# Patient Record
Sex: FEMALE
[De-identification: names, ages, dates, MRNs, and addresses within clinical notes are randomized per-mention and may not be internally consistent; named-entity substitution may affect disease eponyms.]

---

## 2021-10-22 ENCOUNTER — HOSPITAL ENCOUNTER (OUTPATIENT)
Dept: HOSPITAL 97 - ER | Age: 61
Setting detail: OBSERVATION
LOS: 1 days | Discharge: HOME | End: 2021-10-23
Attending: HOSPITALIST | Admitting: HOSPITALIST
Payer: COMMERCIAL

## 2021-10-22 VITALS — BODY MASS INDEX: 37 KG/M2

## 2021-10-22 DIAGNOSIS — E11.9: ICD-10-CM

## 2021-10-22 DIAGNOSIS — Z96.659: ICD-10-CM

## 2021-10-22 DIAGNOSIS — Z23: ICD-10-CM

## 2021-10-22 DIAGNOSIS — Z88.0: ICD-10-CM

## 2021-10-22 DIAGNOSIS — Z90.49: ICD-10-CM

## 2021-10-22 DIAGNOSIS — Z90.710: ICD-10-CM

## 2021-10-22 DIAGNOSIS — K76.0: ICD-10-CM

## 2021-10-22 DIAGNOSIS — R16.1: ICD-10-CM

## 2021-10-22 DIAGNOSIS — E83.42: ICD-10-CM

## 2021-10-22 DIAGNOSIS — I50.9: ICD-10-CM

## 2021-10-22 DIAGNOSIS — Z20.822: ICD-10-CM

## 2021-10-22 DIAGNOSIS — I11.0: ICD-10-CM

## 2021-10-22 DIAGNOSIS — R07.9: Primary | ICD-10-CM

## 2021-10-22 DIAGNOSIS — D64.9: ICD-10-CM

## 2021-10-22 DIAGNOSIS — Z88.6: ICD-10-CM

## 2021-10-22 DIAGNOSIS — G89.29: ICD-10-CM

## 2021-10-22 DIAGNOSIS — D69.6: ICD-10-CM

## 2021-10-22 LAB
ALBUMIN SERPL BCP-MCNC: 3.1 G/DL (ref 3.4–5)
ALP SERPL-CCNC: 78 U/L (ref 45–117)
ALT SERPL W P-5'-P-CCNC: 32 U/L (ref 12–78)
AST SERPL W P-5'-P-CCNC: 36 U/L (ref 15–37)
BLD SMEAR INTERP: (no result)
BUN BLD-MCNC: 20 MG/DL (ref 7–18)
GLUCOSE SERPLBLD-MCNC: 107 MG/DL (ref 74–106)
HCT VFR BLD CALC: 25.9 % (ref 36–45)
INR BLD: 1.16
LYMPHOCYTES # SPEC AUTO: 0.9 K/UL (ref 0.7–4.9)
MAGNESIUM SERPL-MCNC: 1.2 MG/DL (ref 1.8–2.4)
MORPHOLOGY BLD-IMP: (no result)
NT-PROBNP SERPL-MCNC: 193 PG/ML (ref ?–125)
PMV BLD: 6.9 FL (ref 7.6–11.3)
POTASSIUM SERPL-SCNC: 4.2 MMOL/L (ref 3.5–5.1)
RBC # BLD: 3.16 M/UL (ref 3.86–4.86)
TROPONIN (EMERG DEPT USE ONLY): < 0.02 NG/ML (ref 0–0.04)
TROPONIN I: < 0.02 NG/ML (ref 0–0.04)

## 2021-10-22 PROCEDURE — 99285 EMERGENCY DEPT VISIT HI MDM: CPT

## 2021-10-22 PROCEDURE — 84466 ASSAY OF TRANSFERRIN: CPT

## 2021-10-22 PROCEDURE — 85610 PROTHROMBIN TIME: CPT

## 2021-10-22 PROCEDURE — 82947 ASSAY GLUCOSE BLOOD QUANT: CPT

## 2021-10-22 PROCEDURE — 84100 ASSAY OF PHOSPHORUS: CPT

## 2021-10-22 PROCEDURE — 84443 ASSAY THYROID STIM HORMONE: CPT

## 2021-10-22 PROCEDURE — 84439 ASSAY OF FREE THYROXINE: CPT

## 2021-10-22 PROCEDURE — 82607 VITAMIN B-12: CPT

## 2021-10-22 PROCEDURE — 83880 ASSAY OF NATRIURETIC PEPTIDE: CPT

## 2021-10-22 PROCEDURE — 80076 HEPATIC FUNCTION PANEL: CPT

## 2021-10-22 PROCEDURE — 80053 COMPREHEN METABOLIC PANEL: CPT

## 2021-10-22 PROCEDURE — 81003 URINALYSIS AUTO W/O SCOPE: CPT

## 2021-10-22 PROCEDURE — 80048 BASIC METABOLIC PNL TOTAL CA: CPT

## 2021-10-22 PROCEDURE — 83540 ASSAY OF IRON: CPT

## 2021-10-22 PROCEDURE — 85379 FIBRIN DEGRADATION QUANT: CPT

## 2021-10-22 PROCEDURE — 94760 N-INVAS EAR/PLS OXIMETRY 1: CPT

## 2021-10-22 PROCEDURE — 93970 EXTREMITY STUDY: CPT

## 2021-10-22 PROCEDURE — 83735 ASSAY OF MAGNESIUM: CPT

## 2021-10-22 PROCEDURE — 82746 ASSAY OF FOLIC ACID SERUM: CPT

## 2021-10-22 PROCEDURE — 84484 ASSAY OF TROPONIN QUANT: CPT

## 2021-10-22 PROCEDURE — 83036 HEMOGLOBIN GLYCOSYLATED A1C: CPT

## 2021-10-22 PROCEDURE — 82728 ASSAY OF FERRITIN: CPT

## 2021-10-22 PROCEDURE — 93005 ELECTROCARDIOGRAM TRACING: CPT

## 2021-10-22 PROCEDURE — 96366 THER/PROPH/DIAG IV INF ADDON: CPT

## 2021-10-22 PROCEDURE — 71045 X-RAY EXAM CHEST 1 VIEW: CPT

## 2021-10-22 PROCEDURE — 96375 TX/PRO/DX INJ NEW DRUG ADDON: CPT

## 2021-10-22 PROCEDURE — 80061 LIPID PANEL: CPT

## 2021-10-22 PROCEDURE — 96365 THER/PROPH/DIAG IV INF INIT: CPT

## 2021-10-22 PROCEDURE — 36415 COLL VENOUS BLD VENIPUNCTURE: CPT

## 2021-10-22 PROCEDURE — 90471 IMMUNIZATION ADMIN: CPT

## 2021-10-22 PROCEDURE — 71275 CT ANGIOGRAPHY CHEST: CPT

## 2021-10-22 PROCEDURE — 85025 COMPLETE CBC W/AUTO DIFF WBC: CPT

## 2021-10-22 RX ADMIN — HUMAN INSULIN SCH: 100 INJECTION, SOLUTION SUBCUTANEOUS at 22:14

## 2021-10-22 RX ADMIN — MORPHINE SULFATE PRN MG: 2 INJECTION, SOLUTION INTRAMUSCULAR; INTRAVENOUS at 22:00

## 2021-10-22 RX ADMIN — Medication SCH ML: at 22:14

## 2021-10-22 NOTE — ER
Nurse's Notes                                                                                     

 [facility]                                                                 

Name: Meredith Stark                                                                                 

Age: 61 yrs                                                                                       

Sex: Female                                                                                       

: 1960                                                                                   

MRN: [id number]                                                                                   

Arrival Date: 10/22/2021                                                                          

Time: 16:57                                                                                       

Account#: [id number]                                                                            

Bed 15                                                                                            

Private MD:                                                                                       

Diagnosis: Chest pain, unspecified;Acute pulmonary edema                                          

                                                                                                  

Presentation:                                                                                     

10/22                                                                                             

17:13 Chief complaint: Patient states: chest pain that began this morning at 1100, pt also    aa5 

      reports feeling SOB. Pt appears pale in triage, denies bloody stools or vomiting blood.     

      Denies cough/congestion/nausea/vomiting/diarrhea. Coronavirus screen: shortness of          

      breath. Ebola Screen: No symptoms or risks identified at this time. Initial Sepsis          

      Screen: Does the patient meet any 2 criteria? No. Patient's initial sepsis screen is        

      negative. Does the patient have a suspected source of infection? No. Patient's initial      

      sepsis screen is negative. Risk Assessment: Do you want to hurt yourself or someone         

      else? Patient reports no desire to harm self or others. Onset of symptoms was 2021.                                                                                   

17:13 Acuity: SP 2                                                                           aa5 

17:13 Method Of Arrival: Ambulatory                                                           aa5 

                                                                                                  

Historical:                                                                                       

- Allergies:                                                                                      

17:15 PENICILLINS;                                                                            aa5 

17:15 Nubain;                                                                                 aa5 

17:15 Darvocet-N 100;                                                                         aa5 

- PMHx:                                                                                           

17:15 Hypertensive disorder; Diabetes mellitus; thyroid problem;                              aa5 

- PSHx:                                                                                           

17:15 hysterectomy; cheryl knee reconstructive sx; Appendectomy; Cholecystectomy;                aa5 

17:18 Carpal Tunnel Repair;                                                                   aa5 

                                                                                                  

- Immunization history:: Client reports receiving the 2nd dose of the Covid vaccine.              

- Social history:: Smoking status: Patient denies any tobacco usage or history of.                

                                                                                                  

                                                                                                  

Screenin:53 Abuse screen: Denies threats or abuse. Nutritional screening: No deficits noted.        vg1 

      Tuberculosis screening: No symptoms or risk factors identified. Fall Risk No fall in        

      past 12 months (0 pts). No secondary diagnosis (0 pts). IV access (20 points).              

      Ambulatory Aid- None/Bed Rest/Nurse Assist (0 pts). Gait- Normal/Bed Rest/Wheelchair (0     

      pts) Mental Status- Oriented to own ability (0 pts). Total Madden Fall Scale indicates       

      No Risk (0-24 pts).                                                                         

                                                                                                  

Assessment:                                                                                       

18:40 General: Appears in no apparent distress. uncomfortable, Behavior is calm, cooperative. vg1 

      Pain: Complains of pain in anterior aspect of left upper chest Pain radiates to left        

      arm Pain currently is 8 out of 10 on a pain scale. Pain began this morning. Neuro:          

      Level of Consciousness is awake, alert, obeys commands, Oriented to person, place,          

      time, situation, Reports headache. Cardiovascular: Patient's skin is warm and dry.          

      Respiratory: Airway is patent Respiratory effort is even, unlabored. GI: Reports            

      diarrhea. : No signs and/or symptoms were reported regarding the genitourinary            

      system. EENT: No signs and/or symptoms were reported regarding the EENT system. Derm:       

      Skin is intact, is healthy with good turgor. Musculoskeletal: Circulation, motion, and      

      sensation intact.                                                                           

19:35 Reassessment: Patient appears in no apparent distress at this time. No changes from     vg1 

      previously documented assessment. Patient and/or family updated on plan of care and         

      expected duration. Pain level reassessed. Patient is alert, oriented x 3, equal             

      unlabored respirations, skin warm/dry/pink. Pt asked if could take personal Oxycodone       

      10 mg PO; asked [place] NP stated pt could take it. Pt took Oxycodone 10 mg PO for          

      chronic back pain.                                                                          

21:46 Reassessment: Patient appears in no apparent distress at this time. Patient and/or      vg1 

      family updated on plan of care and expected duration. Pain level reassessed. Patient is     

      alert, oriented x 3, equal unlabored respirations, skin warm/dry/pink. Pt rates pain        

      9/10. Receiving nurse notified.                                                             

                                                                                                  

Vital Signs:                                                                                      

17:13  / 60; Pulse 72; Resp 18 S; Temp 98.0(TE); Pulse Ox 95% on R/A; Weight 92.99 kg   aa5 

      (R); Height 5 ft. 2 in. (157.48 cm) (R);                                                    

18:52  / 72; Pulse 70; Resp 14; Pulse Ox 98% ;                                          vg1 

19:00  / 60; Pulse 75; Resp 22; Pulse Ox 98% on R/A;                                    vg1 

21:45  / 58; Pulse 79; Resp 14; Pulse Ox 100% ;                                         vg1 

17:13 Body Mass Index 37.49 (92.99 kg, 157.48 cm)                                             aa 

                                                                                                  

ED Course:                                                                                        

16:57 Patient arrived in ED.                                                                  as  

17:13 Arm band placed on.                                                                     aa5 

17:15 Triage completed.                                                                       aa5 

17:24 EKG completed in triage. Results shown to MD.                                           aa5 

17:29 Initial lab(s) drawn, by me, sent to lab. Inserted saline lock: 20 gauge in right       aa5 

      antecubital area, using aseptic technique. Blood collected.                                 

17:55 XRAY Chest (1 view) In Process Unspecified.                                             EDMS

18:15 [facility]                                                        kb  

18:15 Gerardo Gamino MD is Attending Physician.                                             kb  

18:21 Patient has correct armband on for positive identification. Bed in low position. Call   [facility] 

      light in reach. Side rails up X 1. Warm blanket given. Cardiac monitor on. Pulse ox on.     

      NIBP on.                                                                                    

18:21 Basic Metabolic Panel Sent.                                                             5 

18:21 LFT's Sent.                                                                             5 

18:21 Magnesium Sent.                                                                         5 

18:21 NT PRO-BNP Sent.                                                                        [facility] 

18:21 Troponin (emerg Dept Use Only) Sent.                                                    [facility] 

18:22 EKG done, by ED staff, reviewed by Lindsey CONNOLLY.                               5 

18:41 Elsa Paul, RN is Primary Nurse.                                                  vg1 

18:53 No provider procedures requiring assistance completed. Patient maintains SpO2           vg1 

      saturation greater than 95% on room air.                                                    

19:03 Ajay Abdul is Hospitalizing Provider.                                               kb  

21:43 Patient admitted, IV remains in place.                                                  vg1 

21:43 COVID-19 (Coronavirus) Document "Date of Onset" if Symptomatic Sent.                    wg  

21:43 Extrem Venous W Compression Cheryl US Sent.                                                wg  

21:44 DD Sent.                                                                                wg  

                                                                                                  

Administered Medications:                                                                         

18:50 Drug: Magnesium Sulfate 2 grams Route: IVPB; Infused Over: 2 hrs; Site: right           vg1 

      antecubital;                                                                                

21:47 Follow up: IV Status: Completed infusion                                                vg1 

19:13 Drug: Lasix (furosemide) 20 mg Route: IVP; Site: right antecubital;                     vg1 

21:46 Follow up: Response: No adverse reaction                                                vg1 

19:15 Drug: Aspirin Chewable Tablet 324 mg Route: PO;                                         vg1 

21:46 Follow up: Response: No adverse reaction                                                vg1 

                                                                                                  

                                                                                                  

Outcome:                                                                                          

19:03 Decision to Hospitalize by Provider.                                                    kb  

21:42 Admitted to Med/surg accompanied by tech, via wheelchair, room 419, with chart, Report  vg1 

      called to  Michell KAUR                                                                        

21:42 Condition: stable                                                                           

21:42 Instructed on the need for admit.                                                           

22:02 Patient left the ED.                                                                    vg1 

                                                                                                  

Signatures:                                                                                       

Dispatcher [employer]                           Lindsey Stanley, CATHLEEN KEENAN-Hilda Gibbons Audri, RN                     RN   aa5                                                  

Gabriela Cisneros Victoria, RN                    RN   vg1                                                  

Bartolo Blanchard RN                              wg                                                   

                                                                                                  

Corrections: (The following items were deleted from the chart)                                    

17:18 17:15 Allergies: No Known Allergies; karly brandt 

                                                                                                  

**************************************************************************************************

## 2021-10-22 NOTE — RAD REPORT
EXAM DESCRIPTION:  RAD - Chest Single View - 10/22/2021 5:55 pm

 

CLINICAL HISTORY:  CHEST PAIN

Chest pain.

 

COMPARISON:  <Comparisons>

 

FINDINGS:  Portable technique limits examination quality.

 

Mild-to-moderate interstitial pulmonary edema. The heart is mildly enlarged in size. No displaced fra
ctures.

 

IMPRESSION:  Mild CHF.

## 2021-10-22 NOTE — RAD REPORT
EXAM DESCRIPTION:  US - Extrem Venous W Compress Tera - 10/22/2021 9:19 pm

 

CLINICAL HISTORY:  SWELLING

Bilateral leg edema and swelling.

 

COMPARISON:  <Comparisons>

 

TECHNIQUE:  Real-time sonographic interrogation of the left and right lower extremity deep venous sys
tems was performed.

 

FINDINGS:  Normal compressibility, flow augmentation, phasic flow and spontaneous flow is identified 
in both the left and right lower extremity deep venous systems.

 

IMPRESSION:  No sonographic evidence of left or right lower extremity deep venous thrombosis.

## 2021-10-22 NOTE — EDPHYS
Physician Documentation                                                                           

 [facility]                                                                 

Name: Meredith Stark                                                                                 

Age: 61 yrs                                                                                       

Sex: Female                                                                                       

: 1960                                                                                   

MRN: [id number]                                                                                   

Arrival Date: 10/22/2021                                                                          

Time: 16:57                                                                                       

Account#: [id number]                                                                            

Bed 15                                                                                            

Private MD:                                                                                       

ED Physician Gerardo Gamino                                                                      

HPI:                                                                                              

10/22                                                                                             

19:01 This 61 yrs old  Female presents to ER via Ambulatory with complaints of Chest  kb  

      Pain.                                                                                       

19:01 The patient or guardian reports chest pain that is located primarily in the anterior    kb  

      chest wall, left. Onset: today, at 11:00. The pain does not radiate. Associated signs       

      and symptoms: Pertinent positives: shortness of breath. The chest pain is described as      

      a pressure. Duration: The patient or guardian reports a single episode, that is still       

      ongoing. Modifying factors: The symptoms are alleviated by nothing. the symptoms are        

      aggravated by nothing. Severity of pain: At its worst the pain was moderate in the          

      emergency department the pain is unchanged. The patient has not experienced similar         

      symptoms in the past. The patient has not recently seen a physician.                        

                                                                                                  

Historical:                                                                                       

- Allergies:                                                                                      

17:15 PENICILLINS;                                                                            aa5 

17:15 Nubain;                                                                                 aa5 

17:15 Darvocet-N 100;                                                                         aa5 

- PMHx:                                                                                           

17:15 Hypertensive disorder; Diabetes mellitus; thyroid problem;                              aa5 

- PSHx:                                                                                           

17:15 hysterectomy; cheryl knee reconstructive sx; Appendectomy; Cholecystectomy;                aa5 

17:18 Carpal Tunnel Repair;                                                                   aa5 

                                                                                                  

- Immunization history:: Client reports receiving the 2nd dose of the Covid vaccine.              

- Social history:: Smoking status: Patient denies any tobacco usage or history of.                

                                                                                                  

                                                                                                  

ROS:                                                                                              

19:01 Constitutional: Negative for fever, chills, and weight loss.                            kb  

19:01 Cardiovascular: Positive for chest pain, Negative for edema, orthopnea, palpitations,       

      paroxysmal nocturnal dyspnea.                                                               

19:01 Respiratory: Positive for orthopnea, shortness of breath, Negative for cough,               

      hemoptysis, pleurisy, sputum production, wheezing.                                          

19:01 All other systems are negative.                                                             

                                                                                                  

Exam:                                                                                             

19:01 Constitutional:  This is a well developed, well nourished patient who is awake, alert,  kb  

      and in no acute distress. Head/Face:  Normocephalic, atraumatic. ENT:  Moist Mucous         

      membranes Cardiovascular:  Regular rate and rhythm with a normal S1 and S2.  No             

      gallops, murmurs, or rubs.  No pulse deficits. Respiratory:  Respirations even and          

      unlabored. No increased work of breathing, no retractions or nasal flaring. Skin:           

      Warm, dry with normal turgor.  Normal color. MS/ Extremity:  Pulses equal, no cyanosis.     

       Neurovascular intact.  Full, normal range of motion. Neuro:  Awake and alert, GCS 15,      

      oriented to person, place, time, and situation. Moves all extremities. Normal gait.         

      Psych:  Awake, alert, with orientation to person, place and time.  Behavior, mood, and      

      affect are within normal limits.                                                            

                                                                                                  

Vital Signs:                                                                                      

17:13  / 60; Pulse 72; Resp 18 S; Temp 98.0(TE); Pulse Ox 95% on R/A; Weight 92.99 kg   aa5 

      (R); Height 5 ft. 2 in. (157.48 cm) (R);                                                    

18:52  / 72; Pulse 70; Resp 14; Pulse Ox 98% ;                                          vg1 

19:00  / 60; Pulse 75; Resp 22; Pulse Ox 98% on R/A;                                    vg1 

21:45  / 58; Pulse 79; Resp 14; Pulse Ox 100% ;                                         vg1 

17:13 Body Mass Index 37.49 (92.99 kg, 157.48 cm)                                             aa5 

                                                                                                  

MDM:                                                                                              

18:15 Patient medically screened.                                                             kb  

18:54 Data reviewed: vital signs, nurses notes. Data interpreted: Pulse oximetry: on room air kb  

      is 98 %. Interpretation: normal. Counseling: I had a detailed discussion with the           

      patient and/or guardian regarding: the historical points, exam findings, and any            

      diagnostic results supporting the discharge/admit diagnosis, lab results, radiology         

      results, the need for further work-up and treatment in the hospital. Physician              

      consultation: Jameson RODRIGUEZ was contacted at 18:54, regarding admission, patient's        

      condition, and will see patient in ED, shortly.                                             

19:02 The patient was given aspirin in the Emergency Department.                              kb  

                                                                                                  

10/22                                                                                             

17:24 Order name: Basic Metabolic Panel; Complete Time: 18:24                                 aa5 

10/22                                                                                             

17:24 Order name: CBC with Diff; Complete Time: 20:02                                         aa5 

10/22                                                                                             

17:24 Order name: LFT's; Complete Time: 18:24                                                 aa5 

10/22                                                                                             

17:24 Order name: Magnesium; Complete Time: 18:24                                             aa5 

10/22                                                                                             

17:24 Order name: NT PRO-BNP; Complete Time: 18:24                                            aa5 

10/22                                                                                             

17:24 Order name: PT-INR; Complete Time: 18:15                                                aa5 

10/22                                                                                             

17:24 Order name: Troponin (emerg Dept Use Only); Complete Time: 18:24                        aa5 

10/22                                                                                             

17:24 Order name: XRAY Chest (1 view); Complete Time: 18:15                                   aa5 

10/22                                                                                             

18:46 Order name: COVID-19 (Coronavirus) Document "Date of Onset" if Symptomatic              kb  

10/22                                                                                             

18:46 Order name: CORONAVIRUS                                                                 EDMS

10/22                                                                                             

19:57 Order name: CBC Smear Scan; Complete Time: 20:02                                        EDMS

10/22                                                                                             

20:17 Order name: Extrem Venous W Compression Cheryl US                                          ej  

10/22                                                                                             

20:41 Order name: DD                                                                          ej  

10/22                                                                                             

20:42 Order name: SARS-COV-2 RT PCR; Complete Time: 21:10                                     EDMS

10/22                                                                                             

17:24 Order name: EKG; Complete Time: 17:25                                                   aa5 

10/22                                                                                             

17:24 Order name: Cardiac monitoring; Complete Time: 18:20                                    aa5 

10/22                                                                                             

17:24 Order name: EKG - Nurse/Tech; Complete Time: 17:44                                      aa5 

10/22                                                                                             

17:24 Order name: IV Saline Lock; Complete Time: 17:44                                        aa5 

10/22                                                                                             

17:24 Order name: Labs collected and sent; Complete Time: 17:44                               aa5 

10/22                                                                                             

17:24 Order name: O2 Per Protocol; Complete Time: 18:20                                       aa5 

10/22                                                                                             

17:24 Order name: O2 Sat Monitoring; Complete Time: 18:20                                     aa5 

10/22                                                                                             

19:10 Order name: CONS Physician Consult; Complete Time: 21:43                                EDMS

10/22                                                                                             

21:33 Order name: US; Complete Time: 21:35                                                    EDMS

                                                                                                  

Administered Medications:                                                                         

18:50 Drug: Magnesium Sulfate 2 grams Route: IVPB; Infused Over: 2 hrs; Site: right           vg1 

      antecubital;                                                                                

21:47 Follow up: IV Status: Completed infusion                                                vg1 

19:13 Drug: Lasix (furosemide) 20 mg Route: IVP; Site: right antecubital;                     vg1 

21:46 Follow up: Response: No adverse reaction                                                vg1 

19:15 Drug: Aspirin Chewable Tablet 324 mg Route: PO;                                         vg1 

21:46 Follow up: Response: No adverse reaction                                                vg1 

                                                                                                  

                                                                                                  

Disposition Summary:                                                                              

10/22/21 19:03                                                                                    

Hospitalization Ordered                                                                           

      Hospitalization Status: Inpatient Admission                                             kb  

      Provider: Ajay Abdul  

      Location: Telemetry/MedSurg (Inpatient)                                                 kb  

      Condition: Stable                                                                       kb  

      Problem: new                                                                            kb  

      Symptoms: are unchanged                                                                 kb  

      Bed/Room Type: Standard                                                                   

      Room Assignment: 419(10/22/21 21:19)                                                      

      Diagnosis                                                                                   

        - Chest pain, unspecified                                                             kb  

        - Acute pulmonary edema                                                               kb  

      Forms:                                                                                      

        - Medication Reconciliation Form                                                      kb  

        - SBAR form                                                                           kb  

Addendum:                                                                                         

10/25/2021                                                                                        

     10:25 Co-signature as Attending Physician, Gerardo Gamino MD I agree with the assessment and  c
ha

           plan of care.                                                                          

                                                                                                  

Signatures:                                                                                       

Dispatcher [employer]                           Lindsey Stanley FNP-C FNP-Cassie Prather, RN                        RN   Gerardo Santana MD MD cha Calderon, Audri RN                     RN   aa5                                                  

Elsa Paul RN                    RN   vg1                                                  

                                                                                                  

Corrections: (The following items were deleted from the chart)                                    

10/22                                                                                             

17:18 17:15 Allergies: No Known Allergies; aa5                                                aa5 

21:19 19:03 kb                                                                                mw  

                                                                                                  

**************************************************************************************************

## 2021-10-22 NOTE — P.HP
Certification for Inpatient


Patient admitted to: Observation


With expected LOS: <2 Midnights


Patient will require the following post-hospital care: None


Practitioner: I am a practitioner with admitting privileges, knowledge of 

patient current condition, hospital course, and medical plan of care.


Services: Services provided to patient in accordance with Admission requirements

found in Title 42 Section 412.3 of the Code of Federal Regulations





Patient History


Date of Service: 10/22/21


Reason for admission: chest pain


History of Present Illness: 


Ms. Stark is a 60 yo F with HTN, DM, NAFLD, and history of anemia and 

thrombocytopenia who presents with 10/10 constant left sided chest pressure 

radiating down the left arm. Pain worse with movement, relieved with rest. 

Patient reports SOB, dizziness, nausea. Denies palpitations, vomiting, vision 

changes. She last saw her cardiologist a few months ago with no acute findings 

at that time. Her PCP ordered a bilateral venous doppler US to evaluate leg 

swelling, but the order was lost so she has not had it done yet. H/H 8.4 plt 98 

BUN 20 GFR 56 Glu 107 Mg 1.2  CXR - mild CHF.





- Past Medical/Surgical History


Has patient received pneumonia vaccine in the past: No


Diabetic: Yes


-: HTN


-: DM


-: NAFLD


-: thrombocytopenia


-: anemia


-: splenomegaly 


-: 2x knee replacement


-: hysterectomy


-: appendectomy


-: cholecystectomy


-: carpal tunnel syndrome 


Psychosocial/ Personal History: 





- Social History


Smoking Status: Never smoker


Alcohol use: No


CD- Drugs: No


Caffeine use: No


Place of Residence: Home





Review of Systems


10-point ROS is otherwise unremarkable


General: Unremarkable


Eyes: Unremarkable


ENT: Unremarkable


Respiratory: Shortness of Breath, SOB with Excertion


Cardiovascular: Chest Pain, Edema, Light Headedness


Gastrointestinal: Nausea


Genitourinary: Unremarkable


Musculoskeletal: Unremarkable


Integumentary: Unremarkable


Neurological: Unremarkable


Lymphatics: Unremarkable





Physical Examination





- Physical Exam


General: Alert, In no apparent distress


HEENT: Atraumatic, PERRLA, Mucous membr. moist/pink, EOMI, Sclerae nonicteric


Neck: Supple, 2+ carotid pulse no bruit, No LAD, Without JVD or thyroid 

abnormality


Respiratory: Normal air movement, Crackles/rales


Cardiovascular: Regular rate/rhythm, Normal S1 S2, Edema


Gastrointestinal: Normal bowel sounds, No tenderness


Musculoskeletal: No tenderness


Integumentary: No rashes


Neurological: Normal speech, Normal strength at 5/5 x4 extr, Normal tone, Normal

affect


Lymphatics: No axilla or inguinal lymphadenopathy





- Studies


Laboratory Data (last 24 hrs)





10/22/21 17:30: PT 13.4 H, INR 1.16


10/22/21 17:30: WBC 2.80 L, Hgb 8.4 L, Hct 25.9 L, Plt Count 98 L


10/22/21 17:30: Sodium 138, Potassium 4.2, BUN 20 H, Creatinine 1.01, Glucose 

107 H, Magnesium 1.2 L*, Total Bilirubin 0.3, AST 36, ALT 32, Alkaline 

Phosphatase 78








Assessment and Plan





- Problems (Diagnosis)


(1) HTN (hypertension)


Current Visit: Yes   Status: Chronic   


Qualifiers: 


   Hypertension type: primary hypertension   Qualified Code(s): I10 - Essential 

(primary) hypertension   





(2) T2DM (type 2 diabetes mellitus)


Current Visit: Yes   Status: Chronic   


Qualifiers: 


   Diabetes mellitus long term insulin use: without long term use   Diabetes 

mellitus complication status: with kidney complications   Diabetes mellitus 

complication detail: with chronic kidney disease   Chronic kidney disease stage 

3 subtype: stage 3a (GFR 45-59) 





(3) Chest pain


Current Visit: Yes   Status: Acute   


Qualifiers: 


   Chest pain type: unspecified   Qualified Code(s): R07.9 - Chest pain, 

unspecified   





(4) Anemia


Current Visit: Yes   Status: Chronic   


Qualifiers: 


   Anemia type: unspecified type   Qualified Code(s): D64.9 - Anemia, 

unspecified   





(5) Thrombocytopenia


Current Visit: Yes   Status: Chronic   





(6) Hypomagnesemia


Current Visit: Yes   Status: Acute   





- Plan


on tele, cardiology consulted


trend troponins, repeat EKG


ECHO pending, daily weights, fluid restriction 


DDimer pending, venous doppler ultrasound pending, will obtain CTPE if DDimer 

elevated 


daily ASA, BB, statin, lasix, prn morphine and nitroglycerin


A1c pending, sliding scale insulin and accuchecks 


anemia workup pending, continue to monitor hemoglobin/hematocrit 


lipid and thyroid panel pending


magnesium replacement protocol


DVT ppx 


Discharge Plan: Home


Plan to discharge in: 24 Hours





- Advance Directives


Does patient have a Living Will: No


Does patient have a Durable POA for Healthcare: No





- Code Status/Comfort Care


Code Status Assessed: Yes (full code )


Critical Care: No


Time Spent Managing Pts Care (In Minutes): 70

## 2021-10-23 VITALS — OXYGEN SATURATION: 97 %

## 2021-10-23 VITALS — TEMPERATURE: 98.2 F | DIASTOLIC BLOOD PRESSURE: 62 MMHG | SYSTOLIC BLOOD PRESSURE: 135 MMHG

## 2021-10-23 LAB
ALBUMIN SERPL BCP-MCNC: 3 G/DL (ref 3.4–5)
ALP SERPL-CCNC: 85 U/L (ref 45–117)
ALT SERPL W P-5'-P-CCNC: 28 U/L (ref 12–78)
AST SERPL W P-5'-P-CCNC: 35 U/L (ref 15–37)
BUN BLD-MCNC: 23 MG/DL (ref 7–18)
FERRITIN SERPL-MCNC: 25 NG/ML (ref 8–388)
GLUCOSE SERPLBLD-MCNC: 143 MG/DL (ref 74–106)
HCT VFR BLD CALC: 26.1 % (ref 36–45)
HDLC SERPL-MCNC: 34 MG/DL (ref 40–60)
IRON SERPL-MCNC: 31 UG/DL (ref 50–170)
LDLC SERPL CALC-MCNC: 22 MG/DL (ref ?–130)
LYMPHOCYTES # SPEC AUTO: 1 K/UL (ref 0.7–4.9)
MAGNESIUM SERPL-MCNC: 1.9 MG/DL (ref 1.8–2.4)
PMV BLD: 7.3 FL (ref 7.6–11.3)
POTASSIUM SERPL-SCNC: 3.7 MMOL/L (ref 3.5–5.1)
RBC # BLD: 3.2 M/UL (ref 3.86–4.86)
TRANSFERRIN SERPL-MCNC: 268 MG/DL (ref 200–360)
TSH SERPL DL<=0.05 MIU/L-ACNC: < 0.005 UIU/ML (ref 0.36–3.74)
UA DIPSTICK W REFLEX MICRO PNL UR: (no result)

## 2021-10-23 RX ADMIN — HUMAN INSULIN SCH: 100 INJECTION, SOLUTION SUBCUTANEOUS at 11:30

## 2021-10-23 RX ADMIN — MORPHINE SULFATE PRN MG: 2 INJECTION, SOLUTION INTRAMUSCULAR; INTRAVENOUS at 04:43

## 2021-10-23 RX ADMIN — HUMAN INSULIN SCH: 100 INJECTION, SOLUTION SUBCUTANEOUS at 07:30

## 2021-10-23 RX ADMIN — MORPHINE SULFATE PRN MG: 2 INJECTION, SOLUTION INTRAMUSCULAR; INTRAVENOUS at 11:27

## 2021-10-23 RX ADMIN — Medication SCH ML: at 09:00

## 2021-10-23 NOTE — P.DS
Admission Date: 10/22/21


Discharge Date: 10/23/21


Disposition: ROUTINE DISCHARGE


Discharge Condition: FAIR


Reason for Admission: chest pain





- Problems


(1) Chest pain


Current Visit: Yes   Status: Acute   


Qualifiers: 


   Chest pain type: unspecified   Qualified Code(s): R07.9 - Chest pain, 

unspecified   





(2) Anemia


Current Visit: Yes   Status: Chronic   


Qualifiers: 


   Anemia type: unspecified type   Qualified Code(s): D64.9 - Anemia, 

unspecified   





(3) HTN (hypertension)


Current Visit: Yes   Status: Chronic   


Qualifiers: 


   Hypertension type: primary hypertension   Qualified Code(s): I10 - Essential 

(primary) hypertension   





(4) T2DM (type 2 diabetes mellitus)


Current Visit: Yes   Status: Chronic   


Qualifiers: 


   Diabetes mellitus long term insulin use: without long term use   Diabetes gloria

litus complication status: with kidney complications   Diabetes mellitus 

complication detail: with chronic kidney disease   Chronic kidney disease stage 

3 subtype: stage 3a (GFR 45-59) 





(5) Thrombocytopenia


Current Visit: Yes   Status: Chronic   


Brief History of Present Illness: 


62 yo F with HTN, DM, NAFLD, and history of anemia and thrombocytopenia 

presented with 10/10 constant left sided chest pressure radiating down the left 

arm. Pain worse with movement, relieved with rest. Patient reports SOB, 

dizziness, nausea. Denies palpitations, vomiting, vision changes. H/H 8.4 plt 98

BUN 20 GFR 56 Glu 107 Mg 1.2  CTA thorax showed no acute disease.  

Negative for PE.  Initial troponin negative.  Patient placed under observation 

for ACS rule out.








Hospital Course: 


Troponin trended negative.  Patient was asymptomatic during the hospital stay.  

Noted she has a history of chronic pain on extended-release morphine and 

oxycodone p.r.n. Lipid profile checked was in acceptable range.  Lower extremity

venous Doppler was negative for DVT.  ACS ruled out.  Patient informed to follow

with the per ID for arrangement for outpatient stress test.  Dr. Sandy 

informed.





Vital Signs/Physical Exam: 














Temp Pulse Resp BP Pulse Ox


 


 97.6 F   80   19   115/52 L  97 


 


 10/23/21 08:00  10/23/21 08:57  10/23/21 08:00  10/23/21 08:57  10/23/21 08:00








General: Alert, In no apparent distress, Oriented x3, Obese


HEENT: PERRLA


Neck: JVD not distended


Respiratory: Clear to auscultation bilaterally, Normal air movement


Cardiovascular: Regular rate/rhythm, Normal S1 S2


Gastrointestinal: Soft and benign, Non-distended


Musculoskeletal: No swelling


Integumentary: No rashes, No cyanosis


Neurological: Normal strength at 5/5 x4 extr


Laboratory Data at Discharge: 














WBC  2.40 K/uL (4.3-10.9)  L D 10/23/21  03:11    


 


Hgb  8.7 g/dL (12.0-15.0)  L  10/23/21  03:11    


 


Hct  26.1 % (36.0-45.0)  L  10/23/21  03:11    


 


Plt Count  91 K/uL (152-406)  L  10/23/21  03:11    


 


PT  13.4 SECONDS (9.5-12.5)  H  10/22/21  17:30    


 


INR  1.16   10/22/21  17:30    


 


Sodium  140 mmol/L (136-145)   10/23/21  03:11    


 


Potassium  3.7 mmol/L (3.5-5.1)   10/23/21  03:11    


 


BUN  23 mg/dL (7-18)  H  10/23/21  03:11    


 


Creatinine  1.07 mg/dL (0.55-1.3)   10/23/21  03:11    


 


Glucose  143 mg/dL ()  H  10/23/21  03:11    


 


Phosphorus  4.4 mg/dL (2.5-4.9)   10/23/21  03:11    


 


Magnesium  1.9 mg/dL (1.8-2.4)   10/23/21  03:11    


 


Total Bilirubin  0.4 mg/dL (0.2-1.0)   10/23/21  03:11    


 


AST  35 U/L (15-37)   10/23/21  03:11    


 


ALT  28 U/L (12-78)   10/23/21  03:11    


 


Alkaline Phosphatase  85 U/L ()   10/23/21  03:11    


 


Troponin I  < 0.02 ng/mL (0.0-0.045)   10/23/21  03:11    


 


Triglycerides  117 mg/dL (<150)   10/23/21  03:11    


 


Cholesterol  79 mg/dL (<200)   10/23/21  03:11    


 


HDL Cholesterol  34 mg/dL (40-60)  L  10/23/21  03:11    


 


Cholesterol/HDL Ratio  2.32   10/23/21  03:11    








Home Medications: 








ALPRAZolam [Xanax*] 0.5 mg PO BID PRN 10/23/21 


Aspirin [Aspirin EC 81 MG] 81 mg PO DAILY #30 tablet. 10/23/21 


Atorvastatin Calcium 10 mg PO DAILY 10/23/21 


Levothyroxine Sodium [Levothyroxine] 150 mcg PO DAILY #30 capsule 10/23/21 


Metformin HCl 500 mg PO BID 10/23/21 


Metoclopramide HCl [Reglan] 10 mg PO TID 10/23/21 


Metoprolol Tartrate 25 mg PO BID 10/23/21 


Mirabegron [Myrbetriq] 25 mg PO DAILY 10/23/21 


Mirtazapine 15 mg PO BEDTIME 10/23/21 


Morphine Sulfate [Morphine Sulfate ER] 30 mg PO DAILY 6PM 10/23/21 


Omeprazole [Prilosec] 40 mg PO DAILY 10/23/21 


Oxycodone HCl/Acetaminophen [Percocet  mg Tablet] 1 each PO Q6HR PRN 

10/23/21 


Vortioxetine Hydrobromide [Trintellix] 20 mg PO DAILY 10/23/21 





New Medications: 


Aspirin [Aspirin EC 81 MG] 81 mg PO DAILY #30 tablet.


Levothyroxine Sodium [Levothyroxine] 150 mcg PO DAILY #30 capsule


Diet: AHA


Activity: Ad tena


Followup: 


Solomon Sandy MD [ACTIVE - CAN ADMIT] - 2-3 Days


(Please call the office at [phone].


)


Radha Lee MD [Primary Care Provider] -

## 2021-10-23 NOTE — RAD REPORT
EXAM DESCRIPTION:  CT - Chest For Pe Angio - 10/23/2021 8:12 am

 

CLINICAL HISTORY:  chest pain

 

COMPARISON:  Extrem Venous W Compress Tera dated 10/22/2021

 

FINDINGS:  Chest Wall: No suspicious thyroid nodules or pathologic lymphadenopathy.

Lungs: No acute abnormality.

Pleura: No significant effusions or pneumothorax.

Mediastinum/dolly: No pathologic lymphadenopathy.

Pulmonary arteries/Aorta: No filling defect identified. No aortic aneurysm.

Heart: No significant pericardial effusion. Normal heart size.

Upper abdomen: No acute abnormality. Cholecystectomy. Question splenomegaly. This is only partially i
bret. Cirrhosis.

Bones: No acute abnormality.

 

All CT scans are performed using dose optimization technique as appropriate and may include automated
 exposure control or mA/KV adjustment according to patient size.

 

IMPRESSION:  Negative for pulmonary embolism. No other acute findings within the chest.

## 2021-12-19 ENCOUNTER — HOSPITAL ENCOUNTER (EMERGENCY)
Dept: HOSPITAL 97 - ER | Age: 61
Discharge: HOME | End: 2021-12-19
Payer: COMMERCIAL

## 2021-12-19 VITALS — SYSTOLIC BLOOD PRESSURE: 144 MMHG | DIASTOLIC BLOOD PRESSURE: 65 MMHG

## 2021-12-19 VITALS — OXYGEN SATURATION: 98 % | TEMPERATURE: 98.6 F

## 2021-12-19 DIAGNOSIS — Z88.0: ICD-10-CM

## 2021-12-19 DIAGNOSIS — Z88.8: ICD-10-CM

## 2021-12-19 DIAGNOSIS — Z20.822: ICD-10-CM

## 2021-12-19 DIAGNOSIS — I10: ICD-10-CM

## 2021-12-19 DIAGNOSIS — A09: Primary | ICD-10-CM

## 2021-12-19 DIAGNOSIS — Z88.5: ICD-10-CM

## 2021-12-19 LAB
ALBUMIN SERPL BCP-MCNC: 3.2 G/DL (ref 3.4–5)
ALP SERPL-CCNC: 99 U/L (ref 45–117)
ALT SERPL W P-5'-P-CCNC: 26 U/L (ref 12–78)
AST SERPL W P-5'-P-CCNC: 28 U/L (ref 15–37)
BUN BLD-MCNC: 10 MG/DL (ref 7–18)
GLUCOSE SERPLBLD-MCNC: 293 MG/DL (ref 74–106)
HCT VFR BLD CALC: 33.9 % (ref 36–45)
LIPASE SERPL-CCNC: 170 U/L (ref 73–393)
LYMPHOCYTES # SPEC AUTO: 0.9 K/UL (ref 0.7–4.9)
PMV BLD: 7.7 FL (ref 7.6–11.3)
POTASSIUM SERPL-SCNC: 4.2 MMOL/L (ref 3.5–5.1)
RBC # BLD: 4.06 M/UL (ref 3.86–4.86)
SARS-COV-2 RNA RESP QL NAA+PROBE: NEGATIVE

## 2021-12-19 PROCEDURE — 99284 EMERGENCY DEPT VISIT MOD MDM: CPT

## 2021-12-19 PROCEDURE — 80048 BASIC METABOLIC PNL TOTAL CA: CPT

## 2021-12-19 PROCEDURE — 96375 TX/PRO/DX INJ NEW DRUG ADDON: CPT

## 2021-12-19 PROCEDURE — 36415 COLL VENOUS BLD VENIPUNCTURE: CPT

## 2021-12-19 PROCEDURE — 74177 CT ABD & PELVIS W/CONTRAST: CPT

## 2021-12-19 PROCEDURE — 96361 HYDRATE IV INFUSION ADD-ON: CPT

## 2021-12-19 PROCEDURE — 0240U: CPT

## 2021-12-19 PROCEDURE — 96365 THER/PROPH/DIAG IV INF INIT: CPT

## 2021-12-19 PROCEDURE — 85025 COMPLETE CBC W/AUTO DIFF WBC: CPT

## 2021-12-19 PROCEDURE — 83690 ASSAY OF LIPASE: CPT

## 2021-12-19 PROCEDURE — 80076 HEPATIC FUNCTION PANEL: CPT

## 2021-12-19 NOTE — RAD REPORT
EXAM DESCRIPTION:  CT - Abdomen   Pelvis W Contrast - 12/19/2021 10:07 am

 

CLINICAL HISTORY:  Abdominal pain

 

COMPARISON:  none.

 

TECHNIQUE:  Computed axial tomography of the abdomen pelvis was obtained. 100 cc Isovue-300 was admin
istered intravenously. Oral contrast was not requested which limits evaluation of bowel.

 

All CT scans are performed using dose optimization technique as appropriate and may include automated
 exposure control or mA/KV adjustment according to patient size.

 

FINDINGS:  A cirrhotic liver with fatty infiltration. Mild distention portal vein. Spleen measures 15
 centimeters.

 

Pancreas, adrenals and kidneys unremarkable.

 

There is no evidence of diverticulitis. Portions of the wall of the colon appear mildly thickened.

 

Hysterectomy

 

Small ventral and small umbilical hernias

 

Moderate compression fracture L1 vertebral body probably old

 

IMPRESSION:  Cirrhosis with mild to moderate splenomegaly

 

Mild colonic wall thickening probably a mild colitis

## 2021-12-19 NOTE — ER
Nurse's Notes                                                                                     

 [facility]                                                                 

Name: Meredith Stark                                                                                 

Age: 61 yrs                                                                                       

Sex: Female                                                                                       

: 1960                                                                                   

MRN: [id number]                                                                                   

Arrival Date: 2021                                                                          

Time: 08:57                                                                                       

Account#: [id number]                                                                            

Bed 5                                                                                             

Private MD:                                                                                       

Diagnosis: Infectious gastroenteritis and colitis, unspecified                                    

                                                                                                  

Presentation:                                                                                     

                                                                                             

09:05 Chief complaint: Patient states: N/V/D and abd cramping that began 1 week ago.          ss  

      Coronavirus screen: Client denies travel out of the [place] in the last 14 days. Ebola         

      Screen: Patient denies exposure to infectious person. Patient denies travel to an           

      Ebola-affected area in the 21 days before illness onset. Initial Sepsis Screen: Does        

      the patient meet any 2 criteria? No. Patient's initial sepsis screen is negative. Does      

      the patient have a suspected source of infection? No. Patient's initial sepsis screen       

      is negative. Risk Assessment: Do you want to hurt yourself or someone else? Patient         

      reports no desire to harm self or others. Onset of symptoms was 2021.          

09:05 Method Of Arrival: Ambulatory                                                             

09:05 Acuity: SP 3                                                                           ss  

                                                                                                  

Historical:                                                                                       

- Allergies:                                                                                      

09:03 Darvocet-N 100;                                                                         ss  

09:03 Nubain;                                                                                 ss  

09:03 PENICILLINS;                                                                            ss  

- PMHx:                                                                                           

09:03 diabetes mellitus; Hypertensive disorder; Thyroid problem;                              ss  

- PSHx:                                                                                           

09:03 Appendectomy; cheryl knee reconstructive sx; carpal tunnel repair; Cholecystectomy;        ss  

      hysterectomy;                                                                               

                                                                                                  

- Immunization history:: Client reports receiving the 2nd dose of the Covid vaccine.              

- Social history:: Smoking status: Patient denies any tobacco usage or history of.                

- Family history:: not pertinent.                                                                 

- Hospitalizations: : No recent hospitalization is reported.                                      

                                                                                                  

                                                                                                  

Screenin:09 Abuse screen: Denies threats or abuse. Nutritional screening: Has had N/V for 3 or more vg1 

      days. Tuberculosis screening: No symptoms or risk factors identified. Fall Risk No fall     

      in past 12 months (0 pts). No secondary diagnosis (0 pts). IV access (20 points).           

      Ambulatory Aid- None/Bed Rest/Nurse Assist (0 pts). Gait- Normal/Bed Rest/Wheelchair (0     

      pts) Mental Status- Oriented to own ability (0 pts). Total Madden Fall Scale indicates       

      No Risk (0-24 pts).                                                                         

                                                                                                  

Assessment:                                                                                       

09:07 General: Appears in no apparent distress. uncomfortable, Behavior is calm, cooperative. vg1 

      Pain: Complains of pain in suprapubic area and right lower quadrant Pain currently is 9     

      out of 10 on a pain scale. Pain began x 1 week. Neuro: Level of Consciousness is awake,     

      alert, obeys commands, Oriented to person, place, time, situation. Cardiovascular:          

      Patient's skin is warm and dry. Respiratory: Airway is patent Respiratory effort is         

      even, unlabored. GI: Abdomen is round non-distended, Reports diarrhea, nausea,              

      vomiting, diarrhea x 1 week; vomiting began yesterday. : No signs and/or symptoms         

      were reported regarding the genitourinary system. EENT: No signs and/or symptoms were       

      reported regarding the EENT system. Derm: Skin is pink, warm \T\ dry. Musculoskeletal:      

      Circulation, motion, and sensation intact.                                                  

10:29 Reassessment: Patient appears in no apparent distress at this time. Patient and/or      vg1 

      family updated on plan of care and expected duration. Pain level reassessed. Patient is     

      alert, oriented x 3, equal unlabored respirations, skin warm/dry/pink.                      

11:21 Reassessment: Patient appears in no apparent distress at this time. Patient and/or      vg1 

      family updated on plan of care and expected duration. Pain level reassessed. Patient is     

      alert, oriented x 3, equal unlabored respirations, skin warm/dry/pink. pt up for d/c,       

      currently waiting for IV antibiotics to complete.                                           

                                                                                                  

Vital Signs:                                                                                      

09:03  / 83; Pulse 76; Resp 16; Temp 98.6; Pulse Ox 98% ; Weight 90.72 kg; Height 5 ft. vg1 

      2 in. (157.48 cm); Pain 9/10;                                                               

10:12  / 67; Pulse 75; Resp 15; Pulse Ox 98% ;                                          jl7 

11:30  / 65; Pulse 74; Resp 15; Pulse Ox 98% ;                                          jl7 

09:03 Body Mass Index 36.58 (90.72 kg, 157.48 cm)                                             vg1 

                                                                                                  

ED Course:                                                                                        

08:57 Patient arrived in ED.                                                                  mr  

08:58 Enrrique Bundy MD is Attending Physician.                                                rn  

08:59 Elsa Paul RN is Primary Nurse.                                                  vg1 

09:03 Arm band placed on right wrist.                                                         ss  

09:06 Triage completed.                                                                       ss  

09:09 Patient has correct armband on for positive identification. Placed in gown. Bed in low  vg1 

      position. Call light in reach. Side rails up X 1. Adult w/ patient.                         

09:09 No provider procedures requiring assistance completed.                                  vg1 

09:15 Initial lab(s) drawn, by me. Inserted saline lock: 20 gauge in right antecubital area,  kj1 

      using aseptic technique. Blood collected.                                                   

10:07 CT Abd/Pelvis - IV Contrast Only In Process Unspecified.                                EDMS

11:58 IV discontinued, intact, bleeding controlled, No redness/swelling at site. Pressure     vg1 

      dressing applied.                                                                           

                                                                                                  

Administered Medications:                                                                         

09:12 Drug: NS 0.9% 1000 ml Route: IV; Rate: 1000 ml; Site: right antecubital;                jl7 

10:15 Follow up: Response: No adverse reaction; IV Status: Completed infusion; IV Intake:     jl7 

      1000ml                                                                                      

09:12 Drug: Zofran (Ondansetron) 4 mg Route: IVP; Site: right antecubital;                    jl7 

10:29 Follow up: Response: No adverse reaction; Marked relief of symptoms                     vg1 

10:43 Follow up: Response: No adverse reaction; Nausea is decreased                           jl7 

10:43 Drug: Cipro (ciprofloxacin) 400 mg Volume: 200 ml; Route: IVPB; Infused Over: 60 mins;  jl7 

      Site: right antecubital;                                                                    

11:59 Follow up: IV Status: Completed infusion; IV Intake: 200ml                              vg1 

10:43 Drug: Flagyl (metroNIDAZOLE) 500 mg Route: PO;                                          jl7 

11:59 Follow up: Response: No adverse reaction                                                vg1 

11:18 Drug: Phenergan (promethazine) 12.5 mg Route: IVP; Site: right antecubital;             vg1 

11:59 Follow up: Response: No adverse reaction; Marked relief of symptoms                     vg1 

11:20 Drug: Demerol (meperidine) 25 mg Route: IVP; Site: right antecubital;                   vg1 

11:59 Follow up: Response: No adverse reaction; Marked relief of symptoms                     vg1 

                                                                                                  

                                                                                                  

Intake:                                                                                           

10:15 IV: 1000ml; Total: 1000ml.                                                              jl7 

11:59 IV: 200ml; Total: 1200ml.                                                               vg1 

                                                                                                  

Outcome:                                                                                          

11: Discharge ordered by MD.                                                                rn  

11:58 Discharged to home ambulatory, with family.                                             vg1 

11:58 Condition: good                                                                             

11:58 Discharge instructions given to patient, family, Instructed on discharge instructions,      

      follow up and referral plans. medication usage, Demonstrated understanding of               

      instructions, follow-up care, medications, Prescriptions given X 3.                         

11:58 Patient left the ED.                                                                    vg1 

                                                                                                  

Signatures:                                                                                       

Dispatcher [employer]                           EDKelly HazelEnrrique MD MD rn Smirch, Shelby, RN                      RN   Artemio Zamarripa RN                        RN   [id number]                                                  

Yolanda Stephenson1                                                  

Elsa Paul RN                    RN   vg1                                                  

                                                                                                  

Corrections: (The following items were deleted from the chart)                                    

09:09 09:03 Resp 16bpm; Height 5 ft. 2 in.; Pain 9/10; ss                                     vg1 

10:29 10:29 Reassessment: Patient appears in no apparent distress at this time. No changes    vg1 

      from previously documented assessment. Patient and/or family updated on plan of care        

      and expected duration. Pain level reassessed. Patient is alert, oriented x 3, equal         

      unlabored respirations, skin warm/dry/pink. vg1                                             

                                                                                                  

**************************************************************************************************

## 2021-12-19 NOTE — EDPHYS
Physician Documentation                                                                           

 [facility]                                                                 

Name: Meredith Stark                                                                                 

Age: 61 yrs                                                                                       

Sex: Female                                                                                       

: 1960                                                                                   

MRN: [id number]                                                                                   

Arrival Date: 2021                                                                          

Time: 08:57                                                                                       

Account#: [id number]                                                                            

Bed 5                                                                                             

Private MD:                                                                                       

ED Physician Enrrique Bundy                                                                         

HPI:                                                                                              

                                                                                             

09:07 This 61 yrs old  Female presents to ER via Ambulatory with complaints of        rn  

      Vomiting/Diarrhea.                                                                          

09:07 The patient presents to the emergency department with nausea, vomiting, diarrhea,       rn  

      abdominal pain, of the abdomen diffusely, described as achy, crampy, and does not           

      radiate. Onset: The symptoms/episode began/occurred 1 week(s) ago. Possible causes:         

      unknown. The symptoms are aggravated by nothing. The symptoms are alleviated by             

      nothing. Associated signs and symptoms: Pertinent positives: abdominal pain, diarrhea,      

      nausea, vomiting, Pertinent negatives: fever, GI bleeding. Severity of symptoms: At         

      their worst the symptoms were moderate in the emergency department the symptoms are         

      unchanged. The patient has not experienced similar symptoms in the past. The patient        

      has not recently seen a physician. Patient reports 1 week of abdominal pain and             

      diarrhea, vomiting started yesterday. No blood in stool or emesis. No fever. No known       

      sick contacts..                                                                             

                                                                                                  

Historical:                                                                                       

- Allergies:                                                                                      

09:03 Darvocet-N 100;                                                                         ss  

09:03 Nubain;                                                                                 ss  

09:03 PENICILLINS;                                                                            ss  

- PMHx:                                                                                           

09:03 diabetes mellitus; Hypertensive disorder; Thyroid problem;                              ss  

- PSHx:                                                                                           

09:03 Appendectomy; cheryl knee reconstructive sx; carpal tunnel repair; Cholecystectomy;        ss  

      hysterectomy;                                                                               

                                                                                                  

- Immunization history:: Client reports receiving the 2nd dose of the Covid vaccine.              

- Social history:: Smoking status: Patient denies any tobacco usage or history of.                

- Family history:: not pertinent.                                                                 

- Hospitalizations: : No recent hospitalization is reported.                                      

                                                                                                  

                                                                                                  

ROS:                                                                                              

09:07 Constitutional: Negative for fever, chills, and weight loss, Eyes: Negative for injury, rn  

      pain, redness, and discharge, ENT: Negative for injury, pain, and discharge, Neck:          

      Negative for injury, pain, and swelling, Cardiovascular: Negative for chest pain,           

      palpitations, and edema, Respiratory: Negative for shortness of breath, cough,              

      wheezing, and pleuritic chest pain, Abdomen/GI: Negative for constipation Back:             

      Negative for injury and pain, : Negative for injury, bleeding, discharge, and             

      swelling, MS/Extremity: Negative for injury and deformity, Skin: Negative for injury,       

      rash, and discoloration, Neuro: Negative for headache, numbness, tingling, and seizure.     

                                                                                                  

Exam:                                                                                             

09:07 Constitutional:  This is a well developed, well nourished patient who is awake, alert,  rn  

      and in no acute distress. Head/Face:  Normocephalic, atraumatic. Eyes:  Periorbital         

      areas with no swelling, redness, or edema. ENT:  Dry mucous membranes Cardiovascular:       

      Regular rate and rhythm.  No pulse deficits. Respiratory:  No increased work of             

      breathing, no retractions or nasal flaring. Abdomen/GI:  soft, + diffuse abd                

      tenderness, worse RLQ and periumbilical  Skin:  Warm, dry  MS/ Extremity:  Pulses           

      equal, no cyanosis.   Neuro:  Awake and alert, GCS 15                                       

                                                                                                  

Vital Signs:                                                                                      

09:03  / 83; Pulse 76; Resp 16; Temp 98.6; Pulse Ox 98% ; Weight 90.72 kg; Height 5 ft. vg1 

      2 in. (157.48 cm); Pain 9/10;                                                               

10:12  / 67; Pulse 75; Resp 15; Pulse Ox 98% ;                                          jl7 

11:30  / 65; Pulse 74; Resp 15; Pulse Ox 98% ;                                          jl7 

09:03 Body Mass Index 36.58 (90.72 kg, 157.48 cm)                                             vg1 

                                                                                                  

MDM:                                                                                              

08:58 Patient medically screened.                                                             rn  

10:40 Differential diagnosis: Nonspecific abd pain, gastritis, diverticulitis, viral          rn  

      gastroenteritis, gastroenteritis, Colitis. Data reviewed: vital signs, nurses notes,        

      lab test result(s), radiologic studies, CT scan, and as a result, I will discharge          

      patient. Counseling: I had a detailed discussion with the patient and/or guardian           

      regarding: the historical points, exam findings, and any diagnostic results supporting      

      the discharge/admit diagnosis, lab results, radiology results, the need for outpatient      

      follow up, to return to the emergency department if symptoms worsen or persist or if        

      there are any questions or concerns that arise at home.                                     

10:42 Response to treatment: the patient's symptoms have mildly improved after treatment.     rn  

                                                                                                  

                                                                                             

09:05 Order name: Basic Metabolic Panel; Complete Time: 09:41                                 rn  

                                                                                             

09:05 Order name: CBC with Diff; Complete Time: 09:22                                         rn  

                                                                                             

09:05 Order name: Hepatic Function; Complete Time: 09:41                                      rn  

                                                                                             

09:05 Order name: Lipase; Complete Time: 09:41                                                rn  

                                                                                             

09:05 Order name: CT Abd/Pelvis - IV Contrast Only; Complete Time: 10:31                      rn  

                                                                                             

09:42 Order name: COVID-19/FLU A+B; Complete Time: 10:26                                      EDMS

                                                                                             

09:05 Order name: IV Saline Lock; Complete Time: 09:12                                        rn  

                                                                                             

09:05 Order name: Labs collected and sent; Complete Time: 09:12                               rn  

                                                                                                  

Administered Medications:                                                                         

09:12 Drug: NS 0.9% 1000 ml Route: IV; Rate: 1000 ml; Site: right antecubital;                jl7 

10:15 Follow up: Response: No adverse reaction; IV Status: Completed infusion; IV Intake:     jl7 

      1000ml                                                                                      

09:12 Drug: Zofran (Ondansetron) 4 mg Route: IVP; Site: right antecubital;                    jl7 

10:29 Follow up: Response: No adverse reaction; Marked relief of symptoms                     vg1 

10:43 Follow up: Response: No adverse reaction; Nausea is decreased                           jl7 

10:43 Drug: Cipro (ciprofloxacin) 400 mg Volume: 200 ml; Route: IVPB; Infused Over: 60 mins;  jl7 

      Site: right antecubital;                                                                    

11:59 Follow up: IV Status: Completed infusion; IV Intake: 200ml                              vg1 

10:43 Drug: Flagyl (metroNIDAZOLE) 500 mg Route: PO;                                          jl7 

11:59 Follow up: Response: No adverse reaction                                                vg1 

11:18 Drug: Phenergan (promethazine) 12.5 mg Route: IVP; Site: right antecubital;             vg1 

11:59 Follow up: Response: No adverse reaction; Marked relief of symptoms                     vg1 

11:20 Drug: Demerol (meperidine) 25 mg Route: IVP; Site: right antecubital;                   vg1 

11:59 Follow up: Response: No adverse reaction; Marked relief of symptoms                     vg1 

                                                                                                  

                                                                                                  

Disposition Summary:                                                                              

21 11:09                                                                                    

Discharge Ordered                                                                                 

      Location: Home                                                                          rn  

      Problem: new                                                                            rn  

      Symptoms: have improved                                                                 rn  

      Condition: Stable                                                                       rn  

      Diagnosis                                                                                   

        - Infectious gastroenteritis and colitis, unspecified                                 rn  

      Followup:                                                                               rn  

        - With: Private Physician                                                                  

        - When: 2 - 3 days                                                                         

        - Reason: Recheck today's complaints, Re-evaluation by your physician                      

      Discharge Instructions:                                                                     

        - Discharge Summary Sheet                                                             rn  

        - Colitis                                                                             rn  

      Forms:                                                                                      

        - Medication Reconciliation Form                                                      rn  

        - Thank You Letter                                                                    rn  

        - Antibiotic Education                                                                rn  

        - Prescription Opioid Use                                                             rn  

      Prescriptions:                                                                              

        - ondansetron 4 mg Oral tablet,disintegrating                                              

            - take 1 tablet by ORAL route every 8 hours As needed; 20 tablet; Refills: 0,     rn  

      Product Selection Permitted                                                                 

        - Flagyl 500 mg Oral Tablet                                                                

            - take 1 tablet by ORAL route every 8 hours for 10 days; 30 tablet; Refills: 0,   rn  

      Product Selection Permitted                                                                 

        - Cipro 500 mg Oral Tablet                                                                 

            - take 1 tablet by ORAL route every 12 hours for 10 days; 20 tablet; Refills: 0,  rn  

      Product Selection Permitted                                                                 

Signatures:                                                                                       

Dispatcher [employer]                           EDMS                                                 

Enrrique Bundy MD MD rn Smirch, Shelby RN                      RN   ss                                                   

Artemio Eng RN                        RN   jl7                                                  

Humberto, Elsa, RN                    RN   vg1                                                  

                                                                                                  

Corrections: (The following items were deleted from the chart)                                    

09:42 09:22 SARS-COV-2 RT PCR+[email or web address] ordered. EDMS                                       EDMS

09:43 09:22 Influenza Screen (A \T\ B)+[email or web address].LAB.BRZ ordered. EDMS                                 EDMS

                                                                                                  

**************************************************************************************************

## 2022-06-11 ENCOUNTER — HOSPITAL ENCOUNTER (EMERGENCY)
Dept: HOSPITAL 97 - ER | Age: 62
Discharge: HOME | End: 2022-06-11
Payer: COMMERCIAL

## 2022-06-11 VITALS — DIASTOLIC BLOOD PRESSURE: 66 MMHG | OXYGEN SATURATION: 98 % | TEMPERATURE: 98.2 F | SYSTOLIC BLOOD PRESSURE: 120 MMHG

## 2022-06-11 DIAGNOSIS — Z88.8: ICD-10-CM

## 2022-06-11 DIAGNOSIS — Z88.5: ICD-10-CM

## 2022-06-11 DIAGNOSIS — Z88.0: ICD-10-CM
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
## 2022-06-11 DIAGNOSIS — E11.9: ICD-10-CM

## 2022-06-11 DIAGNOSIS — M25.552: ICD-10-CM

## 2022-06-11 DIAGNOSIS — M54.50: Primary | ICD-10-CM

## 2022-06-11 DIAGNOSIS — I10: ICD-10-CM

## 2022-06-11 LAB
BUN BLD-MCNC: 24 MG/DL (ref 7–18)
GLUCOSE SERPLBLD-MCNC: 152 MG/DL (ref 74–106)
HCT VFR BLD CALC: 28.3 % (ref 36–45)
INR BLD: 1.12
LYMPHOCYTES # SPEC AUTO: 1.1 K/UL (ref 0.7–4.9)
PMV BLD: 7 FL (ref 7.6–11.3)
POTASSIUM SERPL-SCNC: 4.2 MMOL/L (ref 3.5–5.1)
RBC # BLD: 3.33 M/UL (ref 3.86–4.86)

## 2022-06-11 PROCEDURE — 99284 EMERGENCY DEPT VISIT MOD MDM: CPT

## 2022-06-11 PROCEDURE — 73502 X-RAY EXAM HIP UNI 2-3 VIEWS: CPT

## 2022-06-11 PROCEDURE — 96374 THER/PROPH/DIAG INJ IV PUSH: CPT

## 2022-06-11 PROCEDURE — 36415 COLL VENOUS BLD VENIPUNCTURE: CPT

## 2022-06-11 PROCEDURE — 85610 PROTHROMBIN TIME: CPT

## 2022-06-11 PROCEDURE — 72170 X-RAY EXAM OF PELVIS: CPT

## 2022-06-11 PROCEDURE — 85025 COMPLETE CBC W/AUTO DIFF WBC: CPT

## 2022-06-11 PROCEDURE — 80048 BASIC METABOLIC PNL TOTAL CA: CPT

## 2022-06-11 PROCEDURE — 74177 CT ABD & PELVIS W/CONTRAST: CPT

## 2022-06-11 NOTE — RAD REPORT
EXAM DESCRIPTION:  CT - Abdomen   Pelvis W Contrast - 6/11/2022 7:41 pm

 

CLINICAL HISTORY:  low back pain, left hip pain from fall

 

COMPARISON:  <Comparisons>CT study 12/19/2021

 

TECHNIQUE:  Biphasic, helical CT imaging of the abdomen and pelvis was performed following 100 ml non
-ionic IV contrast.

 

No oral contrast administered.

 

All CT scans are performed using dose optimization technique as appropriate and may include automated
 exposure control or mA/KV adjustment according to patient size.

 

FINDINGS:  No suspicious findings in the lung bases.

 

Cirrhotic liver changes are again identifiable. No focal lesion has developed in the liver parenchyma
. Mild splenomegaly is stable. No acute pancreatic process. Cholecystectomy clips are present. No cheryl
iary tree dilatation.

 

Symmetric renal function is seen with no hydronephrosis or suspicious renal mass. No pyelonephritis o
r acute parenchymal process. No bladder abnormalities. No adrenal abnormalities.

 

No dilated bowel loops or bowel wall thickening.  No free air, free fluid or inflammatory stranding. 
 No hernia, mass or bulky lymphadenopathy.

 

No fracture the pelvis or proximal femora. No acute vertebral body compression fracture the 50% wedge
 compression fracture change of the L1 body is chronic. No pathologic bone process.

 

 

IMPRESSION:  No acute fractures of the spine, pelvis or proximal femora.

 

Cirrhotic liver changes stable from December 2021.

 

No acute findings identifiable.

## 2022-06-11 NOTE — XMS REPORT
Continuity of Care Document

                            Created on:2022



Patient:SUDHA BABIN

Sex:Female

:1960

External Reference #:[id number]





Demographics







                          Address                   [address]



                                                    [place] [address]

 

                          Home Phone                [phone]

 

                          Email Address             [address]

 

                          Preferred Language        English

 

                          Marital Status            Unknown

 

                          Shinto Affiliation     Unknown

 

                          Race                      Unknown

 

                          Additional Race(s)        Unavailable

 

                          Ethnic Group              Unknown









Author







                          Organization              [facility]

t

 

                          Address                   [address]



                                                    [place] [address]

 

                          Phone                     [phone]









Support







                Name            Relationship    Address         Phone

 

                TALYA ANGELES              [phone] 



                                                [place] [address] 

 

                TALYA ANGELES              Unavailable     [phone]









Care Team Providers







                    Name                Role                Phone

 

                    JEFF ORR      Attending Clinician Unavailable









Problems

This patient has no known problems.



Allergies, Adverse Reactions, Alerts

This patient has no known allergies or adverse reactions.



Medications

This patient has no known medications.



Procedures

This patient has no known procedures.



Encounters







        Start   End     Encounter Admission Attending Care    Care    Encounter 

Source



        Date/Time Date/Time Type    Type    Clinicians Facility Department ID   

   

 

        2022 Outpatient         DOMINGA  [facility]    MED     [id number]   

 [facility]



        12:04:00 23:59:00                 BENNIE                         







Results







           Test Description Test Time  Test Comments Results    Result Comments 

Source









                    TSH, THIRD GENERATION 2022 06:18:54 









                      Test Item  Value      Reference Range Interpretation Comme

nts









             TSH, THIRD GENERATION (test code = 2821) 0.190 UIU/ML 0.400-4.100  

L            



HEMOGLOBIN [id number] 03:11:56





             Test Item    Value        Reference Range Interpretation Comments

 

             HEMOGLOBIN A1c (test 8.4 %        4.2-5.6      H                   

   [employer]



             code = 56446)                                        ASSOCIATION 

IDELINES FOR



                                                                 HGB A1C:



                                                                 PREDIABETES/INC

REASED RISK .



                                                                 . . . . . . 5.7

-6.4%



                                                                  DIAGNOSIS OF D

IABETES  . .



                                                                 . . . . . . . >

=6.5%



                                                                    WITH CONFIRM

ATION OR



                                                                 APPROPRIATE SYM

PTOMS



                                                                 NOTE: ASSAY MAY

 BE AFFECTED



                                                                 BY HEMOGLOBINOP

ATHIES



                                                                 (SICKLE     FAMILIA

L ANEMIA, S-C



                                                                 DISEASE, OTHERS

) OR



                                                                 ARTIFICIALLY LO

WERED BY



                                                                 DECREASED RED C

ELL SURVIVAL



                                                                 (HEMOLYTIC ANEM

IAS, BLOOD



                                                                 LOSS,     ETC.)

.  CONSIDER



                                                                 ALTERNATE TESTI

NG OR



                                                                 LABORATORY CONS

ULTATION.



IRON, [id number] 03:11:06





             Test Item    Value        Reference Range Interpretation Comments

 

             IRON, SERUM (test 59 UG/DL                                UNL

ESS OTHERWISE



             code = 2222)                                        INDICATED, ALL 

TESTING



                                                                 PERFORMED [facility]



                                                                 PATHOLOGY LABOR

[facility]



                                                                 [facility]  [address] [place] [address]

4



                                                                 LABORATORY DIRE

CTOR:  GILBERTO WARD.



                                                                 CLIA NUMBER [id number]

[id number]



                                                                 CAP ACCREDITATI

ON NO.



                                                                 [id number]



COMPREHENSIVE METABOLIC [id number] 03:11:06





             Test Item    Value        Reference Range Interpretation Comments

 

             GLUCOSE (test code = 141 MG/DL    70-99        H            



             )                                               

 

             BUN (test code = 31 MG/DL     8-23         H            



             )                                               

 

             CREATININE (test 1.07 MG/DL   0.60-1.30                 



             code = 2214)                                        

 

             eGFR ( CKD-EPI) 59 ML/MIN/1.73 >60          L            



             (test code = 12939)                                        

 

             CALC BUN/CREAT (test 29 RATIO     6-28         H            



             code = 2235)                                        

 

             SODIUM (test code = 141 MEQ/L    133-146                   



             )                                               

 

             POTASSIUM (test code 4.7 MEQ/L    3.5-5.4                   



             = )                                             

 

             CHLORIDE (test code 99 MEQ/L                         



             = )                                             

 

             CARBON DIOXIDE (test 26 MEQ/L     19-31                     



             code = 2206)                                        

 

             CALCIUM (test code = 8.8 MG/DL    8.5-10.5                  



             )                                               

 

             PROTEIN, TOTAL (test 8.0 G/DL     6.1-8.3                   



             code = 2229)                                        

 

             ALBUMIN (test code = 4.5 G/DL     3.5-5.2                   



             )                                               

 

             CALC GLOBULIN (test 3.5 G/DL     1.9-3.7                   



             code = 2240)                                        

 

             CALC A/G RATIO (test 1.3 RATIO    1.0-2.6                   



             code = 2234)                                        

 

             BILIRUBIN, TOTAL 0.4 MG/DL    See_Comment                [Automated

 message]



             (test code = 2207)                                        The syste

m which



                                                                 generated this



                                                                 result transmit

maximo



                                                                 reference range

:



                                                                 <=1.2. The refe

rence



                                                                 range was not u

sed



                                                                 to interpret th

is



                                                                 result as



                                                                 normal/abnormal

.

 

             ALKALINE PHOSPHATASE 67 U/L                           



             (test code = 2204)                                        

 

             AST (test code = 49 U/L       9-40         H            



             )                                               

 

             ALT (test code = 41 U/L       5-40         H            



             )                                               



LIPID [id number] 03:11:06





             Test Item    Value        Reference Range Interpretation Comments

 

             CHOLESTEROL (test 113 MG/DL    <200                      



             code = 2210)                                        

 

             TRIGLYCERIDES (test 165 MG/DL    <150         H            



             code = 2232)                                        

 

             HDL CHOLESTEROL (test 39 MG/DL     >39          L            



             code = 2220)                                        

 

             CALC LDL CHOL (test 50 MG/DL     <100                       NOTE: C

ALCULATED LDL



             code = 2237)                                        IS BASED ON



                                                                 CAROLANN-FUNES 

METHOD



                                                                 WHICHINCLUDES



                                                                 ADJUSTABLE



                                                                 TRIGLYCERIDE:VL

DL



                                                                 CHOLESTEROL RAT

IO.THIS



                                                                 FACTOR VARIES B

Y



                                                                 MEASURED TRIGLY

CERIDE



                                                                 AND NON-HDLCHOL

ESTEROL



                                                                 CONCENTRATIONS 

WITH



                                                                 INCREASED CALCU

LATED



                                                                 LDL SEENIN HIGH

ER



                                                                 TRIGLYCERIDE OR

 LOWER



                                                                 NON-HDL SPECIME

NS. FOR



                                                                 MOREINFORMATION

, SEE



                                                                 CLIENT ANNOUNCE

MENT AT



                                                                 [email or web address]



                                                                 /[email or web address]-[email or web address]

 

             RISK RATIO LDL/HDL 1.28 RATIO   <3.22                     



             (test code = )                                        



ALBUMIN/CREATININE RATIO, URINE, [id number] 05:57:19





             Test Item    Value        Reference Range Interpretation Comments

 

             CREATININE, URINE, 212.0 MG/DL  NOT ESTAB                 



             CONC. (test code =                                        



             )                                               

 

             ALBUMIN, URINE, 16.0 MG/DL                               Note: Test

 name is



             RANDOM (test code                                        changed to

 Urine Albumin



             = 65524)                                            from Microalbum

in in



                                                                 accordance with

 ADA and



                                                                 NKF Guidelines,

 and



                                                                 Albumin/Creatin

ine ratio



                                                                 reference inter

mariah



                                                                 reflects those



                                                                 Guidelines.  An

alytic



                                                                 methodology is



                                                                 unchanged.  No 

reference



                                                                 interval for Ra

ndom



                                                                 Urine Albumin i

s



                                                                 available.

 

             CALC         75 MG/G      <30          H                   UNLESS O

THERWISE



             ALBUMIN/CREAT, RND                                        INDICATED

, ALL TESTING



             (test code =                                        PERFORMED [facility]

NICAL



             11957)                                              PATHOLOGY LABOR

[facility]



                                                                 [facility]  [address] [place] [address]

4



                                                                  LABORATORY DIR

LILY:



                                                                 CLIFTON THOMSON M.D.



                                                                     CLIA NUMBER



                                                                 [id number]  CAP



                                                                 ACCREDITATION N

O.



                                                                 [id number]



HEMOGLOBIN [id number] 04:12:15





             Test Item    Value        Reference Range Interpretation Comments

 

             HEMOGLOBIN A1c (test 9.1 %        4.2-5.6      H                   

   [employer]



             code = 77545)                                        ASSOCIATION 

IDELINES FOR



                                                                 HGB A1C:



                                                                 PREDIABETES/INC

REASED RISK .



                                                                 . . . . . . 5.7

-6.4%



                                                                  DIAGNOSIS OF D

IABETES  . .



                                                                 . . . . . . . >

=6.5%



                                                                    WITH CONFIRM

ATION OR



                                                                 APPROPRIATE SYM

PTOMS



                                                                 NOTE: ASSAY MAY

 BE AFFECTED



                                                                 BY HEMOGLOBINOP

ATHIES



                                                                 (SICKLE     FAMILIA

L ANEMIA, S-C



                                                                 DISEASE, OTHERS

) OR



                                                                 ARTIFICIALLY LO

WERED BY



                                                                 DECREASED RED C

ELL SURVIVAL



                                                                 (HEMOLYTIC ANEM

IAS, BLOOD



                                                                 LOSS,     ETC.)

.  CONSIDER



                                                                 ALTERNATE TESTI

NG OR



                                                                 LABORATORY CONS

ULTATION.

## 2022-06-11 NOTE — RAD REPORT
EXAM DESCRIPTION:  RAD - Hip Left 2 View - 6/11/2022 6:47 pm

 

CLINICAL HISTORY:  PAINfollowing fall

 

COMPARISON:  No comparisons

 

FINDINGS:  AP and frogleg views of the left hip were obtained.

 

There is no fracture or dislocation. No acute or destructive bony process seen.

 

No soft tissue abnormality.

 

 

IMPRESSION:  Negative left hip examination for acute or significant findings.

## 2022-06-11 NOTE — EDPHYS
Physician Documentation                                                                           

 [facility]                                                                 

Name: Meredith Stark                                                                                 

Age: 61 yrs                                                                                       

Sex: Female                                                                                       

: 1960                                                                                   

MRN: [id number]                                                                                   

Arrival Date: 2022                                                                          

Time: 17:36                                                                                       

Account#: [id number]                                                                            

Bed 14                                                                                            

Private MD:                                                                                       

ED Physician Delano Wiley                                                                       

HPI:                                                                                              

                                                                                             

18:10 This 61 yrs old  Female presents to ER via Wheelchair with complaints of Fall   cp  

      Injury.                                                                                     

18:10 Details of fall: The patient fell from an upright position, while walking, and struck a cp  

      tile surface. Onset: The symptoms/episode began/occurred just prior to arrival.             

      Associated injuries: The patient sustained injury to the low back, pain, left hip,          

      painful injury. Severity of symptoms: in the emergency department the symptoms are          

      unchanged, despite home interventions. Patient reports slip and fall exiting the shower     

      today. Landed on buttocks, now having pain to low back and left hip. Denies hitting         

      head, denies LOC.                                                                           

                                                                                                  

Historical:                                                                                       

- Allergies:                                                                                      

17:49 Darvocet-N 100;                                                                         jb4 

17:49 Nubain;                                                                                 jb4 

17:49 PENICILLINS;                                                                            jb4 

- PMHx:                                                                                           

17:49 diabetes mellitus; Hypertensive disorder; Thyroid problem;                              jb4 

- PSHx:                                                                                           

17:49 Appendectomy; cheryl knee reconstructive sx; carpal tunnel repair; Cholecystectomy;        jb4 

      hysterectomy;                                                                               

                                                                                                  

- Immunization history:: Adult Immunizations up to date.                                          

- Immunization history: Last tetanus immunization: unknown.                                       

- Social history:: Smoking status: Patient denies any tobacco usage or history of.                

                                                                                                  

                                                                                                  

ROS:                                                                                              

18:15 Constitutional: Negative for body aches, chills, fever, poor PO intake.                 cp  

18:15 Eyes: Negative for injury, pain, redness, and discharge.                                cp  

18:15 Neck: Negative for pain with movement, pain at rest, stiffness.                             

18:15 Cardiovascular: Negative for chest pain.                                                    

18:15 Respiratory: Negative for cough, shortness of breath, wheezing.                             

18:15 Abdomen/GI: Negative for abdominal pain, nausea, vomiting, and diarrhea.                    

18:15 Back: Positive for pain at rest, pain with movement, of the low back.                       

18:15 : Negative for urinary symptoms.                                                          

18:15 Neuro: Negative for altered mental status, headache, loss of consciousness, syncope,        

      weakness.                                                                                   

18:15 All other systems are negative.                                                             

                                                                                                  

Exam:                                                                                             

18:20 Constitutional: The patient appears in no acute distress, alert, awake,                 cp  

      non-diaphoretic, non-toxic, well developed, well nourished, uncomfortable.                  

18:20 Head/Face:  Normocephalic, atraumatic.                                                  cp  

18:20 Eyes: Periorbital structures: appear normal, Conjunctiva: normal, no exudate, no            

      injection, Sclera: no appreciated abnormality, Lids and lashes: appear normal,              

      bilaterally.                                                                                

18:20 ENT: External ear(s): are unremarkable, Nose: is normal, Mouth: Lips: moist, Oral           

      mucosa: moist, Posterior pharynx: Airway: no evidence of obstruction, patent.               

18:20 Neck: C-spine: vertebral tenderness, is not appreciated, crepitus, is not appreciated,      

      ROM/movement: is normal, is supple, without pain, no range of motions limitations.          

18:20 Chest/axilla: Inspection: normal, Palpation: is normal, no crepitus, no tenderness.         

18:20 Cardiovascular: Rate: normal, Edema: is not appreciated, JVD: is not appreciated.           

18:20 Respiratory: the patient does not display signs of respiratory distress,  Respirations:     

      normal, no use of accessory muscles, no retractions, labored breathing, is not present.     

18:20 Abdomen/GI: Inspection: obese Palpation: abdomen is soft and non-tender, in all             

      quadrants.                                                                                  

18:20 Back: pain, that is moderate, of the  lumbar area and left low back.                        

18:20 Musculoskeletal/extremity: Extremities: grossly normal except: noted in the left hip:       

      pain, There is no evidence of  decreased ROM, deformity, ROM: limited passive range of      

      motion due to pain, in the left hip, Perfusion: the extremity is normally perfused          

      throughout, the  left leg Sensation intact.                                                 

18:20 Neuro: Orientation: to person, place \T\ time. Mentation: is normal.                        

                                                                                                  

Vital Signs:                                                                                      

17:44  / 73; Pulse 99; Resp 18; Temp 97.0; Pulse Ox 99% on R/A; Weight 90.72 kg (R);    jb4 

      Height 5 ft. 3 in. (160.02 cm) (R); Pain 10/10;                                             

19:01  / 66; Pulse 69; Resp 16; Pulse Ox 99% ;                                          bp  

20:30  / 66; Pulse 69; Resp 18 S; Temp 98.2(O); Pulse Ox 98% on R/A;                    bb  

17:44 Body Mass Index 35.43 (90.72 kg, 160.02 cm)                                             jb4 

                                                                                                  

Jing Coma Score:                                                                               

17:44 Eye Response: spontaneous(4). Verbal Response: oriented(5). Motor Response: obeys       jb4 

      commands(6). Total: 15.                                                                     

                                                                                                  

Trauma Score (Adult):                                                                             

17:44 Eye Response: spontaneous(1); Verbal Response: oriented(1); Motor Response: obeys       jb4 

      commands(2); Systolic BP: > 89 mm Hg(4); Respiratory Rate: 10 to 29 per min(4); Jing     

      Score: 15; Trauma Score: 12                                                                 

                                                                                                  

MDM:                                                                                              

17:49 Patient medically screened.                                                             cp  

18:30 Differential diagnosis: closed head injury, contusion, fracture, multiple trauma.       cp  

20:10 Data reviewed: vital signs, nurses notes, lab test result(s), radiologic studies, CT    cp  

      scan, plain films.                                                                          

20:10 Counseling: I had a detailed discussion with the patient and/or guardian regarding: the cp  

      historical points, exam findings, and any diagnostic results supporting the                 

      discharge/admit diagnosis, lab results, radiology results, to return to the emergency       

      department if symptoms worsen or persist or if there are any questions or concerns that     

      arise at home. Response to treatment: the patient's symptoms have markedly improved         

      after treatment, and as a result, I will discharge patient. ED course: VSS. Radiology       

      results negative for acute trauma. Controlled medications screen performed showing          

      patient received RX for Morphine and Oxycodone last month. Will discharge to home for       

      continued monitoring.                                                                       

                                                                                                  

                                                                                             

18:05 Order name: Basic Metabolic Panel; Complete Time: 18:59                                   

                                                                                             

18:59 Interpretation: Normal except: ; GLUC 152; BUN 24; GFR 57; CA 8.4.                  

                                                                                             

18:05 Order name: CBC with Diff; Complete Time: 18:59                                           

                                                                                             

18:05 Order name: XRAY Pelvis; Complete Time: 20:04                                             

                                                                                             

20:05 Interpretation: Report reviewed.                                                          

                                                                                             

18:05 Order name: XRAY Hip LEFT 2 view; Complete Time: 20:04                                    

                                                                                             

20:05 Interpretation: Report reviewed.                                                          

                                                                                             

18:05 Order name: PT-INR; Complete Time: 18:59                                                cp  

                                                                                             

19:00 Order name: CT Abd/Pelvis - IV Contrast Only; Complete Time: 20:04                      cp  

                                                                                             

18:05 Order name: Labs collected and sent; Complete Time: 18:22                               cp  

                                                                                                  

Administered Medications:                                                                         

18:22 Drug: morphine 4 mg Route: IVP; Infused Over: 4 mins; Site: left hand;                  bp  

20:10 Drug: Lidoderm Patch 5 % (700 mg/patch) 1 patches Route: Topical; Site: affected area;  bb  

                                                                                                  

                                                                                                  

Disposition:                                                                                      

                                                                                             

08:12 Co-signature as Attending Physician, Delano Wiley MD I agree with the assessment and   kdr 

      plan of care.                                                                               

                                                                                                  

Disposition Summary:                                                                              

22 20:11                                                                                    

Discharge Ordered                                                                                 

      Location: Home                                                                          cp  

      Problem: new                                                                            cp  

      Symptoms: have improved                                                                 cp  

      Condition: Stable                                                                       cp  

      Diagnosis                                                                                   

        - Fall on same level from slipping, tripping and stumbling without subsequent         cp  

      striking against object                                                                     

        - Pain in left hip                                                                    cp  

        - Low back pain                                                                       cp  

      Followup:                                                                               cp  

        - With: Private Physician                                                                  

        - When: 2 - 3 days                                                                         

        - Reason: Recheck today's complaints                                                       

      Discharge Instructions:                                                                     

        - Discharge Summary Sheet                                                             cp  

        - Acute Back Pain, Adult                                                              cp  

        - Fall Prevention in the Home, Adult                                                  cp  

        - Hip Pain                                                                            cp  

        - Back Exercises                                                                      cp  

      Forms:                                                                                      

        - Medication Reconciliation Form                                                      cp  

        - Thank You Letter                                                                    cp  

        - Antibiotic Education                                                                cp  

        - Prescription Opioid Use                                                             cp  

      Prescriptions:                                                                              

        - Lidoderm 5 % Topical adhesive patch,medicated                                            

            - apply 1 patch by TOPICAL route once daily; 15 patch; Refills: 0, Product        cp  

      Selection Permitted                                                                         

        - Diclofenac Sodium 75 mg Oral tablet,delayed release (DR/EC)                              

            - take 1 tablet by ORAL route 2 times per day; 20 tablet; Refills: 0, Product     cp  

      Selection Permitted                                                                         

Signatures:                                                                                       

Dispatcher [employer]                           Delano Garzon MD MD   kdr                                                  

Gaby Patel RN                     RN   bb                                                   

Gerardo Carroll PA                         PA   cp                                                   

Juan Arriaga, RN                       RN   jb4                                                  

Giovany Macdonald RN                      RN   bp                                                   

                                                                                                  

**************************************************************************************************

## 2022-06-11 NOTE — ER
Nurse's Notes                                                                                     

 [facility]                                                                 

Name: Meredith Stark                                                                                 

Age: 61 yrs                                                                                       

Sex: Female                                                                                       

: 1960                                                                                   

MRN: [id number]                                                                                   

Arrival Date: 2022                                                                          

Time: 17:36                                                                                       

Account#: [id number]                                                                            

Bed 14                                                                                            

Private MD:                                                                                       

Diagnosis: Fall on same level from slipping, tripping and stumbling without subsequent striking   

  against object;Pain in left hip;Low back pain                                                   

                                                                                                  

Presentation:                                                                                     

                                                                                             

17:44 Chief complaint: Patient states: I slipped and fell and the shower. I landed on my      jb4 

      lower back and hip. Care prior to arrival: None. Mechanism of Injury: Fall from             

      standing position. Trauma event details: Injury occurred in the [facility].         

17:44 Acuity: SP 3                                                                           jb4 

17:44 Method Of Arrival: Wheelchair                                                           jb4 

17:48 Coronavirus screen: At this time, the client does not indicate any symptoms associated  jb4 

      with coronavirus-19. Ebola Screen: No symptoms or risks identified at this time.            

      Initial Sepsis Screen: Does the patient meet any 2 criteria? No. Patient's initial          

      sepsis screen is negative. Does the patient have a suspected source of infection? No.       

      Patient's initial sepsis screen is negative. Risk Assessment: Do you want to hurt           

      yourself or someone else? Patient reports no desire to harm self or others. Onset of        

      symptoms was 2022. Transition of care: patient was not received from another       

      setting of care.                                                                            

                                                                                                  

Triage Assessment:                                                                                

17:45 General: Appears distressed, uncomfortable, obese, Behavior is cooperative, appropriate bp  

      for age, anxious. Pain: Complains of pain in back. EENT: No deficits noted. Neuro: No       

      deficits noted. Cardiovascular: No deficits noted. Respiratory: No deficits noted. GI:      

      No signs and/or symptoms were reported involving the gastrointestinal system. : No        

      signs and/or symptoms were reported regarding the genitourinary system. Derm: No            

      deficits noted. Musculoskeletal: No deficits noted.                                         

                                                                                                  

Historical:                                                                                       

- Allergies:                                                                                      

17:49 Darvocet-N 100;                                                                         jb4 

17:49 Nubain;                                                                                 jb4 

17:49 PENICILLINS;                                                                            jb4 

- PMHx:                                                                                           

17:49 diabetes mellitus; Hypertensive disorder; Thyroid problem;                              jb4 

- PSHx:                                                                                           

17:49 Appendectomy; cheryl knee reconstructive sx; carpal tunnel repair; Cholecystectomy;        jb4 

      hysterectomy;                                                                               

                                                                                                  

- Immunization history:: Adult Immunizations up to date.                                          

- Immunization history: Last tetanus immunization: unknown.                                       

- Social history:: Smoking status: Patient denies any tobacco usage or history of.                

                                                                                                  

                                                                                                  

Screenin:44 Abuse screen: Denies threats or abuse. Nutritional screening: No deficits noted.        jb4 

      Tuberculosis screening: No symptoms or risk factors identified. Fall risk At risk due       

      to prior history of falls.                                                                  

                                                                                                  

Primary Survey:                                                                                   

17:44 NO uncontrolled hemorrhage observed. A: The client is awake and alert. The airway is    jb4 

      patent. Breathing/Chest: Spontaneous respiratory effort, equal unlabored respirations,      

      breath sounds clear bilaterally, regular pattern, symmetrical chest rise and fall.          

      Circulation: No external hemorrhage present. Regular and strong central pulse, skin         

      warm/dry/normal color. Disability Pupils are equal, round, reactive to light and            

      accommodation. Client is alert. Exposure/Environment: All clothing and personal items       

      were removed. Forensic evidence collection is not deemed to be indicated at this time.      

      Items placed in patient belonging bag.                                                      

20:31 Reassessment Breathing: Spontaneous respiratory effort, equal unlabored respirations,   bb  

      breath sounds clear bilaterally, regular pattern with symmetrical chest rise and fall.      

                                                                                                  

Assessment:                                                                                       

17:45 General: SEE TRIAGE NOTE.                                                               bp  

18:43 Reassessment: PT TO RADIOLOGY.                                                          bp  

19:01 Reassessment: PT RETURNED FROM RADIOLOGY.                                               bp  

19:30 Reassessment: Patient is alert, oriented x 3, equal unlabored respirations, skin        bb  

      warm/dry/pink. awaiting diagnostic results, family at bedside, IV intact with no            

      erythema or edema noted.                                                                    

20:29 Reassessment: Patient is alert, oriented x 3, equal unlabored respirations, skin        bb  

      warm/dry/pink. pt verbalized understanding of and agrees to plan of care discharge          

      instructions given pt assisted to exit via wheelchair accompanied by family.                

                                                                                                  

Vital Signs:                                                                                      

17:44  / 73; Pulse 99; Resp 18; Temp 97.0; Pulse Ox 99% on R/A; Weight 90.72 kg (R);    jb4 

      Height 5 ft. 3 in. (160.02 cm) (R); Pain 10/10;                                             

19:01  / 66; Pulse 69; Resp 16; Pulse Ox 99% ;                                          bp  

20:30  / 66; Pulse 69; Resp 18 S; Temp 98.2(O); Pulse Ox 98% on R/A;                    bb  

17:44 Body Mass Index 35.43 (90.72 kg, 160.02 cm)                                             jb4 

                                                                                                  

Jing Coma Score:                                                                               

17:44 Eye Response: spontaneous(4). Verbal Response: oriented(5). Motor Response: obeys       jb4 

      commands(6). Total: 15.                                                                     

                                                                                                  

Trauma Score (Adult):                                                                             

17:44 Eye Response: spontaneous(1); Verbal Response: oriented(1); Motor Response: obeys       jb4 

      commands(2); Systolic BP: > 89 mm Hg(4); Respiratory Rate: 10 to 29 per min(4); Foster     

      Score: 15; Trauma Score: 12                                                                 

                                                                                                  

ED Course:                                                                                        

17:36 Patient arrived in ED.                                                                  jj6 

17:38 Gerardo Carroll PA is PHCP.                                                                cp  

17:38 Delano Wiley MD is Attending Physician.                                              cp  

17:39 Giovany Macdonald, RN is Primary Nurse.                                                    bp  

17:44 Patient has correct armband on for positive identification. Bed in low position. Call   jb4 

      light in reach. Side rails up X 1.                                                          

17:44 Patient maintains SpO2 saturation greater than 95% on room air. Thermoregulation: warm  jb4 

      blanket given to patient.                                                                   

17:45 Triage completed.                                                                       jb4 

17:49 Arm band placed on right wrist.                                                         jb4 

18:22 Inserted saline lock: 22 gauge in right antecubital area, using aseptic technique.      bp  

      Blood collected.                                                                            

18:47 XRAY Pelvis In Process Unspecified.                                                     EDMS

18:47 XRAY Hip LEFT 2 view In Process Unspecified.                                            EDMS

19:18 Primary Nurse role handed off by Giovany Macdonald, NATASHA                                     eb  

19:43 CT Abd/Pelvis - IV Contrast Only In Process Unspecified.                                EDMS

20:28 Gaby Patel, RN is Primary Nurse.                                                   bb  

20:30 No provider procedures requiring assistance completed. IV discontinued, intact,         bb  

      bleeding controlled, No redness/swelling at site. Pressure dressing applied.                

                                                                                                  

Administered Medications:                                                                         

18:22 Drug: morphine 4 mg Route: IVP; Infused Over: 4 mins; Site: left hand;                  bp  

20:10 Drug: Lidoderm Patch 5 % (700 mg/patch) 1 patches Route: Topical; Site: affected area;  bb  

                                                                                                  

                                                                                                  

Outcome:                                                                                          

20:11 Discharge ordered by MD.                                                                cp  

20:31 Discharged to home via wheelchair, with family.                                         bb  

20:31 Condition: stable                                                                           

20:31 Discharge instructions given to patient, Instructed on discharge instructions, follow       

      up and referral plans. medication usage, Demonstrated understanding of instructions,        

      follow-up care, medications, Prescriptions given X 2.                                       

20:31 Patient left the ED.                                                                    enrique  

                                                                                                  

Signatures:                                                                                       

Dispatcher [employer]                           EDMS                                                 

Gaby Patel, RN                     RN   bb                                                   

Gerardo Carroll PA PA cp Bryson, James RN                       RN   jb4                                                  

Giovany Macdonald RN                      RN   Sabrina Lopez Jennifer jj6                                                  

                                                                                                  

**************************************************************************************************

## 2022-06-11 NOTE — RAD REPORT
EXAM DESCRIPTION:  RAD - Pelvis - 6/11/2022 6:47 pm

 

CLINICAL HISTORY:  fall

 

COMPARISON:  No comparisons

 

TECHNIQUE:  AP imaging of the pelvis was obtained.

 

FINDINGS:  No fracture of the bony pelvis. No fracture, dislocation or other acute hip joint finding.
 No significant sacral ala or SI joint findings.

 

 

No soft tissue abnormality.

 

IMPRESSION:  Negative pelvis for acute or significant findings.

## 2022-06-17 ENCOUNTER — HOSPITAL ENCOUNTER (EMERGENCY)
Dept: HOSPITAL 97 - ER | Age: 62
Discharge: HOME | End: 2022-06-17
Payer: COMMERCIAL

## 2022-06-17 VITALS — SYSTOLIC BLOOD PRESSURE: 140 MMHG | TEMPERATURE: 97.6 F | DIASTOLIC BLOOD PRESSURE: 55 MMHG | OXYGEN SATURATION: 99 %

## 2022-06-17 DIAGNOSIS — R19.7: ICD-10-CM

## 2022-06-17 DIAGNOSIS — Z88.0: ICD-10-CM

## 2022-06-17 DIAGNOSIS — Z88.5: ICD-10-CM

## 2022-06-17 DIAGNOSIS — R11.2: ICD-10-CM

## 2022-06-17 DIAGNOSIS — I10: ICD-10-CM

## 2022-06-17 DIAGNOSIS — E11.9: ICD-10-CM

## 2022-06-17 DIAGNOSIS — M54.50: Primary | ICD-10-CM

## 2022-06-17 DIAGNOSIS — K64.8: ICD-10-CM

## 2022-06-17 LAB
ALBUMIN SERPL BCP-MCNC: 3.9 G/DL (ref 3.4–5)
ALP SERPL-CCNC: 76 U/L (ref 45–117)
ALT SERPL W P-5'-P-CCNC: 27 U/L (ref 12–78)
AST SERPL W P-5'-P-CCNC: 23 U/L (ref 15–37)
BUN BLD-MCNC: 34 MG/DL (ref 7–18)
GLUCOSE SERPLBLD-MCNC: 140 MG/DL (ref 74–106)
HCT VFR BLD CALC: 35.1 % (ref 36–45)
LYMPHOCYTES # SPEC AUTO: 0.8 K/UL (ref 0.7–4.9)
PMV BLD: 7.2 FL (ref 7.6–11.3)
POTASSIUM SERPL-SCNC: 4.8 MMOL/L (ref 3.5–5.1)
RBC # BLD: 4.14 M/UL (ref 3.86–4.86)

## 2022-06-17 PROCEDURE — 96361 HYDRATE IV INFUSION ADD-ON: CPT

## 2022-06-17 PROCEDURE — 99283 EMERGENCY DEPT VISIT LOW MDM: CPT

## 2022-06-17 PROCEDURE — 85025 COMPLETE CBC W/AUTO DIFF WBC: CPT

## 2022-06-17 PROCEDURE — 96375 TX/PRO/DX INJ NEW DRUG ADDON: CPT

## 2022-06-17 PROCEDURE — 80053 COMPREHEN METABOLIC PANEL: CPT

## 2022-06-17 PROCEDURE — 36415 COLL VENOUS BLD VENIPUNCTURE: CPT

## 2022-06-17 PROCEDURE — 96374 THER/PROPH/DIAG INJ IV PUSH: CPT

## 2022-06-17 NOTE — EDPHYS
Physician Documentation                                                                           

 [facility]                                                                 

Name: Meredith Stark                                                                                 

Age: 61 yrs                                                                                       

Sex: Female                                                                                       

: 1960                                                                                   

MRN: [id number]                                                                                   

Arrival Date: 2022                                                                          

Time: 17:22                                                                                       

Account#: [id number]                                                                            

Bed 11                                                                                            

Private MD:                                                                                       

ED Physician Andrew Kemp                                                                    

HPI:                                                                                              

                                                                                             

19:48 This 61 yrs old  Female presents to ER via Wheelchair with complaints of        kb  

      Abdominal Pain, Back Pain, Vomiting, Diarrhea.                                              

19:49 The patient presents to the emergency department with nausea, vomiting, diarrhea.       kb  

      Onset: The symptoms/episode began/occurred yesterday. Possible causes: ran out of           

      oxycodone 2 days ago. The symptoms are aggravated by nothing. The symptoms are              

      alleviated by nothing. Associated signs and symptoms: Pertinent positives: diarrhea,        

      nausea, vomiting. Severity of symptoms: At their worst the symptoms were moderate in        

      the emergency department the symptoms have improved. The patient has not experienced        

      similar symptoms in the past. The patient has not recently seen a physician. Pt states      

      she has been out of her oxycodone for 2 days. Started having n/v/d yesterday. States        

      vomiting has stopped and she has been able to tolerate po intake, but her hemorrhoids       

      have been bleeding because of all of the diarrhea. c/o low back pain that is chronic        

      which is what she takes oxycodone for. .                                                    

                                                                                                  

Historical:                                                                                       

- Allergies:                                                                                      

17:55 Darvocet-N 100;                                                                         aa5 

17:55 Nubain;                                                                                 aa5 

17:55 PENICILLINS;                                                                            aa5 

- PMHx:                                                                                           

17:55 diabetes mellitus; Hypertensive disorder; Thyroid problem; Chronic back pain;           aa5 

- PSHx:                                                                                           

17:55 Appendectomy; hceryl knee reconstructive sx; carpal tunnel repair; Cholecystectomy;        aa5 

      hysterectomy;                                                                               

                                                                                                  

- Immunization history:: Adult Immunizations unknown.                                             

- Social history:: Smoking status: Patient denies any tobacco usage or history of.                

                                                                                                  

                                                                                                  

ROS:                                                                                              

19:25 Constitutional: Negative for fever, chills, and weight loss.                            kb  

19:25 Abdomen/GI: Positive for nausea, vomiting, and diarrhea, rectal bleeding, Negative for      

      abdominal pain.                                                                             

19:25 Back: Positive for pain at rest, of the low back area.                                      

19:25 All other systems are negative.                                                             

                                                                                                  

Exam:                                                                                             

19:25 Constitutional:  This is a well developed, well nourished patient who is awake, alert,  kb  

      and in no acute distress. Head/Face:  Normocephalic, atraumatic. ENT:  Moist Mucous         

      membranes Respiratory:  Respirations even and unlabored. No increased work of               

      breathing. Talking in full sentences Skin:  Warm, dry with normal turgor.  Normal           

      color. MS/ Extremity:  Pulses equal, no cyanosis.  Neurovascular intact.  Full, normal      

      range of motion. Neuro:  Awake and alert, GCS 15, oriented to person, place, time, and      

      situation. Moves all extremities. Normal gait. Psych:  Awake, alert, with orientation       

      to person, place and time.  Behavior, mood, and affect are within normal limits.            

19:25 Abdomen/GI: Inspection: abdomen appears normal, Bowel sounds: normal, Palpation:            

      abdomen is soft and non-tender, Rectal exam: hemorrhoid(s), external, with associated       

      bleeding, without inflammation, without thrombosis, without pain.                           

                                                                                                  

Vital Signs:                                                                                      

17:56  / 55; Pulse 101; Resp 18 S; Temp 97.6(TE); Pulse Ox 99% on R/A; Weight 90.72 kg  aa5 

      (R); Height 5 ft. 2 in. (157.48 cm) (R);                                                    

17:56 Body Mass Index 36.58 (90.72 kg, 157.48 cm)                                             aa5 

                                                                                                  

MDM:                                                                                              

18:07 Patient medically screened.                                                             kb  

19:26 Data reviewed: vital signs, nurses notes. Data interpreted: Pulse oximetry: on room air kb  

      is 99 %. Interpretation: normal. Counseling: I had a detailed discussion with the           

      patient and/or guardian regarding: the historical points, exam findings, and any            

      diagnostic results supporting the discharge/admit diagnosis, lab results, the need for      

      outpatient follow up, a family practitioner, to return to the emergency department if       

      symptoms worsen or persist or if there are any questions or concerns that arise at home.    

19:48 ED course: Pt states she is feeling a little better, but shaky from nerves. states she  kb  

      does have anxiety that she takes something mild for, but also thinks she is detoxing        

      from oxycodone that she normally takes. Has appt with her dr on Monday to get a refill .    

                                                                                                  

                                                                                             

18:11 Order name: CBC with Diff; Complete Time: 18:52                                         kb  

                                                                                             

18:11 Order name: CMP; Complete Time: 19:02                                                   kb  

                                                                                             

18:11 Order name: IV Saline Lock; Complete Time: 18:33                                        kb  

                                                                                             

18:11 Order name: Labs collected and sent; Complete Time: 18:33                               kb  

                                                                                                  

Administered Medications:                                                                         

18:42 Drug: NS 0.9% 1000 ml Route: IV; Rate: 1 bolus; Site: right antecubital;                jb4 

20:00 Follow up: Response: No adverse reaction; IV Status: Completed infusion; IV Intake:     tw5 

      1000ml                                                                                      

18:42 Drug: Zofran (Ondansetron) 4 mg Route: IVP; Site: right antecubital;                    jb4 

20:01 Follow up: Response: No adverse reaction                                                tw5 

18:42 Drug: fentaNYL (PF) 25 mcg Route: IVP; Site: right antecubital;                         jb4 

20:00 Follow up: Response: No adverse reaction; RASS: Alert and Calm (0)                      tw5 

20:01 Drug: HYDROcodone-acetaminophen 10 mg-325 mg 1 tabs Route: PO;                          tw5 

20:01 Follow up: Response: No adverse reaction; Medication administered at discharge.         tw5 

                                                                                                  

                                                                                                  

Disposition:                                                                                      

                                                                                             

08:11 Co-signature as Attending Physician, Andrew Kemp MD I agree with the assessment    ma2 

      and plan of care.                                                                           

                                                                                                  

Disposition Summary:                                                                              

22 19:52                                                                                    

Discharge Ordered                                                                                 

      Location: Home                                                                          kb  

      Condition: Stable                                                                       kb  

      Diagnosis                                                                                   

        - Low back pain - chronic                                                             kb  

        - Nausea with vomiting, unspecified                                                   kb  

        - Diarrhea, unspecified                                                               kb  

        - Other hemorrhoids                                                                   kb  

      Followup:                                                                               kb  

        - With: Emergency Department                                                               

        - When: As needed                                                                          

        - Reason: Worsening of condition                                                           

      Followup:                                                                               kb  

        - With: Private Physician                                                                  

        - When: 2 - 3 days                                                                         

        - Reason: Recheck today's complaints, Continuance of care, Re-evaluation by your           

      physician                                                                                   

      Discharge Instructions:                                                                     

        - Discharge Summary Sheet                                                             kb  

        - Nausea and Vomiting, Adult, Easy-to-Read                                            kb  

        - Diarrhea, Adult, Easy-to-Read                                                       kb  

        - Hemorrhoids, Easy-to-Read                                                           kb  

      Forms:                                                                                      

        - Medication Reconciliation Form                                                      kb  

        - Thank You Letter                                                                    kb  

        - Antibiotic Education                                                                kb  

        - Prescription Opioid Use                                                             kb  

Signatures:                                                                                       

Dispatcher [employer]                           Lindsey Stanley, SERAFINC                 REE-Evelin Adan RN                     RN   aa5                                                  

Juan Arriaga RN                       RN   jb4                                                  

Andrew Kemp MD MD   ma2                                                  

Rosalina Clemons                                tw5                                                  

                                                                                                  

Corrections: (The following items were deleted from the chart)                                    

                                                                                             

19:53 19:48 ED course: Pt states she is feeling a little better, but shaky from nerves.       kb  

      states she does have anxiety that she takes something mild for, but also thinks she is      

      detoxing from oxycodone that she normally takes. . kb                                       

                                                                                                  

**************************************************************************************************

## 2022-06-17 NOTE — ER
Nurse's Notes                                                                                     

 [facility]                                                                 

Name: Meredith Stark                                                                                 

Age: 61 yrs                                                                                       

Sex: Female                                                                                       

: 1960                                                                                   

MRN: [id number]                                                                                   

Arrival Date: 2022                                                                          

Time: 17:22                                                                                       

Account#: [id number]                                                                            

Bed 11                                                                                            

Private MD:                                                                                       

Diagnosis: Low back pain-chronic;Nausea with vomiting, unspecified;Diarrhea, unspecified;Other    

  hemorrhoids                                                                                     

                                                                                                  

Presentation:                                                                                     

                                                                                             

17:56 Chief complaint: Patient states: chronic back pain that got worse today. Pt also        aa5 

      reports vomiting, diarrhea, and upper abd pain that began today, states "I am also          

      bleeding from my hemorrhoids". Coronavirus screen: diarrhea. Ebola Screen: No symptoms      

      or risks identified at this time. Initial Sepsis Screen: Does the patient meet any 2        

      criteria? HR > 90 bpm. Does the patient have a suspected source of infection? No.           

      Patient's initial sepsis screen is negative. Risk Assessment: Do you want to hurt           

      yourself or someone else? Patient reports no desire to harm self or others. Onset of        

      symptoms was 2022.                                                                 

17:56 Acuity: SP 3                                                                           aa5 

17:56 Method Of Arrival: Wheelchair                                                           aa5 

                                                                                                  

Historical:                                                                                       

- Allergies:                                                                                      

17:55 Darvocet-N 100;                                                                         aa5 

17:55 Nubain;                                                                                 aa5 

17:55 PENICILLINS;                                                                            aa5 

- PMHx:                                                                                           

17:55 diabetes mellitus; Hypertensive disorder; Thyroid problem; Chronic back pain;           aa5 

- PSHx:                                                                                           

17:55 Appendectomy; cheryl knee reconstructive sx; carpal tunnel repair; Cholecystectomy;        aa5 

      hysterectomy;                                                                               

                                                                                                  

- Immunization history:: Adult Immunizations unknown.                                             

- Social history:: Smoking status: Patient denies any tobacco usage or history of.                

                                                                                                  

                                                                                                  

Screenin:57 Abuse screen: Denies threats or abuse. Nutritional screening: No deficits noted.        jb4 

      Tuberculosis screening: No symptoms or risk factors identified. Fall Risk None              

      identified.                                                                                 

                                                                                                  

Assessment:                                                                                       

18:57 General: Appears in no apparent distress. uncomfortable, Behavior is calm, cooperative, jb4 

      appropriate for age. Pain: Complains of pain in low back area,hemroids Pain does not        

      radiate. Pain currently is 9 out of 10 on a pain scale. Neuro: Level of Consciousness       

      is awake, alert, obeys commands, Oriented to person, place, time, situation.                

      Cardiovascular: Patient's skin is warm and dry. Respiratory: Airway is patent               

      Respiratory effort is even, unlabored, Respiratory pattern is regular, symmetrical. GI:     

      Abdomen is non-distended, obese, Reports lower abdominal pain, diarrhea, hemorrhoids.       

      : No signs and/or symptoms were reported regarding the genitourinary system. Denies       

      burning with urination, discharge, pain urinary frequency, urgency. Derm: Skin is           

      intact, Skin is dry, Skin is pale, Skin temperature is warm. Musculoskeletal:               

      Circulation, motion, and sensation intact. Range of motion: intact in all extremities.      

20:03 GI:                                                                                     tw5 

                                                                                                  

Vital Signs:                                                                                      

17:56  / 55; Pulse 101; Resp 18 S; Temp 97.6(TE); Pulse Ox 99% on R/A; Weight 90.72 kg  aa5 

      (R); Height 5 ft. 2 in. (157.48 cm) (R);                                                    

17:56 Body Mass Index 36.58 (90.72 kg, 157.48 cm)                                             aa5 

                                                                                                  

ED Course:                                                                                        

17:22 Patient arrived in ED.                                                                  as  

17:56 Arm band placed on.                                                                     aa5 

17:57 Triage completed.                                                                       aa5 

18:07 [facility] Stephen[facility]                                                        kb  

18:07 Andrew Kemp MD is Attending Physician.                                           kb  

18:19 Juan Arriaga, RN is Primary Nurse.                                                     jb4 

18:29 Initial lab(s) drawn, by me, sent to lab. Inserted saline lock: 18 gauge in right       jb4 

      antecubital area, using aseptic technique. Blood collected.                                 

18:57 Patient has correct armband on for positive identification. Bed in low position. Call   jb4 

      light in reach. Side rails up X 1. Client placed on continuous cardiac and pulse            

      oximetry monitoring. NIBP monitoring applied.                                               

20:03 No provider procedures requiring assistance completed. intact, bleeding controlled, No  tw5 

      redness/swelling at site. Pressure dressing applied.                                        

                                                                                                  

Administered Medications:                                                                         

18:42 Drug: NS 0.9% 1000 ml Route: IV; Rate: 1 bolus; Site: right antecubital;                jb4 

20:00 Follow up: Response: No adverse reaction; IV Status: Completed infusion; IV Intake:     tw5 

      1000ml                                                                                      

18:42 Drug: Zofran (Ondansetron) 4 mg Route: IVP; Site: right antecubital;                    jb4 

20:01 Follow up: Response: No adverse reaction                                                tw5 

18:42 Drug: fentaNYL (PF) 25 mcg Route: IVP; Site: right antecubital;                         jb4 

20:00 Follow up: Response: No adverse reaction; RASS: Alert and Calm (0)                      tw5 

20:01 Drug: HYDROcodone-acetaminophen 10 mg-325 mg 1 tabs Route: PO;                          tw5 

20:01 Follow up: Response: No adverse reaction; Medication administered at discharge.         tw5 

                                                                                                  

                                                                                                  

Medication:                                                                                       

18:57 VIS not applicable for this client.                                                     jb4 

                                                                                                  

Intake:                                                                                           

20:00 IV: 1000ml; Total: 1000ml.                                                              tw 

                                                                                                  

Outcome:                                                                                          

19:52 Discharge ordered by MD.                                                                brice  

20:03 Discharged to home with family.                                                         tw5 

20:03 Condition: good                                                                             

20:03 Discharge instructions given to patient, Instructed on discharge instructions, follow       

      up and referral plans. Demonstrated understanding of instructions, follow-up care,          

      medications.                                                                                

20:04 Patient left the ED.                                                                    tw5 

                                                                                                  

Signatures:                                                                                       

Lindsey Stephenson, AMINATAP-C                 FNP-Hilda Gibbons Audri RN                     RN   aa5                                                  

Juan Arriaga RN                       RN   jb4                                                  

Rosalina Clemons                                tw5                                                  

                                                                                                  

**************************************************************************************************

## 2022-06-17 NOTE — XMS REPORT
Continuity of Care Document

                            Created on:2022



Patient:SUDHA BABIN

Sex:Female

:1960

External Reference #:[id number]





Demographics







                          Address                   [address]



                                                    [place] [address]

 

                          Home Phone                [phone]

 

                          Email Address             [place]

 

                          Preferred Language        English

 

                          Marital Status            Unknown

 

                          Bahai Affiliation     Unknown

 

                          Race                      Unknown

 

                          Additional Race(s)        Unavailable

 

                          Ethnic Group              Unknown









Author







                          Organization              [facility]

t

 

                          Address                   [address]



                                                    [place] [address]

 

                          Phone                     [phone]









Support







                Name            Relationship    Address         Phone

 

                TALYA           Spouse          [phone] 



                                                [place] [address] 









Care Team Providers







                    Name                Role                Phone

 

                    DOMINGA               Attending Clinician Unavailable

 

                    JEFF ORR      Attending Clinician Unavailable









Payers







           Payer Name Policy Type Policy Number Effective Date Expiration Date HORACIO polo

 

           [facility] [employer]            [id number]  2022 00:00:00            







Problems

This patient has no known problems.



Allergies, Adverse Reactions, Alerts

This patient has no known allergies or adverse reactions.



Medications

This patient has no known medications.



Procedures

This patient has no known procedures.



Encounters







        Start   End     Encounter Admission Attending Care    Care    Encounter 

Source



        Date/Time Date/Time Type    Type    Clinicians Facility Department ID   

   

 

        [id number]

 [place]



        01:03:24                         BENNIE                 [id number] [facility]

 

        2022 Outpatient         DOMINGA  [facility]    MED     [id number]   

 [facility]



        12:04:00 23:59:00                 BENNIE                         







Results







           Test Description Test Time  Test Comments Results    Result Comments 

Source









                    TSH, THIRD GENERATION 2022 06:18:54 









                      Test Item  Value      Reference Range Interpretation Comme

nts









             TSH, THIRD GENERATION (test code = 2821) 0.190 UIU/ML 0.400-4.100  

L            



HEMOGLOBIN [id number] 03:11:56





             Test Item    Value        Reference Range Interpretation Comments

 

             HEMOGLOBIN A1c (test 8.4 %        4.2-5.6      H                   

   [employer]



             code = 03410)                                        ASSOCIATION 

IDELINES FOR



                                                                 HGB A1C:



                                                                 PREDIABETES/INC

REASED RISK .



                                                                 . . . . . . 5.7

-6.4%



                                                                  DIAGNOSIS OF D

IABETES  . .



                                                                 . . . . . . . >

=6.5%



                                                                    WITH CONFIRM

ATION OR



                                                                 APPROPRIATE SYM

PTOMS



                                                                 NOTE: ASSAY MAY

 BE AFFECTED



                                                                 BY HEMOGLOBINOP

ATHIES



                                                                 (SICKLE     FAMILIA

L ANEMIA, S-C



                                                                 DISEASE, OTHERS

) OR



                                                                 ARTIFICIALLY LO

WERED BY



                                                                 DECREASED RED C

ELL SURVIVAL



                                                                 (HEMOLYTIC ANEM

IAS, BLOOD



                                                                 LOSS,     ETC.)

.  CONSIDER



                                                                 ALTERNATE TESTI

NG OR



                                                                 LABORATORY CONS

ULTATION.



LIPID [id number] 03:11:06





             Test Item    Value        Reference Range Interpretation Comments

 

             CHOLESTEROL (test 113 MG/DL    <200                      



             code = 2210)                                        

 

             TRIGLYCERIDES (test 165 MG/DL    <150         H            



             code = 2232)                                        

 

             HDL CHOLESTEROL (test 39 MG/DL     >39          L            



             code = 2220)                                        

 

             CALC LDL CHOL (test 50 MG/DL     <100                       NOTE: C

ALCULATED LDL



             code = 2237)                                        IS BASED ON



                                                                 CAROLANN-FUNES 

METHOD



                                                                 WHICHINCLUDES



                                                                 ADJUSTABLE



                                                                 TRIGLYCERIDE:VL

DL



                                                                 CHOLESTEROL RAT

IO.THIS



                                                                 FACTOR VARIES B

Y



                                                                 MEASURED TRIGLY

CERIDE



                                                                 AND NON-HDLCHOL

ESTEROL



                                                                 CONCENTRATIONS 

WITH



                                                                 INCREASED CALCU

LATED



                                                                 LDL SEENIN HIGH

ER



                                                                 TRIGLYCERIDE OR

 LOWER



                                                                 NON-HDL SPECIME

NS. FOR



                                                                 MOREINFORMATION

, SEE



                                                                 CLIENT ANNOUNCE

MENT AT



                                                                 [email or web address]



                                                                 [email or web address]

 

             RISK RATIO LDL/HDL 1.28 RATIO   <3.22                     



             (test code = 2238)                                        



IRON, [id number] 03:11:06





             Test Item    Value        Reference Range Interpretation Comments

 

             IRON, SERUM (test 59 UG/DL                                UNL

ESS OTHERWISE



             code = 2222)                                        INDICATED, ALL 

TESTING



                                                                 [facility]

NICAL



                                                                 PATHOLOGY LABOR

[facility]



                                                                 [facility]  [address] [place] [address]

4



                                                                 LABORATORY DIRE

CTOR:  GILBERTO WARD.



                                                                 CLIA NUMBER [id number]

[id number]



                                                                 CAP ACCREDITATI

ON NO.



                                                                 [id number]



COMPREHENSIVE METABOLIC [id number] 03:11:06





             Test Item    Value        Reference Range Interpretation Comments

 

             GLUCOSE (test code = 141 MG/DL    70-99        H            



             )                                               

 

             BUN (test code = 31 MG/DL     8-23         H            



             )                                               

 

             CREATININE (test 1.07 MG/DL   0.60-1.30                 



             code = 2214)                                        

 

             eGFR ( CKD-EPI) 59 ML/MIN/1.73 >60          L            



             (test code = 67433)                                        

 

             CALC BUN/CREAT (test 29 RATIO     6-28         H            



             code = 2235)                                        

 

             SODIUM (test code = 141 MEQ/L    133-146                   



             )                                               

 

             POTASSIUM (test code 4.7 MEQ/L    3.5-5.4                   



             = )                                             

 

             CHLORIDE (test code 99 MEQ/L                         



             = )                                             

 

             CARBON DIOXIDE (test 26 MEQ/L     19-31                     



             code = )                                        

 

             CALCIUM (test code = 8.8 MG/DL    8.5-10.5                  



             )                                               

 

             PROTEIN, TOTAL (test 8.0 G/DL     6.1-8.3                   



             code = )                                        

 

             ALBUMIN (test code = 4.5 G/DL     3.5-5.2                   



             )                                               

 

             CALC GLOBULIN (test 3.5 G/DL     1.9-3.7                   



             code = )                                        

 

             CALC A/G RATIO (test 1.3 RATIO    1.0-2.6                   



             code = )                                        

 

             BILIRUBIN, TOTAL 0.4 MG/DL    See_Comment                [Automated

 message]



             (test code = )                                        The syste

[employer] which



                                                                 generated this



                                                                 result transmit

maximo



                                                                 reference range

:



                                                                 <=1.2. The refe

rence



                                                                 range was not u

sed



                                                                 to interpret th

is



                                                                 result as



                                                                 normal/abnormal

.

 

             ALKALINE PHOSPHATASE 67 U/L                           



             (test code = )                                        

 

             AST (test code = 49 U/L       9-40         H            



             )                                               

 

             ALT (test code = 41 U/L       5-40         H            



             )                                               



ALBUMIN/CREATININE RATIO, URINE, [id number] 05:57:19





             Test Item    Value        Reference Range Interpretation Comments

 

             CREATININE, URINE, 212.0 MG/DL  NOT ESTAB                 



             CONC. (test code =                                        



             )                                               

 

             ALBUMIN, URINE, 16.0 MG/DL                               Note: Test

 name is



             RANDOM (test code                                        changed to

 Urine Albumin



             = 35617)                                            from Microalbum

in in



                                                                 accordance with

 ADA and



                                                                 NKF Guidelines,

 and



                                                                 Albumin/Creatin

ine ratio



                                                                 reference inter

mariah



                                                                 reflects those



                                                                 Guidelines.  An

alytic



                                                                 methodology is



                                                                 unchanged.  No 

reference



                                                                 interval for Ra

ndom



                                                                 Urine Albumin i

s



                                                                 available.

 

             CALC         75 MG/G      <30          H                   UNLESS O

THERWISE



             ALBUMIN/CREAT, RND                                        INDICATED

, ALL TESTING



             (test code =                                        PERFORMED [facility]I

NICAL



             [address])                                              PATHOLOGY LABOR

[facility]



                                                                 [facility]  [address] [place] [address], [place] [address]

4



                                                                  LABORATORY DIR

LILY:



                                                                 CLIFTON THOMSON M.D.



                                                                     CLIA NUMBER



                                                                 [id number]  CAP



                                                                 ACCREDITATION N

O.



                                                                 [id number]



HEMOGLOBIN [id number] 04:12:15





             Test Item    Value        Reference Range Interpretation Comments

 

             HEMOGLOBIN A1c (test 9.1 %        4.2-5.6      H                   

   [employer]



             code = 11897)                                        ASSOCIATION 

IDELINES FOR



                                                                 HGB A1C:



                                                                 PREDIABETES/INC

REASED RISK .



                                                                 . . . . . . 5.7

-6.4%



                                                                  DIAGNOSIS OF D

IABETES  . .



                                                                 . . . . . . . >

=6.5%



                                                                    WITH CONFIRM

ATION OR



                                                                 APPROPRIATE SYM

PTOMS



                                                                 NOTE: ASSAY MAY

 BE AFFECTED



                                                                 BY HEMOGLOBINOP

ATHIES



                                                                 (SICKLE     FAMILIA

L ANEMIA, S-C



                                                                 DISEASE, OTHERS

) OR



                                                                 ARTIFICIALLY LO

WERED BY



                                                                 DECREASED RED C

ELL SURVIVAL



                                                                 (HEMOLYTIC ANEM

IAS, BLOOD



                                                                 LOSS,     ETC.)

.  CONSIDER



                                                                 ALTERNATE TESTI

NG OR



                                                                 LABORATORY CONS

ULTATION.

## 2022-07-14 ENCOUNTER — HOSPITAL ENCOUNTER (EMERGENCY)
Dept: HOSPITAL 97 - ER | Age: 62
LOS: 1 days | Discharge: HOME | End: 2022-07-15
Payer: COMMERCIAL

## 2022-07-14 DIAGNOSIS — Z88.8: ICD-10-CM

## 2022-07-14 DIAGNOSIS — Z88.0: ICD-10-CM
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
## 2022-07-14 DIAGNOSIS — I10: ICD-10-CM

## 2022-07-14 DIAGNOSIS — Z72.0: ICD-10-CM

## 2022-07-14 DIAGNOSIS — R07.89: ICD-10-CM

## 2022-07-14 DIAGNOSIS — M79.622: Primary | ICD-10-CM

## 2022-07-14 DIAGNOSIS — Z88.5: ICD-10-CM

## 2022-07-14 PROCEDURE — 99284 EMERGENCY DEPT VISIT MOD MDM: CPT

## 2022-07-14 PROCEDURE — 96372 THER/PROPH/DIAG INJ SC/IM: CPT

## 2022-07-15 ENCOUNTER — HOSPITAL ENCOUNTER (EMERGENCY)
Dept: HOSPITAL 97 - ER | Age: 62
Discharge: LEFT BEFORE DISCHARGE | End: 2022-07-15
Payer: COMMERCIAL

## 2022-07-15 VITALS — SYSTOLIC BLOOD PRESSURE: 141 MMHG | OXYGEN SATURATION: 98 % | DIASTOLIC BLOOD PRESSURE: 80 MMHG

## 2022-07-15 VITALS — TEMPERATURE: 98.2 F

## 2022-07-15 DIAGNOSIS — Z02.9: Primary | ICD-10-CM

## 2022-07-15 NOTE — ER
Nurse's Notes                                                                                     

 [facility]                                                                 

Name: Meredith Stark                                                                                 

Age: 61 yrs                                                                                       

Sex: Female                                                                                       

: 1960                                                                                   

MRN: [id number]                                                                                   

Arrival Date: 07/15/2022                                                                          

Time: 22:05                                                                                       

Account#: [id number]                                                                            

Bed Waiting                                                                                       

Private MD:                                                                                       

Diagnosis:                                                                                        

                                                                                                  

ED Course:                                                                                        

07/15                                                                                             

22:05 Patient arrived in ED.                                                                  bp1 

22:40 Patient's name was called from ER lobby. No response. Unable to locate patient. Will    bb  

      disposition as left without being seen by a provider.                                       

                                                                                                  

Administered Medications:                                                                         

No medications were administered                                                                  

                                                                                                  

                                                                                                  

Outcome:                                                                                          

22:41 Patient left the ED.                                                                    bb  

                                                                                                  

Signatures:                                                                                       

Gaby Patel RN                     RN   bb                                                   

Dorothy Zhang                           bp1                                                  

                                                                                                  

**************************************************************************************************

## 2022-07-15 NOTE — ER
Nurse's Notes                                                                                     

 [facility]                                                                 

Name: Meredith Stark                                                                                 

Age: 61 yrs                                                                                       

Sex: Female                                                                                       

: 1960                                                                                   

MRN: [id number]                                                                                   

Arrival Date: 2022                                                                          

Time: 23:05                                                                                       

Account#: [id number]                                                                            

Bed 6                                                                                             

Private MD:                                                                                       

Diagnosis: Pain in left upper arm-from fall;Chest pain, unspecified-left lateral from fall        

                                                                                                  

Presentation:                                                                                     

                                                                                             

23:11 Chief complaint: Patient states: I had an accident about an hour ago and I think I      kd3 

      pulled something in my arm. it hurts in my left side and my left arm. I don't know if I     

      hit my head. I kind of just "came too" and I was on the floor. my head does not hurt at     

      all. I was on a small step ladder in my kitchen and i got real dizzy and i think i          

      missed a step on the way down. Chief complaint:. Coronavirus screen: Vaccine status:        

      Patient reports receiving the 2nd dose of the covid vaccine. Ebola Screen: No symptoms      

      or risks identified at this time. Initial Sepsis Screen: Does the patient meet any 2        

      criteria? No. Patient's initial sepsis screen is negative. Does the patient have a          

      suspected source of infection? No. Patient's initial sepsis screen is negative. Risk        

      Assessment: Do you want to hurt yourself or someone else? Patient reports no desire to      

      harm self or others. Onset of symptoms was 2022.                                   

23:11 Method Of Arrival: Wheelchair                                                           kd3 

23:11 Acuity: SP 3                                                                           kd3 

                                                                                                  

Triage Assessment:                                                                                

23:17 General: Appears uncomfortable, Behavior is calm, cooperative. Pain: Complains of pain  kd3 

      in left arm. Neuro: Level of Consciousness is awake, alert, obeys commands, Oriented to     

      person, place, time, situation. Respiratory: Airway is patent Trachea midline               

      Respiratory effort is even, unlabored, Respiratory pattern is regular, symmetrical.         

                                                                                                  

Historical:                                                                                       

- Allergies:                                                                                      

23:17 PENICILLINS;                                                                            kd3 

23:17 Darvocet-N 100;                                                                         kd3 

23:17 Nubain;                                                                                 kd3 

- PMHx:                                                                                           

23:17 chronic back pain; diabetes mellitus; Thyroid problem; Hypertensive disorder;           kd3 

- PSHx:                                                                                           

23:17 Appendectomy; cheryl knee reconstructive sx; carpal tunnel repair; Cholecystectomy;        kd3 

      hysterectomy;                                                                               

                                                                                                  

- Immunization history:: Adult Immunizations up to date.                                          

- Social history:: Smoking status: Patient reports the use of cigarette tobacco                   

  products, Patient denies any tobacco usage or history of.                                       

                                                                                                  

                                                                                                  

Screenin:38 Abuse screen: Denies threats or abuse. Denies injuries from another. Nutritional        ld1 

      screening: No deficits noted. Tuberculosis screening: No symptoms or risk factors           

      identified. Fall Risk Fall in past 12 months (25 points).                                   

                                                                                                  

Assessment:                                                                                       

23:38 General: Appears in no apparent distress. comfortable, Behavior is calm, cooperative,   ld1 

      appropriate for age. Pain: Complains of pain in left upper quadrant, left lower             

      quadrant, left arm and left leg Pain does not radiate. Pain currently is 7 out of 10 on     

      a pain scale. Quality of pain is described as throbbing, Pain began 1 hour ago. Neuro:      

      Level of Consciousness is awake, alert, obeys commands, Oriented to person, place,          

      time, situation. Cardiovascular: Capillary refill < 3 seconds Patient's skin is warm        

      and dry. Respiratory: Airway is patent Respiratory effort is even, unlabored. GI:           

      Abdomen is round non-distended. : No signs and/or symptoms were reported regarding        

      the genitourinary system. EENT: No signs and/or symptoms were reported regarding the        

      EENT system. Derm: No deficits noted. No signs and/or symptoms reported regarding the       

      dermatologic system. Musculoskeletal: Reports pain in right lateral posterior chest,        

      anterior aspect of left lateral abdomen, left arm and left leg.                             

                                                                                                  

Vital Signs:                                                                                      

23:11  / 80; Pulse 88; Resp 19; Temp 98.2; Pulse Ox 99% ; Weight 88.9 kg; Height 5 ft.  kd3 

      2 in. (157.48 cm); Pain 10/10;                                                              

23:38  / 76; Pulse 90; Resp 18; Pulse Ox 97% on R/A; Pain 7/10;                         ld1 

07/15                                                                                             

01:27  / 80; Pulse 74; Resp 18 S; Pulse Ox 98% on R/A;                                  as6 

                                                                                             

23:11 Body Mass Index 35.85 (88.90 kg, 157.48 cm)                                             kd3 

                                                                                                  

ED Course:                                                                                        

                                                                                             

23:05 Patient arrived in ED.                                                                  bp1 

23:17 Gerardo Carroll PA is PHCP.                                                                cp  

23:17 Slick Cabrera MD is Attending Physician.                                             cp  

23:17 Triage completed.                                                                       kd3 

23:17 Jasiel Falcon, RN is Primary Nurse.                                                    as6 

23:17 Arm band placed on right wrist.                                                         kd3 

23:38 Patient has correct armband on for positive identification. Placed in gown. Bed in low  ld1 

      position. Call light in reach. Side rails up X2. Pulse ox on. NIBP on. Door closed.         

      Noise minimized. Warm blanket given.                                                        

23:38 No provider procedures requiring assistance completed.                                  ld1 

07/15                                                                                             

00:14 XRAY Humerus LEFT In Process Unspecified.                                               EDMS

00:14 XRAY Elbow LEFT 3 view In Process Unspecified.                                          EDMS

00:14 XRAY Ribs LEFT In Process Unspecified.                                                  EDMS

01:28 Patient did not have IV access during this emergency room visit.                        as6 

01:28 Sling applied to left arm.                                                              as6 

                                                                                                  

Administered Medications:                                                                         

00:10 Drug: Hydrocodone-Acetaminophen (7.5 mg-325 mg) 1 tabs Route: PO;                       aa9 

01:28 Follow up: Response: No adverse reaction; RASS: Alert and Calm (0)                      as6 

01:27 Drug: morphine 5 mg Route: IM; Site: left ventrogluteal;                                as6 

01:28 Follow up: Response: No adverse reaction                                                as6 

                                                                                                  

                                                                                                  

Medication:                                                                                       

01:28 VIS not applicable for this client.                                                     as6 

                                                                                                  

Outcome:                                                                                          

00:54 Discharge ordered by MD.                                                                cp  

01:28 Discharged to home ambulatory, with significant other.                                  as6 

01:28 Condition: stable                                                                           

01:28 Discharge instructions given to patient, Instructed on discharge instructions, follow       

      up and referral plans. medication usage, Demonstrated understanding of instructions,        

      follow-up care, medications, Prescriptions given X 1.                                       

01:29 Patient left the ED.                                                                    as6 

                                                                                                  

Signatures:                                                                                       

Dispatcher [employer]                           EDMS                                                 

Gerardo Carroll PA PA cp Paniauga, Brittany bp1 Dibbern, Lauren, RN                     RN   ld1                                                  

Jasiel Falcon, RN                      RN   as6                                                  

Cheri Wong RN RN   kd3                                                  

Yaneli Lowe, NATASHA                       RN   aa9                                                  

                                                                                                  

**************************************************************************************************

## 2022-07-15 NOTE — RAD REPORT
EXAM DESCRIPTION:  RAD - Elbow Left 3 View - 7/15/2022 12:06 am

 

CLINICAL HISTORY:  Fall

 

TECHNIQUE:  Frontal and lateral views of the left humerus.

 

COMPARISON:  No relevant prior studies available.

 

FINDINGS:  Bones/joints:   Mild degenerative changes. No acute fracture.   No dislocation.

Soft tissues:   Unremarkable.

* A single impression for all exams can be found at the end of this report

 

EXAM DESCRIPTION:  XR Left Elbow Complete, 3 Views

 

CLINICAL HISTORY:  Fall

 

TECHNIQUE:  Frontal, lateral and oblique views of the left elbow.

 

COMPARISON:  No relevant prior studies available.

 

FINDINGS:  Bones/joints:   Mild degenerative changes. No acute fracture.   No dislocation.

Soft tissues:   Unremarkable.

* A single impression for all exams can be found at the end of this report

 

IMPRESSION:  XR Left Humerus, 2 Views:

No acute injury.

XR Left Elbow Complete, 3 Views:

No acute injury.

 

Electronically signed by:   Shaw العراقي MD   7/15/2022 12:24 AM CDT Workstation: [id number]

 

 

 

Due to temporary technical issues with the PACS/Fluency reporting system, reports are being signed by
 the in house radiologists without review as a courtesy to insure prompt reporting. The interpreting 
radiologist is fully responsible for the content of the report.

## 2022-07-15 NOTE — RAD REPORT
EXAM DESCRIPTION:  RAD - Ribs  Left - 7/15/2022 12:06 am

 

CLINICAL HISTORY:  Fall, pain.

 

COMPARISON:  None.

 

TECHNIQUE:  Two views were obtained: AP and oblique left rib radiographs.

 

FINDINGS:  No definite acute rib fracture is identified. Suspected prior healed left anterolateral fi
fth rib fracture. No pneumothorax identified on these images.

 

IMPRESSION:  1.   No definite acute rib fracture is identified.

2.   Suspected prior healed left anterolateral fifth rib fracture.

 

Electronically signed by:   Candy Corona MD   7/15/2022 12:30 AM CDT Workstation: [id number]

 

 

 

Due to temporary technical issues with the PACS/Fluency reporting system, reports are being signed by
 the in house radiologists without review as a courtesy to insure prompt reporting. The interpreting 
radiologist is fully responsible for the content of the report.

## 2022-07-15 NOTE — EDPHYS
Physician Documentation                                                                           

 [facility]                                                                 

Name: Meredith Stark                                                                                 

Age: 61 yrs                                                                                       

Sex: Female                                                                                       

: 1960                                                                                   

MRN: [id number]                                                                                   

Arrival Date: 2022                                                                          

Time: 23:05                                                                                       

Account#: [id number]                                                                            

Bed 6                                                                                             

Private MD:                                                                                       

ED Physician Slick Cabrera                                                                      

HPI:                                                                                              

                                                                                             

23:40 This 61 yrs old  Female presents to ER via Wheelchair with complaints of Fall   cp  

      Injury.                                                                                     

23:40 Details of fall: The patient fell from a height, small step ladder. Onset: The          cp  

      symptoms/episode began/occurred just prior to arrival. Associated injuries: The patient     

      sustained injury to the chest, specifically the left lower lateral chest wall, pain         

      with breathing, pain with movement, tenderness, left upper arm, painful injury.             

      Severity of symptoms: in the emergency department the symptoms are unchanged, despite       

      home interventions. Patient reports falling after losing balance while standing on          

      small step ladder in closet of home causing her to strike closet wall. No reported LOC.     

      Denies hitting head.                                                                        

                                                                                                  

Historical:                                                                                       

- Allergies:                                                                                      

23:17 PENICILLINS;                                                                            kd3 

23:17 Darvocet-N 100;                                                                         kd3 

23:17 Nubain;                                                                                 kd3 

- PMHx:                                                                                           

23:17 chronic back pain; diabetes mellitus; Thyroid problem; Hypertensive disorder;           kd3 

- PSHx:                                                                                           

23:17 Appendectomy; cheryl knee reconstructive sx; carpal tunnel repair; Cholecystectomy;        kd3 

      hysterectomy;                                                                               

                                                                                                  

- Immunization history:: Adult Immunizations up to date.                                          

- Social history:: Smoking status: Patient reports the use of cigarette tobacco                   

  products, Patient denies any tobacco usage or history of.                                       

                                                                                                  

                                                                                                  

ROS:                                                                                              

23:45 Constitutional: Negative for body aches, chills, fever, poor PO intake.                 cp  

23:45 Eyes: Negative for injury, pain, redness, and discharge.                                cp  

23:45 ENT: Negative for drainage from ear(s), ear pain, sore throat, difficulty swallowing,       

      difficulty handling secretions.                                                             

23:45 Cardiovascular: Positive for chest pain, of the left lateral chest wall.                    

23:45 Respiratory: Negative for cough, shortness of breath, wheezing.                             

23:45 Abdomen/GI: Negative for abdominal pain, nausea, vomiting, and diarrhea.                    

23:45 MS/extremity: Positive for pain, tenderness, of the left upper arm, Negative for            

      decreased range of motion, deformity, paresthesias.                                         

23:45 Neuro: Negative for altered mental status, dizziness, headache, loss of consciousness,      

      syncope, weakness.                                                                          

23:45 All other systems are negative.                                                             

                                                                                                  

Exam:                                                                                             

23:50 Head/Face:  Normocephalic, atraumatic.                                                  cp  

23:50 Constitutional: The patient appears in no acute distress, alert, awake,                     

      non-diaphoretic, non-toxic, well developed, well nourished.                                 

23:50 Eyes: Periorbital structures: appear normal, Conjunctiva: normal, no exudate, no            

      injection, Sclera: no appreciated abnormality, Lids and lashes: appear normal,              

      bilaterally.                                                                                

23:50 ENT: External ear(s): are unremarkable, Nose: is normal, Mouth: is normal, Posterior        

      pharynx: Airway: no evidence of obstruction, patent.                                        

23:50 Neck: C-spine: vertebral tenderness, is not appreciated, crepitus, is not appreciated,      

      ROM/movement: is normal, is supple, without pain, no range of motions limitations.          

23:50 Chest/axilla: Inspection: normal, no ecchymosis, no paradoxical chest wall movement, no     

      swelling, Palpation: crepitus, is not appreciated, tenderness, that is moderate, of the     

       non-specific, left lateral lower chest wall.                                               

23:50 Cardiovascular: Rate: normal, Edema: is not appreciated, JVD: is not appreciated.           

23:50 Respiratory: the patient does not display signs of respiratory distress,  Respirations:     

      normal, no use of accessory muscles, no retractions, labored breathing, is not present,     

      Breath sounds: are clear throughout, no decreased breath sounds, no stridor, no             

      wheezing.                                                                                   

23:50 Abdomen/GI: Inspection: obese Palpation: abdomen is soft and non-tender, in all             

      quadrants.                                                                                  

23:50 Back: CVA tenderness, is absent, vertebral tenderness, is not appreciated.                  

23:50 Musculoskeletal/extremity: Extremities: grossly normal except: noted in the left upper      

      arm: pain, tenderness, There is no evidence of  decreased ROM, deformity, ROM: full         

      passive range of motion, in the left shoulder and left elbow, Pulses: noted to be 2+ in     

      the left radial artery, the  left arm Sensation intact.                                     

23:50 Skin: no rash present.                                                                      

23:50 Neuro: Orientation: to person, place \T\ time. Mentation: is normal, Motor: moves all       

      fours, strength is normal, Sensation: is normal.                                            

                                                                                                  

Vital Signs:                                                                                      

23:11  / 80; Pulse 88; Resp 19; Temp 98.2; Pulse Ox 99% ; Weight 88.9 kg; Height 5 ft.  kd3 

      2 in. (157.48 cm); Pain 10/10;                                                              

23:38  / 76; Pulse 90; Resp 18; Pulse Ox 97% on R/A; Pain 7/10;                         ld1 

07/15                                                                                             

01:27  / 80; Pulse 74; Resp 18 S; Pulse Ox 98% on R/A;                                  as6 

                                                                                             

23:11 Body Mass Index 35.85 (88.90 kg, 157.48 cm)                                             kd3 

                                                                                                  

MDM:                                                                                              

                                                                                             

23:29 Patient medically screened.                                                             cp  

07/15                                                                                             

00:00 Differential diagnosis: closed head injury, contusion, fracture, multiple trauma, rib   cp  

      fracture, rib contusion.                                                                    

00:51 ED course: Pharmacy screen shows patient received Oxycodone 10/325 #120 on 2022.   cp  

      When questioned, patient reports she has to  prescription Monday.                    

00:54 Data reviewed: vital signs, nurses notes, radiologic studies, plain films.              cp  

00:54 Test interpretation: by ED physician or midlevel provider: plain radiologic studies.    cp  

      Counseling: I had a detailed discussion with the patient and/or guardian regarding: the     

      historical points, exam findings, and any diagnostic results supporting the                 

      discharge/admit diagnosis, radiology results, the need for outpatient follow up, a pain     

      , to return to the emergency department if symptoms worsen or          

      persist or if there are any questions or concerns that arise at home. Response to           

      treatment: the patient's symptoms have markedly improved after treatment, and as a          

      result, I will discharge patient.                                                           

                                                                                                  

                                                                                             

23:29 Order name: XRAY Humerus LEFT                                                           cp  

                                                                                             

23:29 Order name: XRAY Elbow LEFT 3 view                                                      cp  

                                                                                             

23:29 Order name: XRAY Ribs LEFT                                                              cp  

07/15                                                                                             

00:44 Order name: Sling; Complete Time: 01:27                                                 cp  

                                                                                                  

Administered Medications:                                                                         

00:10 Drug: Hydrocodone-Acetaminophen (7.5 mg-325 mg) 1 tabs Route: PO;                       aa9 

01:28 Follow up: Response: No adverse reaction; RASS: Alert and Calm (0)                      as6 

01:27 Drug: morphine 5 mg Route: IM; Site: left ventrogluteal;                                as6 

01:28 Follow up: Response: No adverse reaction                                                as6 

                                                                                                  

                                                                                                  

Disposition:                                                                                      

03:58 Co-signature as Attending Physician, Slick Cabrera MD.                                 mh7 

                                                                                                  

Disposition Summary:                                                                              

07/15/22 00:54                                                                                    

Discharge Ordered                                                                                 

      Location: Home                                                                          cp  

      Problem: new                                                                            cp  

      Symptoms: have improved                                                                 cp  

      Condition: Stable                                                                       cp  

      Diagnosis                                                                                   

        - Pain in left upper arm - from fall                                                  cp  

        - Chest pain, unspecified - left lateral from fall                                    cp  

      Followup:                                                                               cp  

        - With: Private Physician                                                                  

        - When: 1 - 2 days                                                                         

        - Reason: Recheck today's complaints                                                       

      Discharge Instructions:                                                                     

        - Discharge Summary Sheet                                                             cp  

        - Rib Contusion                                                                       cp  

        - Chest Wall Pain                                                                     cp  

      Forms:                                                                                      

        - Medication Reconciliation Form                                                      cp  

        - Thank You Letter                                                                    cp  

        - Antibiotic Education                                                                cp  

        - Prescription Opioid Use                                                             cp  

      Prescriptions:                                                                              

        - Ibuprofen 800 mg Oral Tablet                                                             

            - take 1 tablet by ORAL route every 8 hours As needed take with food; 30 tablet;  cp  

      Refills: 0, Product Selection Permitted                                                     

Signatures:                                                                                       

Dispatcher [employer]                           EDMS                                                 

Gerardo Carroll PA PA cp Holmes, Maurice, MD MD   mh7                                                  

Jasiel Falcon RN RN   as6                                                  

Cheri Wong RN RN   kd3                                                  

Yaneli Lowe RN                       RN   aa9                                                  

                                                                                                  

Corrections: (The following items were deleted from the chart)                                    

00:50 00:50 This 61 yrs old  Female presents to ER via Wheelchair with complaints of  cp  

      Fall Injury. cp                                                                             

: 23:35 Constitutional: The patient appears in no acute distress, alert, awake,     cp  

      non-diaphoretic, non-toxic, well developed, well nourished, cp                              

07/15                                                                                             

22: 23:35 Head/Face: Normocephalic, atraumatic. cp                                    cp  

07/15                                                                                             

22: 23:35 Eyes: Periorbital structures: appear normal, Conjunctiva: normal, no        cp  

      exudate, no injection, Sclera: no appreciated abnormality, Lids and lashes: appear          

      normal, bilaterally, cp                                                                     

07/15                                                                                             

22: 23:35 ENT: External ear(s): are unremarkable, Nose: is normal, Mouth: is normal,  cp  

      Posterior pharynx: Airway: no evidence of obstruction, patent, cp                           

07/15                                                                                             

22:02  23:35 Neck: C-spine: vertebral tenderness, is not appreciated, crepitus, is not   cp  

      appreciated, ROM/movement: is normal, is supple, without pain, no range of motions          

      limitations, cp                                                                             

07/15                                                                                             

22: 23:35 Chest/axilla: Inspection: normal, no ecchymosis, no paradoxical chest wall  cp  

      movement, no swelling, Palpation: crepitus, is not appreciated, tenderness, that is         

      moderate, of the non-specific, left lateral lower chest wall, cp                            

07/15                                                                                             

22: 23:35 Cardiovascular: Rate: normal, Edema: is not appreciated, JVD: is not        cp  

      appreciated, cp                                                                             

07/15                                                                                             

22: 23:35 Respiratory: the patient does not display signs of respiratory distress,    cp  

      Respirations: normal, no use of accessory muscles, no retractions, labored breathing,       

      is not present, Breath sounds: are clear throughout, no decreased breath sounds, no         

      stridor, no wheezing, cp                                                                    

07/15                                                                                             

22: 23:35 Abdomen/GI: Inspection: obese Palpation: abdomen is soft and non-tender, in cp  

      all quadrants, cp                                                                           

07/15                                                                                             

22: 23:35 Back: CVA tenderness, is absent, vertebral tenderness, is not appreciated,  cp  

      cp                                                                                          

07/15                                                                                             

22: 23:35 Skin: no rash present. cp                                                   cp  

07/15                                                                                             

22: 23:35 Neuro: Orientation: to person, place \T\ time. Mentation: is normal, Motor:   cp

      moves all fours, strength is normal, Sensation: is normal, cp                               

07/15                                                                                             

22: 23:35 Musculoskeletal/extremity: Extremities: grossly normal except: noted in the cp  

      left upper arm: pain, tenderness, There is no evidence of decreased ROM, deformity,         

      ROM: full passive range of motion, in the left shoulder and left elbow, Pulses: noted       

      to be 2+ in the left radial artery, the left arm Sensation intact. cp                       

                                                                                                  

**************************************************************************************************

## 2022-07-16 NOTE — RAD REPORT
EXAM DESCRIPTION:  RAD - Humerus Left - 7/15/2022 12:05 am

 

XR Left Humerus, 2 Views

 

CLINICAL HISTORY:  Fall

 

TECHNIQUE:  Frontal and lateral views of the left humerus.

 

COMPARISON:  No relevant prior studies available.

 

FINDINGS:  Bones/joints:   Mild degenerative changes. No acute fracture.   No dislocation.

Soft tissues:   Unremarkable.

* A single impression for all exams can be found at the end of this report

 

EXAM DESCRIPTION:  XR Left Elbow Complete, 3 Views

 

CLINICAL HISTORY:  Fall

 

TECHNIQUE:  Frontal, lateral and oblique views of the left elbow.

 

COMPARISON:  No relevant prior studies available.

 

FINDINGS:  Bones/joints:   Mild degenerative changes. No acute fracture.   No dislocation.

Soft tissues:   Unremarkable.

* A single impression for all exams can be found at the end of this report

 

IMPRESSION:  XR Left Humerus, 2 Views:

No acute injury.

XR Left Elbow Complete, 3 Views:

No acute injury.

 

Electronically signed by:   Shaw العراقي MD   7/15/2022 12:24 AM CDT Workstation: [id number]

 

 

 

Due to temporary technical issues with the PACS/Fluency reporting system, reports are being signed by
 the in house radiologists without review as a courtesy to insure prompt reporting. The interpreting 
radiologist is fully responsible for the content of the report.

## 2022-08-11 ENCOUNTER — HOSPITAL ENCOUNTER (EMERGENCY)
Dept: HOSPITAL 97 - ER | Age: 62
Discharge: HOME | End: 2022-08-11
Payer: COMMERCIAL

## 2022-08-11 VITALS — TEMPERATURE: 97.7 F | DIASTOLIC BLOOD PRESSURE: 52 MMHG | OXYGEN SATURATION: 100 % | SYSTOLIC BLOOD PRESSURE: 129 MMHG

## 2022-08-11 DIAGNOSIS — H60.8X1: ICD-10-CM

## 2022-08-11 DIAGNOSIS — H66.001: Primary | ICD-10-CM

## 2022-08-11 DIAGNOSIS — E13.9: ICD-10-CM

## 2022-08-11 DIAGNOSIS — Z88.5: ICD-10-CM

## 2022-08-11 DIAGNOSIS — Z88.0: ICD-10-CM

## 2022-08-11 DIAGNOSIS — I10: ICD-10-CM

## 2022-08-11 LAB
ALBUMIN SERPL BCP-MCNC: 3.4 G/DL (ref 3.4–5)
ALP SERPL-CCNC: 76 U/L (ref 45–117)
ALT SERPL W P-5'-P-CCNC: 36 U/L (ref 12–78)
AST SERPL W P-5'-P-CCNC: 27 U/L (ref 15–37)
BUN BLD-MCNC: 28 MG/DL (ref 7–18)
GLUCOSE SERPLBLD-MCNC: 195 MG/DL (ref 74–106)
HCT VFR BLD CALC: 29.8 % (ref 36–45)
LYMPHOCYTES # SPEC AUTO: 1 K/UL (ref 0.7–4.9)
MCV RBC: 85.5 FL (ref 80–100)
PMV BLD: 7.2 FL (ref 7.6–11.3)
POTASSIUM SERPL-SCNC: 4.6 MMOL/L (ref 3.5–5.1)
RBC # BLD: 3.49 M/UL (ref 3.86–4.86)

## 2022-08-11 PROCEDURE — 87040 BLOOD CULTURE FOR BACTERIA: CPT

## 2022-08-11 PROCEDURE — 96372 THER/PROPH/DIAG INJ SC/IM: CPT

## 2022-08-11 PROCEDURE — 99284 EMERGENCY DEPT VISIT MOD MDM: CPT

## 2022-08-11 PROCEDURE — 70450 CT HEAD/BRAIN W/O DYE: CPT

## 2022-08-11 PROCEDURE — 36415 COLL VENOUS BLD VENIPUNCTURE: CPT

## 2022-08-11 PROCEDURE — 85025 COMPLETE CBC W/AUTO DIFF WBC: CPT

## 2022-08-11 PROCEDURE — 96374 THER/PROPH/DIAG INJ IV PUSH: CPT

## 2022-08-11 PROCEDURE — 80053 COMPREHEN METABOLIC PANEL: CPT

## 2022-08-11 NOTE — ER
Nurse's Notes                                                                                     

 [facility]                                                                 

Name: Meredith Stark                                                                                 

Age: 61 yrs                                                                                       

Sex: Female                                                                                       

: 1960                                                                                   

MRN: [id number]                                                                                   

Arrival Date: 2022                                                                          

Time: 17:46                                                                                       

Account#: [id number]                                                                            

Bed 25                                                                                            

Private MD:                                                                                       

Diagnosis: Acute suppurative otitis media without spontaneous rupture of ear drum, right          

  ear;Otitis externa in other diseases classified elsewhere, right ear;Other specified            

  diabetes mellitus without complications                                                         

                                                                                                  

Presentation:                                                                                     

                                                                                             

17:59 Chief complaint: Patient states: I started having an earache three days ago and it      bm7 

      keeps getting worse. Coronavirus screen: At this time, the client does not indicate any     

      symptoms associated with coronavirus-19. Ebola Screen: No symptoms or risks identified      

      at this time. Initial Sepsis Screen: Does the patient meet any 2 criteria? No.              

      Patient's initial sepsis screen is negative. Does the patient have a suspected source       

      of infection? No. Patient's initial sepsis screen is negative. Risk Assessment: Do you      

      want to hurt yourself or someone else? Patient reports no desire to harm self or            

      others. Onset of symptoms was 2022.                                           

17:59 Method Of Arrival: Ambulatory                                                           7 

17:59 Acuity: SP 4                                                                           bm7 

                                                                                                  

Triage Assessment:                                                                                

18:01 General: Appears in no apparent distress. uncomfortable, Behavior is calm, cooperative, bm7 

      appropriate for age. Pain: Complains of pain in right ear Pain does not radiate. Pain       

      currently is 8 out of 10 on a pain scale. Quality of pain is described as throbbing,        

      Pain began 2-3 days ago. EENT: Pinna with no deformity noted on right ear Reports pain      

      in right ear. Neuro: No deficits noted. Cardiovascular: No deficits noted. Respiratory:     

      No deficits noted. GI: No deficits noted. No signs and/or symptoms were reported            

      involving the gastrointestinal system. : No deficits noted. No signs and/or symptoms      

      were reported regarding the genitourinary system. Derm: No deficits noted. No signs         

      and/or symptoms reported regarding the dermatologic system. Musculoskeletal: No             

      deficits noted. No signs and/or symptoms reported regarding the musculoskeletal system.     

                                                                                                  

Historical:                                                                                       

- Allergies:                                                                                      

18:01 Darvocet-N 100;                                                                         bm7 

18:01 Nubain;                                                                                 bm7 

18:01 PENICILLINS;                                                                            bm7 

- PMHx:                                                                                           

18:01 chronic back pain; diabetes mellitus; Thyroid problem; Hypertensive disorder;           bm7 

- PSHx:                                                                                           

18:01 Appendectomy; cheryl knee reconstructive sx; Cholecystectomy; carpal tunnel repair;        bm7 

      hysterectomy;                                                                               

                                                                                                  

- Immunization history:: Adult Immunizations up to date, Client reports receiving the             

  2nd dose of the Covid vaccine, Client reports receiving the 1st dose of the Covid               

  vaccine.                                                                                        

- Social history:: Smoking status: Patient/guardian denies using tobacco products.                

                                                                                                  

                                                                                                  

Screenin:45 Abuse screen: Denies threats or abuse. Denies injuries from another. Nutritional        hb  

      screening: No deficits noted. Tuberculosis screening: No symptoms or risk factors           

      identified. Fall Risk None identified.                                                      

                                                                                                  

Assessment:                                                                                       

19:45 General: Appears in no apparent distress. Behavior is calm, cooperative.                hb  

19:45 Pain: Pain currently is 8 out of 10 on a pain scale. Neuro: Level of Consciousness is   hb  

      awake, alert, obeys commands, Oriented to person, place, time, situation.                   

      Cardiovascular: Patient's skin is warm and dry. Respiratory: Respiratory effort is          

      even, unlabored, Respiratory pattern is regular, symmetrical. GI: No signs and/or           

      symptoms were reported involving the gastrointestinal system. : No signs and/or           

      symptoms were reported regarding the genitourinary system. EENT: Reports severe rt ear      

      pain. Derm: Skin is pink, warm \T\ dry.                                                     

                                                                                                  

Vital Signs:                                                                                      

17:59  / 52; Pulse 72; Resp 16; Temp 97.7(TE); Pulse Ox 100% on R/A; Weight 87.54 kg    bm7 

      (R); Height 5 ft. 2 in. (157.48 cm); Pain 7/10;                                             

17:59 Body Mass Index 35.30 (87.54 kg, 157.48 cm)                                             bm7 

                                                                                                  

ED Course:                                                                                        

17:46 Patient arrived in ED.                                                                  ja2 

18:01 Triage completed.                                                                       bm7 

18:01 Arm band placed on right wrist.                                                         bm7 

18:12 Maryjane Rodriguez FNP-C is PHCP.                                                          snw 

18:12 Temo Huitron DO is Attending Physician.                                                snw 

19:16 Head Brain Wo Cont In Process Unspecified.                                              EDMS

19:30 Inserted saline lock: 20 gauge in right antecubital area, using aseptic technique.      aa9 

      Blood collected.                                                                            

19:36 Loraine Brown, RN is Primary Nurse.                                                   hb  

19:45 Patient has correct armband on for positive identification.                             hb  

19:56 Blood Culture Adult (2) Sent.                                                           aa9 

19:56 CMP Sent.                                                                               aa9 

19:56 CBC with Diff Sent.                                                                     aa9 

20:46 No provider procedures requiring assistance completed. IV discontinued, intact,         hb  

      bleeding controlled, No redness/swelling at site.                                           

                                                                                                  

Administered Medications:                                                                         

19:50 CANCELLED (Duplicate Order): Clindamycin 300 mg IVPB once over 30 mins; (mix in 50 mL)  snw 

20:01 CANCELLED (Duplicate Order): Clindamycin 600 mg IM once                                 snw 

20:10 Drug: Dilaudid (HYDROmorphone) 1 mg Route: IM; Site: Other;                             hb  

20:10 Drug: Clindamycin 300 mg Route: IVPB; Infused Over: 30 mins; Site: right antecubital;   hb  

                                                                                                  

                                                                                                  

Medication:                                                                                       

19:45 VIS not applicable for this client.                                                     hb  

                                                                                                  

Outcome:                                                                                          

19:37 Discharge ordered by MD.                                                                snw 

20:46 Discharged to home ambulatory, with significant other.                                  hb  

20:46 Condition: stable                                                                           

20:46 Discharge instructions given to patient, Instructed on discharge instructions, follow       

      up and referral plans. medication usage, Demonstrated understanding of instructions,        

      follow-up care, medications, Prescriptions given X 2.                                       

20:46 Patient left the ED.                                                                    hb  

                                                                                                  

Signatures:                                                                                       

Dispatcher [employer]                           EDMS                                                 

Maryjane Rodriguez, AMINATAP-C                   FNP-Csnw                                                  

Loraine Brown, NATASHA                     RN                                                      

Dorothy Garrison, RN                  RN   bm7                                                  

Tracie Batista Aylin RN                       RN   aa9                                                  

                                                                                                  

**************************************************************************************************

## 2022-08-11 NOTE — EDPHYS
Physician Documentation                                                                           

 [facility]                                                                 

Name: Meredith Stark                                                                                 

Age: 61 yrs                                                                                       

Sex: Female                                                                                       

: 1960                                                                                   

MRN: [id number]                                                                                   

Arrival Date: 2022                                                                          

Time: 17:46                                                                                       

Account#: [id number]                                                                            

Bed 25                                                                                            

Private MD:                                                                                       

ED Physician Temo Huitron                                                                         

HPI:                                                                                              

                                                                                             

19:13 This 61 yrs old  Female presents to ER via Ambulatory with complaints of Ear    snw 

      Pain.                                                                                       

19:13 The patient presents with a fullness, pain, swelling, tenderness. The complaints affect snw 

      the right ear. Onset: The symptoms/episode began/occurred gradually, 3 day(s) ago, and      

      became worse today. Associated signs and symptoms: Pertinent positives:                     

      lightheadedness. Severity of symptoms: At their worst the symptoms were moderate            

      severe. The patient has not experienced similar symptoms in the past. The patient has       

      not recently seen a physician, sees someone at [facility].                             

                                                                                                  

Historical:                                                                                       

- Allergies:                                                                                      

18:01 Darvocet-N 100;                                                                         bm7 

18:01 Nubain;                                                                                 bm7 

18:01 PENICILLINS;                                                                            bm7 

- PMHx:                                                                                           

18:01 chronic back pain; diabetes mellitus; Thyroid problem; Hypertensive disorder;           bm7 

- PSHx:                                                                                           

18:01 Appendectomy; cheryl knee reconstructive sx; Cholecystectomy; carpal tunnel repair;        bm7 

      hysterectomy;                                                                               

                                                                                                  

- Immunization history:: Adult Immunizations up to date, Client reports receiving the             

  2nd dose of the Covid vaccine, Client reports receiving the 1st dose of the Covid               

  vaccine.                                                                                        

- Social history:: Smoking status: Patient/guardian denies using tobacco products.                

                                                                                                  

                                                                                                  

ROS:                                                                                              

19:12 Constitutional: Negative for fever, chills, and weight loss, Eyes: Negative for injury, snw 

      pain, redness, and discharge, Neck: Negative for injury, pain, and swelling,                

      Cardiovascular: Negative for chest pain, palpitations, and edema, Respiratory: Negative     

      for shortness of breath, cough, wheezing, and pleuritic chest pain, Abdomen/GI:             

      Negative for abdominal pain, nausea, vomiting, diarrhea, and constipation, Back:            

      Negative for injury and pain, : Negative for injury, bleeding, discharge, and             

      swelling, MS/Extremity: Negative for injury and deformity, Skin: Negative for injury,       

      rash, and discoloration, Neuro: Negative for headache, weakness, numbness, tingling,        

      and seizure, Psych: Negative for depression, anxiety, suicide ideation, homicidal           

      ideation, and hallucinations.                                                               

19:12 Constitutional: Positive for                                                                

19:12 ENT: Positive for ear pain.                                                                 

                                                                                                  

Exam:                                                                                             

19:10 Constitutional:  This is a well developed, well nourished patient who is awake, alert,  snw 

      and in no acute distress. Head/Face:  Normocephalic, atraumatic. Eyes:  Pupils equal        

      round and reactive to light, extra-ocular motions intact.  Lids and lashes normal.          

      Conjunctiva and sclera are non-icteric and not injected.  Cornea within normal limits.      

      Periorbital areas with no swelling, redness, or edema. Neck:  Trachea midline, no           

      thyromegaly or masses palpated, and no cervical lymphadenopathy.  Supple, full range of     

      motion without nuchal rigidity, or vertebral point tenderness.  No Meningismus.             

      Chest/axilla:  Normal chest wall appearance and motion.  Nontender with no deformity.       

      No lesions are appreciated. Cardiovascular:  Regular rate and rhythm with a normal S1       

      and S2.  No gallops, murmurs, or rubs.  Normal PMI, no JVD.  No pulse deficits.             

      Respiratory:  Lungs have equal breath sounds bilaterally, clear to auscultation and         

      percussion.  No rales, rhonchi or wheezes noted.  No increased work of breathing, no        

      retractions or nasal flaring. Abdomen/GI:  Soft, non-tender, with normal bowel sounds.      

      No distension or tympany.  No guarding or rebound.  No evidence of tenderness               

      throughout. Back:  No spinal tenderness.  No costovertebral tenderness.  Full range of      

      motion. Skin:  Warm, dry with normal turgor.  Normal color with no rashes, no lesions,      

      and no evidence of cellulitis. MS/ Extremity:  Pulses equal, no cyanosis.                   

      Neurovascular intact.  Full, normal range of motion. Neuro:  Awake and alert, GCS 15,       

      oriented to person, place, time, and situation.  Cranial nerves II-XII grossly intact.      

      Motor strength 5/5 in all extremities.  Sensory grossly intact.  Cerebellar exam            

      normal.  Normal gait. Psych:  Awake, alert, with orientation to person, place and time.     

       Behavior, mood, and affect are within normal limits.                                       

19:10 ENT: External ear(s): are unremarkable, Ear canal(s): swelling, that is minimal, TM's:      

      severe tenderness, Nose: is normal, Mouth: is normal, Posterior pharynx: is normal,         

      Dental exam: normal, +mastoid tenderness, +significant pain.                                

                                                                                                  

Vital Signs:                                                                                      

17:59  / 52; Pulse 72; Resp 16; Temp 97.7(TE); Pulse Ox 100% on R/A; Weight 87.54 kg    bm7 

      (R); Height 5 ft. 2 in. (157.48 cm); Pain 7/10;                                             

17:59 Body Mass Index 35.30 (87.54 kg, 157.48 cm)                                             bm7 

                                                                                                  

MDM:                                                                                              

18:47 Patient medically screened.                                                             snw 

20:01 Data reviewed: vital signs, nurses notes. Data interpreted: Pulse oximetry: on room air snw 

      is 100 %. Interpretation: normal. Counseling: I had a detailed discussion with the          

      patient and/or guardian regarding: the historical points, exam findings, and any            

      diagnostic results supporting the discharge/admit diagnosis, radiology results, to          

      return to the emergency department if symptoms worsen or persist or if there are any        

      questions or concerns that arise at home. Special discussion: Based on the history and      

      exam findings, there is no indication for further emergent testing or inpatient             

      evaluation. I discussed with the patient/guardian the need to see the ENT specialist        

      for further evaluation of the symptoms. I discussed with the patient/guardian the need      

      to see the primary care provider for further evaluation of the symptoms.                    

                                                                                                  

                                                                                             

18:49 Order name: Blood Culture Adult (2)                                                     snw 

08                                                                                             

18:49 Order name: CBC with Diff; Complete Time: 20:08                                         snw 

                                                                                             

18:49 Order name: CT Head Brain wo Cont                                                       snw 

                                                                                             

18:49 Order name: CMP; Complete Time: 20:14                                                   snw 

                                                                                             

18:54 Order name: Head Brain Wo Cont; Complete Time: 19:33                                    EDMS

                                                                                             

18:49 Order name: SL; Complete Time: 19:56                                                    snw 

                                                                                                  

Administered Medications:                                                                         

19:50 CANCELLED (Duplicate Order): Clindamycin 300 mg IVPB once over 30 mins; (mix in 50 mL)  snw 

20:01 CANCELLED (Duplicate Order): Clindamycin 600 mg IM once                                 snw 

20:10 Drug: Dilaudid (HYDROmorphone) 1 mg Route: IM; Site: Other;                             hb  

20:10 Drug: Clindamycin 300 mg Route: IVPB; Infused Over: 30 mins; Site: right antecubital;   hb  

                                                                                                  

                                                                                                  

Disposition:                                                                                      

21:17 Co-signature as Attending Physician, Temo Huitron DO I was immediately available on-site ms3 

      in the Emergency Department for consultation in the care of the patient. .                  

                                                                                                  

Disposition Summary:                                                                              

22 19:37                                                                                    

Discharge Ordered                                                                                 

      Location: Home                                                                          snw 

      Condition: Stable                                                                       snw 

      Diagnosis                                                                                   

        - Acute suppurative otitis media without spontaneous rupture of ear drum, right ear   snw 

        - Otitis externa in other diseases classified elsewhere, right ear                    snw 

        - Other specified diabetes mellitus without complications                             snw 

      Followup:                                                                               snw 

        - With: Emergency Department                                                               

        - When: As needed                                                                          

        - Reason: Worsening of condition                                                           

      Followup:                                                                               snw 

        - With: Private Physician                                                                  

        - When: 2 - 3 days                                                                         

        - Reason: Recheck today's complaints, Continuance of care, Re-evaluation by your           

      physician                                                                                   

      Discharge Instructions:                                                                     

        - Discharge Summary Sheet                                                             snw 

        - Ear Drops, Adult                                                                    snw 

        - Otitis Media, Adult                                                                 snw 

        - Otitis Externa                                                                      snw 

      Forms:                                                                                      

        - Medication Reconciliation Form                                                      snw 

        - Thank You Letter                                                                    snw 

        - Antibiotic Education                                                                snw 

        - Prescription Opioid Use                                                             snw 

      Prescriptions:                                                                              

        - Clindamycin HCl 300 mg Oral Capsule                                                      

            - take 1 capsule by ORAL route every 8 hours for 10 days; 30 capsule; Refills: 0, snw 

      Product Selection Permitted                                                                 

        - Tylenol-Codeine #3 300 mg-30 mg Oral                                                     

            - take 1 tablet by ORAL route 3 times per day; 12 tablet; Refills: 0, Product     snw 

      Selection Permitted                                                                         

Signatures:                                                                                       

Dispatcher [employer]                           EDMS                                                 

Maryjane Rodriguez FNP-C                   FNP-Csnw                                                  

Loraine Brown RN                     RN                                                      

Temo Huitron DO DO   ms3                                                  

Dorothy Garrison RN                  RN   bm7                                                  

                                                                                                  

Corrections: (The following items were deleted from the chart)                                    

19:50 18:49 Clindamycin 300 mg IVPB once over 30 mins; (mix in 50 mL) ordered. snw            snw 

19:50 19:50 Clindamycin 300 mg IVPB once over 30 mins; (mix in 50 mL) ordered. snw            snw 

20:01 19:50 Clindamycin 600 mg IM once ordered. snw                                           snw 

                                                                                                  

**************************************************************************************************

## 2022-08-11 NOTE — XMS REPORT
Continuity of Care Document

                           Created on:2022



Patient:SUDHA BABIN

Sex:Female

:1960

External Reference #:[id number]





Demographics







                          Address                   [address]



                                                    [place] [address]

 

                          Home Phone                [phone]

 

                          Mobile Phone              [phone]

 

                          Email Address             [address]

 

                          Preferred Language        English

 

                          Marital Status            Unknown

 

                          [employer] Affiliation     Unknown

 

                          Race                      Unknown

 

                          Additional Race(s)        Unavailable



                                                    Unavailable



                                                    White

 

                          Ethnic Group              Unknown









Author







                          Organization              [facility]

t

 

                          Address                   [address]



                                                    [place] [address]

 

                          Phone                     [phone]









Support







                Name            Relationship    Address         Phone

 

                JUNIE BABIN              [phone] 



                                                [place] [address] 

 

                TALYA JUNIE ANGELES              Unavailable     [phone]

 

                REGINA BABIN  X               [phone]

[phone]



                                                         



                                                [place] [address] 









Care Team Providers







                    Name                Role                Phone

 

                    PCP, PATIENT DOES NOT HAVE A Primary Care Physician Unavaila

BENNIE Ramirez      Attending Clinician Unavailable

 

                    ROSIE JHA     Attending Clinician Unavailable

 

                    Rosie Jha DO  Attending Clinician [phone]

 

                    BENNIE ORR Attending Clinician Unavailable

 

                    ROSIE JHA     Admitting Clinician Unavailable









Payers







           Payer Name Policy Type Policy Number Effective Date Expiration Date S

ource

 

           [id number]  [facility]            



                                            00:00:00              

 

           [facility]            [id number]  [id number]            



           MEDICARE GOLD PPO                       00:00:00              



           CSNP                                                   







Problems

This patient has no known problems.



Allergies, Adverse Reactions, Alerts







       Allergy Allergy Status Severity Reaction(s) Onset  Inactive Treating Comm

ents 

Source



       Name   Type                        Date   Date   Clinician        

 

       [facility]   Active        Other-Cmnt                       Univ

ers



       IN     INGREDI                      7-15                        ity of



                                          00:00:                      [place]



                                          00                          Medical



                                                                      Branch

 

       [facility] Active        Other - See                       U

nivers



       in     ty to                comments 7-15                        ity of



              adverse                      00:00:                      [place]



              reaction                      00                          Medical



              s                                                       Branch

 

       n      Propens[facility] Active                                     



              ty to                       6                        



              adverse                      00:00:                      



              reaction                      00                          



              to drug                                                  

 

       Bactrim [facility] Active                                     



       - Oral ty to                                               



              adverse                      00:00:                      



              reaction                      00                          



              to drug                                                  

 

       NO KNOWN Drug   Active                                           Univers



       ALLERGIE Class                                                   ity of



       S                                                              [place]



                                                                      Medical



                                                                      Branch







Social History







           Social Habit Start Date Stop Date  Quantity   Comments   Source

 

           Exposure to 2022 2022-07-15 Not sure              [facility]

 [place]



           SARS-CoV-2 (event) 00:00:00   23:14:00                         [facility]

l Branch

 

           Sex Assigned At [birth year]                       [facility]



           Birth      00:00:00   00:00:00                         Medical Branch









                Smoking Status  Start Date      Stop Date       Source

 

                Tobacco smoking consumption                                 [facility]



                unknown                                         Branch







Medications







       Ordered Filled Start  Stop   Current Ordering Indication Dosage Frequency

 Signature

                    Comments            Components          Source



     Medication Medication Date Date Medication? Clinician                (SIG) 

          



     Name Name                                                   

 

     lisinopril      -0      No             1mg                      



     5 mg tablet                                                    



               00:00:                                              



               00                                                

 

     Myrbetriq      2-0      No             1mg                      



     25 mg      -                                              



     tablet,exte      00:00:                                              



     nded      00                                                



     release                                                        

 

     citalopram      2-0      No             1mg                      



     20 mg      7-                                              



     tablet      00:00:                                              



               00                                                

 

     atorvastati      2-0      No             1mg                      



     n 10 mg      -                                              



     tablet      00:00:                                              



               00                                                

 

     metformin      2-0      No             1mg                      



     1,000 mg      -                                              



     tablet      00:00:                                              



               00                                                

 

     glimepiride      2-0      No             1mg                      



     1 mg tablet                                                    



               00:00:                                              



               00                                                

 

     metoclopram      2-0      No             1mg                      



     fabio 10 mg      -                                              



     tablet      00:00:                                              



               00                                                

 

     ferrous      2-0      No             1(65 mg                     



     sulfate 325                           iron)                     



     mg (65 mg      00:00:                                              



     iron)      00                                                



     tablet                                                        

 

     omeprazole      -0      No             1mg                      



     40 mg                                                    



     capsule,del      00:00:                                              



     ayed      00                                                



     release                                                        

 

     TAKE 1      -0      No             1000                     



     TABLET                                                    



     TWICE      00:00:                                              



     DAILY.      00                                                

 

     TAKE 1      -0      No             500                      



     TABLET BY      -                                              



     MOUTH EVERY      00:00:                                              



     8 HOURS FOR      00                                                



     10 DAYS                                                        

 

     &lt       2022-0      No             250                      



               7-                                              



               00:00:                                              



               00                                                

 

     &lt       2022-0      No             10                       



               7-21                                              



               00:00:                                              



               00                                                

 

     &lt       2022-0      No             25                       



               7-21                                              



               00:00:                                              



               00                                                

 

     &lt       2022-0      No                                      



               7-21                                              



               00:00:                                              



               00                                                

 

     &lt       2022-0      No                                      



               7-21                                              



               00:00:                                              



               00                                                

 

     TAKE 1      -0      No             1000                     



     TABLET                                                    



     TWICE      00:00:                                              



     DAILY.      00                                                

 

     Dose      2-0      No             5                        



     Unknown                                                    



               00:00:                                              



               00                                                

 

     HYDROcodone      2022-0 2022- No             1{tbl}      1 tablet,         

  Univers



     -acetaminop                                Oral,           ity of



     hen (NORCO)      06:30: 05:28                          ONCE, 1           Te

[place]



      mg      00   :00                           dose, On           Medica

l



     tablet 1                                         Sat            Branch



     tablet                                         22 at           



                                                  0130,           



                                                  Routine           

 

     TAKE 1      -0      No             500                      



     TABLET BY      7-13                                              



     MOUTH EVERY      00:00:                                              



     12 HOURS      00                                                



     FOR 10 DAYS                                                        

 

     &lt       2022-0      No                                      



                                                             



               00:00:                                              



               00                                                

 

     TAKE 1      2-0      No                                      



     TABLET BY      7-13                                              



     MOUTH DAILY      00:00:                                              



               00                                                

 

     &lt       2022-0      No                                      



               7-                                              



               00:00:                                              



               00                                                

 

     &lt       2022-0      No             20                       



                                                             



               00:00:                                              



               00                                                

 

     Dose      2022-0      No             50                       



     Unknown                                                    



               00:00:                                              



               00                                                

 

     TAKE 1      -0      No             500                      



     TABLET BY      7-13                                              



     MOUTH EVERY      00:00:                                              



     8 HOURS FOR      00                                                



     10 DAYS                                                        

 

     Dose      2022-0      No             20                       



     Unknown                                                    



               00:00:                                              



               00                                                

 

     Dose      2022-0      No             10                       



     Unknown                                                    



               00:00:                                              



               00                                                

 

     &lt       2022-0      No             25                       



                                                             



               00:00:                                              



               00                                                

 

     DISSOLVE 1      -0      No             4                        



     TABLET BY      7-13                                              



     MOUTH EVERY      00:00:                                              



     8 HOURS AS      00                                                



     NEEDED                                                        

 

     &lt       2022-0      No                                      



                                                             



               00:00:                                              



               00                                                

 

     TAKE 1      -0      No             1000                     



     TABLET                                                    



     TWICE      00:00:                                              



     DAILY.      00                                                

 

     &lt       2022-0      No             10                       



                                                             



               00:00:                                              



               00                                                

 

     &lt       2022-0      No             15                       



                                                             



               00:00:                                              



               00                                                

 

     &lt       2022-0      No             5                        



                                                             



               00:00:                                              



               00                                                

 

     TAKE 1      2-0      No             5                        



     TABLET BY      7-13                                              



     MOUTH TWICE      00:00:                                              



     A DAY AS      00                                                



     NEEDED FOR                                                        



     ANXIETY                                                        

 

     Dose      2022-0      No                                      



     Unknown                                                    



               00:00:                                              



               00                                                

 

     &lt       2022-0      No                                      



               7-13                                              



               00:00:                                              



               00                                                

 

     TAKE 1      2-0      No             5                        



     TABLET BY      7-12                                              



     MOUTH TWICE      00:00:                                              



     A DAY AS      00                                                



     NEEDED FOR                                                        



     ANXIETY                                                        

 

     DISSOLVE 1      2-0      No             4                        



     TABLET BY      7-11                                              



     MOUTH EVERY      00:00:                                              



     8 HOURS AS      00                                                



     NEEDED                                                        

 

     TAKE 1      2-0      No             [id number]                     



     TABLET                                                    



     TWICE      00:00:                                              



     DAILY.      00                                                

 

     &lt       2022-0      No             25                       



                                                             



               00:00:                                              



               00                                                

 

     TAKE 1      2022-0      No             500                      



     TABLET BY                                                    



     MOUTH EVERY      00:00:                                              



     8 HOURS FOR      00                                                



     10 DAYS                                                        

 

     &lt       2022-0      No                                      



               7-06                                              



               00:00:                                              



               00                                                

 

     &lt       2022-0      No             30                       



                                                             



               00:00:                                              



               00                                                

 

     TAKE 1      2022-0      No             30                       



     TABLET BY                                                    



     MOUTH AT      00:00:                                              



     BEDTIME      00                                                

 

     &lt       2022-0      No                                      



               7-                                              



               00:00:                                              



               00                                                

 

     Dose      2022-0      No             50                       



     Unknown                                                    



               00:00:                                              



               00                                                

 

     &lt       2022-0      No             20                       



                                                             



               00:00:                                              



               00                                                

 

     &lt       2022-0      No                                      



                                                             



               00:00:                                              



               00                                                

 

     TAKE 1      2022-0      No             20                       



     TABLET BY                                                    



     MOUTH ONCE      00:00:                                              



     A DAY WITH      00                                                



     MEALS                                                        

 

     &lt       2022-0      No                                      



               6-30                                              



               00:00:                                              



               00                                                

 

     TAKE 1      2022-0      No             200                      



     TABLET      30                                              



     DAILY.      00:00:                                              



               00                                                

 

     TAKE 1      2022-0      No                                      



     TABLET BY      6-29                                              



     MOUTH EVERY      00:00:                                              



     8 HOURS FOR      00                                                



     10 DAYS                                                        

 

     TAKE 1      2022-0      No                                      



     TABLET BY      6-29                                              



     MOUTH EVERY      00:00:                                              



     8 HOURS FOR      00                                                



     10 DAYS                                                        

 

     &lt       2022-0      No                                      



               6-29                                              



               00:00:                                              



               00                                                

 

     &lt       2022-0      No                                      



               6-29                                              



               00:00:                                              



               00                                                

 

     &lt       2022-0      No                                      



               6-29                                              



               00:00:                                              



               00                                                

 

     &lt       2022-0      No                                      



               6-29                                              



               00:00:                                              



               00                                                

 

     TAKE ONE      2022-0      No                                      



     (1) TO TWO      6-27                                              



     (2)       00:00:                                              



     TABLET(S)      00                                                



     BY MOUTH                                                        



     EVERY 6                                                        



     HOURS AS                                                        



     NEEDED FOR                                                        



     PAIN , NO                                                        



     MORE THAN 8                                                        



     TABLETS IN                                                        



     24 HOURS.                                                        

 

     &lt       2022-0      No                                      



               6-27                                              



               00:00:                                              



               00                                                

 

     &lt       2022-0      No                                      



               6-27                                              



               00:00:                                              



               00                                                

 

     &lt       2022-0      No                                      



               6-27                                              



               00:00:                                              



               00                                                

 

     &lt       2022-0      No                                      



               6-27                                              



               00:00:                                              



               00                                                

 

     &lt       2022-0      No                                      



               6-27                                              



               00:00:                                              



               00                                                

 

     TAKE ONE      2022-0      No                                      



     (1) TO TWO      6-27                                              



     (2)       00:00:                                              



     TABLET(S)      00                                                



     BY MOUTH                                                        



     EVERY 6                                                        



     HOURS AS                                                        



     NEEDED FOR                                                        



     PAIN , NO                                                        



     MORE THAN 8                                                        



     TABLETS IN                                                        



     24 HOURS.                                                        

 

     &lt       2022-0      No                                      



               6-27                                              



               00:00:                                              



               00                                                

 

     &lt       2022-0      No                                      



               6-27                                              



               00:00:                                              



               00                                                

 

     &lt       2022-0      No                                      



               6-27                                              



               00:00:                                              



               00                                                

 

     &lt       2022-0      No                                      



               6-27                                              



               00:00:                                              



               00                                                

 

     &lt       2022-0      No                                      



               6-27                                              



               00:00:                                              



               00                                                

 

     TAKE 1      2022-0      No                                      



     TABLET BY      6-24                                              



     MOUTH AT      00:00:                                              



     BEDTIME      00                                                

 

     DISSOLVE 1      2022-0      No                                      



     TABLET BY      6-24                                              



     MOUTH EVERY      00:00:                                              



     8 HOURS AS      00                                                



     NEEDED                                                        

 

     TAKE 1      2022-0      No                                      



     TABLET BY      6-24                                              



     MOUTH AT      00:00:                                              



     BEDTIME      00                                                

 

     DISSOLVE 1      2022-0      No                                      



     TABLET BY      6-24                                              



     MOUTH EVERY      00:00:                                              



     8 HOURS AS      00                                                



     NEEDED                                                        

 

     metformin      2022-0      No             1mg                      



     1,000 mg      6-23                                              



     tablet      00:00:                                              



               00                                                

 

     levothyroxi      2022-0      No             1mcg                     



     ne 75 mcg      6-23                                              



     tablet      00:00:                                              



               00                                                

 

     levothyroxi      2022-0      No             1mcg                     



     ne 200 mcg      6-23                                              



     tablet      00:00:                                              



               00                                                

 

     metformin      2022-0      No             1mg                      



     1,000 mg      6-23                                              



     tablet      00:00:                                              



               00                                                

 

     levothyroxi      2022-0      No             1mcg                     



     ne 75 mcg      6-23                                              



     tablet      00:00:                                              



               00                                                

 

     levothyroxi      2022-0      No             1mcg                     



     ne 200 mcg      6-23                                              



     tablet      00:00:                                              



               00                                                

 

     Myrbetriq      2022-0      No             1mg                      



     25 mg      4-29                                              



     tablet,exte      00:00:                                              



     nded      00                                                



     release                                                        

 

     hydroxyzine      2022-0      No             1mg                      



     HCl 50 mg      4-29                                              



     tablet      00:00:                                              



               00                                                

 

     lisinopril      2022-0      No             1mg                      



     5 mg tablet      4-29                                              



               00:00:                                              



               00                                                

 

     metformin      2022-0      No             1mg                      



     1,000 mg      4-29                                              



     tablet      00:00:                                              



               00                                                

 

     metoclopram      2022-0      No             1mg                      



     fabio 10 mg      4-29                                              



     tablet      00:00:                                              



               00                                                

 

     Myrbetriq      2022-0      No             1mg                      



     25 mg      4-29                                              



     tablet,exte      00:00:                                              



     nded      00                                                



     release                                                        

 

     hydroxyzine      2022-0      No             1mg                      



     HCl 50 mg      4-29                                              



     tablet      00:00:                                              



               00                                                

 

     lisinopril      2022-0      No             1mg                      



     5 mg tablet      4-29                                              



               00:00:                                              



               00                                                

 

     metformin      2022-0      No             1mg                      



     1,000 mg      4-29                                              



     tablet      00:00:                                              



               00                                                

 

     metoclopram      2022-0      No             1mg                      



     fabio 10 mg      4-29                                              



     tablet      00:00:                                              



               00                                                

 

     citalopram      2022-0      No             1mg                      



     20 mg      4-27                                              



     tablet      00:00:                                              



               00                                                

 

     atorvastati      2022-0      No             1mg                      



     n 10 mg      4-27                                              



     tablet      00:00:                                              



               00                                                

 

     glimepiride      2022-0      No             1mg                      



     1 mg tablet      27                                              



               00:00:                                              



               00                                                

 

     levothyroxi      2022-0      No             1mcg                     



     ne 75 mcg      4-27                                              



     tablet      00:00:                                              



               00                                                

 

     levothyroxi      2022-0      No             1mcg                     



     ne 200 mcg      4-27                                              



     tablet      00:00:                                              



               00                                                

 

     levothyroxi      2022-0      No             1mcg                     



     ne 300 mcg      4-27                                              



     tablet      00:00:                                              



               00                                                

 

     ferrous      2022-0      No             1(65 mg                     



     sulfate 325      4-27                     iron)                     



     mg (65 mg      00:00:                                              



     iron)      00                                                



     tablet                                                        

 

     omeprazole      2-0      No             1mg                      



     40 mg      4-27                                              



     capsule,del      00:00:                                              



     ayed      00                                                



     release                                                        

 

     citalopram      2-0      No             1mg                      



     20 mg      4-27                                              



     tablet      00:00:                                              



               00                                                

 

     atorvastati      2-0      No             1mg                      



     n 10 mg      4-27                                              



     tablet      00:00:                                              



               00                                                

 

     glimepiride      2-0      No             1mg                      



     1 mg tablet                                                    



               00:00:                                              



               00                                                

 

     levothyroxi      2022-0      No             1mcg                     



     ne 75 mcg      4-27                                              



     tablet      00:00:                                              



               00                                                

 

     levothyroxi      2022-0      No             1mcg                     



     ne 200 mcg      4-27                                              



     tablet      00:00:                                              



               00                                                

 

     levothyroxi      2-0      No             1mcg                     



     ne 300 mcg      4-27                                              



     tablet      00:00:                                              



               00                                                

 

     ferrous      2022-0      No             1(65 mg                     



     sulfate 325      4-27                     iron)                     



     mg (65 mg      00:00:                                              



     iron)      00                                                



     tablet                                                        

 

     omeprazole      2-0      No             1mg                      



     40 mg      4-27                                              



     capsule,del      00:00:                                              



     ayed      00                                                



     release                                                        

 

     Dose      2-0      No                                      



     Unknown      4-                                              



               00:00:                                              



               00                                                

 

     Dose      2022-0      No                                      



     Unknown      4-                                              



               00:00:                                              



               00                                                

 

     Dose      2022-0      No                                      



     Unknown      4-                                              



               00:00:                                              



               00                                                

 

     Dose      2022-0      No                                      



     Unknown      4-                                              



               00:00:                                              



               00                                                

 

     Dose      2022-0      No                                      



     Unknown      4-                                              



               00:00:                                              



               00                                                

 

     Dose      2022-0      No                                      



     Unknown      4-01                                              



               00:00:                                              



               00                                                

 

     Dose      2022-0      No                                      



     Unknown      4-01                                              



               00:00:                                              



               00                                                

 

     Dose      2022-0      No                                      



     Unknown      4-01                                              



               00:00:                                              



               00                                                

 

     Dose      2022-0      No                                      



     Unknown      4-01                                              



               00:00:                                              



               00                                                

 

     Dose      2022-0      No                                      



     Unknown      4-01                                              



               00:00:                                              



               00                                                

 

     metformin      2022-0      No             1mg                      



     1,000 mg      4-01                                              



     tablet      00:00:                                              



               00                                                

 

     levothyroxi      2022-0      No             1mcg                     



     ne 75 mcg      4-01                                              



     tablet      00:00:                                              



               00                                                

 

     levothyroxi      2022-0      No             1mcg                     



     ne 200 mcg      4-01                                              



     tablet      00:00:                                              



               00                                                

 

     hydrocortis      2-0      No             1mg                      



     one acetate      4-01                                              



     25 mg      00:00:                                              



     rectal      00                                                



     suppository                                                        

 

     nitrofurant      2-0      No             1mg                      



     oin       4-01                                              



     monohydrate      00:00:                                              



     /macrocryst      00                                                



     als 100 mg                                                        



     capsule                                                        

 

     Dose      2-0      No                                      



     Unknown      4-01                                              



               00:00:                                              



               00                                                

 

     Dose      2022-0      No                                      



     Unknown      4-01                                              



               00:00:                                              



               00                                                

 

     Dose      2022-0      No                                      



     Unknown      4-01                                              



               00:00:                                              



               00                                                

 

     Dose      2022-0      No                                      



     Unknown      4-01                                              



               00:00:                                              



               00                                                

 

     Dose      2022-0      No                                      



     Unknown      4-01                                              



               00:00:                                              



               00                                                

 

     Dose      2022-0      No                                      



     Unknown      4-01                                              



               00:00:                                              



               00                                                

 

     Dose      2022-0      No                                      



     Unknown      4-01                                              



               00:00:                                              



               00                                                

 

     Dose      2022-0      No                                      



     Unknown      4-01                                              



               00:00:                                              



               00                                                

 

     Dose      2022-0      No                                      



     Unknown      4-01                                              



               00:00:                                              



               00                                                

 

     Dose      2022-0      No                                      



     Unknown      4-01                                              



               00:00:                                              



               00                                                

 

     Dose      2022-0      No                                      



     Unknown      4-01                                              



               00:00:                                              



               00                                                

 

     Dose      2022-0      No                                      



     Unknown      4-01                                              



               00:00:                                              



               00                                                

 

     Dose      2022-0      No                                      



     Unknown      4-01                                              



               00:00:                                              



               00                                                

 

     Dose      2022-0      No                                      



     Unknown      4-01                                              



               00:00:                                              



               00                                                

 

     Dose      2022-0      No                                      



     Unknown      4-01                                              



               00:00:                                              



               00                                                

 

     Dose      2022-0      No                                      



     Unknown      4-01                                              



               00:00:                                              



               00                                                

 

     Dose      2022-0      No                                      



     Unknown      4-01                                              



               00:00:                                              



               00                                                

 

     Dose      2022-0      No                                      



     Unknown      4-01                                              



               00:00:                                              



               00                                                

 

     Dose      2022-0      No                                      



     Unknown      4-01                                              



               00:00:                                              



               00                                                

 

     Dose      2022-0      No                                      



     Unknown      4-01                                              



               00:00:                                              



               00                                                

 

     Dose      2022-0      No                                      



     Unknown      4-01                                              



               00:00:                                              



               00                                                

 

     Dose      2022-0      No                                      



     Unknown      4-01                                              



               00:00:                                              



               00                                                

 

     Dose      2022-0      No                                      



     Unknown      4-01                                              



               00:00:                                              



               00                                                

 

     Dose      2022-0      No                                      



     Unknown      4-01                                              



               00:00:                                              



               00                                                

 

     Dose      2022-0      No                                      



     Unknown      4-01                                              



               00:00:                                              



               00                                                

 

     Dose      2022-0      No                                      



     Unknown      4-01                                              



               00:00:                                              



               00                                                

 

     Dose      2022-0      No                                      



     Unknown      4-01                                              



               00:00:                                              



               00                                                

 

     Dose      2022-0      No                                      



     Unknown      4-01                                              



               00:00:                                              



               00                                                

 

     Dose      2022-0      No                                      



     Unknown      4-01                                              



               00:00:                                              



               00                                                

 

     metformin      2022-0      No             1mg                      



     1,000 mg      4-01                                              



     tablet      00:00:                                              



               00                                                

 

     levothyroxi      2-0      No             1mcg                     



     ne 75 mcg      4-                                              



     tablet      00:00:                                              



               00                                                

 

     levothyroxi      2022-0      No             1mcg                     



     ne 200 mcg      4-01                                              



     tablet      00:00:                                              



               00                                                

 

     hydrocortis      2-0      No             1mg                      



     one acetate      4-01                                              



     25 mg      00:00:                                              



     rectal      00                                                



     suppository                                                        

 

     nitrofurant      2-0      No             1mg                      



     oin       4-01                                              



     monohydrate      00:00:                                              



     /macrocryst      00                                                



     als 100 mg                                                        



     capsule                                                        

 

     Dose      2-0      No                                      



     Unknown      4-01                                              



               00:00:                                              



               00                                                

 

     Dose      2022-0      No                                      



     Unknown      4-01                                              



               00:00:                                              



               00                                                

 

     Dose      2022-0      No                                      



     Unknown      4-01                                              



               00:00:                                              



               00                                                

 

     Dose      2022-0      No                                      



     Unknown      4-01                                              



               00:00:                                              



               00                                                

 

     Dose      2022-0      No                                      



     Unknown      4-01                                              



               00:00:                                              



               00                                                

 

     Dose      2022-0      No                                      



     Unknown      4-01                                              



               00:00:                                              



               00                                                

 

     Dose      2022-0      No                                      



     Unknown      4-01                                              



               00:00:                                              



               00                                                

 

     Dose      2022-0      No                                      



     Unknown      4-01                                              



               00:00:                                              



               00                                                

 

     Dose      2022-0      No                                      



     Unknown      4-01                                              



               00:00:                                              



               00                                                

 

     Dose      2022-0      No                                      



     Unknown      4-01                                              



               00:00:                                              



               00                                                

 

     Dose      2022-0      No                                      



     Unknown      4-01                                              



               00:00:                                              



               00                                                

 

     Dose      2022-0      No                                      



     Unknown      4-01                                              



               00:00:                                              



               00                                                

 

     Dose      2022-0      No                                      



     Unknown      4-01                                              



               00:00:                                              



               00                                                

 

     Dose      2022-0      No                                      



     Unknown      4-01                                              



               00:00:                                              



               00                                                

 

     Dose      2022-0      No                                      



     Unknown      4-01                                              



               00:00:                                              



               00                                                

 

     Dose      2022-0      No                                      



     Unknown      4-01                                              



               00:00:                                              



               00                                                

 

     Dose      2022-0      No                                      



     Unknown      4-01                                              



               00:00:                                              



               00                                                

 

     Dose      2022-0      No                                      



     Unknown      4-                                              



               00:00:                                              



               00                                                

 

     Dose      2022-0      No                                      



     Unknown      4-                                              



               00:00:                                              



               00                                                

 

     Dose      2022-0      No                                      



     Unknown      3-31                                              



               00:00:                                              



               00                                                

 

     Dose      2022-0      No                                      



     Unknown      3-31                                              



               00:00:                                              



               00                                                

 

     Dose      2022-0      No                                      



     Unknown      3-31                                              



               00:00:                                              



               00                                                

 

     Dose      2022-0      No                                      



     Unknown      3-31                                              



               00:00:                                              



               00                                                

 

     Dose      2022-0      No                                      



     Unknown      3-31                                              



               00:00:                                              



               00                                                

 

     Dose      2022-0      No                                      



     Unknown      3-31                                              



               00:00:                                              



               00                                                

 

     Dose      2022-0      No                                      



     Unknown      3-31                                              



               00:00:                                              



               00                                                

 

     Dose      2022-0      No                                      



     Unknown      3-31                                              



               00:00:                                              



               00                                                

 

     Dose      2022-0      No                                      



     Unknown      3-31                                              



               00:00:                                              



               00                                                

 

     Dose      2022-0      No                                      



     Unknown      3-31                                              



               00:00:                                              



               00                                                

 

     Dose      2022-0      No                                      



     Unknown      3-31                                              



               00:00:                                              



               00                                                

 

     Dose      2022-0      No                                      



     Unknown      3-31                                              



               00:00:                                              



               00                                                

 

     Dose      2022-0      No                                      



     Unknown      3-31                                              



               00:00:                                              



               00                                                

 

     Dose      2022-0      No                                      



     Unknown      3-31                                              



               00:00:                                              



               00                                                

 

     Dose      2022-0      No                                      



     Unknown      3-31                                              



               00:00:                                              



               00                                                

 

     Dose      2022-0      No                                      



     Unknown      3-31                                              



               00:00:                                              



               00                                                

 

     Dose      2022-0      No                                      



     Unknown      3-31                                              



               00:00:                                              



               00                                                

 

     Dose      2022-0      No                                      



     Unknown      3-31                                              



               00:00:                                              



               00                                                

 

     Dose      2022-0      No                                      



     Unknown      1-25                                              



               00:00:                                              



               00                                                

 

     Dose      2022-0      No                                      



     Unknown      1-25                                              



               00:00:                                              



               00                                                

 

     Dose      2022-0      No                                      



     Unknown      1-25                                              



               00:00:                                              



               00                                                

 

     Dose      2022-0      No                                      



     Unknown      1-25                                              



               00:00:                                              



               00                                                

 

     Dose      2022-0      No                                      



     Unknown      1-25                                              



               00:00:                                              



               00                                                

 

     Dose      2022-0      No                                      



     Unknown      1-25                                              



               00:00:                                              



               00                                                

 

     Dose      2022-0      No                                      



     Unknown      1-20                                              



               00:00:                                              



               00                                                

 

     Dose      2022-0      No                                      



     Unknown      1-20                                              



               00:00:                                              



               00                                                

 

     neomycin-po      2022-0      No             4mg/mL-                     



     lymyxin-hyd      1-19                     unit/mL                     



     rocort 3.5      00:00:                     -%                       



     mg-10,000      00                                                



     unit/mL-1 %                                                        



     ear                                                         



     drops,susp                                                        

 

     Humulin      2-0      No             1unit/m                     



     70/30 U-100      1-19                     L                        



     Insulin 100      00:00:                     (70-30)                     



     unit/mL      00                                                



     subcutaneou                                                        



     s                                                           



     suspension                                                        

 

     lisinopril      2-0      No             1mg                      



     5 mg tablet      1-19                                              



               00:00:                                              



               00                                                

 

     hydroxyzine      2022-0      No             1mg                      



     HCl 50 mg      1-19                                              



     tablet      00:00:                                              



               00                                                

 

     Myrbetriq      2022-0      No             1mg                      



     25 mg      1-19                                              



     tablet,exte      00:00:                                              



     nded      00                                                



     release                                                        

 

     Dose      2-0      No                                      



     Unknown      1-19                                              



               00:00:                                              



               00                                                

 

     metformin      2022-0      No             1mg                      



     1,000 mg      1-19                                              



     tablet      00:00:                                              



               00                                                

 

     metformin      2022-0      No             1mg                      



     500 mg      1-19                                              



     tablet      00:00:                                              



               00                                                

 

     metoprolol      2-0      No             1mg                      



     tartrate 25      1-19                                              



     mg tablet      00:00:                                              



               00                                                

 

     metoclopram      2-0      No             1mg                      



     fabio 10 mg      -19                                              



     tablet      00:00:                                              



               00                                                

 

     azithromyci      2-0      No             mg                       



     n 250 mg      -19                                              



     tablet      00:00:                                              



               00                                                

 

     Dose      2022-0      No                                      



     Unknown      1-19                                              



               00:00:                                              



               00                                                

 

     Dose      2022-0      No                                      



     Unknown      1-19                                              



               00:00:                                              



               00                                                

 

     Dose      2022-0      No                                      



     Unknown      1-19                                              



               00:00:                                              



               00                                                

 

     levothyroxi      2-0      No             1mcg                     



     ne 150 mcg      -19                                              



     capsule      00:00:                                              



               00                                                

 

     neomycin-po      2-0      No             4mg/mL-                     



     lymyxin-hyd      -19                     unit/mL                     



     rocort 3.5      00:00:                     -%                       



     mg-10,000      00                                                



     unit/mL-1 %                                                        



     ear                                                         



     drops,susp                                                        

 

     Humulin      2-0      No             1unit/m                     



     70/30 U-100      1-19                     L                        



     Insulin 100      00:00:                     (70-30)                     



     unit/mL      00                                                



     subcutaneou                                                        



     s                                                           



     suspension                                                        

 

     lisinopril      2-0      No             1mg                      



     5 mg tablet      -19                                              



               00:00:                                              



               00                                                

 

     hydroxyzine      2022-0      No             1mg                      



     HCl 50 mg      1-19                                              



     tablet      00:00:                                              



               00                                                

 

     Myrbetriq      2022-0      No             1mg                      



     25 mg      1-19                                              



     tablet,exte      00:00:                                              



     nded      00                                                



     release                                                        

 

     Dose      2022-0      No                                      



     Unknown      1-19                                              



               00:00:                                              



               00                                                

 

     metformin      2022-0      No             1mg                      



     1,000 mg      1-19                                              



     tablet      00:00:                                              



               00                                                

 

     metformin      2022-0      No             1mg                      



     500 mg      1-19                                              



     tablet      00:00:                                              



               00                                                

 

     metoprolol      2-0      No             1mg                      



     tartrate 25      1-19                                              



     mg tablet      00:00:                                              



               00                                                

 

     metoclopram      2-0      No             1mg                      



     fabio 10 mg      1-19                                              



     tablet      00:00:                                              



               00                                                

 

     azithromyci      2-0      No             mg                       



     n 250 mg      1-19                                              



     tablet      00:00:                                              



               00                                                

 

     Dose      2022-0      No                                      



     Unknown      1-19                                              



               00:00:                                              



               00                                                

 

     Dose      2022-0      No                                      



     Unknown      1-19                                              



               00:00:                                              



               00                                                

 

     Dose      2022-0      No                                      



     Unknown      1-19                                              



               00:00:                                              



               00                                                

 

     levothyroxi      2-0      No             1mcg                     



     ne 150 mcg      1-19                                              



     capsule      00:00:                                              



               00                                                

 

     lisinopril      2-0      No             1mg                      



     5 mg tablet                                                    



               00:00:                                              



               00                                                

 

     lisinopril      2-0      No             1mg                      



     5 mg tablet                                                    



               00:00:                                              



               00                                                

 

     Humulin      2-0      No             1unit/m                     



     70/30 U-100      1-03                     L                        



     Insulin 100      00:00:                     (70-30)                     



     unit/mL      00                                                



     subcutaneou                                                        



     s                                                           



     suspension                                                        

 

     hydroxyzine      2-0      No             1mg                      



     HCl 50 mg      1-03                                              



     tablet      00:00:                                              



               00                                                

 

     hydroxyzine      2-0      No             1mg                      



     HCl 50 mg      1-03                                              



     tablet      00:00:                                              



               00                                                

 

     metformin      2-0      No             1mg                      



     1,000 mg      1-03                                              



     tablet      00:00:                                              



               00                                                

 

     Humulin      2022-0      No             1unit/m                     



     70/30 U-100      1-03                     L                        



     Insulin 100      00:00:                     (70-30)                     



     unit/mL      00                                                



     subcutaneou                                                        



     s                                                           



     suspension                                                        

 

     hydroxyzine      2-0      No             1mg                      



     HCl 50 mg      1-03                                              



     tablet      00:00:                                              



               00                                                

 

     hydroxyzine      2-0      No             1mg                      



     HCl 50 mg      1-03                                              



     tablet      00:00:                                              



               00                                                

 

     metformin      2022-0      No             1mg                      



     1,000 mg      1-03                                              



     tablet      00:00:                                              



               00                                                

 

     Zofran 4 mg      -1      No             1mg                      



     tablet      2-16                                              



               00:00:                                              



               00                                                

 

     levothyroxi      -1      No             1mcg                     



     ne 150 mcg      2-16                                              



     capsule      00:00:                                              



               00                                                

 

     Zofran 4 mg      -1      No             1mg                      



     tablet      2-16                                              



               00:00:                                              



               00                                                

 

     levothyroxi      -1      No             1mcg                     



     ne 150 mcg      2-16                                              



     capsule      00:00:                                              



               00                                                

 

     metformin      2021      No             1mg                      



     500 mg      1-27                                              



     tablet      00:00:                                              



               00                                                

 

     metformin      -      No             1mg                      



     500 mg      1-27                                              



     tablet      00:00:                                              



               00                                                

 

     metoprolol      -      No             1mg                      



     tartrate 25      1-22                                              



     mg tablet      00:00:                                              



               00                                                

 

     metoprolol      2021      No             1mg                      



     tartrate 25      1-22                                              



     mg tablet      00:00:                                              



               00                                                

 

     Myrbetriq      2021      No             1mg                      



     25 mg      1-16                                              



     tablet,exte      00:00:                                              



     nded      00                                                



     release                                                        

 

     metoclopram      2021      No             1mg                      



     fabio 10 mg      1-16                                              



     tablet      00:00:                                              



               00                                                

 

     Myrbetriq      2021      No             1mg                      



     25 mg      1-16                                              



     tablet,exte      00:00:                                              



     nded      00                                                



     release                                                        

 

     metoclopram      2021      No             1mg                      



     fabio 10 mg      1-16                                              



     tablet      00:00:                                              



               00                                                

 

     atorvastati      2021      No             1mg                      



     n 10 mg      1-14                                              



     tablet      00:00:                                              



               00                                                

 

     omeprazole      2021      No             1mg                      



     40 mg      1-14                                              



     capsule,del      00:00:                                              



     ayed      00                                                



     release                                                        

 

     atorvastati      2021      No             1mg                      



     n 10 mg      1-14                                              



     tablet      00:00:                                              



               00                                                

 

     omeprazole      2021      No             1mg                      



     40 mg      1-14                                              



     capsule,del      00:00:                                              



     ayed      00                                                



     release                                                        

 

     levothyroxi      2021      No             1mcg                     



     ne 150 mcg      1-04                                              



     capsule      00:00:                                              



               00                                                

 

     levothyroxi      2021      No             1mcg                     



     ne 150 mcg      1-04                                              



     capsule      00:00:                                              



               00                                                

 

     ferrous      -      No             1(65 mg                     



     sulfate 325      0-21                     iron)                     



     mg (65 mg      00:00:                                              



     iron)      00                                                



     tablet                                                        

 

     ferrous      2021      No             1(65 mg                     



     sulfate 325      0-21                     iron)                     



     mg (65 mg      00:00:                                              



     iron)      00                                                



     tablet                                                        

 

     Myrbetriq      2021      No             1mg                      



     25 mg      0-13                                              



     tablet,exte      00:00:                                              



     nded      00                                                



     release                                                        

 

     levothyroxi      2021      No             1mcg                     



     ne 300 mcg      0-13                                              



     tablet      00:00:                                              



               00                                                

 

     Myrbetriq      2021      No             1mg                      



     25 mg      0-13                                              



     tablet,exte      00:00:                                              



     nded      00                                                



     release                                                        

 

     levothyroxi      2021      No             1mcg                     



     ne 300 mcg      0-13                                              



     tablet      00:00:                                              



               00                                                

 

     nitrofurant      2021-1      No             1mg                      



     oin       0-12                                              



     macrocrysta      00:00:                                              



     l 100 mg      00                                                



     capsule                                                        

 

     nitrofurant      2021-1      No             1mg                      



     oin       0-12                                              



     macrocrysta      00:00:                                              



     l 100 mg      00                                                



     capsule                                                        

 

     metoclopram      2021-0      No             1mg                      



     fabio 10 mg      9-13                                              



     tablet      00:00:                                              



               00                                                

 

     metoclopram      2021-0      No             1mg                      



     fabio 10 mg      9-13                                              



     tablet      00:00:                                              



               00                                                

 

     Myrbetriq      2021-0      No             1mg                      



     25 mg      9-01                                              



     tablet,exte      00:00:                                              



     nded      00                                                



     release                                                        

 

     Myrbetriq      2021-0      No             1mg                      



     25 mg      9-01                                              



     tablet,exte      00:00:                                              



     nded      00                                                



     release                                                        

 

     metoclopram      2021-0      No             1mg                      



     fabio 10 mg      8-13                                              



     tablet      00:00:                                              



               00                                                

 

     metoclopram      2021-0      No             1mg                      



     fabio 10 mg      8-13                                              



     tablet      00:00:                                              



               00                                                

 

     metoclopram      2021-0      No             1mg                      



     fabio 10 mg      7-28                                              



     tablet      00:00:                                              



               00                                                

 

     metoclopram      2021-0      No             1mg                      



     fabio 10 mg      7-28                                              



     tablet      00:00:                                              



               00                                                

 

     Humulin      2021-0      No             1unit/m                     



     70/30 U-100      7-22                     L                        



     Insulin 100      00:00:                     (70-30)                     



     unit/mL      00                                                



     subcutaneou                                                        



     s                                                           



     suspension                                                        

 

     atorvastati      1-0      No             1mg                      



     n 10 mg      7-22                                              



     tablet      00:00:                                              



               00                                                

 

     Myrbetriq      2021-0      No             1mg                      



     25 mg      7-22                                              



     tablet,exte      00:00:                                              



     nded      00                                                



     release                                                        

 

     Trintellix      2021-0      No             1mg                      



     20 mg      7-22                                              



     tablet      00:00:                                              



               00                                                

 

     metformin      2021-0      No             1mg                      



     500 mg      7-22                                              



     tablet      00:00:                                              



               00                                                

 

     metoprolol      2021-0      No             1mg                      



     tartrate 25      7-22                                              



     mg tablet      00:00:                                              



               00                                                

 

     mirtazapine      2021-0      No             1mg                      



     15 mg      7-22                                              



     tablet      00:00:                                              



               00                                                

 

     levothyroxi      2021-0      No             1mcg                     



     ne 300 mcg      7-22                                              



     tablet      00:00:                                              



               00                                                

 

     omeprazole      2021-0      No             1mg                      



     40 mg      7-22                                              



     capsule,del      00:00:                                              



     ayed      00                                                



     release                                                        

 

     Humulin      2021-0      No             1unit/m                     



     70/30 U-100      7-22                     L                        



     Insulin 100      00:00:                     (70-30)                     



     unit/mL      00                                                



     subcutaneou                                                        



     s                                                           



     suspension                                                        

 

     atorvastati      2021-0      No             1mg                      



     n 10 mg      7-22                                              



     tablet      00:00:                                              



               00                                                

 

     Myrbetriq      2021-0      No             1mg                      



     25 mg      7-22                                              



     tablet,exte      00:00:                                              



     nded      00                                                



     release                                                        

 

     Trintellix      2021-0      No             1mg                      



     20 mg      7-22                                              



     tablet      00:00:                                              



               00                                                

 

     metformin      2021-0      No             1mg                      



     500 mg      7-22                                              



     tablet      00:00:                                              



               00                                                

 

     metoprolol      2021-0      No             1mg                      



     tartrate 25      7-22                                              



     mg tablet      00:00:                                              



               00                                                

 

     mirtazapine      2021-0      No             1mg                      



     15 mg      7-22                                              



     tablet      00:00:                                              



               00                                                

 

     levothyroxi      2021-0      No             1mcg                     



     ne 300 mcg      7-22                                              



     tablet      00:00:                                              



               00                                                

 

     omeprazole      1-0      No             1mg                      



     40 mg      7-22                                              



     capsule,del      00:00:                                              



     ayed      00                                                



     release                                                        

 

     alprazolam      1-0      No             1mg                      



     0.5 mg      7-13                                              



     tablet      00:00:                                              



               00                                                

 

     metoprolol      2021-0      No             1mg                      



     tartrate 25      7-13                                              



     mg tablet      00:00:                                              



               00                                                

 

     metoclopram      2021-0      No             1mg                      



     fabio 10 mg      7-13                                              



     tablet      00:00:                                              



               00                                                

 

     mirtazapine      2021-0      No             1mg                      



     15 mg      7-13                                              



     tablet      00:00:                                              



               00                                                

 

     levothyroxi      2021-0      No             1mcg                     



     ne 300 mcg      7-13                                              



     tablet      00:00:                                              



               00                                                

 

     omeprazole      1-0      No             1mg                      



     40 mg      7-13                                              



     capsule,del      00:00:                                              



     ayed      00                                                



     release                                                        

 

     Vitamin D2      1-0      No             1(50,00                     



     1,250 mcg      7-13                     0 unit)                     



     (50,000      00:00:                                              



     unit)      00                                                



     capsule                                                        

 

     Humulin      1-0      No             1unit/m                     



     70/30 U-100      7-13                     L                        



     Insulin 100      00:00:                     (70-30)                     



     unit/mL      00                                                



     subcutaneou                                                        



     s                                                           



     suspension                                                        

 

     Trintellix      2021-0      No             1mg                      



     20 mg      7-13                                              



     tablet      00:00:                                              



               00                                                

 

     Myrbetriq      2021-0      No             1mg                      



     25 mg      7-13                                              



     tablet,exte      00:00:                                              



     nded      00                                                



     release                                                        

 

     atorvastati      2021-0      No             1mg                      



     n 10 mg      7-13                                              



     tablet      00:00:                                              



               00                                                

 

     metformin      2021-0      No             1mg                      



     500 mg      7-13                                              



     tablet      00:00:                                              



               00                                                

 

     alprazolam      -0      No             1mg                      



     0.5 mg      7-13                                              



     tablet      00:00:                                              



               00                                                

 

     metoprolol      -0      No             1mg                      



     tartrate 25      7-13                                              



     mg tablet      00:00:                                              



               00                                                

 

     metoclopram      -0      No             1mg                      



     fabio 10 mg      7-13                                              



     tablet      00:00:                                              



               00                                                

 

     mirtazapine      -0      No             1mg                      



     15 mg      7-13                                              



     tablet      00:00:                                              



               00                                                

 

     levothyroxi      -0      No             1mcg                     



     ne 300 mcg      7-13                                              



     tablet      00:00:                                              



               00                                                

 

     omeprazole      -0      No             1mg                      



     40 mg      7-13                                              



     capsule,del      00:00:                                              



     ayed      00                                                



     release                                                        

 

     Vitamin D2      -0      No             1(50,00                     



     1,250 mcg      7-13                     0 unit)                     



     (50,000      00:00:                                              



     unit)      00                                                



     capsule                                                        

 

     Humulin      -0      No             1unit/m                     



     70/30 U-100      7-13                     L                        



     Insulin 100      00:00:                     (70-30)                     



     unit/mL      00                                                



     subcutaneou                                                        



     s                                                           



     suspension                                                        

 

     Trintellix      -0      No             1mg                      



     20 mg      7-13                                              



     tablet      00:00:                                              



               00                                                

 

     Myrbetriq      -0      No             1mg                      



     25 mg      7-13                                              



     tablet,exte      00:00:                                              



     nded      00                                                



     release                                                        

 

     atorvastati      -0      No             1mg                      



     n 10 mg      7-13                                              



     tablet      00:00:                                              



               00                                                

 

     metformin      -0      No             1mg                      



     500 mg      7-13                                              



     tablet      00:00:                                              



               00                                                







Immunizations







           Ordered Immunization Filled Immunization Date       Status     Commen

ts   Source



           Name       Name                                        

 

           Moderna COVID-19            2021 Completed             



           Vaccine               00:00:00                         

 

           Moderna COVID-19            2021 Completed             



           Vaccine               00:00:00                         







Vital Signs







             Vital Name   Observation Time Observation Value Comments     Source

 

             Systolic blood 2022 08:10:00 123 mm[Hg]                [facility]

[facility]



             pressure                                            [facility]

 

             Diastolic blood 2022 08:10:00 87 mm[Hg]                 Unive

[facility]ity [facility]

 

             [facility]   2022 08:10:00 82 /min                   [facility]

[facility]

 

             Respiratory rate 2022 08:10:00 18 /min                   [facility]

[facility]

 

             Oxygen saturation in 2022 08:10:00 98 /min                   

[facility] blood by                                        [place] [facility]

marisa



             Pulse oximetry                                        [facility]

 

             Body temperature 2022 04:15:00 36.83 Maricarmen                 [facility]

[facility]

 

             Body height  2022 04:15:00 157.5 cm                  [facility]

 

             Body weight  2022 04:15:00 88.905 kg                 [facility]

 

             BMI          2022 04:15:00 35.85 kg/m2               [facility]

 

             BP Systolic  2022 11:31:00 110 mm[Hg]                

 

             BP Diastolic 2022 11:31:00 49 mm[Hg]                 

 

             Weight Measured 2022 11:31:00 195.80 pounds              

 

             Height Measured 2022 11:31:00 61.81 inches              

 

             Body Temperature 2022 11:31:00 97.80 degrees              

 

             Heart Rate   2022 11:31:00 95.00 /min                

 

             Respiratory Rate 2022 11:31:00 18.00 /min                

 

             BP Systolic  2022 16:36:00 129 mm[Hg]                

 

             BP Diastolic 2022 16:36:00 68 mm[Hg]                 

 

             Weight Measured 2022 16:36:00 196.00 pounds              

 

             Height Measured 2022 16:36:00 61.81 inches              

 

             Body Temperature 2022 16:36:00 97.60 degrees              

 

             Heart Rate   2022 16:36:00 108.00 /min               

 

             Respiratory Rate 2022 16:36:00 18.00 /min                

 

             BP Systolic  2022 11:46:00 115 mm[Hg]                

 

             BP Diastolic 2022 11:46:00 75 mm[Hg]                 

 

             Weight Measured 2022 11:46:00 190.40 pounds              

 

             Height Measured 2022 11:46:00 61.81 inches              

 

             Body Temperature 2022 11:46:00 98.10 degrees              

 

             Heart Rate   2022 11:46:00 89.00 /min                

 

             Respiratory Rate 2022 11:46:00                           

 

             BP Systolic  2022 11:22:00 123 mm[Hg]                

 

             BP Diastolic 2022 11:22:00 57 mm[Hg]                 

 

             Weight Measured 2022 11:22:00 194.60 pounds              

 

             Height Measured 2022 11:22:00 61.81 inches              

 

             Body Temperature 2022 11:22:00 97.50 degrees              

 

             Heart Rate   2022 11:22:00 85.00 /min                

 

             Respiratory Rate 2022 11:22:00 16.00 /min                

 

             BP Systolic  2022 15:21:00 148 mm[Hg]                

 

             BP Diastolic 2022 15:21:00 82 mm[Hg]                 

 

             Weight Measured 2022 15:21:00 192.60 pounds              

 

             Height Measured 2022 15:21:00 61.81 inches              

 

             Body Temperature 2022 15:21:00 98.50 degrees              

 

             Heart Rate   2022 15:21:00 100.00 /min               

 

             Respiratory Rate 2022 15:21:00 25.00 /min                

 

             BP Systolic  2021 16:45:00 135 mm[Hg]                

 

             BP Diastolic 2021 16:45:00 82 mm[Hg]                 

 

             Weight Measured 2021 16:45:00 206.80 pounds              

 

             Height Measured 2021 16:45:00                           

 

             Body Temperature 2021 16:45:00 98.20 degrees              

 

             Heart Rate   2021 16:45:00 86.00 /min                

 

             Respiratory Rate 2021 16:45:00 25.00 /min                

 

             BP Systolic  2021 11:18:00 136 mm[Hg]                

 

             BP Diastolic 2021 11:18:00 84 mm[Hg]                 

 

             Weight Measured 2021 11:18:00 201.60 pounds              

 

             Height Measured 2021 11:18:00 61.81 inches              

 

             Body Temperature 2021 11:18:00 98.20 degrees              

 

             Heart Rate   2021 11:18:00 87.00 /min                

 

             Respiratory Rate 2021 11:18:00 17.00 /min                

 

             BP Systolic  2021-10-19 14:54:00 122 mm[Hg]                

 

             BP Diastolic 2021-10-19 14:54:00 76 mm[Hg]                 

 

             Weight Measured 2021-10-19 14:54:00 211.80 pounds              

 

             Height Measured 2021-10-19 14:54:00 61.81 inches              

 

             Body Temperature 2021-10-19 14:54:00 98.30 degrees              

 

             Heart Rate   2021-10-19 14:54:00 80.00 /min                

 

             Respiratory Rate 2021-10-19 14:54:00 16.00 /min                

 

             BP Systolic  2021-10-12 10:09:00 114 mm[Hg]                

 

             BP Diastolic 2021-10-12 10:09:00 72 mm[Hg]                 

 

             Weight Measured 2021-10-12 10:09:00 212.00 pounds              

 

             Height Measured 2021-10-12 10:09:00 61.81 inches              

 

             Body Temperature 2021-10-12 10:09:00 98.40 degrees              

 

             Heart Rate   2021-10-12 10:09:00 98.00 /min                

 

             Respiratory Rate 2021-10-12 10:09:00                           

 

             BP Systolic  2021-10-12 10:08:00 114 mm[Hg]                

 

             BP Diastolic 2021-10-12 10:08:00 72 mm[Hg]                 

 

             Weight Measured 2021-10-12 10:08:00 212.00 pounds              

 

             Height Measured 2021-10-12 10:08:00 61.81 inches              

 

             Body Temperature 2021-10-12 10:08:00 98.40 degrees              

 

             Heart Rate   2021-10-12 10:08:00 98.00 /min                

 

             Respiratory Rate 2021-10-12 10:08:00                           

 

             BP Systolic  2021-10-09 11:33:00 119 mm[Hg]                

 

             BP Diastolic 2021-10-09 11:33:00 78 mm[Hg]                 

 

             Weight Measured 2021-10-09 11:33:00 210.60 pounds              

 

             Height Measured 2021-10-09 11:33:00 61.81 inches              

 

             Body Temperature 2021-10-09 11:33:00 98.30 degrees              

 

             Heart Rate   2021-10-09 11:33:00 92.00 /min                

 

             Respiratory Rate 2021-10-09 11:33:00                           







Procedures







                Procedure       Date / Time Performed Performing Clinician Sourc

e

 

                XR HIPS 3 VW LEFT 2022 04:42:39 Rosie Jha [facility]

 

                NOTICE OF PRIVACY 2022 04:04:38 Doctor Unassigned, No [facility]

[facility] [place]



                PRACTICES                       Name            [facility]

 

                CONSENT/REFUSAL FOR 2022 04:04:16 Doctor Unassigned, No [facility]

[facility] 

[place]



                DIAGNOSIS AND                   Name            [facility]



                TREATMENT                                       







Plan of Care







             Planned Activity Planned Date Details      Comments     Source

 

             Goal                      Plan of Care Note [code = 93842-0]       

       

 

             Goal                      Plan of Care Note [code = 57524-3]       

       

 

             Goal                      Plan of Care Note [code = 98749-8]       

       

 

             Goal                      Plan of Care Note [code = 52323-3]       

       

 

             Goal                      Plan of Care Note [code = 09937-0]       

       

 

             Goal                      Plan of Care Note [code = 17038-5]       

       

 

             Goal                      Plan of Care Note [code = 59478-8]       

       

 

             Goal                      Plan of Care Note [code = 31104-5]       

       

 

             Goal                      Plan of Care Note [code = 47894-4]       

       

 

             Goal                      Plan of Care Note [code = 98288-7]       

       

 

             Goal                      Plan of Care Note [code = 52902-4]       

       

 

             Goal                      Plan of Care Note [code = 67241-0]       

       

 

             Goal                      Plan of Care Note [code = 78973-9]       

       

 

             Goal                      Plan of Care Note [code = 30291-4]       

       

 

             Goal                      Plan of Care Note [code = 24649-5]       

       

 

             Goal                      Plan of Care Note [code = 01580-9]       

       

 

             Goal                      Plan of Care Note [code = 55010-1]       

       

 

             Goal                      Plan of Care Note [code = 36759-2]       

       

 

             Goal                      Plan of Care Note [code = 33414-2]       

       

 

             Goal                      Plan of Care Note [code = 55494-9]       

       

 

             Goal                      Plan of Care Note [code = 34520-2]       

       

 

             Goal                      Plan of Care Note [code = 50688-3]       

       

 

             Goal                      Plan of Care Note [code = 66811-8]       

       

 

             Goal                      Plan of Care Note [code = 96201-4]       

       

 

             Goal                      Plan of Care Note [code = 27081-8]       

       

 

             Goal                      Plan of Care Note [code = 08022-6]       

       

 

             Goal                      Plan of Care Note [code = 79090-4]       

       

 

             Goal                      Plan of Care Note [code = 66144-8]       

       

 

             Goal                      Plan of Care Note [code = 96314-1]       

       

 

             Goal                      Plan of Care Note [code = 17851-7]       

       

 

             Goal                      Plan of Care Note [code = 13732-1]       

       

 

             Goal                      Plan of Care Note [code = 60991-7]       

       

 

             Goal                      Plan of Care Note [code = 62957-0]       

       

 

             Goal                      Plan of Care Note [code = 36459-8]       

       

 

             Goal                      Plan of Care Note [code = 42688-7]       

       

 

             Goal                      Plan of Care Note [code = 81925-2]       

       

 

             Goal                      Plan of Care Note [code = 00153-9]       

       

 

             Goal                      Plan of Care Note [code = 41985-3]       

       

 

             Goal                      Plan of Care Note [code = 83133-3]       

       

 

             Goal                      Plan of Care Note [code = 36883-2]       

       

 

             Goal                      Plan of Care Note [code = 81007-1]       

       

 

             Goal                      Plan of Care Note [code = 97780-2]       

       

 

             Goal                      Plan of Care Note [code = 53009-3]       

       

 

             Goal                      Plan of Care Note [code = 27609-5]       

       

 

             Goal                      Plan of Care Note [code = 17152-3]       

       

 

             Goal                      Plan of Care Note [code = 67806-3]       

       

 

             Goal                      Plan of Care Note [code = 16113-5]       

       

 

             Goal                      Plan of Care Note [code = 04728-3]       

       

 

             Goal                      Plan of Care Note [code = 62231-2]       

       

 

             Goal                      Plan of Care Note [code = 68058-6]       

       

 

             Goal                      Plan of Care Note [code = 67838-7]       

       

 

             Goal                      Plan of Care Note [code = 56118-7]       

       

 

             Goal                      Plan of Care Note [code = 61317-8]       

       

 

             Goal                      Plan of Care Note [code = 66307-4]       

       







Encounters







        Start   End     Encounter Admission Attending Care    Care    Encounter 

Source



        Date/Time Date/Time Type    Type    Clinicians Facility Department ID   

   

 

        2022         Outpatient         DOMINGA  [facility]     [id number]

 [place]



        01:03:24                         [facility]                 [id number] [facility]

 

        2022 Outpatient                 [id number]

[id number] 



        00:00:00 00:00:00 Visit                   [id number]         [id number] 



                                                -[id number]         [id number] 



                                                [id number]         [id number]  

 

        2022-07-15 2022 Emergency X       EBEN JHA    ERT     [id number]

50 [facility]



        23:20:00 03:26:00                 ROSIE vaca [facility]

 

        2022-07-15 2022 Emergency         EBEN Jha    [id number]

[address] [facility]



        23:20:00 03:26:00                 Rosie LOPEZ [id number]         i

[facility] [id number]         [facility]  [phone]         Medi

marisa



                                                        [address]             [facility]

 

        2022 Outpatient                 [id number]

[id number] 



        00:00:00 00:00:00 Visit                   [id number]         [id number] 



                                                [id number]         [id number] 



                                                [id number]         [id number]  

 

        2022 Outpatient         DOMINGA,  [facility]    MED     [id number]   

 [facility]



        12:04:00 23:59:00                 BENNIE                         







Results







           Test Description Test Time  Test Comments Results    Result Comments 

Source

 

           CULTURE, URINE 2022            SPECIMEN NUMBER:            



                      12:01:26              [id number] CULTURE,            



                                            URINE SPECIMEN            



                                            NUMBER: [id number]            



                                            SPECIMEN COMMENT:            



                                            URINE SOURCE: URINE            



                                            REPORT STATUS: FINAL            



                                            FINAL REPORT:            



                                            2022 10-50,000            



                                            CFU/ML MIXED            



                                            UROGENITAL ROWENA            



                                            UNLESS OTHERWISE            



                                            INDICATED, ALL            



                                            TESTING PERFORMED            



                                            [facility]            



                                            [facility]            



                                            [address] [place] [address] LABORATORY            



                                            DIRECTOR: CLIFTON DARBY M.D. CLIA            



                                            NUMBER [id number]            



                                            [place]            



                                            [place]. [id number]            









                    CULTURE, URINE      2022 00:00:00 









                      Test Item  Value      Reference Range Interpretation Comme

nts









             CULTURE, URINE (test code = 99726) SPECIMEN NUMBER: [id number]      

                     



TSH, THIRD [id number] 06:16:09





             Test Item    Value        Reference Range Interpretation Comments

 

             TSH, THIRD   <0.010 UIU/ML 0.400-4.100  L             UNLESS OTHERW

ISE



             GENERATION (test                                        INDICATED, 

ALL



             code = 2821)                                        TESTING PERFORM

ED



                                                                 ATCLINICAL PATH

OGY



                                                                 [employer]

[address] [address] [facility] with [facility]



                                                                 DIRECTOR: CLIFTON DARBY M.D.

 CLIA



                                                                 NUMBER [id number]

03 CAP



                                                                 ACCREDITATION N

O.



                                                                 [id number]



HEMOGLOBIN [id number] 05:23:24





             Test Item    Value        Reference Range Interpretation Comments

 

             HEMOGLOBIN A1c (test 6.7 %        4.2-5.6      H             AMERIC

AN DIABETES



             code = 57585)                                        ASSOCIATION 

IDELINES FOR



                                                                 HGB A1C:



                                                                 PREDIABETES/INC

REASED RISK .



                                                                 . . . . . . 5.7

-6.4%



                                                                 DIAGNOSIS OF DI

ABETES . . .



                                                                 . . . . . . >=6

.5% WITH



                                                                 CONFIRMATION OR

 APPROPRIATE



                                                                 SYMPTOMS NOTE: 

ASSAY MAY BE



                                                                 AFFECTED BY



                                                                 HEMOGLOBINOPATH

IES (SICKLE



                                                                 CELL ANEMIA, S-

C DISEASE,



                                                                 OTHERS) OR ZENA

FICIALLY



                                                                 LOWERED BY DECR

EASED RED



                                                                 CELL SURVIVAL (

HEMOLYTIC



                                                                 ANEMIAS, BLOOD 

LOSS, ETC.).



                                                                 CONSIDER ALTERN

ATE TESTING



                                                                 OR LABORATORY C

ONSULTATION.



COMPREHENSIVE METABOLIC [id number] 04:45:06





             Test Item    Value        Reference Range Interpretation Comments

 

             GLUCOSE (test code = 266 MG/DL    70-99        H            



             )                                               

 

             BUN (test code = 17 MG/DL     8-23                      



             )                                               

 

             CREATININE (test 0.92 MG/DL   0.60-1.30                 



             code = 221)                                        

 

             eGFR ( CKD-EPI) 70 ML/MIN/1.73 >60                       



             (test code = 83257)                                        

 

             CALC BUN/CREAT (test 18 RATIO     6-28                      



             code = 2235)                                        

 

             SODIUM (test code = 136 MEQ/L    133-146                   



             )                                               

 

             POTASSIUM (test code 5.3 MEQ/L    3.5-5.4                   



             = )                                             

 

             CHLORIDE (test code 98 MEQ/L                         



             = )                                             

 

             CARBON DIOXIDE (test 22 MEQ/L     19-31                     



             code = 220)                                        

 

             CALCIUM (test code = 8.8 MG/DL    8.5-10.5                  



             )                                               

 

             PROTEIN, TOTAL (test 7.4 G/DL     6.1-8.3                   



             code = 2229)                                        

 

             ALBUMIN (test code = 4.1 G/DL     3.5-5.2                   



             )                                               

 

             CALC GLOBULIN (test 3.3 G/DL     1.9-3.7                   



             code = 2240)                                        

 

             CALC A/G RATIO (test 1.2 RATIO    1.0-2.6                   



             code = 2234)                                        

 

             BILIRUBIN, TOTAL 0.3 MG/DL    See_Comment                [Automated

 message]



             (test code = 2207)                                        The syste

m which



                                                                 generated this



                                                                 result transmit

maximo



                                                                 reference range

:



                                                                 <=1.2. The refe

rence



                                                                 range was not u

sed



                                                                 to interpret th

is



                                                                 result as



                                                                 normal/abnormal

.

 

             ALKALINE PHOSPHATASE 88 U/L                           



             (test code = 2204)                                        

 

             AST (test code = 32 U/L       9-40                      



             )                                               

 

             ALT (test code = 29 U/L       5-40                      



             )                                               



HEMOGLOBIN [id number] 00:00:00





             Test Item    Value        Reference Range Interpretation Comments

 

             HEMOGLOBIN A1c (test code = 97733) 6.7 %                           

       



HEMOGLOBIN [id number] 00:00:00





             Test Item    Value        Reference Range Interpretation Comments

 

             HEMOGLOBIN A1c (test code = 91622) 6.7 %                           

       



COMPREHENSIVE METABOLIC [id number] 00:00:00





             Test Item    Value        Reference Range Interpretation Comments

 

             GLUCOSE (test code = 2217) 266 MG/DL                              

 

             BUN (test code = 2208) 17 MG/DL                               

 

             CREATININE (test code = 2214) 0.92 MG/DL                           

  

 

             eGFR ( CKD-EPI) (test code 70 ML/MIN/1.73                      

     



             = 58692)                                            

 

             CALC BUN/CREAT (test code = 18 RATIO                               



             2235)                                               

 

             SODIUM (test code = 2231) 136 MEQ/L                              

 

             POTASSIUM (test code = 2228) 5.3 MEQ/L                             

 

 

             CHLORIDE (test code = 2215) 98 MEQ/L                               

 

             CARBON DIOXIDE (test code = 22 MEQ/L                               



             )                                               

 

             CALCIUM (test code = 2209) 8.8 MG/DL                              

 

             PROTEIN, TOTAL (test code = 7.4 G/DL                               



             )                                               

 

             ALBUMIN (test code = 2201) 4.1 G/DL                               

 

             CALC GLOBULIN (test code = 3.3 G/DL                               



             )                                               

 

             CALC A/G RATIO (test code = 1.2 RATIO                              



             )                                               

 

             BILIRUBIN, TOTAL (test code = 0.3 MG/DL                            

  



             )                                               

 

             ALKALINE PHOSPHATASE (test 88 U/L                                 



             code = 2204)                                        

 

             AST (test code = 2218) 32 U/L                                 

 

             ALT (test code = 2219) 29 U/L                                 



[id number] 00:00:00





             Test Item    Value        Reference Range Interpretation Comments

 

             TSH, THIRD GENERATION (test <0.010 UIU/ML                          

 



             code = 2821)                                        



[id number] 00:00:00





             Test Item    Value        Reference Range Interpretation Comments

 

             TSH, THIRD GENERATION (test <0.010 UIU/ML                          

 



             code = 2821)                                        



TSH, THIRD [id number] 06:18:54





             Test Item    Value        Reference Range Interpretation Comments

 

             TSH, THIRD GENERATION (test code 0.190 UIU/ML 0.400-4.100  L       

     



             = 2821)                                             



HEMOGLOBIN [id number] 03:11:56





             Test Item    Value        Reference Range Interpretation Comments

 

             HEMOGLOBIN A1c (test 8.4 %        4.2-5.6      H             AMERIC

AN DIABETES



             code = 34517)                                        ASSOCIATION 

IDELINES FOR



                                                                 HGB A1C:



                                                                 PREDIABETES/INC

REASED RISK .



                                                                 . . . . . . 5.7

-6.4%



                                                                 DIAGNOSIS OF DI

ABETES . . .



                                                                 . . . . . . >=6

.5% WITH



                                                                 CONFIRMATION OR

 APPROPRIATE



                                                                 SYMPTOMS NOTE: 

ASSAY MAY BE



                                                                 AFFECTED BY



                                                                 HEMOGLOBINOPATH

IES (SICKLE



                                                                 CELL ANEMIA, S-

C DISEASE,



                                                                 OTHERS) OR ZENA

FICIALLY



                                                                 LOWERED BY DECR

EASED RED



                                                                 CELL SURVIVAL (

HEMOLYTIC



                                                                 ANEMIAS, BLOOD 

LOSS, ETC.).



                                                                 CONSIDER ALTERN

ATE TESTING



                                                                 OR LABORATORY C

ONSULTATION.



COMPREHENSIVE METABOLIC [id number] 03:11:06





             Test Item    Value        Reference Range Interpretation Comments

 

             GLUCOSE (test code = 141 MG/DL    70-99        H            



             )                                               

 

             BUN (test code = 31 MG/DL     8-23         H            



             )                                               

 

             CREATININE (test 1.07 MG/DL   0.60-1.30                 



             code = 221)                                        

 

             eGFR ( CKD-EPI) 59 ML/MIN/1.73 >60          L            



             (test code = 56840)                                        

 

             CALC BUN/CREAT (test 29 RATIO     6-28         H            



             code = 2235)                                        

 

             SODIUM (test code = 141 MEQ/L    133-146                   



             )                                               

 

             POTASSIUM (test code 4.7 MEQ/L    3.5-5.4                   



             = )                                             

 

             CHLORIDE (test code 99 MEQ/L                         



             = )                                             

 

             CARBON DIOXIDE (test 26 MEQ/L     19-31                     



             code = 220)                                        

 

             CALCIUM (test code = 8.8 MG/DL    8.5-10.5                  



             )                                               

 

             PROTEIN, TOTAL (test 8.0 G/DL     6.1-8.3                   



             code = 2229)                                        

 

             ALBUMIN (test code = 4.5 G/DL     3.5-5.2                   



             )                                               

 

             CALC GLOBULIN (test 3.5 G/DL     1.9-3.7                   



             code = 2240)                                        

 

             CALC A/G RATIO (test 1.3 RATIO    1.0-2.6                   



             code = 2234)                                        

 

             BILIRUBIN, TOTAL 0.4 MG/DL    See_Comment                [Automated

 message]



             (test code = 2207)                                        The syste

m which



                                                                 generated this



                                                                 result transmit

maximo



                                                                 reference range

:



                                                                 <=1.2. The refe

rence



                                                                 range was not u

sed



                                                                 to interpret th

is



                                                                 result as



                                                                 normal/abnormal

.

 

             ALKALINE PHOSPHATASE 67 U/L                           



             (test code = 2204)                                        

 

             AST (test code = 49 U/L       9-40         H            



             )                                               

 

             ALT (test code = 41 U/L       5-40         H            



             )                                               



LIPID [id number] 03:11:06





             Test Item    Value        Reference Range Interpretation Comments

 

             CHOLESTEROL (test 113 MG/DL    <200                      



             code = 2210)                                        

 

             TRIGLYCERIDES (test 165 MG/DL    <150         H            



             code = 2232)                                        

 

             HDL CHOLESTEROL (test 39 MG/DL     >39          L            



             code = 2220)                                        

 

             CALC LDL CHOL (test 50 MG/DL     <100                       NOTE: C

ALCULATED LDL



             code = 2237)                                        IS BASED ON



                                                                 CAROLANN-FUNES 

METHOD



                                                                 WHICHINCLUDES



                                                                 ADJUSTABLE



                                                                 TRIGLYCERIDE:VL

DL



                                                                 CHOLESTEROL RAT

IO.THIS



                                                                 FACTOR VARIES B

Y



                                                                 MEASURED TRIGLY

CERIDE



                                                                 AND NON-HDLCHOL

ESTEROL



                                                                 CONCENTRATIONS 

WITH



                                                                 INCREASED CALCU

LATED



                                                                 LDL SEENIN HIGH

ER



                                                                 TRIGLYCERIDE OR

 LOWER



                                                                 NON-HDL SPECIME

NS. FOR



                                                                 MOREINFORMATION

, SEE



                                                                 CLIENT ANNOUNCE

MENT AT



                                                                 [email or web address]

[email or web address]



                                                                 /CalcLDL-C

 

             RISK RATIO LDL/HDL 1.28 RATIO   <3.22                     



             (test code = 2238)                                        



IRON, [id number] 03:11:06





             Test Item    Value        Reference Range Interpretation Comments

 

             IRON, SERUM (test 59 UG/DL                          UNLESS OT

HERWISE



             code = 2222)                                        INDICATED, ALL 

TESTING



                                                                 PERFORMED ATCLI

NICAL



                                                                 PATHOLOGY [facility]



                                                                 INC[facility] [address] [place], [place] [address] [place] [address]



                                                                 DIRECTOR: CLIFTON DARBY M.D.

 CLIA



                                                                 NUMBER [id number]

03 CAP



                                                                 ACCREDITATION N

O. [id number]



HEMOGLOBIN [id number] 00:00:00





             Test Item    Value        Reference Range Interpretation Comments

 

             HEMOGLOBIN A1c (test code = 26443) 8.4 %                           

       



HEMOGLOBIN [id number] 00:00:00





             Test Item    Value        Reference Range Interpretation Comments

 

             HEMOGLOBIN A1c (test code = 27018) 8.4 %                           

       



COMPREHENSIVE METABOLIC [id number] 00:00:00





             Test Item    Value        Reference Range Interpretation Comments

 

             GLUCOSE (test code = 2217) 141 MG/DL                              

 

             BUN (test code = 2208) 31 MG/DL                               

 

             CREATININE (test code = 2214) 1.07 MG/DL                           

  

 

             eGFR ( CKD-EPI) (test code 59 ML/MIN/1.73                      

     



             = 28550)                                            

 

             CALC BUN/CREAT (test code = 29 RATIO                               



             2235)                                               

 

             SODIUM (test code = 2231) 141 MEQ/L                              

 

             POTASSIUM (test code = 2228) 4.7 MEQ/L                             

 

 

             CHLORIDE (test code = 2215) 99 MEQ/L                               

 

             CARBON DIOXIDE (test code = 26 MEQ/L                               



             )                                               

 

             CALCIUM (test code = 220) 8.8 MG/DL                              

 

             PROTEIN, TOTAL (test code = 8.0 G/DL                               



             )                                               

 

             ALBUMIN (test code = 2201) 4.5 G/DL                               

 

             CALC GLOBULIN (test code = 3.5 G/DL                               



             )                                               

 

             CALC A/G RATIO (test code = 1.3 RATIO                              



             )                                               

 

             BILIRUBIN, TOTAL (test code = 0.4 MG/DL                            

  



             )                                               

 

             ALKALINE PHOSPHATASE (test 67 U/L                                 



             code = 2204)                                        

 

             AST (test code = 221) 49 U/L                                 

 

             ALT (test code = 2219) 41 U/L                                 



LIPID [id number] 00:00:00





             Test Item    Value        Reference Range Interpretation Comments

 

             CHOLESTEROL (test code = 2210) 113 MG/DL                           

   

 

             TRIGLYCERIDES (test code = 2232) 165 MG/DL                         

     

 

             HDL CHOLESTEROL (test code = 0) 39 MG/DL                        

       

 

             CALC LDL CHOL (test code = ) 50 MG/DL                          

     

 

             RISK RATIO LDL/HDL (test code = 1.28 RATIO                         

    



             2238)                                               



[id number] 00:00:00





             Test Item    Value        Reference Range Interpretation Comments

 

             TSH, THIRD GENERATION (test code 0.190 UIU/ML                      

     



             = 2821)                                             



[id number] 00:00:00





             Test Item    Value        Reference Range Interpretation Comments

 

             TSH, THIRD GENERATION (test code 0.190 UIU/ML                      

     



             = 2821)                                             



IRON, [id number] 00:00:00





             Test Item    Value        Reference Range Interpretation Comments

 

             IRON, SERUM (test code = ) 59 UG/DL                            

   



HEMOGLOBIN [id number] 00:00:00





             Test Item    Value        Reference Range Interpretation Comments

 

             HEMOGLOBIN A1c (test code = 04398) 8.4 %                           

       



HEMOGLOBIN [id number] 00:00:00





             Test Item    Value        Reference Range Interpretation Comments

 

             HEMOGLOBIN A1c (test code = 71221) 8.4 %                           

       



COMPREHENSIVE METABOLIC [id number] 00:00:00





             Test Item    Value        Reference Range Interpretation Comments

 

             GLUCOSE (test code = 2217) 141 MG/DL                              

 

             BUN (test code = 8) 31 MG/DL                               

 

             CREATININE (test code = 2214) 1.07 MG/DL                           

  

 

             eGFR ( CKD-EPI) (test code 59 ML/MIN/1.73                      

     



             = 65362)                                            

 

             CALC BUN/CREAT (test code = 29 RATIO                               



             2235)                                               

 

             SODIUM (test code = 2231) 141 MEQ/L                              

 

             POTASSIUM (test code = 2228) 4.7 MEQ/L                             

 

 

             CHLORIDE (test code = 2215) 99 MEQ/L                               

 

             CARBON DIOXIDE (test code = 26 MEQ/L                               



             )                                               

 

             CALCIUM (test code = 2209) 8.8 MG/DL                              

 

             PROTEIN, TOTAL (test code = 8.0 G/DL                               



             )                                               

 

             ALBUMIN (test code = 2201) 4.5 G/DL                               

 

             CALC GLOBULIN (test code = 3.5 G/DL                               



             )                                               

 

             CALC A/G RATIO (test code = 1.3 RATIO                              



             223)                                               

 

             BILIRUBIN, TOTAL (test code = 0.4 MG/DL                            

  



             )                                               

 

             ALKALINE PHOSPHATASE (test 67 U/L                                 



             code = 220)                                        

 

             AST (test code = 2218) 49 U/L                                 

 

             ALT (test code = 2219) 41 U/L                                 



LIPID [id number] 00:00:00





             Test Item    Value        Reference Range Interpretation Comments

 

             CHOLESTEROL (test code = 2210) 113 MG/DL                           

   

 

             TRIGLYCERIDES (test code = 2232) 165 MG/DL                         

     

 

             HDL CHOLESTEROL (test code = 2220) 39 MG/DL                        

       

 

             CALC LDL CHOL (test code = 2237) 50 MG/DL                          

     

 

             RISK RATIO LDL/HDL (test code = 1.28 RATIO                         

    



             2238)                                               



[id number] 00:00:00





             Test Item    Value        Reference Range Interpretation Comments

 

             TSH, THIRD GENERATION (test code 0.190 UIU/ML                      

     



             = 2821)                                             



[id number] 00:00:00





             Test Item    Value        Reference Range Interpretation Comments

 

             TSH, THIRD GENERATION (test code 0.190 UIU/ML                      

     



             = 2821)                                             



IRON, [id number] 00:00:00





             Test Item    Value        Reference Range Interpretation Comments

 

             IRON, SERUM (test code = 2222) 59 UG/DL                            

   



ALBUMIN/CREATININE RATIO, URINE, [id number] 05:57:19





             Test Item    Value        Reference Range Interpretation Comments

 

             CREATININE, URINE, 212.0 MG/DL  NOT ESTAB                 



             CONC. (test code =                                        



             )                                               

 

             ALBUMIN, URINE, 16.0 MG/DL                              Note: Test 

name is



             RANDOM (test code                                        changed to

 Urine Albumin



             = 21268)                                            from Microalbum

in in



                                                                 accordance with

 ADA and



                                                                 NKF Guidelines,

 and



                                                                 Albumin/Creatin

ine ratio



                                                                 reference inter

mariah



                                                                 reflects those



                                                                 Guidelines. Violet

lytic



                                                                 methodology is



                                                                 unchanged. No r

eference



                                                                 interval for Ra

ndom



                                                                 Urine Albumin i

s



                                                                 available.

 

             CALC         75 MG/G      <30          H             UNLESS OTHERWI

SE



             ALBUMIN/CREAT, RND                                        INDICATED

, ALL TESTING



             (test code =                                        PERFORMED ATCLI

NICAL



             26988)                                              PATHOLOGY [facility]

[facility],



                                                                 [facility]. [address] [place] [address]

4



                                                                 LABORATORY DIRE

CTOR:



                                                                 CLIFTON THOMSON M.D.



                                                                 CLIA NUMBER [id number]

[id number]



                                                                 CAP ACCREDITATI

ON NO.



                                                                 [id number]



HEMOGLOBIN [id number] 04:12:15





             Test Item    Value        Reference Range Interpretation Comments

 

             HEMOGLOBIN A1c (test 9.1 %        4.2-5.6      H             AMERIC

AN DIABETES



             code = 31405)                                        ASSOCIATION 

IDELINES FOR



                                                                 HGB A1C:



                                                                 PREDIABETES/INC

REASED RISK .



                                                                 . . . . . . 5.7

-6.4%



                                                                 DIAGNOSIS OF DI

ABETES . . .



                                                                 . . . . . . >=6

.5% WITH



                                                                 CONFIRMATION OR

 APPROPRIATE



                                                                 SYMPTOMS NOTE: 

ASSAY MAY BE



                                                                 AFFECTED BY



                                                                 HEMOGLOBINOPATH

IES (SICKLE



                                                                 CELL ANEMIA, S-

C DISEASE,



                                                                 OTHERS) OR ZENA

FICIALLY



                                                                 LOWERED BY DECR

EASED RED



                                                                 CELL SURVIVAL (

HEMOLYTIC



                                                                 ANEMIAS, BLOOD 

LOSS, ETC.).



                                                                 CONSIDER ALTERN

ATE TESTING



                                                                 OR LABORATORY C

ONSULTATION.



HEMOGLOBIN [id number] 00:00:00





             Test Item    Value        Reference Range Interpretation Comments

 

             HEMOGLOBIN A1c (test code = 72559) 9.1 %                           

       



HEMOGLOBIN [id number] 00:00:00





             Test Item    Value        Reference Range Interpretation Comments

 

             HEMOGLOBIN A1c (test code = 06829) 9.1 %                           

       



MICROALBUMIN/CREATININE, RANDOM AND [id number] 00:00:00





             Test Item    Value        Reference Range Interpretation Comments

 

             CREATININE, URINE, CONC. (test 212.0 MG/DL                         

   



             code = 2072)                                        

 

             ALBUMIN, URINE, RANDOM (test code 16.0 MG/DL                       

      



             = 59016)                                            

 

             CALC ALBUMIN/CREAT, RND (test 75 MG/G                              

  



             code = 14348)                                        



HEMOGLOBIN [id number] 00:00:00





             Test Item    Value        Reference Range Interpretation Comments

 

             HEMOGLOBIN A1c (test code = 78620) 9.1 %                           

       



HEMOGLOBIN [id number] 00:00:00





             Test Item    Value        Reference Range Interpretation Comments

 

             HEMOGLOBIN A1c (test code = 35815) 9.1 %                           

       



MICROALBUMIN/CREATININE, RANDOM AND [id number] 00:00:00





             Test Item    Value        Reference Range Interpretation Comments

 

             CREATININE, URINE, CONC. (test 212.0 MG/DL                         

   



             code = 2072)                                        

 

             ALBUMIN, URINE, RANDOM (test code 16.0 MG/DL                       

      



             = 69683)                                            

 

             CALC ALBUMIN/CREAT, RND (test 75 MG/G                              

  



             code = 87643)                                        



PATHOLOGIST SMEAR [id number] 00:00:00





             Test Item    Value        Reference Range Interpretation Comments

 

             DIAGNOSIS: (test code = 8200) (NOTE)                               

  

 

             COMMENTS: (test code = 8205) (NOTE)                                

 

 

             MICROSCOPIC DESCRIPTION: (test (NOTE)                              

   



             code = 8210)                                        

 

             PATHOLOGIST: (test code = 8250) (NOTE)                             

    

 

             CPT: (test code = 8400) 49826                                  

 

             WBC (test code = 1001) 2.7 K/UL                               

 

             RBC (test code = 1002) 3.09 M/UL                              

 

             HEMOGLOBIN (test code = 1003) 8.2 G/DL                             

  

 

             HEMATOCRIT (test code = 1004) 25.6 %                               

  

 

             MCV (test code = 1005) 82.8 fL                                

 

             MCH (test code = 1006) 26.5 PG                                

 

             MCHC (test code = 1007) 32.0 G/DL                              

 

             RDW (test code = 1038) 14.1 %                                 

 

             NEUTROPHILS (test code = 1008) 52.0 %                              

   

 

             LYMPHOCYTES (test code = 1010) 35.8 %                              

   

 

             MONOCYTES (test code = 1011) 7.2 %                                 

 

 

             EOSINOPHILS (test code = 1012) 4.2 %                               

   

 

             BASOPHILS (test code = 1013) 0.4 %                                 

 

 

             NUCLEATED RBCS (test code = 0.0 /100WBC'S                          

 



             1065)                                               

 

             PLATELET COUNT (test code = 99 K/UL                                



             1015)                                               

 

             ABSOLUTE NEUTROPHILS (test code 1.38 K/UL                          

    



             = 1066)                                             

 

             ABSOLUTE LYMPHOCYTES (test code 0.95 K/UL                          

    



             = 1067)                                             

 

             ABSOLUTE MONOCYTES (test code = 0.19 K/UL                          

    



             1068)                                               

 

             ABSOLUTE EOSINOPHILS (test code 0.11 K/UL                          

    



             = 1040)                                             

 

             ABSOLUTE BASOPHILS (test code = 0.01 K/UL                          

    



             1069)                                               

 

             ABS NUCLEATED RBCS (test code = 0.00 K/UL                          

    



             20403)                                              

 

             COMMENTS (test code = 1016) (NOTE)                                 



PATHOLOGIST SMEAR [id number] 00:00:00





             Test Item    Value        Reference Range Interpretation Comments

 

             DIAGNOSIS: (test code = 8200) (NOTE)                               

  

 

             COMMENTS: (test code = 8205) (NOTE)                                

 

 

             MICROSCOPIC DESCRIPTION: (test (NOTE)                              

   



             code = 8210)                                        

 

             PATHOLOGIST: (test code = 8250) (NOTE)                             

    

 

             CPT: (test code = 8400) 95416                                  

 

             WBC (test code = 1001) 2.7 K/UL                               

 

             RBC (test code = 1002) 3.09 M/UL                              

 

             HEMOGLOBIN (test code = 1003) 8.2 G/DL                             

  

 

             HEMATOCRIT (test code = 1004) 25.6 %                               

  

 

             MCV (test code = 1005) 82.8 fL                                

 

             MCH (test code = 1006) 26.5 PG                                

 

             MCHC (test code = 1007) 32.0 G/DL                              

 

             RDW (test code = 1038) 14.1 %                                 

 

             NEUTROPHILS (test code = 1008) 52.0 %                              

   

 

             LYMPHOCYTES (test code = 1010) 35.8 %                              

   

 

             MONOCYTES (test code = 1011) 7.2 %                                 

 

 

             EOSINOPHILS (test code = 1012) 4.2 %                               

   

 

             BASOPHILS (test code = 1013) 0.4 %                                 

 

 

             NUCLEATED RBCS (test code = 0.0 /100WBC'S                          

 



             1065)                                               

 

             PLATELET COUNT (test code = 99 K/UL                                



             1015)                                               

 

             ABSOLUTE NEUTROPHILS (test code 1.38 K/UL                          

    



             = 1066)                                             

 

             ABSOLUTE LYMPHOCYTES (test code 0.95 K/UL                          

    



             = 1067)                                             

 

             ABSOLUTE MONOCYTES (test code = 0.19 K/UL                          

    



             1068)                                               

 

             ABSOLUTE EOSINOPHILS (test code 0.11 K/UL                          

    



             = 1040)                                             

 

             ABSOLUTE BASOPHILS (test code = 0.01 K/UL                          

    



             1069)                                               

 

             ABS NUCLEATED RBCS (test code = 0.00 K/UL                          

    



             75068)                                              

 

             COMMENTS (test code = 1016) (NOTE)                                 



FERRITIN2021-10-20 00:00:00





             Test Item    Value        Reference Range Interpretation Comments

 

             FERRITIN (test code = 5) 42 NG/ML                               



[id number] 00:00:00





             Test Item    Value        Reference Range Interpretation Comments

 

             TRANSFERRIN (test code = 4936) 281 MG/DL                           

   



IRON BINDING CAPACITY AND IRON AND % SATURATION2021-10-20 00:00:00





             Test Item    Value        Reference Range Interpretation Comments

 

             IRON, SERUM (test code = 2222) 28 UG/DL                            

   

 

             UNSATURATED IBC (test code = 71930) 315 UG/DL                      

        

 

             CALC TOTAL IBC (test code = 7) 343 UG/DL                        

      

 

             CALC % IRON SAT (test code = ) 8 %                             

       



PROTIME AND PTT2021-10-20 00:00:00





             Test Item    Value        Reference Range Interpretation Comments

 

             PROTHROMBIN TIME (PT) (test code 14.8 SECONDS                      

     



             = 1402)                                             

 

             INR (test code = 63450) 1.1                                    

 

             PTT (test code = 1403) 35.8 SECONDS                           



RHEUMATOID FACTOR, [id number] 00:00:00





             Test Item    Value        Reference Range Interpretation Comments

 

             RHEUMATOID FACTOR, QUANT (test code <10 IU/ML                      

        



             = 3502)                                             



RHEUMATOID FACTOR, [id number] 00:00:00





             Test Item    Value        Reference Range Interpretation Comments

 

             RHEUMATOID FACTOR, QUANT (test code <10 IU/ML                      

        



             = 3502)                                             



CCP IgG2021-10-20 00:00:00





             Test Item    Value        Reference Range Interpretation Comments

 

             CCP IgG (test code = 05373) <0.5 U/ML                              



CCP IgG2021-10-20 00:00:00





             Test Item    Value        Reference Range Interpretation Comments

 

             CCP IgG (test code = 88242) <0.5 U/ML                              



SMITH (SM) ANTIBODY2021-10-20 00:00:00





             Test Item    Value        Reference Range Interpretation Comments

 

             MCGUIRE (Sm) ANTIBODY (test code = <0.2 AI                           

     



             90057)                                              



DNA DS ANTIBODY2021-10-20 00:00:00





             Test Item    Value        Reference Range Interpretation Comments

 

             dsDNA ANTIBODY (test code = 4287) 1.0 IU/ML                        

      



FERRITIN2021-10-20 00:00:00





             Test Item    Value        Reference Range Interpretation Comments

 

             FERRITIN (test code = 2075) 42 NG/ML                               



[id number] 00:00:00





             Test Item    Value        Reference Range Interpretation Comments

 

             TRANSFERRIN (test code = 4936) 281 MG/DL                           

   



IRON BINDING CAPACITY AND IRON AND % SATURATION2021-10-20 00:00:00





             Test Item    Value        Reference Range Interpretation Comments

 

             IRON, SERUM (test code = 2222) 28 UG/DL                            

   

 

             UNSATURATED IBC (test code = 26397) 315 UG/DL                      

        

 

             CALC TOTAL IBC (test code = 7) 343 UG/DL                        

      

 

             CALC % IRON SAT (test code = ) 8 %                             

       



PROTIME AND PTT2021-10-20 00:00:00





             Test Item    Value        Reference Range Interpretation Comments

 

             PROTHROMBIN TIME (PT) (test code 14.8 SECONDS                      

     



             = 1402)                                             

 

             INR (test code = 41536) 1.1                                    

 

             PTT (test code = 1403) 35.8 SECONDS                           



RHEUMATOID FACTOR, [id number] 00:00:00





             Test Item    Value        Reference Range Interpretation Comments

 

             RHEUMATOID FACTOR, QUANT (test code <10 IU/ML                      

        



             = 3502)                                             



RHEUMATOID FACTOR, [id number] 00:00:00





             Test Item    Value        Reference Range Interpretation Comments

 

             RHEUMATOID FACTOR, QUANT (test code <10 IU/ML                      

        



             = 3502)                                             



CCP IgG2021-10-20 00:00:00





             Test Item    Value        Reference Range Interpretation Comments

 

             CCP IgG (test code = 66685) <0.5 U/ML                              



CCP IgG2021-10-20 00:00:00





             Test Item    Value        Reference Range Interpretation Comments

 

             CCP IgG (test code = 88407) <0.5 U/ML                              



SMITH (SM) ANTIBODY2021-10-20 00:00:00





             Test Item    Value        Reference Range Interpretation Comments

 

             MCGIURE (Sm) ANTIBODY (test code = <0.2 AI                           

     



             82123)                                              



DNA DS ANTIBODY2021-10-20 00:00:00





             Test Item    Value        Reference Range Interpretation Comments

 

             dsDNA ANTIBODY (test code = 4287) 1.0 IU/ML                        

      



CULTURE, URINE2021-10-16 00:00:00





             Test Item    Value        Reference Range Interpretation Comments

 

             CULTURE, URINE (test SPECIMEN NUMBER:                           



             code = 53860) [id number]                              



CULTURE, URINE2021-10-16 00:00:00





             Test Item    Value        Reference Range Interpretation Comments

 

             CULTURE, URINE (test SPECIMEN NUMBER:                           



             code = 64983) [id number]                              



VIOLET TITER AND PATTERN [REFLEX]2021-10-14 00:00:00





             Test Item    Value        Reference Range Interpretation Comments

 

             PATTERN (test code = SPECKLED                               



             18208)                                              

 

             VIOLET TITER (test code = 1:640 TITER                            



             3550)                                               

 

             PATTERN 2 (test code = NOT DETECTED                           



             95921)                                              

 

             VIOLET TITER 2 (test code = NOT DETECTED TITER                        

   



             28647)                                              

 

             PATTERN 3 (test code = NOT DETECTED                           



             75663)                                              

 

             VIOLET TITER 3 (test code = NOT DETECTED TITER                        

   



             16655)                                              

 

             METHOD (test code = 75295) (NOTE)                                 



VILOET TITER AND PATTERN [REFLEX]2021-10-14 00:00:00





             Test Item    Value        Reference Range Interpretation Comments

 

             PATTERN (test code = SPECKLED                               



             46764)                                              

 

             VIOLET TITER (test code = 1:640 TITER                            



             3550)                                               

 

             PATTERN 2 (test code = NOT DETECTED                           



             20950)                                              

 

             VIOLET TITER 2 (test code = NOT DETECTED TITER                        

   



             76936)                                              

 

             PATTERN 3 (test code = NOT DETECTED                           



             75956)                                              

 

             VIOLET TITER 3 (test code = NOT DETECTED TITER                        

   



             48554)                                              

 

             METHOD (test code = 14971) (NOTE)                                 



CBC W/AUTO DIFF2021-10-13 00:00:00





             Test Item    Value        Reference Range Interpretation Comments

 

             WBC (test code = 1001) 3.1 K/UL                               

 

             RBC (test code = 1002) 3.14 M/UL                              

 

             HEMOGLOBIN (test code = 1003) 8.4 G/DL                             

  

 

             HEMATOCRIT (test code = 1004) 25.9 %                               

  

 

             MCV (test code = 1005) 82.5 fL                                

 

             MCH (test code = 1006) 26.8 PG                                

 

             MCHC (test code = 1007) 32.4 G/DL                              

 

             RDW (test code = 1038) 14.5 %                                 

 

             NEUTROPHILS (test code = 1008) 61.2 %                              

   

 

             LYMPHOCYTES (test code = 1010) 25.5 %                              

   

 

             MONOCYTES (test code = 1011) 8.8 %                                 

 

 

             EOSINOPHILS (test code = 1012) 3.9 %                               

   

 

             BASOPHILS (test code = 1013) 0.3 %                                 

 

 

             IMMATURE GRANULOCYTES (test 0.3 %                                  



             code = 1036)                                        

 

             NUCLEATED RBCS (test code = 0.0 /100WBC'S                          

 



             1065)                                               

 

             PLATELET COUNT (test code = 85 K/UL                                



             1015)                                               

 

             ABSOLUTE NEUTROPHILS (test code 1.87 K/UL                          

    



             = 1066)                                             

 

             ABSOLUTE LYMPHOCYTES (test code 0.78 K/UL                          

    



             = 1067)                                             

 

             ABSOLUTE MONOCYTES (test code = 0.27 K/UL                          

    



             1068)                                               

 

             ABSOLUTE EOSINOPHILS (test code 0.12 K/UL                          

    



             = 1040)                                             

 

             ABSOLUTE BASOPHILS (test code = 0.01 K/UL                          

    



             1069)                                               

 

             ABS IMMATURE GRANULOCYTES (test 0.01 K/UL                          

    



             code = 1020)                                        

 

             ABS NUCLEATED RBCS (test code = 0.00 K/UL                          

    



             69643)                                              



CBC W/AUTO DIFF2021-10-13 00:00:00





             Test Item    Value        Reference Range Interpretation Comments

 

             WBC (test code = 1001) 3.1 K/UL                               

 

             RBC (test code = 1002) 3.14 M/UL                              

 

             HEMOGLOBIN (test code = 1003) 8.4 G/DL                             

  

 

             HEMATOCRIT (test code = 1004) 25.9 %                               

  

 

             MCV (test code = 1005) 82.5 fL                                

 

             MCH (test code = 1006) 26.8 PG                                

 

             MCHC (test code = 1007) 32.4 G/DL                              

 

             RDW (test code = 1038) 14.5 %                                 

 

             NEUTROPHILS (test code = 1008) 61.2 %                              

   

 

             LYMPHOCYTES (test code = 1010) 25.5 %                              

   

 

             MONOCYTES (test code = 1011) 8.8 %                                 

 

 

             EOSINOPHILS (test code = 1012) 3.9 %                               

   

 

             BASOPHILS (test code = 1013) 0.3 %                                 

 

 

             IMMATURE GRANULOCYTES (test 0.3 %                                  



             code = 1036)                                        

 

             NUCLEATED RBCS (test code = 0.0 /100WBC'S                          

 



             1065)                                               

 

             PLATELET COUNT (test code = 85 K/UL                                



             1015)                                               

 

             ABSOLUTE NEUTROPHILS (test code 1.87 K/UL                          

    



             = 1066)                                             

 

             ABSOLUTE LYMPHOCYTES (test code 0.78 K/UL                          

    



             = 1067)                                             

 

             ABSOLUTE MONOCYTES (test code = 0.27 K/UL                          

    



             1068)                                               

 

             ABSOLUTE EOSINOPHILS (test code 0.12 K/UL                          

    



             = 1040)                                             

 

             ABSOLUTE BASOPHILS (test code = 0.01 K/UL                          

    



             1069)                                               

 

             ABS IMMATURE GRANULOCYTES (test 0.01 K/UL                          

    



             code = 1020)                                        

 

             ABS NUCLEATED RBCS (test code = 0.00 K/UL                          

    



             76181)                                              



COMPREHENSIVE METABOLIC PANEL2021-10-13 00:00:00





             Test Item    Value        Reference Range Interpretation Comments

 

             GLUCOSE (test code = 2217) 139 MG/DL                              

 

             BUN (test code = 2208) 21 MG/DL                               

 

             CREATININE (test code = 2214) 0.83 MG/DL                           

  

 

             eGFR  AMER. (test code 88 ML/MIN/1.73                       

    



             = 38452)                                            

 

             eGFR NON- AMER. (test 76 ML/MIN/1.73                        

   



             code = 72593)                                        

 

             CALC BUN/CREAT (test code = 25 RATIO                               



             2235)                                               

 

             SODIUM (test code = 2231) 140 MEQ/L                              

 

             POTASSIUM (test code = 2228) 4.8 MEQ/L                             

 

 

             CHLORIDE (test code = 2215) 102 MEQ/L                              

 

             CARBON DIOXIDE (test code = 25 MEQ/L                               



             2206)                                               

 

             CALCIUM (test code = 2209) 9.2 MG/DL                              

 

             PROTEIN, TOTAL (test code = 7.2 G/DL                               



             2229)                                               

 

             ALBUMIN (test code = 2201) 3.6 G/DL                               

 

             CALC GLOBULIN (test code = 3.6 G/DL                               



             2240)                                               

 

             CALC A/G RATIO (test code = 1.0 RATIO                              



             2234)                                               

 

             BILIRUBIN, TOTAL (test code = 0.6 MG/DL                            

  



             2207)                                               

 

             ALKALINE PHOSPHATASE (test 101 U/L                                



             code = 2204)                                        

 

             AST (test code = 2218) 30 U/L                                 

 

             ALT (test code = 2219) 23 U/L                                 



PROBNP2021-10-13 00:00:00





             Test Item    Value        Reference Range Interpretation Comments

 

             NT-proBNP (test code = 21840) 247 PG/ML                            

  



PROBNP2021-10-13 00:00:00





             Test Item    Value        Reference Range Interpretation Comments

 

             NT-proBNP (test code = 82076) 247 PG/ML                            

  



VIOLET (ANTI-NUCLEAR AB) WITH REFLEX TITER2021-10-13 00:00:00





             Test Item    Value        Reference Range Interpretation Comments

 

             ANTI-NUCLEAR ANTIBODIES (test code = POSITIVE                      

         



             3506)                                               



C-REACTIVE PROTEIN2021-10-13 00:00:00





             Test Item    Value        Reference Range Interpretation Comments

 

             C-REACTIVE PROTEIN (test code = 1.1 MG/DL                          

    



             3513)                                               



SEDIMENTATION RATE2021-10-13 00:00:00





             Test Item    Value        Reference Range Interpretation Comments

 

             SEDIMENTATION RATE (test code = 88 MM/HOUR                         

    



             1017)                                               



[id number] 00:00:00





             Test Item    Value        Reference Range Interpretation Comments

 

             D-DIMER (test code = 1405) 0.77 UG/MLFEU                           



CBC W/AUTO DIFF2021-10-13 00:00:00





             Test Item    Value        Reference Range Interpretation Comments

 

             WBC (test code = 1001) 3.1 K/UL                               

 

             RBC (test code = 1002) 3.14 M/UL                              

 

             HEMOGLOBIN (test code = 1003) 8.4 G/DL                             

  

 

             HEMATOCRIT (test code = 1004) 25.9 %                               

  

 

             MCV (test code = 1005) 82.5 fL                                

 

             MCH (test code = 1006) 26.8 PG                                

 

             MCHC (test code = 1007) 32.4 G/DL                              

 

             RDW (test code = 1038) 14.5 %                                 

 

             NEUTROPHILS (test code = 1008) 61.2 %                              

   

 

             LYMPHOCYTES (test code = 1010) 25.5 %                              

   

 

             MONOCYTES (test code = 1011) 8.8 %                                 

 

 

             EOSINOPHILS (test code = 1012) 3.9 %                               

   

 

             BASOPHILS (test code = 1013) 0.3 %                                 

 

 

             IMMATURE GRANULOCYTES (test 0.3 %                                  



             code = 1036)                                        

 

             NUCLEATED RBCS (test code = 0.0 /100WBC'S                          

 



             1065)                                               

 

             PLATELET COUNT (test code = 85 K/UL                                



             1015)                                               

 

             ABSOLUTE NEUTROPHILS (test code 1.87 K/UL                          

    



             = 1066)                                             

 

             ABSOLUTE LYMPHOCYTES (test code 0.78 K/UL                          

    



             = 1067)                                             

 

             ABSOLUTE MONOCYTES (test code = 0.27 K/UL                          

    



             1068)                                               

 

             ABSOLUTE EOSINOPHILS (test code 0.12 K/UL                          

    



             = 1040)                                             

 

             ABSOLUTE BASOPHILS (test code = 0.01 K/UL                          

    



             1069)                                               

 

             ABS IMMATURE GRANULOCYTES (test 0.01 K/UL                          

    



             code = 1020)                                        

 

             ABS NUCLEATED RBCS (test code = 0.00 K/UL                          

    



             37843)                                              



CBC W/AUTO DIFF2021-10-13 00:00:00





             Test Item    Value        Reference Range Interpretation Comments

 

             WBC (test code = 1001) 3.1 K/UL                               

 

             RBC (test code = 1002) 3.14 M/UL                              

 

             HEMOGLOBIN (test code = 1003) 8.4 G/DL                             

  

 

             HEMATOCRIT (test code = 1004) 25.9 %                               

  

 

             MCV (test code = 1005) 82.5 fL                                

 

             MCH (test code = 1006) 26.8 PG                                

 

             MCHC (test code = 1007) 32.4 G/DL                              

 

             RDW (test code = 1038) 14.5 %                                 

 

             NEUTROPHILS (test code = 1008) 61.2 %                              

   

 

             LYMPHOCYTES (test code = 1010) 25.5 %                              

   

 

             MONOCYTES (test code = 1011) 8.8 %                                 

 

 

             EOSINOPHILS (test code = 1012) 3.9 %                               

   

 

             BASOPHILS (test code = 1013) 0.3 %                                 

 

 

             IMMATURE GRANULOCYTES (test 0.3 %                                  



             code = 1036)                                        

 

             NUCLEATED RBCS (test code = 0.0 /100WBC'S                          

 



             1065)                                               

 

             PLATELET COUNT (test code = 85 K/UL                                



             1015)                                               

 

             ABSOLUTE NEUTROPHILS (test code 1.87 K/UL                          

    



             = 1066)                                             

 

             ABSOLUTE LYMPHOCYTES (test code 0.78 K/UL                          

    



             = 1067)                                             

 

             ABSOLUTE MONOCYTES (test code = 0.27 K/UL                          

    



             1068)                                               

 

             ABSOLUTE EOSINOPHILS (test code 0.12 K/UL                          

    



             = 1040)                                             

 

             ABSOLUTE BASOPHILS (test code = 0.01 K/UL                          

    



             1069)                                               

 

             ABS IMMATURE GRANULOCYTES (test 0.01 K/UL                          

    



             code = 1020)                                        

 

             ABS NUCLEATED RBCS (test code = 0.00 K/UL                          

    



             12552)                                              



COMPREHENSIVE METABOLIC PANEL2021-10-13 00:00:00





             Test Item    Value        Reference Range Interpretation Comments

 

             GLUCOSE (test code = 2217) 139 MG/DL                              

 

             BUN (test code = 2208) 21 MG/DL                               

 

             CREATININE (test code = 2214) 0.83 MG/DL                           

  

 

             eGFR  AMER. (test code 88 ML/MIN/1.73                       

    



             = 38172)                                            

 

             eGFR NON- AMER. (test 76 ML/MIN/1.73                        

   



             code = 60666)                                        

 

             CALC BUN/CREAT (test code = 25 RATIO                               



             2235)                                               

 

             SODIUM (test code = 2231) 140 MEQ/L                              

 

             POTASSIUM (test code = 2228) 4.8 MEQ/L                             

 

 

             CHLORIDE (test code = 2215) 102 MEQ/L                              

 

             CARBON DIOXIDE (test code = 25 MEQ/L                               



             2206)                                               

 

             CALCIUM (test code = 2209) 9.2 MG/DL                              

 

             PROTEIN, TOTAL (test code = 7.2 G/DL                               



             2229)                                               

 

             ALBUMIN (test code = 2201) 3.6 G/DL                               

 

             CALC GLOBULIN (test code = 3.6 G/DL                               



             2240)                                               

 

             CALC A/G RATIO (test code = 1.0 RATIO                              



             2234)                                               

 

             BILIRUBIN, TOTAL (test code = 0.6 MG/DL                            

  



             2207)                                               

 

             ALKALINE PHOSPHATASE (test 101 U/L                                



             code = 2204)                                        

 

             AST (test code = 2218) 30 U/L                                 

 

             ALT (test code = 2219) 23 U/L                                 



PROBNP2021-10-13 00:00:00





             Test Item    Value        Reference Range Interpretation Comments

 

             NT-proBNP (test code = 53422) 247 PG/ML                            

  



PROBNP2021-10-13 00:00:00





             Test Item    Value        Reference Range Interpretation Comments

 

             NT-proBNP (test code = 52882) 247 PG/ML                            

  



VIOLET (ANTI-NUCLEAR AB) WITH REFLEX TITER2021-10-13 00:00:00





             Test Item    Value        Reference Range Interpretation Comments

 

             ANTI-NUCLEAR ANTIBODIES (test code = POSITIVE                      

         



             3506)                                               



C-REACTIVE PROTEIN2021-10-13 00:00:00





             Test Item    Value        Reference Range Interpretation Comments

 

             C-REACTIVE PROTEIN (test code = 1.1 MG/DL                          

    



             3513)                                               



SEDIMENTATION RATE2021-10-13 00:00:00





             Test Item    Value        Reference Range Interpretation Comments

 

             SEDIMENTATION RATE (test code = 88 MM/HOUR                         

    



             1017)                                               



[id number] 00:00:00





             Test Item    Value        Reference Range Interpretation Comments

 

             D-DIMER (test code = 1405) 0.77 UG/MLFEU                           



HEMOGLOBIN A1c2021-10-10 00:00:00





             Test Item    Value        Reference Range Interpretation Comments

 

             HEMOGLOBIN A1c (test code = 62930) 7.5 %                           

       



HEMOGLOBIN A1c2021-10-10 00:00:00





             Test Item    Value        Reference Range Interpretation Comments

 

             HEMOGLOBIN A1c (test code = 63392) 7.5 %                           

       



HEMOGLOBIN A1c2021-10-10 00:00:00





             Test Item    Value        Reference Range Interpretation Comments

 

             HEMOGLOBIN A1c (test code = 96913) 7.5 %                           

       



HEMOGLOBIN A1c2021-10-10 00:00:00





             Test Item    Value        Reference Range Interpretation Comments

 

             HEMOGLOBIN A1c (test code = 13289) 7.5 %                           

       



CULTURE, URINE2021-07-15 00:00:00





             Test Item    Value        Reference Range Interpretation Comments

 

             CULTURE, URINE (test SPECIMEN NUMBER:                           



             code = 30304) [id number]                              



CULTURE, URINE2021-07-15 00:00:00





             Test Item    Value        Reference Range Interpretation Comments

 

             CULTURE, URINE (test SPECIMEN NUMBER:                           



             code = 74383) [id number]                              



HEMOGLOBIN [id number] 00:00:00





             Test Item    Value        Reference Range Interpretation Comments

 

             HEMOGLOBIN A1c (test code = 95052) 8.6 %                           

       



HEMOGLOBIN [id number] 00:00:00





             Test Item    Value        Reference Range Interpretation Comments

 

             HEMOGLOBIN A1c (test code = 05104) 8.6 %                           

       



LIPID [id number] 00:00:00





             Test Item    Value        Reference Range Interpretation Comments

 

             CHOLESTEROL (test code = 2210) 104 MG/DL                           

   

 

             TRIGLYCERIDES (test code = 2232) 164 MG/DL                         

     

 

             HDL CHOLESTEROL (test code = 2220) 39 MG/DL                        

       

 

             CALC LDL CHOL (test code = 2237) 41 MG/DL                          

     

 

             RISK RATIO LDL/HDL (test code = 1.05 RATIO                         

    



             2238)                                               



COMPREHENSIVE METABOLIC [id number] 00:00:00





             Test Item    Value        Reference Range Interpretation Comments

 

             GLUCOSE (test code = 2217) 330 MG/DL                              

 

             BUN (test code = 2208) 11 MG/DL                               

 

             CREATININE (test code = 2214) 0.84 MG/DL                           

  

 

             eGFR  AMER. (test code 87 ML/MIN/1.73                       

    



             = 50148)                                            

 

             eGFR NON- AMER. (test 75 ML/MIN/1.73                        

   



             code = 30405)                                        

 

             CALC BUN/CREAT (test code = 13 RATIO                               



             2235)                                               

 

             SODIUM (test code = 2231) 136 MEQ/L                              

 

             POTASSIUM (test code = 2228) 4.5 MEQ/L                             

 

 

             CHLORIDE (test code = 2215) 97 MEQ/L                               

 

             CARBON DIOXIDE (test code = 27 MEQ/L                               



             )                                               

 

             CALCIUM (test code = 2209) 9.1 MG/DL                              

 

             PROTEIN, TOTAL (test code = 7.7 G/DL                               



             )                                               

 

             ALBUMIN (test code = 2201) 3.8 G/DL                               

 

             CALC GLOBULIN (test code = 3.9 G/DL                               



             0)                                               

 

             CALC A/G RATIO (test code = 1.0 RATIO                              



             2234)                                               

 

             BILIRUBIN, TOTAL (test code = 0.4 MG/DL                            

  



             )                                               

 

             ALKALINE PHOSPHATASE (test 106 U/L                                



             code = 2204)                                        

 

             AST (test code = 2218) 44 U/L                                 

 

             ALT (test code = 2219) 29 U/L                                 



MICROALBUMIN/CREATININE, RANDOM AND [id number] 00:00:00





             Test Item    Value        Reference Range Interpretation Comments

 

             CREATININE, URINE, CONC. (test 131.3 MG/DL                         

   



             code = )                                        

 

             ALBUMIN, URINE, RANDOM (test code 0.4 MG/DL                        

      



             = 41714)                                            

 

             CALC ALBUMIN/CREAT, RND (test 3 MG/G                               

  



             code = 04506)                                        



HEMOGLOBIN [id number] 00:00:00





             Test Item    Value        Reference Range Interpretation Comments

 

             HEMOGLOBIN A1c (test code = 53964) 8.6 %                           

       



HEMOGLOBIN [id number] 00:00:00





             Test Item    Value        Reference Range Interpretation Comments

 

             HEMOGLOBIN A1c (test code = 96777) 8.6 %                           

       



LIPID [id number] 00:00:00





             Test Item    Value        Reference Range Interpretation Comments

 

             CHOLESTEROL (test code = 2210) 104 MG/DL                           

   

 

             TRIGLYCERIDES (test code = 2232) 164 MG/DL                         

     

 

             HDL CHOLESTEROL (test code = 2220) 39 MG/DL                        

       

 

             CALC LDL CHOL (test code = 2237) 41 MG/DL                          

     

 

             RISK RATIO LDL/HDL (test code = 1.05 RATIO                         

    



             2238)                                               



COMPREHENSIVE METABOLIC [id number] 00:00:00





             Test Item    Value        Reference Range Interpretation Comments

 

             GLUCOSE (test code = 2217) 330 MG/DL                              

 

             BUN (test code = 2208) 11 MG/DL                               

 

             CREATININE (test code = 2214) 0.84 MG/DL                           

  

 

             eGFR  AMER. (test code 87 ML/MIN/1.73                       

    



             = 87223)                                            

 

             eGFR NON- AMER. (test 75 ML/MIN/1.73                        

   



             code = 87421)                                        

 

             CALC BUN/CREAT (test code = 13 RATIO                               



             2235)                                               

 

             SODIUM (test code = 2231) 136 MEQ/L                              

 

             POTASSIUM (test code = 2228) 4.5 MEQ/L                             

 

 

             CHLORIDE (test code = 2215) 97 MEQ/L                               

 

             CARBON DIOXIDE (test code = 27 MEQ/L                               



             )                                               

 

             CALCIUM (test code = 2209) 9.1 MG/DL                              

 

             PROTEIN, TOTAL (test code = 7.7 G/DL                               



             )                                               

 

             ALBUMIN (test code = 220) 3.8 G/DL                               

 

             CALC GLOBULIN (test code = 3.9 G/DL                               



             )                                               

 

             CALC A/G RATIO (test code = 1.0 RATIO                              



             2234)                                               

 

             BILIRUBIN, TOTAL (test code = 0.4 MG/DL                            

  



             )                                               

 

             ALKALINE PHOSPHATASE (test 106 U/L                                



             code = 2204)                                        

 

             AST (test code = 2218) 44 U/L                                 

 

             ALT (test code = 2219) 29 U/L                                 



MICROALBUMIN/CREATININE, RANDOM AND [id number] 00:00:00





             Test Item    Value        Reference Range Interpretation Comments

 

             CREATININE, URINE, CONC. (test 131.3 MG/DL                         

   



             code = 2072)                                        

 

             ALBUMIN, URINE, RANDOM (test code 0.4 MG/DL                        

      



             = 59774)                                            

 

             CALC ALBUMIN/CREAT, RND (test 3 MG/G                               

  



             code = 81997)

## 2022-08-14 ENCOUNTER — HOSPITAL ENCOUNTER (EMERGENCY)
Dept: HOSPITAL 97 - ER | Age: 62
Discharge: HOME | End: 2022-08-14
Payer: COMMERCIAL

## 2022-08-14 VITALS — OXYGEN SATURATION: 100 % | TEMPERATURE: 97.5 F

## 2022-08-14 VITALS — SYSTOLIC BLOOD PRESSURE: 126 MMHG | DIASTOLIC BLOOD PRESSURE: 56 MMHG

## 2022-08-14 DIAGNOSIS — M54.42: ICD-10-CM

## 2022-08-14 DIAGNOSIS — Z88.0: ICD-10-CM

## 2022-08-14 DIAGNOSIS — Z88.5: ICD-10-CM

## 2022-08-14 DIAGNOSIS — E11.9: ICD-10-CM

## 2022-08-14 DIAGNOSIS — I10: ICD-10-CM

## 2022-08-14 DIAGNOSIS — S32.018A: Primary | ICD-10-CM

## 2022-08-14 PROCEDURE — 72131 CT LUMBAR SPINE W/O DYE: CPT

## 2022-08-14 PROCEDURE — 72100 X-RAY EXAM L-S SPINE 2/3 VWS: CPT

## 2022-08-14 PROCEDURE — 99284 EMERGENCY DEPT VISIT MOD MDM: CPT

## 2022-08-14 PROCEDURE — 96372 THER/PROPH/DIAG INJ SC/IM: CPT

## 2022-08-14 NOTE — XMS REPORT
Continuity of Care Document

                           Created on:2022



Patient:SUDHA BABIN

Sex:Female

:1960

External Reference #:[id number]





Demographics







                          Address                   [address]



                                                    [place] [address]

 

                          Home Phone                [phone]

 

                          Mobile Phone              [phone]

 

                          Email Address             [address]

 

                          Preferred Language        English

 

                          Marital Status            Unknown

 

                          Rastafari Affiliation     Unknown

 

                          Race                      Unknown

 

                          Additional Race(s)        Unavailable



                                                    Unavailable



                                                    White

 

                          Ethnic Group              Unknown









Author







                          Organization              [facility]

t

 

                          Address                   [address]



                                                    [place] [address]

 

                          Phone                     [phone]









Support







                Name            Relationship    Address         Phone

 

                DANGELO BABINALISHA  BRIDGETTE              [phone] 



                                                [place] [address] 

 

                TALYA JUNIE ANGELES              Unavailable     [phone]

 

                REGINA BABIN  X               [address] [phone]

[phone]



                                                         



                                                [place] [address] 









Care Team Providers







                    Name                Role                Phone

 

                    PCP, PATIENT DOES NOT HAVE A Primary Care Physician Unavaila

BENNIE Ramirez      Attending Clinician Unavailable

 

                    ROSIE JHA     Attending Clinician Unavailable

 

                    Rosie Jha DO  Attending Clinician [phone]

 

                    BENNIE ORR Attending Clinician Unavailable

 

                    ROSIE JHA     Admitting Clinician Unavailable









Payers







           Payer Name Policy Type Policy Number Effective Date Expiration Date S

ource

 

           [facility]            [id number]  2022            



                                            00:00:00              

 

           [facility]            [id number]  [id number]            



           MEDICARE GOLD PPO                       00:00:00              



           CSNP                                                   







Problems

This patient has no known problems.



Allergies, Adverse Reactions, Alerts







       Allergy Allergy Status Severity Reaction(s) Onset  Inactive Treating Comm

ents 

Source



       Name   Type                        Date   Date   Clinician        

 

       [facility]   Active        Other-Cmnt                       Univ

ers



       IN     INGREDI                      7-15                        ity of



                                          00:00:                      [place]



                                          00                          Medical



                                                                      Branch

 

       [facility] Active        Other - See                       U

nivers



       in     ty to                comments 7-15                        ity of



              adverse                      00:00:                      [place]



              reaction                      00                          Medical



              s                                                       Branch

 

       n      [facility] Active                                     



              ty to                       6                        



              adverse                      00:00:                      



              reaction                      00                          



              to drug                                                  

 

       Bactrim [facility] Active                                     



       - Oral ty to                                               



              adverse                      00:00:                      



              reaction                      00                          



              to drug                                                  

 

       NO KNOWN Drug   Active                                           [facility]



       ALLERGIE Class                                                   ity of



       S                                                              [place]



                                                                      Medical



                                                                      Branch







Social History







           Social Habit Start Date Stop Date  Quantity   Comments   Source

 

           Exposure to 2022 2022-07-15 Not sure              [facility]

 [place]



           SARS-CoV-2 (event) 00:00:00   23:14:00                         [facility]

l Branch

 

           Sex Assigned At [birth year]                       [facility]



           Birth      00:00:00   00:00:00                         Medical Branch









                Smoking Status  Start Date      Stop Date       Source

 

                Tobacco smoking consumption                                 [facility]



                unknown                                         Branch







Medications







       Ordered Filled Start  Stop   Current Ordering Indication Dosage Frequency

 Signature

                    Comments            Components          Source



     Medication Medication Date Date Medication? Clinician                (SIG) 

          



     Name Name                                                   

 

     lisinopril      -0      No             1mg                      



     5 mg tablet                                                    



               00:00:                                              



               00                                                

 

     Myrbetriq      2-0      No             1mg                      



     25 mg      -                                              



     tablet,exte      00:00:                                              



     nded      00                                                



     release                                                        

 

     citalopram      2-0      No             1mg                      



     20 mg      7-                                              



     tablet      00:00:                                              



               00                                                

 

     atorvastati      2-0      No             1mg                      



     n 10 mg      -                                              



     tablet      00:00:                                              



               00                                                

 

     metformin      2-0      No             1mg                      



     1,000 mg      -                                              



     tablet      00:00:                                              



               00                                                

 

     glimepiride      2-0      No             1mg                      



     1 mg tablet                                                    



               00:00:                                              



               00                                                

 

     metoclopram      2-0      No             1mg                      



     fabio 10 mg      -                                              



     tablet      00:00:                                              



               00                                                

 

     ferrous      2-0      No             1(65 mg                     



     sulfate 325                           iron)                     



     mg (65 mg      00:00:                                              



     iron)      00                                                



     tablet                                                        

 

     omeprazole      -0      No             1mg                      



     40 mg                                                    



     capsule,del      00:00:                                              



     ayed      00                                                



     release                                                        

 

     TAKE 1      -0      No             1000                     



     TABLET                                                    



     TWICE      00:00:                                              



     DAILY.      00                                                

 

     TAKE 1      -0      No             500                      



     TABLET BY      -                                              



     MOUTH EVERY      00:00:                                              



     8 HOURS FOR      00                                                



     10 DAYS                                                        

 

     &lt       2022-0      No             250                      



               7-                                              



               00:00:                                              



               00                                                

 

     &lt       2022-0      No             10                       



               7-21                                              



               00:00:                                              



               00                                                

 

     &lt       2022-0      No             25                       



               7-21                                              



               00:00:                                              



               00                                                

 

     &lt       2022-0      No                                      



               7-21                                              



               00:00:                                              



               00                                                

 

     &lt       2022-0      No                                      



               7-21                                              



               00:00:                                              



               00                                                

 

     TAKE 1      -0      No             1000                     



     TABLET                                                    



     TWICE      00:00:                                              



     DAILY.      00                                                

 

     Dose      2-0      No             5                        



     Unknown                                                    



               00:00:                                              



               00                                                

 

     HYDROcodone      2022-0 2022- No             1{tbl}      1 tablet,         

  Univers



     -acetaminop                                Oral,           ity of



     hen (NORCO)      06:30: 05:28                          ONCE, 1           Te

[facility]



      mg      00   :00                           dose, On           Medica

l



     tablet 1                                         Sat            Branch



     tablet                                         22 at           



                                                  0130,           



                                                  Routine           

 

     TAKE 1      -0      No             500                      



     TABLET BY      7-13                                              



     MOUTH EVERY      00:00:                                              



     12 HOURS      00                                                



     FOR 10 DAYS                                                        

 

     &lt       2022-0      No                                      



                                                             



               00:00:                                              



               00                                                

 

     TAKE 1      2-0      No                                      



     TABLET BY      7-13                                              



     MOUTH DAILY      00:00:                                              



               00                                                

 

     &lt       2022-0      No                                      



               7-                                              



               00:00:                                              



               00                                                

 

     &lt       2022-0      No             20                       



                                                             



               00:00:                                              



               00                                                

 

     Dose      2022-0      No             50                       



     Unknown                                                    



               00:00:                                              



               00                                                

 

     TAKE 1      -0      No             500                      



     TABLET BY      7-13                                              



     MOUTH EVERY      00:00:                                              



     8 HOURS FOR      00                                                



     10 DAYS                                                        

 

     Dose      2022-0      No             20                       



     Unknown                                                    



               00:00:                                              



               00                                                

 

     Dose      2022-0      No             10                       



     Unknown                                                    



               00:00:                                              



               00                                                

 

     &lt       2022-0      No             25                       



                                                             



               00:00:                                              



               00                                                

 

     DISSOLVE 1      -0      No             4                        



     TABLET BY      7-13                                              



     MOUTH EVERY      00:00:                                              



     8 HOURS AS      00                                                



     NEEDED                                                        

 

     &lt       2022-0      No                                      



                                                             



               00:00:                                              



               00                                                

 

     TAKE 1      -0      No             1000                     



     TABLET                                                    



     TWICE      00:00:                                              



     DAILY.      00                                                

 

     &lt       2022-0      No             10                       



                                                             



               00:00:                                              



               00                                                

 

     &lt       2022-0      No             15                       



                                                             



               00:00:                                              



               00                                                

 

     &lt       2022-0      No             5                        



                                                             



               00:00:                                              



               00                                                

 

     TAKE 1      2-0      No             5                        



     TABLET BY      7-13                                              



     MOUTH TWICE      00:00:                                              



     A DAY AS      00                                                



     NEEDED FOR                                                        



     ANXIETY                                                        

 

     Dose      2022-0      No                                      



     Unknown                                                    



               00:00:                                              



               00                                                

 

     &lt       2022-0      No                                      



               7-13                                              



               00:00:                                              



               00                                                

 

     TAKE 1      2-0      No             5                        



     TABLET BY      7-12                                              



     MOUTH TWICE      00:00:                                              



     A DAY AS      00                                                



     NEEDED FOR                                                        



     ANXIETY                                                        

 

     DISSOLVE 1      2-0      No             4                        



     TABLET BY      7-11                                              



     MOUTH EVERY      00:00:                                              



     8 HOURS AS      00                                                



     NEEDED                                                        

 

     TAKE 1      2-0      No             [id number]                     



     TABLET                                                    



     TWICE      00:00:                                              



     DAILY.      00                                                

 

     &lt       2022-0      No             25                       



                                                             



               00:00:                                              



               00                                                

 

     TAKE 1      2022-0      No             500                      



     TABLET BY                                                    



     MOUTH EVERY      00:00:                                              



     8 HOURS FOR      00                                                



     10 DAYS                                                        

 

     &lt       2022-0      No                                      



               7-06                                              



               00:00:                                              



               00                                                

 

     &lt       2022-0      No             30                       



                                                             



               00:00:                                              



               00                                                

 

     TAKE 1      2022-0      No             30                       



     TABLET BY                                                    



     MOUTH AT      00:00:                                              



     BEDTIME      00                                                

 

     &lt       2022-0      No                                      



               7-                                              



               00:00:                                              



               00                                                

 

     Dose      2022-0      No             50                       



     Unknown                                                    



               00:00:                                              



               00                                                

 

     &lt       2022-0      No             20                       



                                                             



               00:00:                                              



               00                                                

 

     &lt       2022-0      No                                      



                                                             



               00:00:                                              



               00                                                

 

     TAKE 1      2022-0      No             20                       



     TABLET BY                                                    



     MOUTH ONCE      00:00:                                              



     A DAY WITH      00                                                



     MEALS                                                        

 

     &lt       2022-0      No                                      



               6-30                                              



               00:00:                                              



               00                                                

 

     TAKE 1      2022-0      No             200                      



     TABLET      30                                              



     DAILY.      00:00:                                              



               00                                                

 

     TAKE 1      2022-0      No                                      



     TABLET BY      6-29                                              



     MOUTH EVERY      00:00:                                              



     8 HOURS FOR      00                                                



     10 DAYS                                                        

 

     TAKE 1      2022-0      No                                      



     TABLET BY      6-29                                              



     MOUTH EVERY      00:00:                                              



     8 HOURS FOR      00                                                



     10 DAYS                                                        

 

     &lt       2022-0      No                                      



               6-29                                              



               00:00:                                              



               00                                                

 

     &lt       2022-0      No                                      



               6-29                                              



               00:00:                                              



               00                                                

 

     &lt       2022-0      No                                      



               6-29                                              



               00:00:                                              



               00                                                

 

     &lt       2022-0      No                                      



               6-29                                              



               00:00:                                              



               00                                                

 

     TAKE ONE      2022-0      No                                      



     (1) TO TWO      6-27                                              



     (2)       00:00:                                              



     TABLET(S)      00                                                



     BY MOUTH                                                        



     EVERY 6                                                        



     HOURS AS                                                        



     NEEDED FOR                                                        



     PAIN , NO                                                        



     MORE THAN 8                                                        



     TABLETS IN                                                        



     24 HOURS.                                                        

 

     &lt       2022-0      No                                      



               6-27                                              



               00:00:                                              



               00                                                

 

     &lt       2022-0      No                                      



               6-27                                              



               00:00:                                              



               00                                                

 

     &lt       2022-0      No                                      



               6-27                                              



               00:00:                                              



               00                                                

 

     &lt       2022-0      No                                      



               6-27                                              



               00:00:                                              



               00                                                

 

     &lt       2022-0      No                                      



               6-27                                              



               00:00:                                              



               00                                                

 

     TAKE ONE      2022-0      No                                      



     (1) TO TWO      6-27                                              



     (2)       00:00:                                              



     TABLET(S)      00                                                



     BY MOUTH                                                        



     EVERY 6                                                        



     HOURS AS                                                        



     NEEDED FOR                                                        



     PAIN , NO                                                        



     MORE THAN 8                                                        



     TABLETS IN                                                        



     24 HOURS.                                                        

 

     &lt       2022-0      No                                      



               6-27                                              



               00:00:                                              



               00                                                

 

     &lt       2022-0      No                                      



               6-27                                              



               00:00:                                              



               00                                                

 

     &lt       2022-0      No                                      



               6-27                                              



               00:00:                                              



               00                                                

 

     &lt       2022-0      No                                      



               6-27                                              



               00:00:                                              



               00                                                

 

     &lt       2022-0      No                                      



               6-27                                              



               00:00:                                              



               00                                                

 

     TAKE 1      2022-0      No                                      



     TABLET BY      6-24                                              



     MOUTH AT      00:00:                                              



     BEDTIME      00                                                

 

     DISSOLVE 1      2022-0      No                                      



     TABLET BY      6-24                                              



     MOUTH EVERY      00:00:                                              



     8 HOURS AS      00                                                



     NEEDED                                                        

 

     TAKE 1      2022-0      No                                      



     TABLET BY      6-24                                              



     MOUTH AT      00:00:                                              



     BEDTIME      00                                                

 

     DISSOLVE 1      2022-0      No                                      



     TABLET BY      6-24                                              



     MOUTH EVERY      00:00:                                              



     8 HOURS AS      00                                                



     NEEDED                                                        

 

     metformin      2022-0      No             1mg                      



     1,000 mg      6-23                                              



     tablet      00:00:                                              



               00                                                

 

     levothyroxi      2022-0      No             1mcg                     



     ne 75 mcg      6-23                                              



     tablet      00:00:                                              



               00                                                

 

     levothyroxi      2022-0      No             1mcg                     



     ne 200 mcg      6-23                                              



     tablet      00:00:                                              



               00                                                

 

     metformin      2022-0      No             1mg                      



     1,000 mg      6-23                                              



     tablet      00:00:                                              



               00                                                

 

     levothyroxi      2022-0      No             1mcg                     



     ne 75 mcg      6-23                                              



     tablet      00:00:                                              



               00                                                

 

     levothyroxi      2022-0      No             1mcg                     



     ne 200 mcg      6-23                                              



     tablet      00:00:                                              



               00                                                

 

     Myrbetriq      2022-0      No             1mg                      



     25 mg      4-29                                              



     tablet,exte      00:00:                                              



     nded      00                                                



     release                                                        

 

     hydroxyzine      2022-0      No             1mg                      



     HCl 50 mg      4-29                                              



     tablet      00:00:                                              



               00                                                

 

     lisinopril      2022-0      No             1mg                      



     5 mg tablet      4-29                                              



               00:00:                                              



               00                                                

 

     metformin      2022-0      No             1mg                      



     1,000 mg      4-29                                              



     tablet      00:00:                                              



               00                                                

 

     metoclopram      2022-0      No             1mg                      



     fabio 10 mg      4-29                                              



     tablet      00:00:                                              



               00                                                

 

     Myrbetriq      2022-0      No             1mg                      



     25 mg      4-29                                              



     tablet,exte      00:00:                                              



     nded      00                                                



     release                                                        

 

     hydroxyzine      2022-0      No             1mg                      



     HCl 50 mg      4-29                                              



     tablet      00:00:                                              



               00                                                

 

     lisinopril      2022-0      No             1mg                      



     5 mg tablet      4-29                                              



               00:00:                                              



               00                                                

 

     metformin      2022-0      No             1mg                      



     1,000 mg      4-29                                              



     tablet      00:00:                                              



               00                                                

 

     metoclopram      2022-0      No             1mg                      



     fabio 10 mg      4-29                                              



     tablet      00:00:                                              



               00                                                

 

     citalopram      2022-0      No             1mg                      



     20 mg      4-27                                              



     tablet      00:00:                                              



               00                                                

 

     atorvastati      2022-0      No             1mg                      



     n 10 mg      4-27                                              



     tablet      00:00:                                              



               00                                                

 

     glimepiride      2022-0      No             1mg                      



     1 mg tablet      27                                              



               00:00:                                              



               00                                                

 

     levothyroxi      2022-0      No             1mcg                     



     ne 75 mcg      4-27                                              



     tablet      00:00:                                              



               00                                                

 

     levothyroxi      2022-0      No             1mcg                     



     ne 200 mcg      4-27                                              



     tablet      00:00:                                              



               00                                                

 

     levothyroxi      2022-0      No             1mcg                     



     ne 300 mcg      4-27                                              



     tablet      00:00:                                              



               00                                                

 

     ferrous      2022-0      No             1(65 mg                     



     sulfate 325      4-27                     iron)                     



     mg (65 mg      00:00:                                              



     iron)      00                                                



     tablet                                                        

 

     omeprazole      2-0      No             1mg                      



     40 mg      4-27                                              



     capsule,del      00:00:                                              



     ayed      00                                                



     release                                                        

 

     citalopram      2-0      No             1mg                      



     20 mg      4-27                                              



     tablet      00:00:                                              



               00                                                

 

     atorvastati      2-0      No             1mg                      



     n 10 mg      4-27                                              



     tablet      00:00:                                              



               00                                                

 

     glimepiride      2-0      No             1mg                      



     1 mg tablet                                                    



               00:00:                                              



               00                                                

 

     levothyroxi      2022-0      No             1mcg                     



     ne 75 mcg      4-27                                              



     tablet      00:00:                                              



               00                                                

 

     levothyroxi      2022-0      No             1mcg                     



     ne 200 mcg      4-27                                              



     tablet      00:00:                                              



               00                                                

 

     levothyroxi      2-0      No             1mcg                     



     ne 300 mcg      4-27                                              



     tablet      00:00:                                              



               00                                                

 

     ferrous      2022-0      No             1(65 mg                     



     sulfate 325      4-27                     iron)                     



     mg (65 mg      00:00:                                              



     iron)      00                                                



     tablet                                                        

 

     omeprazole      2-0      No             1mg                      



     40 mg      4-27                                              



     capsule,del      00:00:                                              



     ayed      00                                                



     release                                                        

 

     Dose      2-0      No                                      



     Unknown      4-                                              



               00:00:                                              



               00                                                

 

     Dose      2022-0      No                                      



     Unknown      4-                                              



               00:00:                                              



               00                                                

 

     Dose      2022-0      No                                      



     Unknown      4-                                              



               00:00:                                              



               00                                                

 

     Dose      2022-0      No                                      



     Unknown      4-                                              



               00:00:                                              



               00                                                

 

     Dose      2022-0      No                                      



     Unknown      4-                                              



               00:00:                                              



               00                                                

 

     Dose      2022-0      No                                      



     Unknown      4-01                                              



               00:00:                                              



               00                                                

 

     Dose      2022-0      No                                      



     Unknown      4-01                                              



               00:00:                                              



               00                                                

 

     Dose      2022-0      No                                      



     Unknown      4-01                                              



               00:00:                                              



               00                                                

 

     Dose      2022-0      No                                      



     Unknown      4-01                                              



               00:00:                                              



               00                                                

 

     Dose      2022-0      No                                      



     Unknown      4-01                                              



               00:00:                                              



               00                                                

 

     metformin      2022-0      No             1mg                      



     1,000 mg      4-01                                              



     tablet      00:00:                                              



               00                                                

 

     levothyroxi      2022-0      No             1mcg                     



     ne 75 mcg      4-01                                              



     tablet      00:00:                                              



               00                                                

 

     levothyroxi      2022-0      No             1mcg                     



     ne 200 mcg      4-01                                              



     tablet      00:00:                                              



               00                                                

 

     hydrocortis      2-0      No             1mg                      



     one acetate      4-01                                              



     25 mg      00:00:                                              



     rectal      00                                                



     suppository                                                        

 

     nitrofurant      2-0      No             1mg                      



     oin       4-01                                              



     monohydrate      00:00:                                              



     /macrocryst      00                                                



     als 100 mg                                                        



     capsule                                                        

 

     Dose      2-0      No                                      



     Unknown      4-01                                              



               00:00:                                              



               00                                                

 

     Dose      2022-0      No                                      



     Unknown      4-01                                              



               00:00:                                              



               00                                                

 

     Dose      2022-0      No                                      



     Unknown      4-01                                              



               00:00:                                              



               00                                                

 

     Dose      2022-0      No                                      



     Unknown      4-01                                              



               00:00:                                              



               00                                                

 

     Dose      2022-0      No                                      



     Unknown      4-01                                              



               00:00:                                              



               00                                                

 

     Dose      2022-0      No                                      



     Unknown      4-01                                              



               00:00:                                              



               00                                                

 

     Dose      2022-0      No                                      



     Unknown      4-01                                              



               00:00:                                              



               00                                                

 

     Dose      2022-0      No                                      



     Unknown      4-01                                              



               00:00:                                              



               00                                                

 

     Dose      2022-0      No                                      



     Unknown      4-01                                              



               00:00:                                              



               00                                                

 

     Dose      2022-0      No                                      



     Unknown      4-01                                              



               00:00:                                              



               00                                                

 

     Dose      2022-0      No                                      



     Unknown      4-01                                              



               00:00:                                              



               00                                                

 

     Dose      2022-0      No                                      



     Unknown      4-01                                              



               00:00:                                              



               00                                                

 

     Dose      2022-0      No                                      



     Unknown      4-01                                              



               00:00:                                              



               00                                                

 

     Dose      2022-0      No                                      



     Unknown      4-01                                              



               00:00:                                              



               00                                                

 

     Dose      2022-0      No                                      



     Unknown      4-01                                              



               00:00:                                              



               00                                                

 

     Dose      2022-0      No                                      



     Unknown      4-01                                              



               00:00:                                              



               00                                                

 

     Dose      2022-0      No                                      



     Unknown      4-01                                              



               00:00:                                              



               00                                                

 

     Dose      2022-0      No                                      



     Unknown      4-01                                              



               00:00:                                              



               00                                                

 

     Dose      2022-0      No                                      



     Unknown      4-01                                              



               00:00:                                              



               00                                                

 

     Dose      2022-0      No                                      



     Unknown      4-01                                              



               00:00:                                              



               00                                                

 

     Dose      2022-0      No                                      



     Unknown      4-01                                              



               00:00:                                              



               00                                                

 

     Dose      2022-0      No                                      



     Unknown      4-01                                              



               00:00:                                              



               00                                                

 

     Dose      2022-0      No                                      



     Unknown      4-01                                              



               00:00:                                              



               00                                                

 

     Dose      2022-0      No                                      



     Unknown      4-01                                              



               00:00:                                              



               00                                                

 

     Dose      2022-0      No                                      



     Unknown      4-01                                              



               00:00:                                              



               00                                                

 

     Dose      2022-0      No                                      



     Unknown      4-01                                              



               00:00:                                              



               00                                                

 

     Dose      2022-0      No                                      



     Unknown      4-01                                              



               00:00:                                              



               00                                                

 

     Dose      2022-0      No                                      



     Unknown      4-01                                              



               00:00:                                              



               00                                                

 

     Dose      2022-0      No                                      



     Unknown      4-01                                              



               00:00:                                              



               00                                                

 

     metformin      2022-0      No             1mg                      



     1,000 mg      4-01                                              



     tablet      00:00:                                              



               00                                                

 

     levothyroxi      2-0      No             1mcg                     



     ne 75 mcg      4-                                              



     tablet      00:00:                                              



               00                                                

 

     levothyroxi      2022-0      No             1mcg                     



     ne 200 mcg      4-01                                              



     tablet      00:00:                                              



               00                                                

 

     hydrocortis      2-0      No             1mg                      



     one acetate      4-01                                              



     25 mg      00:00:                                              



     rectal      00                                                



     suppository                                                        

 

     nitrofurant      2-0      No             1mg                      



     oin       4-01                                              



     monohydrate      00:00:                                              



     /macrocryst      00                                                



     als 100 mg                                                        



     capsule                                                        

 

     Dose      2-0      No                                      



     Unknown      4-01                                              



               00:00:                                              



               00                                                

 

     Dose      2022-0      No                                      



     Unknown      4-01                                              



               00:00:                                              



               00                                                

 

     Dose      2022-0      No                                      



     Unknown      4-01                                              



               00:00:                                              



               00                                                

 

     Dose      2022-0      No                                      



     Unknown      4-01                                              



               00:00:                                              



               00                                                

 

     Dose      2022-0      No                                      



     Unknown      4-01                                              



               00:00:                                              



               00                                                

 

     Dose      2022-0      No                                      



     Unknown      4-01                                              



               00:00:                                              



               00                                                

 

     Dose      2022-0      No                                      



     Unknown      4-01                                              



               00:00:                                              



               00                                                

 

     Dose      2022-0      No                                      



     Unknown      4-01                                              



               00:00:                                              



               00                                                

 

     Dose      2022-0      No                                      



     Unknown      4-01                                              



               00:00:                                              



               00                                                

 

     Dose      2022-0      No                                      



     Unknown      4-01                                              



               00:00:                                              



               00                                                

 

     Dose      2022-0      No                                      



     Unknown      4-01                                              



               00:00:                                              



               00                                                

 

     Dose      2022-0      No                                      



     Unknown      4-01                                              



               00:00:                                              



               00                                                

 

     Dose      2022-0      No                                      



     Unknown      4-01                                              



               00:00:                                              



               00                                                

 

     Dose      2022-0      No                                      



     Unknown      4-01                                              



               00:00:                                              



               00                                                

 

     Dose      2022-0      No                                      



     Unknown      4-01                                              



               00:00:                                              



               00                                                

 

     Dose      2022-0      No                                      



     Unknown      4-01                                              



               00:00:                                              



               00                                                

 

     Dose      2022-0      No                                      



     Unknown      4-01                                              



               00:00:                                              



               00                                                

 

     Dose      2022-0      No                                      



     Unknown      4-                                              



               00:00:                                              



               00                                                

 

     Dose      2022-0      No                                      



     Unknown      4-                                              



               00:00:                                              



               00                                                

 

     Dose      2022-0      No                                      



     Unknown      3-31                                              



               00:00:                                              



               00                                                

 

     Dose      2022-0      No                                      



     Unknown      3-31                                              



               00:00:                                              



               00                                                

 

     Dose      2022-0      No                                      



     Unknown      3-31                                              



               00:00:                                              



               00                                                

 

     Dose      2022-0      No                                      



     Unknown      3-31                                              



               00:00:                                              



               00                                                

 

     Dose      2022-0      No                                      



     Unknown      3-31                                              



               00:00:                                              



               00                                                

 

     Dose      2022-0      No                                      



     Unknown      3-31                                              



               00:00:                                              



               00                                                

 

     Dose      2022-0      No                                      



     Unknown      3-31                                              



               00:00:                                              



               00                                                

 

     Dose      2022-0      No                                      



     Unknown      3-31                                              



               00:00:                                              



               00                                                

 

     Dose      2022-0      No                                      



     Unknown      3-31                                              



               00:00:                                              



               00                                                

 

     Dose      2022-0      No                                      



     Unknown      3-31                                              



               00:00:                                              



               00                                                

 

     Dose      2022-0      No                                      



     Unknown      3-31                                              



               00:00:                                              



               00                                                

 

     Dose      2022-0      No                                      



     Unknown      3-31                                              



               00:00:                                              



               00                                                

 

     Dose      2022-0      No                                      



     Unknown      3-31                                              



               00:00:                                              



               00                                                

 

     Dose      2022-0      No                                      



     Unknown      3-31                                              



               00:00:                                              



               00                                                

 

     Dose      2022-0      No                                      



     Unknown      3-31                                              



               00:00:                                              



               00                                                

 

     Dose      2022-0      No                                      



     Unknown      3-31                                              



               00:00:                                              



               00                                                

 

     Dose      2022-0      No                                      



     Unknown      3-31                                              



               00:00:                                              



               00                                                

 

     Dose      2022-0      No                                      



     Unknown      3-31                                              



               00:00:                                              



               00                                                

 

     Dose      2022-0      No                                      



     Unknown      1-25                                              



               00:00:                                              



               00                                                

 

     Dose      2022-0      No                                      



     Unknown      1-25                                              



               00:00:                                              



               00                                                

 

     Dose      2022-0      No                                      



     Unknown      1-25                                              



               00:00:                                              



               00                                                

 

     Dose      2022-0      No                                      



     Unknown      1-25                                              



               00:00:                                              



               00                                                

 

     Dose      2022-0      No                                      



     Unknown      1-25                                              



               00:00:                                              



               00                                                

 

     Dose      2022-0      No                                      



     Unknown      1-25                                              



               00:00:                                              



               00                                                

 

     Dose      2022-0      No                                      



     Unknown      1-20                                              



               00:00:                                              



               00                                                

 

     Dose      2022-0      No                                      



     Unknown      1-20                                              



               00:00:                                              



               00                                                

 

     neomycin-po      2022-0      No             4mg/mL-                     



     lymyxin-hyd      1-19                     unit/mL                     



     rocort 3.5      00:00:                     -%                       



     mg-10,000      00                                                



     unit/mL-1 %                                                        



     ear                                                         



     drops,susp                                                        

 

     Humulin      2-0      No             1unit/m                     



     70/30 U-100      1-19                     L                        



     Insulin 100      00:00:                     (70-30)                     



     unit/mL      00                                                



     subcutaneou                                                        



     s                                                           



     suspension                                                        

 

     lisinopril      2-0      No             1mg                      



     5 mg tablet      1-19                                              



               00:00:                                              



               00                                                

 

     hydroxyzine      2022-0      No             1mg                      



     HCl 50 mg      1-19                                              



     tablet      00:00:                                              



               00                                                

 

     Myrbetriq      2022-0      No             1mg                      



     25 mg      1-19                                              



     tablet,exte      00:00:                                              



     nded      00                                                



     release                                                        

 

     Dose      2-0      No                                      



     Unknown      1-19                                              



               00:00:                                              



               00                                                

 

     metformin      2022-0      No             1mg                      



     1,000 mg      1-19                                              



     tablet      00:00:                                              



               00                                                

 

     metformin      2022-0      No             1mg                      



     500 mg      1-19                                              



     tablet      00:00:                                              



               00                                                

 

     metoprolol      2-0      No             1mg                      



     tartrate 25      1-19                                              



     mg tablet      00:00:                                              



               00                                                

 

     metoclopram      2-0      No             1mg                      



     fabio 10 mg      -19                                              



     tablet      00:00:                                              



               00                                                

 

     azithromyci      2-0      No             mg                       



     n 250 mg      -19                                              



     tablet      00:00:                                              



               00                                                

 

     Dose      2022-0      No                                      



     Unknown      1-19                                              



               00:00:                                              



               00                                                

 

     Dose      2022-0      No                                      



     Unknown      1-19                                              



               00:00:                                              



               00                                                

 

     Dose      2022-0      No                                      



     Unknown      1-19                                              



               00:00:                                              



               00                                                

 

     levothyroxi      2-0      No             1mcg                     



     ne 150 mcg      -19                                              



     capsule      00:00:                                              



               00                                                

 

     neomycin-po      2-0      No             4mg/mL-                     



     lymyxin-hyd      -19                     unit/mL                     



     rocort 3.5      00:00:                     -%                       



     mg-10,000      00                                                



     unit/mL-1 %                                                        



     ear                                                         



     drops,susp                                                        

 

     Humulin      2-0      No             1unit/m                     



     70/30 U-100      1-19                     L                        



     Insulin 100      00:00:                     (70-30)                     



     unit/mL      00                                                



     subcutaneou                                                        



     s                                                           



     suspension                                                        

 

     lisinopril      2-0      No             1mg                      



     5 mg tablet      -19                                              



               00:00:                                              



               00                                                

 

     hydroxyzine      2022-0      No             1mg                      



     HCl 50 mg      1-19                                              



     tablet      00:00:                                              



               00                                                

 

     Myrbetriq      2022-0      No             1mg                      



     25 mg      1-19                                              



     tablet,exte      00:00:                                              



     nded      00                                                



     release                                                        

 

     Dose      2022-0      No                                      



     Unknown      1-19                                              



               00:00:                                              



               00                                                

 

     metformin      2022-0      No             1mg                      



     1,000 mg      1-19                                              



     tablet      00:00:                                              



               00                                                

 

     metformin      2022-0      No             1mg                      



     500 mg      1-19                                              



     tablet      00:00:                                              



               00                                                

 

     metoprolol      2-0      No             1mg                      



     tartrate 25      1-19                                              



     mg tablet      00:00:                                              



               00                                                

 

     metoclopram      2-0      No             1mg                      



     fabio 10 mg      1-19                                              



     tablet      00:00:                                              



               00                                                

 

     azithromyci      2-0      No             mg                       



     n 250 mg      1-19                                              



     tablet      00:00:                                              



               00                                                

 

     Dose      2022-0      No                                      



     Unknown      1-19                                              



               00:00:                                              



               00                                                

 

     Dose      2022-0      No                                      



     Unknown      1-19                                              



               00:00:                                              



               00                                                

 

     Dose      2022-0      No                                      



     Unknown      1-19                                              



               00:00:                                              



               00                                                

 

     levothyroxi      2-0      No             1mcg                     



     ne 150 mcg      1-19                                              



     capsule      00:00:                                              



               00                                                

 

     lisinopril      2-0      No             1mg                      



     5 mg tablet                                                    



               00:00:                                              



               00                                                

 

     lisinopril      2-0      No             1mg                      



     5 mg tablet                                                    



               00:00:                                              



               00                                                

 

     Humulin      2-0      No             1unit/m                     



     70/30 U-100      1-03                     L                        



     Insulin 100      00:00:                     (70-30)                     



     unit/mL      00                                                



     subcutaneou                                                        



     s                                                           



     suspension                                                        

 

     hydroxyzine      2-0      No             1mg                      



     HCl 50 mg      1-03                                              



     tablet      00:00:                                              



               00                                                

 

     hydroxyzine      2-0      No             1mg                      



     HCl 50 mg      1-03                                              



     tablet      00:00:                                              



               00                                                

 

     metformin      2-0      No             1mg                      



     1,000 mg      1-03                                              



     tablet      00:00:                                              



               00                                                

 

     Humulin      2022-0      No             1unit/m                     



     70/30 U-100      1-03                     L                        



     Insulin 100      00:00:                     (70-30)                     



     unit/mL      00                                                



     subcutaneou                                                        



     s                                                           



     suspension                                                        

 

     hydroxyzine      2-0      No             1mg                      



     HCl 50 mg      1-03                                              



     tablet      00:00:                                              



               00                                                

 

     hydroxyzine      2-0      No             1mg                      



     HCl 50 mg      1-03                                              



     tablet      00:00:                                              



               00                                                

 

     metformin      2022-0      No             1mg                      



     1,000 mg      1-03                                              



     tablet      00:00:                                              



               00                                                

 

     Zofran 4 mg      -1      No             1mg                      



     tablet      2-16                                              



               00:00:                                              



               00                                                

 

     levothyroxi      -1      No             1mcg                     



     ne 150 mcg      2-16                                              



     capsule      00:00:                                              



               00                                                

 

     Zofran 4 mg      -1      No             1mg                      



     tablet      2-16                                              



               00:00:                                              



               00                                                

 

     levothyroxi      -1      No             1mcg                     



     ne 150 mcg      2-16                                              



     capsule      00:00:                                              



               00                                                

 

     metformin      2021      No             1mg                      



     500 mg      1-27                                              



     tablet      00:00:                                              



               00                                                

 

     metformin      -      No             1mg                      



     500 mg      1-27                                              



     tablet      00:00:                                              



               00                                                

 

     metoprolol      -      No             1mg                      



     tartrate 25      1-22                                              



     mg tablet      00:00:                                              



               00                                                

 

     metoprolol      2021      No             1mg                      



     tartrate 25      1-22                                              



     mg tablet      00:00:                                              



               00                                                

 

     Myrbetriq      2021      No             1mg                      



     25 mg      1-16                                              



     tablet,exte      00:00:                                              



     nded      00                                                



     release                                                        

 

     metoclopram      2021      No             1mg                      



     fabio 10 mg      1-16                                              



     tablet      00:00:                                              



               00                                                

 

     Myrbetriq      2021      No             1mg                      



     25 mg      1-16                                              



     tablet,exte      00:00:                                              



     nded      00                                                



     release                                                        

 

     metoclopram      2021      No             1mg                      



     fabio 10 mg      1-16                                              



     tablet      00:00:                                              



               00                                                

 

     atorvastati      2021      No             1mg                      



     n 10 mg      1-14                                              



     tablet      00:00:                                              



               00                                                

 

     omeprazole      2021      No             1mg                      



     40 mg      1-14                                              



     capsule,del      00:00:                                              



     ayed      00                                                



     release                                                        

 

     atorvastati      2021      No             1mg                      



     n 10 mg      1-14                                              



     tablet      00:00:                                              



               00                                                

 

     omeprazole      2021      No             1mg                      



     40 mg      1-14                                              



     capsule,del      00:00:                                              



     ayed      00                                                



     release                                                        

 

     levothyroxi      2021      No             1mcg                     



     ne 150 mcg      1-04                                              



     capsule      00:00:                                              



               00                                                

 

     levothyroxi      2021      No             1mcg                     



     ne 150 mcg      1-04                                              



     capsule      00:00:                                              



               00                                                

 

     ferrous      -      No             1(65 mg                     



     sulfate 325      0-21                     iron)                     



     mg (65 mg      00:00:                                              



     iron)      00                                                



     tablet                                                        

 

     ferrous      2021      No             1(65 mg                     



     sulfate 325      0-21                     iron)                     



     mg (65 mg      00:00:                                              



     iron)      00                                                



     tablet                                                        

 

     Myrbetriq      2021      No             1mg                      



     25 mg      0-13                                              



     tablet,exte      00:00:                                              



     nded      00                                                



     release                                                        

 

     levothyroxi      2021      No             1mcg                     



     ne 300 mcg      0-13                                              



     tablet      00:00:                                              



               00                                                

 

     Myrbetriq      2021      No             1mg                      



     25 mg      0-13                                              



     tablet,exte      00:00:                                              



     nded      00                                                



     release                                                        

 

     levothyroxi      2021      No             1mcg                     



     ne 300 mcg      0-13                                              



     tablet      00:00:                                              



               00                                                

 

     nitrofurant      2021-1      No             1mg                      



     oin       0-12                                              



     macrocrysta      00:00:                                              



     l 100 mg      00                                                



     capsule                                                        

 

     nitrofurant      2021-1      No             1mg                      



     oin       0-12                                              



     macrocrysta      00:00:                                              



     l 100 mg      00                                                



     capsule                                                        

 

     metoclopram      2021-0      No             1mg                      



     fabio 10 mg      9-13                                              



     tablet      00:00:                                              



               00                                                

 

     metoclopram      2021-0      No             1mg                      



     fabio 10 mg      9-13                                              



     tablet      00:00:                                              



               00                                                

 

     Myrbetriq      2021-0      No             1mg                      



     25 mg      9-01                                              



     tablet,exte      00:00:                                              



     nded      00                                                



     release                                                        

 

     Myrbetriq      2021-0      No             1mg                      



     25 mg      9-01                                              



     tablet,exte      00:00:                                              



     nded      00                                                



     release                                                        

 

     metoclopram      2021-0      No             1mg                      



     fabio 10 mg      8-13                                              



     tablet      00:00:                                              



               00                                                

 

     metoclopram      2021-0      No             1mg                      



     fabio 10 mg      8-13                                              



     tablet      00:00:                                              



               00                                                

 

     metoclopram      2021-0      No             1mg                      



     fabio 10 mg      7-28                                              



     tablet      00:00:                                              



               00                                                

 

     metoclopram      2021-0      No             1mg                      



     fabio 10 mg      7-28                                              



     tablet      00:00:                                              



               00                                                

 

     Humulin      2021-0      No             1unit/m                     



     70/30 U-100      7-22                     L                        



     Insulin 100      00:00:                     (70-30)                     



     unit/mL      00                                                



     subcutaneou                                                        



     s                                                           



     suspension                                                        

 

     atorvastati      1-0      No             1mg                      



     n 10 mg      7-22                                              



     tablet      00:00:                                              



               00                                                

 

     Myrbetriq      2021-0      No             1mg                      



     25 mg      7-22                                              



     tablet,exte      00:00:                                              



     nded      00                                                



     release                                                        

 

     Trintellix      2021-0      No             1mg                      



     20 mg      7-22                                              



     tablet      00:00:                                              



               00                                                

 

     metformin      2021-0      No             1mg                      



     500 mg      7-22                                              



     tablet      00:00:                                              



               00                                                

 

     metoprolol      2021-0      No             1mg                      



     tartrate 25      7-22                                              



     mg tablet      00:00:                                              



               00                                                

 

     mirtazapine      2021-0      No             1mg                      



     15 mg      7-22                                              



     tablet      00:00:                                              



               00                                                

 

     levothyroxi      2021-0      No             1mcg                     



     ne 300 mcg      7-22                                              



     tablet      00:00:                                              



               00                                                

 

     omeprazole      2021-0      No             1mg                      



     40 mg      7-22                                              



     capsule,del      00:00:                                              



     ayed      00                                                



     release                                                        

 

     Humulin      2021-0      No             1unit/m                     



     70/30 U-100      7-22                     L                        



     Insulin 100      00:00:                     (70-30)                     



     unit/mL      00                                                



     subcutaneou                                                        



     s                                                           



     suspension                                                        

 

     atorvastati      2021-0      No             1mg                      



     n 10 mg      7-22                                              



     tablet      00:00:                                              



               00                                                

 

     Myrbetriq      2021-0      No             1mg                      



     25 mg      7-22                                              



     tablet,exte      00:00:                                              



     nded      00                                                



     release                                                        

 

     Trintellix      2021-0      No             1mg                      



     20 mg      7-22                                              



     tablet      00:00:                                              



               00                                                

 

     metformin      2021-0      No             1mg                      



     500 mg      7-22                                              



     tablet      00:00:                                              



               00                                                

 

     metoprolol      2021-0      No             1mg                      



     tartrate 25      7-22                                              



     mg tablet      00:00:                                              



               00                                                

 

     mirtazapine      2021-0      No             1mg                      



     15 mg      7-22                                              



     tablet      00:00:                                              



               00                                                

 

     levothyroxi      2021-0      No             1mcg                     



     ne 300 mcg      7-22                                              



     tablet      00:00:                                              



               00                                                

 

     omeprazole      1-0      No             1mg                      



     40 mg      7-22                                              



     capsule,del      00:00:                                              



     ayed      00                                                



     release                                                        

 

     alprazolam      1-0      No             1mg                      



     0.5 mg      7-13                                              



     tablet      00:00:                                              



               00                                                

 

     metoprolol      2021-0      No             1mg                      



     tartrate 25      7-13                                              



     mg tablet      00:00:                                              



               00                                                

 

     metoclopram      2021-0      No             1mg                      



     fabio 10 mg      7-13                                              



     tablet      00:00:                                              



               00                                                

 

     mirtazapine      2021-0      No             1mg                      



     15 mg      7-13                                              



     tablet      00:00:                                              



               00                                                

 

     levothyroxi      2021-0      No             1mcg                     



     ne 300 mcg      7-13                                              



     tablet      00:00:                                              



               00                                                

 

     omeprazole      1-0      No             1mg                      



     40 mg      7-13                                              



     capsule,del      00:00:                                              



     ayed      00                                                



     release                                                        

 

     Vitamin D2      1-0      No             1(50,00                     



     1,250 mcg      7-13                     0 unit)                     



     (50,000      00:00:                                              



     unit)      00                                                



     capsule                                                        

 

     Humulin      1-0      No             1unit/m                     



     70/30 U-100      7-13                     L                        



     Insulin 100      00:00:                     (70-30)                     



     unit/mL      00                                                



     subcutaneou                                                        



     s                                                           



     suspension                                                        

 

     Trintellix      2021-0      No             1mg                      



     20 mg      7-13                                              



     tablet      00:00:                                              



               00                                                

 

     Myrbetriq      2021-0      No             1mg                      



     25 mg      7-13                                              



     tablet,exte      00:00:                                              



     nded      00                                                



     release                                                        

 

     atorvastati      2021-0      No             1mg                      



     n 10 mg      7-13                                              



     tablet      00:00:                                              



               00                                                

 

     metformin      2021-0      No             1mg                      



     500 mg      7-13                                              



     tablet      00:00:                                              



               00                                                

 

     alprazolam      -0      No             1mg                      



     0.5 mg      7-13                                              



     tablet      00:00:                                              



               00                                                

 

     metoprolol      -0      No             1mg                      



     tartrate 25      7-13                                              



     mg tablet      00:00:                                              



               00                                                

 

     metoclopram      -0      No             1mg                      



     fabio 10 mg      7-13                                              



     tablet      00:00:                                              



               00                                                

 

     mirtazapine      -0      No             1mg                      



     15 mg      7-13                                              



     tablet      00:00:                                              



               00                                                

 

     levothyroxi      -0      No             1mcg                     



     ne 300 mcg      7-13                                              



     tablet      00:00:                                              



               00                                                

 

     omeprazole      -0      No             1mg                      



     40 mg      7-13                                              



     capsule,del      00:00:                                              



     ayed      00                                                



     release                                                        

 

     Vitamin D2      -0      No             1(50,00                     



     1,250 mcg      7-13                     0 unit)                     



     (50,000      00:00:                                              



     unit)      00                                                



     capsule                                                        

 

     Humulin      -0      No             1unit/m                     



     70/30 U-100      7-13                     L                        



     Insulin 100      00:00:                     (70-30)                     



     unit/mL      00                                                



     subcutaneou                                                        



     s                                                           



     suspension                                                        

 

     Trintellix      -0      No             1mg                      



     20 mg      7-13                                              



     tablet      00:00:                                              



               00                                                

 

     Myrbetriq      -0      No             1mg                      



     25 mg      7-13                                              



     tablet,exte      00:00:                                              



     nded      00                                                



     release                                                        

 

     atorvastati      -0      No             1mg                      



     n 10 mg      7-13                                              



     tablet      00:00:                                              



               00                                                

 

     metformin      -0      No             1mg                      



     500 mg      7-13                                              



     tablet      00:00:                                              



               00                                                







Immunizations







           Ordered Immunization Filled Immunization Date       Status     Commen

ts   Source



           Name       Name                                        

 

           Moderna COVID-19            2021 Completed             



           Vaccine               00:00:00                         

 

           Moderna COVID-19            2021 Completed             



           Vaccine               00:00:00                         







Vital Signs







             Vital Name   Observation Time Observation Value Comments     Source

 

             Systolic blood 2022 08:10:00 123 mm[Hg]                [facility]

[facility]



             pressure                                            [facility]

 

             Diastolic blood 2022 08:10:00 87 mm[Hg]                 [facility]

[facility]

 

             Heart rate   2022 08:10:00 82 /min                   [facility]

[facility]

 

             Respiratory rate 2022 08:10:00 18 /min                   [facility]

[facility]

 

             Oxygen saturation in 2022 08:10:00 98 /min                   

[facility] blood by                                        [place] [facility]

marisa



             Pulse oximetry                                        [facility]

 

             Body temperature 2022 04:15:00 36.83 Maricarmen                 [facility]

[facility]

 

             Body height  2022 04:15:00 157.5 cm                  [facility]

 

             Body weight  2022 04:15:00 88.905 kg                 [facility]

 

             BMI          2022 04:15:00 35.85 kg/m2               [facility]

 

             BP Systolic  2022 11:31:00 110 mm[Hg]                

 

             BP Diastolic 2022 11:31:00 49 mm[Hg]                 

 

             Weight Measured 2022 11:31:00 195.80 pounds              

 

             Height Measured 2022 11:31:00 61.81 inches              

 

             Body Temperature 2022 11:31:00 97.80 degrees              

 

             Heart Rate   2022 11:31:00 95.00 /min                

 

             Respiratory Rate 2022 11:31:00 18.00 /min                

 

             BP Systolic  2022 16:36:00 129 mm[Hg]                

 

             BP Diastolic 2022 16:36:00 68 mm[Hg]                 

 

             Weight Measured 2022 16:36:00 196.00 pounds              

 

             Height Measured 2022 16:36:00 61.81 inches              

 

             Body Temperature 2022 16:36:00 97.60 degrees              

 

             Heart Rate   2022 16:36:00 108.00 /min               

 

             Respiratory Rate 2022 16:36:00 18.00 /min                

 

             BP Systolic  2022 11:46:00 115 mm[Hg]                

 

             BP Diastolic 2022 11:46:00 75 mm[Hg]                 

 

             Weight Measured 2022 11:46:00 190.40 pounds              

 

             Height Measured 2022 11:46:00 61.81 inches              

 

             Body Temperature 2022 11:46:00 98.10 degrees              

 

             Heart Rate   2022 11:46:00 89.00 /min                

 

             Respiratory Rate 2022 11:46:00                           

 

             BP Systolic  2022 11:22:00 123 mm[Hg]                

 

             BP Diastolic 2022 11:22:00 57 mm[Hg]                 

 

             Weight Measured 2022 11:22:00 194.60 pounds              

 

             Height Measured 2022 11:22:00 61.81 inches              

 

             Body Temperature 2022 11:22:00 97.50 degrees              

 

             Heart Rate   2022 11:22:00 85.00 /min                

 

             Respiratory Rate 2022 11:22:00 16.00 /min                

 

             BP Systolic  2022 15:21:00 148 mm[Hg]                

 

             BP Diastolic 2022 15:21:00 82 mm[Hg]                 

 

             Weight Measured 2022 15:21:00 192.60 pounds              

 

             Height Measured 2022 15:21:00 61.81 inches              

 

             Body Temperature 2022 15:21:00 98.50 degrees              

 

             Heart Rate   2022 15:21:00 100.00 /min               

 

             Respiratory Rate 2022 15:21:00 25.00 /min                

 

             BP Systolic  2021 16:45:00 135 mm[Hg]                

 

             BP Diastolic 2021 16:45:00 82 mm[Hg]                 

 

             Weight Measured 2021 16:45:00 206.80 pounds              

 

             Height Measured 2021 16:45:00                           

 

             Body Temperature 2021 16:45:00 98.20 degrees              

 

             Heart Rate   2021 16:45:00 86.00 /min                

 

             Respiratory Rate 2021 16:45:00 25.00 /min                

 

             BP Systolic  2021 11:18:00 136 mm[Hg]                

 

             BP Diastolic 2021 11:18:00 84 mm[Hg]                 

 

             Weight Measured 2021 11:18:00 201.60 pounds              

 

             Height Measured 2021 11:18:00 61.81 inches              

 

             Body Temperature 2021 11:18:00 98.20 degrees              

 

             Heart Rate   2021 11:18:00 87.00 /min                

 

             Respiratory Rate 2021 11:18:00 17.00 /min                

 

             BP Systolic  2021-10-19 14:54:00 122 mm[Hg]                

 

             BP Diastolic 2021-10-19 14:54:00 76 mm[Hg]                 

 

             Weight Measured 2021-10-19 14:54:00 211.80 pounds              

 

             Height Measured 2021-10-19 14:54:00 61.81 inches              

 

             Body Temperature 2021-10-19 14:54:00 98.30 degrees              

 

             Heart Rate   2021-10-19 14:54:00 80.00 /min                

 

             Respiratory Rate 2021-10-19 14:54:00 16.00 /min                

 

             BP Systolic  2021-10-12 10:09:00 114 mm[Hg]                

 

             BP Diastolic 2021-10-12 10:09:00 72 mm[Hg]                 

 

             Weight Measured 2021-10-12 10:09:00 212.00 pounds              

 

             Height Measured 2021-10-12 10:09:00 61.81 inches              

 

             Body Temperature 2021-10-12 10:09:00 98.40 degrees              

 

             Heart Rate   2021-10-12 10:09:00 98.00 /min                

 

             Respiratory Rate 2021-10-12 10:09:00                           

 

             BP Systolic  2021-10-12 10:08:00 114 mm[Hg]                

 

             BP Diastolic 2021-10-12 10:08:00 72 mm[Hg]                 

 

             Weight Measured 2021-10-12 10:08:00 212.00 pounds              

 

             Height Measured 2021-10-12 10:08:00 61.81 inches              

 

             Body Temperature 2021-10-12 10:08:00 98.40 degrees              

 

             Heart Rate   2021-10-12 10:08:00 98.00 /min                

 

             Respiratory Rate 2021-10-12 10:08:00                           

 

             BP Systolic  2021-10-09 11:33:00 119 mm[Hg]                

 

             BP Diastolic 2021-10-09 11:33:00 78 mm[Hg]                 

 

             Weight Measured 2021-10-09 11:33:00 210.60 pounds              

 

             Height Measured 2021-10-09 11:33:00 61.81 inches              

 

             Body Temperature 2021-10-09 11:33:00 98.30 degrees              

 

             Heart Rate   2021-10-09 11:33:00 92.00 /min                

 

             Respiratory Rate 2021-10-09 11:33:00                           







Procedures







                Procedure       Date / Time Performed Performing Clinician Sourc

e

 

                XR HIPS 3 VW LEFT 2022 04:42:39 [address] Jha [facility]

 

                NOTICE OF PRIVACY 2022 04:04:38 Doctor Unassigned, No [facility]

[facility] [place]



                PRACTICES                       Name            [facility]

 

                CONSENT/REFUSAL FOR 2022 04:04:16 Doctor Unassigned, No [facility]

[facility] 

[place]



                DIAGNOSIS AND                   Name            [facility]



                TREATMENT                                       







Plan of Care







             Planned Activity Planned Date Details      Comments     Source

 

             Goal                      Plan of Care Note [code = 16760-7]       

       

 

             Goal                      Plan of Care Note [code = 36161-0]       

       

 

             Goal                      Plan of Care Note [code = 65512-9]       

       

 

             Goal                      Plan of Care Note [code = 80704-6]       

       

 

             Goal                      Plan of Care Note [code = 30780-2]       

       

 

             Goal                      Plan of Care Note [code = 88536-0]       

       

 

             Goal                      Plan of Care Note [code = 71600-0]       

       

 

             Goal                      Plan of Care Note [code = 25074-6]       

       

 

             Goal                      Plan of Care Note [code = 02026-4]       

       

 

             Goal                      Plan of Care Note [code = 92154-6]       

       

 

             Goal                      Plan of Care Note [code = 27185-3]       

       

 

             Goal                      Plan of Care Note [code = 86635-0]       

       

 

             Goal                      Plan of Care Note [code = 80320-8]       

       

 

             Goal                      Plan of Care Note [code = 95292-4]       

       

 

             Goal                      Plan of Care Note [code = 90997-4]       

       

 

             Goal                      Plan of Care Note [code = 07101-8]       

       

 

             Goal                      Plan of Care Note [code = 42372-1]       

       

 

             Goal                      Plan of Care Note [code = 41900-7]       

       

 

             Goal                      Plan of Care Note [code = 89827-8]       

       

 

             Goal                      Plan of Care Note [code = 69050-8]       

       

 

             Goal                      Plan of Care Note [code = 39555-5]       

       

 

             Goal                      Plan of Care Note [code = 37546-6]       

       

 

             Goal                      Plan of Care Note [code = 89757-3]       

       

 

             Goal                      Plan of Care Note [code = 90052-5]       

       

 

             Goal                      Plan of Care Note [code = 97913-9]       

       

 

             Goal                      Plan of Care Note [code = 95381-8]       

       

 

             Goal                      Plan of Care Note [code = 90347-8]       

       

 

             Goal                      Plan of Care Note [code = 58762-7]       

       

 

             Goal                      Plan of Care Note [code = 75164-1]       

       

 

             Goal                      Plan of Care Note [code = 61091-0]       

       

 

             Goal                      Plan of Care Note [code = 47812-0]       

       

 

             Goal                      Plan of Care Note [code = 98610-4]       

       

 

             Goal                      Plan of Care Note [code = 21319-8]       

       

 

             Goal                      Plan of Care Note [code = 47158-4]       

       

 

             Goal                      Plan of Care Note [code = 04255-4]       

       

 

             Goal                      Plan of Care Note [code = 94764-9]       

       

 

             Goal                      Plan of Care Note [code = 50889-6]       

       

 

             Goal                      Plan of Care Note [code = 03150-5]       

       

 

             Goal                      Plan of Care Note [code = 59828-5]       

       

 

             Goal                      Plan of Care Note [code = 28654-6]       

       

 

             Goal                      Plan of Care Note [code = 79908-6]       

       

 

             Goal                      Plan of Care Note [code = 79029-0]       

       

 

             Goal                      Plan of Care Note [code = 27491-9]       

       

 

             Goal                      Plan of Care Note [code = 37569-9]       

       

 

             Goal                      Plan of Care Note [code = 29561-7]       

       

 

             Goal                      Plan of Care Note [code = 97207-4]       

       

 

             Goal                      Plan of Care Note [code = 15640-1]       

       

 

             Goal                      Plan of Care Note [code = 21733-6]       

       

 

             Goal                      Plan of Care Note [code = 09936-0]       

       

 

             Goal                      Plan of Care Note [code = 02920-4]       

       

 

             Goal                      Plan of Care Note [code = 44317-7]       

       

 

             Goal                      Plan of Care Note [code = 49694-7]       

       

 

             Goal                      Plan of Care Note [code = 94355-0]       

       

 

             Goal                      Plan of Care Note [code = 45250-6]       

       







Encounters







        Start   End     Encounter Admission Attending Care    Care    Encounter 

Source



        Date/Time Date/Time Type    Type    Clinicians Facility Department ID   

   

 

        2022         Outpatient         DOMINGA  [facility]     [id number]

 [place]



        01:03:24                         [facility]                 [id number] [facility]

 

        2022 Outpatient                 [id number]

[id number] 



        00:00:00 00:00:00 Visit                   [id number]         [id number] 



                                                -[id number]         [id number] 



                                                [id number]         [id number]  

 

        2022-07-15 2022 Emergency X       EBEN JHA    ERT     [id number]

50 [facility]



        23:20:00 03:26:00                 ROSIE vaca [facility]

 

        2022-07-15 2022 Emergency         EBEN Jha    [id number]

4[address] [facility]



        23:20:00 03:26:00                 Rosie LOPEZ [id number]         i

[facility] [id number]         [facility]  [phone]         Medi

marisa



                                                        [address]             [facility]

 

        2022 Outpatient                 [id number]

[id number] 



        00:00:00 00:00:00 Visit                   [id number]         [id number] 



                                                [id number]         [id number] 



                                                [id number]         [id number]  

 

        2022 Outpatient         DOMINGA,  [facility]    MED     [id number]   

 [facility]



        12:04:00 23:59:00                 BENNIE                         







Results







           Test Description Test Time  Test Comments Results    Result Comments 

Source

 

           CULTURE, URINE 2022            SPECIMEN NUMBER:            



                      12:01:26              [id number] CULTURE,            



                                            URINE SPECIMEN            



                                            NUMBER: [id number]            



                                            SPECIMEN COMMENT:            



                                            URINE SOURCE: URINE            



                                            REPORT STATUS: FINAL            



                                            FINAL REPORT:            



                                            2022 10-50,000            



                                            CFU/ML MIXED            



                                            UROGENITAL ROWENA            



                                            UNLESS OTHERWISE            



                                            INDICATED, ALL            



                                            TESTING PERFORMED            



                                            [facility]            



                                            [facility]            



                                            [address] [place] [address] LABORATORY            



                                            DIRECTOR: CLIFTON DARBY M.D. CLIA            



                                            NUMBER [id number]            



                                            [place]CREDIT[place]            



                                            [place]. [id number]            









                    CULTURE, URINE      2022 00:00:00 









                      Test Item  Value      Reference Range Interpretation Comme

nts









             CULTURE, URINE (test code = 95649) SPECIMEN NUMBER: [id number]      

                     



TSH, THIRD [id number] 06:16:09





             Test Item    Value        Reference Range Interpretation Comments

 

             TSH, THIRD   <0.010 UIU/ML 0.400-4.100  L             UNLESS OTHERW

ISE



             GENERATION (test                                        INDICATED, 

ALL



             code = 2821)                                        TESTING PERFORM

ED



                                                                 ATCLINICAL PATH

OGY



                                                                 test company, I

[address] [address] [facility]



                                                                 DIRECTOR: CLIFTON DARBY M.D.

 CLIA



                                                                 NUMBER [id number]

03 CAP



                                                                 ACCREDITATION N

O.



                                                                 [id number]



HEMOGLOBIN [id number] 05:23:24





             Test Item    Value        Reference Range Interpretation Comments

 

             HEMOGLOBIN A1c (test 6.7 %        4.2-5.6      H             AMERIC

AN DIABETES



             code = 37665)                                        ASSOCIATION 

IDELINES FOR



                                                                 HGB A1C:



                                                                 PREDIABETES/INC

REASED RISK .



                                                                 . . . . . . 5.7

-6.4%



                                                                 DIAGNOSIS OF DI

ABETES . . .



                                                                 . . . . . . >=6

.5% WITH



                                                                 CONFIRMATION OR

 APPROPRIATE



                                                                 SYMPTOMS NOTE: 

ASSAY MAY BE



                                                                 AFFECTED BY



                                                                 HEMOGLOBINOPATH

IES (SICKLE



                                                                 CELL ANEMIA, S-

C DISEASE,



                                                                 OTHERS) OR ZENA

FICIALLY



                                                                 LOWERED BY DECR

EASED RED



                                                                 CELL SURVIVAL (

HEMOLYTIC



                                                                 ANEMIAS, BLOOD 

LOSS, ETC.).



                                                                 CONSIDER ALTERN

ATE TESTING



                                                                 OR LABORATORY C

ONSULTATION.



COMPREHENSIVE METABOLIC [id number] 04:45:06





             Test Item    Value        Reference Range Interpretation Comments

 

             GLUCOSE (test code = 266 MG/DL    70-99        H            



             )                                               

 

             BUN (test code = 17 MG/DL     8-23                      



             )                                               

 

             CREATININE (test 0.92 MG/DL   0.60-1.30                 



             code = 221)                                        

 

             eGFR ( CKD-EPI) 70 ML/MIN/1.73 >60                       



             (test code = 12638)                                        

 

             CALC BUN/CREAT (test 18 RATIO     6-28                      



             code = 2235)                                        

 

             SODIUM (test code = 136 MEQ/L    133-146                   



             )                                               

 

             POTASSIUM (test code 5.3 MEQ/L    3.5-5.4                   



             = )                                             

 

             CHLORIDE (test code 98 MEQ/L                         



             = )                                             

 

             CARBON DIOXIDE (test 22 MEQ/L     19-31                     



             code = 220)                                        

 

             CALCIUM (test code = 8.8 MG/DL    8.5-10.5                  



             )                                               

 

             PROTEIN, TOTAL (test 7.4 G/DL     6.1-8.3                   



             code = 2229)                                        

 

             ALBUMIN (test code = 4.1 G/DL     3.5-5.2                   



             )                                               

 

             CALC GLOBULIN (test 3.3 G/DL     1.9-3.7                   



             code = 2240)                                        

 

             CALC A/G RATIO (test 1.2 RATIO    1.0-2.6                   



             code = 2234)                                        

 

             BILIRUBIN, TOTAL 0.3 MG/DL    See_Comment                [Automated

 message]



             (test code = 2207)                                        The syste

m which



                                                                 generated this



                                                                 result transmit

maximo



                                                                 reference range

:



                                                                 <=1.2. The refe

rence



                                                                 range was not u

sed



                                                                 to interpret th

is



                                                                 result as



                                                                 normal/abnormal

.

 

             ALKALINE PHOSPHATASE 88 U/L                           



             (test code = 2204)                                        

 

             AST (test code = 32 U/L       9-40                      



             )                                               

 

             ALT (test code = 29 U/L       5-40                      



             )                                               



HEMOGLOBIN [id number] 00:00:00





             Test Item    Value        Reference Range Interpretation Comments

 

             HEMOGLOBIN A1c (test code = 33398) 6.7 %                           

       



HEMOGLOBIN [id number] 00:00:00





             Test Item    Value        Reference Range Interpretation Comments

 

             HEMOGLOBIN A1c (test code = 79756) 6.7 %                           

       



COMPREHENSIVE METABOLIC [id number] 00:00:00





             Test Item    Value        Reference Range Interpretation Comments

 

             GLUCOSE (test code = 2217) 266 MG/DL                              

 

             BUN (test code = 2208) 17 MG/DL                               

 

             CREATININE (test code = 2214) 0.92 MG/DL                           

  

 

             eGFR ( CKD-EPI) (test code 70 ML/MIN/1.73                      

     



             = 22050)                                            

 

             CALC BUN/CREAT (test code = 18 RATIO                               



             2235)                                               

 

             SODIUM (test code = 2231) 136 MEQ/L                              

 

             POTASSIUM (test code = 2228) 5.3 MEQ/L                             

 

 

             CHLORIDE (test code = 2215) 98 MEQ/L                               

 

             CARBON DIOXIDE (test code = 22 MEQ/L                               



             )                                               

 

             CALCIUM (test code = 2209) 8.8 MG/DL                              

 

             PROTEIN, TOTAL (test code = 7.4 G/DL                               



             )                                               

 

             ALBUMIN (test code = 2201) 4.1 G/DL                               

 

             CALC GLOBULIN (test code = 3.3 G/DL                               



             )                                               

 

             CALC A/G RATIO (test code = 1.2 RATIO                              



             )                                               

 

             BILIRUBIN, TOTAL (test code = 0.3 MG/DL                            

  



             )                                               

 

             ALKALINE PHOSPHATASE (test 88 U/L                                 



             code = 2204)                                        

 

             AST (test code = 2218) 32 U/L                                 

 

             ALT (test code = 2219) 29 U/L                                 



[id number] 00:00:00





             Test Item    Value        Reference Range Interpretation Comments

 

             TSH, THIRD GENERATION (test <0.010 UIU/ML                          

 



             code = 2821)                                        



[id number] 00:00:00





             Test Item    Value        Reference Range Interpretation Comments

 

             TSH, THIRD GENERATION (test <0.010 UIU/ML                          

 



             code = 2821)                                        



TSH, THIRD [id number] 06:18:54





             Test Item    Value        Reference Range Interpretation Comments

 

             TSH, THIRD GENERATION (test code 0.190 UIU/ML 0.400-4.100  L       

     



             = 2821)                                             



HEMOGLOBIN [id number] 03:11:56





             Test Item    Value        Reference Range Interpretation Comments

 

             HEMOGLOBIN A1c (test 8.4 %        4.2-5.6      H             AMERIC

AN DIABETES



             code = 96543)                                        ASSOCIATION 

IDELINES FOR



                                                                 HGB A1C:



                                                                 PREDIABETES/INC

REASED RISK .



                                                                 . . . . . . 5.7

-6.4%



                                                                 DIAGNOSIS OF DI

ABETES . . .



                                                                 . . . . . . >=6

.5% WITH



                                                                 CONFIRMATION OR

 APPROPRIATE



                                                                 SYMPTOMS NOTE: 

ASSAY MAY BE



                                                                 AFFECTED BY



                                                                 HEMOGLOBINOPATH

IES (SICKLE



                                                                 CELL ANEMIA, S-

C DISEASE,



                                                                 OTHERS) OR ZENA

FICIALLY



                                                                 LOWERED BY DECR

EASED RED



                                                                 CELL SURVIVAL (

HEMOLYTIC



                                                                 ANEMIAS, BLOOD 

LOSS, ETC.).



                                                                 CONSIDER ALTERN

ATE TESTING



                                                                 OR LABORATORY C

ONSULTATION.



COMPREHENSIVE METABOLIC [id number] 03:11:06





             Test Item    Value        Reference Range Interpretation Comments

 

             GLUCOSE (test code = 141 MG/DL    70-99        H            



             )                                               

 

             BUN (test code = 31 MG/DL     8-23         H            



             )                                               

 

             CREATININE (test 1.07 MG/DL   0.60-1.30                 



             code = 221)                                        

 

             eGFR ( CKD-EPI) 59 ML/MIN/1.73 >60          L            



             (test code = 68509)                                        

 

             CALC BUN/CREAT (test 29 RATIO     6-28         H            



             code = 2235)                                        

 

             SODIUM (test code = 141 MEQ/L    133-146                   



             )                                               

 

             POTASSIUM (test code 4.7 MEQ/L    3.5-5.4                   



             = )                                             

 

             CHLORIDE (test code 99 MEQ/L                         



             = )                                             

 

             CARBON DIOXIDE (test 26 MEQ/L     19-31                     



             code = 220)                                        

 

             CALCIUM (test code = 8.8 MG/DL    8.5-10.5                  



             )                                               

 

             PROTEIN, TOTAL (test 8.0 G/DL     6.1-8.3                   



             code = 2229)                                        

 

             ALBUMIN (test code = 4.5 G/DL     3.5-5.2                   



             )                                               

 

             CALC GLOBULIN (test 3.5 G/DL     1.9-3.7                   



             code = 2240)                                        

 

             CALC A/G RATIO (test 1.3 RATIO    1.0-2.6                   



             code = 2234)                                        

 

             BILIRUBIN, TOTAL 0.4 MG/DL    See_Comment                [Automated

 message]



             (test code = 2207)                                        The syste

m which



                                                                 generated this



                                                                 result transmit

maximo



                                                                 reference range

:



                                                                 <=1.2. The refe

rence



                                                                 range was not u

sed



                                                                 to interpret th

is



                                                                 result as



                                                                 normal/abnormal

.

 

             ALKALINE PHOSPHATASE 67 U/L                           



             (test code = 2204)                                        

 

             AST (test code = 49 U/L       9-40         H            



             )                                               

 

             ALT (test code = 41 U/L       5-40         H            



             )                                               



LIPID [id number] 03:11:06





             Test Item    Value        Reference Range Interpretation Comments

 

             CHOLESTEROL (test 113 MG/DL    <200                      



             code = 2210)                                        

 

             TRIGLYCERIDES (test 165 MG/DL    <150         H            



             code = 2232)                                        

 

             HDL CHOLESTEROL (test 39 MG/DL     >39          L            



             code = 2220)                                        

 

             CALC LDL CHOL (test 50 MG/DL     <100                       NOTE: C

ALCULATED LDL



             code = 2237)                                        IS BASED ON



                                                                 CAROLANN-FUNES 

METHOD



                                                                 WHICHINCLUDES



                                                                 ADJUSTABLE



                                                                 TRIGLYCERIDE:VL

DL



                                                                 CHOLESTEROL RAT

IO.THIS



                                                                 FACTOR VARIES B

Y



                                                                 MEASURED TRIGLY

CERIDE



                                                                 AND NON-HDLCHOL

ESTEROL



                                                                 CONCENTRATIONS 

WITH



                                                                 INCREASED CALCU

LATED



                                                                 LDL SEENIN HIGH

ER



                                                                 TRIGLYCERIDE OR

 LOWER



                                                                 NON-HDL SPECIME

NS. FOR



                                                                 MOREINFORMATION

, SEE



                                                                 CLIENT ANNOUNCE

MENT AT



                                                                 [email or web address]

[email or web address]



                                                                 [email or web address]

 

             RISK RATIO LDL/HDL 1.28 RATIO   <3.22                     



             (test code = 2238)                                        



IRON, [id number] 03:11:06





             Test Item    Value        Reference Range Interpretation Comments

 

             IRON, SERUM (test 59 UG/DL                          UNLESS OT

HERWISE



             code = 2222)                                        INDICATED, ALL 

TESTING



                                                                 PERFORMED ATCLI

NICAL



                                                                 PATHOLOGY [facility]



                                                                 INC[facility] [address] [place], [place] [id number] [place] [address]



                                                                 DIRECTOR: CLIFTON DARBY M.D.

 CLIA



                                                                 NUMBER [id number]

03 CAP



                                                                 ACCREDITATION N

O. [id number]



HEMOGLOBIN [id number] 00:00:00





             Test Item    Value        Reference Range Interpretation Comments

 

             HEMOGLOBIN A1c (test code = 66549) 8.4 %                           

       



HEMOGLOBIN [id number] 00:00:00





             Test Item    Value        Reference Range Interpretation Comments

 

             HEMOGLOBIN A1c (test code = 15950) 8.4 %                           

       



COMPREHENSIVE METABOLIC [id number] 00:00:00





             Test Item    Value        Reference Range Interpretation Comments

 

             GLUCOSE (test code = 2217) 141 MG/DL                              

 

             BUN (test code = 2208) 31 MG/DL                               

 

             CREATININE (test code = 2214) 1.07 MG/DL                           

  

 

             eGFR ( CKD-EPI) (test code 59 ML/MIN/1.73                      

     



             = 14420)                                            

 

             CALC BUN/CREAT (test code = 29 RATIO                               



             2235)                                               

 

             SODIUM (test code = 2231) 141 MEQ/L                              

 

             POTASSIUM (test code = 2228) 4.7 MEQ/L                             

 

 

             CHLORIDE (test code = 2215) 99 MEQ/L                               

 

             CARBON DIOXIDE (test code = 26 MEQ/L                               



             )                                               

 

             CALCIUM (test code = 220) 8.8 MG/DL                              

 

             PROTEIN, TOTAL (test code = 8.0 G/DL                               



             )                                               

 

             ALBUMIN (test code = 2201) 4.5 G/DL                               

 

             CALC GLOBULIN (test code = 3.5 G/DL                               



             )                                               

 

             CALC A/G RATIO (test code = 1.3 RATIO                              



             )                                               

 

             BILIRUBIN, TOTAL (test code = 0.4 MG/DL                            

  



             )                                               

 

             ALKALINE PHOSPHATASE (test 67 U/L                                 



             code = 2204)                                        

 

             AST (test code = 221) 49 U/L                                 

 

             ALT (test code = 2219) 41 U/L                                 



LIPID [id number] 00:00:00





             Test Item    Value        Reference Range Interpretation Comments

 

             CHOLESTEROL (test code = 2210) 113 MG/DL                           

   

 

             TRIGLYCERIDES (test code = 2232) 165 MG/DL                         

     

 

             HDL CHOLESTEROL (test code = 0) 39 MG/DL                        

       

 

             CALC LDL CHOL (test code = ) 50 MG/DL                          

     

 

             RISK RATIO LDL/HDL (test code = 1.28 RATIO                         

    



             2238)                                               



[id number] 00:00:00





             Test Item    Value        Reference Range Interpretation Comments

 

             TSH, THIRD GENERATION (test code 0.190 UIU/ML                      

     



             = 2821)                                             



[id number] 00:00:00





             Test Item    Value        Reference Range Interpretation Comments

 

             TSH, THIRD GENERATION (test code 0.190 UIU/ML                      

     



             = 2821)                                             



IRON, [id number] 00:00:00





             Test Item    Value        Reference Range Interpretation Comments

 

             IRON, SERUM (test code = ) 59 UG/DL                            

   



HEMOGLOBIN [id number] 00:00:00





             Test Item    Value        Reference Range Interpretation Comments

 

             HEMOGLOBIN A1c (test code = 33529) 8.4 %                           

       



HEMOGLOBIN [id number] 00:00:00





             Test Item    Value        Reference Range Interpretation Comments

 

             HEMOGLOBIN A1c (test code = 92039) 8.4 %                           

       



COMPREHENSIVE METABOLIC [id number] 00:00:00





             Test Item    Value        Reference Range Interpretation Comments

 

             GLUCOSE (test code = 2217) 141 MG/DL                              

 

             BUN (test code = 8) 31 MG/DL                               

 

             CREATININE (test code = 2214) 1.07 MG/DL                           

  

 

             eGFR ( CKD-EPI) (test code 59 ML/MIN/1.73                      

     



             = 60024)                                            

 

             CALC BUN/CREAT (test code = 29 RATIO                               



             2235)                                               

 

             SODIUM (test code = 2231) 141 MEQ/L                              

 

             POTASSIUM (test code = 2228) 4.7 MEQ/L                             

 

 

             CHLORIDE (test code = 2215) 99 MEQ/L                               

 

             CARBON DIOXIDE (test code = 26 MEQ/L                               



             )                                               

 

             CALCIUM (test code = 2209) 8.8 MG/DL                              

 

             PROTEIN, TOTAL (test code = 8.0 G/DL                               



             )                                               

 

             ALBUMIN (test code = 2201) 4.5 G/DL                               

 

             CALC GLOBULIN (test code = 3.5 G/DL                               



             )                                               

 

             CALC A/G RATIO (test code = 1.3 RATIO                              



             223)                                               

 

             BILIRUBIN, TOTAL (test code = 0.4 MG/DL                            

  



             )                                               

 

             ALKALINE PHOSPHATASE (test 67 U/L                                 



             code = 220)                                        

 

             AST (test code = 2218) 49 U/L                                 

 

             ALT (test code = 2219) 41 U/L                                 



LIPID [id number] 00:00:00





             Test Item    Value        Reference Range Interpretation Comments

 

             CHOLESTEROL (test code = 2210) 113 MG/DL                           

   

 

             TRIGLYCERIDES (test code = 2232) 165 MG/DL                         

     

 

             HDL CHOLESTEROL (test code = 2220) 39 MG/DL                        

       

 

             CALC LDL CHOL (test code = 2237) 50 MG/DL                          

     

 

             RISK RATIO LDL/HDL (test code = 1.28 RATIO                         

    



             2238)                                               



[id number] 00:00:00





             Test Item    Value        Reference Range Interpretation Comments

 

             TSH, THIRD GENERATION (test code 0.190 UIU/ML                      

     



             = 2821)                                             



[id number] 00:00:00





             Test Item    Value        Reference Range Interpretation Comments

 

             TSH, THIRD GENERATION (test code 0.190 UIU/ML                      

     



             = 2821)                                             



IRON, [id number] 00:00:00





             Test Item    Value        Reference Range Interpretation Comments

 

             IRON, SERUM (test code = 2222) 59 UG/DL                            

   



ALBUMIN/CREATININE RATIO, URINE, [id number] 05:57:19





             Test Item    Value        Reference Range Interpretation Comments

 

             CREATININE, URINE, 212.0 MG/DL  NOT ESTAB                 



             CONC. (test code =                                        



             )                                               

 

             ALBUMIN, URINE, 16.0 MG/DL                              Note: Test 

name is



             RANDOM (test code                                        changed to

 Urine Albumin



             = 75572)                                            from Microalbum

in in



                                                                 accordance with

 ADA and



                                                                 NKF Guidelines,

 and



                                                                 Albumin/Creatin

ine ratio



                                                                 reference inter

mariah



                                                                 reflects those



                                                                 Guidelines. Violet

lytic



                                                                 methodology is



                                                                 unchanged. No r

eference



                                                                 interval for Ra

ndom



                                                                 Urine Albumin i

s



                                                                 available.

 

             CALC         75 MG/G      <30          H             UNLESS OTHERWI

SE



             ALBUMIN/CREAT, RND                                        INDICATED

, ALL TESTING



             (test code =                                        PERFORMED ATCLI

NICAL



             630[address])                                              PATHOLOGY [facility]

[place],



                                                                 [facility]. [address] [place] [address]

4



                                                                 LABORATORY DIRE

CTOR:



                                                                 CLIFTON THOMSON M.D.



                                                                 CLIA NUMBER [id number]

[id number]



                                                                 CAP ACCREDITATI

ON NO.



                                                                 [id number]



HEMOGLOBIN [id number] 04:12:15





             Test Item    Value        Reference Range Interpretation Comments

 

             HEMOGLOBIN A1c (test 9.1 %        4.2-5.6      H             AMERIC

AN DIABETES



             code = 14473)                                        ASSOCIATION 

IDELINES FOR



                                                                 HGB A1C:



                                                                 PREDIABETES/INC

REASED RISK .



                                                                 . . . . . . 5.7

-6.4%



                                                                 DIAGNOSIS OF DI

ABETES . . .



                                                                 . . . . . . >=6

.5% WITH



                                                                 CONFIRMATION OR

 APPROPRIATE



                                                                 SYMPTOMS NOTE: 

ASSAY MAY BE



                                                                 AFFECTED BY



                                                                 HEMOGLOBINOPATH

IES (SICKLE



                                                                 CELL ANEMIA, S-

C DISEASE,



                                                                 OTHERS) OR ZENA

FICIALLY



                                                                 LOWERED BY DECR

EASED RED



                                                                 CELL SURVIVAL (

HEMOLYTIC



                                                                 ANEMIAS, BLOOD 

LOSS, ETC.).



                                                                 CONSIDER ALTERN

ATE TESTING



                                                                 OR LABORATORY C

ONSULTATION.



HEMOGLOBIN [id number] 00:00:00





             Test Item    Value        Reference Range Interpretation Comments

 

             HEMOGLOBIN A1c (test code = 86810) 9.1 %                           

       



HEMOGLOBIN [id number] 00:00:00





             Test Item    Value        Reference Range Interpretation Comments

 

             HEMOGLOBIN A1c (test code = 77835) 9.1 %                           

       



MICROALBUMIN/CREATININE, RANDOM AND [id number] 00:00:00





             Test Item    Value        Reference Range Interpretation Comments

 

             CREATININE, URINE, CONC. (test 212.0 MG/DL                         

   



             code = 2072)                                        

 

             ALBUMIN, URINE, RANDOM (test code 16.0 MG/DL                       

      



             = 97343)                                            

 

             CALC ALBUMIN/CREAT, RND (test 75 MG/G                              

  



             code = 54682)                                        



HEMOGLOBIN [id number] 00:00:00





             Test Item    Value        Reference Range Interpretation Comments

 

             HEMOGLOBIN A1c (test code = 34677) 9.1 %                           

       



HEMOGLOBIN [id number] 00:00:00





             Test Item    Value        Reference Range Interpretation Comments

 

             HEMOGLOBIN A1c (test code = 96548) 9.1 %                           

       



MICROALBUMIN/CREATININE, RANDOM AND [id number] 00:00:00





             Test Item    Value        Reference Range Interpretation Comments

 

             CREATININE, URINE, CONC. (test 212.0 MG/DL                         

   



             code = 2072)                                        

 

             ALBUMIN, URINE, RANDOM (test code 16.0 MG/DL                       

      



             = 39844)                                            

 

             CALC ALBUMIN/CREAT, RND (test 75 MG/G                              

  



             code = 80555)                                        



PATHOLOGIST SMEAR [id number] 00:00:00





             Test Item    Value        Reference Range Interpretation Comments

 

             DIAGNOSIS: (test code = 8200) (NOTE)                               

  

 

             COMMENTS: (test code = 8205) (NOTE)                                

 

 

             MICROSCOPIC DESCRIPTION: (test (NOTE)                              

   



             code = 8210)                                        

 

             PATHOLOGIST: (test code = 8250) (NOTE)                             

    

 

             CPT: (test code = 8400) 74346                                  

 

             WBC (test code = 1001) 2.7 K/UL                               

 

             RBC (test code = 1002) 3.09 M/UL                              

 

             HEMOGLOBIN (test code = 1003) 8.2 G/DL                             

  

 

             HEMATOCRIT (test code = 1004) 25.6 %                               

  

 

             MCV (test code = 1005) 82.8 fL                                

 

             MCH (test code = 1006) 26.5 PG                                

 

             MCHC (test code = 1007) 32.0 G/DL                              

 

             RDW (test code = 1038) 14.1 %                                 

 

             NEUTROPHILS (test code = 1008) 52.0 %                              

   

 

             LYMPHOCYTES (test code = 1010) 35.8 %                              

   

 

             MONOCYTES (test code = 1011) 7.2 %                                 

 

 

             EOSINOPHILS (test code = 1012) 4.2 %                               

   

 

             BASOPHILS (test code = 1013) 0.4 %                                 

 

 

             NUCLEATED RBCS (test code = 0.0 /100WBC'S                          

 



             1065)                                               

 

             PLATELET COUNT (test code = 99 K/UL                                



             1015)                                               

 

             ABSOLUTE NEUTROPHILS (test code 1.38 K/UL                          

    



             = 1066)                                             

 

             ABSOLUTE LYMPHOCYTES (test code 0.95 K/UL                          

    



             = 1067)                                             

 

             ABSOLUTE MONOCYTES (test code = 0.19 K/UL                          

    



             1068)                                               

 

             ABSOLUTE EOSINOPHILS (test code 0.11 K/UL                          

    



             = 1040)                                             

 

             ABSOLUTE BASOPHILS (test code = 0.01 K/UL                          

    



             1069)                                               

 

             ABS NUCLEATED RBCS (test code = 0.00 K/UL                          

    



             18214)                                              

 

             COMMENTS (test code = 1016) (NOTE)                                 



PATHOLOGIST SMEAR [id number] 00:00:00





             Test Item    Value        Reference Range Interpretation Comments

 

             DIAGNOSIS: (test code = 8200) (NOTE)                               

  

 

             COMMENTS: (test code = 8205) (NOTE)                                

 

 

             MICROSCOPIC DESCRIPTION: (test (NOTE)                              

   



             code = 8210)                                        

 

             PATHOLOGIST: (test code = 8250) (NOTE)                             

    

 

             CPT: (test code = 8400) 92676                                  

 

             WBC (test code = 1001) 2.7 K/UL                               

 

             RBC (test code = 1002) 3.09 M/UL                              

 

             HEMOGLOBIN (test code = 1003) 8.2 G/DL                             

  

 

             HEMATOCRIT (test code = 1004) 25.6 %                               

  

 

             MCV (test code = 1005) 82.8 fL                                

 

             MCH (test code = 1006) 26.5 PG                                

 

             MCHC (test code = 1007) 32.0 G/DL                              

 

             RDW (test code = 1038) 14.1 %                                 

 

             NEUTROPHILS (test code = 1008) 52.0 %                              

   

 

             LYMPHOCYTES (test code = 1010) 35.8 %                              

   

 

             MONOCYTES (test code = 1011) 7.2 %                                 

 

 

             EOSINOPHILS (test code = 1012) 4.2 %                               

   

 

             BASOPHILS (test code = 1013) 0.4 %                                 

 

 

             NUCLEATED RBCS (test code = 0.0 /100WBC'S                          

 



             1065)                                               

 

             PLATELET COUNT (test code = 99 K/UL                                



             1015)                                               

 

             ABSOLUTE NEUTROPHILS (test code 1.38 K/UL                          

    



             = 1066)                                             

 

             ABSOLUTE LYMPHOCYTES (test code 0.95 K/UL                          

    



             = 1067)                                             

 

             ABSOLUTE MONOCYTES (test code = 0.19 K/UL                          

    



             1068)                                               

 

             ABSOLUTE EOSINOPHILS (test code 0.11 K/UL                          

    



             = 1040)                                             

 

             ABSOLUTE BASOPHILS (test code = 0.01 K/UL                          

    



             1069)                                               

 

             ABS NUCLEATED RBCS (test code = 0.00 K/UL                          

    



             09554)                                              

 

             COMMENTS (test code = 1016) (NOTE)                                 



FERRITIN2021-10-20 00:00:00





             Test Item    Value        Reference Range Interpretation Comments

 

             FERRITIN (test code = 5) 42 NG/ML                               



[id number] 00:00:00





             Test Item    Value        Reference Range Interpretation Comments

 

             TRANSFERRIN (test code = 4936) 281 MG/DL                           

   



IRON BINDING CAPACITY AND IRON AND % SATURATION2021-10-20 00:00:00





             Test Item    Value        Reference Range Interpretation Comments

 

             IRON, SERUM (test code = 2222) 28 UG/DL                            

   

 

             UNSATURATED IBC (test code = 44969) 315 UG/DL                      

        

 

             CALC TOTAL IBC (test code = 7) 343 UG/DL                        

      

 

             CALC % IRON SAT (test code = ) 8 %                             

       



PROTIME AND PTT2021-10-20 00:00:00





             Test Item    Value        Reference Range Interpretation Comments

 

             PROTHROMBIN TIME (PT) (test code 14.8 SECONDS                      

     



             = 1402)                                             

 

             INR (test code = 88237) 1.1                                    

 

             PTT (test code = 1403) 35.8 SECONDS                           



RHEUMATOID FACTOR, [id number] 00:00:00





             Test Item    Value        Reference Range Interpretation Comments

 

             RHEUMATOID FACTOR, QUANT (test code <10 IU/ML                      

        



             = 3502)                                             



RHEUMATOID FACTOR, [id number] 00:00:00





             Test Item    Value        Reference Range Interpretation Comments

 

             RHEUMATOID FACTOR, QUANT (test code <10 IU/ML                      

        



             = 3502)                                             



CCP IgG2021-10-20 00:00:00





             Test Item    Value        Reference Range Interpretation Comments

 

             CCP IgG (test code = 02835) <0.5 U/ML                              



CCP IgG2021-10-20 00:00:00





             Test Item    Value        Reference Range Interpretation Comments

 

             CCP IgG (test code = 43430) <0.5 U/ML                              



SMITH (SM) ANTIBODY2021-10-20 00:00:00





             Test Item    Value        Reference Range Interpretation Comments

 

             MCGUIRE (Sm) ANTIBODY (test code = <0.2 AI                           

     



             43849)                                              



DNA DS ANTIBODY2021-10-20 00:00:00





             Test Item    Value        Reference Range Interpretation Comments

 

             dsDNA ANTIBODY (test code = 4287) 1.0 IU/ML                        

      



FERRITIN2021-10-20 00:00:00





             Test Item    Value        Reference Range Interpretation Comments

 

             FERRITIN (test code = 2075) 42 NG/ML                               



[id number] 00:00:00





             Test Item    Value        Reference Range Interpretation Comments

 

             TRANSFERRIN (test code = 4936) 281 MG/DL                           

   



IRON BINDING CAPACITY AND IRON AND % SATURATION2021-10-20 00:00:00





             Test Item    Value        Reference Range Interpretation Comments

 

             IRON, SERUM (test code = 2222) 28 UG/DL                            

   

 

             UNSATURATED IBC (test code = 67285) 315 UG/DL                      

        

 

             CALC TOTAL IBC (test code = 7) 343 UG/DL                        

      

 

             CALC % IRON SAT (test code = ) 8 %                             

       



PROTIME AND PTT2021-10-20 00:00:00





             Test Item    Value        Reference Range Interpretation Comments

 

             PROTHROMBIN TIME (PT) (test code 14.8 SECONDS                      

     



             = 1402)                                             

 

             INR (test code = 90445) 1.1                                    

 

             PTT (test code = 1403) 35.8 SECONDS                           



RHEUMATOID FACTOR, [id number] 00:00:00





             Test Item    Value        Reference Range Interpretation Comments

 

             RHEUMATOID FACTOR, QUANT (test code <10 IU/ML                      

        



             = 3502)                                             



RHEUMATOID FACTOR, [id number] 00:00:00





             Test Item    Value        Reference Range Interpretation Comments

 

             RHEUMATOID FACTOR, QUANT (test code <10 IU/ML                      

        



             = 3502)                                             



CCP IgG2021-10-20 00:00:00





             Test Item    Value        Reference Range Interpretation Comments

 

             CCP IgG (test code = 18117) <0.5 U/ML                              



CCP IgG2021-10-20 00:00:00





             Test Item    Value        Reference Range Interpretation Comments

 

             CCP IgG (test code = 34545) <0.5 U/ML                              



SMITH (SM) ANTIBODY2021-10-20 00:00:00





             Test Item    Value        Reference Range Interpretation Comments

 

             MCGUIRE (Sm) ANTIBODY (test code = <0.2 AI                           

     



             34607)                                              



DNA DS ANTIBODY2021-10-20 00:00:00





             Test Item    Value        Reference Range Interpretation Comments

 

             dsDNA ANTIBODY (test code = 4287) 1.0 IU/ML                        

      



CULTURE, URINE2021-10-16 00:00:00





             Test Item    Value        Reference Range Interpretation Comments

 

             CULTURE, URINE (test SPECIMEN NUMBER:                           



             code = 75050) [id number]                              



CULTURE, URINE2021-10-16 00:00:00





             Test Item    Value        Reference Range Interpretation Comments

 

             CULTURE, URINE (test SPECIMEN NUMBER:                           



             code = 02366) [id number]                              



VIOLET TITER AND PATTERN [REFLEX]2021-10-14 00:00:00





             Test Item    Value        Reference Range Interpretation Comments

 

             PATTERN (test code = SPECKLED                               



             66202)                                              

 

             VIOLET TITER (test code = 1:640 TITER                            



             3550)                                               

 

             PATTERN 2 (test code = NOT DETECTED                           



             03568)                                              

 

             VIOLET TITER 2 (test code = NOT DETECTED TITER                        

   



             17687)                                              

 

             PATTERN 3 (test code = NOT DETECTED                           



             82863)                                              

 

             VIOLET TITER 3 (test code = NOT DETECTED TITER                        

   



             02413)                                              

 

             METHOD (test code = 26473) (NOTE)                                 



VIOLET TITER AND PATTERN [REFLEX]2021-10-14 00:00:00





             Test Item    Value        Reference Range Interpretation Comments

 

             PATTERN (test code = SPECKLED                               



             21920)                                              

 

             VIOLET TITER (test code = 1:640 TITER                            



             3550)                                               

 

             PATTERN 2 (test code = NOT DETECTED                           



             94325)                                              

 

             VIOLET TITER 2 (test code = NOT DETECTED TITER                        

   



             41675)                                              

 

             PATTERN 3 (test code = NOT DETECTED                           



             87424)                                              

 

             VIOLET TITER 3 (test code = NOT DETECTED TITER                        

   



             67580)                                              

 

             METHOD (test code = 31945) (NOTE)                                 



CBC W/AUTO DIFF2021-10-13 00:00:00





             Test Item    Value        Reference Range Interpretation Comments

 

             WBC (test code = 1001) 3.1 K/UL                               

 

             RBC (test code = 1002) 3.14 M/UL                              

 

             HEMOGLOBIN (test code = 1003) 8.4 G/DL                             

  

 

             HEMATOCRIT (test code = 1004) 25.9 %                               

  

 

             MCV (test code = 1005) 82.5 fL                                

 

             MCH (test code = 1006) 26.8 PG                                

 

             MCHC (test code = 1007) 32.4 G/DL                              

 

             RDW (test code = 1038) 14.5 %                                 

 

             NEUTROPHILS (test code = 1008) 61.2 %                              

   

 

             LYMPHOCYTES (test code = 1010) 25.5 %                              

   

 

             MONOCYTES (test code = 1011) 8.8 %                                 

 

 

             EOSINOPHILS (test code = 1012) 3.9 %                               

   

 

             BASOPHILS (test code = 1013) 0.3 %                                 

 

 

             IMMATURE GRANULOCYTES (test 0.3 %                                  



             code = 1036)                                        

 

             NUCLEATED RBCS (test code = 0.0 /100WBC'S                          

 



             1065)                                               

 

             PLATELET COUNT (test code = 85 K/UL                                



             1015)                                               

 

             ABSOLUTE NEUTROPHILS (test code 1.87 K/UL                          

    



             = 1066)                                             

 

             ABSOLUTE LYMPHOCYTES (test code 0.78 K/UL                          

    



             = 1067)                                             

 

             ABSOLUTE MONOCYTES (test code = 0.27 K/UL                          

    



             1068)                                               

 

             ABSOLUTE EOSINOPHILS (test code 0.12 K/UL                          

    



             = 1040)                                             

 

             ABSOLUTE BASOPHILS (test code = 0.01 K/UL                          

    



             1069)                                               

 

             ABS IMMATURE GRANULOCYTES (test 0.01 K/UL                          

    



             code = 1020)                                        

 

             ABS NUCLEATED RBCS (test code = 0.00 K/UL                          

    



             79095)                                              



CBC W/AUTO DIFF2021-10-13 00:00:00





             Test Item    Value        Reference Range Interpretation Comments

 

             WBC (test code = 1001) 3.1 K/UL                               

 

             RBC (test code = 1002) 3.14 M/UL                              

 

             HEMOGLOBIN (test code = 1003) 8.4 G/DL                             

  

 

             HEMATOCRIT (test code = 1004) 25.9 %                               

  

 

             MCV (test code = 1005) 82.5 fL                                

 

             MCH (test code = 1006) 26.8 PG                                

 

             MCHC (test code = 1007) 32.4 G/DL                              

 

             RDW (test code = 1038) 14.5 %                                 

 

             NEUTROPHILS (test code = 1008) 61.2 %                              

   

 

             LYMPHOCYTES (test code = 1010) 25.5 %                              

   

 

             MONOCYTES (test code = 1011) 8.8 %                                 

 

 

             EOSINOPHILS (test code = 1012) 3.9 %                               

   

 

             BASOPHILS (test code = 1013) 0.3 %                                 

 

 

             IMMATURE GRANULOCYTES (test 0.3 %                                  



             code = 1036)                                        

 

             NUCLEATED RBCS (test code = 0.0 /100WBC'S                          

 



             1065)                                               

 

             PLATELET COUNT (test code = 85 K/UL                                



             1015)                                               

 

             ABSOLUTE NEUTROPHILS (test code 1.87 K/UL                          

    



             = 1066)                                             

 

             ABSOLUTE LYMPHOCYTES (test code 0.78 K/UL                          

    



             = 1067)                                             

 

             ABSOLUTE MONOCYTES (test code = 0.27 K/UL                          

    



             1068)                                               

 

             ABSOLUTE EOSINOPHILS (test code 0.12 K/UL                          

    



             = 1040)                                             

 

             ABSOLUTE BASOPHILS (test code = 0.01 K/UL                          

    



             1069)                                               

 

             ABS IMMATURE GRANULOCYTES (test 0.01 K/UL                          

    



             code = 1020)                                        

 

             ABS NUCLEATED RBCS (test code = 0.00 K/UL                          

    



             29274)                                              



COMPREHENSIVE METABOLIC PANEL2021-10-13 00:00:00





             Test Item    Value        Reference Range Interpretation Comments

 

             GLUCOSE (test code = 2217) 139 MG/DL                              

 

             BUN (test code = 2208) 21 MG/DL                               

 

             CREATININE (test code = 2214) 0.83 MG/DL                           

  

 

             eGFR  AMER. (test code 88 ML/MIN/1.73                       

    



             = 62955)                                            

 

             eGFR NON- AMER. (test 76 ML/MIN/1.73                        

   



             code = 53771)                                        

 

             CALC BUN/CREAT (test code = 25 RATIO                               



             2235)                                               

 

             SODIUM (test code = 2231) 140 MEQ/L                              

 

             POTASSIUM (test code = 2228) 4.8 MEQ/L                             

 

 

             CHLORIDE (test code = 2215) 102 MEQ/L                              

 

             CARBON DIOXIDE (test code = 25 MEQ/L                               



             2206)                                               

 

             CALCIUM (test code = 2209) 9.2 MG/DL                              

 

             PROTEIN, TOTAL (test code = 7.2 G/DL                               



             2229)                                               

 

             ALBUMIN (test code = 2201) 3.6 G/DL                               

 

             CALC GLOBULIN (test code = 3.6 G/DL                               



             2240)                                               

 

             CALC A/G RATIO (test code = 1.0 RATIO                              



             2234)                                               

 

             BILIRUBIN, TOTAL (test code = 0.6 MG/DL                            

  



             2207)                                               

 

             ALKALINE PHOSPHATASE (test 101 U/L                                



             code = 2204)                                        

 

             AST (test code = 2218) 30 U/L                                 

 

             ALT (test code = 2219) 23 U/L                                 



PROBNP2021-10-13 00:00:00





             Test Item    Value        Reference Range Interpretation Comments

 

             NT-proBNP (test code = 20429) 247 PG/ML                            

  



PROBNP2021-10-13 00:00:00





             Test Item    Value        Reference Range Interpretation Comments

 

             NT-proBNP (test code = 97786) 247 PG/ML                            

  



VIOLET (ANTI-NUCLEAR AB) WITH REFLEX TITER2021-10-13 00:00:00





             Test Item    Value        Reference Range Interpretation Comments

 

             ANTI-NUCLEAR ANTIBODIES (test code = POSITIVE                      

         



             3506)                                               



C-REACTIVE PROTEIN2021-10-13 00:00:00





             Test Item    Value        Reference Range Interpretation Comments

 

             C-REACTIVE PROTEIN (test code = 1.1 MG/DL                          

    



             3513)                                               



SEDIMENTATION RATE2021-10-13 00:00:00





             Test Item    Value        Reference Range Interpretation Comments

 

             SEDIMENTATION RATE (test code = 88 MM/HOUR                         

    



             1017)                                               



[id number] 00:00:00





             Test Item    Value        Reference Range Interpretation Comments

 

             D-DIMER (test code = 1405) 0.77 UG/MLFEU                           



CBC W/AUTO DIFF2021-10-13 00:00:00





             Test Item    Value        Reference Range Interpretation Comments

 

             WBC (test code = 1001) 3.1 K/UL                               

 

             RBC (test code = 1002) 3.14 M/UL                              

 

             HEMOGLOBIN (test code = 1003) 8.4 G/DL                             

  

 

             HEMATOCRIT (test code = 1004) 25.9 %                               

  

 

             MCV (test code = 1005) 82.5 fL                                

 

             MCH (test code = 1006) 26.8 PG                                

 

             MCHC (test code = 1007) 32.4 G/DL                              

 

             RDW (test code = 1038) 14.5 %                                 

 

             NEUTROPHILS (test code = 1008) 61.2 %                              

   

 

             LYMPHOCYTES (test code = 1010) 25.5 %                              

   

 

             MONOCYTES (test code = 1011) 8.8 %                                 

 

 

             EOSINOPHILS (test code = 1012) 3.9 %                               

   

 

             BASOPHILS (test code = 1013) 0.3 %                                 

 

 

             IMMATURE GRANULOCYTES (test 0.3 %                                  



             code = 1036)                                        

 

             NUCLEATED RBCS (test code = 0.0 /100WBC'S                          

 



             1065)                                               

 

             PLATELET COUNT (test code = 85 K/UL                                



             1015)                                               

 

             ABSOLUTE NEUTROPHILS (test code 1.87 K/UL                          

    



             = 1066)                                             

 

             ABSOLUTE LYMPHOCYTES (test code 0.78 K/UL                          

    



             = 1067)                                             

 

             ABSOLUTE MONOCYTES (test code = 0.27 K/UL                          

    



             1068)                                               

 

             ABSOLUTE EOSINOPHILS (test code 0.12 K/UL                          

    



             = 1040)                                             

 

             ABSOLUTE BASOPHILS (test code = 0.01 K/UL                          

    



             1069)                                               

 

             ABS IMMATURE GRANULOCYTES (test 0.01 K/UL                          

    



             code = 1020)                                        

 

             ABS NUCLEATED RBCS (test code = 0.00 K/UL                          

    



             77861)                                              



CBC W/AUTO DIFF2021-10-13 00:00:00





             Test Item    Value        Reference Range Interpretation Comments

 

             WBC (test code = 1001) 3.1 K/UL                               

 

             RBC (test code = 1002) 3.14 M/UL                              

 

             HEMOGLOBIN (test code = 1003) 8.4 G/DL                             

  

 

             HEMATOCRIT (test code = 1004) 25.9 %                               

  

 

             MCV (test code = 1005) 82.5 fL                                

 

             MCH (test code = 1006) 26.8 PG                                

 

             MCHC (test code = 1007) 32.4 G/DL                              

 

             RDW (test code = 1038) 14.5 %                                 

 

             NEUTROPHILS (test code = 1008) 61.2 %                              

   

 

             LYMPHOCYTES (test code = 1010) 25.5 %                              

   

 

             MONOCYTES (test code = 1011) 8.8 %                                 

 

 

             EOSINOPHILS (test code = 1012) 3.9 %                               

   

 

             BASOPHILS (test code = 1013) 0.3 %                                 

 

 

             IMMATURE GRANULOCYTES (test 0.3 %                                  



             code = 1036)                                        

 

             NUCLEATED RBCS (test code = 0.0 /100WBC'S                          

 



             1065)                                               

 

             PLATELET COUNT (test code = 85 K/UL                                



             1015)                                               

 

             ABSOLUTE NEUTROPHILS (test code 1.87 K/UL                          

    



             = 1066)                                             

 

             ABSOLUTE LYMPHOCYTES (test code 0.78 K/UL                          

    



             = 1067)                                             

 

             ABSOLUTE MONOCYTES (test code = 0.27 K/UL                          

    



             1068)                                               

 

             ABSOLUTE EOSINOPHILS (test code 0.12 K/UL                          

    



             = 1040)                                             

 

             ABSOLUTE BASOPHILS (test code = 0.01 K/UL                          

    



             1069)                                               

 

             ABS IMMATURE GRANULOCYTES (test 0.01 K/UL                          

    



             code = 1020)                                        

 

             ABS NUCLEATED RBCS (test code = 0.00 K/UL                          

    



             40440)                                              



COMPREHENSIVE METABOLIC PANEL2021-10-13 00:00:00





             Test Item    Value        Reference Range Interpretation Comments

 

             GLUCOSE (test code = 2217) 139 MG/DL                              

 

             BUN (test code = 2208) 21 MG/DL                               

 

             CREATININE (test code = 2214) 0.83 MG/DL                           

  

 

             eGFR  AMER. (test code 88 ML/MIN/1.73                       

    



             = 40098)                                            

 

             eGFR NON- AMER. (test 76 ML/MIN/1.73                        

   



             code = 07778)                                        

 

             CALC BUN/CREAT (test code = 25 RATIO                               



             2235)                                               

 

             SODIUM (test code = 2231) 140 MEQ/L                              

 

             POTASSIUM (test code = 2228) 4.8 MEQ/L                             

 

 

             CHLORIDE (test code = 2215) 102 MEQ/L                              

 

             CARBON DIOXIDE (test code = 25 MEQ/L                               



             2206)                                               

 

             CALCIUM (test code = 2209) 9.2 MG/DL                              

 

             PROTEIN, TOTAL (test code = 7.2 G/DL                               



             2229)                                               

 

             ALBUMIN (test code = 2201) 3.6 G/DL                               

 

             CALC GLOBULIN (test code = 3.6 G/DL                               



             2240)                                               

 

             CALC A/G RATIO (test code = 1.0 RATIO                              



             2234)                                               

 

             BILIRUBIN, TOTAL (test code = 0.6 MG/DL                            

  



             2207)                                               

 

             ALKALINE PHOSPHATASE (test 101 U/L                                



             code = 2204)                                        

 

             AST (test code = 2218) 30 U/L                                 

 

             ALT (test code = 2219) 23 U/L                                 



PROBNP2021-10-13 00:00:00





             Test Item    Value        Reference Range Interpretation Comments

 

             NT-proBNP (test code = 99382) 247 PG/ML                            

  



PROBNP2021-10-13 00:00:00





             Test Item    Value        Reference Range Interpretation Comments

 

             NT-proBNP (test code = 02922) 247 PG/ML                            

  



VIOLET (ANTI-NUCLEAR AB) WITH REFLEX TITER2021-10-13 00:00:00





             Test Item    Value        Reference Range Interpretation Comments

 

             ANTI-NUCLEAR ANTIBODIES (test code = POSITIVE                      

         



             3506)                                               



C-REACTIVE PROTEIN2021-10-13 00:00:00





             Test Item    Value        Reference Range Interpretation Comments

 

             C-REACTIVE PROTEIN (test code = 1.1 MG/DL                          

    



             3513)                                               



SEDIMENTATION RATE2021-10-13 00:00:00





             Test Item    Value        Reference Range Interpretation Comments

 

             SEDIMENTATION RATE (test code = 88 MM/HOUR                         

    



             1017)                                               



[id number] 00:00:00





             Test Item    Value        Reference Range Interpretation Comments

 

             D-DIMER (test code = 1405) 0.77 UG/MLFEU                           



HEMOGLOBIN A1c2021-10-10 00:00:00





             Test Item    Value        Reference Range Interpretation Comments

 

             HEMOGLOBIN A1c (test code = 84657) 7.5 %                           

       



HEMOGLOBIN A1c2021-10-10 00:00:00





             Test Item    Value        Reference Range Interpretation Comments

 

             HEMOGLOBIN A1c (test code = 43312) 7.5 %                           

       



HEMOGLOBIN A1c2021-10-10 00:00:00





             Test Item    Value        Reference Range Interpretation Comments

 

             HEMOGLOBIN A1c (test code = 63950) 7.5 %                           

       



HEMOGLOBIN A1c2021-10-10 00:00:00





             Test Item    Value        Reference Range Interpretation Comments

 

             HEMOGLOBIN A1c (test code = 50508) 7.5 %                           

       



CULTURE, URINE2021-07-15 00:00:00





             Test Item    Value        Reference Range Interpretation Comments

 

             CULTURE, URINE (test SPECIMEN NUMBER:                           



             code = 73950) [id number]                              



CULTURE, URINE2021-07-15 00:00:00





             Test Item    Value        Reference Range Interpretation Comments

 

             CULTURE, URINE (test SPECIMEN NUMBER:                           



             code = 75682) [id number]                              



HEMOGLOBIN [id number] 00:00:00





             Test Item    Value        Reference Range Interpretation Comments

 

             HEMOGLOBIN A1c (test code = 39922) 8.6 %                           

       



HEMOGLOBIN [id number] 00:00:00





             Test Item    Value        Reference Range Interpretation Comments

 

             HEMOGLOBIN A1c (test code = 44004) 8.6 %                           

       



LIPID [id number] 00:00:00





             Test Item    Value        Reference Range Interpretation Comments

 

             CHOLESTEROL (test code = 2210) 104 MG/DL                           

   

 

             TRIGLYCERIDES (test code = 2232) 164 MG/DL                         

     

 

             HDL CHOLESTEROL (test code = 2220) 39 MG/DL                        

       

 

             CALC LDL CHOL (test code = 2237) 41 MG/DL                          

     

 

             RISK RATIO LDL/HDL (test code = 1.05 RATIO                         

    



             2238)                                               



COMPREHENSIVE METABOLIC [id number] 00:00:00





             Test Item    Value        Reference Range Interpretation Comments

 

             GLUCOSE (test code = 2217) 330 MG/DL                              

 

             BUN (test code = 2208) 11 MG/DL                               

 

             CREATININE (test code = 2214) 0.84 MG/DL                           

  

 

             eGFR  AMER. (test code 87 ML/MIN/1.73                       

    



             = 32699)                                            

 

             eGFR NON- AMER. (test 75 ML/MIN/1.73                        

   



             code = 57119)                                        

 

             CALC BUN/CREAT (test code = 13 RATIO                               



             2235)                                               

 

             SODIUM (test code = 2231) 136 MEQ/L                              

 

             POTASSIUM (test code = 2228) 4.5 MEQ/L                             

 

 

             CHLORIDE (test code = 2215) 97 MEQ/L                               

 

             CARBON DIOXIDE (test code = 27 MEQ/L                               



             )                                               

 

             CALCIUM (test code = 2209) 9.1 MG/DL                              

 

             PROTEIN, TOTAL (test code = 7.7 G/DL                               



             )                                               

 

             ALBUMIN (test code = 2201) 3.8 G/DL                               

 

             CALC GLOBULIN (test code = 3.9 G/DL                               



             0)                                               

 

             CALC A/G RATIO (test code = 1.0 RATIO                              



             2234)                                               

 

             BILIRUBIN, TOTAL (test code = 0.4 MG/DL                            

  



             )                                               

 

             ALKALINE PHOSPHATASE (test 106 U/L                                



             code = 2204)                                        

 

             AST (test code = 2218) 44 U/L                                 

 

             ALT (test code = 2219) 29 U/L                                 



MICROALBUMIN/CREATININE, RANDOM AND [id number] 00:00:00





             Test Item    Value        Reference Range Interpretation Comments

 

             CREATININE, URINE, CONC. (test 131.3 MG/DL                         

   



             code = )                                        

 

             ALBUMIN, URINE, RANDOM (test code 0.4 MG/DL                        

      



             = 35250)                                            

 

             CALC ALBUMIN/CREAT, RND (test 3 MG/G                               

  



             code = 64624)                                        



HEMOGLOBIN [id number] 00:00:00





             Test Item    Value        Reference Range Interpretation Comments

 

             HEMOGLOBIN A1c (test code = 65749) 8.6 %                           

       



HEMOGLOBIN [id number] 00:00:00





             Test Item    Value        Reference Range Interpretation Comments

 

             HEMOGLOBIN A1c (test code = 23762) 8.6 %                           

       



LIPID [id number] 00:00:00





             Test Item    Value        Reference Range Interpretation Comments

 

             CHOLESTEROL (test code = 2210) 104 MG/DL                           

   

 

             TRIGLYCERIDES (test code = 2232) 164 MG/DL                         

     

 

             HDL CHOLESTEROL (test code = 2220) 39 MG/DL                        

       

 

             CALC LDL CHOL (test code = 2237) 41 MG/DL                          

     

 

             RISK RATIO LDL/HDL (test code = 1.05 RATIO                         

    



             2238)                                               



COMPREHENSIVE METABOLIC [id number] 00:00:00





             Test Item    Value        Reference Range Interpretation Comments

 

             GLUCOSE (test code = 2217) 330 MG/DL                              

 

             BUN (test code = 2208) 11 MG/DL                               

 

             CREATININE (test code = 2214) 0.84 MG/DL                           

  

 

             eGFR  AMER. (test code 87 ML/MIN/1.73                       

    



             = 53005)                                            

 

             eGFR NON- AMER. (test 75 ML/MIN/1.73                        

   



             code = 18693)                                        

 

             CALC BUN/CREAT (test code = 13 RATIO                               



             2235)                                               

 

             SODIUM (test code = 2231) 136 MEQ/L                              

 

             POTASSIUM (test code = 2228) 4.5 MEQ/L                             

 

 

             CHLORIDE (test code = 2215) 97 MEQ/L                               

 

             CARBON DIOXIDE (test code = 27 MEQ/L                               



             )                                               

 

             CALCIUM (test code = 2209) 9.1 MG/DL                              

 

             PROTEIN, TOTAL (test code = 7.7 G/DL                               



             )                                               

 

             ALBUMIN (test code = 220) 3.8 G/DL                               

 

             CALC GLOBULIN (test code = 3.9 G/DL                               



             )                                               

 

             CALC A/G RATIO (test code = 1.0 RATIO                              



             2234)                                               

 

             BILIRUBIN, TOTAL (test code = 0.4 MG/DL                            

  



             )                                               

 

             ALKALINE PHOSPHATASE (test 106 U/L                                



             code = 2204)                                        

 

             AST (test code = 2218) 44 U/L                                 

 

             ALT (test code = 2219) 29 U/L                                 



MICROALBUMIN/CREATININE, RANDOM AND [id number] 00:00:00





             Test Item    Value        Reference Range Interpretation Comments

 

             CREATININE, URINE, CONC. (test 131.3 MG/DL                         

   



             code = 2072)                                        

 

             ALBUMIN, URINE, RANDOM (test code 0.4 MG/DL                        

      



             = 25328)                                            

 

             CALC ALBUMIN/CREAT, RND (test 3 MG/G                               

  



             code = 60086)
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
## 2022-08-14 NOTE — ER
Nurse's Notes                                                                                     

 [facility]                                                                 

Name: Meredith Stark                                                                                 

Age: 61 yrs                                                                                       

Sex: Female                                                                                       

: 1960                                                                                   

MRN: [id number]                                                                                   

Arrival Date: 2022                                                                          

Time: 13:58                                                                                       

Account#: [id number]                                                                            

Bed 24                                                                                            

Private MD:                                                                                       

Diagnosis: Other fracture of unspecified lumbar vertebra;Lumbago with sciatica, left side         

                                                                                                  

Presentation:                                                                                     

                                                                                             

15:14 Chief complaint: Patient states: left lower back pain radiating down left leg since     kb3 

      this morning. Coronavirus screen: Vaccine status: Patient reports receiving the 2nd         

      dose of the covid vaccine. Client denies travel out of the [place] in the last 14 days. At     

      this time, the client does not indicate any symptoms associated with coronavirus-19.        

      Ebola Screen: Patient negative for fever greater than or equal to 101.5 degrees             

      Fahrenheit, and additional compatible Ebola Virus Disease symptoms Patient denies           

      exposure to infectious person. Patient denies travel to an Ebola-affected area in the       

      21 days before illness onset. Initial Sepsis Screen: Does the patient meet any 2            

      criteria? No. Patient's initial sepsis screen is negative. Does the patient have a          

      suspected source of infection? No. Patient's initial sepsis screen is negative. Risk        

      Assessment: Do you want to hurt yourself or someone else? Patient reports no desire to      

      harm self or others. Onset of symptoms was 2022 at 09:00.                        

15:14 Method Of Arrival: Wheelchair                                                           kb3 

15:14 Acuity: SP 4                                                                           kb3 

                                                                                                  

Triage Assessment:                                                                                

15:15 General: Appears in no apparent distress. uncomfortable, Behavior is calm, cooperative. kb3 

      Pain: Complains of pain in left low back Pain radiates to left leg Pain currently is 10     

      out of 10 on a pain scale. Quality of pain is described as sharp, shooting, Pain began      

      3 hours ago. Is continuous. Musculoskeletal: Capillary refill < 3 seconds, Tenderness       

      present in left low back Reports pain in left low back.                                     

                                                                                                  

Historical:                                                                                       

- Allergies:                                                                                      

15:15 Darvocet-N 100;                                                                         kb3 

15:15 Nubain;                                                                                 kb3 

15:15 PENICILLINS;                                                                            kb3 

- PMHx:                                                                                           

15:15 chronic back pain; diabetes mellitus; Hypertensive disorder; Thyroid problem;           kb3 

- PSHx:                                                                                           

15:15 Appendectomy; cheryl knee reconstructive sx; carpal tunnel repair; Cholecystectomy;        kb3 

      hysterectomy;                                                                               

                                                                                                  

- Immunization history:: Adult Immunizations up to date, Client reports receiving the             

  2nd dose of the Covid vaccine, Last tetanus immunization: unknown.                              

- Social history:: Smoking status: Patient denies any tobacco usage or history of.                

  Patient/guardian denies using alcohol, street drugs.                                            

                                                                                                  

                                                                                                  

Screenin:46 Abuse screen: Denies threats or abuse. Denies injuries from another. Nutritional        [facility] 

      screening: No deficits noted. Tuberculosis screening: No symptoms or risk factors           

      identified. Fall Risk Gait- Impaired (20 pts.).                                             

                                                                                                  

Assessment:                                                                                       

17:20 General: Appears uncomfortable, Behavior is calm, cooperative. General: pt brought from [facility] 

      lobby and placed in room 24 at this time. . Pain: Complains of pain in coccyx, left         

      lower back and right lower back Pain currently is 6 out of 10 on a pain scale. Quality      

      of pain is described as aching, shooting, Pain began suddenly. Neuro: No deficits noted.    

                                                                                                  

Vital Signs:                                                                                      

15:14 Pulse 94; Resp 20; Temp 97.5; Pulse Ox 100% ; Weight 87.54 kg; Height 5 ft. 2 in.       kb3 

      (157.48 cm); Pain 10/10;                                                                    

15:15  / 65;                                                                            kb3 

18:45  / 56; Pulse 70; Resp 17; Pulse Ox 100% ; Pain 4/10;                              jh6 

15:14 Body Mass Index 35.30 (87.54 kg, 157.48 cm)                                             kb3 

                                                                                                  

ED Course:                                                                                        

13:58 Patient arrived in ED.                                                                  rg4 

14:54 Butch Gonzáles PA is PHCP.                                                              [facility] 

14:54 Gerardo Gamino MD is Attending Physician.                                             m 

15:15 Triage completed.                                                                       kb3 

15:15 Arm band placed on right wrist.                                                         kb3 

15:43 Lumbar Spine (3 Views) XRAY In Process Unspecified.                                     EDMS

17:20 Bed in low position. Call light in reach. Side rails up X 1. Adult w/ patient.          6 

17:24 Ro St, RN is Primary Nurse.                                                 jh6 

17:46 Patient moved to CT via stretcher.                                                      jh6 

17:53 Spine Lumbar Wo Con In Process Unspecified.                                             EDMS

18:54 No provider procedures requiring assistance completed.                                  jh6 

18:55 Patient did not have IV access during this emergency room visit.                        jh6 

                                                                                                  

Administered Medications:                                                                         

15:28 Drug: Ondansetron 4 mg Route: PO;                                                       kb3 

15:29 Drug: Ketorolac 30 mg Route: IM; Site: right ventrogluteal;                             kb3 

15:29 Drug: Decadron (dexamethasone) 10 mg Route: IM; Site: left deltoid;                     kb3 

15:29 Drug: morphine 4 mg Route: IM; Site: right deltoid;                                     kb3 

18:35 Drug: Norco (HYDROcodone-acetaminophen) 10 mg-325 mg 1 tabs Route: PO;                  6 

18:55 Follow up: Response: No adverse reaction                                                [facility] 

                                                                                                  

                                                                                                  

Medication:                                                                                       

18:55 VIS not applicable for this client.                                                     [facility] 

                                                                                                  

Outcome:                                                                                          

18:37 Discharge ordered by MD.                                                                jolanta 

18:54 Discharged to home via wheelchair.                                                      [facility] 

18:54 Condition: stable                                                                           

18:54 Discharge instructions given to patient, family, Instructed on discharge instructions,      

      follow up and referral plans. Demonstrated understanding of instructions, follow-up         

      care, medications, Prescriptions given X 2.                                                 

18:55 Patient left the ED.                                                                    [facility] 

                                                                                                  

Signatures:                                                                                       

Dispatcher [employer]                           EDMS                                                 

Butch Gonzáles PA PA jmm Garcia, Rubi                                 rg4                                                  

Ro St RN                   RN   6                                                  

Clover Moreno RN                    RN   kb3                                                  

                                                                                                  

**************************************************************************************************

## 2022-09-10 ENCOUNTER — HOSPITAL ENCOUNTER (EMERGENCY)
Dept: HOSPITAL 97 - ER | Age: 62
Discharge: HOME | End: 2022-09-10
Payer: COMMERCIAL

## 2022-09-10 VITALS — OXYGEN SATURATION: 97 % | SYSTOLIC BLOOD PRESSURE: 145 MMHG | DIASTOLIC BLOOD PRESSURE: 80 MMHG

## 2022-09-10 VITALS — TEMPERATURE: 97.2 F

## 2022-09-10 DIAGNOSIS — Z88.0: ICD-10-CM

## 2022-09-10 DIAGNOSIS — I10: ICD-10-CM

## 2022-09-10 DIAGNOSIS — Z88.5: ICD-10-CM

## 2022-09-10 DIAGNOSIS — M54.50: Primary | ICD-10-CM

## 2022-09-10 PROCEDURE — 96372 THER/PROPH/DIAG INJ SC/IM: CPT

## 2022-09-10 PROCEDURE — 81003 URINALYSIS AUTO W/O SCOPE: CPT

## 2022-09-10 PROCEDURE — 99283 EMERGENCY DEPT VISIT LOW MDM: CPT

## 2022-09-10 NOTE — EDPHYS
Physician Documentation                                                                           

 [facility]                                                                 

Name: Meredith Stark                                                                                 

Age: 61 yrs                                                                                       

Sex: Female                                                                                       

: 1960                                                                                   

MRN: [id number]                                                                                   

Arrival Date: 09/10/2022                                                                          

Time: 14:52                                                                                       

Account#: [id number]                                                                            

Bed External Waiting                                                                              

Private MD:                                                                                       

[facility] Physician Gerardo Gamino                                                                      

HPI:                                                                                              

09/10                                                                                             

15:41 This 61 yrs old  Female presents to ER via Ambulatory with complaints of Lower  jl9 

      back pain s/p helping her son move yesterday. Patient also c/o dysuria x2 days. .           

15:41 The patient presents with pain and spasm, tightness. The symptoms are located in the    jl9 

      low back. Onset: The symptoms/episode began/occurred yesterday. The pain does not           

      radiate. Associated signs and symptoms: Pertinent positives: dysuria, Pertinent             

      negatives: abdominal pain, chest pain. The problem was sustained when lifting               

      furniture. Modifying factors: The patient symptoms are alleviated by rest, the patient      

      symptoms are aggravated by bending. Severity of symptoms: in the emergency department       

      the symptoms a " 5" out of "10". The patient has experienced a previous episode.            

                                                                                                  

Historical:                                                                                       

- Allergies:                                                                                      

14:57 PENICILLINS;                                                                            kr3 

14:57 Nubain;                                                                                 kr3 

14:57 Darvocet-N 100;                                                                         kr3 

- PMHx:                                                                                           

14:57 chronic back pain; diabetes mellitus; Hypertensive disorder; Thyroid problem; Cirrhosis kr3 

      of liver;                                                                                   

- PSHx:                                                                                           

14:57 Appendectomy; cherly knee reconstructive sx; carpal tunnel repair; Cholecystectomy;        kr3 

      hysterectomy;                                                                               

                                                                                                  

- Immunization history:: Client reports receiving the 2nd dose of the Covid vaccine.              

- Social history:: Smoking status: Patient denies any tobacco usage or history of.                

                                                                                                  

                                                                                                  

ROS:                                                                                              

15:42 Constitutional: Negative for fever, chills, and weight loss, Eyes: Negative for injury, jl9 

      pain, redness, and discharge, ENT: Negative for injury, pain, and discharge, Neck:          

      Negative for injury, pain, and swelling, Cardiovascular: Negative for chest pain,           

      palpitations, and edema, Respiratory: Negative for shortness of breath, cough,              

      wheezing, and pleuritic chest pain, Abdomen/GI: Negative for abdominal pain, nausea,        

      vomiting, diarrhea, and constipation.                                                       

15:42 MS/Extremity: Negative for injury and deformity, Skin: Negative for injury, rash, and       

      discoloration, Neuro: Negative for headache, weakness, numbness, tingling, and seizure,     

      Psych: Negative for depression, anxiety, suicide ideation, homicidal ideation, and          

      hallucinations, Allergy/Immunology: Negative for hives, rash, and allergies, Endocrine:     

      Negative for neck swelling, polydipsia, polyuria, polyphagia, and marked weight             

      changes, Hematologic/Lymphatic: Negative for swollen nodes, abnormal bleeding, and          

      unusual bruising.                                                                           

15:42 Back: Positive for pain with movement.                                                      

15:42 : Positive for urinary symptoms, burning with urination.                                  

                                                                                                  

Exam:                                                                                             

15:43 Constitutional:  This is a well developed, well nourished patient who is awake, alert,  jl9 

      and in no acute distress. Head/Face:  Normocephalic, atraumatic. Eyes:  Pupils equal        

      round and reactive to light, extra-ocular motions intact.  Lids and lashes normal.          

      Conjunctiva and sclera are non-icteric and not injected.  Cornea within normal limits.      

      Periorbital areas with no swelling, redness, or edema. ENT:   Mucous membranes moist.       

      Neck:  Trachea midline, no thyromegaly or masses palpated, and no cervical                  

      lymphadenopathy.  Supple, full range of motion without nuchal rigidity, or vertebral        

      point tenderness.  No Meningismus. Chest/axilla:  Normal chest wall appearance and          

      motion.  Nontender with no deformity.  No lesions are appreciated. Cardiovascular:          

      Regular rate and rhythm with a normal S1 and S2.  No gallops, murmurs, or rubs.  Normal     

      PMI, no JVD.  No pulse deficits. Respiratory:  Lungs have equal breath sounds               

      bilaterally, clear to auscultation and percussion.  No rales, rhonchi or wheezes noted.     

       No increased work of breathing, no retractions or nasal flaring. Abdomen/GI:  Soft,        

      non-tender, with normal bowel sounds.  No distension or tympany.  No guarding or            

      rebound.  No evidence of tenderness throughout.                                             

15:43 Skin:  Warm, dry with normal turgor.  Normal color with no rashes, no lesions, and no       

      evidence of cellulitis. MS/ Extremity:  Pulses equal, no cyanosis.  Neurovascular           

      intact.  Full, normal range of motion. Neuro:  Awake and alert, GCS 15, oriented to         

      person, place, time, and situation.  Cranial nerves II-XII grossly intact.  Motor           

      strength 5/5 in all extremities.  Sensory grossly intact.  Cerebellar exam normal.          

      Normal gait. Psych:  Awake, alert, with orientation to person, place and time.              

      Behavior, mood, and affect are within normal limits.                                        

15:43 Back: pain, that is moderate, ROM is painful, normal spinal alignment noted, CVA            

      tenderness, that is mild, vertebral tenderness, is not appreciated, muscle spasm, is        

      appreciated in the lumbar area.                                                             

                                                                                                  

Vital Signs:                                                                                      

14:56  / 84; Pulse 114; Resp 18; Temp 97.2; Pulse Ox 96% on R/A; Height 5 ft. 2 in.     kr3 

      (157.48 cm); Pain 10/10;                                                                    

16:30  / 80; Pulse 88; Resp 16; Pulse Ox 97% ; Pain 6/10;                               jh6 

                                                                                                  

MDM:                                                                                              

14:53 Patient medically screened.                                                             [facility] 

15:43 Data reviewed: vital signs, nurses notes.                                               [facility] 

15:44 Counseling: I had a detailed discussion with the patient and/or guardian regarding: the [facility] 

      historical points, exam findings, and any diagnostic results supporting the                 

      discharge/admit diagnosis, lab results, the need for outpatient follow up, to return to     

      the emergency department if symptoms worsen or persist or if there are any questions or     

      concerns that arise at home.                                                                

                                                                                                  

09/10                                                                                             

15:28 Order name: Urine Dipstick-Ancillary; Complete Time: 15:33                              EDMS

09/10                                                                                             

14:59 Order name: Urine Dipstick-Ancillary (obtain specimen); Complete Time: 15:29            [facility] 

                                                                                                  

Administered Medications:                                                                         

15:40 Drug: Flexeril (cyclobenzaprine) 10 mg Route: PO;                                       6 

17:00 Follow up: Response: Pain is decreased                                                  [facility] 

15:40 Drug: Ketorolac 60 mg Route: IM; Site: left gluteus;                                    6 

17:57 Follow up: Response: No adverse reaction                                                [facility] 

17:00 Drug: HYDROcodone-acetaminophen 5 mg-325 mg 1 tabs Route: PO;                           6 

17:57 Follow up: Response: No adverse reaction                                                [facility] 

                                                                                                  

                                                                                                  

Disposition Summary:                                                                              

09/10/22 16:19                                                                                    

Discharge Ordered                                                                                 

      Location: Home                                                                          9 

      Condition: Stable                                                                       jl9 

      Diagnosis                                                                                   

        - Low back pain                                                                       jl9 

      Followup:                                                                               jl9 

        - With: Private Physician                                                                  

        - When: 1 - 2 days                                                                         

        - Reason: Recheck today's complaints, Continuance of care, Re-evaluation by your           

      physician                                                                                   

      Discharge Instructions:                                                                     

        - Discharge Summary Sheet                                                             jl9 

        - Acute Back Pain, Adult                                                              jl9 

        - Chronic Back Pain                                                                   jl9 

        - Urinary Tract Infection, Adult, Easy-to-Read                                        jl9 

      Forms:                                                                                      

        - Medication Reconciliation Form                                                      jl9 

        - Thank You Letter                                                                    jl9 

        - Antibiotic Education                                                                jl9 

        - Prescription Opioid Use                                                             jl9 

      Prescriptions:                                                                              

        - Cyclobenzaprine 10 mg Oral Tablet                                                        

            - take 1 tablet by ORAL route every 8 hours As needed; 30 tablet; Refills: 0,     jl9 

      Product Selection Permitted                                                                 

        - cefpodoxime 200 mg Oral Tablet                                                           

            - take 1 tablet by ORAL route every 12 hours for 7 days with food; 14 tablet;     jl9 

      Refills: 0, Product Selection Permitted                                                     

Signatures:                                                                                       

Ro St RN                   RN   jh6                                                  

Rick Marie                                jl9                                                  

Michelle Buchanan RN                        RN   kr3                                                  

                                                                                                  

**************************************************************************************************

## 2022-09-10 NOTE — ER
Nurse's Notes                                                                                     

 [facility]                                                                 

Name: Meredith Stark                                                                                 

Age: 61 yrs                                                                                       

Sex: Female                                                                                       

: 1960                                                                                   

MRN: [id number]                                                                                   

Arrival Date: 09/10/2022                                                                          

Time: 14:52                                                                                       

Account#: [id number]                                                                            

Bed External Waiting                                                                              

Private MD:                                                                                       

Diagnosis: Low back pain                                                                          

                                                                                                  

Presentation:                                                                                     

09/10                                                                                             

14:56 Chief complaint: Patient states: Low back pain with dysuria for 2 days. States she was  kr3 

      helping her son move yesterday. Coronavirus screen: Vaccine status: Patient reports         

      receiving the 2nd dose of the covid vaccine. Client denies travel out of the [place] in        

      the last 14 days. At this time, the client does not indicate any symptoms associated        

      with coronavirus-19. Ebola Screen: Patient denies travel to an Ebola-affected area in       

      the 21 days before illness onset. Initial Sepsis Screen: Does the patient meet any 2        

      criteria? HR > 90 bpm. No. Patient's initial sepsis screen is negative. Does the            

      patient have a suspected source of infection? Yes: Dysuria/Frequency/Urgency/UTI. Risk      

      Assessment: Do you want to hurt yourself or someone else? Patient reports no desire to      

      harm self or others. Onset of symptoms was 2022.                              

14:56 Method Of Arrival: Ambulatory                                                           kr3 

14:56 Acuity: SP 3                                                                           kr3 

                                                                                                  

Triage Assessment:                                                                                

14:58 General: Appears uncomfortable, Behavior is calm, cooperative, appropriate for age.     kr3 

      Pain: Complains of pain in back Pain currently is 10 out of 10 on a pain scale. Quality     

      of pain is described as aching. : Reports burning with urination, urinary frequency.      

      Musculoskeletal: Reports pain in back.                                                      

                                                                                                  

Historical:                                                                                       

- Allergies:                                                                                      

14:57 PENICILLINS;                                                                            kr3 

14:57 Nubain;                                                                                 kr3 

14:57 Darvocet-N 100;                                                                         kr3 

- PMHx:                                                                                           

14:57 chronic back pain; diabetes mellitus; Hypertensive disorder; Thyroid problem; Cirrhosis kr3 

      of liver;                                                                                   

- PSHx:                                                                                           

14:57 Appendectomy; cheryl knee reconstructive sx; carpal tunnel repair; Cholecystectomy;        kr3 

      hysterectomy;                                                                               

                                                                                                  

- Immunization history:: Client reports receiving the 2nd dose of the Covid vaccine.              

- Social history:: Smoking status: Patient denies any tobacco usage or history of.                

                                                                                                  

                                                                                                  

Screening:                                                                                        

15:20 Abuse screen: Denies threats or abuse. Denies injuries from another. Nutritional        jh6 

      screening: No deficits noted. Tuberculosis screening: No symptoms or risk factors           

      identified. Fall Risk None identified.                                                      

                                                                                                  

Assessment:                                                                                       

15:20 General: Appears in no apparent distress. comfortable, Behavior is calm, cooperative.   jh6 

15:20 Pain: Complains of pain in coccyx, left lower back and right lower back Pain currently  jh6 

      is 10 out of 10 on a pain scale. Quality of pain is described as aching, sharp,             

      shooting, Pain began 1 day ago. Is continuous, Alleviated by nothing. Aggravated by         

      increased activity. Neuro: Level of Consciousness is awake, alert, obeys commands,          

      Oriented to person, place, time, situation,  are equal bilaterally Moves all           

      extremities. Gait is steady, Speech is normal, Facial symmetry appears normal, Pupils       

      are PERRLA. Musculoskeletal: Reports pain in coccyx, left lower back and right lower        

      back.                                                                                       

16:15 Reassessment: Patient and/or family updated on plan of care and expected duration. Pain jh6 

      level reassessed. pt states that pain is slightly better but is requesting something        

      else for pain. states that she normally takes Percocet for pain at home but is out          

      until next week, provider was made aware.                                                   

                                                                                                  

Vital Signs:                                                                                      

14:56  / 84; Pulse 114; Resp 18; Temp 97.2; Pulse Ox 96% on R/A; Height 5 ft. 2 in.     kr3 

      (157.48 cm); Pain 10/10;                                                                    

16:30  / 80; Pulse 88; Resp 16; Pulse Ox 97% ; Pain 6/10;                               jh6 

                                                                                                  

ED Course:                                                                                        

14:52 Patient arrived in ED.                                                                  rg4 

14:53 [facility]                                                                  jl9 

14:53 Gerardo Gaimno MD is Attending Physician.                                             jl9 

14:57 Triage completed.                                                                       kr3 

14:58 Arm band placed on.                                                                     kr3 

15:14 Ro St, RN is Primary Nurse.                                                 jh6 

15:20 Call light in reach. Side rails up X 1. Adult w/ patient.                               jh6 

15:34 Urine collected: clean catch specimen.                                                  jh6 

18:02 Patient did not have IV access during this emergency room visit.                        jh6 

                                                                                                  

Administered Medications:                                                                         

15:40 Drug: Flexeril (cyclobenzaprine) 10 mg Route: PO;                                       jh6 

17:00 Follow up: Response: Pain is decreased                                                  jh6 

15:40 Drug: Ketorolac 60 mg Route: IM; Site: left gluteus;                                    6 

17:57 Follow up: Response: No adverse reaction                                                6 

17:00 Drug: HYDROcodone-acetaminophen 5 mg-325 mg 1 tabs Route: PO;                           6 

17:57 Follow up: Response: No adverse reaction                                                [facility] 

                                                                                                  

                                                                                                  

Medication:                                                                                       

15:47 VIS not applicable for this client.                                                     [facility] 

                                                                                                  

Outcome:                                                                                          

16:19 Discharge ordered by MD. geronimo 

17:00 Discharged to home ambulatory.                                                          6 

17:00 Condition: improved                                                                     6 

17:00 Discharge instructions given to patient, family, Instructed on discharge instructions,      

      follow up and referral plans. Demonstrated understanding of instructions, follow-up         

      care, medications, Prescriptions given X 2.                                                 

18:02 Patient left the ED.                                                                    [facility] 

                                                                                                  

Signatures:                                                                                       

Pilar Paul Jennifer RN                   RN   jh6                                                  

Rick Marie9                                                  

Michelle Buchanan RN                        RN   kr3                                                  

                                                                                                  

**************************************************************************************************

## 2022-09-10 NOTE — XMS REPORT
Continuity of Care Document

                          Created on:September 10, 2022



Patient:SUDHA BABIN

Sex:Female

:1960

External Reference #:[id number]





Demographics







                          Address                   [address]



                                                    [place] [address]

 

                          Home Phone                [phone]

 

                          Mobile Phone              [phone]

 

                          Email Address             [address]

 

                          Preferred Language        English

 

                          Marital Status            Unknown

 

                          Moravian Affiliation     Unknown

 

                          Race                      Unknown

 

                          Additional Race(s)        Unavailable



                                                    Unavailable



                                                    White

 

                          Ethnic Group              Unknown









Author







                          Organization              [facility]

t

 

                          Address                   [address]



                                                    [place] [address]

 

                          Phone                     [phone]









Support







                Name            Relationship    Address         Phone

 

                JUNIE BABIN              [phone] 



                                                [place] [address] 

 

                EMILY BABINALBARO ANGELES              Unavailable     [phone]

 

                REGINA BABIN  X               [address] [phone]

[phone]



                                                         



                                                [place] [address] 









Care Team Providers







                    Name                Role                Phone

 

                    PCP, PATIENT DOES NOT HAVE A Primary Care Physician Unavaila

BENNIE Ramirez      Attending Clinician Unavailable

 

                    Rosie Jha DO  Attending Clinician [phone]

 

                    ROSIE JHA     Attending Clinician Unavailable

 

                    BENNIE ORR Attending Clinician Unavailable

 

                    ROSIE JHA     Admitting Clinician Unavailable









Payers







           Payer Name Policy Type Policy Number Effective Date Expiration Date HORACIO polo

 

           [facility] [employer]            [id number]  2022 00:00:00            







Problems

This patient has no known problems.



Allergies, Adverse Reactions, Alerts







       Allergy Allergy Status Severity Reaction(s) Onset  Inactive Treating Comm

ents 

Source



       Name   Type                        Date   Date   Clinician        

 

       [facility] Propensi Active        Other - See                       U

nivers



       in     ty to                comments 715                        ity of



              adverse                      00:00:                      [place]



              reaction                      00                          Medical



              s                                                       Branch

 

       [facility]   Active        Other-Cmnt                       Univ

ers



       IN     INGREDI                      7-15                        ity of



                                          00:00:                      [place]



                                          00                          Medical



                                                                      Branch

 

       n      Propensi Active                                     



              ty to                                               



              adverse                      00:00:                      



              reaction                      00                          



              to drug                                                  

 

       Bactrim Propensi Active                                     



       - Oral ty to                                               



              adverse                      00:00:                      



              reaction                      00                          



              to drug                                                  

 

       NO KNOWN Drug   Active                                           [facility]



       ALLERGIE Class                                                   ity of



       S                                                              [facility]







Social History







           Social Habit Start Date Stop Date  Quantity   Comments   Source

 

           Exposure to 2022 2022-07-15 Not sure              [facility]

 [place]



           SARS-CoV-2 (event) 00:00:00   23:14:00                         [facility]

l Branch

 

           Sex Assigned At [birth year]                       [facility]



           Birth      00:00:00   00:00:00                         Medical Branch









                Smoking Status  Start Date      Stop Date       Source

 

                Tobacco smoking consumption                                 [facility]



                unknown                                         Branch







Medications







       Ordered Filled Start  Stop   Current Ordering Indication Dosage Frequency

 Signature

                    Comments            Components          Source



     Medication Medication Date Date Medication? Clinician                (SIG) 

          



     Name Name                                                   

 

     MORPHINE      -0      No             30                       



     SULFATE ER      8-22                                              



     30 MG TBCR      00:00:                                              



               00                                                

 

     TRINTELLIX      -0      No             20                       



     20 MG TABS      8-18                                              



               00:00:                                              



               00                                                

 

     Dose      2-0      No             25                       



     Unknown      8-10                                              



               00:00:                                              



               00                                                

 

     &lt       2022-0      No             10                       



               8-10                                              



               00:00:                                              



               00                                                

 

     Dose      2022-0      No                                      



     Unknown      8-10                                              



               00:00:                                              



               00                                                

 

     Dose      2-0      No             15                       



     Unknown      8-10                                              



               00:00:                                              



               00                                                

 

     Dose      2022-0      No             5                        



     Unknown      8-10                                              



               00:00:                                              



               00                                                

 

     Dose      2022-0      No             4                        



     Unknown      7-29                                              



               00:00:                                              



               00                                                

 

     Dose      2022-0      No             10                       



     Unknown      7-29                                              



               00:00:                                              



               00                                                

 

     &lt       2022-0      No                                      



               7-25                                              



               00:00:                                              



               00                                                

 

     lisinopril      2-0      No             1mg                      



     5 mg tablet      7-21                                              



               00:00:                                              



               00                                                

 

     Myrbetriq      2-0      No             1mg                      



     25 mg      7-21                                              



     tablet,exte      00:00:                                              



     nded      00                                                



     release                                                        

 

     citalopram      2-0      No             1mg                      



     20 mg      7-21                                              



     tablet      00:00:                                              



               00                                                

 

     atorvastati      2-0      No             1mg                      



     n 10 mg      7-21                                              



     tablet      00:00:                                              



               00                                                

 

     metformin      2022-0      No             1mg                      



     1,000 mg      7-21                                              



     tablet      00:00:                                              



               00                                                

 

     glimepiride      2-0      No             1mg                      



     1 mg tablet      7-21                                              



               00:00:                                              



               00                                                

 

     metoclopram      2-0      No             1mg                      



     fabio 10 mg      7-21                                              



     tablet      00:00:                                              



               00                                                

 

     ferrous      2-0      No             1(65 mg                     



     sulfate 325      7-                     iron)                     



     mg (65 mg      00:00:                                              



     iron)      00                                                



     tablet                                                        

 

     omeprazole      2-0      No             1mg                      



     40 mg      7-21                                              



     capsule,del      00:00:                                              



     ayed      00                                                



     release                                                        

 

     TAKE 1      -0      No             1000                     



     TABLET      -                                              



     TWICE      00:00:                                              



     DAILY.      00                                                

 

     TAKE 1      -0      No             500                      



     TABLET BY      7-                                              



     MOUTH EVERY      00:00:                                              



     8 HOURS FOR      00                                                



     10 DAYS                                                        

 

     &lt       2022-0      No             250                      



               7-                                              



               00:00:                                              



               00                                                

 

     &lt       2022-0      No             10                       



               7-                                              



               00:00:                                              



               00                                                

 

     &lt       2022-0      No             25                       



               7-                                              



               00:00:                                              



               00                                                

 

     &lt       2022-0      No                                      



               7-21                                              



               00:00:                                              



               00                                                

 

     &lt       2022-0      No                                      



               7-21                                              



               00:00:                                              



               00                                                

 

     TAKE 1      -0      No             1000                     



     TABLET                                                    



     TWICE      00:00:                                              



     DAILY.      00                                                

 

     lisinopril      2-0      No             1mg                      



     5 mg tablet                                                    



               00:00:                                              



               00                                                

 

     Myrbetriq      2022-0      No             1mg                      



     25 mg      -                                              



     tablet,exte      00:00:                                              



     nded      00                                                



     release                                                        

 

     citalopram      2-0      No             1mg                      



     20 mg      -                                              



     tablet      00:00:                                              



               00                                                

 

     atorvastati      2022-0      No             1mg                      



     n 10 mg      -                                              



     tablet      00:00:                                              



               00                                                

 

     metformin      2-0      No             1mg                      



     1,000 mg      -                                              



     tablet      00:00:                                              



               00                                                

 

     glimepiride      2-0      No             1mg                      



     1 mg tablet                                                    



               00:00:                                              



               00                                                

 

     metoclopram      2-0      No             1mg                      



     fabio 10 mg      -                                              



     tablet      00:00:                                              



               00                                                

 

     ferrous      2022-0      No             1(65 mg                     



     sulfate 325                           iron)                     



     mg (65 mg      00:00:                                              



     iron)      00                                                



     tablet                                                        

 

     omeprazole      -0      No             1mg                      



     40 mg                                                    



     capsule,del      00:00:                                              



     ayed      00                                                



     release                                                        

 

     TAKE 1      -0      No             1000                     



     TABLET                                                    



     TWICE      00:00:                                              



     DAILY.      00                                                

 

     TAKE 1      -0      No             500                      



     TABLET BY                                                    



     MOUTH EVERY      00:00:                                              



     8 HOURS FOR      00                                                



     10 DAYS                                                        

 

     &lt       2022-0      No             250                      



               7-                                              



               00:00:                                              



               00                                                

 

     &lt       2022-0      No             10                       



               7-                                              



               00:00:                                              



               00                                                

 

     &lt       2022-0      No             25                       



               7-                                              



               00:00:                                              



               00                                                

 

     &lt       2022-0      No                                      



               7-21                                              



               00:00:                                              



               00                                                

 

     &lt       2022-0      No                                      



               7-21                                              



               00:00:                                              



               00                                                

 

     TAKE 1      2-0      No             1000                     



     TABLET                                                    



     TWICE      00:00:                                              



     DAILY.      00                                                

 

     Dose      2022-0      No             5                        



     Unknown                                                    



               00:00:                                              



               00                                                

 

     Dose      2022-0      No             5                        



     Unknown                                                    



               00:00:                                              



               00                                                

 

     HYDROcodone      2-0 2022- No             1{tbl}      1 tablet,         

  [facility]



     -acetaminop                                Oral,           ity of



     hen (NORCO)      06:30: 05:28                          ONCE, 1           [facility]

[place]



      mg      00   :00                           dose, On           Medica

l



     tablet 1                                         Sat            Branch



     tablet                                         22 at           



                                                  0130,           



                                                  Routine           

 

     TAKE 1      -0      No             500                      



     TABLET BY      7-13                                              



     MOUTH EVERY      00:00:                                              



     12 HOURS      00                                                



     FOR 10 DAYS                                                        

 

     &lt       2022-0      No                                      



                                                             



               00:00:                                              



               00                                                

 

     TAKE 1      -0      No                                      



     TABLET BY      7-13                                              



     MOUTH DAILY      00:00:                                              



               00                                                

 

     &lt       2022-0      No                                      



               7                                              



               00:00:                                              



               00                                                

 

     &lt       2022-0      No             20                       



                                                             



               00:00:                                              



               00                                                

 

     Dose      2-0      No             50                       



     Unknown                                                    



               00:00:                                              



               00                                                

 

     TAKE 1      -0      No             500                      



     TABLET BY      7-13                                              



     MOUTH EVERY      00:00:                                              



     8 HOURS FOR      00                                                



     10 DAYS                                                        

 

     Dose      2022-0      No             20                       



     Unknown                                                    



               00:00:                                              



               00                                                

 

     Dose      2022-0      No             10                       



     Unknown                                                    



               00:00:                                              



               00                                                

 

     &lt       2022-0      No             25                       



                                                             



               00:00:                                              



               00                                                

 

     DISSOLVE 1      2-0      No             4                        



     TABLET BY      7-13                                              



     MOUTH EVERY      00:00:                                              



     8 HOURS AS      00                                                



     NEEDED                                                        

 

     &lt       2022-0      No                                      



                                                             



               00:00:                                              



               00                                                

 

     TAKE 1      -0      No             1000                     



     TABLET                                                    



     TWICE      00:00:                                              



     DAILY.      00                                                

 

     &lt       2022-0      No             10                       



                                                             



               00:00:                                              



               00                                                

 

     &lt       2022-0      No             15                       



                                                             



               00:00:                                              



               00                                                

 

     &lt       2022-0      No             5                        



                                                             



               00:00:                                              



               00                                                

 

     TAKE 1      2-0      No             5                        



     TABLET BY      7-13                                              



     MOUTH TWICE      00:00:                                              



     A DAY AS      00                                                



     NEEDED FOR                                                        



     ANXIETY                                                        

 

     Dose      2022-0      No                                      



     Unknown                                                    



               00:00:                                              



               00                                                

 

     &lt       2022-0      No                                      



               7-                                              



               00:00:                                              



               00                                                

 

     TAKE 1      -0      No             500                      



     TABLET BY      7-13                                              



     MOUTH EVERY      00:00:                                              



     12 HOURS      00                                                



     FOR 10 DAYS                                                        

 

     &lt       2022-0      No                                      



               7-                                              



               00:00:                                              



               00                                                

 

     TAKE 1      2022-0      No                                      



     TABLET BY      7-13                                              



     MOUTH DAILY      00:00:                                              



               00                                                

 

     &lt       2022-0      No                                      



               7                                              



               00:00:                                              



               00                                                

 

     &lt       2022-0      No             20                       



                                                             



               00:00:                                              



               00                                                

 

     Dose      2022-0      No             50                       



     Unknown                                                    



               00:00:                                              



               00                                                

 

     TAKE 1      2022-0      No             500                      



     TABLET BY      7-13                                              



     MOUTH EVERY      00:00:                                              



     8 HOURS FOR      00                                                



     10 DAYS                                                        

 

     Dose      2022-0      No             20                       



     Unknown                                                    



               00:00:                                              



               00                                                

 

     Dose      2022-0      No             10                       



     Unknown                                                    



               00:00:                                              



               00                                                

 

     &lt       2022-0      No             25                       



                                                             



               00:00:                                              



               00                                                

 

     DISSOLVE 1      2022-0      No             4                        



     TABLET BY      7-                                              



     MOUTH EVERY      00:00:                                              



     8 HOURS AS      00                                                



     NEEDED                                                        

 

     &lt       2022-0      No                                      



                                                             



               00:00:                                              



               00                                                

 

     TAKE 1      2022-0      No             1000                     



     TABLET                                                    



     TWICE      00:00:                                              



     DAILY.      00                                                

 

     &lt       2022-0      No             10                       



                                                             



               00:00:                                              



               00                                                

 

     &lt       2022-0      No             15                       



                                                             



               00:00:                                              



               00                                                

 

     &lt       2022-0      No             5                        



                                                             



               00:00:                                              



               00                                                

 

     TAKE 1      2022-0      No             5                        



     TABLET BY      13                                              



     MOUTH TWICE      00:00:                                              



     A DAY AS      00                                                



     NEEDED FOR                                                        



     ANXIETY                                                        

 

     Dose      2022-0      No                                      



     Unknown                                                    



               00:00:                                              



               00                                                

 

     &lt       2022-0      No                                      



                                                             



               00:00:                                              



               00                                                

 

     TAKE 1      2022-0      No             5                        



     TABLET BY      7-12                                              



     MOUTH TWICE      00:00:                                              



     A DAY AS      00                                                



     NEEDED FOR                                                        



     ANXIETY                                                        

 

     TAKE 1      2022-0      No             5                        



     TABLET BY      7-12                                              



     MOUTH TWICE      00:00:                                              



     A DAY AS      00                                                



     NEEDED FOR                                                        



     ANXIETY                                                        

 

     DISSOLVE 1      2022-0      No             4                        



     TABLET BY      7-11                                              



     MOUTH EVERY      00:00:                                              



     8 HOURS AS      00                                                



     NEEDED                                                        

 

     DISSOLVE 1      2022-0      No             4                        



     TABLET BY      7-11                                              



     MOUTH EVERY      00:00:                                              



     8 HOURS AS      00                                                



     NEEDED                                                        

 

     TAKE 1      2022-0      No             [id number]                     



     TABLET                                                    



     TWICE      00:00:                                              



     DAILY.      00                                                

 

     &lt       2022-0      No             25                       



                                                             



               00:00:                                              



               00                                                

 

     TAKE 1      2022-0      No             500                      



     TABLET BY      7-06                                              



     MOUTH EVERY      00:00:                                              



     8 HOURS FOR      00                                                



     10 DAYS                                                        

 

     &lt       2022-0      No                                      



               -                                              



               00:00:                                              



               00                                                

 

     &lt       2022-0      No             30                       



                                                             



               00:00:                                              



               00                                                

 

     TAKE 1      2022-0      No             30                       



     TABLET BY      7-06                                              



     MOUTH AT      00:00:                                              



     BEDTIME      00                                                

 

     &lt       2022-0      No                                      



               7                                              



               00:00:                                              



               00                                                

 

     Dose      2022-0      No             50                       



     Unknown                                                    



               00:00:                                              



               00                                                

 

     &lt       2022-0      No             20                       



                                                             



               00:00:                                              



               00                                                

 

     &lt       2022-0      No                                      



               7                                              



               00:00:                                              



               00                                                

 

     TAKE 1      2022-0      No             [id number]                     



     TABLET                                                    



     TWICE      00:00:                                              



     DAILY.      00                                                

 

     &lt       2022-0      No             25                       



                                                             



               00:00:                                              



               00                                                

 

     TAKE 1      2022-0      No             500                      



     TABLET BY                                                    



     MOUTH EVERY      00:00:                                              



     8 HOURS FOR                                                      



     10 DAYS                                                        

 

     &lt       2022-0      No                                      



                                                             



               00:00:                                              



               00                                                

 

     &lt       2022-0      No             30                       



                                                             



               00:00:                                              



               00                                                

 

     TAKE 1      2022-0      No             30                       



     TABLET BY      7-                                              



     MOUTH AT      00:00:                                              



     BEDTIME      00                                                

 

     &lt       2022-0      No                                      



                                                             



               00:00:                                              



               00                                                

 

     Dose      2022-0      No             50                       



     Unknown                                                    



               00:00:                                              



               00                                                

 

     &lt       2022-0      No             20                       



                                                             



               00:00:                                              



               00                                                

 

     &lt       2022-0      No                                      



                                                             



               00:00:                                              



               00                                                

 

     TAKE 1      2022-0      No             20                       



     TABLET BY      7-05                                              



     MOUTH ONCE      00:00:                                              



     A DAY WITH      00                                                



     MEALS                                                        

 

     TAKE 1      2022-0      No             20                       



     TABLET BY      7-05                                              



     MOUTH ONCE      00:00:                                              



     A DAY WITH      00                                                



     MEALS                                                        

 

     &lt       2022-0      No                                      



               6-30                                              



               00:00:                                              



               00                                                

 

     TAKE 1      2022-0      No             200                      



     TABLET      6-30                                              



     DAILY.      00:00:                                              



               00                                                

 

     &lt       2022-0      No                                      



               6-30                                              



               00:00:                                              



               00                                                

 

     TAKE 1      2022-0      No             200                      



     TABLET      6-30                                              



     DAILY.      00:00:                                              



               00                                                

 

     TAKE 1      2022-0      No                                      



     TABLET BY      6-29                                              



     MOUTH EVERY      00:00:                                              



     8 HOURS FOR      00                                                



     10 DAYS                                                        

 

     TAKE 1      2022-0      No                                      



     TABLET BY      6-29                                              



     MOUTH EVERY      00:00:                                              



     8 HOURS FOR                                                      



     10 DAYS                                                        

 

     &lt       2022-0      No                                      



               6-29                                              



               00:00:                                              



               00                                                

 

     &lt       2022-0      No                                      



               6-29                                              



               00:00:                                              



               00                                                

 

     &lt       2022-0      No                                      



               6-29                                              



               00:00:                                              



               00                                                

 

     &lt       2022-0      No                                      



               6-29                                              



               00:00:                                              



               00                                                

 

     TAKE 1      2022-0      No                                      



     TABLET BY      6-29                                              



     MOUTH EVERY      00:00:                                              



     8 HOURS FOR      00                                                



     10 DAYS                                                        

 

     TAKE 1      2022-0      No                                      



     TABLET BY      29                                              



     MOUTH EVERY      00:00:                                              



     8 HOURS FOR      00                                                



     10 DAYS                                                        

 

     &lt       2022-0      No                                      



                                                             



               00:00:                                              



               00                                                

 

     &lt       2022-0      No                                      



                                                             



               00:00:                                              



               00                                                

 

     &lt       2022-0      No                                      



                                                             



               00:00:                                              



               00                                                

 

     &lt       2022-0      No                                      



                                                             



               00:00:                                              



               00                                                

 

     TAKE ONE      2-0      No                                      



     (1) TO TWO                                                    



     (2)       00:00:                                              



     TABLET(S)      00                                                



     BY MOUTH                                                        



     EVERY 6                                                        



     HOURS AS                                                        



     NEEDED FOR                                                        



     PAIN , NO                                                        



     MORE THAN 8                                                        



     TABLETS IN                                                        



     24 HOURS.                                                        

 

     &lt       2022-0      No                                      



                                                             



               00:00:                                              



               00                                                

 

     &lt       2022-0      No                                      



                                                             



               00:00:                                              



               00                                                

 

     &lt       2022-0      No                                      



                                                             



               00:00:                                              



               00                                                

 

     &lt       2022-0      No                                      



                                                             



               00:00:                                              



               00                                                

 

     &lt       2022-0      No                                      



                                                             



               00:00:                                              



               00                                                

 

     TAKE ONE      2-0      No                                      



     (1) TO TWO                                                    



     (2)       00:00:                                              



     TABLET(S)      00                                                



     BY MOUTH                                                        



     EVERY 6                                                        



     HOURS AS                                                        



     NEEDED FOR                                                        



     PAIN , NO                                                        



     MORE THAN 8                                                        



     TABLETS IN                                                        



     24 HOURS.                                                        

 

     &lt       2022-0      No                                      



                                                             



               00:00:                                              



               00                                                

 

     &lt       2022-0      No                                      



                                                             



               00:00:                                              



               00                                                

 

     &lt       2022-0      No                                      



                                                             



               00:00:                                              



               00                                                

 

     &lt       2022-0      No                                      



                                                             



               00:00:                                              



               00                                                

 

     &lt       2022-0      No                                      



                                                             



               00:00:                                              



               00                                                

 

     TAKE ONE      2-0      No                                      



     (1) TO TWO                                                    



     (2)       00:00:                                              



     TABLET(S)      00                                                



     BY MOUTH                                                        



     EVERY 6                                                        



     HOURS AS                                                        



     NEEDED FOR                                                        



     PAIN , NO                                                        



     MORE THAN 8                                                        



     TABLETS IN                                                        



     24 HOURS.                                                        

 

     &lt       2022-0      No                                      



                                                             



               00:00:                                              



               00                                                

 

     &lt       2022-0      No                                      



                                                             



               00:00:                                              



               00                                                

 

     &lt       2022-0      No                                      



                                                             



               00:00:                                              



               00                                                

 

     &lt       2022-0      No                                      



               6                                              



               00:00:                                              



               00                                                

 

     &lt       2022-0      No                                      



                                                             



               00:00:                                              



               00                                                

 

     TAKE 1      2022-0      No                                      



     TABLET BY      6-24                                              



     MOUTH AT      00:00:                                              



     BEDTIME      00                                                

 

     DISSOLVE 1      2022-0      No                                      



     TABLET BY      6-24                                              



     MOUTH EVERY      00:00:                                              



     8 HOURS AS      00                                                



     NEEDED                                                        

 

     TAKE 1      2022-0      No                                      



     TABLET BY      6-24                                              



     MOUTH AT      00:00:                                              



     BEDTIME      00                                                

 

     DISSOLVE 1      2022-0      No                                      



     TABLET BY      6-24                                              



     MOUTH EVERY      00:00:                                              



     8 HOURS AS      00                                                



     NEEDED                                                        

 

     TAKE 1      2022-0      No                                      



     TABLET BY      6-24                                              



     MOUTH AT      00:00:                                              



     BEDTIME      00                                                

 

     DISSOLVE 1      2022-0      No                                      



     TABLET BY      6-24                                              



     MOUTH EVERY      00:00:                                              



     8 HOURS AS      00                                                



     NEEDED                                                        

 

     metformin      2022-0      No             1mg                      



     1,000 mg      6-23                                              



     tablet      00:00:                                              



               00                                                

 

     levothyroxi      2022-0      No             1mcg                     



     ne 75 mcg      6-23                                              



     tablet      00:00:                                              



               00                                                

 

     levothyroxi      2022-0      No             1mcg                     



     ne 200 mcg      6-23                                              



     tablet      00:00:                                              



               00                                                

 

     metformin      2022-0      No             1mg                      



     1,000 mg      6-23                                              



     tablet      00:00:                                              



               00                                                

 

     levothyroxi      2022-0      No             1mcg                     



     ne 75 mcg      6-23                                              



     tablet      00:00:                                              



               00                                                

 

     levothyroxi      2022-0      No             1mcg                     



     ne 200 mcg      6-23                                              



     tablet      00:00:                                              



               00                                                

 

     metformin      2022-0      No             1mg                      



     1,000 mg      6-23                                              



     tablet      00:00:                                              



               00                                                

 

     levothyroxi      2022-0      No             1mcg                     



     ne 75 mcg      6-23                                              



     tablet      00:00:                                              



               00                                                

 

     levothyroxi      2022-0      No             1mcg                     



     ne 200 mcg      6-23                                              



     tablet      00:00:                                              



               00                                                

 

     Myrbetriq      2022-0      No             1mg                      



     25 mg      4-29                                              



     tablet,exte      00:00:                                              



     nded      00                                                



     release                                                        

 

     hydroxyzine      2022-0      No             1mg                      



     HCl 50 mg      4-29                                              



     tablet      00:00:                                              



               00                                                

 

     lisinopril      2022-0      No             1mg                      



     5 mg tablet      4-29                                              



               00:00:                                              



               00                                                

 

     metformin      2022-0      No             1mg                      



     1,000 mg      4-29                                              



     tablet      00:00:                                              



               00                                                

 

     metoclopram      2022-0      No             1mg                      



     fabio 10 mg      4-29                                              



     tablet      00:00:                                              



               00                                                

 

     Myrbetriq      2022-0      No             1mg                      



     25 mg      4-29                                              



     tablet,exte      00:00:                                              



     nded      00                                                



     release                                                        

 

     hydroxyzine      2022-0      No             1mg                      



     HCl 50 mg      4-29                                              



     tablet      00:00:                                              



               00                                                

 

     lisinopril      2022-0      No             1mg                      



     5 mg tablet      4-29                                              



               00:00:                                              



               00                                                

 

     metformin      2022-0      No             1mg                      



     1,000 mg      4-29                                              



     tablet      00:00:                                              



               00                                                

 

     metoclopram      2022-0      No             1mg                      



     fabio 10 mg      4-29                                              



     tablet      00:00:                                              



               00                                                

 

     Myrbetriq      2-0      No             1mg                      



     25 mg      4-29                                              



     tablet,exte      00:00:                                              



     nded      00                                                



     release                                                        

 

     hydroxyzine      2022-0      No             1mg                      



     HCl 50 mg      4-29                                              



     tablet      00:00:                                              



               00                                                

 

     lisinopril      2022-0      No             1mg                      



     5 mg tablet      4-29                                              



               00:00:                                              



               00                                                

 

     metformin      2022-0      No             1mg                      



     1,000 mg      4-29                                              



     tablet      00:00:                                              



               00                                                

 

     metoclopram      2022-0      No             1mg                      



     fabio 10 mg      4-29                                              



     tablet      00:00:                                              



               00                                                

 

     citalopram      2022-0      No             1mg                      



     20 mg      4-27                                              



     tablet      00:00:                                              



               00                                                

 

     atorvastati      2022-0      No             1mg                      



     n 10 mg      4-27                                              



     tablet      00:00:                                              



               00                                                

 

     glimepiride      2022-0      No             1mg                      



     1 mg tablet      4-27                                              



               00:00:                                              



               00                                                

 

     levothyroxi      2022-0      No             1mcg                     



     ne 75 mcg      4-27                                              



     tablet      00:00:                                              



               00                                                

 

     levothyroxi      2022-0      No             1mcg                     



     ne 200 mcg      4-27                                              



     tablet      00:00:                                              



               00                                                

 

     levothyroxi      2022-0      No             1mcg                     



     ne 300 mcg      4-27                                              



     tablet      00:00:                                              



               00                                                

 

     ferrous      2022-0      No             1(65 mg                     



     sulfate 325      4-27                     iron)                     



     mg (65 mg      00:00:                                              



     iron)      00                                                



     tablet                                                        

 

     omeprazole      2-0      No             1mg                      



     40 mg      4-27                                              



     capsule,del      00:00:                                              



     ayed      00                                                



     release                                                        

 

     citalopram      2-0      No             1mg                      



     20 mg      4-27                                              



     tablet      00:00:                                              



               00                                                

 

     atorvastati      2022-0      No             1mg                      



     n 10 mg      4-27                                              



     tablet      00:00:                                              



               00                                                

 

     glimepiride      2022-0      No             1mg                      



     1 mg tablet      4-27                                              



               00:00:                                              



               00                                                

 

     levothyroxi      2022-0      No             1mcg                     



     ne 75 mcg      4-27                                              



     tablet      00:00:                                              



               00                                                

 

     levothyroxi      2022-0      No             1mcg                     



     ne 200 mcg      4-27                                              



     tablet      00:00:                                              



               00                                                

 

     levothyroxi      2022-0      No             1mcg                     



     ne 300 mcg      4-27                                              



     tablet      00:00:                                              



               00                                                

 

     ferrous      2022-0      No             1(65 mg                     



     sulfate 325      4-27                     iron)                     



     mg (65 mg      00:00:                                              



     iron)      00                                                



     tablet                                                        

 

     omeprazole      -0      No             1mg                      



     40 mg      4-27                                              



     capsule,del      00:00:                                              



     ayed      00                                                



     release                                                        

 

     citalopram      2-0      No             1mg                      



     20 mg      4-27                                              



     tablet      00:00:                                              



               00                                                

 

     atorvastati      2-0      No             1mg                      



     n 10 mg      4-27                                              



     tablet      00:00:                                              



               00                                                

 

     glimepiride      2-0      No             1mg                      



     1 mg tablet                                                    



               00:00:                                              



               00                                                

 

     levothyroxi      -0      No             1mcg                     



     ne 75 mcg      4-27                                              



     tablet      00:00:                                              



               00                                                

 

     levothyroxi      -0      No             1mcg                     



     ne 200 mcg      4-27                                              



     tablet      00:00:                                              



               00                                                

 

     levothyroxi      -0      No             1mcg                     



     ne 300 mcg      -27                                              



     tablet      00:00:                                              



               00                                                

 

     ferrous      2-0      No             1(65 mg                     



     sulfate 325      -                     iron)                     



     mg (65 mg      00:00:                                              



     iron)      00                                                



     tablet                                                        

 

     omeprazole      -0      No             1mg                      



     40 mg      -27                                              



     capsule,del      00:00:                                              



     ayed      00                                                



     release                                                        

 

     Dose      2-0      No                                      



     Unknown      4-01                                              



               00:00:                                              



               00                                                

 

     Dose      2022-0      No                                      



     Unknown      4-01                                              



               00:00:                                              



               00                                                

 

     Dose      2022-0      No                                      



     Unknown      4-01                                              



               00:00:                                              



               00                                                

 

     Dose      2022-0      No                                      



     Unknown      4-01                                              



               00:00:                                              



               00                                                

 

     Dose      2022-0      No                                      



     Unknown      4-01                                              



               00:00:                                              



               00                                                

 

     Dose      2022-0      No                                      



     Unknown      4-01                                              



               00:00:                                              



               00                                                

 

     Dose      2022-0      No                                      



     Unknown      4-01                                              



               00:00:                                              



               00                                                

 

     Dose      2022-0      No                                      



     Unknown      4-01                                              



               00:00:                                              



               00                                                

 

     Dose      2022-0      No                                      



     Unknown      4-01                                              



               00:00:                                              



               00                                                

 

     Dose      2022-0      No                                      



     Unknown      4-01                                              



               00:00:                                              



               00                                                

 

     metformin      2-0      No             1mg                      



     1,000 mg      4-01                                              



     tablet      00:00:                                              



               00                                                

 

     levothyroxi      2-0      No             1mcg                     



     ne 75 mcg      4-01                                              



     tablet      00:00:                                              



               00                                                

 

     levothyroxi      2-0      No             1mcg                     



     ne 200 mcg      4-01                                              



     tablet      00:00:                                              



               00                                                

 

     hydrocortis      2-0      No             1mg                      



     one acetate      4-01                                              



     25 mg      00:00:                                              



     rectal      00                                                



     suppository                                                        

 

     nitrofurant      2-0      No             1mg                      



     oin       4-01                                              



     monohydrate      00:00:                                              



     /macrocryst      00                                                



     als 100 mg                                                        



     capsule                                                        

 

     Dose      2022-0      No                                      



     Unknown      4-01                                              



               00:00:                                              



               00                                                

 

     Dose      2022-0      No                                      



     Unknown      4-01                                              



               00:00:                                              



               00                                                

 

     Dose      2022-0      No                                      



     Unknown      4-01                                              



               00:00:                                              



               00                                                

 

     Dose      2022-0      No                                      



     Unknown      4-01                                              



               00:00:                                              



               00                                                

 

     Dose      2022-0      No                                      



     Unknown      4-01                                              



               00:00:                                              



               00                                                

 

     Dose      2022-0      No                                      



     Unknown      4-01                                              



               00:00:                                              



               00                                                

 

     Dose      2022-0      No                                      



     Unknown      4-01                                              



               00:00:                                              



               00                                                

 

     Dose      2022-0      No                                      



     Unknown      4-01                                              



               00:00:                                              



               00                                                

 

     Dose      2022-0      No                                      



     Unknown      4-01                                              



               00:00:                                              



               00                                                

 

     Dose      2022-0      No                                      



     Unknown      4-01                                              



               00:00:                                              



               00                                                

 

     Dose      2022-0      No                                      



     Unknown      4-01                                              



               00:00:                                              



               00                                                

 

     Dose      2022-0      No                                      



     Unknown      4-01                                              



               00:00:                                              



               00                                                

 

     Dose      2022-0      No                                      



     Unknown      4-01                                              



               00:00:                                              



               00                                                

 

     Dose      2022-0      No                                      



     Unknown      4-01                                              



               00:00:                                              



               00                                                

 

     Dose      2022-0      No                                      



     Unknown      4-01                                              



               00:00:                                              



               00                                                

 

     Dose      2022-0      No                                      



     Unknown      4-01                                              



               00:00:                                              



               00                                                

 

     Dose      2022-0      No                                      



     Unknown      4-01                                              



               00:00:                                              



               00                                                

 

     Dose      2022-0      No                                      



     Unknown      4-01                                              



               00:00:                                              



               00                                                

 

     Dose      2022-0      No                                      



     Unknown      4-01                                              



               00:00:                                              



               00                                                

 

     Dose      2022-0      No                                      



     Unknown      4-01                                              



               00:00:                                              



               00                                                

 

     Dose      2022-0      No                                      



     Unknown      4-01                                              



               00:00:                                              



               00                                                

 

     Dose      2022-0      No                                      



     Unknown      4-01                                              



               00:00:                                              



               00                                                

 

     Dose      2022-0      No                                      



     Unknown      4-01                                              



               00:00:                                              



               00                                                

 

     Dose      2022-0      No                                      



     Unknown      4-01                                              



               00:00:                                              



               00                                                

 

     Dose      2022-0      No                                      



     Unknown      4-01                                              



               00:00:                                              



               00                                                

 

     Dose      2022-0      No                                      



     Unknown      4-01                                              



               00:00:                                              



               00                                                

 

     Dose      2022-0      No                                      



     Unknown      4-01                                              



               00:00:                                              



               00                                                

 

     Dose      2022-0      No                                      



     Unknown      4-01                                              



               00:00:                                              



               00                                                

 

     Dose      2022-0      No                                      



     Unknown      4-01                                              



               00:00:                                              



               00                                                

 

     metformin      2022-0      No             1mg                      



     1,000 mg      4-01                                              



     tablet      00:00:                                              



               00                                                

 

     levothyroxi      2022-0      No             1mcg                     



     ne 75 mcg      4-01                                              



     tablet      00:00:                                              



               00                                                

 

     levothyroxi      2022-0      No             1mcg                     



     ne 200 mcg      4-                                              



     tablet      00:00:                                              



               00                                                

 

     hydrocortis      2-0      No             1mg                      



     one acetate      4-01                                              



     25 mg      00:00:                                              



     rectal      00                                                



     suppository                                                        

 

     nitrofurant      2-0      No             1mg                      



     oin       4-01                                              



     monohydrate      00:00:                                              



     /macrocryst      00                                                



     als 100 mg                                                        



     capsule                                                        

 

     Dose      -0      No                                      



     Unknown      4-01                                              



               00:00:                                              



               00                                                

 

     Dose      2022-0      No                                      



     Unknown      4-01                                              



               00:00:                                              



               00                                                

 

     Dose      2022-0      No                                      



     Unknown      4-01                                              



               00:00:                                              



               00                                                

 

     Dose      2022-0      No                                      



     Unknown      4-01                                              



               00:00:                                              



               00                                                

 

     Dose      2022-0      No                                      



     Unknown      4-01                                              



               00:00:                                              



               00                                                

 

     Dose      2022-0      No                                      



     Unknown      4-01                                              



               00:00:                                              



               00                                                

 

     Dose      2022-0      No                                      



     Unknown      4-01                                              



               00:00:                                              



               00                                                

 

     Dose      2022-0      No                                      



     Unknown      4-01                                              



               00:00:                                              



               00                                                

 

     Dose      2022-0      No                                      



     Unknown      4-01                                              



               00:00:                                              



               00                                                

 

     Dose      2022-0      No                                      



     Unknown      4-01                                              



               00:00:                                              



               00                                                

 

     Dose      2022-0      No                                      



     Unknown      4-01                                              



               00:00:                                              



               00                                                

 

     Dose      2022-0      No                                      



     Unknown      4-01                                              



               00:00:                                              



               00                                                

 

     Dose      2022-0      No                                      



     Unknown      4-01                                              



               00:00:                                              



               00                                                

 

     Dose      2022-0      No                                      



     Unknown      4-01                                              



               00:00:                                              



               00                                                

 

     Dose      2022-0      No                                      



     Unknown      4-01                                              



               00:00:                                              



               00                                                

 

     Dose      2022-0      No                                      



     Unknown      4-01                                              



               00:00:                                              



               00                                                

 

     Dose      2022-0      No                                      



     Unknown      4-01                                              



               00:00:                                              



               00                                                

 

     Dose      2022-0      No                                      



     Unknown      4-01                                              



               00:00:                                              



               00                                                

 

     Dose      2022-0      No                                      



     Unknown      4-01                                              



               00:00:                                              



               00                                                

 

     Dose      2022-0      No                                      



     Unknown      4-01                                              



               00:00:                                              



               00                                                

 

     Dose      2022-0      No                                      



     Unknown      4-01                                              



               00:00:                                              



               00                                                

 

     Dose      2022-0      No                                      



     Unknown      4-01                                              



               00:00:                                              



               00                                                

 

     Dose      2022-0      No                                      



     Unknown      4-01                                              



               00:00:                                              



               00                                                

 

     Dose      2022-0      No                                      



     Unknown      4-01                                              



               00:00:                                              



               00                                                

 

     Dose      2022-0      No                                      



     Unknown      4-01                                              



               00:00:                                              



               00                                                

 

     Dose      2022-0      No                                      



     Unknown      4-01                                              



               00:00:                                              



               00                                                

 

     Dose      2022-0      No                                      



     Unknown      4-01                                              



               00:00:                                              



               00                                                

 

     Dose      2022-0      No                                      



     Unknown      4-01                                              



               00:00:                                              



               00                                                

 

     Dose      2022-0      No                                      



     Unknown      4-01                                              



               00:00:                                              



               00                                                

 

     metformin      2022-0      No             1mg                      



     1,000 mg      4-01                                              



     tablet      00:00:                                              



               00                                                

 

     levothyroxi      2-0      No             1mcg                     



     ne 75 mcg      4-01                                              



     tablet      00:00:                                              



               00                                                

 

     levothyroxi      2-0      No             1mcg                     



     ne 200 mcg      4-01                                              



     tablet      00:00:                                              



               00                                                

 

     hydrocortis      2-0      No             1mg                      



     one acetate      4-01                                              



     25 mg      00:00:                                              



     rectal      00                                                



     suppository                                                        

 

     nitrofurant      -0      No             1mg                      



     oin       4-01                                              



     monohydrate      00:00:                                              



     /macrocryst      00                                                



     als 100 mg                                                        



     capsule                                                        

 

     Dose      -0      No                                      



     Unknown      4-01                                              



               00:00:                                              



               00                                                

 

     Dose      2-0      No                                      



     Unknown      4-01                                              



               00:00:                                              



               00                                                

 

     Dose      2-0      No                                      



     Unknown      4-01                                              



               00:00:                                              



               00                                                

 

     Dose      2-0      No                                      



     Unknown      4-01                                              



               00:00:                                              



               00                                                

 

     Dose      2-0      No                                      



     Unknown      4-01                                              



               00:00:                                              



               00                                                

 

     Dose      2022-0      No                                      



     Unknown      4-01                                              



               00:00:                                              



               00                                                

 

     Dose      2022-0      No                                      



     Unknown      4-01                                              



               00:00:                                              



               00                                                

 

     Dose      2022-0      No                                      



     Unknown      4-01                                              



               00:00:                                              



               00                                                

 

     Dose      2022-0      No                                      



     Unknown      4-01                                              



               00:00:                                              



               00                                                

 

     Dose      2022-0      No                                      



     Unknown      4-01                                              



               00:00:                                              



               00                                                

 

     Dose      2022-0      No                                      



     Unknown      4-01                                              



               00:00:                                              



               00                                                

 

     Dose      2022-0      No                                      



     Unknown      4-01                                              



               00:00:                                              



               00                                                

 

     Dose      2-0      No                                      



     Unknown      4-01                                              



               00:00:                                              



               00                                                

 

     Dose      2022-0      No                                      



     Unknown      4-01                                              



               00:00:                                              



               00                                                

 

     Dose      2022-0      No                                      



     Unknown      4-01                                              



               00:00:                                              



               00                                                

 

     Dose      2022-0      No                                      



     Unknown      4-01                                              



               00:00:                                              



               00                                                

 

     Dose      2022-0      No                                      



     Unknown      4-01                                              



               00:00:                                              



               00                                                

 

     Dose      2022-0      No                                      



     Unknown      4-01                                              



               00:00:                                              



               00                                                

 

     Dose      2022-0      No                                      



     Unknown      4-                                              



               00:00:                                              



               00                                                

 

     Dose      2-0      No                                      



     Unknown      3-                                              



               00:00:                                              



               00                                                

 

     Dose      2-0      No                                      



     Unknown      3-                                              



               00:00:                                              



               00                                                

 

     Dose      2-0      No                                      



     Unknown      3-                                              



               00:00:                                              



               00                                                

 

     Dose      2022-0      No                                      



     Unknown      3-                                              



               00:00:                                              



               00                                                

 

     Dose      2022-0      No                                      



     Unknown      3-                                              



               00:00:                                              



               00                                                

 

     Dose      2022-0      No                                      



     Unknown      3-                                              



               00:00:                                              



               00                                                

 

     Dose      2022-0      No                                      



     Unknown      3-31                                              



               00:00:                                              



               00                                                

 

     Dose      2022-0      No                                      



     Unknown      3-31                                              



               00:00:                                              



               00                                                

 

     Dose      2022-0      No                                      



     Unknown      3-31                                              



               00:00:                                              



               00                                                

 

     Dose      2022-0      No                                      



     Unknown      3-31                                              



               00:00:                                              



               00                                                

 

     Dose      2022-0      No                                      



     Unknown      3-31                                              



               00:00:                                              



               00                                                

 

     Dose      2022-0      No                                      



     Unknown      3-31                                              



               00:00:                                              



               00                                                

 

     Dose      2022-0      No                                      



     Unknown      3-31                                              



               00:00:                                              



               00                                                

 

     Dose      2022-0      No                                      



     Unknown      3-31                                              



               00:00:                                              



               00                                                

 

     Dose      2022-0      No                                      



     Unknown      3-31                                              



               00:00:                                              



               00                                                

 

     Dose      2022-0      No                                      



     Unknown      3-31                                              



               00:00:                                              



               00                                                

 

     Dose      2022-0      No                                      



     Unknown      3-31                                              



               00:00:                                              



               00                                                

 

     Dose      2022-0      No                                      



     Unknown      3-31                                              



               00:00:                                              



               00                                                

 

     Dose      2022-0      No                                      



     Unknown      3-31                                              



               00:00:                                              



               00                                                

 

     Dose      2022-0      No                                      



     Unknown      3-31                                              



               00:00:                                              



               00                                                

 

     Dose      2022-0      No                                      



     Unknown      3-31                                              



               00:00:                                              



               00                                                

 

     Dose      2022-0      No                                      



     Unknown      3-31                                              



               00:00:                                              



               00                                                

 

     Dose      2022-0      No                                      



     Unknown      3-31                                              



               00:00:                                              



               00                                                

 

     Dose      2022-0      No                                      



     Unknown      3-31                                              



               00:00:                                              



               00                                                

 

     Dose      2022-0      No                                      



     Unknown      3-31                                              



               00:00:                                              



               00                                                

 

     Dose      2022-0      No                                      



     Unknown      3-31                                              



               00:00:                                              



               00                                                

 

     Dose      2022-0      No                                      



     Unknown      3-31                                              



               00:00:                                              



               00                                                

 

     Dose      2022-0      No                                      



     Unknown      1-25                                              



               00:00:                                              



               00                                                

 

     Dose      2022-0      No                                      



     Unknown      1-25                                              



               00:00:                                              



               00                                                

 

     Dose      2022-0      No                                      



     Unknown      1-25                                              



               00:00:                                              



               00                                                

 

     Dose      2022-0      No                                      



     Unknown      1-25                                              



               00:00:                                              



               00                                                

 

     Dose      2022-0      No                                      



     Unknown      1-25                                              



               00:00:                                              



               00                                                

 

     Dose      2022-0      No                                      



     Unknown      1-25                                              



               00:00:                                              



               00                                                

 

     Dose      2022-0      No                                      



     Unknown      1-25                                              



               00:00:                                              



               00                                                

 

     Dose      2022-0      No                                      



     Unknown      1-25                                              



               00:00:                                              



               00                                                

 

     Dose      2022-0      No                                      



     Unknown      1-25                                              



               00:00:                                              



               00                                                

 

     Dose      2022-0      No                                      



     Unknown      1-20                                              



               00:00:                                              



               00                                                

 

     Dose      2022-0      No                                      



     Unknown      1-20                                              



               00:00:                                              



               00                                                

 

     Dose      2022-0      No                                      



     Unknown      1-20                                              



               00:00:                                              



               00                                                

 

     neomycin-po      2022-0      No             4mg/mL-                     



     lymyxin-hyd      1-19                     unit/mL                     



     rocort 3.5      00:00:                     -%                       



     mg-10,000      00                                                



     unit/mL-1 %                                                        



     ear                                                         



     drops,susp                                                        

 

     Humulin      2022-0      No             1unit/m                     



     70/30 U-100      1-19                     L                        



     Insulin 100      00:00:                     (70-30)                     



     unit/mL      00                                                



     subcutaneou                                                        



     s                                                           



     suspension                                                        

 

     lisinopril      2022-0      No             1mg                      



     5 mg tablet      -19                                              



               00:00:                                              



               00                                                

 

     hydroxyzine      2022-0      No             1mg                      



     HCl 50 mg      1-19                                              



     tablet      00:00:                                              



               00                                                

 

     Myrbetriq      2022-0      No             1mg                      



     25 mg      1-19                                              



     tablet,exte      00:00:                                              



     nded      00                                                



     release                                                        

 

     Dose      2022-0      No                                      



     Unknown      -19                                              



               00:00:                                              



               00                                                

 

     metformin      2022-0      No             1mg                      



     1,000 mg      1-19                                              



     tablet      00:00:                                              



               00                                                

 

     metformin      2022-0      No             1mg                      



     500 mg      1-19                                              



     tablet      00:00:                                              



               00                                                

 

     metoprolol      2022-0      No             1mg                      



     tartrate 25      1-19                                              



     mg tablet      00:00:                                              



               00                                                

 

     metoclopram      2022-0      No             1mg                      



     fabio 10 mg      1-19                                              



     tablet      00:00:                                              



               00                                                

 

     azithromyci      2022-0      No             mg                       



     n 250 mg      1-19                                              



     tablet      00:00:                                              



               00                                                

 

     Dose      2022-0      No                                      



     Unknown      1-19                                              



               00:00:                                              



               00                                                

 

     Dose      2022-0      No                                      



     Unknown      1-19                                              



               00:00:                                              



               00                                                

 

     Dose      2022-0      No                                      



     Unknown      1-19                                              



               00:00:                                              



               00                                                

 

     levothyroxi      2022-0      No             1mcg                     



     ne 150 mcg      1-19                                              



     capsule      00:00:                                              



               00                                                

 

     neomycin-po      2022-0      No             4mg/mL-                     



     lymyxin-hyd      1-19                     unit/mL                     



     rocort 3.5      00:00:                     -%                       



     mg-10,000      00                                                



     unit/mL-1 %                                                        



     ear                                                         



     drops,susp                                                        

 

     Humulin      2-0      No             1unit/m                     



     70/30 U-100      1-19                     L                        



     Insulin 100      00:00:                     (70-30)                     



     unit/mL      00                                                



     subcutaneou                                                        



     s                                                           



     suspension                                                        

 

     lisinopril      2022-0      No             1mg                      



     5 mg tablet      19                                              



               00:00:                                              



               00                                                

 

     hydroxyzine      2022-0      No             1mg                      



     HCl 50 mg      1-19                                              



     tablet      00:00:                                              



               00                                                

 

     Myrbetriq      2022-0      No             1mg                      



     25 mg      1-19                                              



     tablet,exte      00:00:                                              



     nded      00                                                



     release                                                        

 

     Dose      2022-0      No                                      



     Unknown      1-19                                              



               00:00:                                              



               00                                                

 

     metformin      2022-0      No             1mg                      



     1,000 mg      1-19                                              



     tablet      00:00:                                              



               00                                                

 

     metformin      2022-0      No             1mg                      



     500 mg      1-19                                              



     tablet      00:00:                                              



               00                                                

 

     metoprolol      2022-0      No             1mg                      



     tartrate 25      1-19                                              



     mg tablet      00:00:                                              



               00                                                

 

     metoclopram      2022-0      No             1mg                      



     fabio 10 mg      1-19                                              



     tablet      00:00:                                              



               00                                                

 

     azithromyci      2022-0      No             mg                       



     n 250 mg      1-19                                              



     tablet      00:00:                                              



               00                                                

 

     neomycin-po      2-0      No             4mg/mL-                     



     lymyxin-hyd      1-19                     unit/mL                     



     rocort 3.5      00:00:                     -%                       



     mg-10,000      00                                                



     unit/mL-1 %                                                        



     ear                                                         



     drops,susp                                                        

 

     Humulin      2-0      No             1unit/m                     



     70/30 U-100      1-19                     L                        



     Insulin 100      00:00:                     (70-30)                     



     unit/mL      00                                                



     subcutaneou                                                        



     s                                                           



     suspension                                                        

 

     lisinopril      2-0      No             1mg                      



     5 mg tablet      -19                                              



               00:00:                                              



               00                                                

 

     Dose      2022-0      No                                      



     Unknown      1-19                                              



               00:00:                                              



               00                                                

 

     hydroxyzine      2022-0      No             1mg                      



     HCl 50 mg      1-19                                              



     tablet      00:00:                                              



               00                                                

 

     Myrbetriq      2-0      No             1mg                      



     25 mg      1-19                                              



     tablet,exte      00:00:                                              



     nded      00                                                



     release                                                        

 

     Dose      2-0      No                                      



     Unknown      1-19                                              



               00:00:                                              



               00                                                

 

     metformin      2022-0      No             1mg                      



     1,000 mg      1-19                                              



     tablet      00:00:                                              



               00                                                

 

     metformin      2022-0      No             1mg                      



     500 mg      1-19                                              



     tablet      00:00:                                              



               00                                                

 

     metoprolol      2022-0      No             1mg                      



     tartrate 25      1-19                                              



     mg tablet      00:00:                                              



               00                                                

 

     metoclopram      2022-0      No             1mg                      



     fabio 10 mg      1-19                                              



     tablet      00:00:                                              



               00                                                

 

     azithromyci      2022-0      No             mg                       



     n 250 mg      1-19                                              



     tablet      00:00:                                              



               00                                                

 

     Dose      2022-0      No                                      



     Unknown      1-19                                              



               00:00:                                              



               00                                                

 

     Dose      2022-0      No                                      



     Unknown      1-19                                              



               00:00:                                              



               00                                                

 

     Dose      2022-0      No                                      



     Unknown      1-19                                              



               00:00:                                              



               00                                                

 

     Dose      2022-0      No                                      



     Unknown      1-19                                              



               00:00:                                              



               00                                                

 

     levothyroxi      2022-0      No             1mcg                     



     ne 150 mcg      1-19                                              



     capsule      00:00:                                              



               00                                                

 

     Dose      2022-0      No                                      



     Unknown      1-19                                              



               00:00:                                              



               00                                                

 

     levothyroxi      2022-0      No             1mcg                     



     ne 150 mcg      1-19                                              



     capsule      00:00:                                              



               00                                                

 

     lisinopril      2022-0      No             1mg                      



     5 mg tablet                                                    



               00:00:                                              



               00                                                

 

     lisinopril      2022-0      No             1mg                      



     5 mg tablet                                                    



               00:00:                                              



               00                                                

 

     lisinopril      2022-0      No             1mg                      



     5 mg tablet                                                    



               00:00:                                              



               00                                                

 

     Humulin      2022-0      No             1unit/m                     



     70/30 U-100      1-03                     L                        



     Insulin 100      00:00:                     (70-30)                     



     unit/mL      00                                                



     subcutaneou                                                        



     s                                                           



     suspension                                                        

 

     hydroxyzine      2-0      No             1mg                      



     HCl 50 mg      1-03                                              



     tablet      00:00:                                              



               00                                                

 

     hydroxyzine      2022-0      No             1mg                      



     HCl 50 mg      1-03                                              



     tablet      00:00:                                              



               00                                                

 

     metformin      2022-0      No             1mg                      



     1,000 mg      1-03                                              



     tablet      00:00:                                              



               00                                                

 

     Humulin      2022-0      No             1unit/m                     



     70/30 U-100      1-03                     L                        



     Insulin 100      00:00:                     (70-30)                     



     unit/mL      00                                                



     subcutaneou                                                        



     s                                                           



     suspension                                                        

 

     hydroxyzine      2-0      No             1mg                      



     HCl 50 mg      1-03                                              



     tablet      00:00:                                              



               00                                                

 

     hydroxyzine      2022-0      No             1mg                      



     HCl 50 mg      1-03                                              



     tablet      00:00:                                              



               00                                                

 

     metformin      2022-0      No             1mg                      



     1,000 mg      1-03                                              



     tablet      00:00:                                              



               00                                                

 

     Humulin      2022-0      No             1unit/m                     



     70/30 U-100      1-03                     L                        



     Insulin 100      00:00:                     (70-30)                     



     unit/mL      00                                                



     subcutaneou                                                        



     s                                                           



     suspension                                                        

 

     hydroxyzine      2-0      No             1mg                      



     HCl 50 mg      1-03                                              



     tablet      00:00:                                              



               00                                                

 

     hydroxyzine      2022-0      No             1mg                      



     HCl 50 mg      1-03                                              



     tablet      00:00:                                              



               00                                                

 

     metformin      2022-0      No             1mg                      



     1,000 mg      1-03                                              



     tablet      00:00:                                              



               00                                                

 

     Zofran 4 mg      -1      No             1mg                      



     tablet      2-16                                              



               00:00:                                              



               00                                                

 

     levothyroxi      1-1      No             1mcg                     



     ne 150 mcg      2-16                                              



     capsule      00:00:                                              



               00                                                

 

     Zofran 4 mg      -1      No             1mg                      



     tablet      2-16                                              



               00:00:                                              



               00                                                

 

     levothyroxi      -      No             1mcg                     



     ne 150 mcg      2-16                                              



     capsule      00:00:                                              



               00                                                

 

     Zofran 4 mg      -      No             1mg                      



     tablet      2-16                                              



               00:00:                                              



               00                                                

 

     levothyroxi      2021      No             1mcg                     



     ne 150 mcg      2-16                                              



     capsule      00:00:                                              



               00                                                

 

     metformin      -      No             1mg                      



     500 mg      1-27                                              



     tablet      00:00:                                              



               00                                                

 

     metformin      -      No             1mg                      



     500 mg      1-27                                              



     tablet      00:00:                                              



               00                                                

 

     metformin      -      No             1mg                      



     500 mg      1-27                                              



     tablet      00:00:                                              



               00                                                

 

     metoprolol      -      No             1mg                      



     tartrate 25      1-22                                              



     mg tablet      00:00:                                              



               00                                                

 

     metoprolol      -      No             1mg                      



     tartrate 25      1-22                                              



     mg tablet      00:00:                                              



               00                                                

 

     metoprolol      -      No             1mg                      



     tartrate 25      1-22                                              



     mg tablet      00:00:                                              



               00                                                

 

     Myrbetriq      -      No             1mg                      



     25 mg      1-16                                              



     tablet,exte      00:00:                                              



     nded      00                                                



     release                                                        

 

     metoclopram      -      No             1mg                      



     fabio 10 mg      1-16                                              



     tablet      00:00:                                              



               00                                                

 

     Myrbetriq      -      No             1mg                      



     25 mg      1-16                                              



     tablet,exte      00:00:                                              



     nded      00                                                



     release                                                        

 

     metoclopram      -      No             1mg                      



     fabio 10 mg      1-16                                              



     tablet      00:00:                                              



               00                                                

 

     Myrbetriq      -      No             1mg                      



     25 mg      1-16                                              



     tablet,exte      00:00:                                              



     nded      00                                                



     release                                                        

 

     metoclopram      -      No             1mg                      



     fabio 10 mg      1-16                                              



     tablet      00:00:                                              



               00                                                

 

     atorvastati      -      No             1mg                      



     n 10 mg      1-14                                              



     tablet      00:00:                                              



               00                                                

 

     omeprazole      -      No             1mg                      



     40 mg      1-14                                              



     capsule,del      00:00:                                              



     ayed      00                                                



     release                                                        

 

     atorvastati      -      No             1mg                      



     n 10 mg      1-14                                              



     tablet      00:00:                                              



               00                                                

 

     omeprazole      -1      No             1mg                      



     40 mg      1-14                                              



     capsule,del      00:00:                                              



     ayed      00                                                



     release                                                        

 

     atorvastati      -      No             1mg                      



     n 10 mg      1-14                                              



     tablet      00:00:                                              



               00                                                

 

     omeprazole      -      No             1mg                      



     40 mg      1-14                                              



     capsule,del      00:00:                                              



     ayed      00                                                



     release                                                        

 

     levothyroxi      2021      No             1mcg                     



     ne 150 mcg      1-04                                              



     capsule      00:00:                                              



               00                                                

 

     levothyroxi      2021      No             1mcg                     



     ne 150 mcg      1-04                                              



     capsule      00:00:                                              



               00                                                

 

     levothyroxi      -1      No             1mcg                     



     ne 150 mcg      1-04                                              



     capsule      00:00:                                              



               00                                                

 

     ferrous      -1      No             1(65 mg                     



     sulfate 325      0-21                     iron)                     



     mg (65 mg      00:00:                                              



     iron)      00                                                



     tablet                                                        

 

     ferrous      2021      No             1(65 mg                     



     sulfate 325      0-21                     iron)                     



     mg (65 mg      00:00:                                              



     iron)      00                                                



     tablet                                                        

 

     ferrous      2021      No             1(65 mg                     



     sulfate 325      0-21                     iron)                     



     mg (65 mg      00:00:                                              



     iron)      00                                                



     tablet                                                        

 

     Myrbetriq      1      No             1mg                      



     25 mg      0-13                                              



     tablet,exte      00:00:                                              



     nded      00                                                



     release                                                        

 

     levothyroxi      1      No             1mcg                     



     ne 300 mcg      0-13                                              



     tablet      00:00:                                              



               00                                                

 

     Myrbetriq      -1      No             1mg                      



     25 mg      0-13                                              



     tablet,exte      00:00:                                              



     nded      00                                                



     release                                                        

 

     levothyroxi      -1      No             1mcg                     



     ne 300 mcg      0-13                                              



     tablet      00:00:                                              



               00                                                

 

     Myrbetriq      -1      No             1mg                      



     25 mg      0-13                                              



     tablet,exte      00:00:                                              



     nded      00                                                



     release                                                        

 

     levothyroxi      1      No             1mcg                     



     ne 300 mcg      0-13                                              



     tablet      00:00:                                              



               00                                                

 

     nitrofurant      -1      No             1mg                      



     oin       0-12                                              



     macrocrysta      00:00:                                              



     l 100 mg      00                                                



     capsule                                                        

 

     nitrofurant      -1      No             1mg                      



     oin       0-12                                              



     macrocrysta      00:00:                                              



     l 100 mg      00                                                



     capsule                                                        

 

     nitrofurant      -1      No             1mg                      



     oin       0-12                                              



     macrocrysta      00:00:                                              



     l 100 mg      00                                                



     capsule                                                        

 

     metoclopram      1-0      No             1mg                      



     fabio 10 mg      9-13                                              



     tablet      00:00:                                              



               00                                                

 

     metoclopram      1-0      No             1mg                      



     fabio 10 mg      9-13                                              



     tablet      00:00:                                              



               00                                                

 

     metoclopram      1-0      No             1mg                      



     fabio 10 mg      9-13                                              



     tablet      00:00:                                              



               00                                                

 

     Myrbetriq      1-0      No             1mg                      



     25 mg      9-01                                              



     tablet,exte      00:00:                                              



     nded      00                                                



     release                                                        

 

     Myrbetriq      2021-0      No             1mg                      



     25 mg      9-01                                              



     tablet,exte      00:00:                                              



     nded      00                                                



     release                                                        

 

     Myrbetriq      2021-0      No             1mg                      



     25 mg      9-01                                              



     tablet,exte      00:00:                                              



     nded      00                                                



     release                                                        

 

     metoclopram      2021-0      No             1mg                      



     fabio 10 mg      8-13                                              



     tablet      00:00:                                              



               00                                                

 

     metoclopram      2021-0      No             1mg                      



     fabio 10 mg      8-13                                              



     tablet      00:00:                                              



               00                                                

 

     metoclopram      2021-0      No             1mg                      



     fabio 10 mg      8-13                                              



     tablet      00:00:                                              



               00                                                

 

     metoclopram      2021-0      No             1mg                      



     fabio 10 mg      7-28                                              



     tablet      00:00:                                              



               00                                                

 

     metoclopram      2021-0      No             1mg                      



     fabio 10 mg      7-28                                              



     tablet      00:00:                                              



               00                                                

 

     metoclopram      2021-0      No             1mg                      



     fabio 10 mg      7-28                                              



     tablet      00:00:                                              



               00                                                

 

     Humulin      2021-0      No             1unit/m                     



     70/30 U-100      7-22                     L                        



     Insulin 100      00:00:                     (70-30)                     



     unit/mL      00                                                



     subcutaneou                                                        



     s                                                           



     suspension                                                        

 

     atorvastati      2021-0      No             1mg                      



     n 10 mg      7-22                                              



     tablet      00:00:                                              



               00                                                

 

     Myrbetriq      2021-0      No             1mg                      



     25 mg      7-22                                              



     tablet,exte      00:00:                                              



     nded      00                                                



     release                                                        

 

     Trintellix      2021-0      No             1mg                      



     20 mg      7-22                                              



     tablet      00:00:                                              



               00                                                

 

     metformin      2021-0      No             1mg                      



     500 mg      7-22                                              



     tablet      00:00:                                              



               00                                                

 

     metoprolol      2021-0      No             1mg                      



     tartrate 25      7-22                                              



     mg tablet      00:00:                                              



               00                                                

 

     mirtazapine      2021-0      No             1mg                      



     15 mg      7-22                                              



     tablet      00:00:                                              



               00                                                

 

     levothyroxi      2021-0      No             1mcg                     



     ne 300 mcg      7-22                                              



     tablet      00:00:                                              



               00                                                

 

     omeprazole      2021-0      No             1mg                      



     40 mg      7-22                                              



     capsule,del      00:00:                                              



     ayed      00                                                



     release                                                        

 

     Humulin      2021-0      No             1unit/m                     



     70/30 U-100      7-22                     L                        



     Insulin 100      00:00:                     (70-30)                     



     unit/mL      00                                                



     subcutaneou                                                        



     s                                                           



     suspension                                                        

 

     atorvastati      2021-0      No             1mg                      



     n 10 mg      7-22                                              



     tablet      00:00:                                              



               00                                                

 

     Myrbetriq      2021-0      No             1mg                      



     25 mg      7-22                                              



     tablet,exte      00:00:                                              



     nded      00                                                



     release                                                        

 

     Trintellix      2021-0      No             1mg                      



     20 mg      7-22                                              



     tablet      00:00:                                              



               00                                                

 

     metformin      2021-0      No             1mg                      



     500 mg      7-22                                              



     tablet      00:00:                                              



               00                                                

 

     metoprolol      2021-0      No             1mg                      



     tartrate 25      7-22                                              



     mg tablet      00:00:                                              



               00                                                

 

     mirtazapine      2021-0      No             1mg                      



     15 mg      7-22                                              



     tablet      00:00:                                              



               00                                                

 

     levothyroxi      2021-0      No             1mcg                     



     ne 300 mcg      7-22                                              



     tablet      00:00:                                              



               00                                                

 

     omeprazole      2021-0      No             1mg                      



     40 mg      7-22                                              



     capsule,del      00:00:                                              



     ayed      00                                                



     release                                                        

 

     Humulin      1-0      No             1unit/m                     



     70/30 U-100      7-22                     L                        



     Insulin 100      00:00:                     (70-30)                     



     unit/mL      00                                                



     subcutaneou                                                        



     s                                                           



     suspension                                                        

 

     atorvastati      1-0      No             1mg                      



     n 10 mg      7-22                                              



     tablet      00:00:                                              



               00                                                

 

     Myrbetriq      1-0      No             1mg                      



     25 mg      7-22                                              



     tablet,exte      00:00:                                              



     nded      00                                                



     release                                                        

 

     Trintellix      1-0      No             1mg                      



     20 mg      7-22                                              



     tablet      00:00:                                              



               00                                                

 

     metformin      2021-0      No             1mg                      



     500 mg      7-22                                              



     tablet      00:00:                                              



               00                                                

 

     metoprolol      2021-0      No             1mg                      



     tartrate 25      7-22                                              



     mg tablet      00:00:                                              



               00                                                

 

     mirtazapine      2021-0      No             1mg                      



     15 mg      7-22                                              



     tablet      00:00:                                              



               00                                                

 

     levothyroxi      2021-0      No             1mcg                     



     ne 300 mcg      7-22                                              



     tablet      00:00:                                              



               00                                                

 

     omeprazole      2021-0      No             1mg                      



     40 mg      7-22                                              



     capsule,del      00:00:                                              



     ayed      00                                                



     release                                                        

 

     alprazolam      2021-0      No             1mg                      



     0.5 mg      7-13                                              



     tablet      00:00:                                              



               00                                                

 

     metoprolol      2021-0      No             1mg                      



     tartrate 25      7-13                                              



     mg tablet      00:00:                                              



               00                                                

 

     metoclopram      2021-0      No             1mg                      



     fabio 10 mg      7-13                                              



     tablet      00:00:                                              



               00                                                

 

     mirtazapine      2021-0      No             1mg                      



     15 mg      7-13                                              



     tablet      00:00:                                              



               00                                                

 

     levothyroxi      2021-0      No             1mcg                     



     ne 300 mcg      7-13                                              



     tablet      00:00:                                              



               00                                                

 

     omeprazole      2021-0      No             1mg                      



     40 mg      7-13                                              



     capsule,del      00:00:                                              



     ayed      00                                                



     release                                                        

 

     Vitamin D2      2021-0      No             1(50,00                     



     1,250 mcg      7-13                     0 unit)                     



     (50,000      00:00:                                              



     unit)      00                                                



     capsule                                                        

 

     Humulin      2021-0      No             1unit/m                     



     70/30 U-100      7-13                     L                        



     Insulin 100      00:00:                     (70-30)                     



     unit/mL      00                                                



     subcutaneou                                                        



     s                                                           



     suspension                                                        

 

     Trintellix      2021-0      No             1mg                      



     20 mg      7-13                                              



     tablet      00:00:                                              



               00                                                

 

     Myrbetriq      2021-0      No             1mg                      



     25 mg      7-13                                              



     tablet,exte      00:00:                                              



     nded      00                                                



     release                                                        

 

     atorvastati      2021-0      No             1mg                      



     n 10 mg      7-13                                              



     tablet      00:00:                                              



               00                                                

 

     metformin      2021-0      No             1mg                      



     500 mg      7-13                                              



     tablet      00:00:                                              



               00                                                

 

     alprazolam      2021-0      No             1mg                      



     0.5 mg      7-13                                              



     tablet      00:00:                                              



               00                                                

 

     metoprolol      2021-0      No             1mg                      



     tartrate 25      7-13                                              



     mg tablet      00:00:                                              



               00                                                

 

     metoclopram      2021-0      No             1mg                      



     fabio 10 mg      7-13                                              



     tablet      00:00:                                              



               00                                                

 

     mirtazapine      2021-0      No             1mg                      



     15 mg      7-13                                              



     tablet      00:00:                                              



               00                                                

 

     levothyroxi      1-0      No             1mcg                     



     ne 300 mcg      7-13                                              



     tablet      00:00:                                              



               00                                                

 

     omeprazole      1-0      No             1mg                      



     40 mg      7-13                                              



     capsule,del      00:00:                                              



     ayed      00                                                



     release                                                        

 

     Vitamin D2      1-0      No             1(50,00                     



     1,250 mcg      7-13                     0 unit)                     



     (50,000      00:00:                                              



     unit)      00                                                



     capsule                                                        

 

     Humulin      2021-0      No             1unit/m                     



     70/30 U-100      7-13                     L                        



     Insulin 100      00:00:                     (70-30)                     



     unit/mL      00                                                



     subcutaneou                                                        



     s                                                           



     suspension                                                        

 

     Humulin      2021-0      No             1unit/m                     



     70/30 U-100      7-13                     L                        



     Insulin 100      00:00:                     (70-30)                     



     unit/mL      00                                                



     subcutaneou                                                        



     s                                                           



     suspension                                                        

 

     Trintellix      2021-0      No             1mg                      



     20 mg      7-13                                              



     tablet      00:00:                                              



               00                                                

 

     Myrbetriq      2021-0      No             1mg                      



     25 mg      7-13                                              



     tablet,exte      00:00:                                              



     nded      00                                                



     release                                                        

 

     atorvastati      2021-0      No             1mg                      



     n 10 mg      7-13                                              



     tablet      00:00:                                              



               00                                                

 

     metformin      2021-0      No             1mg                      



     500 mg      7-13                                              



     tablet      00:00:                                              



               00                                                

 

     alprazolam      1-0      No             1mg                      



     0.5 mg      7-13                                              



     tablet      00:00:                                              



               00                                                

 

     metoprolol      1-0      No             1mg                      



     tartrate 25      7-13                                              



     mg tablet      00:00:                                              



               00                                                

 

     metoclopram      1-0      No             1mg                      



     fabio 10 mg      7-13                                              



     tablet      00:00:                                              



               00                                                

 

     mirtazapine      1-0      No             1mg                      



     15 mg      7-13                                              



     tablet      00:00:                                              



               00                                                

 

     levothyroxi      1-0      No             1mcg                     



     ne 300 mcg      7-13                                              



     tablet      00:00:                                              



               00                                                

 

     omeprazole      -0      No             1mg                      



     40 mg      7-13                                              



     capsule,del      00:00:                                              



     ayed      00                                                



     release                                                        

 

     Vitamin D2      -0      No             1(50,00                     



     1,250 mcg      7-13                     0 unit)                     



     (50,000      00:00:                                              



     unit)      00                                                



     capsule                                                        

 

     Trintellix      -0      No             1mg                      



     20 mg      7-13                                              



     tablet      00:00:                                              



               00                                                

 

     Myrbetriq      1-0      No             1mg                      



     25 mg      7-13                                              



     tablet,exte      00:00:                                              



     nded      00                                                



     release                                                        

 

     atorvastati      -0      No             1mg                      



     n 10 mg      7-13                                              



     tablet      00:00:                                              



               00                                                

 

     metformin      1-0      No             1mg                      



     500 mg      7-13                                              



     tablet      00:00:                                              



               00                                                







Immunizations







           Ordered Immunization Filled Immunization Date       Status     Commen

ts   Source



           Name       Name                                        

 

           Moderna COVID-19            2021 Completed             



           Vaccine               00:00:00                         

 

           Moderna COVID-19            2021 Completed             



           Vaccine               00:00:00                         

 

           Moderna COVID-19            2021 Completed             



           Vaccine               00:00:00                         







Vital Signs







             Vital Name   Observation Time Observation Value Comments     Source

 

             Systolic blood 2022 08:10:00 123 mm[Hg]                [facility]

s[facility] of



             pressure                                            [facility]

 

             Diastolic blood 2022 08:10:00 87 mm[Hg]                 [facility]

 

             Heart rate   2022 08:10:00 82 /min                   [facility]

 

             Respiratory rate 2022 08:10:00 18 /min                   [facility]

 

             Oxygen saturation in 2022 08:10:00 98 /min                   

[facility] blood by                                        [facility]

marisa



             Pulse oximetry                                        [facility]

 

             Body temperature 2022 04:15:00 36.83 Maricarmen                 [facility]

 

             Body height  2022 04:15:00 157.5 cm                  [facility]

 

             Body weight  2022 04:15:00 88.905 kg                 [facility]

 

             BMI          2022 04:15:00 35.85 kg/m2               [facility]

 

             BP Systolic  2022 13:09:00 142 mm[Hg]                

 

             BP Diastolic 2022 13:09:00 81 mm[Hg]                 

 

             Weight Measured 2022 13:09:00 195.40 pounds              

 

             Height Measured 2022 13:09:00 61.81 inches              

 

             Body Temperature 2022 13:09:00 97.80 degrees              

 

             Heart Rate   2022 13:09:00 91.00 /min                

 

             Respiratory Rate 2022 13:09:00                           

 

             BP Systolic  2022 11:31:00 110 mm[Hg]                

 

             BP Diastolic 2022 11:31:00 49 mm[Hg]                 

 

             Weight Measured 2022 11:31:00 195.80 pounds              

 

             Height Measured 2022 11:31:00 61.81 inches              

 

             Body Temperature 2022 11:31:00 97.80 degrees              

 

             Heart Rate   2022 11:31:00 95.00 /min                

 

             Respiratory Rate 2022 11:31:00 18.00 /min                

 

             BP Systolic  2022 16:36:00 129 mm[Hg]                

 

             BP Diastolic 2022 16:36:00 68 mm[Hg]                 

 

             Weight Measured 2022 16:36:00 196.00 pounds              

 

             Height Measured 2022 16:36:00 61.81 inches              

 

             Body Temperature 2022 16:36:00 97.60 degrees              

 

             Heart Rate   2022 16:36:00 108.00 /min               

 

             Respiratory Rate 2022 16:36:00 18.00 /min                

 

             BP Systolic  2022 11:46:00 115 mm[Hg]                

 

             BP Diastolic 2022 11:46:00 75 mm[Hg]                 

 

             Weight Measured 2022 11:46:00 190.40 pounds              

 

             Height Measured 2022 11:46:00 61.81 inches              

 

             Body Temperature 2022 11:46:00 98.10 degrees              

 

             Heart Rate   2022 11:46:00 89.00 /min                

 

             Respiratory Rate 2022 11:46:00                           

 

             BP Systolic  2022 11:22:00 123 mm[Hg]                

 

             BP Diastolic 2022 11:22:00 57 mm[Hg]                 

 

             Weight Measured 2022 11:22:00 194.60 pounds              

 

             Height Measured 2022 11:22:00 61.81 inches              

 

             Body Temperature 2022 11:22:00 97.50 degrees              

 

             Heart Rate   2022 11:22:00 85.00 /min                

 

             Respiratory Rate 2022 11:22:00 16.00 /min                

 

             BP Systolic  2022 15:21:00 148 mm[Hg]                

 

             BP Diastolic 2022 15:21:00 82 mm[Hg]                 

 

             Weight Measured 2022 15:21:00 192.60 pounds              

 

             Height Measured 2022 15:21:00 61.81 inches              

 

             Body Temperature 2022 15:21:00 98.50 degrees              

 

             Heart Rate   2022 15:21:00 100.00 /min               

 

             Respiratory Rate 2022 15:21:00 25.00 /min                

 

             BP Systolic  2021 16:45:00 135 mm[Hg]                

 

             BP Diastolic 2021 16:45:00 82 mm[Hg]                 

 

             Weight Measured 2021 16:45:00 206.80 pounds              

 

             Height Measured 2021 16:45:00                           

 

             Body Temperature 2021 16:45:00 98.20 degrees              

 

             Heart Rate   2021 16:45:00 86.00 /min                

 

             Respiratory Rate 2021 16:45:00 25.00 /min                

 

             BP Systolic  2021 11:18:00 136 mm[Hg]                

 

             BP Diastolic 2021 11:18:00 84 mm[Hg]                 

 

             Weight Measured 2021 11:18:00 201.60 pounds              

 

             Height Measured 2021 11:18:00 61.81 inches              

 

             Body Temperature 2021 11:18:00 98.20 degrees              

 

             Heart Rate   2021 11:18:00 87.00 /min                

 

             Respiratory Rate 2021 11:18:00 17.00 /min                

 

             BP Systolic  2021-10-19 14:54:00 122 mm[Hg]                

 

             BP Diastolic 2021-10-19 14:54:00 76 mm[Hg]                 

 

             Weight Measured 2021-10-19 14:54:00 211.80 pounds              

 

             Height Measured 2021-10-19 14:54:00 61.81 inches              

 

             Body Temperature 2021-10-19 14:54:00 98.30 degrees              

 

             Heart Rate   2021-10-19 14:54:00 80.00 /min                

 

             Respiratory Rate 2021-10-19 14:54:00 16.00 /min                

 

             BP Systolic  2021-10-12 10:09:00 114 mm[Hg]                

 

             BP Diastolic 2021-10-12 10:09:00 72 mm[Hg]                 

 

             Weight Measured 2021-10-12 10:09:00 212.00 pounds              

 

             Height Measured 2021-10-12 10:09:00 61.81 inches              

 

             Body Temperature 2021-10-12 10:09:00 98.40 degrees              

 

             Heart Rate   2021-10-12 10:09:00 98.00 /min                

 

             Respiratory Rate 2021-10-12 10:09:00                           

 

             BP Systolic  2021-10-12 10:08:00 114 mm[Hg]                

 

             BP Diastolic 2021-10-12 10:08:00 72 mm[Hg]                 

 

             Weight Measured 2021-10-12 10:08:00 212.00 pounds              

 

             Height Measured 2021-10-12 10:08:00 61.81 inches              

 

             Body Temperature 2021-10-12 10:08:00 98.40 degrees              

 

             Heart Rate   2021-10-12 10:08:00 98.00 /min                

 

             Respiratory Rate 2021-10-12 10:08:00                           

 

             BP Systolic  2021-10-09 11:33:00 119 mm[Hg]                

 

             BP Diastolic 2021-10-09 11:33:00 78 mm[Hg]                 

 

             Weight Measured 2021-10-09 11:33:00 210.60 pounds              

 

             Height Measured 2021-10-09 11:33:00 61.81 inches              

 

             Body Temperature 2021-10-09 11:33:00 98.30 degrees              

 

             Heart Rate   2021-10-09 11:33:00 92.00 /min                

 

             Respiratory Rate 2021-10-09 11:33:00                           







Procedures







                Procedure       Date / Time Performed Performing Clinician Sourc

e

 

                XR HIPS 3 VW LEFT 2022 04:42:39 Rosie Jha [facility]

 

                NOTICE OF PRIVACY 2022 04:04:38 Doctor Unassigned, No [facility]



                [facility]                       Name            [facility]

 

                CONSENT/REFUSAL FOR 2022 04:04:16 Doctor Unassigned, No [facility]

[facility]



                [facility] AND                   Name            [facility]



                TREATMENT                                       







Plan of Care







             Planned Activity Planned Date Details      Comments     Source

 

             Goal                      Plan of Care Note [code = 81983-5]       

       

 

             Goal                      Plan of Care Note [code = 50862-2]       

       

 

             Goal                      Plan of Care Note [code = 80859-3]       

       

 

             Goal                      Plan of Care Note [code = 59595-7]       

       

 

             Goal                      Plan of Care Note [code = 04023-7]       

       

 

             Goal                      Plan of Care Note [code = 73116-5]       

       

 

             Goal                      Plan of Care Note [code = 42556-2]       

       

 

             Goal                      Plan of Care Note [code = 31745-5]       

       

 

             Goal                      Plan of Care Note [code = 58552-4]       

       

 

             Goal                      Plan of Care Note [code = 01756-2]       

       

 

             Goal                      Plan of Care Note [code = 57592-6]       

       

 

             Goal                      Plan of Care Note [code = 96001-5]       

       

 

             Goal                      Plan of Care Note [code = 42282-1]       

       

 

             Goal                      Plan of Care Note [code = 89590-2]       

       

 

             Goal                      Plan of Care Note [code = 05371-2]       

       

 

             Goal                      Plan of Care Note [code = 31517-6]       

       

 

             Goal                      Plan of Care Note [code = 66611-4]       

       

 

             Goal                      Plan of Care Note [code = 58930-4]       

       

 

             Goal                      Plan of Care Note [code = 10332-6]       

       

 

             Goal                      Plan of Care Note [code = 89115-3]       

       

 

             Goal                      Plan of Care Note [code = 63867-8]       

       

 

             Goal                      Plan of Care Note [code = 49301-9]       

       

 

             Goal                      Plan of Care Note [code = 93931-5]       

       

 

             Goal                      Plan of Care Note [code = 88337-4]       

       

 

             Goal                      Plan of Care Note [code = 18818-0]       

       

 

             Goal                      Plan of Care Note [code = 79217-0]       

       

 

             Goal                      Plan of Care Note [code = 06658-0]       

       

 

             Goal                      Plan of Care Note [code = 46428-8]       

       

 

             Goal                      Plan of Care Note [code = 71246-0]       

       

 

             Goal                      Plan of Care Note [code = 17968-7]       

       

 

             Goal                      Plan of Care Note [code = 02468-5]       

       

 

             Goal                      Plan of Care Note [code = 95770-7]       

       

 

             Goal                      Plan of Care Note [code = 99960-9]       

       

 

             Goal                      Plan of Care Note [code = 67211-5]       

       

 

             Goal                      Plan of Care Note [code = 31898-2]       

       

 

             Goal                      Plan of Care Note [code = 32046-7]       

       

 

             Goal                      Plan of Care Note [code = 26697-1]       

       

 

             Goal                      Plan of Care Note [code = 20348-6]       

       

 

             Goal                      Plan of Care Note [code = 19149-0]       

       

 

             Goal                      Plan of Care Note [code = 46907-0]       

       

 

             Goal                      Plan of Care Note [code = 24250-7]       

       

 

             Goal                      Plan of Care Note [code = 23129-4]       

       

 

             Goal                      Plan of Care Note [code = 40240-6]       

       

 

             Goal                      Plan of Care Note [code = 15675-3]       

       

 

             Goal                      Plan of Care Note [code = 33602-6]       

       

 

             Goal                      Plan of Care Note [code = 77599-3]       

       

 

             Goal                      Plan of Care Note [code = 63381-2]       

       

 

             Goal                      Plan of Care Note [code = 78332-8]       

       

 

             Goal                      Plan of Care Note [code = 76336-3]       

       

 

             Goal                      Plan of Care Note [code = 08157-3]       

       

 

             Goal                      Plan of Care Note [code = 14995-0]       

       

 

             Goal                      Plan of Care Note [code = 12190-9]       

       

 

             Goal                      Plan of Care Note [code = 43362-6]       

       

 

             Goal                      Plan of Care Note [code = 23406-2]       

       

 

             Goal                      Plan of Care Note [code = 18825-7]       

       

 

             Goal                      Plan of Care Note [code = 12990-7]       

       

 

             Goal                      Plan of Care Note [code = 01627-8]       

       

 

             Goal                      Plan of Care Note [code = 65933-9]       

       

 

             Goal                      Plan of Care Note [code = 02447-8]       

       

 

             Goal                      Plan of Care Note [code = 22065-2]       

       

 

             Goal                      Plan of Care Note [code = 34757-9]       

       

 

             Goal                      Plan of Care Note [code = 37281-7]       

       

 

             Goal                      Plan of Care Note [code = 96295-4]       

       

 

             Goal                      Plan of Care Note [code = 40813-6]       

       

 

             Goal                      Plan of Care Note [code = 42659-8]       

       

 

             Goal                      Plan of Care Note [code = 70216-2]       

       

 

             Goal                      Plan of Care Note [code = 07340-7]       

       

 

             Goal                      Plan of Care Note [code = 14821-2]       

       

 

             Goal                      Plan of Care Note [code = 86212-5]       

       

 

             Goal                      Plan of Care Note [code = 59173-7]       

       

 

             Goal                      Plan of Care Note [code = 19699-6]       

       

 

             Goal                      Plan of Care Note [code = 99491-2]       

       

 

             Goal                      Plan of Care Note [code = 99077-6]       

       

 

             Goal                      Plan of Care Note [code = 68849-4]       

       

 

             Goal                      Plan of Care Note [code = 90998-4]       

       

 

             Goal                      Plan of Care Note [code = 95434-1]       

       

 

             Goal                      Plan of Care Note [code = 10336-7]       

       

 

             Goal                      Plan of Care Note [code = 16664-3]       

       

 

             Goal                      Plan of Care Note [code = 23118-7]       

       

 

             Goal                      Plan of Care Note [code = 20397-4]       

       

 

             Goal                      Plan of Care Note [code = 33012-9]       

       

 

             Goal                      Plan of Care Note [code = 18066-3]       

       







Encounters







        Start   End     Encounter Admission Attending Care    Care    Encounter 

Source



        Date/Time Date/Time Type    Type    Clinicians Facility Department ID   

   

 

        2022         Outpatient         [facility],  [facility]     [id number]

 [place]



        01:03:24                         [facility]                 [id number] [facility]

 

        2022 Outpatient                 [id number]

[id number] 



        00:00:00 00:00:00 Visit                   [id number]         [id number] 



                                                -4fc2-u05         [id number] 



                                                [id number]         [id number]  

 

        2022 Outpatient                 [id number]

[id number] 



        00:00:00 00:00:00 Visit                   422b-482d         22b-482d-8 



                                                -8157-b70         [id number] 



                                                [id number]         0f6bdd  

 

        2022-07-15 2022 [facility]    [phone] [id number]

[address] [facility]



        23:20:00 03:26:00                 Rosie LOPEZ [id number]         i

ty ronnie WIGGINS [id number]         [facility]  [phone]         Medi

marisa



                                                        [address]             Branch

 

        2022-07-15 2022 Emergency X       SINGER, [facility]    ERT     [id number]

[address] [facility]



        23:20:00 03:26:00                 ROSIE vaca [facility]

 

        2022 Outpatient                 [id number]

[id number] 



        00:00:00 00:00:00 Visit                   6l2b-3qkz         u0b-8iof-s 



                                                -z2o0-fij         0s8-bubz84 



                                                [id number]         [id number]  

 

        2022 Outpatient         DOMINGA  [facility]    MED     [id number]   

 [facility]



        12:04:00 23:59:00                 BENNIE                         







Results







           Test Description Test Time  Test Comments Results    Result Comments 

Source









                    VITAMIN D, 25 OH    2022 06:58:12 









                      Test Item  Value      Reference Range Interpretation Comme

nts









             VITAMIN D, 25 OH (test code 18 NG/ML     SEE BELOW    L            

 NOTE: 25-HYDROXYVITAMIN D 

ASSAY



             = 4958)                                             INCLUDES 25-HYD

ROXYVITAMIN D2 AND D3.



                                                                 METHODOLOGY IS 

CHEMILUMINESCENT



                                                                 IMMUNOASSAY. **

*** INTERPRETIVE



                                                                 RANGES *****PED

IATRIC (<17 YEARS) . .



                                                                 . . . . . . . .

 . NG/ML 20-100ADULT:



                                                                 INSUFFICIENT . 

. . . . . . . . . . .



                                                                 . . NG/ML <20 S

UBOPTIMAL . . . . . .



                                                                 . . . . . . . .

 . NG/ML 20-29 OPTIMAL



                                                                 . . . . . . . .

 . . . . . . . . .



                                                                 NG/ML  UN

LESS OTHERWISE



                                                                 INDICATED, ALL 

TESTING PERFORMED



                                                                 ATCLINICAL PATH

M.dot,



                                                                 INC. [address] [place] [address]



                                                                 LABORATORY DIRE

CTOR: CLIFTON DARBY M.D.

 CLIA NUMBER



                                                                 [id number] 

ACCREDITATION NO.



                                                                 [id number]



TSH, THIRD [id number] 06:45:08





             Test Item    Value        Reference Range Interpretation Comments

 

             TSH, THIRD GENERATION (test code 1.290 UIU/ML 0.400-4.100          

     



             = 2821)                                             



VITAMIN [id number] 06:45:08





             Test Item    Value        Reference Range Interpretation Comments

 

             VITAMIN B-12 (test code = 2840) 647 PG/ML    200-950               

    



COMPREHENSIVE METABOLIC [id number] 05:39:52





             Test Item    Value        Reference Range Interpretation Comments

 

             GLUCOSE (test code = 119 MG/DL    70-99        H            



             )                                               

 

             BUN (test code = 19 MG/DL     8-23                      



             )                                               

 

             CREATININE (test 0.98 MG/DL   0.60-1.30                 



             code = 221)                                        

 

             eGFR ( CKD-EPI) 65 ML/MIN/1.73 >60                       



             (test code = 55249)                                        

 

             CALC BUN/CREAT (test 19 RATIO     6-28                      



             code = 2235)                                        

 

             SODIUM (test code = 137 MEQ/L    133-146                   



             )                                               

 

             POTASSIUM (test code 4.9 MEQ/L    3.5-5.4                   



             = )                                             

 

             CHLORIDE (test code 97 MEQ/L                         



             = )                                             

 

             CARBON DIOXIDE (test 23 MEQ/L     19-31                     



             code = 220)                                        

 

             CALCIUM (test code = 9.7 MG/DL    8.5-10.5                  



             )                                               

 

             PROTEIN, TOTAL (test 7.9 G/DL     6.1-8.3                   



             code = 222)                                        

 

             ALBUMIN (test code = 4.6 G/DL     3.5-5.2                   



             )                                               

 

             CALC GLOBULIN (test 3.3 G/DL     1.9-3.7                   



             code = 2240)                                        

 

             CALC A/G RATIO (test 1.4 RATIO    1.0-2.6                   



             code = 2234)                                        

 

             BILIRUBIN, TOTAL 0.3 MG/DL    See_Comment                [Automated

 message]



             (test code = 220)                                        The syste

m which



                                                                 generated this



                                                                 result transmit

maximo



                                                                 reference range

:



                                                                 <=1.2. The refe

rence



                                                                 range was not u

sed



                                                                 to interpret th

is



                                                                 result as



                                                                 normal/abnormal

.

 

             ALKALINE PHOSPHATASE 79 U/L                           



             (test code = 2204)                                        

 

             AST (test code = 48 U/L       9-40         H            



             )                                               

 

             ALT (test code = 48 U/L       5-40         H            



             )                                               



CBC W/AUTO DIFF WITH [id number] 02:09:53





             Test Item    Value        Reference Range Interpretation Comments

 

             WBC (test code = 7.0 K/UL     3.5-11.0                  



             1001)                                               

 

             RBC (test code = 3.83 M/UL    3.80-5.40                 



             1002)                                               

 

             HEMOGLOBIN (test code 10.9 G/DL    11.5-15.5    L            



             = 1003)                                             

 

             HEMATOCRIT (test code 33.2 %       34.0-45.0    L            



             = 1004)                                             

 

             MCV (test code = 86.7 fL      80.0-99.0                 



             1005)                                               

 

             MCH (test code = 28.5 PG      25.0-33.0                 



             1006)                                               

 

             MCHC (test code = 32.8 G/DL    31.0-36.0                 



             1007)                                               

 

             RDW (test code = 15.2 %       11.5-15.0    H            



             1038)                                               

 

             NEUTROPHILS (test 63.1 %                                 



             code = 1008)                                        

 

             LYMPHOCYTES (test 28.0 %                                 



             code = 1010)                                        

 

             MONOCYTES (test code 6.4 %                                  



             = 1011)                                             

 

             EOSINOPHILS (test 1.6 %                                  



             code = 1012)                                        

 

             BASOPHILS (test code 0.6 %                                  



             = 1013)                                             

 

             IMMATURE GRANULOCYTES 0.3 %                                  



             (test code = 1036)                                        

 

             NUCLEATED RBCS (test 0.0 /100 WBC'S See_Comment                [Aut

omated



             code = 1065)                                        message] The sy

stem



                                                                 which generated



                                                                 this result



                                                                 transmitted



                                                                 reference range

:



                                                                 0.0. The refere

nce



                                                                 range was not u

sed



                                                                 to interpret th

is



                                                                 result as



                                                                 normal/abnormal

.

 

             PLATELET COUNT (test 115 K/UL     130-400      L            



             code = 1015)                                        

 

             ABSOLUTE NEUTROPHILS 4.41 K/UL    1.50-7.50                 



             (test code = 1066)                                        

 

             ABSOLUTE LYMPHOCYTES 1.96 K/UL    1.00-4.00                 



             (test code = 1067)                                        

 

             ABSOLUTE MONOCYTES 0.45 K/UL    0.20-1.00                 



             (test code = 1068)                                        

 

             ABSOLUTE EOSINOPHILS 0.11 K/UL    0.00-0.50                 



             (test code = 1040)                                        

 

             ABSOLUTE BASOPHILS 0.04 K/UL    0.00-0.20                 



             (test code = 1069)                                        

 

             ABS IMMATURE 0.02 K/UL    0.00-0.10                 



             GRANULOCYTES (test                                        



             code = 1020)                                        

 

             ABS NUCLEATED RBCS 0.00 K/UL    0.00-0.11                 



             (test code = 15055)                                        



CULTURE, [id number] 12:01:26SPECIMEN NUMBER: [id number] CULTURE, URINE  
SPECIMEN NUMBER: [id number] SPECIMEN COMMENT: URINE SOURCE: URINE REPORT STATUS: 
FINAL FINAL REPORT: 2022 10-50,000 CFU/ML MIXED UROGENITAL ROWENA UNLESS O
THERWISE INDICATED, ALL TESTING PERFORMED AT[facility] LAB[facility], 
INC. [address] [place] [address] : CLIFTON DARBY M.D. 
CLIA NUMBER [id number] CAP ACCREDITATION NO.[id number], [id number] 
00:00:00





             Test Item    Value        Reference Range Interpretation Comments

 

             CULTURE, URINE (test SPECIMEN NUMBER:                           



             code = 35479) [id number]                              



CULTURE, [id number] 00:00:00





             Test Item    Value        Reference Range Interpretation Comments

 

             CULTURE, URINE (test SPECIMEN NUMBER:                           



             code = 97842) [id number]                              



CULTURE, [id number] 00:00:00





             Test Item    Value        Reference Range Interpretation Comments

 

             CULTURE, URINE (test SPECIMEN NUMBER:                           



             code = 94477) [id number]                              



TSH, THIRD [id number] 06:16:09





             Test Item    Value        Reference Range Interpretation Comments

 

             TSH, THIRD   <0.010 UIU/ML 0.400-4.100  L             UNLESS OTHERW

ISE



             GENERATION (test                                        INDICATED, 

ALL



             code = 2821)                                        TESTING PERFORM

ED



                                                                 [facility]

[facility]



                                                                 [address] [place] [address] [id number] [place] [address]



                                                                 DIRECTOR: CLIFTON DARBY M.D.

 CLIA



                                                                 NUMBER [id number]

03 CAP



                                                                 ACCREDITATION N

O.



                                                                 [id number]



HEMOGLOBIN [id number] 05:23:24





             Test Item    Value        Reference Range Interpretation Comments

 

             HEMOGLOBIN A1c (test 6.7 %        4.2-5.6      H             AMERIC

AN DIABETES



             code = 61881)                                        ASSOCIATION 

IDELINES FOR



                                                                 HGB A1C:



                                                                 PREDIABETES/INC

REASED RISK .



                                                                 . . . . . . 5.7

-6.4%



                                                                 DIAGNOSIS OF DI

ABETES . . .



                                                                 . . . . . . >=6

.5% WITH



                                                                 CONFIRMATION OR

 APPROPRIATE



                                                                 SYMPTOMS NOTE: 

ASSAY MAY BE



                                                                 AFFECTED BY



                                                                 HEMOGLOBINOPATH

IES (SICKLE



                                                                 CELL ANEMIA, S-

C DISEASE,



                                                                 OTHERS) OR ZENA

FICIALLY



                                                                 LOWERED BY DECR

EASED RED



                                                                 CELL SURVIVAL (

HEMOLYTIC



                                                                 ANEMIAS, BLOOD 

LOSS, ETC.).



                                                                 CONSIDER ALTERN

ATE TESTING



                                                                 OR LABORATORY C

ONSULTATION.



COMPREHENSIVE METABOLIC [id number] 04:45:06





             Test Item    Value        Reference Range Interpretation Comments

 

             GLUCOSE (test code = 266 MG/DL    70-99        H            



             )                                               

 

             BUN (test code = 17 MG/DL     8-23                      



             )                                               

 

             CREATININE (test 0.92 MG/DL   0.60-1.30                 



             code = 221)                                        

 

             eGFR ( CKD-EPI) 70 ML/MIN/1.73 >60                       



             (test code = 68622)                                        

 

             CALC BUN/CREAT (test 18 RATIO     6-28                      



             code = 2235)                                        

 

             SODIUM (test code = 136 MEQ/L    133-146                   



             )                                               

 

             POTASSIUM (test code 5.3 MEQ/L    3.5-5.4                   



             = )                                             

 

             CHLORIDE (test code 98 MEQ/L                         



             = )                                             

 

             CARBON DIOXIDE (test 22 MEQ/L     19-31                     



             code = 220)                                        

 

             CALCIUM (test code = 8.8 MG/DL    8.5-10.5                  



             )                                               

 

             PROTEIN, TOTAL (test 7.4 G/DL     6.1-8.3                   



             code = )                                        

 

             ALBUMIN (test code = 4.1 G/DL     3.5-5.2                   



             )                                               

 

             CALC GLOBULIN (test 3.3 G/DL     1.9-3.7                   



             code = )                                        

 

             CALC A/G RATIO (test 1.2 RATIO    1.0-2.6                   



             code = 223)                                        

 

             BILIRUBIN, TOTAL 0.3 MG/DL    See_Comment                [Automated

 message]



             (test code = )                                        The syste

m which



                                                                 generated this



                                                                 result transmit

maximo



                                                                 reference range

:



                                                                 <=1.2. The refe

rence



                                                                 range was not u

sed



                                                                 to interpret th

is



                                                                 result as



                                                                 normal/abnormal

.

 

             ALKALINE PHOSPHATASE 88 U/L                           



             (test code = )                                        

 

             AST (test code = 32 U/L       9-40                      



             )                                               

 

             ALT (test code = 29 U/L       5-40                      



             )                                               



HEMOGLOBIN [id number] 00:00:00





             Test Item    Value        Reference Range Interpretation Comments

 

             HEMOGLOBIN A1c (test code = 23433) 6.7 %                           

       



HEMOGLOBIN [id number] 00:00:00





             Test Item    Value        Reference Range Interpretation Comments

 

             HEMOGLOBIN A1c (test code = 26128) 6.7 %                           

       



COMPREHENSIVE METABOLIC [id number] 00:00:00





             Test Item    Value        Reference Range Interpretation Comments

 

             GLUCOSE (test code = 2217) 266 MG/DL                              

 

             BUN (test code = ) 17 MG/DL                               

 

             CREATININE (test code = 4) 0.92 MG/DL                           

  

 

             eGFR ( CKD-EPI) (test code 70 ML/MIN/1.73                      

     



             = 41510)                                            

 

             CALC BUN/CREAT (test code = 18 RATIO                               



             2235)                                               

 

             SODIUM (test code = 2231) 136 MEQ/L                              

 

             POTASSIUM (test code = 2228) 5.3 MEQ/L                             

 

 

             CHLORIDE (test code = 2215) 98 MEQ/L                               

 

             CARBON DIOXIDE (test code = 22 MEQ/L                               



             )                                               

 

             CALCIUM (test code = 2209) 8.8 MG/DL                              

 

             PROTEIN, TOTAL (test code = 7.4 G/DL                               



             )                                               

 

             ALBUMIN (test code = 2201) 4.1 G/DL                               

 

             CALC GLOBULIN (test code = 3.3 G/DL                               



             )                                               

 

             CALC A/G RATIO (test code = 1.2 RATIO                              



             )                                               

 

             BILIRUBIN, TOTAL (test code = 0.3 MG/DL                            

  



             )                                               

 

             ALKALINE PHOSPHATASE (test 88 U/L                                 



             code = 2204)                                        

 

             AST (test code = 2218) 32 U/L                                 

 

             ALT (test code = 2219) 29 U/L                                 



[id number] 00:00:00





             Test Item    Value        Reference Range Interpretation Comments

 

             TSH, THIRD GENERATION (test <0.010 UIU/ML                          

 



             code = 2821)                                        



[id number] 00:00:00





             Test Item    Value        Reference Range Interpretation Comments

 

             TSH, THIRD GENERATION (test <0.010 UIU/ML                          

 



             code = 2821)                                        



HEMOGLOBIN [id number] 00:00:00





             Test Item    Value        Reference Range Interpretation Comments

 

             HEMOGLOBIN A1c (test code = 56281) 6.7 %                           

       



HEMOGLOBIN [id number] 00:00:00





             Test Item    Value        Reference Range Interpretation Comments

 

             HEMOGLOBIN A1c (test code = 53078) 6.7 %                           

       



HEMOGLOBIN [id number] 00:00:00





             Test Item    Value        Reference Range Interpretation Comments

 

             HEMOGLOBIN A1c (test code = 22630) 6.7 %                           

       



COMPREHENSIVE METABOLIC [id number] 00:00:00





             Test Item    Value        Reference Range Interpretation Comments

 

             GLUCOSE (test code = 2217) 266 MG/DL                              

 

             BUN (test code = 2208) 17 MG/DL                               

 

             CREATININE (test code = 2214) 0.92 MG/DL                           

  

 

             eGFR ( CKD-EPI) (test code 70 ML/MIN/1.73                      

     



             = 08406)                                            

 

             CALC BUN/CREAT (test code = 18 RATIO                               



             2235)                                               

 

             SODIUM (test code = 2231) 136 MEQ/L                              

 

             POTASSIUM (test code = 2228) 5.3 MEQ/L                             

 

 

             CHLORIDE (test code = 2215) 98 MEQ/L                               

 

             CARBON DIOXIDE (test code = 22 MEQ/L                               



             220)                                               

 

             CALCIUM (test code = 2209) 8.8 MG/DL                              

 

             PROTEIN, TOTAL (test code = 7.4 G/DL                               



             )                                               

 

             ALBUMIN (test code = 2201) 4.1 G/DL                               

 

             CALC GLOBULIN (test code = 3.3 G/DL                               



             2240)                                               

 

             CALC A/G RATIO (test code = 1.2 RATIO                              



             2234)                                               

 

             BILIRUBIN, TOTAL (test code = 0.3 MG/DL                            

  



             )                                               

 

             ALKALINE PHOSPHATASE (test 88 U/L                                 



             code = 2204)                                        

 

             AST (test code = 2218) 32 U/L                                 

 

             ALT (test code = 2219) 29 U/L                                 



COMPREHENSIVE METABOLIC [id number] 00:00:00





             Test Item    Value        Reference Range Interpretation Comments

 

             GLUCOSE (test code = 2217) 266 MG/DL                              

 

             BUN (test code = 2208) 17 MG/DL                               

 

             CREATININE (test code = 2214) 0.92 MG/DL                           

  

 

             eGFR ( CKD-EPI) (test code 70 ML/MIN/1.73                      

     



             = 29019)                                            

 

             CALC BUN/CREAT (test code = 18 RATIO                               



             2235)                                               

 

             SODIUM (test code = 2231) 136 MEQ/L                              

 

             POTASSIUM (test code = 2228) 5.3 MEQ/L                             

 

 

             CHLORIDE (test code = 2215) 98 MEQ/L                               

 

             CARBON DIOXIDE (test code = 22 MEQ/L                               



             )                                               

 

             CALCIUM (test code = 2209) 8.8 MG/DL                              

 

             PROTEIN, TOTAL (test code = 7.4 G/DL                               



             )                                               

 

             ALBUMIN (test code = 2201) 4.1 G/DL                               

 

             CALC GLOBULIN (test code = 3.3 G/DL                               



             2240)                                               

 

             CALC A/G RATIO (test code = 1.2 RATIO                              



             2234)                                               

 

             BILIRUBIN, TOTAL (test code = 0.3 MG/DL                            

  



             7)                                               

 

             ALKALINE PHOSPHATASE (test 88 U/L                                 



             code = 2204)                                        

 

             AST (test code = 2218) 32 U/L                                 

 

             ALT (test code = 2219) 29 U/L                                 



[id number] 00:00:00





             Test Item    Value        Reference Range Interpretation Comments

 

             TSH, THIRD GENERATION (test <0.010 UIU/ML                          

 



             code = 2821)                                        



[id number] 00:00:00





             Test Item    Value        Reference Range Interpretation Comments

 

             TSH, THIRD GENERATION (test <0.010 UIU/ML                          

 



             code = 2821)                                        



[id number] 00:00:00





             Test Item    Value        Reference Range Interpretation Comments

 

             TSH, THIRD GENERATION (test <0.010 UIU/ML                          

 



             code = 2821)                                        



TSH, THIRD [id number] 06:18:54





             Test Item    Value        Reference Range Interpretation Comments

 

             TSH, THIRD GENERATION (test code 0.190 UIU/ML 0.400-4.100  L       

     



             = 2821)                                             



HEMOGLOBIN [id number] 03:11:56





             Test Item    Value        Reference Range Interpretation Comments

 

             HEMOGLOBIN A1c (test 8.4 %        4.2-5.6      H             AMERIC

AN DIABETES



             code = 11269)                                        ASSOCIATION 

IDELINES FOR



                                                                 HGB A1C:



                                                                 PREDIABETES/INC

REASED RISK .



                                                                 . . . . . . 5.7

-6.4%



                                                                 DIAGNOSIS OF DI

ABETES . . .



                                                                 . . . . . . >=6

.5% WITH



                                                                 CONFIRMATION OR

 APPROPRIATE



                                                                 SYMPTOMS NOTE: 

ASSAY MAY BE



                                                                 AFFECTED BY



                                                                 HEMOGLOBINOPATH

IES (SICKLE



                                                                 CELL ANEMIA, S-

C DISEASE,



                                                                 OTHERS) OR ZENA

FICIALLY



                                                                 LOWERED BY DECR

EASED RED



                                                                 CELL SURVIVAL (

HEMOLYTIC



                                                                 ANEMIAS, BLOOD 

LOSS, ETC.).



                                                                 CONSIDER ALTERN

ATE TESTING



                                                                 OR LABORATORY C

ONSULTATION.



COMPREHENSIVE METABOLIC [id number] 03:11:06





             Test Item    Value        Reference Range Interpretation Comments

 

             GLUCOSE (test code = 141 MG/DL    70-99        H            



             )                                               

 

             BUN (test code = 31 MG/DL     8-23         H            



             )                                               

 

             CREATININE (test 1.07 MG/DL   0.60-1.30                 



             code = 2214)                                        

 

             eGFR ( CKD-EPI) 59 ML/MIN/1.73 >60          L            



             (test code = 80981)                                        

 

             CALC BUN/CREAT (test 29 RATIO     6-28         H            



             code = 2235)                                        

 

             SODIUM (test code = 141 MEQ/L    133-146                   



             )                                               

 

             POTASSIUM (test code 4.7 MEQ/L    3.5-5.4                   



             = )                                             

 

             CHLORIDE (test code 99 MEQ/L                         



             = 221)                                             

 

             CARBON DIOXIDE (test 26 MEQ/L     19-31                     



             code = 2206)                                        

 

             CALCIUM (test code = 8.8 MG/DL    8.5-10.5                  



             )                                               

 

             PROTEIN, TOTAL (test 8.0 G/DL     6.1-8.3                   



             code = 2229)                                        

 

             ALBUMIN (test code = 4.5 G/DL     3.5-5.2                   



             2201)                                               

 

             CALC GLOBULIN (test 3.5 G/DL     1.9-3.7                   



             code = 2240)                                        

 

             CALC A/G RATIO (test 1.3 RATIO    1.0-2.6                   



             code = 2234)                                        

 

             BILIRUBIN, TOTAL 0.4 MG/DL    See_Comment                [Automated

 message]



             (test code = 2207)                                        The syste

m which



                                                                 generated this



                                                                 result transmit

maximo



                                                                 reference range

:



                                                                 <=1.2. The refe

rence



                                                                 range was not u

sed



                                                                 to interpret th

is



                                                                 result as



                                                                 normal/abnormal

.

 

             ALKALINE PHOSPHATASE 67 U/L                           



             (test code = 2204)                                        

 

             AST (test code = 49 U/L       9-40         H            



             )                                               

 

             ALT (test code = 41 U/L       5-40         H            



             )                                               



LIPID [id number] 03:11:06





             Test Item    Value        Reference Range Interpretation Comments

 

             CHOLESTEROL (test 113 MG/DL    <200                      



             code = 2210)                                        

 

             TRIGLYCERIDES (test 165 MG/DL    <150         H            



             code = 2232)                                        

 

             HDL CHOLESTEROL (test 39 MG/DL     >39          L            



             code = 2220)                                        

 

             CALC LDL CHOL (test 50 MG/DL     <100                       NOTE: C

ALCULATED LDL



             code = 2237)                                        IS BASED ON



                                                                 CAROLANN-FUNES 

METHOD



                                                                 WHICHINCLUDES



                                                                 ADJUSTABLE



                                                                 TRIGLYCERIDE:VL

DL



                                                                 CHOLESTEROL RAT

IO.THIS



                                                                 FACTOR VARIES B

Y



                                                                 MEASURED TRIGLY

CERIDE



                                                                 AND NON-HDLCHOL

ESTEROL



                                                                 CONCENTRATIONS 

WITH



                                                                 INCREASED CALCU

LATED



                                                                 LDL SEENIN HIGH

ER



                                                                 TRIGLYCERIDE OR

 LOWER



                                                                 NON-HDL SPECIME

NS. FOR



                                                                 MOREINFORMATION

, SEE



                                                                 CLIENT ANNOUNCE

MENT AT



                                                                 [email or web address]



                                                                 [email or web address]

 

             RISK RATIO LDL/HDL 1.28 RATIO   <3.22                     



             (test code = 2238)                                        



IRON, [id number] 03:11:06





             Test Item    Value        Reference Range Interpretation Comments

 

             IRON, SERUM (test 59 UG/DL                          UNLESS OT

HERWISE



             code = 2222)                                        INDICATED, ALL 

TESTING



                                                                 PERFORMED ATCLI

NICAL



                                                                 PATHOLOGY LABOR

[facility] [employer],



                                                                 INC. [address] [facility],



                                                                 [place] [address] A[address] BOGGS



                                                                 DIRECTOR: LOUIS WARDIA



                                                                 NUMBER [id number]

[id number] CAP



                                                                 ACCREDITATION N

O. [id number]



HEMOGLOBIN [id number] 00:00:00





             Test Item    Value        Reference Range Interpretation Comments

 

             HEMOGLOBIN A1c (test code = 94205) 8.4 %                           

       



HEMOGLOBIN [id number] 00:00:00





             Test Item    Value        Reference Range Interpretation Comments

 

             HEMOGLOBIN A1c (test code = 23261) 8.4 %                           

       



COMPREHENSIVE METABOLIC [id number] 00:00:00





             Test Item    Value        Reference Range Interpretation Comments

 

             GLUCOSE (test code = 2217) 141 MG/DL                              

 

             BUN (test code = 2208) 31 MG/DL                               

 

             CREATININE (test code = 2214) 1.07 MG/DL                           

  

 

             eGFR ( CKD-EPI) (test code 59 ML/MIN/1.73                      

     



             = 74342)                                            

 

             CALC BUN/CREAT (test code = 29 RATIO                               



             2235)                                               

 

             SODIUM (test code = 2231) 141 MEQ/L                              

 

             POTASSIUM (test code = 2228) 4.7 MEQ/L                             

 

 

             CHLORIDE (test code = 2215) 99 MEQ/L                               

 

             CARBON DIOXIDE (test code = 26 MEQ/L                               



             )                                               

 

             CALCIUM (test code = 220) 8.8 MG/DL                              

 

             PROTEIN, TOTAL (test code = 8.0 G/DL                               



             )                                               

 

             ALBUMIN (test code = 220) 4.5 G/DL                               

 

             CALC GLOBULIN (test code = 3.5 G/DL                               



             )                                               

 

             CALC A/G RATIO (test code = 1.3 RATIO                              



             4)                                               

 

             BILIRUBIN, TOTAL (test code = 0.4 MG/DL                            

  



             )                                               

 

             ALKALINE PHOSPHATASE (test 67 U/L                                 



             code = 2204)                                        

 

             AST (test code = 2218) 49 U/L                                 

 

             ALT (test code = 2219) 41 U/L                                 



LIPID [id number] 00:00:00





             Test Item    Value        Reference Range Interpretation Comments

 

             CHOLESTEROL (test code = 2210) 113 MG/DL                           

   

 

             TRIGLYCERIDES (test code = 2232) 165 MG/DL                         

     

 

             HDL CHOLESTEROL (test code = 2220) 39 MG/DL                        

       

 

             CALC LDL CHOL (test code = 2237) 50 MG/DL                          

     

 

             RISK RATIO LDL/HDL (test code = 1.28 RATIO                         

    



             2238)                                               



[id number] 00:00:00





             Test Item    Value        Reference Range Interpretation Comments

 

             TSH, THIRD GENERATION (test code 0.190 UIU/ML                      

     



             = 2821)                                             



[id number] 00:00:00





             Test Item    Value        Reference Range Interpretation Comments

 

             TSH, THIRD GENERATION (test code 0.190 UIU/ML                      

     



             = 2821)                                             



IRON, [id number] 00:00:00





             Test Item    Value        Reference Range Interpretation Comments

 

             IRON, SERUM (test code = 2222) 59 UG/DL                            

   



HEMOGLOBIN [id number] 00:00:00





             Test Item    Value        Reference Range Interpretation Comments

 

             HEMOGLOBIN A1c (test code = 84956) 8.4 %                           

       



HEMOGLOBIN [id number] 00:00:00





             Test Item    Value        Reference Range Interpretation Comments

 

             HEMOGLOBIN A1c (test code = 48025) 8.4 %                           

       



COMPREHENSIVE METABOLIC [id number] 00:00:00





             Test Item    Value        Reference Range Interpretation Comments

 

             GLUCOSE (test code = 2217) 141 MG/DL                              

 

             BUN (test code = 2208) 31 MG/DL                               

 

             CREATININE (test code = 2214) 1.07 MG/DL                           

  

 

             eGFR ( CKD-EPI) (test code 59 ML/MIN/1.73                      

     



             = 64756)                                            

 

             CALC BUN/CREAT (test code = 29 RATIO                               



             2235)                                               

 

             SODIUM (test code = 223) 141 MEQ/L                              

 

             POTASSIUM (test code = 2228) 4.7 MEQ/L                             

 

 

             CHLORIDE (test code = 2215) 99 MEQ/L                               

 

             CARBON DIOXIDE (test code = 26 MEQ/L                               



             )                                               

 

             CALCIUM (test code = 2209) 8.8 MG/DL                              

 

             PROTEIN, TOTAL (test code = 8.0 G/DL                               



             )                                               

 

             ALBUMIN (test code = 2201) 4.5 G/DL                               

 

             CALC GLOBULIN (test code = 3.5 G/DL                               



             )                                               

 

             CALC A/G RATIO (test code = 1.3 RATIO                              



             )                                               

 

             BILIRUBIN, TOTAL (test code = 0.4 MG/DL                            

  



             )                                               

 

             ALKALINE PHOSPHATASE (test 67 U/L                                 



             code = 2204)                                        

 

             AST (test code = 2218) 49 U/L                                 

 

             ALT (test code = 2219) 41 U/L                                 



LIPID [id number] 00:00:00





             Test Item    Value        Reference Range Interpretation Comments

 

             CHOLESTEROL (test code = 2210) 113 MG/DL                           

   

 

             TRIGLYCERIDES (test code = 2232) 165 MG/DL                         

     

 

             HDL CHOLESTEROL (test code = 2220) 39 MG/DL                        

       

 

             CALC LDL CHOL (test code = 2237) 50 MG/DL                          

     

 

             RISK RATIO LDL/HDL (test code = 1.28 RATIO                         

    



             2238)                                               



[id number] 00:00:00





             Test Item    Value        Reference Range Interpretation Comments

 

             TSH, THIRD GENERATION (test code 0.190 UIU/ML                      

     



             = 2821)                                             



[id number] 00:00:00





             Test Item    Value        Reference Range Interpretation Comments

 

             TSH, THIRD GENERATION (test code 0.190 UIU/ML                      

     



             = 2821)                                             



IRON, [id number] 00:00:00





             Test Item    Value        Reference Range Interpretation Comments

 

             IRON, SERUM (test code = 2) 59 UG/DL                            

   



HEMOGLOBIN [id number] 00:00:00





             Test Item    Value        Reference Range Interpretation Comments

 

             HEMOGLOBIN A1c (test code = 98249) 8.4 %                           

       



HEMOGLOBIN [id number] 00:00:00





             Test Item    Value        Reference Range Interpretation Comments

 

             HEMOGLOBIN A1c (test code = 05281) 8.4 %                           

       



HEMOGLOBIN [id number] 00:00:00





             Test Item    Value        Reference Range Interpretation Comments

 

             HEMOGLOBIN A1c (test code = 61795) 8.4 %                           

       



COMPREHENSIVE METABOLIC [id number] 00:00:00





             Test Item    Value        Reference Range Interpretation Comments

 

             GLUCOSE (test code = 2217) 141 MG/DL                              

 

             BUN (test code = 2208) 31 MG/DL                               

 

             CREATININE (test code = 2214) 1.07 MG/DL                           

  

 

             eGFR ( CKD-EPI) (test code 59 ML/MIN/1.73                      

     



             = 60816)                                            

 

             CALC BUN/CREAT (test code = 29 RATIO                               



             2235)                                               

 

             SODIUM (test code = 2231) 141 MEQ/L                              

 

             POTASSIUM (test code = 2228) 4.7 MEQ/L                             

 

 

             CHLORIDE (test code = 2215) 99 MEQ/L                               

 

             CARBON DIOXIDE (test code = 26 MEQ/L                               



             )                                               

 

             CALCIUM (test code = 2209) 8.8 MG/DL                              

 

             PROTEIN, TOTAL (test code = 8.0 G/DL                               



             )                                               

 

             ALBUMIN (test code = 2201) 4.5 G/DL                               

 

             CALC GLOBULIN (test code = 3.5 G/DL                               



             0)                                               

 

             CALC A/G RATIO (test code = 1.3 RATIO                              



             4)                                               

 

             BILIRUBIN, TOTAL (test code = 0.4 MG/DL                            

  



             )                                               

 

             ALKALINE PHOSPHATASE (test 67 U/L                                 



             code = 2204)                                        

 

             AST (test code = 2218) 49 U/L                                 

 

             ALT (test code = 2219) 41 U/L                                 



COMPREHENSIVE METABOLIC [id number] 00:00:00





             Test Item    Value        Reference Range Interpretation Comments

 

             GLUCOSE (test code = 2217) 141 MG/DL                              

 

             BUN (test code = 2208) 31 MG/DL                               

 

             CREATININE (test code = 2214) 1.07 MG/DL                           

  

 

             eGFR ( CKD-EPI) (test code 59 ML/MIN/1.73                      

     



             = 80718)                                            

 

             CALC BUN/CREAT (test code = 29 RATIO                               



             2235)                                               

 

             SODIUM (test code = 2231) 141 MEQ/L                              

 

             POTASSIUM (test code = 2228) 4.7 MEQ/L                             

 

 

             CHLORIDE (test code = 2215) 99 MEQ/L                               

 

             CARBON DIOXIDE (test code = 26 MEQ/L                               



             )                                               

 

             CALCIUM (test code = 2209) 8.8 MG/DL                              

 

             PROTEIN, TOTAL (test code = 8.0 G/DL                               



             )                                               

 

             ALBUMIN (test code = 2201) 4.5 G/DL                               

 

             CALC GLOBULIN (test code = 3.5 G/DL                               



             )                                               

 

             CALC A/G RATIO (test code = 1.3 RATIO                              



             4)                                               

 

             BILIRUBIN, TOTAL (test code = 0.4 MG/DL                            

  



             )                                               

 

             ALKALINE PHOSPHATASE (test 67 U/L                                 



             code = 2204)                                        

 

             AST (test code = 2218) 49 U/L                                 

 

             ALT (test code = 2219) 41 U/L                                 



LIPID [id number] 00:00:00





             Test Item    Value        Reference Range Interpretation Comments

 

             CHOLESTEROL (test code = 2210) 113 MG/DL                           

   

 

             TRIGLYCERIDES (test code = 2232) 165 MG/DL                         

     

 

             HDL CHOLESTEROL (test code = 2220) 39 MG/DL                        

       

 

             CALC LDL CHOL (test code = 2237) 50 MG/DL                          

     

 

             RISK RATIO LDL/HDL (test code = 1.28 RATIO                         

    



             2238)                                               



LIPID [id number] 00:00:00





             Test Item    Value        Reference Range Interpretation Comments

 

             CHOLESTEROL (test code = 2210) 113 MG/DL                           

   

 

             TRIGLYCERIDES (test code = 2232) 165 MG/DL                         

     

 

             HDL CHOLESTEROL (test code = 2220) 39 MG/DL                        

       

 

             CALC LDL CHOL (test code = 2237) 50 MG/DL                          

     

 

             RISK RATIO LDL/HDL (test code = 1.28 RATIO                         

    



             2238)                                               



[id number] 00:00:00





             Test Item    Value        Reference Range Interpretation Comments

 

             TSH, THIRD GENERATION (test code 0.190 UIU/ML                      

     



             = 2821)                                             



[id number] 00:00:00





             Test Item    Value        Reference Range Interpretation Comments

 

             TSH, THIRD GENERATION (test code 0.190 UIU/ML                      

     



             = 2821)                                             



[id number] 00:00:00





             Test Item    Value        Reference Range Interpretation Comments

 

             TSH, THIRD GENERATION (test code 0.190 UIU/ML                      

     



             = 2821)                                             



IRON, [id number] 00:00:00





             Test Item    Value        Reference Range Interpretation Comments

 

             IRON, SERUM (test code = ) 59 UG/DL                            

   



IRON, [id number] 00:00:00





             Test Item    Value        Reference Range Interpretation Comments

 

             IRON, SERUM (test code = 2222) 59 UG/DL                            

   



ALBUMIN/CREATININE RATIO, URINE, [id number] 05:57:19





             Test Item    Value        Reference Range Interpretation Comments

 

             CREATININE, URINE, 212.0 MG/DL  NOT ESTAB                 



             CONC. (test code =                                        



             2072)                                               

 

             ALBUMIN, URINE, 16.0 MG/DL                              Note: Test 

name is



             RANDOM (test code                                        changed to

 Urine Albumin



             = 29734)                                            from Microalbu

in in



                                                                 accordance with

 ADA and



                                                                 NKF Guidelines,

 and



                                                                 Albumin/Creatin

ine ratio



                                                                 reference inter

mariah



                                                                 reflects those



                                                                 Guidelines. Violet

lytic



                                                                 methodology is



                                                                 unchanged. No r

eference



                                                                 interval for Ra

ndom



                                                                 Urine Albumin i

s



                                                                 available.

 

             CALC         75 MG/G      <30          H             UNLESS OTHERWI

SE



             ALBUMIN/CREAT, RND                                        INDICATED

, ALL TESTING



             (test code =                                        PERFORMED ATCLI

NICAL



             56148)                                              PATHOLOGY LABOR

[employer]



                                                                 INC. [address] [place] [address]

4



                                                                 LABORATORY DIRE

CTOR:



                                                                 CLIFTON THOMSON M.D.



                                                                 CLIA NUMBER [id number]

[id number]



                                                                 CAP ACCREDITATI

ON NO.



                                                                 [id number]



HEMOGLOBIN [id number] 04:12:15





             Test Item    Value        Reference Range Interpretation Comments

 

             HEMOGLOBIN A1c (test 9.1 %        4.2-5.6      H             AMERIC

AN DIABETES



             code = 45835)                                        ASSOCIATION 

IDELINES FOR



                                                                 HGB A1C:



                                                                 PREDIABETES/INC

REASED RISK .



                                                                 . . . . . . 5.7

-6.4%



                                                                 DIAGNOSIS OF DI

ABETES . . .



                                                                 . . . . . . >=6

.5%  WITH



                                                                 CONFIRMATION OR

 APPROPRIATE



                                                                 SYMPTOMS NOTE: 

ASSAY MAY BE



                                                                 AFFECTED BY



                                                                 HEMOGLOBINOPATH

IES (SICKLE



                                                                 CELL ANEMIA, S-

C DISEASE,



                                                                 OTHERS) OR ZENA

FICIALLY



                                                                 LOWERED BY DECR

EASED RED



                                                                 CELL SURVIVAL (

HEMOLYTIC



                                                                 ANEMIAS, BLOOD 

LOSS, ETC.).



                                                                 CONSIDER ALTERN

ATE TESTING



                                                                 OR LABORATORY C

ONSULTATION.



HEMOGLOBIN [id number] 00:00:00





             Test Item    Value        Reference Range Interpretation Comments

 

             HEMOGLOBIN A1c (test code = 43124) 9.1 %                           

       



HEMOGLOBIN [id number] 00:00:00





             Test Item    Value        Reference Range Interpretation Comments

 

             HEMOGLOBIN A1c (test code = 25130) 9.1 %                           

       



MICROALBUMIN/CREATININE, RANDOM AND [id number] 00:00:00





             Test Item    Value        Reference Range Interpretation Comments

 

             CREATININE, URINE, CONC. (test 212.0 MG/DL                         

   



             code = 2072)                                        

 

             ALBUMIN, URINE, RANDOM (test code 16.0 MG/DL                       

      



             = 42161)                                            

 

             CALC ALBUMIN/CREAT, RND (test 75 MG/G                              

  



             code = 18139)                                        



HEMOGLOBIN [id number] 00:00:00





             Test Item    Value        Reference Range Interpretation Comments

 

             HEMOGLOBIN A1c (test code = 00206) 9.1 %                           

       



HEMOGLOBIN [id number] 00:00:00





             Test Item    Value        Reference Range Interpretation Comments

 

             HEMOGLOBIN A1c (test code = 33724) 9.1 %                           

       



MICROALBUMIN/CREATININE, RANDOM AND [id number] 00:00:00





             Test Item    Value        Reference Range Interpretation Comments

 

             CREATININE, URINE, CONC. (test 212.0 MG/DL                         

   



             code = 2072)                                        

 

             ALBUMIN, URINE, RANDOM (test code 16.0 MG/DL                       

      



             = 62968)                                            

 

             CALC ALBUMIN/CREAT, RND (test 75 MG/G                              

  



             code = 98659)                                        



HEMOGLOBIN [id number] 00:00:00





             Test Item    Value        Reference Range Interpretation Comments

 

             HEMOGLOBIN A1c (test code = 78026) 9.1 %                           

       



HEMOGLOBIN [id number] 00:00:00





             Test Item    Value        Reference Range Interpretation Comments

 

             HEMOGLOBIN A1c (test code = 46737) 9.1 %                           

       



HEMOGLOBIN [id number] 00:00:00





             Test Item    Value        Reference Range Interpretation Comments

 

             HEMOGLOBIN A1c (test code = 97999) 9.1 %                           

       



MICROALBUMIN/CREATININE, RANDOM AND [id number] 00:00:00





             Test Item    Value        Reference Range Interpretation Comments

 

             CREATININE, URINE, CONC. (test 212.0 MG/DL                         

   



             code = 2072)                                        

 

             ALBUMIN, URINE, RANDOM (test code 16.0 MG/DL                       

      



             = 95228)                                            

 

             CALC ALBUMIN/CREAT, RND (test 75 MG/G                              

  



             code = 85185)                                        



MICROALBUMIN/CREATININE, RANDOM AND [id number] 00:00:00





             Test Item    Value        Reference Range Interpretation Comments

 

             CREATININE, URINE, CONC. (test 212.0 MG/DL                         

   



             code = 2072)                                        

 

             ALBUMIN, URINE, RANDOM (test code 16.0 MG/DL                       

      



             = 33480)                                            

 

             CALC ALBUMIN/CREAT, RND (test 75 MG/G                              

  



             code = 26662)                                        



PATHOLOGIST SMEAR [id number] 00:00:00





             Test Item    Value        Reference Range Interpretation Comments

 

             DIAGNOSIS: (test code = 8200) (NOTE)                               

  

 

             COMMENTS: (test code = 8205) (NOTE)                                

 

 

             MICROSCOPIC DESCRIPTION: (test (NOTE)                              

   



             code = 8210)                                        

 

             PATHOLOGIST: (test code = 8250) (NOTE)                             

    

 

             CPT: (test code = 8400) 67896                                  

 

             WBC (test code = 1001) 2.7 K/UL                               

 

             RBC (test code = 1002) 3.09 M/UL                              

 

             HEMOGLOBIN (test code = 1003) 8.2 G/DL                             

  

 

             HEMATOCRIT (test code = 1004) 25.6 %                               

  

 

             MCV (test code = 1005) 82.8 fL                                

 

             MCH (test code = 1006) 26.5 PG                                

 

             MCHC (test code = 1007) 32.0 G/DL                              

 

             RDW (test code = 1038) 14.1 %                                 

 

             NEUTROPHILS (test code = 1008) 52.0 %                              

   

 

             LYMPHOCYTES (test code = 1010) 35.8 %                              

   

 

             MONOCYTES (test code = 1011) 7.2 %                                 

 

 

             EOSINOPHILS (test code = 1012) 4.2 %                               

   

 

             BASOPHILS (test code = 1013) 0.4 %                                 

 

 

             NUCLEATED RBCS (test code = 0.0 /100WBC'S                          

 



             1065)                                               

 

             PLATELET COUNT (test code = 99 K/UL                                



             1015)                                               

 

             ABSOLUTE NEUTROPHILS (test code 1.38 K/UL                          

    



             = 1066)                                             

 

             ABSOLUTE LYMPHOCYTES (test code 0.95 K/UL                          

    



             = 1067)                                             

 

             ABSOLUTE MONOCYTES (test code = 0.19 K/UL                          

    



             1068)                                               

 

             ABSOLUTE EOSINOPHILS (test code 0.11 K/UL                          

    



             = 1040)                                             

 

             ABSOLUTE BASOPHILS (test code = 0.01 K/UL                          

    



             1069)                                               

 

             ABS NUCLEATED RBCS (test code = 0.00 K/UL                          

    



             79815)                                              

 

             COMMENTS (test code = 1016) (NOTE)                                 



PATHOLOGIST SMEAR [id number] 00:00:00





             Test Item    Value        Reference Range Interpretation Comments

 

             DIAGNOSIS: (test code = 8200) (NOTE)                               

  

 

             COMMENTS: (test code = 8205) (NOTE)                                

 

 

             MICROSCOPIC DESCRIPTION: (test (NOTE)                              

   



             code = 8210)                                        

 

             PATHOLOGIST: (test code = 8250) (NOTE)                             

    

 

             CPT: (test code = 8400) 42289                                  

 

             WBC (test code = 1001) 2.7 K/UL                               

 

             RBC (test code = 1002) 3.09 M/UL                              

 

             HEMOGLOBIN (test code = 1003) 8.2 G/DL                             

  

 

             HEMATOCRIT (test code = 1004) 25.6 %                               

  

 

             MCV (test code = 1005) 82.8 fL                                

 

             MCH (test code = 1006) 26.5 PG                                

 

             MCHC (test code = 1007) 32.0 G/DL                              

 

             RDW (test code = 1038) 14.1 %                                 

 

             NEUTROPHILS (test code = 1008) 52.0 %                              

   

 

             LYMPHOCYTES (test code = 1010) 35.8 %                              

   

 

             MONOCYTES (test code = 1011) 7.2 %                                 

 

 

             EOSINOPHILS (test code = 1012) 4.2 %                               

   

 

             BASOPHILS (test code = 1013) 0.4 %                                 

 

 

             NUCLEATED RBCS (test code = 0.0 /100WBC'S                          

 



             1065)                                               

 

             PLATELET COUNT (test code = 99 K/UL                                



             1015)                                               

 

             ABSOLUTE NEUTROPHILS (test code 1.38 K/UL                          

    



             = 1066)                                             

 

             ABSOLUTE LYMPHOCYTES (test code 0.95 K/UL                          

    



             = 1067)                                             

 

             ABSOLUTE MONOCYTES (test code = 0.19 K/UL                          

    



             1068)                                               

 

             ABSOLUTE EOSINOPHILS (test code 0.11 K/UL                          

    



             = 1040)                                             

 

             ABSOLUTE BASOPHILS (test code = 0.01 K/UL                          

    



             1069)                                               

 

             ABS NUCLEATED RBCS (test code = 0.00 K/UL                          

    



             75219)                                              

 

             COMMENTS (test code = 1016) (NOTE)                                 



PATHOLOGIST SMEAR [id number] 00:00:00





             Test Item    Value        Reference Range Interpretation Comments

 

             DIAGNOSIS: (test code = 8200) (NOTE)                               

  

 

             COMMENTS: (test code = 8205) (NOTE)                                

 

 

             MICROSCOPIC DESCRIPTION: (test (NOTE)                              

   



             code = 8210)                                        

 

             PATHOLOGIST: (test code = 8250) (NOTE)                             

    

 

             CPT: (test code = 8400) 83628                                  

 

             WBC (test code = 1001) 2.7 K/UL                               

 

             RBC (test code = 1002) 3.09 M/UL                              

 

             HEMOGLOBIN (test code = 1003) 8.2 G/DL                             

  

 

             HEMATOCRIT (test code = 1004) 25.6 %                               

  

 

             MCV (test code = 1005) 82.8 fL                                

 

             MCH (test code = 1006) 26.5 PG                                

 

             MCHC (test code = 1007) 32.0 G/DL                              

 

             RDW (test code = 1038) 14.1 %                                 

 

             NEUTROPHILS (test code = 1008) 52.0 %                              

   

 

             LYMPHOCYTES (test code = 1010) 35.8 %                              

   

 

             MONOCYTES (test code = 1011) 7.2 %                                 

 

 

             EOSINOPHILS (test code = 1012) 4.2 %                               

   

 

             BASOPHILS (test code = 1013) 0.4 %                                 

 

 

             NUCLEATED RBCS (test code = 0.0 /100WBC'S                          

 



             1065)                                               

 

             PLATELET COUNT (test code = 99 K/UL                                



             1015)                                               

 

             ABSOLUTE NEUTROPHILS (test code 1.38 K/UL                          

    



             = 1066)                                             

 

             ABSOLUTE LYMPHOCYTES (test code 0.95 K/UL                          

    



             = 1067)                                             

 

             ABSOLUTE MONOCYTES (test code = 0.19 K/UL                          

    



             1068)                                               

 

             ABSOLUTE EOSINOPHILS (test code 0.11 K/UL                          

    



             = 1040)                                             

 

             ABSOLUTE BASOPHILS (test code = 0.01 K/UL                          

    



             1069)                                               

 

             ABS NUCLEATED RBCS (test code = 0.00 K/UL                          

    



             48362)                                              

 

             COMMENTS (test code = 1016) (NOTE)                                 



PATHOLOGIST SMEAR [id number] 00:00:00





             Test Item    Value        Reference Range Interpretation Comments

 

             DIAGNOSIS: (test code = 8200) (NOTE)                               

  

 

             COMMENTS: (test code = 8205) (NOTE)                                

 

 

             MICROSCOPIC DESCRIPTION: (test (NOTE)                              

   



             code = 8210)                                        

 

             PATHOLOGIST: (test code = 8250) (NOTE)                             

    

 

             CPT: (test code = 8400) 33727                                  

 

             WBC (test code = 1001) 2.7 K/UL                               

 

             RBC (test code = 1002) 3.09 M/UL                              

 

             HEMOGLOBIN (test code = 1003) 8.2 G/DL                             

  

 

             HEMATOCRIT (test code = 1004) 25.6 %                               

  

 

             MCV (test code = 1005) 82.8 fL                                

 

             MCH (test code = 1006) 26.5 PG                                

 

             MCHC (test code = 1007) 32.0 G/DL                              

 

             RDW (test code = 1038) 14.1 %                                 

 

             NEUTROPHILS (test code = 1008) 52.0 %                              

   

 

             LYMPHOCYTES (test code = 1010) 35.8 %                              

   

 

             MONOCYTES (test code = 1011) 7.2 %                                 

 

 

             EOSINOPHILS (test code = 1012) 4.2 %                               

   

 

             BASOPHILS (test code = 1013) 0.4 %                                 

 

 

             NUCLEATED RBCS (test code = 0.0 /100WBC'S                          

 



             1065)                                               

 

             PLATELET COUNT (test code = 99 K/UL                                



             1015)                                               

 

             ABSOLUTE NEUTROPHILS (test code 1.38 K/UL                          

    



             = 1066)                                             

 

             ABSOLUTE LYMPHOCYTES (test code 0.95 K/UL                          

    



             = 1067)                                             

 

             ABSOLUTE MONOCYTES (test code = 0.19 K/UL                          

    



             1068)                                               

 

             ABSOLUTE EOSINOPHILS (test code 0.11 K/UL                          

    



             = 1040)                                             

 

             ABSOLUTE BASOPHILS (test code = 0.01 K/UL                          

    



             1069)                                               

 

             ABS NUCLEATED RBCS (test code = 0.00 K/UL                          

    



             59166)                                              

 

             COMMENTS (test code = 1016) (NOTE)                                 



FERRITIN2021-10-20 00:00:00





             Test Item    Value        Reference Range Interpretation Comments

 

             FERRITIN (test code = ) 42 NG/ML                               



[id number] 00:00:00





             Test Item    Value        Reference Range Interpretation Comments

 

             TRANSFERRIN (test code = 4936) 281 MG/DL                           

   



IRON BINDING CAPACITY AND IRON AND % SATURATION2021-10-20 00:00:00





             Test Item    Value        Reference Range Interpretation Comments

 

             IRON, SERUM (test code = 2) 28 UG/DL                            

   

 

             UNSATURATED IBC (test code = 33939) 315 UG/DL                      

        

 

             CALC TOTAL IBC (test code = ) 343 UG/DL                        

      

 

             CALC % IRON SAT (test code = ) 8 %                             

       



PROTIME AND PTT2021-10-20 00:00:00





             Test Item    Value        Reference Range Interpretation Comments

 

             PROTHROMBIN TIME (PT) (test code 14.8 SECONDS                      

     



             = 1402)                                             

 

             INR (test code = 64969) 1.1                                    

 

             PTT (test code = 1403) 35.8 SECONDS                           



RHEUMATOID FACTOR, [id number] 00:00:00





             Test Item    Value        Reference Range Interpretation Comments

 

             RHEUMATOID FACTOR, QUANT (test code <10 IU/ML                      

        



             = 3502)                                             



RHEUMATOID FACTOR, [id number] 00:00:00





             Test Item    Value        Reference Range Interpretation Comments

 

             RHEUMATOID FACTOR, QUANT (test code <10 IU/ML                      

        



             = 3502)                                             



CCP IgG2021-10-20 00:00:00





             Test Item    Value        Reference Range Interpretation Comments

 

             CCP IgG (test code = 15613) <0.5 U/ML                              



CCP IgG2021-10-20 00:00:00





             Test Item    Value        Reference Range Interpretation Comments

 

             CCP IgG (test code = 75879) <0.5 U/ML                              



SMITH (SM) ANTIBODY2021-10-20 00:00:00





             Test Item    Value        Reference Range Interpretation Comments

 

             MCGUIRE (Sm) ANTIBODY (test code = <0.2 AI                           

     



             37436)                                              



DNA DS ANTIBODY2021-10-20 00:00:00





             Test Item    Value        Reference Range Interpretation Comments

 

             dsDNA ANTIBODY (test code = 4287) 1.0 IU/ML                        

      



FERRITIN2021-10-20 00:00:00





             Test Item    Value        Reference Range Interpretation Comments

 

             FERRITIN (test code = ) 42 NG/ML                               



[id number] 00:00:00





             Test Item    Value        Reference Range Interpretation Comments

 

             TRANSFERRIN (test code = 4936) 281 MG/DL                           

   



IRON BINDING CAPACITY AND IRON AND % SATURATION2021-10-20 00:00:00





             Test Item    Value        Reference Range Interpretation Comments

 

             IRON, SERUM (test code = 2222) 28 UG/DL                            

   

 

             UNSATURATED IBC (test code = ) 315 UG/DL                      

        

 

             CALC TOTAL IBC (test code = ) 343 UG/DL                        

      

 

             CALC % IRON SAT (test code = ) 8 %                             

       



PROTIME AND PTT2021-10-20 00:00:00





             Test Item    Value        Reference Range Interpretation Comments

 

             PROTHROMBIN TIME (PT) (test code 14.8 SECONDS                      

     



             = 1402)                                             

 

             INR (test code = 33998) 1.1                                    

 

             PTT (test code = 1403) 35.8 SECONDS                           



RHEUMATOID FACTOR, [id number] 00:00:00





             Test Item    Value        Reference Range Interpretation Comments

 

             RHEUMATOID FACTOR, QUANT (test code <10 IU/ML                      

        



             = 3502)                                             



RHEUMATOID FACTOR, [id number] 00:00:00





             Test Item    Value        Reference Range Interpretation Comments

 

             RHEUMATOID FACTOR, QUANT (test code <10 IU/ML                      

        



             = 3502)                                             



CCP IgG2021-10-20 00:00:00





             Test Item    Value        Reference Range Interpretation Comments

 

             CCP IgG (test code = 46489) <0.5 U/ML                              



CCP IgG2021-10-20 00:00:00





             Test Item    Value        Reference Range Interpretation Comments

 

             CCP IgG (test code = 43905) <0.5 U/ML                              



SMITH (SM) ANTIBODY2021-10-20 00:00:00





             Test Item    Value        Reference Range Interpretation Comments

 

             MCGUIRE (Sm) ANTIBODY (test code = <0.2 AI                           

     



             82897)                                              



DNA DS ANTIBODY2021-10-20 00:00:00





             Test Item    Value        Reference Range Interpretation Comments

 

             dsDNA ANTIBODY (test code = 4287) 1.0 IU/ML                        

      



FERRITIN2021-10-20 00:00:00





             Test Item    Value        Reference Range Interpretation Comments

 

             FERRITIN (test code = ) 42 NG/ML                               



FERRITIN2021-10-20 00:00:00





             Test Item    Value        Reference Range Interpretation Comments

 

             FERRITIN (test code = ) 42 NG/ML                               



[id number] 00:00:00





             Test Item    Value        Reference Range Interpretation Comments

 

             TRANSFERRIN (test code = 4936) 281 MG/DL                           

   



[id number] 00:00:00





             Test Item    Value        Reference Range Interpretation Comments

 

             TRANSFERRIN (test code = 4936) 281 MG/DL                           

   



IRON BINDING CAPACITY AND IRON AND % SATURATION2021-10-20 00:00:00





             Test Item    Value        Reference Range Interpretation Comments

 

             IRON, SERUM (test code = ) 28 UG/DL                            

   

 

             UNSATURATED IBC (test code = ) 315 UG/DL                      

        

 

             CALC TOTAL IBC (test code = ) 343 UG/DL                        

      

 

             CALC % IRON SAT (test code = ) 8 %                             

       



IRON BINDING CAPACITY AND IRON AND % SATURATION2021-10-20 00:00:00





             Test Item    Value        Reference Range Interpretation Comments

 

             IRON, SERUM (test code = 222) 28 UG/DL                            

   

 

             UNSATURATED IBC (test code = ) 315 UG/DL                      

        

 

             CALC TOTAL IBC (test code = ) 343 UG/DL                        

      

 

             CALC % IRON SAT (test code = ) 8 %                             

       



PROTIME AND PTT2021-10-20 00:00:00





             Test Item    Value        Reference Range Interpretation Comments

 

             PROTHROMBIN TIME (PT) (test code 14.8 SECONDS                      

     



             = 1402)                                             

 

             INR (test code = 87088) 1.1                                    

 

             PTT (test code = 1403) 35.8 SECONDS                           



PROTIME AND PTT2021-10-20 00:00:00





             Test Item    Value        Reference Range Interpretation Comments

 

             PROTHROMBIN TIME (PT) (test code 14.8 SECONDS                      

     



             = 1402)                                             

 

             INR (test code = 02848) 1.1                                    

 

             PTT (test code = 1403) 35.8 SECONDS                           



RHEUMATOID FACTOR, [id number] 00:00:00





             Test Item    Value        Reference Range Interpretation Comments

 

             RHEUMATOID FACTOR, QUANT (test code <10 IU/ML                      

        



             = 3502)                                             



RHEUMATOID FACTOR, [id number] 00:00:00





             Test Item    Value        Reference Range Interpretation Comments

 

             RHEUMATOID FACTOR, QUANT (test code <10 IU/ML                      

        



             = 3502)                                             



RHEUMATOID FACTOR, [id number] 00:00:00





             Test Item    Value        Reference Range Interpretation Comments

 

             RHEUMATOID FACTOR, QUANT (test code <10 IU/ML                      

        



             = 3502)                                             



CCP IgG2021-10-20 00:00:00





             Test Item    Value        Reference Range Interpretation Comments

 

             CCP IgG (test code = 48993) <0.5 U/ML                              



CCP IgG2021-10-20 00:00:00





             Test Item    Value        Reference Range Interpretation Comments

 

             CCP IgG (test code = 78235) <0.5 U/ML                              



CCP IgG2021-10-20 00:00:00





             Test Item    Value        Reference Range Interpretation Comments

 

             CCP IgG (test code = 06322) <0.5 U/ML                              



SMITH (SM) ANTIBODY2021-10-20 00:00:00





             Test Item    Value        Reference Range Interpretation Comments

 

             MCGUIRE (Sm) ANTIBODY (test code = <0.2 AI                           

     



             29502)                                              



SMITH (SM) ANTIBODY2021-10-20 00:00:00





             Test Item    Value        Reference Range Interpretation Comments

 

             MCGUIRE (Sm) ANTIBODY (test code = <0.2 AI                           

     



             71103)                                              



DNA DS ANTIBODY2021-10-20 00:00:00





             Test Item    Value        Reference Range Interpretation Comments

 

             dsDNA ANTIBODY (test code = 4287) 1.0 IU/ML                        

      



DNA DS ANTIBODY2021-10-20 00:00:00





             Test Item    Value        Reference Range Interpretation Comments

 

             dsDNA ANTIBODY (test code = 4287) 1.0 IU/ML                        

      



CULTURE, URINE2021-10-16 00:00:00





             Test Item    Value        Reference Range Interpretation Comments

 

             CULTURE, URINE (test SPECIMEN NUMBER:                           



             code = 04451) [id number]                              



CULTURE, URINE2021-10-16 00:00:00





             Test Item    Value        Reference Range Interpretation Comments

 

             CULTURE, URINE (test SPECIMEN NUMBER:                           



             code = 05916) [id number]                              



CULTURE, URINE2021-10-16 00:00:00





             Test Item    Value        Reference Range Interpretation Comments

 

             CULTURE, URINE (test SPECIMEN NUMBER:                           



             code = 40530) [id number]                              



CULTURE, URINE2021-10-16 00:00:00





             Test Item    Value        Reference Range Interpretation Comments

 

             CULTURE, URINE (test SPECIMEN NUMBER:                           



             code = 35199) [id number]                              



VIOLET TITER AND PATTERN [REFLEX]2021-10-14 00:00:00





             Test Item    Value        Reference Range Interpretation Comments

 

             PATTERN (test code = SPECKLED                               



             72764)                                              

 

             VIOLET TITER (test code = 1:640 TITER                            



             3550)                                               

 

             PATTERN 2 (test code = NOT DETECTED                           



             15349)                                              

 

             VIOLET TITER 2 (test code = NOT DETECTED TITER                        

   



             24035)                                              

 

             PATTERN 3 (test code = NOT DETECTED                           



             48765)                                              

 

             VIOLET TITER 3 (test code = NOT DETECTED TITER                        

   



             89013)                                              

 

             METHOD (test code = 50616) (NOTE)                                 



VIOLET TITER AND PATTERN [REFLEX]2021-10-14 00:00:00





             Test Item    Value        Reference Range Interpretation Comments

 

             PATTERN (test code = SPECKLED                               



             15934)                                              

 

             VIOLET TITER (test code = 1:640 TITER                            



             3550)                                               

 

             PATTERN 2 (test code = NOT DETECTED                           



             58263)                                              

 

             VIOLET TITER 2 (test code = NOT DETECTED TITER                        

   



             20499)                                              

 

             PATTERN 3 (test code = NOT DETECTED                           



             24126)                                              

 

             VIOLET TITER 3 (test code = NOT DETECTED TITER                        

   



             25571)                                              

 

             METHOD (test code = 14780) (NOTE)                                 



VIOLET TITER AND PATTERN [REFLEX]2021-10-14 00:00:00





             Test Item    Value        Reference Range Interpretation Comments

 

             PATTERN (test code = SPECKLED                               



             58287)                                              

 

             VIOLET TITER (test code = 1:640 TITER                            



             3550)                                               

 

             PATTERN 2 (test code = NOT DETECTED                           



             08935)                                              

 

             VIOLET TITER 2 (test code = NOT DETECTED TITER                        

   



             50841)                                              

 

             PATTERN 3 (test code = NOT DETECTED                           



             76812)                                              

 

             VIOLET TITER 3 (test code = NOT DETECTED TITER                        

   



             69628)                                              

 

             METHOD (test code = 73180) (NOTE)                                 



CBC W/AUTO DIFF2021-10-13 00:00:00





             Test Item    Value        Reference Range Interpretation Comments

 

             WBC (test code = 1001) 3.1 K/UL                               

 

             RBC (test code = 1002) 3.14 M/UL                              

 

             HEMOGLOBIN (test code = 1003) 8.4 G/DL                             

  

 

             HEMATOCRIT (test code = 1004) 25.9 %                               

  

 

             MCV (test code = 1005) 82.5 fL                                

 

             MCH (test code = 1006) 26.8 PG                                

 

             MCHC (test code = 1007) 32.4 G/DL                              

 

             RDW (test code = 1038) 14.5 %                                 

 

             NEUTROPHILS (test code = 1008) 61.2 %                              

   

 

             LYMPHOCYTES (test code = 1010) 25.5 %                              

   

 

             MONOCYTES (test code = 1011) 8.8 %                                 

 

 

             EOSINOPHILS (test code = 1012) 3.9 %                               

   

 

             BASOPHILS (test code = 1013) 0.3 %                                 

 

 

             IMMATURE GRANULOCYTES (test 0.3 %                                  



             code = 1036)                                        

 

             NUCLEATED RBCS (test code = 0.0 /100WBC'S                          

 



             1065)                                               

 

             PLATELET COUNT (test code = 85 K/UL                                



             1015)                                               

 

             ABSOLUTE NEUTROPHILS (test code 1.87 K/UL                          

    



             = 1066)                                             

 

             ABSOLUTE LYMPHOCYTES (test code 0.78 K/UL                          

    



             = 1067)                                             

 

             ABSOLUTE MONOCYTES (test code = 0.27 K/UL                          

    



             1068)                                               

 

             ABSOLUTE EOSINOPHILS (test code 0.12 K/UL                          

    



             = 1040)                                             

 

             ABSOLUTE BASOPHILS (test code = 0.01 K/UL                          

    



             1069)                                               

 

             ABS IMMATURE GRANULOCYTES (test 0.01 K/UL                          

    



             code = 1020)                                        

 

             ABS NUCLEATED RBCS (test code = 0.00 K/UL                          

    



             17353)                                              



CBC W/AUTO DIFF2021-10-13 00:00:00





             Test Item    Value        Reference Range Interpretation Comments

 

             WBC (test code = 1001) 3.1 K/UL                               

 

             RBC (test code = 1002) 3.14 M/UL                              

 

             HEMOGLOBIN (test code = 1003) 8.4 G/DL                             

  

 

             HEMATOCRIT (test code = 1004) 25.9 %                               

  

 

             MCV (test code = 1005) 82.5 fL                                

 

             MCH (test code = 1006) 26.8 PG                                

 

             MCHC (test code = 1007) 32.4 G/DL                              

 

             RDW (test code = 1038) 14.5 %                                 

 

             NEUTROPHILS (test code = 1008) 61.2 %                              

   

 

             LYMPHOCYTES (test code = 1010) 25.5 %                              

   

 

             MONOCYTES (test code = 1011) 8.8 %                                 

 

 

             EOSINOPHILS (test code = 1012) 3.9 %                               

   

 

             BASOPHILS (test code = 1013) 0.3 %                                 

 

 

             IMMATURE GRANULOCYTES (test 0.3 %                                  



             code = 1036)                                        

 

             NUCLEATED RBCS (test code = 0.0 /100WBC'S                          

 



             1065)                                               

 

             PLATELET COUNT (test code = 85 K/UL                                



             1015)                                               

 

             ABSOLUTE NEUTROPHILS (test code 1.87 K/UL                          

    



             = 1066)                                             

 

             ABSOLUTE LYMPHOCYTES (test code 0.78 K/UL                          

    



             = 1067)                                             

 

             ABSOLUTE MONOCYTES (test code = 0.27 K/UL                          

    



             1068)                                               

 

             ABSOLUTE EOSINOPHILS (test code 0.12 K/UL                          

    



             = 1040)                                             

 

             ABSOLUTE BASOPHILS (test code = 0.01 K/UL                          

    



             1069)                                               

 

             ABS IMMATURE GRANULOCYTES (test 0.01 K/UL                          

    



             code = 1020)                                        

 

             ABS NUCLEATED RBCS (test code = 0.00 K/UL                          

    



             24934)                                              



COMPREHENSIVE METABOLIC PANEL202-10- 00:00:00





             Test Item    Value        Reference Range Interpretation Comments

 

             GLUCOSE (test code = 2217) 139 MG/DL                              

 

             BUN (test code = 2208) 21 MG/DL                               

 

             CREATININE (test code = 2214) 0.83 MG/DL                           

  

 

             eGFR  AMER. (test code 88 ML/MIN/1.73                       

    



             = 64348)                                            

 

             eGFR NON- AMER. (test 76 ML/MIN/1.73                        

   



             code = 62342)                                        

 

             CALC BUN/CREAT (test code = 25 RATIO                               



             2235)                                               

 

             SODIUM (test code = 2231) 140 MEQ/L                              

 

             POTASSIUM (test code = 2228) 4.8 MEQ/L                             

 

 

             CHLORIDE (test code = 2215) 102 MEQ/L                              

 

             CARBON DIOXIDE (test code = 25 MEQ/L                               



             2206)                                               

 

             CALCIUM (test code = 2209) 9.2 MG/DL                              

 

             PROTEIN, TOTAL (test code = 7.2 G/DL                               



             )                                               

 

             ALBUMIN (test code = 2201) 3.6 G/DL                               

 

             CALC GLOBULIN (test code = 3.6 G/DL                               



             2240)                                               

 

             CALC A/G RATIO (test code = 1.0 RATIO                              



             2234)                                               

 

             BILIRUBIN, TOTAL (test code = 0.6 MG/DL                            

  



             2207)                                               

 

             ALKALINE PHOSPHATASE (test 101 U/L                                



             code = 2204)                                        

 

             AST (test code = 2218) 30 U/L                                 

 

             ALT (test code = 2219) 23 U/L                                 



PROBNP2021-10-13 00:00:00





             Test Item    Value        Reference Range Interpretation Comments

 

             NT-proBNP (test code = 66305) 247 PG/ML                            

  



PROBNP2021-10-13 00:00:00





             Test Item    Value        Reference Range Interpretation Comments

 

             NT-proBNP (test code = 53111) 247 PG/ML                            

  



VIOLET (ANTI-NUCLEAR AB) WITH REFLEX TITER2021-10-13 00:00:00





             Test Item    Value        Reference Range Interpretation Comments

 

             ANTI-NUCLEAR ANTIBODIES (test code = POSITIVE                      

         



             3506)                                               



C-REACTIVE PROTEIN2021-10-13 00:00:00





             Test Item    Value        Reference Range Interpretation Comments

 

             C-REACTIVE PROTEIN (test code = 1.1 MG/DL                          

    



             3513)                                               



SEDIMENTATION RATE2021-10-13 00:00:00





             Test Item    Value        Reference Range Interpretation Comments

 

             SEDIMENTATION RATE (test code = 88 MM/HOUR                         

    



             1017)                                               



[id number] 00:00:00





             Test Item    Value        Reference Range Interpretation Comments

 

             D-DIMER (test code = 1405) 0.77 UG/MLFEU                           



CBC W/AUTO DIFF2021-10-13 00:00:00





             Test Item    Value        Reference Range Interpretation Comments

 

             WBC (test code = 1001) 3.1 K/UL                               

 

             RBC (test code = 1002) 3.14 M/UL                              

 

             HEMOGLOBIN (test code = 1003) 8.4 G/DL                             

  

 

             HEMATOCRIT (test code = 1004) 25.9 %                               

  

 

             MCV (test code = 1005) 82.5 fL                                

 

             MCH (test code = 1006) 26.8 PG                                

 

             MCHC (test code = 1007) 32.4 G/DL                              

 

             RDW (test code = 1038) 14.5 %                                 

 

             NEUTROPHILS (test code = 1008) 61.2 %                              

   

 

             LYMPHOCYTES (test code = 1010) 25.5 %                              

   

 

             MONOCYTES (test code = 1011) 8.8 %                                 

 

 

             EOSINOPHILS (test code = 1012) 3.9 %                               

   

 

             BASOPHILS (test code = 1013) 0.3 %                                 

 

 

             IMMATURE GRANULOCYTES (test 0.3 %                                  



             code = 1036)                                        

 

             NUCLEATED RBCS (test code = 0.0 /100WBC'S                          

 



             1065)                                               

 

             PLATELET COUNT (test code = 85 K/UL                                



             1015)                                               

 

             ABSOLUTE NEUTROPHILS (test code 1.87 K/UL                          

    



             = 1066)                                             

 

             ABSOLUTE LYMPHOCYTES (test code 0.78 K/UL                          

    



             = 1067)                                             

 

             ABSOLUTE MONOCYTES (test code = 0.27 K/UL                          

    



             1068)                                               

 

             ABSOLUTE EOSINOPHILS (test code 0.12 K/UL                          

    



             = 1040)                                             

 

             ABSOLUTE BASOPHILS (test code = 0.01 K/UL                          

    



             1069)                                               

 

             ABS IMMATURE GRANULOCYTES (test 0.01 K/UL                          

    



             code = 1020)                                        

 

             ABS NUCLEATED RBCS (test code = 0.00 K/UL                          

    



             57901)                                              



CBC W/AUTO DIFF2021-10-13 00:00:00





             Test Item    Value        Reference Range Interpretation Comments

 

             WBC (test code = 1001) 3.1 K/UL                               

 

             RBC (test code = 1002) 3.14 M/UL                              

 

             HEMOGLOBIN (test code = 1003) 8.4 G/DL                             

  

 

             HEMATOCRIT (test code = 1004) 25.9 %                               

  

 

             MCV (test code = 1005) 82.5 fL                                

 

             MCH (test code = 1006) 26.8 PG                                

 

             MCHC (test code = 1007) 32.4 G/DL                              

 

             RDW (test code = 1038) 14.5 %                                 

 

             NEUTROPHILS (test code = 1008) 61.2 %                              

   

 

             LYMPHOCYTES (test code = 1010) 25.5 %                              

   

 

             MONOCYTES (test code = 1011) 8.8 %                                 

 

 

             EOSINOPHILS (test code = 1012) 3.9 %                               

   

 

             BASOPHILS (test code = 1013) 0.3 %                                 

 

 

             IMMATURE GRANULOCYTES (test 0.3 %                                  



             code = 1036)                                        

 

             NUCLEATED RBCS (test code = 0.0 /100WBC'S                          

 



             1065)                                               

 

             PLATELET COUNT (test code = 85 K/UL                                



             1015)                                               

 

             ABSOLUTE NEUTROPHILS (test code 1.87 K/UL                          

    



             = 1066)                                             

 

             ABSOLUTE LYMPHOCYTES (test code 0.78 K/UL                          

    



             = 1067)                                             

 

             ABSOLUTE MONOCYTES (test code = 0.27 K/UL                          

    



             1068)                                               

 

             ABSOLUTE EOSINOPHILS (test code 0.12 K/UL                          

    



             = 1040)                                             

 

             ABSOLUTE BASOPHILS (test code = 0.01 K/UL                          

    



             1069)                                               

 

             ABS IMMATURE GRANULOCYTES (test 0.01 K/UL                          

    



             code = 1020)                                        

 

             ABS NUCLEATED RBCS (test code = 0.00 K/UL                          

    



             21042)                                              



COMPREHENSIVE METABOLIC PANEL2021-10-13 00:00:00





             Test Item    Value        Reference Range Interpretation Comments

 

             GLUCOSE (test code = 2217) 139 MG/DL                              

 

             BUN (test code = 2208) 21 MG/DL                               

 

             CREATININE (test code = 2214) 0.83 MG/DL                           

  

 

             eGFR  AMER. (test code 88 ML/MIN/1.73                       

    



             = 40823)                                            

 

             eGFR NON- AMER. (test 76 ML/MIN/1.73                        

   



             code = 64965)                                        

 

             CALC BUN/CREAT (test code = 25 RATIO                               



             2235)                                               

 

             SODIUM (test code = 2231) 140 MEQ/L                              

 

             POTASSIUM (test code = 2228) 4.8 MEQ/L                             

 

 

             CHLORIDE (test code = 2215) 102 MEQ/L                              

 

             CARBON DIOXIDE (test code = 25 MEQ/L                               



             2206)                                               

 

             CALCIUM (test code = 2209) 9.2 MG/DL                              

 

             PROTEIN, TOTAL (test code = 7.2 G/DL                               



             222)                                               

 

             ALBUMIN (test code = 2201) 3.6 G/DL                               

 

             CALC GLOBULIN (test code = 3.6 G/DL                               



             2240)                                               

 

             CALC A/G RATIO (test code = 1.0 RATIO                              



             2234)                                               

 

             BILIRUBIN, TOTAL (test code = 0.6 MG/DL                            

  



             )                                               

 

             ALKALINE PHOSPHATASE (test 101 U/L                                



             code = 2204)                                        

 

             AST (test code = 2218) 30 U/L                                 

 

             ALT (test code = 2219) 23 U/L                                 



PROBNP2021-10-13 00:00:00





             Test Item    Value        Reference Range Interpretation Comments

 

             NT-proBNP (test code = 38156) 247 PG/ML                            

  



PROBNP2021-10-13 00:00:00





             Test Item    Value        Reference Range Interpretation Comments

 

             NT-proBNP (test code = 39444) 247 PG/ML                            

  



VIOLET (ANTI-NUCLEAR AB) WITH REFLEX TITER2021-10-13 00:00:00





             Test Item    Value        Reference Range Interpretation Comments

 

             ANTI-NUCLEAR ANTIBODIES (test code = POSITIVE                      

         



             3506)                                               



C-REACTIVE PROTEIN2021-10-13 00:00:00





             Test Item    Value        Reference Range Interpretation Comments

 

             C-REACTIVE PROTEIN (test code = 1.1 MG/DL                          

    



             3513)                                               



SEDIMENTATION RATE2021-10-13 00:00:00





             Test Item    Value        Reference Range Interpretation Comments

 

             SEDIMENTATION RATE (test code = 88 MM/HOUR                         

    



             1017)                                               



[id number] 00:00:00





             Test Item    Value        Reference Range Interpretation Comments

 

             D-DIMER (test code = 1405) 0.77 UG/MLFEU                           



CBC W/AUTO DIFF2021-10-13 00:00:00





             Test Item    Value        Reference Range Interpretation Comments

 

             WBC (test code = 1001) 3.1 K/UL                               

 

             RBC (test code = 1002) 3.14 M/UL                              

 

             HEMOGLOBIN (test code = 1003) 8.4 G/DL                             

  

 

             HEMATOCRIT (test code = 1004) 25.9 %                               

  

 

             MCV (test code = 1005) 82.5 fL                                

 

             MCH (test code = 1006) 26.8 PG                                

 

             MCHC (test code = 1007) 32.4 G/DL                              

 

             RDW (test code = 1038) 14.5 %                                 

 

             NEUTROPHILS (test code = 1008) 61.2 %                              

   

 

             LYMPHOCYTES (test code = 1010) 25.5 %                              

   

 

             MONOCYTES (test code = 1011) 8.8 %                                 

 

 

             EOSINOPHILS (test code = 1012) 3.9 %                               

   

 

             BASOPHILS (test code = 1013) 0.3 %                                 

 

 

             IMMATURE GRANULOCYTES (test 0.3 %                                  



             code = 1036)                                        

 

             NUCLEATED RBCS (test code = 0.0 /100WBC'S                          

 



             1065)                                               

 

             PLATELET COUNT (test code = 85 K/UL                                



             1015)                                               

 

             ABSOLUTE NEUTROPHILS (test code 1.87 K/UL                          

    



             = 1066)                                             

 

             ABSOLUTE LYMPHOCYTES (test code 0.78 K/UL                          

    



             = 1067)                                             

 

             ABSOLUTE MONOCYTES (test code = 0.27 K/UL                          

    



             1068)                                               

 

             ABSOLUTE EOSINOPHILS (test code 0.12 K/UL                          

    



             = 1040)                                             

 

             ABSOLUTE BASOPHILS (test code = 0.01 K/UL                          

    



             1069)                                               

 

             ABS IMMATURE GRANULOCYTES (test 0.01 K/UL                          

    



             code = 1020)                                        

 

             ABS NUCLEATED RBCS (test code = 0.00 K/UL                          

    



             60173)                                              



CBC W/AUTO [id number] 00:00:00





             Test Item    Value        Reference Range Interpretation Comments

 

             WBC (test code = 1001) 3.1 K/UL                               

 

             RBC (test code = 1002) 3.14 M/UL                              

 

             HEMOGLOBIN (test code = 1003) 8.4 G/DL                             

  

 

             HEMATOCRIT (test code = 1004) 25.9 %                               

  

 

             MCV (test code = 1005) 82.5 fL                                

 

             MCH (test code = 1006) 26.8 PG                                

 

             MCHC (test code = 1007) 32.4 G/DL                              

 

             RDW (test code = 1038) 14.5 %                                 

 

             NEUTROPHILS (test code = 1008) 61.2 %                              

   

 

             LYMPHOCYTES (test code = 1010) 25.5 %                              

   

 

             MONOCYTES (test code = 1011) 8.8 %                                 

 

 

             EOSINOPHILS (test code = 1012) 3.9 %                               

   

 

             BASOPHILS (test code = 1013) 0.3 %                                 

 

 

             IMMATURE GRANULOCYTES (test 0.3 %                                  



             code = 1036)                                        

 

             NUCLEATED RBCS (test code = 0.0 /100WBC'S                          

 



             1065)                                               

 

             PLATELET COUNT (test code = 85 K/UL                                



             1015)                                               

 

             ABSOLUTE NEUTROPHILS (test code 1.87 K/UL                          

    



             = 1066)                                             

 

             ABSOLUTE LYMPHOCYTES (test code 0.78 K/UL                          

    



             = 1067)                                             

 

             ABSOLUTE MONOCYTES (test code = 0.27 K/UL                          

    



             1068)                                               

 

             ABSOLUTE EOSINOPHILS (test code 0.12 K/UL                          

    



             = 1040)                                             

 

             ABSOLUTE BASOPHILS (test code = 0.01 K/UL                          

    



             1069)                                               

 

             ABS IMMATURE GRANULOCYTES (test 0.01 K/UL                          

    



             code = 1020)                                        

 

             ABS NUCLEATED RBCS (test code = 0.00 K/UL                          

    



             94975)                                              



CBC W/AUTO DIFF2021-10-13 00:00:00





             Test Item    Value        Reference Range Interpretation Comments

 

             WBC (test code = 1001) 3.1 K/UL                               

 

             RBC (test code = 1002) 3.14 M/UL                              

 

             HEMOGLOBIN (test code = 1003) 8.4 G/DL                             

  

 

             HEMATOCRIT (test code = 1004) 25.9 %                               

  

 

             MCV (test code = 1005) 82.5 fL                                

 

             MCH (test code = 1006) 26.8 PG                                

 

             MCHC (test code = 1007) 32.4 G/DL                              

 

             RDW (test code = 1038) 14.5 %                                 

 

             NEUTROPHILS (test code = 1008) 61.2 %                              

   

 

             LYMPHOCYTES (test code = 1010) 25.5 %                              

   

 

             MONOCYTES (test code = 1011) 8.8 %                                 

 

 

             EOSINOPHILS (test code = 1012) 3.9 %                               

   

 

             BASOPHILS (test code = 1013) 0.3 %                                 

 

 

             IMMATURE GRANULOCYTES (test 0.3 %                                  



             code = 1036)                                        

 

             NUCLEATED RBCS (test code = 0.0 /100WBC'S                          

 



             1065)                                               

 

             PLATELET COUNT (test code = 85 K/UL                                



             1015)                                               

 

             ABSOLUTE NEUTROPHILS (test code 1.87 K/UL                          

    



             = 1066)                                             

 

             ABSOLUTE LYMPHOCYTES (test code 0.78 K/UL                          

    



             = 1067)                                             

 

             ABSOLUTE MONOCYTES (test code = 0.27 K/UL                          

    



             1068)                                               

 

             ABSOLUTE EOSINOPHILS (test code 0.12 K/UL                          

    



             = 1040)                                             

 

             ABSOLUTE BASOPHILS (test code = 0.01 K/UL                          

    



             1069)                                               

 

             ABS IMMATURE GRANULOCYTES (test 0.01 K/UL                          

    



             code = 1020)                                        

 

             ABS NUCLEATED RBCS (test code = 0.00 K/UL                          

    



             92760)                                              



COMPREHENSIVE METABOLIC PANEL2021-10-13 00:00:00





             Test Item    Value        Reference Range Interpretation Comments

 

             GLUCOSE (test code = 2217) 139 MG/DL                              

 

             BUN (test code = 2208) 21 MG/DL                               

 

             CREATININE (test code = 2214) 0.83 MG/DL                           

  

 

             eGFR  AMER. (test code 88 ML/MIN/1.73                       

    



             = 66358)                                            

 

             eGFR NON- AMER. (test 76 ML/MIN/1.73                        

   



             code = 41836)                                        

 

             CALC BUN/CREAT (test code = 25 RATIO                               



             2235)                                               

 

             SODIUM (test code = 2231) 140 MEQ/L                              

 

             POTASSIUM (test code = 2228) 4.8 MEQ/L                             

 

 

             CHLORIDE (test code = 2215) 102 MEQ/L                              

 

             CARBON DIOXIDE (test code = 25 MEQ/L                               



             220)                                               

 

             CALCIUM (test code = 2209) 9.2 MG/DL                              

 

             PROTEIN, TOTAL (test code = 7.2 G/DL                               



             )                                               

 

             ALBUMIN (test code = 2201) 3.6 G/DL                               

 

             CALC GLOBULIN (test code = 3.6 G/DL                               



             2240)                                               

 

             CALC A/G RATIO (test code = 1.0 RATIO                              



             223)                                               

 

             BILIRUBIN, TOTAL (test code = 0.6 MG/DL                            

  



             )                                               

 

             ALKALINE PHOSPHATASE (test 101 U/L                                



             code = 2204)                                        

 

             AST (test code = 2218) 30 U/L                                 

 

             ALT (test code = 2219) 23 U/L                                 



COMPREHENSIVE METABOLIC PANEL2021-10-13 00:00:00





             Test Item    Value        Reference Range Interpretation Comments

 

             GLUCOSE (test code = 2217) 139 MG/DL                              

 

             BUN (test code = 2208) 21 MG/DL                               

 

             CREATININE (test code = 2214) 0.83 MG/DL                           

  

 

             eGFR  AMER. (test code 88 ML/MIN/1.73                       

    



             = 33743)                                            

 

             eGFR NON- AMER. (test 76 ML/MIN/1.73                        

   



             code = 26452)                                        

 

             CALC BUN/CREAT (test code = 25 RATIO                               



             2235)                                               

 

             SODIUM (test code = 2231) 140 MEQ/L                              

 

             POTASSIUM (test code = 2228) 4.8 MEQ/L                             

 

 

             CHLORIDE (test code = 2215) 102 MEQ/L                              

 

             CARBON DIOXIDE (test code = 25 MEQ/L                               



             2206)                                               

 

             CALCIUM (test code = 2209) 9.2 MG/DL                              

 

             PROTEIN, TOTAL (test code = 7.2 G/DL                               



             )                                               

 

             ALBUMIN (test code = 2201) 3.6 G/DL                               

 

             CALC GLOBULIN (test code = 3.6 G/DL                               



             2240)                                               

 

             CALC A/G RATIO (test code = 1.0 RATIO                              



             2234)                                               

 

             BILIRUBIN, TOTAL (test code = 0.6 MG/DL                            

  



             2207)                                               

 

             ALKALINE PHOSPHATASE (test 101 U/L                                



             code = 2204)                                        

 

             AST (test code = 2218) 30 U/L                                 

 

             ALT (test code = 2219) 23 U/L                                 



PROBNP2021-10-13 00:00:00





             Test Item    Value        Reference Range Interpretation Comments

 

             NT-proBNP (test code = 08601) 247 PG/ML                            

  



PROBNP2021-10-13 00:00:00





             Test Item    Value        Reference Range Interpretation Comments

 

             NT-proBNP (test code = 54478) 247 PG/ML                            

  



PROBNP2021-10-13 00:00:00





             Test Item    Value        Reference Range Interpretation Comments

 

             NT-proBNP (test code = 43419) 247 PG/ML                            

  



VIOLET (ANTI-NUCLEAR AB) WITH REFLEX TITER2021-10-13 00:00:00





             Test Item    Value        Reference Range Interpretation Comments

 

             ANTI-NUCLEAR ANTIBODIES (test code = POSITIVE                      

         



             3506)                                               



VIOLET (ANTI-NUCLEAR AB) WITH REFLEX TITER2021-10-13 00:00:00





             Test Item    Value        Reference Range Interpretation Comments

 

             ANTI-NUCLEAR ANTIBODIES (test code = POSITIVE                      

         



             3506)                                               



C-REACTIVE PROTEIN2021-10-13 00:00:00





             Test Item    Value        Reference Range Interpretation Comments

 

             C-REACTIVE PROTEIN (test code = 1.1 MG/DL                          

    



             3513)                                               



C-REACTIVE PROTEIN2021-10-13 00:00:00





             Test Item    Value        Reference Range Interpretation Comments

 

             C-REACTIVE PROTEIN (test code = 1.1 MG/DL                          

    



             3513)                                               



SEDIMENTATION RATE2021-10-13 00:00:00





             Test Item    Value        Reference Range Interpretation Comments

 

             SEDIMENTATION RATE (test code = 88 MM/HOUR                         

    



             1017)                                               



SEDIMENTATION RATE2021-10-13 00:00:00





             Test Item    Value        Reference Range Interpretation Comments

 

             SEDIMENTATION RATE (test code = 88 MM/HOUR                         

    



             1017)                                               



[id number] 00:00:00





             Test Item    Value        Reference Range Interpretation Comments

 

             D-DIMER (test code = 1405) 0.77 UG/MLFEU                           



[id number] 00:00:00





             Test Item    Value        Reference Range Interpretation Comments

 

             D-DIMER (test code = 1405) 0.77 UG/MLFEU                           



HEMOGLOBIN A1c2021-10-10 00:00:00





             Test Item    Value        Reference Range Interpretation Comments

 

             HEMOGLOBIN A1c (test code = 25933) 7.5 %                           

       



HEMOGLOBIN A1c2021-10-10 00:00:00





             Test Item    Value        Reference Range Interpretation Comments

 

             HEMOGLOBIN A1c (test code = 40943) 7.5 %                           

       



HEMOGLOBIN A1c2021-10-10 00:00:00





             Test Item    Value        Reference Range Interpretation Comments

 

             HEMOGLOBIN A1c (test code = 35081) 7.5 %                           

       



HEMOGLOBIN A1c2021-10-10 00:00:00





             Test Item    Value        Reference Range Interpretation Comments

 

             HEMOGLOBIN A1c (test code = 17371) 7.5 %                           

       



HEMOGLOBIN A1c2021-10-10 00:00:00





             Test Item    Value        Reference Range Interpretation Comments

 

             HEMOGLOBIN A1c (test code = 37051) 7.5 %                           

       



HEMOGLOBIN A1c2021-10-10 00:00:00





             Test Item    Value        Reference Range Interpretation Comments

 

             HEMOGLOBIN A1c (test code = 73415) 7.5 %                           

       



HEMOGLOBIN A1c2021-10-10 00:00:00





             Test Item    Value        Reference Range Interpretation Comments

 

             HEMOGLOBIN A1c (test code = 62982) 7.5 %                           

       



CULTURE, URINE2021-07-15 00:00:00





             Test Item    Value        Reference Range Interpretation Comments

 

             CULTURE, URINE (test SPECIMEN NUMBER:                           



             code = 11018) [id number]                              



CULTURE, URINE2021-07-15 00:00:00





             Test Item    Value        Reference Range Interpretation Comments

 

             CULTURE, URINE (test SPECIMEN NUMBER:                           



             code = 24808) [id number]                              



CULTURE, URINE2021-07-15 00:00:00





             Test Item    Value        Reference Range Interpretation Comments

 

             CULTURE, URINE (test SPECIMEN NUMBER:                           



             code = 89703) [id number]                              



CULTURE, URINE2021-07-15 00:00:00





             Test Item    Value        Reference Range Interpretation Comments

 

             CULTURE, URINE (test SPECIMEN NUMBER:                           



             code = 73270) [id number]                              



HEMOGLOBIN [id number] 00:00:00





             Test Item    Value        Reference Range Interpretation Comments

 

             HEMOGLOBIN A1c (test code = 50101) 8.6 %                           

       



HEMOGLOBIN [id number] 00:00:00





             Test Item    Value        Reference Range Interpretation Comments

 

             HEMOGLOBIN A1c (test code = 25657) 8.6 %                           

       



LIPID [id number] 00:00:00





             Test Item    Value        Reference Range Interpretation Comments

 

             CHOLESTEROL (test code = 2210) 104 MG/DL                           

   

 

             TRIGLYCERIDES (test code = 2232) 164 MG/DL                         

     

 

             HDL CHOLESTEROL (test code = 2220) 39 MG/DL                        

       

 

             CALC LDL CHOL (test code = 2237) 41 MG/DL                          

     

 

             RISK RATIO LDL/HDL (test code = 1.05 RATIO                         

    



             2238)                                               



COMPREHENSIVE METABOLIC [id number] 00:00:00





             Test Item    Value        Reference Range Interpretation Comments

 

             GLUCOSE (test code = 2217) 330 MG/DL                              

 

             BUN (test code = 2208) 11 MG/DL                               

 

             CREATININE (test code = 2214) 0.84 MG/DL                           

  

 

             eGFR  AMER. (test code 87 ML/MIN/1.73                       

    



             = 49495)                                            

 

             eGFR NON- AMER. (test 75 ML/MIN/1.73                        

   



             code = 09492)                                        

 

             CALC BUN/CREAT (test code = 13 RATIO                               



             2235)                                               

 

             SODIUM (test code = 2231) 136 MEQ/L                              

 

             POTASSIUM (test code = 2228) 4.5 MEQ/L                             

 

 

             CHLORIDE (test code = 2215) 97 MEQ/L                               

 

             CARBON DIOXIDE (test code = 27 MEQ/L                               



             )                                               

 

             CALCIUM (test code = 2209) 9.1 MG/DL                              

 

             PROTEIN, TOTAL (test code = 7.7 G/DL                               



             )                                               

 

             ALBUMIN (test code = 220) 3.8 G/DL                               

 

             CALC GLOBULIN (test code = 3.9 G/DL                               



             )                                               

 

             CALC A/G RATIO (test code = 1.0 RATIO                              



             4)                                               

 

             BILIRUBIN, TOTAL (test code = 0.4 MG/DL                            

  



             )                                               

 

             ALKALINE PHOSPHATASE (test 106 U/L                                



             code = 2204)                                        

 

             AST (test code = 2218) 44 U/L                                 

 

             ALT (test code = 2219) 29 U/L                                 



MICROALBUMIN/CREATININE, RANDOM AND [id number] 00:00:00





             Test Item    Value        Reference Range Interpretation Comments

 

             CREATININE, URINE, CONC. (test 131.3 MG/DL                         

   



             code = )                                        

 

             ALBUMIN, URINE, RANDOM (test code 0.4 MG/DL                        

      



             = 58542)                                            

 

             CALC ALBUMIN/CREAT, RND (test 3 MG/G                               

  



             code = 83148)                                        



HEMOGLOBIN [id number] 00:00:00





             Test Item    Value        Reference Range Interpretation Comments

 

             HEMOGLOBIN A1c (test code = 41492) 8.6 %                           

       



HEMOGLOBIN [id number] 00:00:00





             Test Item    Value        Reference Range Interpretation Comments

 

             HEMOGLOBIN A1c (test code = 28395) 8.6 %                           

       



LIPID [id number] 00:00:00





             Test Item    Value        Reference Range Interpretation Comments

 

             CHOLESTEROL (test code = 2210) 104 MG/DL                           

   

 

             TRIGLYCERIDES (test code = 2232) 164 MG/DL                         

     

 

             HDL CHOLESTEROL (test code = 2220) 39 MG/DL                        

       

 

             CALC LDL CHOL (test code = 2237) 41 MG/DL                          

     

 

             RISK RATIO LDL/HDL (test code = 1.05 RATIO                         

    



             2238)                                               



COMPREHENSIVE METABOLIC [id number] 00:00:00





             Test Item    Value        Reference Range Interpretation Comments

 

             GLUCOSE (test code = 2217) 330 MG/DL                              

 

             BUN (test code = 2208) 11 MG/DL                               

 

             CREATININE (test code = 2214) 0.84 MG/DL                           

  

 

             eGFR  AMER. (test code 87 ML/MIN/1.73                       

    



             = 86709)                                            

 

             eGFR NON- AMER. (test 75 ML/MIN/1.73                        

   



             code = 63049)                                        

 

             CALC BUN/CREAT (test code = 13 RATIO                               



             2235)                                               

 

             SODIUM (test code = 2231) 136 MEQ/L                              

 

             POTASSIUM (test code = 2228) 4.5 MEQ/L                             

 

 

             CHLORIDE (test code = 2215) 97 MEQ/L                               

 

             CARBON DIOXIDE (test code = 27 MEQ/L                               



             )                                               

 

             CALCIUM (test code = 2209) 9.1 MG/DL                              

 

             PROTEIN, TOTAL (test code = 7.7 G/DL                               



             )                                               

 

             ALBUMIN (test code = 2201) 3.8 G/DL                               

 

             CALC GLOBULIN (test code = 3.9 G/DL                               



             2240)                                               

 

             CALC A/G RATIO (test code = 1.0 RATIO                              



             )                                               

 

             BILIRUBIN, TOTAL (test code = 0.4 MG/DL                            

  



             )                                               

 

             ALKALINE PHOSPHATASE (test 106 U/L                                



             code = 2204)                                        

 

             AST (test code = 2218) 44 U/L                                 

 

             ALT (test code = 2219) 29 U/L                                 



MICROALBUMIN/CREATININE, RANDOM AND [id number] 00:00:00





             Test Item    Value        Reference Range Interpretation Comments

 

             CREATININE, URINE, CONC. (test 131.3 MG/DL                         

   



             code = )                                        

 

             ALBUMIN, URINE, RANDOM (test code 0.4 MG/DL                        

      



             = 62248)                                            

 

             CALC ALBUMIN/CREAT, RND (test 3 MG/G                               

  



             code = 92738)                                        



HEMOGLOBIN [id number] 00:00:00





             Test Item    Value        Reference Range Interpretation Comments

 

             HEMOGLOBIN A1c (test code = 12881) 8.6 %                           

       



HEMOGLOBIN [id number] 00:00:00





             Test Item    Value        Reference Range Interpretation Comments

 

             HEMOGLOBIN A1c (test code = 74401) 8.6 %                           

       



HEMOGLOBIN [id number] 00:00:00





             Test Item    Value        Reference Range Interpretation Comments

 

             HEMOGLOBIN A1c (test code = 07522) 8.6 %                           

       



LIPID [id number] 00:00:00





             Test Item    Value        Reference Range Interpretation Comments

 

             CHOLESTEROL (test code = 2210) 104 MG/DL                           

   

 

             TRIGLYCERIDES (test code = 2232) 164 MG/DL                         

     

 

             HDL CHOLESTEROL (test code = 2220) 39 MG/DL                        

       

 

             CALC LDL CHOL (test code = 2237) 41 MG/DL                          

     

 

             RISK RATIO LDL/HDL (test code = 1.05 RATIO                         

    



             2238)                                               



LIPID [id number] 00:00:00





             Test Item    Value        Reference Range Interpretation Comments

 

             CHOLESTEROL (test code = 2210) 104 MG/DL                           

   

 

             TRIGLYCERIDES (test code = 2232) 164 MG/DL                         

     

 

             HDL CHOLESTEROL (test code = 2220) 39 MG/DL                        

       

 

             CALC LDL CHOL (test code = 2237) 41 MG/DL                          

     

 

             RISK RATIO LDL/HDL (test code = 1.05 RATIO                         

    



             2238)                                               



COMPREHENSIVE METABOLIC [id number] 00:00:00





             Test Item    Value        Reference Range Interpretation Comments

 

             GLUCOSE (test code = 2217) 330 MG/DL                              

 

             BUN (test code = 2208) 11 MG/DL                               

 

             CREATININE (test code = 2214) 0.84 MG/DL                           

  

 

             eGFR  AMER. (test code 87 ML/MIN/1.73                       

    



             = 69255)                                            

 

             eGFR NON- AMER. (test 75 ML/MIN/1.73                        

   



             code = 58673)                                        

 

             CALC BUN/CREAT (test code = 13 RATIO                               



             2235)                                               

 

             SODIUM (test code = 2231) 136 MEQ/L                              

 

             POTASSIUM (test code = 2228) 4.5 MEQ/L                             

 

 

             CHLORIDE (test code = 2215) 97 MEQ/L                               

 

             CARBON DIOXIDE (test code = 27 MEQ/L                               



             )                                               

 

             CALCIUM (test code = 2209) 9.1 MG/DL                              

 

             PROTEIN, TOTAL (test code = 7.7 G/DL                               



             )                                               

 

             ALBUMIN (test code = 2201) 3.8 G/DL                               

 

             CALC GLOBULIN (test code = 3.9 G/DL                               



             0)                                               

 

             CALC A/G RATIO (test code = 1.0 RATIO                              



             )                                               

 

             BILIRUBIN, TOTAL (test code = 0.4 MG/DL                            

  



             )                                               

 

             ALKALINE PHOSPHATASE (test 106 U/L                                



             code = 2204)                                        

 

             AST (test code = 2218) 44 U/L                                 

 

             ALT (test code = 2219) 29 U/L                                 



COMPREHENSIVE METABOLIC [id number] 00:00:00





             Test Item    Value        Reference Range Interpretation Comments

 

             GLUCOSE (test code = 2217) 330 MG/DL                              

 

             BUN (test code = 2208) 11 MG/DL                               

 

             CREATININE (test code = 2214) 0.84 MG/DL                           

  

 

             eGFR  AMER. (test code 87 ML/MIN/1.73                       

    



             = 28748)                                            

 

             eGFR NON- AMER. (test 75 ML/MIN/1.73                        

   



             code = 93420)                                        

 

             CALC BUN/CREAT (test code = 13 RATIO                               



             5)                                               

 

             SODIUM (test code = 2231) 136 MEQ/L                              

 

             POTASSIUM (test code = 2228) 4.5 MEQ/L                             

 

 

             CHLORIDE (test code = 2215) 97 MEQ/L                               

 

             CARBON DIOXIDE (test code = 27 MEQ/L                               



             )                                               

 

             CALCIUM (test code = 2209) 9.1 MG/DL                              

 

             PROTEIN, TOTAL (test code = 7.7 G/DL                               



             )                                               

 

             ALBUMIN (test code = 220) 3.8 G/DL                               

 

             CALC GLOBULIN (test code = 3.9 G/DL                               



             )                                               

 

             CALC A/G RATIO (test code = 1.0 RATIO                              



             )                                               

 

             BILIRUBIN, TOTAL (test code = 0.4 MG/DL                            

  



             )                                               

 

             ALKALINE PHOSPHATASE (test 106 U/L                                



             code = 220)                                        

 

             AST (test code = 2218) 44 U/L                                 

 

             ALT (test code = 2219) 29 U/L                                 



MICROALBUMIN/CREATININE, RANDOM AND [id number] 00:00:00





             Test Item    Value        Reference Range Interpretation Comments

 

             CREATININE, URINE, CONC. (test 131.3 MG/DL                         

   



             code = 2072)                                        

 

             ALBUMIN, URINE, RANDOM (test code 0.4 MG/DL                        

      



             = 77633)                                            

 

             CALC ALBUMIN/CREAT, RND (test 3 MG/G                               

  



             code = 31405)                                        



MICROALBUMIN/CREATININE, RANDOM AND [id number] 00:00:00





             Test Item    Value        Reference Range Interpretation Comments

 

             CREATININE, URINE, CONC. (test 131.3 MG/DL                         

   



             code = 2072)                                        

 

             ALBUMIN, URINE, RANDOM (test code 0.4 MG/DL                        

      



             = 45584)                                            

 

             CALC ALBUMIN/CREAT, RND (test 3 MG/G                               

  



             code = 68732)

## 2022-11-04 ENCOUNTER — HOSPITAL ENCOUNTER (EMERGENCY)
Dept: HOSPITAL 97 - ER | Age: 62
Discharge: HOME | End: 2022-11-04
Payer: COMMERCIAL

## 2022-11-04 VITALS — SYSTOLIC BLOOD PRESSURE: 110 MMHG | DIASTOLIC BLOOD PRESSURE: 53 MMHG

## 2022-11-04 VITALS — TEMPERATURE: 99.2 F | OXYGEN SATURATION: 99 %

## 2022-11-04 DIAGNOSIS — Z88.5: ICD-10-CM

## 2022-11-04 DIAGNOSIS — M54.50: Primary | ICD-10-CM

## 2022-11-04 DIAGNOSIS — I10: ICD-10-CM

## 2022-11-04 DIAGNOSIS — Z88.0: ICD-10-CM

## 2022-11-04 PROCEDURE — 96372 THER/PROPH/DIAG INJ SC/IM: CPT

## 2022-11-04 PROCEDURE — 81015 MICROSCOPIC EXAM OF URINE: CPT

## 2022-11-04 PROCEDURE — 99283 EMERGENCY DEPT VISIT LOW MDM: CPT

## 2022-11-04 PROCEDURE — 81003 URINALYSIS AUTO W/O SCOPE: CPT

## 2022-11-04 NOTE — EDPHYS
Physician Documentation                                                                           

 [facility]                                                                 

Name: Meredith Stark                                                                                 

Age: 62 yrs                                                                                       

Sex: Female                                                                                       

: 1960                                                                                   

MRN: [id number]                                                                                   

Arrival Date: 2022                                                                          

Time: 19:59                                                                                       

Account#: [id number]                                                                            

Bed 19                                                                                            

Private MD:                                                                                       

ED Physician Delano Wiley                                                                       

HPI:                                                                                              

                                                                                             

21:37 This 62 yrs old  Female presents to ER via Ambulatory with complaints of Back   kb  

      Pain.                                                                                       

21:37 The patient presents with pain that is acute. The symptoms are located in the low back. kb  

      Onset: The symptoms/episode began/occurred yesterday. The pain does not radiate.            

      Associated signs and symptoms: The patient has no apparent associated signs or              

      symptoms. The problem was sustained from a chronic condition, when lifting. Modifying       

      factors: The patient symptoms are alleviated by nothing, the patient symptoms are           

      aggravated by any movement. Severity of symptoms: At their worst the symptoms were          

      moderate, in the emergency department the symptoms are unchanged. The patient has           

      experienced similar episodes in the past. The patient has not recently seen a               

      physician. Pt reports left low back pain after lifting something heavy yesterday. Has       

      had this exact same pain multiple times in the past. Plans to see pain management on        

      the . .                                                                                  

                                                                                                  

Historical:                                                                                       

- Allergies:                                                                                      

20:16 Darvocet-N 100;                                                                         kr3 

20:16 Nubain;                                                                                 kr3 

20:16 PENICILLINS;                                                                            kr3 

20:16 Ketorolac;                                                                              kr3 

- PMHx:                                                                                           

20:16 chronic back pain; cirrhosis of liver; diabetes mellitus; Hypertensive disorder;        kr3 

      Thyroid problem;                                                                            

- PSHx:                                                                                           

20:16 Appendectomy; cheryl knee reconstructive sx; Cholecystectomy; hysterectomy; carpal tunnel  kr3 

      repair;                                                                                     

                                                                                                  

- Immunization history:: Adult Immunizations not up to date.                                      

- Social history:: Smoking status: Patient denies any tobacco usage or history of.                

                                                                                                  

                                                                                                  

ROS:                                                                                              

21:36 Constitutional: Negative for fever, chills, and weight loss.                            kb  

21:36 Back: Positive for pain at rest, pain with movement, of the left low back.                  

21:36 All other systems are negative.                                                             

                                                                                                  

Exam:                                                                                             

21:36 Constitutional:  This is a well developed, well nourished patient who is awake, alert,  kb  

      and in no acute distress. Head/Face:  Normocephalic, atraumatic. ENT:  Moist Mucous         

      membranes Cardiovascular:  Regular rate and rhythm with a normal S1 and S2.  No             

      gallops, murmurs, or rubs.  No pulse deficits. Respiratory:  Respirations even and          

      unlabored. No increased work of breathing. Talking in full sentences Abdomen/GI:  Soft,     

      non-tender. No distention Skin:  Warm, dry with normal turgor.  Normal color. MS/           

      Extremity:  Pulses equal, no cyanosis.  Neurovascular intact.  Full, normal range of        

      motion. Neuro:  Awake and alert, GCS 15, oriented to person, place, time, and               

      situation. Moves all extremities. Normal gait. Psych:  Awake, alert, with orientation       

      to person, place and time.  Behavior, mood, and affect are within normal limits.            

21:36 Back: pain, that is moderate, of the  left low back, ROM is painful.                        

                                                                                                  

Vital Signs:                                                                                      

20:12  / 91; Pulse 94; Resp 18; Temp 99.2; Pulse Ox 99% on R/A; Weight 91.17 kg; Height kr3 

      5 ft. 2 in. (157.48 cm); Pain 10/10;                                                        

21:41  / 53; Pulse 82; Resp 16; Pulse Ox 99% on R/A;                                    tp1 

20:12 Body Mass Index 36.76 (91.17 kg, 157.48 cm)                                             kr3 

                                                                                                  

MDM:                                                                                              

20:18 Patient medically screened.                                                             kb  

21:36 Data reviewed: vital signs, nurses notes. Data interpreted: Pulse oximetry: on room air kb  

      is 99 %. Interpretation: normal. Counseling: I had a detailed discussion with the           

      patient and/or guardian regarding: the historical points, exam findings, and any            

      diagnostic results supporting the discharge/admit diagnosis, lab results, the need for      

      outpatient follow up, a pain management specialist, to return to the emergency              

      department if symptoms worsen or persist or if there are any questions or concerns that     

      arise at home.                                                                              

                                                                                                  

                                                                                             

20:47 Order name: Urine Dipstick-Ancillary; Complete Time: 20:48                              EDMS

                                                                                             

20:50 Order name: Urine Microscopic Only; Complete Time: 21:30                                tp1 

                                                                                             

20:30 Order name: Urine Dipstick-Ancillary (obtain specimen); Complete Time: 20:49            kb  

                                                                                                  

Administered Medications:                                                                         

20:35 Drug: Norco (HYDROcodone-acetaminophen) 10 mg-325 mg 1 tabs Route: PO;                  tp1 

21:40 Follow up: Response: Pain is unchanged, physician notified                              tp1 

20:36 Drug: Decadron (dexamethasone) 10 mg Route: IM; Site: right deltoid;                    tp1 

21:40 Follow up: Response: Pain is unchanged, physician notified                              tp1 

21:40 Drug: Flexeril (cyclobenzaprine) 10 mg Route: PO;                                       tp1 

21:40 Follow up: Response: Medication administered at discharge.                              tp1 

                                                                                                  

                                                                                                  

Disposition:                                                                                      

22:11 Co-signature as Attending Physician, Delano Wiley MD I agree with the assessment and   kdr 

      plan of care.                                                                               

                                                                                                  

Disposition Summary:                                                                              

22 21:33                                                                                    

Discharge Ordered                                                                                 

      Location: Home                                                                          kb  

      Condition: Stable                                                                       kb  

      Diagnosis                                                                                   

        - Low back pain                                                                       kb  

      Followup:                                                                               kb  

        - With: Emergency Department                                                               

        - When: As needed                                                                          

        - Reason: Worsening of condition                                                           

      Followup:                                                                               kb  

        - With: Private Physician                                                                  

        - When: 2 - 3 days                                                                         

        - Reason: Recheck today's complaints, Continuance of care, Re-evaluation by your           

      physician                                                                                   

      Discharge Instructions:                                                                     

        - Discharge Summary Sheet                                                             kb  

        - Musculoskeletal Pain                                                                kb  

        - Chronic Back Pain, Easy-to-Read                                                     kb  

      Forms:                                                                                      

        - Medication Reconciliation Form                                                      kb  

        - Thank You Letter                                                                    kb  

        - Antibiotic Education                                                                kb  

        - Prescription Opioid Use                                                             kb  

      Prescriptions:                                                                              

        - Cyclobenzaprine 10 mg Oral Tablet                                                        

            - take 1 tablet by ORAL route every 8 hours As needed; 21 tablet; Refills: 0,     kb  

      Product Selection Permitted                                                                 

        - Diclofenac Sodium 75 mg Oral tablet,delayed release (DR/EC)                              

            - take 1 tablet by ORAL route 2 times per day As needed; 30 tablet; Refills: 0,   kb  

      Product Selection Permitted                                                                 

Signatures:                                                                                       

Dispatcher [employer]                           Lindsey Stanley, Delano Bedoya MD MD   [facility]                                                  

Rosalina Campbell RN                     RN   tp1                                                  

Michelle Buchanan RN                        RN   kr3                                                  

                                                                                                  

**************************************************************************************************

## 2022-11-04 NOTE — ER
Nurse's Notes                                                                                     

 [facility]                                                                 

Name: Meredith Stark                                                                                 

Age: 62 yrs                                                                                       

Sex: Female                                                                                       

: 1960                                                                                   

MRN: [id number]                                                                                   

Arrival Date: 2022                                                                          

Time: 19:59                                                                                       

Account#: [id number]                                                                            

Bed 19                                                                                            

Private MD:                                                                                       

Diagnosis: Low back pain                                                                          

                                                                                                  

Presentation:                                                                                     

                                                                                             

20:12 Chief complaint: Patient states: left side flank pain that started yesterday that has   kr3 

      progressed to sharp shooting pain currently. Coronavirus screen: Vaccine status:            

      Patient reports receiving the 2nd dose of the covid vaccine. Client denies travel out       

      of the [place] in the last 14 days. Ebola Screen: Patient denies travel to an                  

      Ebola-affected area in the 21 days before illness onset. Initial Sepsis Screen: Does        

      the patient meet any 2 criteria? No. Patient's initial sepsis screen is negative. Does      

      the patient have a suspected source of infection? No. Patient's initial sepsis screen       

      is negative. Risk Assessment: Do you want to hurt yourself or someone else? Patient         

      reports no desire to harm self or others. Onset of symptoms was October 3, 2022.            

20:12 Method Of Arrival: Ambulatory                                                           kr3 

20:12 Acuity: SP 3                                                                           kr3 

                                                                                                  

Triage Assessment:                                                                                

20:17 General: Appears distressed, uncomfortable, Behavior is calm, cooperative, appropriate  kr3 

      for age. Pain: Complains of pain in left low back and left mid back. Musculoskeletal:       

      Range of motion: intact in all extremities.                                                 

                                                                                                  

Historical:                                                                                       

- Allergies:                                                                                      

20:16 Darvocet-N 100;                                                                         kr3 

20:16 Nubain;                                                                                 kr3 

20:16 PENICILLINS;                                                                            kr3 

20:16 Ketorolac;                                                                              kr3 

- PMHx:                                                                                           

20:16 chronic back pain; cirrhosis of liver; diabetes mellitus; Hypertensive disorder;        kr3 

      Thyroid problem;                                                                            

- PSHx:                                                                                           

20:16 Appendectomy; cheryl knee reconstructive sx; Cholecystectomy; hysterectomy; carpal tunnel  kr3 

      repair;                                                                                     

                                                                                                  

- Immunization history:: Adult Immunizations not up to date.                                      

- Social history:: Smoking status: Patient denies any tobacco usage or history of.                

                                                                                                  

                                                                                                  

Screenin:27 Abuse screen: Denies threats or abuse. Denies injuries from another. Nutritional        tp1 

      screening: No deficits noted. Tuberculosis screening: No symptoms or risk factors           

      identified. Fall Risk None identified.                                                      

                                                                                                  

Assessment:                                                                                       

20:26 General: Appears in no apparent distress. uncomfortable, Behavior is calm, cooperative. tp1 

      Pain: Complains of pain in left low back Pain radiates to right low back Pain currently     

      is 8 out of 10 on a pain scale. Quality of pain is described as sharp, Is continuous.       

      Neuro: Level of Consciousness is awake, alert, obeys commands, Oriented to person,          

      place, time, situation. Cardiovascular: Patient's skin is warm and dry. Respiratory:        

      Airway is patent Respiratory effort is even, unlabored. GI: Abdomen is obese, Patient       

      currently denies diarrhea, nausea, vomiting. : Denies burning with urination, urinary     

      frequency. EENT: No signs and/or symptoms were reported regarding the EENT system.          

      Derm: Skin is pink, warm \T\ dry. Musculoskeletal: Circulation, motion, and sensation       

      intact.                                                                                     

21:31 Reassessment: Patient appears in no apparent distress at this time. No changes from     tp1 

      previously documented assessment. Patient and/or family updated on plan of care and         

      expected duration. Pain level reassessed. Patient is alert, oriented x 3, equal             

      unlabored respirations, skin warm/dry/pink. States pain level has not changed. Provider     

      notified.                                                                                   

                                                                                                  

Vital Signs:                                                                                      

20:12  / 91; Pulse 94; Resp 18; Temp 99.2; Pulse Ox 99% on R/A; Weight 91.17 kg; Height kr3 

      5 ft. 2 in. (157.48 cm); Pain 10/10;                                                        

21:41  / 53; Pulse 82; Resp 16; Pulse Ox 99% on R/A;                                    tp1 

20:12 Body Mass Index 36.76 (91.17 kg, 157.48 cm)                                             kr3 

                                                                                                  

ED Course:                                                                                        

19:59 Patient arrived in ED.                                                                  mr  

20:14 [facility]                                                        kb  

20:14 Delano Wiley MD is Attending Physician.                                              kb  

20:15 Triage completed.                                                                       kr3 

20:18 Patient placed in an exam room, on a stretcher.                                         kr3 

20:25 Rosalina Campbell, RN is Primary Nurse.                                                   tp1 

20:26 Patient has correct armband on for positive identification. Bed in low position. Call   tp1 

      light in reach. Adult w/ patient.                                                           

20:52 Urine Microscopic Only Sent.                                                            tp1 

21:10 Urine Microscopic Only Sent.                                                            tp1 

21:40 No provider procedures requiring assistance completed. Patient did not have IV access   tp1 

      during this emergency room visit.                                                           

                                                                                                  

Administered Medications:                                                                         

20:35 Drug: Norco (HYDROcodone-acetaminophen) 10 mg-325 mg 1 tabs Route: PO;                  tp1 

21:40 Follow up: Response: Pain is unchanged, physician notified                              tp1 

20:36 Drug: Decadron (dexamethasone) 10 mg Route: IM; Site: right deltoid;                    tp1 

21:40 Follow up: Response: Pain is unchanged, physician notified                              tp1 

21:40 Drug: Flexeril (cyclobenzaprine) 10 mg Route: PO;                                       tp1 

21:40 Follow up: Response: Medication administered at discharge.                              tp1 

                                                                                                  

                                                                                                  

Medication:                                                                                       

21:40 VIS not applicable for this client.                                                     tp1 

                                                                                                  

Outcome:                                                                                          

21:33 Discharge ordered by MD.                                                                kb  

21:40 Discharged to home ambulatory.                                                          tp1 

21:40 Condition: good                                                                             

21:40 Discharge instructions given to patient, Instructed on discharge instructions, follow       

      up and referral plans. medication usage, Demonstrated understanding of instructions,        

      follow-up care, medications, Prescriptions given X 2.                                       

21:41 Patient left the ED.                                                                    tp1 

                                                                                                  

Signatures:                                                                                       

Lindsey Stephenson, AMINATAP-C                 FNP-Kelly Stockton                                                                                    

Rosalina Campbell, RN                     RN   tp1                                                  

Michelle Buchanan RN                        RN   kr3                                                  

                                                                                                  

**************************************************************************************************

## 2022-11-04 NOTE — XMS REPORT
Continuity of Care Document

                           Created on:2022



Patient:SUDHA STARK

Sex:Female

:1960

External Reference #:[id number]





Demographics







                          Address                   [address]



                                                    [place] [address]

 

                          Home Phone                [phone]

 

                          Mobile Phone              [phone] )

 

                          Email Address             [email or web address]

 

                          Preferred Language        en

 

                          Marital Status            Unknown

 

                          Jew Affiliation     Unknown

 

                          Race                      Unknown

 

                          Additional Race(s)        Unavailable



                                                    White



                                                    Unavailable

 

                          Ethnic Group              Unknown









Author







                          Organization              [facility]

t

 

                          Address                   [address]



                                                    [place] [address]

 

                          Phone                     [phone]









Support







                Name            Relationship    Address         Phone

 

                JUNIE STARK  Spouse          [phone]





                                                [place] [address] 

 

                Tan Stark  Spouse          [address] [phone]

[phone]



                                                [address]         



                                                [place] [address] 









Care Team Providers







                    Name                Role                Phone

 

                    Pcp, Patient Does Not Have A Primary Care Physician [phone]



 

                    BENNIE ORR      Attending Clinician Unavailable

 

                    PRADEEP SPANN      Attending Clinician Unavailable

 

                    PRADEEP SPANN      Attending Clinician Unavailable

 

                    ROSIE JHA     Attending Clinician Unavailable

 

                    Rosie Jha DO  Attending Clinician [phone]

 

                    BENNIE ORR Attending Clinician Unavailable

 

                    ROSIE JHA     Admitting Clinician Unavailable









Payers







           Payer Name Policy Type Policy Number Effective Date Expiration Date S

christ

 

           [facility] [employer]            [id number]  2022            



                                            00:00:00              

 

           [employer]/[facility]            [id number]  2022            



           MEDICARE GOLD PPO                       00:00:00              



           CSNP                                                   







Problems







       Condition Condition Condition Status Onset  Resolution Last   Treating Co

mments 

Source



       Name   Details Category        Date   Date   Treatment Clinician        



                                                 Date                 

 

       No known No known Disease                                           Unive

rs



       active active                                                  ity of



       problems problems                                                  [place]



                                                                      Medical



                                                                      [place]







Allergies, Adverse Reactions, Alerts







       Allergy Allergy Status Severity Reaction(s) Onset  Inactive Treating Comm

ents 

Source



       Name   Type                        Date   Date   Clinician        

 

       [facility]   Active        Hives                        [facility]



       C      INGREDI                      9-12                        ity of



                                          00:00:                      [place]



                                          00                          Medical



                                                                      Branch

 

       [facility] Propens[facility] Active        Hives                        Univer

s



       c      ty to                       9-12                        ity of



              adverse                      00:00:                      [place]



              reaction                      00                          Medical



              s                                                       Branch

 

       [facility] Propensi Active        Other - See                       U

nivers



       in     ty to                comments 7-15                        ity of



              adverse                      00:00:                      [place]



              reaction                      00                          Medical



              s                                                       Branch

 

       [facility]   Active        Other-Cmnt                       Univ

ers



       IN     INGREDI                      7-15                        ity of



                                          00:00:                      [place]



                                          00                          Medical



                                                                      Branch

 

       n      Propensi Active                                     



              ty to                       6-23                        



              adverse                      00:00:                      



              reaction                      00                          



              to drug                                                  

 

       Bactrim Propensi Active                                     



       - Oral ty to                       4-01                        



              adverse                      00:00:                      



              reaction                      00                          



              to drug                                                  

 

       NO KNOWN Drug   Active                                           [facility]



       ALLERGIE Class                                                   ity of



       S                                                              [place]



                                                                      Medical



                                                                      [place]







Social History







           Social Habit Start Date Stop Date  Quantity   Comments   Source

 

           Exposure to 2022 Not sure              [facility]

 [place]



           SARS-CoV-2 (event) 00:00:00   23:48:00                         Medica

l Branch

 

           Sex Assigned At [birth year]                       [facility]



           Birth      00:00:00   00:00:00                         Medical Branch









                Smoking Status  Start Date      Stop Date       Source

 

                Tobacco smoking consumption                                 [facility]



                unknown                                         [facility]







Medications







       Ordered Filled Start  Stop   Current Ordering Indication Dosage Frequency

 Signature

                    Comments            Components          Source



     Medication Medication Date Date Medication? Clinician                (SIG) 

          



     Name Name                                                   

 

     LISINOPRIL      2022      No                                      



     5 MG TABS      0-11                                              



               00:00:                                              



               00                                                

 

     UNITHROID            No                                      



     75 MCG TABS      9                                              



               00:00:                                              



               00                                                

 

     HUMULIN      0      No                                      



     70/30                                                    



     KWIKPEN      00:00:                                              



     (70-30) 100      00                                                



     UNIT/ML                                                        



     SUPN                                                        

 

     HYDROcodone      2022- No             1{tbl}      1 tablet,         

  Univers



     -acetaminop                                Oral,           ity of



     hen (NORCO)      05:52: 05:58                          ONCE, 1           [facility]

[place]



      mg      00   :00                           dose, On           Medica

l



     tablet 1                                         Tue            Branch



     tablet                                         22 at           



                                                  0100, ASAP           

 

     MORPHINE      0      No             30                       



     SULFATE ER      8-22                                              



     30 MG TBCR      00:00:                                              



               00                                                

 

     MORPHINE      -0      No             30                       



     SULFATE ER      8-22                                              



     30 MG TBCR      00:00:                                              



               00                                                

 

     TRINTELLIX      -0      No                                      



     20 MG TABS      8-18                                              



               00:00:                                              



               00                                                

 

     TRINTELLIX      -0      No             20                       



     20 MG TABS      8-18                                              



               00:00:                                              



               00                                                

 

     Dose      2022-0      No                                      



     Unknown      8-10                                              



               00:00:                                              



               00                                                

 

     &lt       2022-0      No             10                       



               8-10                                              



               00:00:                                              



               00                                                

 

     Dose      2022-0      No                                      



     Unknown      8-10                                              



               00:00:                                              



               00                                                

 

     Dose      2022-0      No                                      



     Unknown      8-10                                              



               00:00:                                              



               00                                                

 

     Dose      2022-0      No             5                        



     Unknown      8-10                                              



               00:00:                                              



               00                                                

 

     Dose      2022-0      No             25                       



     Unknown      8-10                                              



               00:00:                                              



               00                                                

 

     &lt       2022-0      No             10                       



               8-10                                              



               00:00:                                              



               00                                                

 

     Dose      2022-0      No                                      



     Unknown      8-10                                              



               00:00:                                              



               00                                                

 

     Dose      2022-0      No             15                       



     Unknown      8-10                                              



               00:00:                                              



               00                                                

 

     Dose      2022-0      No             5                        



     Unknown      8-10                                              



               00:00:                                              



               00                                                

 

     Dose      2022-0      No             4                        



     Unknown      7                                              



               00:00:                                              



               00                                                

 

     Dose      2022-0      No             10                       



     Unknown                                                    



               00:00:                                              



               00                                                

 

     Dose      2022-0      No             4                        



     Unknown                                                    



               00:00:                                              



               00                                                

 

     Dose      2022-0      No             10                       



     Unknown                                                    



               00:00:                                              



               00                                                

 

     Dose      2022-0      No                                      



     Unknown      7                                              



               00:00:                                              



               00                                                

 

     &lt       2022-0      No                                      



               7-25                                              



               00:00:                                              



               00                                                

 

     lisinopril      2022-0      No             1mg                      



     5 mg tablet      7-                                              



               00:00:                                              



               00                                                

 

     Myrbetriq      2022-0      No             1mg                      



     25 mg      7-21                                              



     tablet,exte      00:00:                                              



     nded      00                                                



     release                                                        

 

     citalopram      2022-0      No             1mg                      



     20 mg      7-21                                              



     tablet      00:00:                                              



               00                                                

 

     atorvastati      2022-0      No             1mg                      



     n 10 mg      7-21                                              



     tablet      00:00:                                              



               00                                                

 

     metformin      2022-0      No             1mg                      



     1,000 mg      7-21                                              



     tablet      00:00:                                              



               00                                                

 

     Dose      2022-0      No                                      



     Unknown      7-                                              



               00:00:                                              



               00                                                

 

     metoclopram      2022-0      No             1mg                      



     fabio 10 mg      7-21                                              



     tablet      00:00:                                              



               00                                                

 

     ferrous      2-0      No             1(65 mg                     



     sulfate 325      7-21                     iron)                     



     mg (65 mg      00:00:                                              



     iron)      00                                                



     tablet                                                        

 

     omeprazole      2-0      No             1mg                      



     40 mg      7-21                                              



     capsule,del      00:00:                                              



     ayed      00                                                



     release                                                        

 

     TAKE 1      2-0      No             1000                     



     TABLET      7-21                                              



     TWICE      00:00:                                              



     DAILY.      00                                                

 

     TAKE 1      -0      No             500                      



     TABLET BY      7-21                                              



     MOUTH EVERY      00:00:                                              



     8 HOURS FOR      00                                                



     10 DAYS                                                        

 

     &lt       2022-0      No             250                      



               7-21                                              



               00:00:                                              



               00                                                

 

     &lt       2022-0      No             10                       



               7-21                                              



               00:00:                                              



               00                                                

 

     &lt       2022-0      No             25                       



               7-21                                              



               00:00:                                              



               00                                                

 

     &lt       2022-0      No                                      



               7-21                                              



               00:00:                                              



               00                                                

 

     &lt       2022-0      No                                      



               7-21                                              



               00:00:                                              



               00                                                

 

     TAKE 1      2022-0      No             1000                     



     TABLET      7-21                                              



     TWICE      00:00:                                              



     DAILY.      00                                                

 

     lisinopril      2022-0      No             1mg                      



     5 mg tablet      7-                                              



               00:00:                                              



               00                                                

 

     Myrbetriq      2022-0      No             1mg                      



     25 mg      7-21                                              



     tablet,exte      00:00:                                              



     nded      00                                                



     release                                                        

 

     citalopram      2-0      No             1mg                      



     20 mg      7-21                                              



     tablet      00:00:                                              



               00                                                

 

     atorvastati      2022-0      No             1mg                      



     n 10 mg      7-21                                              



     tablet      00:00:                                              



               00                                                

 

     metformin      2-0      No             1mg                      



     1,000 mg      7-21                                              



     tablet      00:00:                                              



               00                                                

 

     glimepiride      2-0      No             1mg                      



     1 mg tablet                                                    



               00:00:                                              



               00                                                

 

     metoclopram      2-0      No             1mg                      



     fabio 10 mg      7-21                                              



     tablet      00:00:                                              



               00                                                

 

     ferrous      2022-0      No             1(65 mg                     



     sulfate 325      -                     iron)                     



     mg (65 mg      00:00:                                              



     iron)      00                                                



     tablet                                                        

 

     omeprazole      -0      No             1mg                      



     40 mg      -                                              



     capsule,del      00:00:                                              



     ayed      00                                                



     release                                                        

 

     TAKE 1      -0      No             1000                     



     TABLET      -                                              



     TWICE      00:00:                                              



     DAILY.      00                                                

 

     TAKE 1      -0      No             500                      



     TABLET BY      7-                                              



     MOUTH EVERY      00:00:                                              



     8 HOURS FOR      00                                                



     10 DAYS                                                        

 

     &lt       2022-0      No             250                      



               7-21                                              



               00:00:                                              



               00                                                

 

     &lt       2022-0      No             10                       



               7-21                                              



               00:00:                                              



               00                                                

 

     &lt       2022-0      No             25                       



               7-21                                              



               00:00:                                              



               00                                                

 

     &lt       2022-0      No                                      



               7-21                                              



               00:00:                                              



               00                                                

 

     &lt       2022-0      No                                      



               7-21                                              



               00:00:                                              



               00                                                

 

     TAKE 1      2-0      No             1000                     



     TABLET      7-                                              



     TWICE      00:00:                                              



     DAILY.      00                                                

 

     lisinopril      2-0      No             1mg                      



     5 mg tablet                                                    



               00:00:                                              



               00                                                

 

     Myrbetriq      2022-0      No             1mg                      



     25 mg      7-21                                              



     tablet,exte      00:00:                                              



     nded      00                                                



     release                                                        

 

     citalopram      -0      No             1mg                      



     20 mg      7-21                                              



     tablet      00:00:                                              



               00                                                

 

     atorvastati      -0      No             1mg                      



     n 10 mg      -                                              



     tablet      00:00:                                              



               00                                                

 

     metformin      -0      No             1mg                      



     1,000 mg      -                                              



     tablet      00:00:                                              



               00                                                

 

     glimepiride      -0      No             1mg                      



     1 mg tablet                                                    



               00:00:                                              



               00                                                

 

     metoclopram      -0      No             1mg                      



     fabio 10 mg      -                                              



     tablet      00:00:                                              



               00                                                

 

     ferrous      -0      No             1(65 mg                     



     sulfate 325                           iron)                     



     mg (65 mg      00:00:                                              



     iron)      00                                                



     tablet                                                        

 

     omeprazole      -0      No             1mg                      



     40 mg                                                    



     capsule,del      00:00:                                              



     ayed      00                                                



     release                                                        

 

     TAKE 1      -0      No             1000                     



     TABLET                                                    



     TWICE      00:00:                                              



     DAILY.      00                                                

 

     TAKE 1      -0      No             500                      



     TABLET BY      7-                                              



     MOUTH EVERY      00:00:                                              



     8 HOURS FOR      00                                                



     10 DAYS                                                        

 

     &lt       -0      No             250                      



               7-                                              



               00:00:                                              



               00                                                

 

     &lt       202-0      No             10                       



               7-                                              



               00:00:                                              



               00                                                

 

     &lt       2022-0      No             25                       



               7-                                              



               00:00:                                              



               00                                                

 

     &lt       2-0      No                                      



               7-21                                              



               00:00:                                              



               00                                                

 

     &lt       2-0      No                                      



               7-21                                              



               00:00:                                              



               00                                                

 

     TAKE 1      -0      No             1000                     



     TABLET                                                    



     TWICE      00:00:                                              



     DAILY.      00                                                

 

     Dose      -0      No             5                        



     Unknown      7-                                              



               00:00:                                              



               00                                                

 

     Dose      -0      No             5                        



     Unknown      7                                              



               00:00:                                              



               00                                                

 

     Dose      -0      No             5                        



     Unknown      -20                                              



               00:00:                                              



               00                                                

 

     HYDROcodone      -0 202- No             1{tbl}      1 tablet,         

  [facility]



     -acetaminop       07-16                          Oral,           ity of



     hen (NORCO)      06:30: 05:28                          ONCE, 1           [facility]

[place]



      mg      00   :00                           dose, On           [facility]

l



     tablet 1                                         Sat            [facility]



     tablet                                         22 at           



                                                  0130,           



                                                  Routine           

 

     No known      -0      No                       No known           [facility]

rs



     medications                                     medication           it

y of



               04:06:                               s              [address]

 

     TAKE 1      -0      No             500                      



     TABLET BY      7-13                                              



     MOUTH EVERY      00:00:                                              



     12 HOURS      00                                                



     FOR 10 DAYS                                                        

 

     &lt       2022-0      No                                      



               7-13                                              



               00:00:                                              



               00                                                

 

     TAKE 1      2022-0      No                                      



     TABLET BY      7-13                                              



     MOUTH DAILY      00:00:                                              



               00                                                

 

     &lt       2022-0      No                                      



               7-13                                              



               00:00:                                              



               00                                                

 

     &lt       2022-0      No             20                       



               7-                                              



               00:00:                                              



               00                                                

 

     Dose      2022-0      No             50                       



     Unknown      7                                              



               00:00:                                              



               00                                                

 

     TAKE 1      2022-0      No             500                      



     TABLET BY      7-13                                              



     MOUTH EVERY      00:00:                                              



     8 HOURS FOR      00                                                



     10 DAYS                                                        

 

     Dose      2022-0      No             20                       



     Unknown      7                                              



               00:00:                                              



               00                                                

 

     Dose      2022-0      No             10                       



     Unknown      7                                              



               00:00:                                              



               00                                                

 

     &lt       2022-0      No             25                       



               7-                                              



               00:00:                                              



               00                                                

 

     DISSOLVE 1      2022-0      No             4                        



     TABLET BY      7-13                                              



     MOUTH EVERY      00:00:                                              



     8 HOURS AS      00                                                



     NEEDED                                                        

 

     &lt       2022-0      No                                      



               7-                                              



               00:00:                                              



               00                                                

 

     TAKE 1      2022-0      No             1000                     



     TABLET      7-                                              



     TWICE      00:00:                                              



     DAILY.      00                                                

 

     &lt       2022-0      No             10                       



               7-                                              



               00:00:                                              



               00                                                

 

     &lt       2022-0      No             15                       



               7                                              



               00:00:                                              



               00                                                

 

     &lt       2022-0      No             5                        



               7-                                              



               00:00:                                              



               00                                                

 

     TAKE 1      2022-0      No             5                        



     TABLET BY      7-13                                              



     MOUTH TWICE      00:00:                                              



     A DAY AS      00                                                



     NEEDED FOR                                                        



     ANXIETY                                                        

 

     Dose      2022-0      No                                      



     Unknown      7                                              



               00:00:                                              



               00                                                

 

     &lt       2022-0      No                                      



               7-                                              



               00:00:                                              



               00                                                

 

     TAKE 1      2022-0      No             500                      



     TABLET BY      7-13                                              



     MOUTH EVERY      00:00:                                              



     12 HOURS      00                                                



     FOR 10 DAYS                                                        

 

     &lt       2022-0      No                                      



               7-13                                              



               00:00:                                              



               00                                                

 

     TAKE 1      2022-0      No                                      



     TABLET BY      7-13                                              



     MOUTH DAILY      00:00:                                              



               00                                                

 

     &lt       2022-0      No                                      



               7-13                                              



               00:00:                                              



               00                                                

 

     &lt       2022-0      No             20                       



               7-                                              



               00:00:                                              



               00                                                

 

     Dose      2022-0      No             50                       



     Unknown                                                    



               00:00:                                              



               00                                                

 

     TAKE 1      2022-0      No             500                      



     TABLET BY      7-13                                              



     MOUTH EVERY      00:00:                                              



     8 HOURS FOR      00                                                



     10 DAYS                                                        

 

     Dose      2022-0      No             20                       



     Unknown                                                    



               00:00:                                              



               00                                                

 

     Dose      2022-0      No             10                       



     Unknown                                                    



               00:00:                                              



               00                                                

 

     &lt       2022-0      No             25                       



               7-                                              



               00:00:                                              



               00                                                

 

     DISSOLVE 1      2022-0      No             4                        



     TABLET BY      7-13                                              



     MOUTH EVERY      00:00:                                              



     8 HOURS AS      00                                                



     NEEDED                                                        

 

     &lt       2022-0      No                                      



               7-13                                              



               00:00:                                              



               00                                                

 

     TAKE 1      2022-0      No             1000                     



     TABLET      7-13                                              



     TWICE      00:00:                                              



     DAILY.      00                                                

 

     &lt       2022-0      No             10                       



               7-13                                              



               00:00:                                              



               00                                                

 

     &lt       2022-0      No             15                       



               7-13                                              



               00:00:                                              



               00                                                

 

     &lt       2022-0      No             5                        



               7-13                                              



               00:00:                                              



               00                                                

 

     TAKE 1      2022-0      No             5                        



     TABLET BY      7-13                                              



     MOUTH TWICE      00:00:                                              



     A DAY AS      00                                                



     NEEDED FOR                                                        



     ANXIETY                                                        

 

     Dose      2022-0      No                                      



     Unknown      7-                                              



               00:00:                                              



               00                                                

 

     &lt       2022-0      No                                      



               7-13                                              



               00:00:                                              



               00                                                

 

     TAKE 1      2022-0      No             500                      



     TABLET BY      7-13                                              



     MOUTH EVERY      00:00:                                              



     12 HOURS      00                                                



     FOR 10 DAYS                                                        

 

     &lt       2022-0      No                                      



               7-13                                              



               00:00:                                              



               00                                                

 

     TAKE 1      2022-0      No                                      



     TABLET BY      7-13                                              



     MOUTH DAILY      00:00:                                              



               00                                                

 

     &lt       2022-0      No                                      



               7-13                                              



               00:00:                                              



               00                                                

 

     &lt       2022-0      No             20                       



               7-                                              



               00:00:                                              



               00                                                

 

     Dose      2022-0      No             50                       



     Unknown      7                                              



               00:00:                                              



               00                                                

 

     TAKE 1      2022-0      No             500                      



     TABLET BY      7-13                                              



     MOUTH EVERY      00:00:                                              



     8 HOURS FOR      00                                                



     10 DAYS                                                        

 

     Dose      2022-0      No             20                       



     Unknown      7-                                              



               00:00:                                              



               00                                                

 

     Dose      2022-0      No             10                       



     Unknown      7                                              



               00:00:                                              



               00                                                

 

     &lt       2022-0      No             25                       



               7-                                              



               00:00:                                              



               00                                                

 

     DISSOLVE 1      2022-0      No             4                        



     TABLET BY      7-13                                              



     MOUTH EVERY      00:00:                                              



     8 HOURS AS      00                                                



     NEEDED                                                        

 

     &lt       2022-0      No                                      



               7-13                                              



               00:00:                                              



               00                                                

 

     TAKE 1      2022-0      No             1000                     



     TABLET      7-13                                              



     TWICE      00:00:                                              



     DAILY.      00                                                

 

     &lt       2022-0      No             10                       



               7-13                                              



               00:00:                                              



               00                                                

 

     &lt       2022-0      No             15                       



               7-                                              



               00:00:                                              



               00                                                

 

     &lt       2022-0      No             5                        



               7-                                              



               00:00:                                              



               00                                                

 

     TAKE 1      2022-0      No             5                        



     TABLET BY      7-13                                              



     MOUTH TWICE      00:00:                                              



     A DAY AS      00                                                



     NEEDED FOR                                                        



     ANXIETY                                                        

 

     Dose      2022-0      No                                      



     Unknown      7-                                              



               00:00:                                              



               00                                                

 

     &lt       2022-0      No                                      



               7-                                              



               00:00:                                              



               00                                                

 

     TAKE 1      2022-0      No             5                        



     TABLET BY      7-12                                              



     MOUTH TWICE      00:00:                                              



     A DAY AS      00                                                



     NEEDED FOR                                                        



     ANXIETY                                                        

 

     TAKE 1      2022-0      No             5                        



     TABLET BY      7-12                                              



     MOUTH TWICE      00:00:                                              



     A DAY AS      00                                                



     NEEDED FOR                                                        



     ANXIETY                                                        

 

     TAKE 1      2022-0      No             5                        



     TABLET BY      7-12                                              



     MOUTH TWICE      00:00:                                              



     A DAY AS      00                                                



     NEEDED FOR                                                        



     ANXIETY                                                        

 

     DISSOLVE 1      2022-0      No             4                        



     TABLET BY      7-11                                              



     MOUTH EVERY      00:00:                                              



     8 HOURS AS      00                                                



     NEEDED                                                        

 

     DISSOLVE 1      2022-0      No             4                        



     TABLET BY      7-11                                              



     MOUTH EVERY      00:00:                                              



     8 HOURS AS      00                                                



     NEEDED                                                        

 

     DISSOLVE 1      2022-0      No             4                        



     TABLET BY      7-11                                              



     MOUTH EVERY      00:00:                                              



     8 HOURS AS      00                                                



     NEEDED                                                        

 

     &lt       2022-0      No                                      



                                                             



               00:00:                                              



               00                                                

 

     &lt       2022-0      No             30                       



                                                             



               00:00:                                              



               00                                                

 

     TAKE 1      2022-0      No             30                       



     TABLET BY      7-06                                              



     MOUTH AT      00:00:                                              



     BEDTIME      00                                                

 

     &lt       2022-0      No                                      



                                                             



               00:00:                                              



               00                                                

 

     Dose      2022-0      No             50                       



     Unknown                                                    



               00:00:                                              



               00                                                

 

     &lt       2022-0      No             20                       



                                                             



               00:00:                                              



               00                                                

 

     &lt       2022-0      No                                      



                                                             



               00:00:                                              



               00                                                

 

     TAKE 1      2022-0      No             [id number]                     



     TABLET                                                    



     TWICE      00:00:                                              



     DAILY.      00                                                

 

     &lt       2022-0      No             25                       



                                                             



               00:00:                                              



               00                                                

 

     TAKE 1      2022-0      No             500                      



     TABLET BY      7-06                                              



     MOUTH EVERY      00:00:                                              



     8 HOURS FOR      00                                                



     10 DAYS                                                        

 

     &lt       2022-0      No                                      



                                                             



               00:00:                                              



               00                                                

 

     &lt       2022-0      No             30                       



                                                             



               00:00:                                              



               00                                                

 

     TAKE 1      2022-0      No             30                       



     TABLET BY      -                                              



     MOUTH AT      00:00:                                              



     BEDTIME      00                                                

 

     &lt       2022-0      No                                      



                                                             



               00:00:                                              



               00                                                

 

     Dose      2022-0      No             50                       



     Unknown                                                    



               00:00:                                              



               00                                                

 

     &lt       2022-0      No             20                       



                                                             



               00:00:                                              



               00                                                

 

     &lt       2022-0      No                                      



                                                             



               00:00:                                              



               00                                                

 

     TAKE 1      2022-0      No             [id number]                     



     TABLET                                                    



     TWICE      00:00:                                              



     DAILY.      00                                                

 

     &lt       2022-0      No             25                       



                                                             



               00:00:                                              



               00                                                

 

     TAKE 1      2022-0      No             500                      



     TABLET BY      7-06                                              



     MOUTH EVERY      00:00:                                              



     8 HOURS FOR      00                                                



     10 DAYS                                                        

 

     &lt       2022-0      No                                      



               7-06                                              



               00:00:                                              



               00                                                

 

     &lt       2022-0      No             30                       



               7-                                              



               00:00:                                              



               00                                                

 

     TAKE 1      2022-0      No             30                       



     TABLET BY      7-06                                              



     MOUTH AT      00:00:                                              



     BEDTIME      00                                                

 

     &lt       2022-0      No                                      



               7-                                              



               00:00:                                              



               00                                                

 

     Dose      2022-0      No             50                       



     Unknown                                                    



               00:00:                                              



               00                                                

 

     &lt       2022-0      No             20                       



               7-                                              



               00:00:                                              



               00                                                

 

     &lt       2022-0      No                                      



               7-                                              



               00:00:                                              



               00                                                

 

     TAKE 1      2022-0      No             [id number]                     



     TABLET                                                    



     TWICE      00:00:                                              



     DAILY.      00                                                

 

     &lt       2022-0      No             25                       



               7                                              



               00:00:                                              



               00                                                

 

     TAKE 1      2022-0      No             500                      



     TABLET BY      7-06                                              



     MOUTH EVERY      00:00:                                              



     8 HOURS FOR                                                      



     10 DAYS                                                        

 

     TAKE 1      2022-0      No             20                       



     TABLET BY      7-05                                              



     MOUTH ONCE      00:00:                                              



     A DAY WITH      00                                                



     MEALS                                                        

 

     TAKE 1      2022-0      No             20                       



     TABLET BY      7-05                                              



     MOUTH ONCE      00:00:                                              



     A DAY WITH      00                                                



     MEALS                                                        

 

     TAKE 1      2022-0      No             20                       



     TABLET BY      7-05                                              



     MOUTH ONCE      00:00:                                              



     A DAY WITH      00                                                



     MEALS                                                        

 

     &lt       2022-0      No                                      



               6-30                                              



               00:00:                                              



               00                                                

 

     TAKE 1      2022-0      No             200                      



     TABLET      6-30                                              



     DAILY.      00:00:                                              



               00                                                

 

     &lt       2022-0      No                                      



               6-30                                              



               00:00:                                              



               00                                                

 

     TAKE 1      2022-0      No             200                      



     TABLET      6-30                                              



     DAILY.      00:00:                                              



               00                                                

 

     &lt       2022-0      No                                      



               6-30                                              



               00:00:                                              



               00                                                

 

     TAKE 1      2022-0      No             200                      



     TABLET      6-30                                              



     DAILY.      00:00:                                              



               00                                                

 

     TAKE 1      2022-0      No                                      



     TABLET BY      6-29                                              



     MOUTH EVERY      00:00:                                              



     8 HOURS FOR      00                                                



     10 DAYS                                                        

 

     TAKE 1      2022-0      No                                      



     TABLET BY      6-29                                              



     MOUTH EVERY      00:00:                                              



     8 HOURS FOR                                                      



     10 DAYS                                                        

 

     &lt       2022-0      No                                      



               6-29                                              



               00:00:                                              



               00                                                

 

     &lt       2022-0      No                                      



               6-29                                              



               00:00:                                              



               00                                                

 

     &lt       2022-0      No                                      



               6-29                                              



               00:00:                                              



               00                                                

 

     &lt       2022-0      No                                      



               6-29                                              



               00:00:                                              



               00                                                

 

     TAKE 1      2022-0      No                                      



     TABLET BY      6-29                                              



     MOUTH EVERY      00:00:                                              



     8 HOURS FOR                                                      



     10 DAYS                                                        

 

     TAKE 1      2022-0      No                                      



     TABLET BY      6-29                                              



     MOUTH EVERY      00:00:                                              



     8 HOURS FOR      00                                                



     10 DAYS                                                        

 

     &lt       2022-0      No                                      



               6                                              



               00:00:                                              



               00                                                

 

     &lt       2022-0      No                                      



               6                                              



               00:00:                                              



               00                                                

 

     &lt       2022-0      No                                      



               6                                              



               00:00:                                              



               00                                                

 

     &lt       2022-0      No                                      



               6                                              



               00:00:                                              



               00                                                

 

     TAKE 1      2022-0      No                                      



     TABLET BY      6-29                                              



     MOUTH EVERY      00:00:                                              



     8 HOURS FOR      00                                                



     10 DAYS                                                        

 

     TAKE 1      2022-0      No                                      



     TABLET BY      6-29                                              



     MOUTH EVERY      00:00:                                              



     8 HOURS FOR      00                                                



     10 DAYS                                                        

 

     &lt       2022-0      No                                      



               6                                              



               00:00:                                              



               00                                                

 

     &lt       2022-0      No                                      



               6                                              



               00:00:                                              



               00                                                

 

     &lt       2022-0      No                                      



               6                                              



               00:00:                                              



               00                                                

 

     &lt       2022-0      No                                      



               6                                              



               00:00:                                              



               00                                                

 

     TAKE ONE      2022-0      No                                      



     (1) TO TWO                                                    



     (2)       00:00:                                              



     TABLET(S)      00                                                



     BY MOUTH                                                        



     EVERY 6                                                        



     HOURS AS                                                        



     NEEDED FOR                                                        



     PAIN , NO                                                        



     MORE THAN 8                                                        



     TABLETS IN                                                        



     24 HOURS.                                                        

 

     &lt       2022-0      No                                      



               6                                              



               00:00:                                              



               00                                                

 

     &lt       2022-0      No                                      



               6                                              



               00:00:                                              



               00                                                

 

     &lt       2022-0      No                                      



               6                                              



               00:00:                                              



               00                                                

 

     &lt       2022-0      No                                      



               6                                              



               00:00:                                              



               00                                                

 

     &lt       2022-0      No                                      



               6                                              



               00:00:                                              



               00                                                

 

     TAKE ONE      2-0      No                                      



     (1) TO TWO                                                    



     (2)       00:00:                                              



     TABLET(S)      00                                                



     BY MOUTH                                                        



     EVERY 6                                                        



     HOURS AS                                                        



     NEEDED FOR                                                        



     PAIN , NO                                                        



     MORE THAN 8                                                        



     TABLETS IN                                                        



     24 HOURS.                                                        

 

     &lt       2022-0      No                                      



               6                                              



               00:00:                                              



               00                                                

 

     &lt       2022-0      No                                      



               6                                              



               00:00:                                              



               00                                                

 

     &lt       2022-0      No                                      



               6-27                                              



               00:00:                                              



               00                                                

 

     &lt       2022-0      No                                      



               6                                              



               00:00:                                              



               00                                                

 

     &lt       2022-0      No                                      



               6                                              



               00:00:                                              



               00                                                

 

     TAKE ONE      2-0      No                                      



     (1) TO TWO                                                    



     (2)       00:00:                                              



     TABLET(S)      00                                                



     BY MOUTH                                                        



     EVERY 6                                                        



     HOURS AS                                                        



     NEEDED FOR                                                        



     PAIN , NO                                                        



     MORE THAN 8                                                        



     TABLETS IN                                                        



     24 HOURS.                                                        

 

     &lt       2022-0      No                                      



               6-27                                              



               00:00:                                              



               00                                                

 

     &lt       2022-0      No                                      



               6-27                                              



               00:00:                                              



               00                                                

 

     &lt       2022-0      No                                      



               6-27                                              



               00:00:                                              



               00                                                

 

     &lt       2022-0      No                                      



               6-27                                              



               00:00:                                              



               00                                                

 

     &lt       2022-0      No                                      



               6-27                                              



               00:00:                                              



               00                                                

 

     TAKE ONE      2022-0      No                                      



     (1) TO TWO      6-27                                              



     (2)       00:00:                                              



     TABLET(S)      00                                                



     BY MOUTH                                                        



     EVERY 6                                                        



     HOURS AS                                                        



     NEEDED FOR                                                        



     PAIN , NO                                                        



     MORE THAN 8                                                        



     TABLETS IN                                                        



     24 HOURS.                                                        

 

     &lt       2022-0      No                                      



               6-27                                              



               00:00:                                              



               00                                                

 

     &lt       2022-0      No                                      



               6-27                                              



               00:00:                                              



               00                                                

 

     &lt       2022-0      No                                      



               6-27                                              



               00:00:                                              



               00                                                

 

     &lt       2022-0      No                                      



               6-27                                              



               00:00:                                              



               00                                                

 

     &lt       2022-0      No                                      



               6-27                                              



               00:00:                                              



               00                                                

 

     TAKE 1      2022-0      No                                      



     TABLET BY      6-24                                              



     MOUTH AT      00:00:                                              



     BEDTIME      00                                                

 

     DISSOLVE 1      2022-0      No                                      



     TABLET BY      6-24                                              



     MOUTH EVERY      00:00:                                              



     8 HOURS AS      00                                                



     NEEDED                                                        

 

     TAKE 1      2022-0      No                                      



     TABLET BY      6-24                                              



     MOUTH AT      00:00:                                              



     BEDTIME      00                                                

 

     DISSOLVE 1      2022-0      No                                      



     TABLET BY      6-24                                              



     MOUTH EVERY      00:00:                                              



     8 HOURS AS      00                                                



     NEEDED                                                        

 

     TAKE 1      2022-0      No                                      



     TABLET BY      6-24                                              



     MOUTH AT      00:00:                                              



     BEDTIME      00                                                

 

     DISSOLVE 1      2022-0      No                                      



     TABLET BY      6-24                                              



     MOUTH EVERY      00:00:                                              



     8 HOURS AS      00                                                



     NEEDED                                                        

 

     TAKE 1      2022-0      No                                      



     TABLET BY      6-24                                              



     MOUTH AT      00:00:                                              



     BEDTIME      00                                                

 

     DISSOLVE 1      2022-0      No                                      



     TABLET BY      6-24                                              



     MOUTH EVERY      00:00:                                              



     8 HOURS AS      00                                                



     NEEDED                                                        

 

     metformin      2022-0      No             1mg                      



     1,000 mg      6-23                                              



     tablet      00:00:                                              



               00                                                

 

     levothyroxi      2022-0      No             1mcg                     



     ne 75 mcg      6-23                                              



     tablet      00:00:                                              



               00                                                

 

     levothyroxi      2022-0      No             1mcg                     



     ne 200 mcg      6-23                                              



     tablet      00:00:                                              



               00                                                

 

     metformin      2022-0      No             1mg                      



     1,000 mg      6-23                                              



     tablet      00:00:                                              



               00                                                

 

     levothyroxi      2022-0      No             1mcg                     



     ne 75 mcg      6-23                                              



     tablet      00:00:                                              



               00                                                

 

     levothyroxi      2022-0      No             1mcg                     



     ne 200 mcg      6-23                                              



     tablet      00:00:                                              



               00                                                

 

     metformin      2022-0      No             1mg                      



     1,000 mg      6-23                                              



     tablet      00:00:                                              



               00                                                

 

     levothyroxi      2022-0      No             1mcg                     



     ne 75 mcg      6-23                                              



     tablet      00:00:                                              



               00                                                

 

     levothyroxi      2022-0      No             1mcg                     



     ne 200 mcg      6-23                                              



     tablet      00:00:                                              



               00                                                

 

     metformin      2022-0      No             1mg                      



     1,000 mg      6-23                                              



     tablet      00:00:                                              



               00                                                

 

     levothyroxi      2022-0      No             1mcg                     



     ne 75 mcg      6-23                                              



     tablet      00:00:                                              



               00                                                

 

     levothyroxi      2022-0      No             1mcg                     



     ne 200 mcg      6-23                                              



     tablet      00:00:                                              



               00                                                

 

     Myrbetriq      2022-0      No             1mg                      



     25 mg      4-29                                              



     tablet,exte      00:00:                                              



     nded      00                                                



     release                                                        

 

     hydroxyzine      2022-0      No             1mg                      



     HCl 50 mg      4-29                                              



     tablet      00:00:                                              



               00                                                

 

     lisinopril      2022-0      No             1mg                      



     5 mg tablet      4-29                                              



               00:00:                                              



               00                                                

 

     metformin      2022-0      No             1mg                      



     1,000 mg      4-29                                              



     tablet      00:00:                                              



               00                                                

 

     metoclopram      2022-0      No             1mg                      



     fabio 10 mg      4-29                                              



     tablet      00:00:                                              



               00                                                

 

     Myrbetriq      2022-0      No             1mg                      



     25 mg      4-29                                              



     tablet,exte      00:00:                                              



     nded      00                                                



     release                                                        

 

     hydroxyzine      2022-0      No             1mg                      



     HCl 50 mg      4-29                                              



     tablet      00:00:                                              



               00                                                

 

     lisinopril      2022-0      No             1mg                      



     5 mg tablet      4-29                                              



               00:00:                                              



               00                                                

 

     metformin      2022-0      No             1mg                      



     1,000 mg      4-29                                              



     tablet      00:00:                                              



               00                                                

 

     metoclopram      2022-0      No             1mg                      



     fabio 10 mg      4-29                                              



     tablet      00:00:                                              



               00                                                

 

     Myrbetriq      2022-0      No             1mg                      



     25 mg      4-29                                              



     tablet,exte      00:00:                                              



     nded      00                                                



     release                                                        

 

     hydroxyzine      2022-0      No             1mg                      



     HCl 50 mg      4-29                                              



     tablet      00:00:                                              



               00                                                

 

     lisinopril      2022-0      No             1mg                      



     5 mg tablet      4-29                                              



               00:00:                                              



               00                                                

 

     metformin      2022-0      No             1mg                      



     1,000 mg      4-29                                              



     tablet      00:00:                                              



               00                                                

 

     metoclopram      2022-0      No             1mg                      



     fabio 10 mg      4-29                                              



     tablet      00:00:                                              



               00                                                

 

     Myrbetriq      2022-0      No             1mg                      



     25 mg      4-29                                              



     tablet,exte      00:00:                                              



     nded      00                                                



     release                                                        

 

     hydroxyzine      2022-0      No             1mg                      



     HCl 50 mg      4-29                                              



     tablet      00:00:                                              



               00                                                

 

     lisinopril      2-0      No             1mg                      



     5 mg tablet      4-29                                              



               00:00:                                              



               00                                                

 

     metformin      2022-0      No             1mg                      



     1,000 mg      4-29                                              



     tablet      00:00:                                              



               00                                                

 

     metoclopram      2022-0      No             1mg                      



     fabio 10 mg      4-29                                              



     tablet      00:00:                                              



               00                                                

 

     citalopram      2022-0      No             1mg                      



     20 mg      4-27                                              



     tablet      00:00:                                              



               00                                                

 

     atorvastati      2022-0      No             1mg                      



     n 10 mg      4-27                                              



     tablet      00:00:                                              



               00                                                

 

     glimepiride      2-0      No             1mg                      



     1 mg tablet      427                                              



               00:00:                                              



               00                                                

 

     levothyroxi      2022-0      No             1mcg                     



     ne 75 mcg      4-27                                              



     tablet      00:00:                                              



               00                                                

 

     levothyroxi      2-0      No             1mcg                     



     ne 200 mcg      4-27                                              



     tablet      00:00:                                              



               00                                                

 

     levothyroxi      2022-0      No             1mcg                     



     ne 300 mcg      4-27                                              



     tablet      00:00:                                              



               00                                                

 

     ferrous      2022-0      No             1(65 mg                     



     sulfate 325      4-27                     iron)                     



     mg (65 mg      00:00:                                              



     iron)      00                                                



     tablet                                                        

 

     omeprazole      2-0      No             1mg                      



     40 mg      4-27                                              



     capsule,del      00:00:                                              



     ayed      00                                                



     release                                                        

 

     citalopram      2-0      No             1mg                      



     20 mg      4-27                                              



     tablet      00:00:                                              



               00                                                

 

     atorvastati      2-0      No             1mg                      



     n 10 mg      4-27                                              



     tablet      00:00:                                              



               00                                                

 

     glimepiride      2-0      No             1mg                      



     1 mg tablet      427                                              



               00:00:                                              



               00                                                

 

     levothyroxi      2022-0      No             1mcg                     



     ne 75 mcg      4-27                                              



     tablet      00:00:                                              



               00                                                

 

     levothyroxi      2-0      No             1mcg                     



     ne 200 mcg      4-27                                              



     tablet      00:00:                                              



               00                                                

 

     levothyroxi      2022-0      No             1mcg                     



     ne 300 mcg      4-27                                              



     tablet      00:00:                                              



               00                                                

 

     ferrous      2022-0      No             1(65 mg                     



     sulfate 325      4-27                     iron)                     



     mg (65 mg      00:00:                                              



     iron)      00                                                



     tablet                                                        

 

     omeprazole      2-0      No             1mg                      



     40 mg      4-27                                              



     capsule,del      00:00:                                              



     ayed      00                                                



     release                                                        

 

     citalopram      2022-0      No             1mg                      



     20 mg      4-27                                              



     tablet      00:00:                                              



               00                                                

 

     atorvastati      2022-0      No             1mg                      



     n 10 mg      4-27                                              



     tablet      00:00:                                              



               00                                                

 

     glimepiride      2-0      No             1mg                      



     1 mg tablet      4-27                                              



               00:00:                                              



               00                                                

 

     levothyroxi      2-0      No             1mcg                     



     ne 75 mcg      4-27                                              



     tablet      00:00:                                              



               00                                                

 

     levothyroxi      2022-0      No             1mcg                     



     ne 200 mcg      4-27                                              



     tablet      00:00:                                              



               00                                                

 

     levothyroxi      2022-0      No             1mcg                     



     ne 300 mcg      4-27                                              



     tablet      00:00:                                              



               00                                                

 

     ferrous      2022-0      No             1(65 mg                     



     sulfate 325      4-27                     iron)                     



     mg (65 mg      00:00:                                              



     iron)      00                                                



     tablet                                                        

 

     omeprazole      2-0      No             1mg                      



     40 mg      4-27                                              



     capsule,del      00:00:                                              



     ayed      00                                                



     release                                                        

 

     citalopram      2-0      No             1mg                      



     20 mg      4-27                                              



     tablet      00:00:                                              



               00                                                

 

     atorvastati      2-0      No             1mg                      



     n 10 mg      4-27                                              



     tablet      00:00:                                              



               00                                                

 

     Dose      2-0      No                                      



     Unknown      4-27                                              



               00:00:                                              



               00                                                

 

     levothyroxi      2-0      No             1mcg                     



     ne 75 mcg      4-27                                              



     tablet      00:00:                                              



               00                                                

 

     levothyroxi      2-0      No             1mcg                     



     ne 200 mcg      4-27                                              



     tablet      00:00:                                              



               00                                                

 

     levothyroxi      2-0      No             1mcg                     



     ne 300 mcg      4-27                                              



     tablet      00:00:                                              



               00                                                

 

     ferrous      2-0      No             1(65 mg                     



     sulfate 325      4-27                     iron)                     



     mg (65 mg      00:00:                                              



     iron)      00                                                



     tablet                                                        

 

     omeprazole      2-0      No             1mg                      



     40 mg      4-27                                              



     capsule,del      00:00:                                              



     ayed      00                                                



     release                                                        

 

     Dose      2-0      No                                      



     Unknown      4-01                                              



               00:00:                                              



               00                                                

 

     Dose      2022-0      No                                      



     Unknown      4-01                                              



               00:00:                                              



               00                                                

 

     Dose      2022-0      No                                      



     Unknown      4-01                                              



               00:00:                                              



               00                                                

 

     Dose      2022-0      No                                      



     Unknown      4-01                                              



               00:00:                                              



               00                                                

 

     Dose      2022-0      No                                      



     Unknown      4-01                                              



               00:00:                                              



               00                                                

 

     Dose      2022-0      No                                      



     Unknown      4-01                                              



               00:00:                                              



               00                                                

 

     Dose      2022-0      No                                      



     Unknown      4-01                                              



               00:00:                                              



               00                                                

 

     Dose      2022-0      No                                      



     Unknown      4-01                                              



               00:00:                                              



               00                                                

 

     Dose      2022-0      No                                      



     Unknown      4-01                                              



               00:00:                                              



               00                                                

 

     Dose      2022-0      No                                      



     Unknown      4-01                                              



               00:00:                                              



               00                                                

 

     metformin      2-0      No             1mg                      



     1,000 mg      4-01                                              



     tablet      00:00:                                              



               00                                                

 

     levothyroxi      2-0      No             1mcg                     



     ne 75 mcg      4-01                                              



     tablet      00:00:                                              



               00                                                

 

     levothyroxi      2-0      No             1mcg                     



     ne 200 mcg      4-01                                              



     tablet      00:00:                                              



               00                                                

 

     hydrocortis      2-0      No             1mg                      



     one acetate      4-01                                              



     25 mg      00:00:                                              



     rectal      00                                                



     suppository                                                        

 

     nitrofurant      2-0      No             1mg                      



     oin       4-01                                              



     monohydrate      00:00:                                              



     /macrocryst      00                                                



     als 100 mg                                                        



     capsule                                                        

 

     Dose      2-0      No                                      



     Unknown      4-01                                              



               00:00:                                              



               00                                                

 

     Dose      2022-0      No                                      



     Unknown      4-01                                              



               00:00:                                              



               00                                                

 

     Dose      2-0      No                                      



     Unknown      4-01                                              



               00:00:                                              



               00                                                

 

     Dose      2-0      No                                      



     Unknown      4-01                                              



               00:00:                                              



               00                                                

 

     Dose      2-0      No                                      



     Unknown      4-01                                              



               00:00:                                              



               00                                                

 

     Dose      2022-0      No                                      



     Unknown      4-01                                              



               00:00:                                              



               00                                                

 

     Dose      2022-0      No                                      



     Unknown      4-01                                              



               00:00:                                              



               00                                                

 

     Dose      2022-0      No                                      



     Unknown      4-01                                              



               00:00:                                              



               00                                                

 

     Dose      2022-0      No                                      



     Unknown      4-01                                              



               00:00:                                              



               00                                                

 

     Dose      2022-0      No                                      



     Unknown      4-01                                              



               00:00:                                              



               00                                                

 

     Dose      2022-0      No                                      



     Unknown      4-01                                              



               00:00:                                              



               00                                                

 

     Dose      2022-0      No                                      



     Unknown      4-01                                              



               00:00:                                              



               00                                                

 

     Dose      2022-0      No                                      



     Unknown      4-01                                              



               00:00:                                              



               00                                                

 

     Dose      2022-0      No                                      



     Unknown      4-01                                              



               00:00:                                              



               00                                                

 

     Dose      2022-0      No                                      



     Unknown      4-01                                              



               00:00:                                              



               00                                                

 

     Dose      2022-0      No                                      



     Unknown      4-01                                              



               00:00:                                              



               00                                                

 

     Dose      2022-0      No                                      



     Unknown      4-01                                              



               00:00:                                              



               00                                                

 

     Dose      2022-0      No                                      



     Unknown      4-01                                              



               00:00:                                              



               00                                                

 

     Dose      2022-0      No                                      



     Unknown      4-01                                              



               00:00:                                              



               00                                                

 

     Dose      2022-0      No                                      



     Unknown      4-01                                              



               00:00:                                              



               00                                                

 

     Dose      2022-0      No                                      



     Unknown      4-01                                              



               00:00:                                              



               00                                                

 

     Dose      2022-0      No                                      



     Unknown      4-01                                              



               00:00:                                              



               00                                                

 

     Dose      2022-0      No                                      



     Unknown      4-01                                              



               00:00:                                              



               00                                                

 

     Dose      2022-0      No                                      



     Unknown      4-01                                              



               00:00:                                              



               00                                                

 

     Dose      2022-0      No                                      



     Unknown      4-01                                              



               00:00:                                              



               00                                                

 

     Dose      2022-0      No                                      



     Unknown      4-01                                              



               00:00:                                              



               00                                                

 

     Dose      2022-0      No                                      



     Unknown      4-01                                              



               00:00:                                              



               00                                                

 

     Dose      2022-0      No                                      



     Unknown      4-01                                              



               00:00:                                              



               00                                                

 

     Dose      2022-0      No                                      



     Unknown      4-01                                              



               00:00:                                              



               00                                                

 

     metformin      2-0      No             1mg                      



     1,000 mg      4-01                                              



     tablet      00:00:                                              



               00                                                

 

     levothyroxi      2-0      No             1mcg                     



     ne 75 mcg      4-01                                              



     tablet      00:00:                                              



               00                                                

 

     levothyroxi      -0      No             1mcg                     



     ne 200 mcg      4-01                                              



     tablet      00:00:                                              



               00                                                

 

     hydrocortis      2-0      No             1mg                      



     one acetate      4-01                                              



     25 mg      00:00:                                              



     rectal      00                                                



     suppository                                                        

 

     nitrofurant      2-0      No             1mg                      



     oin       4-01                                              



     monohydrate      00:00:                                              



     /macrocryst      00                                                



     als 100 mg                                                        



     capsule                                                        

 

     Dose      -0      No                                      



     Unknown      4-01                                              



               00:00:                                              



               00                                                

 

     Dose      2022-0      No                                      



     Unknown      4-01                                              



               00:00:                                              



               00                                                

 

     Dose      2022-0      No                                      



     Unknown      4-01                                              



               00:00:                                              



               00                                                

 

     Dose      2022-0      No                                      



     Unknown      4-01                                              



               00:00:                                              



               00                                                

 

     Dose      2022-0      No                                      



     Unknown      4-01                                              



               00:00:                                              



               00                                                

 

     Dose      2022-0      No                                      



     Unknown      4-01                                              



               00:00:                                              



               00                                                

 

     Dose      2022-0      No                                      



     Unknown      4-01                                              



               00:00:                                              



               00                                                

 

     Dose      2022-0      No                                      



     Unknown      4-01                                              



               00:00:                                              



               00                                                

 

     Dose      2022-0      No                                      



     Unknown      4-01                                              



               00:00:                                              



               00                                                

 

     Dose      2022-0      No                                      



     Unknown      4-01                                              



               00:00:                                              



               00                                                

 

     Dose      2022-0      No                                      



     Unknown      4-01                                              



               00:00:                                              



               00                                                

 

     Dose      2022-0      No                                      



     Unknown      4-01                                              



               00:00:                                              



               00                                                

 

     Dose      2022-0      No                                      



     Unknown      4-01                                              



               00:00:                                              



               00                                                

 

     Dose      2022-0      No                                      



     Unknown      4-01                                              



               00:00:                                              



               00                                                

 

     Dose      2022-0      No                                      



     Unknown      4-01                                              



               00:00:                                              



               00                                                

 

     Dose      2022-0      No                                      



     Unknown      4-01                                              



               00:00:                                              



               00                                                

 

     Dose      2022-0      No                                      



     Unknown      4-01                                              



               00:00:                                              



               00                                                

 

     Dose      2022-0      No                                      



     Unknown      4-01                                              



               00:00:                                              



               00                                                

 

     Dose      2022-0      No                                      



     Unknown      4-01                                              



               00:00:                                              



               00                                                

 

     Dose      2022-0      No                                      



     Unknown      4-01                                              



               00:00:                                              



               00                                                

 

     Dose      2022-0      No                                      



     Unknown      4-01                                              



               00:00:                                              



               00                                                

 

     Dose      2022-0      No                                      



     Unknown      4-01                                              



               00:00:                                              



               00                                                

 

     Dose      2022-0      No                                      



     Unknown      4-01                                              



               00:00:                                              



               00                                                

 

     Dose      2022-0      No                                      



     Unknown      4-01                                              



               00:00:                                              



               00                                                

 

     Dose      2022-0      No                                      



     Unknown      4-01                                              



               00:00:                                              



               00                                                

 

     Dose      2022-0      No                                      



     Unknown      4-01                                              



               00:00:                                              



               00                                                

 

     Dose      2022-0      No                                      



     Unknown      4-01                                              



               00:00:                                              



               00                                                

 

     Dose      2022-0      No                                      



     Unknown      4-01                                              



               00:00:                                              



               00                                                

 

     Dose      2022-0      No                                      



     Unknown      4-01                                              



               00:00:                                              



               00                                                

 

     metformin      2022-0      No             1mg                      



     1,000 mg      4-01                                              



     tablet      00:00:                                              



               00                                                

 

     levothyroxi      2-0      No             1mcg                     



     ne 75 mcg      4-01                                              



     tablet      00:00:                                              



               00                                                

 

     levothyroxi      2-0      No             1mcg                     



     ne 200 mcg      4-01                                              



     tablet      00:00:                                              



               00                                                

 

     hydrocortis      2-0      No             1mg                      



     one acetate      4-01                                              



     25 mg      00:00:                                              



     rectal      00                                                



     suppository                                                        

 

     nitrofurant      2-0      No             1mg                      



     oin       4-01                                              



     monohydrate      00:00:                                              



     /macrocryst      00                                                



     als 100 mg                                                        



     capsule                                                        

 

     Dose      2-0      No                                      



     Unknown      4-01                                              



               00:00:                                              



               00                                                

 

     Dose      2022-0      No                                      



     Unknown      4-01                                              



               00:00:                                              



               00                                                

 

     Dose      2022-0      No                                      



     Unknown      4-01                                              



               00:00:                                              



               00                                                

 

     Dose      2022-0      No                                      



     Unknown      4-01                                              



               00:00:                                              



               00                                                

 

     Dose      2022-0      No                                      



     Unknown      4-01                                              



               00:00:                                              



               00                                                

 

     Dose      2022-0      No                                      



     Unknown      4-01                                              



               00:00:                                              



               00                                                

 

     Dose      2022-0      No                                      



     Unknown      4-01                                              



               00:00:                                              



               00                                                

 

     Dose      2022-0      No                                      



     Unknown      4-01                                              



               00:00:                                              



               00                                                

 

     Dose      2022-0      No                                      



     Unknown      4-01                                              



               00:00:                                              



               00                                                

 

     Dose      2022-0      No                                      



     Unknown      4-01                                              



               00:00:                                              



               00                                                

 

     Dose      2022-0      No                                      



     Unknown      4-01                                              



               00:00:                                              



               00                                                

 

     Dose      2022-0      No                                      



     Unknown      4-01                                              



               00:00:                                              



               00                                                

 

     Dose      2022-0      No                                      



     Unknown      4-01                                              



               00:00:                                              



               00                                                

 

     metformin      2022-0      No             1mg                      



     1,000 mg      4-01                                              



     tablet      00:00:                                              



               00                                                

 

     levothyroxi      2-0      No             1mcg                     



     ne 75 mcg      4-01                                              



     tablet      00:00:                                              



               00                                                

 

     levothyroxi      2-0      No             1mcg                     



     ne 200 mcg      4-01                                              



     tablet      00:00:                                              



               00                                                

 

     hydrocortis      2022-0      No             1mg                      



     one acetate      4-01                                              



     25 mg      00:00:                                              



     rectal      00                                                



     suppository                                                        

 

     nitrofurant      2-0      No             1mg                      



     oin       4-01                                              



     monohydrate      00:00:                                              



     /macrocryst      00                                                



     als 100 mg                                                        



     capsule                                                        

 

     Dose      -0      No                                      



     Unknown      4-01                                              



               00:00:                                              



               00                                                

 

     Dose      2022-0      No                                      



     Unknown      4-01                                              



               00:00:                                              



               00                                                

 

     Dose      2022-0      No                                      



     Unknown      4-01                                              



               00:00:                                              



               00                                                

 

     Dose      2022-0      No                                      



     Unknown      4-01                                              



               00:00:                                              



               00                                                

 

     Dose      2022-0      No                                      



     Unknown      4-01                                              



               00:00:                                              



               00                                                

 

     Dose      2022-0      No                                      



     Unknown      4-01                                              



               00:00:                                              



               00                                                

 

     Dose      2022-0      No                                      



     Unknown      4-01                                              



               00:00:                                              



               00                                                

 

     Dose      2022-0      No                                      



     Unknown      4-01                                              



               00:00:                                              



               00                                                

 

     Dose      2022-0      No                                      



     Unknown      4-01                                              



               00:00:                                              



               00                                                

 

     Dose      2022-0      No                                      



     Unknown      4-01                                              



               00:00:                                              



               00                                                

 

     Dose      2022-0      No                                      



     Unknown      4-01                                              



               00:00:                                              



               00                                                

 

     Dose      2022-0      No                                      



     Unknown      4-01                                              



               00:00:                                              



               00                                                

 

     Dose      2022-0      No                                      



     Unknown      4-01                                              



               00:00:                                              



               00                                                

 

     Dose      2022-0      No                                      



     Unknown      4-01                                              



               00:00:                                              



               00                                                

 

     Dose      2022-0      No                                      



     Unknown      4-01                                              



               00:00:                                              



               00                                                

 

     Dose      2022-0      No                                      



     Unknown      4-01                                              



               00:00:                                              



               00                                                

 

     Dose      2022-0      No                                      



     Unknown      4-01                                              



               00:00:                                              



               00                                                

 

     Dose      2022-0      No                                      



     Unknown      4-01                                              



               00:00:                                              



               00                                                

 

     Dose      2022-0      No                                      



     Unknown      4-01                                              



               00:00:                                              



               00                                                

 

     Dose      2022-0      No                                      



     Unknown      4-01                                              



               00:00:                                              



               00                                                

 

     Dose      2022-0      No                                      



     Unknown      4-01                                              



               00:00:                                              



               00                                                

 

     Dose      2022-0      No                                      



     Unknown      4-01                                              



               00:00:                                              



               00                                                

 

     Dose      2022-0      No                                      



     Unknown      4-01                                              



               00:00:                                              



               00                                                

 

     Dose      2022-0      No                                      



     Unknown      4-01                                              



               00:00:                                              



               00                                                

 

     Dose      2022-0      No                                      



     Unknown      4-01                                              



               00:00:                                              



               00                                                

 

     Dose      2022-0      No                                      



     Unknown      4-01                                              



               00:00:                                              



               00                                                

 

     Dose      2022-0      No                                      



     Unknown      4-01                                              



               00:00:                                              



               00                                                

 

     Dose      2022-0      No                                      



     Unknown      4-01                                              



               00:00:                                              



               00                                                

 

     Dose      2022-0      No                                      



     Unknown      4-01                                              



               00:00:                                              



               00                                                

 

     Dose      2022-0      No                                      



     Unknown      4-01                                              



               00:00:                                              



               00                                                

 

     Dose      2022-0      No                                      



     Unknown      4-                                              



               00:00:                                              



               00                                                

 

     Dose      2022-0      No                                      



     Unknown      4-                                              



               00:00:                                              



               00                                                

 

     Dose      2022-0      No                                      



     Unknown      4-                                              



               00:00:                                              



               00                                                

 

     Dose      2022-0      No                                      



     Unknown      4-                                              



               00:00:                                              



               00                                                

 

     Dose      2022-0      No                                      



     Unknown      4-                                              



               00:00:                                              



               00                                                

 

     Dose      2022-0      No                                      



     Unknown      3-31                                              



               00:00:                                              



               00                                                

 

     Dose      2022-0      No                                      



     Unknown      3-31                                              



               00:00:                                              



               00                                                

 

     Dose      2022-0      No                                      



     Unknown      3-31                                              



               00:00:                                              



               00                                                

 

     Dose      2022-0      No                                      



     Unknown      3-31                                              



               00:00:                                              



               00                                                

 

     Dose      2022-0      No                                      



     Unknown      3-31                                              



               00:00:                                              



               00                                                

 

     Dose      2022-0      No                                      



     Unknown      3-31                                              



               00:00:                                              



               00                                                

 

     Dose      2022-0      No                                      



     Unknown      3-31                                              



               00:00:                                              



               00                                                

 

     Dose      2022-0      No                                      



     Unknown      3-31                                              



               00:00:                                              



               00                                                

 

     Dose      2022-0      No                                      



     Unknown      3-31                                              



               00:00:                                              



               00                                                

 

     Dose      2022-0      No                                      



     Unknown      3-31                                              



               00:00:                                              



               00                                                

 

     Dose      2022-0      No                                      



     Unknown      3-31                                              



               00:00:                                              



               00                                                

 

     Dose      2022-0      No                                      



     Unknown      3-31                                              



               00:00:                                              



               00                                                

 

     Dose      2022-0      No                                      



     Unknown      3-31                                              



               00:00:                                              



               00                                                

 

     Dose      2022-0      No                                      



     Unknown      3-31                                              



               00:00:                                              



               00                                                

 

     Dose      2022-0      No                                      



     Unknown      3-31                                              



               00:00:                                              



               00                                                

 

     Dose      2022-0      No                                      



     Unknown      3-31                                              



               00:00:                                              



               00                                                

 

     Dose      2022-0      No                                      



     Unknown      3-31                                              



               00:00:                                              



               00                                                

 

     Dose      2022-0      No                                      



     Unknown      3-31                                              



               00:00:                                              



               00                                                

 

     Dose      2022-0      No                                      



     Unknown      3-31                                              



               00:00:                                              



               00                                                

 

     Dose      2022-0      No                                      



     Unknown      3-31                                              



               00:00:                                              



               00                                                

 

     Dose      2022-0      No                                      



     Unknown      3-31                                              



               00:00:                                              



               00                                                

 

     Dose      2022-0      No                                      



     Unknown      3-31                                              



               00:00:                                              



               00                                                

 

     Dose      2022-0      No                                      



     Unknown      3-31                                              



               00:00:                                              



               00                                                

 

     Dose      2022-0      No                                      



     Unknown      3-31                                              



               00:00:                                              



               00                                                

 

     Dose      2022-0      No                                      



     Unknown      3-31                                              



               00:00:                                              



               00                                                

 

     Dose      2022-0      No                                      



     Unknown      3-31                                              



               00:00:                                              



               00                                                

 

     Dose      2022-0      No                                      



     Unknown      3-31                                              



               00:00:                                              



               00                                                

 

     Dose      2022-0      No                                      



     Unknown      3-31                                              



               00:00:                                              



               00                                                

 

     Dose      2022-0      No                                      



     Unknown      3-31                                              



               00:00:                                              



               00                                                

 

     Dose      2022-0      No                                      



     Unknown      3-31                                              



               00:00:                                              



               00                                                

 

     Dose      2-0      No                                      



     Unknown      3-31                                              



               00:00:                                              



               00                                                

 

     Dose      2-0      No                                      



     Unknown      3-31                                              



               00:00:                                              



               00                                                

 

     Dose      2022-0      No                                      



     Unknown      3-31                                              



               00:00:                                              



               00                                                

 

     Dose      2022-0      No                                      



     Unknown      3-31                                              



               00:00:                                              



               00                                                

 

     Dose      2-0      No                                      



     Unknown      3-31                                              



               00:00:                                              



               00                                                

 

     Dose      2022-0      No                                      



     Unknown      3-31                                              



               00:00:                                              



               00                                                

 

     Dose      2022-0      No                                      



     Unknown      1-25                                              



               00:00:                                              



               00                                                

 

     Dose      2022-0      No                                      



     Unknown      1-25                                              



               00:00:                                              



               00                                                

 

     Dose      2-0      No                                      



     Unknown      1-25                                              



               00:00:                                              



               00                                                

 

     Dose      2-0      No                                      



     Unknown      1-25                                              



               00:00:                                              



               00                                                

 

     Dose      2022-0      No                                      



     Unknown      1-25                                              



               00:00:                                              



               00                                                

 

     Dose      2022-0      No                                      



     Unknown      1-25                                              



               00:00:                                              



               00                                                

 

     Dose      2-0      No                                      



     Unknown      1-25                                              



               00:00:                                              



               00                                                

 

     Dose      2022-0      No                                      



     Unknown      1-25                                              



               00:00:                                              



               00                                                

 

     Dose      2022-0      No                                      



     Unknown      1-25                                              



               00:00:                                              



               00                                                

 

     Dose      2-0      No                                      



     Unknown      1-25                                              



               00:00:                                              



               00                                                

 

     Dose      2-0      No                                      



     Unknown      1-25                                              



               00:00:                                              



               00                                                

 

     Dose      2022-0      No                                      



     Unknown      1-25                                              



               00:00:                                              



               00                                                

 

     Dose      2-0      No                                      



     Unknown      1-20                                              



               00:00:                                              



               00                                                

 

     Dose      2-0      No                                      



     Unknown      1-20                                              



               00:00:                                              



               00                                                

 

     Dose      2-0      No                                      



     Unknown      1-20                                              



               00:00:                                              



               00                                                

 

     Dose      2-0      No                                      



     Unknown      1-20                                              



               00:00:                                              



               00                                                

 

     levothyroxi      -0      No             1mcg                     



     ne 150 mcg      1-19                                              



     capsule      00:00:                                              



               00                                                

 

     neomycin-po      -0      No             4mg/mL-                     



     lymyxin-hyd      1-19                     unit/mL                     



     rocort 3.5      00:00:                     -%                       



     mg-10,000      00                                                



     unit/mL-1 %                                                        



     ear                                                         



     drops,susp                                                        

 

     Humulin      0      No             1unit/m                     



     70/30 U-100      1-19                     L                        



     Insulin 100      00:00:                     (70-30)                     



     unit/mL      00                                                



     subcutaneou                                                        



     s                                                           



     suspension                                                        

 

     lisinopril      0      No             1mg                      



     5 mg tablet      1-19                                              



               00:00:                                              



               00                                                

 

     hydroxyzine      2022-0      No             1mg                      



     HCl 50 mg      1-19                                              



     tablet      00:00:                                              



               00                                                

 

     Myrbetriq      2022-0      No             1mg                      



     25 mg      1-19                                              



     tablet,exte      00:00:                                              



     nded      00                                                



     release                                                        

 

     Dose      2022-0      No                                      



     Unknown      -19                                              



               00:00:                                              



               00                                                

 

     metformin      2022-0      No             1mg                      



     1,000 mg      1-19                                              



     tablet      00:00:                                              



               00                                                

 

     metformin      2022-0      No             1mg                      



     500 mg      1-19                                              



     tablet      00:00:                                              



               00                                                

 

     metoprolol      2022-0      No             1mg                      



     tartrate 25      1-19                                              



     mg tablet      00:00:                                              



               00                                                

 

     metoclopram      2022-0      No             1mg                      



     fabio 10 mg      1-19                                              



     tablet      00:00:                                              



               00                                                

 

     azithromyci      2022-0      No             mg                       



     n 250 mg      1-19                                              



     tablet      00:00:                                              



               00                                                

 

     Dose      2-0      No                                      



     Unknown      -19                                              



               00:00:                                              



               00                                                

 

     Dose      2022-0      No                                      



     Unknown                                                    



               00:00:                                              



               00                                                

 

     Dose      2-0      No                                      



     Unknown                                                    



               00:00:                                              



               00                                                

 

     levothyroxi      2-0      No             1mcg                     



     ne 150 mcg      -19                                              



     capsule      00:00:                                              



               00                                                

 

     neomycin-po      2-0      No             4mg/mL-                     



     lymyxin-hyd      1-19                     unit/mL                     



     rocort 3.5      00:00:                     -%                       



     mg-10,000      00                                                



     unit/mL-1 %                                                        



     ear                                                         



     drops,susp                                                        

 

     Humulin      2-0      No             1unit/m                     



     70/30 U-100      1-19                     L                        



     Insulin 100      00:00:                     (70-30)                     



     unit/mL      00                                                



     subcutaneou                                                        



     s                                                           



     suspension                                                        

 

     lisinopril      2-0      No             1mg                      



     5 mg tablet      19                                              



               00:00:                                              



               00                                                

 

     hydroxyzine      2-0      No             1mg                      



     HCl 50 mg      1-19                                              



     tablet      00:00:                                              



               00                                                

 

     Myrbetriq      2022-0      No             1mg                      



     25 mg      1-19                                              



     tablet,exte      00:00:                                              



     nded      00                                                



     release                                                        

 

     Dose      2-0      No                                      



     Unknown      -19                                              



               00:00:                                              



               00                                                

 

     metformin      2022-0      No             1mg                      



     1,000 mg      1-19                                              



     tablet      00:00:                                              



               00                                                

 

     metformin      2022-0      No             1mg                      



     500 mg      1-19                                              



     tablet      00:00:                                              



               00                                                

 

     metoprolol      2022-0      No             1mg                      



     tartrate 25      1-19                                              



     mg tablet      00:00:                                              



               00                                                

 

     metoclopram      2022-0      No             1mg                      



     fabio 10 mg      1-19                                              



     tablet      00:00:                                              



               00                                                

 

     azithromyci      2022-0      No             mg                       



     n 250 mg      1-19                                              



     tablet      00:00:                                              



               00                                                

 

     Dose      2022-0      No                                      



     Unknown      1-19                                              



               00:00:                                              



               00                                                

 

     Dose      2022-0      No                                      



     Unknown      -19                                              



               00:00:                                              



               00                                                

 

     Dose      2022-0      No                                      



     Unknown      1-19                                              



               00:00:                                              



               00                                                

 

     levothyroxi      2022-0      No             1mcg                     



     ne 150 mcg      1-19                                              



     capsule      00:00:                                              



               00                                                

 

     neomycin-po      2022-0      No             4mg/mL-                     



     lymyxin-hyd      1-19                     unit/mL                     



     rocort 3.5      00:00:                     -%                       



     mg-10,000      00                                                



     unit/mL-1 %                                                        



     ear                                                         



     drops,susp                                                        

 

     Humulin      2-0      No             1unit/m                     



     70/30 U-100      1-19                     L                        



     Insulin 100      00:00:                     (70-30)                     



     unit/mL      00                                                



     subcutaneou                                                        



     s                                                           



     suspension                                                        

 

     lisinopril      2-0      No             1mg                      



     5 mg tablet                                                    



               00:00:                                              



               00                                                

 

     hydroxyzine      2022-0      No             1mg                      



     HCl 50 mg      1-19                                              



     tablet      00:00:                                              



               00                                                

 

     Myrbetriq      2022-0      No             1mg                      



     25 mg      1-19                                              



     tablet,exte      00:00:                                              



     nded      00                                                



     release                                                        

 

     Dose      2-0      No                                      



     Unknown                                                    



               00:00:                                              



               00                                                

 

     metformin      2022-0      No             1mg                      



     1,000 mg      1-19                                              



     tablet      00:00:                                              



               00                                                

 

     metformin      2022-0      No             1mg                      



     500 mg      1-19                                              



     tablet      00:00:                                              



               00                                                

 

     metoprolol      2022-0      No             1mg                      



     tartrate 25      1-19                                              



     mg tablet      00:00:                                              



               00                                                

 

     metoclopram      2022-0      No             1mg                      



     fabio 10 mg      1-19                                              



     tablet      00:00:                                              



               00                                                

 

     azithromyci      2022-0      No             mg                       



     n 250 mg      1-19                                              



     tablet      00:00:                                              



               00                                                

 

     neomycin-po      2022-0      No             4mg/mL-                     



     lymyxin-hyd      1-19                     unit/mL                     



     rocort 3.5      00:00:                     -%                       



     mg-10,000      00                                                



     unit/mL-1 %                                                        



     ear                                                         



     drops,susp                                                        

 

     Humulin      2022-0      No             1unit/m                     



     70/30 U-100      1-19                     L                        



     Insulin 100      00:00:                     (70-30)                     



     unit/mL      00                                                



     subcutaneou                                                        



     s                                                           



     suspension                                                        

 

     lisinopril      2-0      No             1mg                      



     5 mg tablet      1-19                                              



               00:00:                                              



               00                                                

 

     Dose      2022-0      No                                      



     Unknown      1-19                                              



               00:00:                                              



               00                                                

 

     hydroxyzine      2022-0      No             1mg                      



     HCl 50 mg      1-19                                              



     tablet      00:00:                                              



               00                                                

 

     Myrbetriq      2022-0      No             1mg                      



     25 mg      1-19                                              



     tablet,exte      00:00:                                              



     nded      00                                                



     release                                                        

 

     Dose      2022-0      No                                      



     Unknown      1-19                                              



               00:00:                                              



               00                                                

 

     metformin      2022-0      No             1mg                      



     1,000 mg      1-19                                              



     tablet      00:00:                                              



               00                                                

 

     metformin      2022-0      No             1mg                      



     500 mg      1-19                                              



     tablet      00:00:                                              



               00                                                

 

     metoprolol      2022-0      No             1mg                      



     tartrate 25      1-19                                              



     mg tablet      00:00:                                              



               00                                                

 

     metoclopram      2022-0      No             1mg                      



     fabio 10 mg      -19                                              



     tablet      00:00:                                              



               00                                                

 

     azithromyci      2022-0      No             mg                       



     n 250 mg      -19                                              



     tablet      00:00:                                              



               00                                                

 

     Dose      2022-0      No                                      



     Unknown      1-19                                              



               00:00:                                              



               00                                                

 

     Dose      2022-0      No                                      



     Unknown      1-19                                              



               00:00:                                              



               00                                                

 

     Dose      2022-0      No                                      



     Unknown      1-19                                              



               00:00:                                              



               00                                                

 

     Dose      2022-0      No                                      



     Unknown      1-19                                              



               00:00:                                              



               00                                                

 

     levothyroxi      2022-0      No             1mcg                     



     ne 150 mcg      -19                                              



     capsule      00:00:                                              



               00                                                

 

     Dose      2022-0      No                                      



     Unknown      -19                                              



               00:00:                                              



               00                                                

 

     lisinopril      2022-0      No             1mg                      



     5 mg tablet                                                    



               00:00:                                              



               00                                                

 

     lisinopril      2022-0      No             1mg                      



     5 mg tablet                                                    



               00:00:                                              



               00                                                

 

     lisinopril      2022-0      No             1mg                      



     5 mg tablet                                                    



               00:00:                                              



               00                                                

 

     lisinopril      2022-0      No             1mg                      



     5 mg tablet                                                    



               00:00:                                              



               00                                                

 

     Humulin      2022-0      No             1unit/m                     



     70/30 U-100      1-03                     L                        



     Insulin 100      00:00:                     (70-30)                     



     unit/mL      00                                                



     subcutaneou                                                        



     s                                                           



     suspension                                                        

 

     hydroxyzine      2022-0      No             1mg                      



     HCl 50 mg      1-03                                              



     tablet      00:00:                                              



               00                                                

 

     hydroxyzine      2022-0      No             1mg                      



     HCl 50 mg      1-03                                              



     tablet      00:00:                                              



               00                                                

 

     metformin      2022-0      No             1mg                      



     1,000 mg      1-03                                              



     tablet      00:00:                                              



               00                                                

 

     Humulin      2022-0      No             1unit/m                     



     70/30 U-100      1-03                     L                        



     Insulin 100      00:00:                     (70-30)                     



     unit/mL      00                                                



     subcutaneou                                                        



     s                                                           



     suspension                                                        

 

     hydroxyzine      2-0      No             1mg                      



     HCl 50 mg      1-03                                              



     tablet      00:00:                                              



               00                                                

 

     hydroxyzine      2022-0      No             1mg                      



     HCl 50 mg      1-03                                              



     tablet      00:00:                                              



               00                                                

 

     metformin      2022-0      No             1mg                      



     1,000 mg      1-03                                              



     tablet      00:00:                                              



               00                                                

 

     Humulin      2022-0      No             1unit/m                     



     70/30 U-100      1-03                     L                        



     Insulin 100      00:00:                     (70-30)                     



     unit/mL      00                                                



     subcutaneou                                                        



     s                                                           



     suspension                                                        

 

     hydroxyzine      2-0      No             1mg                      



     HCl 50 mg      1-03                                              



     tablet      00:00:                                              



               00                                                

 

     hydroxyzine      2-0      No             1mg                      



     HCl 50 mg      1-03                                              



     tablet      00:00:                                              



               00                                                

 

     metformin      2-0      No             1mg                      



     1,000 mg      1-03                                              



     tablet      00:00:                                              



               00                                                

 

     Humulin      2022-0      No             1unit/m                     



     70/30 U-100      1-03                     L                        



     Insulin 100      00:00:                     (70-30)                     



     unit/mL      00                                                



     subcutaneou                                                        



     s                                                           



     suspension                                                        

 

     hydroxyzine      2-0      No             1mg                      



     HCl 50 mg      1-03                                              



     tablet      00:00:                                              



               00                                                

 

     hydroxyzine      2-0      No             1mg                      



     HCl 50 mg      1-03                                              



     tablet      00:00:                                              



               00                                                

 

     metformin      2-0      No             1mg                      



     1,000 mg      1-03                                              



     tablet      00:00:                                              



               00                                                

 

     Zofran 4 mg      1-1      No             1mg                      



     tablet      2-16                                              



               00:00:                                              



               00                                                

 

     levothyroxi      -1      No             1mcg                     



     ne 150 mcg      2-16                                              



     capsule      00:00:                                              



               00                                                

 

     Zofran 4 mg      1-1      No             1mg                      



     tablet      2-16                                              



               00:00:                                              



               00                                                

 

     levothyroxi      1-1      No             1mcg                     



     ne 150 mcg      2-16                                              



     capsule      00:00:                                              



               00                                                

 

     Zofran 4 mg      1-1      No             1mg                      



     tablet      2-16                                              



               00:00:                                              



               00                                                

 

     levothyroxi      1-1      No             1mcg                     



     ne 150 mcg      2-16                                              



     capsule      00:00:                                              



               00                                                

 

     Zofran 4 mg      -1      No             1mg                      



     tablet      2-16                                              



               00:00:                                              



               00                                                

 

     levothyroxi      -1      No             1mcg                     



     ne 150 mcg      2-16                                              



     capsule      00:00:                                              



               00                                                

 

     metformin      1-1      No             1mg                      



     500 mg      1-27                                              



     tablet      00:00:                                              



               00                                                

 

     metformin      -1      No             1mg                      



     500 mg      1-27                                              



     tablet      00:00:                                              



               00                                                

 

     metformin      -1      No             1mg                      



     500 mg      1-27                                              



     tablet      00:00:                                              



               00                                                

 

     metformin      -1      No             1mg                      



     500 mg      1-27                                              



     tablet      00:00:                                              



               00                                                

 

     metoprolol      -1      No             1mg                      



     tartrate 25      1-22                                              



     mg tablet      00:00:                                              



               00                                                

 

     metoprolol      -1      No             1mg                      



     tartrate 25      1-22                                              



     mg tablet      00:00:                                              



               00                                                

 

     metoprolol      -1      No             1mg                      



     tartrate 25      1-22                                              



     mg tablet      00:00:                                              



               00                                                

 

     metoprolol      -1      No             1mg                      



     tartrate 25      1-22                                              



     mg tablet      00:00:                                              



               00                                                

 

     Myrbetriq      -1      No             1mg                      



     25 mg      1-16                                              



     tablet,exte      00:00:                                              



     nded      00                                                



     release                                                        

 

     metoclopram      -1      No             1mg                      



     fabio 10 mg      1-16                                              



     tablet      00:00:                                              



               00                                                

 

     Myrbetriq      -1      No             1mg                      



     25 mg      1-16                                              



     tablet,exte      00:00:                                              



     nded      00                                                



     release                                                        

 

     metoclopram      -1      No             1mg                      



     fabio 10 mg      1-16                                              



     tablet      00:00:                                              



               00                                                

 

     Myrbetriq      -1      No             1mg                      



     25 mg      1-16                                              



     tablet,exte      00:00:                                              



     nded      00                                                



     release                                                        

 

     metoclopram      -1      No             1mg                      



     fabio 10 mg      1-16                                              



     tablet      00:00:                                              



               00                                                

 

     Myrbetriq      -1      No             1mg                      



     25 mg      1-16                                              



     tablet,exte      00:00:                                              



     nded      00                                                



     release                                                        

 

     metoclopram      -1      No             1mg                      



     fabio 10 mg      1-16                                              



     tablet      00:00:                                              



               00                                                

 

     atorvastati      -1      No             1mg                      



     n 10 mg      1-14                                              



     tablet      00:00:                                              



               00                                                

 

     omeprazole      -1      No             1mg                      



     40 mg      1-14                                              



     capsule,del      00:00:                                              



     ayed      00                                                



     release                                                        

 

     atorvastati      -1      No             1mg                      



     n 10 mg      1-14                                              



     tablet      00:00:                                              



               00                                                

 

     omeprazole      -1      No             1mg                      



     40 mg      1-14                                              



     capsule,del      00:00:                                              



     ayed      00                                                



     release                                                        

 

     atorvastati      -1      No             1mg                      



     n 10 mg      1-14                                              



     tablet      00:00:                                              



               00                                                

 

     omeprazole      -1      No             1mg                      



     40 mg      1-14                                              



     capsule,del      00:00:                                              



     ayed      00                                                



     release                                                        

 

     atorvastati      2021      No             1mg                      



     n 10 mg      1-14                                              



     tablet      00:00:                                              



               00                                                

 

     omeprazole      2021      No             1mg                      



     40 mg      1-14                                              



     capsule,del      00:00:                                              



     ayed      00                                                



     release                                                        

 

     levothyroxi      2021      No             1mcg                     



     ne 150 mcg      1-04                                              



     capsule      00:00:                                              



               00                                                

 

     levothyroxi      2021      No             1mcg                     



     ne 150 mcg      1-04                                              



     capsule      00:00:                                              



               00                                                

 

     levothyroxi      2021      No             1mcg                     



     ne 150 mcg      1-04                                              



     capsule      00:00:                                              



               00                                                

 

     levothyroxi      2021      No             1mcg                     



     ne 150 mcg      1-04                                              



     capsule      00:00:                                              



               00                                                

 

     ferrous      2021      No             1(65 mg                     



     sulfate 325      0-21                     iron)                     



     mg (65 mg      00:00:                                              



     iron)      00                                                



     tablet                                                        

 

     ferrous      2021      No             1(65 mg                     



     sulfate 325      0-21                     iron)                     



     mg (65 mg      00:00:                                              



     iron)      00                                                



     tablet                                                        

 

     ferrous      2021      No             1(65 mg                     



     sulfate 325      0-21                     iron)                     



     mg (65 mg      00:00:                                              



     iron)      00                                                



     tablet                                                        

 

     ferrous      2021      No             1(65 mg                     



     sulfate 325      0-21                     iron)                     



     mg (65 mg      00:00:                                              



     iron)      00                                                



     tablet                                                        

 

     Myrbetriq      2021      No             1mg                      



     25 mg      0-13                                              



     tablet,exte      00:00:                                              



     nded      00                                                



     release                                                        

 

     levothyroxi      2021      No             1mcg                     



     ne 300 mcg      0-13                                              



     tablet      00:00:                                              



               00                                                

 

     Myrbetriq      2021      No             1mg                      



     25 mg      0-13                                              



     tablet,exte      00:00:                                              



     nded      00                                                



     release                                                        

 

     levothyroxi      2021      No             1mcg                     



     ne 300 mcg      0-13                                              



     tablet      00:00:                                              



               00                                                

 

     Myrbetriq      2021      No             1mg                      



     25 mg      0-13                                              



     tablet,exte      00:00:                                              



     nded      00                                                



     release                                                        

 

     levothyroxi      2021      No             1mcg                     



     ne 300 mcg      0-13                                              



     tablet      00:00:                                              



               00                                                

 

     Myrbetriq      2021      No             1mg                      



     25 mg      0-13                                              



     tablet,exte      00:00:                                              



     nded      00                                                



     release                                                        

 

     levothyroxi      2021      No             1mcg                     



     ne 300 mcg      0-13                                              



     tablet      00:00:                                              



               00                                                

 

     nitrofurant      2021      No             1mg                      



     oin       0-12                                              



     macrocrysta      00:00:                                              



     l 100 mg      00                                                



     capsule                                                        

 

     nitrofurant      2021-1      No             1mg                      



     oin       0-12                                              



     macrocrysta      00:00:                                              



     l 100 mg      00                                                



     capsule                                                        

 

     nitrofurant      1-1      No             1mg                      



     oin       0-12                                              



     macrocrysta      00:00:                                              



     l 100 mg      00                                                



     capsule                                                        

 

     nitrofurant      1-1      No             1mg                      



     oin       0-12                                              



     macrocrysta      00:00:                                              



     l 100 mg      00                                                



     capsule                                                        

 

     metoclopram      1-0      No             1mg                      



     fabio 10 mg      9-13                                              



     tablet      00:00:                                              



               00                                                

 

     metoclopram      2021-0      No             1mg                      



     fabio 10 mg      9-13                                              



     tablet      00:00:                                              



               00                                                

 

     metoclopram      2021-0      No             1mg                      



     fabio 10 mg      9-13                                              



     tablet      00:00:                                              



               00                                                

 

     metoclopram      2021-0      No             1mg                      



     fabio 10 mg      9-13                                              



     tablet      00:00:                                              



               00                                                

 

     Myrbetriq      2021-0      No             1mg                      



     25 mg      9-01                                              



     tablet,exte      00:00:                                              



     nded      00                                                



     release                                                        

 

     Myrbetriq      2021-0      No             1mg                      



     25 mg      9-01                                              



     tablet,exte      00:00:                                              



     nded      00                                                



     release                                                        

 

     Myrbetriq      2021-0      No             1mg                      



     25 mg      9-01                                              



     tablet,exte      00:00:                                              



     nded      00                                                



     release                                                        

 

     Myrbetriq      2021-0      No             1mg                      



     25 mg      9-01                                              



     tablet,exte      00:00:                                              



     nded      00                                                



     release                                                        

 

     metoclopram      1-0      No             1mg                      



     fabio 10 mg      8-13                                              



     tablet      00:00:                                              



               00                                                

 

     metoclopram      2021-0      No             1mg                      



     fabio 10 mg      8-13                                              



     tablet      00:00:                                              



               00                                                

 

     metoclopram      2021-0      No             1mg                      



     fabio 10 mg      8-13                                              



     tablet      00:00:                                              



               00                                                

 

     metoclopram      2021-0      No             1mg                      



     fabio 10 mg      8-13                                              



     tablet      00:00:                                              



               00                                                

 

     metoclopram      2021-0      No             1mg                      



     fabio 10 mg      7-28                                              



     tablet      00:00:                                              



               00                                                

 

     metoclopram      2021-0      No             1mg                      



     fabio 10 mg      7-28                                              



     tablet      00:00:                                              



               00                                                

 

     metoclopram      2021-0      No             1mg                      



     fabio 10 mg      7-28                                              



     tablet      00:00:                                              



               00                                                

 

     metoclopram      2021-0      No             1mg                      



     fabio 10 mg      7-28                                              



     tablet      00:00:                                              



               00                                                

 

     Humulin      2021-0      No             1unit/m                     



     70/30 U-100      7-22                     L                        



     Insulin 100      00:00:                     (70-30)                     



     unit/mL      00                                                



     subcutaneou                                                        



     s                                                           



     suspension                                                        

 

     atorvastati      2021-0      No             1mg                      



     n 10 mg      7-22                                              



     tablet      00:00:                                              



               00                                                

 

     Myrbetriq      2021-0      No             1mg                      



     25 mg      7-22                                              



     tablet,exte      00:00:                                              



     nded      00                                                



     release                                                        

 

     Trintellix      2021-0      No             1mg                      



     20 mg      7-22                                              



     tablet      00:00:                                              



               00                                                

 

     metformin      2021-0      No             1mg                      



     500 mg      7-22                                              



     tablet      00:00:                                              



               00                                                

 

     metoprolol      2021-0      No             1mg                      



     tartrate 25      7-22                                              



     mg tablet      00:00:                                              



               00                                                

 

     mirtazapine      2021-0      No             1mg                      



     15 mg      7-22                                              



     tablet      00:00:                                              



               00                                                

 

     levothyroxi      2021-0      No             1mcg                     



     ne 300 mcg      7-22                                              



     tablet      00:00:                                              



               00                                                

 

     omeprazole      2021-0      No             1mg                      



     40 mg      7-22                                              



     capsule,del      00:00:                                              



     ayed      00                                                



     release                                                        

 

     Humulin      2021-0      No             1unit/m                     



     70/30 U-100      7-22                     L                        



     Insulin 100      00:00:                     (70-30)                     



     unit/mL      00                                                



     subcutaneou                                                        



     s                                                           



     suspension                                                        

 

     atorvastati      1-0      No             1mg                      



     n 10 mg      7-22                                              



     tablet      00:00:                                              



               00                                                

 

     Myrbetriq      2021-0      No             1mg                      



     25 mg      7-22                                              



     tablet,exte      00:00:                                              



     nded      00                                                



     release                                                        

 

     Trintellix      2021-0      No             1mg                      



     20 mg      7-22                                              



     tablet      00:00:                                              



               00                                                

 

     metformin      2021-0      No             1mg                      



     500 mg      7-22                                              



     tablet      00:00:                                              



               00                                                

 

     metoprolol      2021-0      No             1mg                      



     tartrate 25      7-22                                              



     mg tablet      00:00:                                              



               00                                                

 

     mirtazapine      2021-0      No             1mg                      



     15 mg      7-22                                              



     tablet      00:00:                                              



               00                                                

 

     levothyroxi      2021-0      No             1mcg                     



     ne 300 mcg      7-22                                              



     tablet      00:00:                                              



               00                                                

 

     omeprazole      2021-0      No             1mg                      



     40 mg      7-22                                              



     capsule,del      00:00:                                              



     ayed      00                                                



     release                                                        

 

     Humulin      2021-0      No             1unit/m                     



     70/30 U-100      7-22                     L                        



     Insulin 100      00:00:                     (70-30)                     



     unit/mL      00                                                



     subcutaneou                                                        



     s                                                           



     suspension                                                        

 

     atorvastati      2021-0      No             1mg                      



     n 10 mg      7-22                                              



     tablet      00:00:                                              



               00                                                

 

     Myrbetriq      2021-0      No             1mg                      



     25 mg      7-22                                              



     tablet,exte      00:00:                                              



     nded      00                                                



     release                                                        

 

     Trintellix      2021-0      No             1mg                      



     20 mg      7-22                                              



     tablet      00:00:                                              



               00                                                

 

     metformin      2021-0      No             1mg                      



     500 mg      7-22                                              



     tablet      00:00:                                              



               00                                                

 

     metoprolol      2021-0      No             1mg                      



     tartrate 25      7-22                                              



     mg tablet      00:00:                                              



               00                                                

 

     mirtazapine      2021-0      No             1mg                      



     15 mg      7-22                                              



     tablet      00:00:                                              



               00                                                

 

     levothyroxi      2021-0      No             1mcg                     



     ne 300 mcg      7-22                                              



     tablet      00:00:                                              



               00                                                

 

     omeprazole      2021-0      No             1mg                      



     40 mg      7-22                                              



     capsule,del      00:00:                                              



     ayed      00                                                



     release                                                        

 

     Humulin      1-0      No             1unit/m                     



     70/30 U-100      7-22                     L                        



     Insulin 100      00:00:                     (70-30)                     



     unit/mL      00                                                



     subcutaneou                                                        



     s                                                           



     suspension                                                        

 

     atorvastati      1-0      No             1mg                      



     n 10 mg      7-22                                              



     tablet      00:00:                                              



               00                                                

 

     Myrbetriq      2021-0      No             1mg                      



     25 mg      7-22                                              



     tablet,exte      00:00:                                              



     nded      00                                                



     release                                                        

 

     Trintellix      2021-0      No             1mg                      



     20 mg      7-22                                              



     tablet      00:00:                                              



               00                                                

 

     metformin      2021-0      No             1mg                      



     500 mg      7-22                                              



     tablet      00:00:                                              



               00                                                

 

     metoprolol      2021-0      No             1mg                      



     tartrate 25      7-22                                              



     mg tablet      00:00:                                              



               00                                                

 

     mirtazapine      2021-0      No             1mg                      



     15 mg      7-22                                              



     tablet      00:00:                                              



               00                                                

 

     levothyroxi      2021-0      No             1mcg                     



     ne 300 mcg      7-22                                              



     tablet      00:00:                                              



               00                                                

 

     omeprazole      2021-0      No             1mg                      



     40 mg      7-22                                              



     capsule,del      00:00:                                              



     ayed      00                                                



     release                                                        

 

     alprazolam      2021-0      No             1mg                      



     0.5 mg      7-13                                              



     tablet      00:00:                                              



               00                                                

 

     metoprolol      2021-0      No             1mg                      



     tartrate 25      7-13                                              



     mg tablet      00:00:                                              



               00                                                

 

     metoclopram      2021-0      No             1mg                      



     fabio 10 mg      7-13                                              



     tablet      00:00:                                              



               00                                                

 

     mirtazapine      2021-0      No             1mg                      



     15 mg      7-13                                              



     tablet      00:00:                                              



               00                                                

 

     levothyroxi      2021-0      No             1mcg                     



     ne 300 mcg      7-13                                              



     tablet      00:00:                                              



               00                                                

 

     omeprazole      2021-0      No             1mg                      



     40 mg      7-13                                              



     capsule,del      00:00:                                              



     ayed      00                                                



     release                                                        

 

     Vitamin D2      1-0      No             1(50,00                     



     1,250 mcg      7-13                     0 unit)                     



     (50,000      00:00:                                              



     unit)      00                                                



     capsule                                                        

 

     Humulin      2021-0      No             1unit/m                     



     70/30 U-100      7-13                     L                        



     Insulin 100      00:00:                     (70-30)                     



     unit/mL      00                                                



     subcutaneou                                                        



     s                                                           



     suspension                                                        

 

     Trintellix      2021-0      No             1mg                      



     20 mg      7-13                                              



     tablet      00:00:                                              



               00                                                

 

     Myrbetriq      2021-0      No             1mg                      



     25 mg      7-13                                              



     tablet,exte      00:00:                                              



     nded      00                                                



     release                                                        

 

     atorvastati      2021-0      No             1mg                      



     n 10 mg      7-13                                              



     tablet      00:00:                                              



               00                                                

 

     metformin      2021-0      No             1mg                      



     500 mg      7-13                                              



     tablet      00:00:                                              



               00                                                

 

     alprazolam      2021-0      No             1mg                      



     0.5 mg      7-13                                              



     tablet      00:00:                                              



               00                                                

 

     metoprolol      2021-0      No             1mg                      



     tartrate 25      7-13                                              



     mg tablet      00:00:                                              



               00                                                

 

     metoclopram      2021-0      No             1mg                      



     fabio 10 mg      7-13                                              



     tablet      00:00:                                              



               00                                                

 

     mirtazapine      2021-0      No             1mg                      



     15 mg      7-13                                              



     tablet      00:00:                                              



               00                                                

 

     levothyroxi      1-0      No             1mcg                     



     ne 300 mcg      7-13                                              



     tablet      00:00:                                              



               00                                                

 

     omeprazole      1-0      No             1mg                      



     40 mg      7-13                                              



     capsule,del      00:00:                                              



     ayed      00                                                



     release                                                        

 

     Vitamin D2      1-0      No             1(50,00                     



     1,250 mcg      7-13                     0 unit)                     



     (50,000      00:00:                                              



     unit)      00                                                



     capsule                                                        

 

     Humulin      2021-0      No             1unit/m                     



     70/30 U-100      7-13                     L                        



     Insulin 100      00:00:                     (70-30)                     



     unit/mL      00                                                



     subcutaneou                                                        



     s                                                           



     suspension                                                        

 

     Humulin      2021-0      No             1unit/m                     



     70/30 U-100      7-13                     L                        



     Insulin 100      00:00:                     (70-30)                     



     unit/mL      00                                                



     subcutaneou                                                        



     s                                                           



     suspension                                                        

 

     Trintellix      2021-0      No             1mg                      



     20 mg      7-13                                              



     tablet      00:00:                                              



               00                                                

 

     Myrbetriq      2021-0      No             1mg                      



     25 mg      7-13                                              



     tablet,exte      00:00:                                              



     nded      00                                                



     release                                                        

 

     atorvastati      2021-0      No             1mg                      



     n 10 mg      7-13                                              



     tablet      00:00:                                              



               00                                                

 

     metformin      2021-0      No             1mg                      



     500 mg      7-13                                              



     tablet      00:00:                                              



               00                                                

 

     alprazolam      2021-0      No             1mg                      



     0.5 mg      7-13                                              



     tablet      00:00:                                              



               00                                                

 

     metoprolol      2021-0      No             1mg                      



     tartrate 25      7-13                                              



     mg tablet      00:00:                                              



               00                                                

 

     metoclopram      2021-0      No             1mg                      



     fabio 10 mg      7-13                                              



     tablet      00:00:                                              



               00                                                

 

     mirtazapine      2021-0      No             1mg                      



     15 mg      7-13                                              



     tablet      00:00:                                              



               00                                                

 

     levothyroxi      1-0      No             1mcg                     



     ne 300 mcg      7-13                                              



     tablet      00:00:                                              



               00                                                

 

     omeprazole      1-0      No             1mg                      



     40 mg      7-13                                              



     capsule,del      00:00:                                              



     ayed      00                                                



     release                                                        

 

     Vitamin D2      1-0      No             1(50,00                     



     1,250 mcg      7-13                     0 unit)                     



     (50,000      00:00:                                              



     unit)      00                                                



     capsule                                                        

 

     Trintellix      1-0      No             1mg                      



     20 mg      7-13                                              



     tablet      00:00:                                              



               00                                                

 

     Myrbetriq      2021-0      No             1mg                      



     25 mg      7-13                                              



     tablet,exte      00:00:                                              



     nded      00                                                



     release                                                        

 

     atorvastati      1-0      No             1mg                      



     n 10 mg      7-13                                              



     tablet      00:00:                                              



               00                                                

 

     Humulin      1-0      No             1unit/m                     



     70/30 U-100      7-13                     L                        



     Insulin 100      00:00:                     (70-30)                     



     unit/mL      00                                                



     subcutaneou                                                        



     s                                                           



     suspension                                                        

 

     Trintellix      2021-0      No             1mg                      



     20 mg      7-13                                              



     tablet      00:00:                                              



               00                                                

 

     Myrbetriq      2021-0      No             1mg                      



     25 mg      7-13                                              



     tablet,exte      00:00:                                              



     nded      00                                                



     release                                                        

 

     atorvastati      1-0      No             1mg                      



     n 10 mg      7-13                                              



     tablet      00:00:                                              



               00                                                

 

     metformin      2021-0      No             1mg                      



     500 mg      7-13                                              



     tablet      00:00:                                              



               00                                                

 

     alprazolam      2021-0      No             1mg                      



     0.5 mg      7-13                                              



     tablet      00:00:                                              



               00                                                

 

     metoprolol      2021-0      No             1mg                      



     tartrate 25      7-13                                              



     mg tablet      00:00:                                              



               00                                                

 

     metoclopram      2021-0      No             1mg                      



     fabio 10 mg      7-13                                              



     tablet      00:00:                                              



               00                                                

 

     mirtazapine      2021-0      No             1mg                      



     15 mg      7-13                                              



     tablet      00:00:                                              



               00                                                

 

     levothyroxi      -0      No             1mcg                     



     ne 300 mcg      7-13                                              



     tablet      00:00:                                              



               00                                                

 

     omeprazole      -0      No             1mg                      



     40 mg      7-13                                              



     capsule,del      00:00:                                              



     ayed      00                                                



     release                                                        

 

     Vitamin D2      -0      No             1(50,00                     



     1,250 mcg      7-13                     0 unit)                     



     (50,000      00:00:                                              



     unit)      00                                                



     capsule                                                        

 

     metformin      -0      No             1mg                      



     500 mg      7-13                                              



     tablet      00:00:                                              



               00                                                







Immunizations







           Ordered Immunization Filled Immunization Date       Status     Commen

ts   Source



           Name       Name                                        

 

           Moderna COVIDVladimir            2021 Completed             



           Vaccine               00:00:00                         

 

           Moderna COVID-Jam            2021 Completed             



           Vaccine               00:00:00                         

 

           Moderna COVID-19            2021 Completed             



           Vaccine               00:00:00                         

 

           Moderna COVID-Jam            2021 Completed             



           Vaccine               00:00:00                         







Vital Signs







             Vital Name   Observation Time Observation Value Comments     Source

 

             Systolic blood 2022 04:49:00 147 mm[Hg]                [facility]

[facility]

 

             Diastolic blood 2022 04:49:00 74 mm[facility]Hg]                 [facility]

 

             Heart rate   2022 04:49:00 97 /min                   [facility]

 

             Body temperature 2022 04:49:00 37 Maricarmne                    [facility]

 

             Respiratory rate 2022 04:49:00 18 /min                   [facility]

 

             Body weight  2022 04:49:00 88.905 kg                 [facility]

 

             BMI          2022 04:49:00 35.85 kg/m2               [facility]

 

             Oxygen saturation in 2022 04:49:00 100 /min                  

[facility]



             Arterial blood by                                        [place] [facility]

marisa



             Pulse oximetry                                        [facility]

 

             Systolic blood 2022 08:10:00 123 mm[Hg]                Univer

sity of



             [facility]

 

             Diastolic blood 2022 08:10:00 87 mm[Hg]                 Unive

rsity [facility]

 

             Heart rate   2022 08:10:00 82 /min                   [facility]

 

             Respiratory rate 2022 08:10:00 18 /min                   [facility]

 

             Oxygen saturation in 2022 08:10:00 98 /min                   

[facility] 



             Arterial blood by                                        [facility]

marisa



             Pulse oximetry                                        [facility]anch

 

             Body temperature 2022 04:15:00 36.83 Maricarmen                 [facility]

 

             Body height  2022 04:15:00 157.5 cm                  [facility]

 

             Body weight  2022 04:15:00 88.905 kg                 [facility]

 

             BMI          2022 04:15:00 35.85 kg/m2               [facility]

 

             BP Systolic  2022-10-11 10:36:00 122 mm[Hg]                

 

             BP Diastolic 2022-10-11 10:36:00 78 mm[Hg]                 

 

             Weight Measured 2022-10-11 10:36:00 202.40 pounds              

 

             Height Measured 2022-10-11 10:36:00 61.81 inches              

 

             Body Temperature 2022-10-11 10:36:00 97.80 degrees              

 

             Heart Rate   2022-10-11 10:36:00 86.00 /min                

 

             Respiratory Rate 2022-10-11 10:36:00 17.00 /min                

 

             BP Systolic  2022 13:09:00 142 mm[Hg]                

 

             BP Diastolic 2022 13:09:00 81 mm[Hg]                 

 

             Weight Measured 2022 13:09:00 195.40 pounds              

 

             Height Measured 2022 13:09:00 61.81 inches              

 

             Body Temperature 2022 13:09:00 97.80 degrees              

 

             Heart Rate   2022 13:09:00 91.00 /min                

 

             Respiratory Rate 2022 13:09:00                           

 

             BP Systolic  2022 11:31:00 110 mm[Hg]                

 

             BP Diastolic 2022 11:31:00 49 mm[Hg]                 

 

             Weight Measured 2022 11:31:00 195.80 pounds              

 

             Height Measured 2022 11:31:00 61.81 inches              

 

             Body Temperature 2022 11:31:00 97.80 degrees              

 

             Heart Rate   2022 11:31:00 95.00 /min                

 

             Respiratory Rate 2022 11:31:00 18.00 /min                

 

             BP Systolic  2022 16:36:00 129 mm[Hg]                

 

             BP Diastolic 2022 16:36:00 68 mm[Hg]                 

 

             Weight Measured 2022 16:36:00 196.00 pounds              

 

             Height Measured 2022 16:36:00 61.81 inches              

 

             Body Temperature 2022 16:36:00 97.60 degrees              

 

             Heart Rate   2022 16:36:00 108.00 /min               

 

             Respiratory Rate 2022 16:36:00 18.00 /min                

 

             BP Systolic  2022 11:46:00 115 mm[Hg]                

 

             BP Diastolic 2022 11:46:00 75 mm[Hg]                 

 

             Weight Measured 2022 11:46:00 190.40 pounds              

 

             Height Measured 2022 11:46:00 61.81 inches              

 

             Body Temperature 2022 11:46:00 98.10 degrees              

 

             Heart Rate   2022 11:46:00 89.00 /min                

 

             Respiratory Rate 2022 11:46:00                           

 

             BP Systolic  2022 11:22:00 123 mm[Hg]                

 

             BP Diastolic 2022 11:22:00 57 mm[Hg]                 

 

             Weight Measured 2022 11:22:00 194.60 pounds              

 

             Height Measured 2022 11:22:00 61.81 inches              

 

             Body Temperature 2022 11:22:00 97.50 degrees              

 

             Heart Rate   2022 11:22:00 85.00 /min                

 

             Respiratory Rate 2022 11:22:00 16.00 /min                

 

             BP Systolic  2022 15:21:00 148 mm[Hg]                

 

             BP Diastolic 2022 15:21:00 82 mm[Hg]                 

 

             Weight Measured 2022 15:21:00 192.60 pounds              

 

             Height Measured 2022 15:21:00 61.81 inches              

 

             Body Temperature 2022 15:21:00 98.50 degrees              

 

             Heart Rate   2022 15:21:00 100.00 /min               

 

             Respiratory Rate 2022 15:21:00 25.00 /min                

 

             BP Systolic  2021 16:45:00 135 mm[Hg]                

 

             BP Diastolic 2021 16:45:00 82 mm[Hg]                 

 

             Weight Measured 2021 16:45:00 206.80 pounds              

 

             Height Measured 2021 16:45:00                           

 

             Body Temperature 2021 16:45:00 98.20 degrees              

 

             Heart Rate   2021 16:45:00 86.00 /min                

 

             Respiratory Rate 2021 16:45:00 25.00 /min                

 

             BP Systolic  2021 11:18:00 136 mm[Hg]                

 

             BP Diastolic 2021 11:18:00 84 mm[Hg]                 

 

             Weight Measured 2021 11:18:00 201.60 pounds              

 

             Height Measured 2021 11:18:00 61.81 inches              

 

             Body Temperature 2021 11:18:00 98.20 degrees              

 

             Heart Rate   2021 11:18:00 87.00 /min                

 

             Respiratory Rate 2021 11:18:00 17.00 /min                

 

             BP Systolic  2021-10-19 14:54:00 122 mm[Hg]                

 

             BP Diastolic 2021-10-19 14:54:00 76 mm[Hg]                 

 

             Weight Measured 2021-10-19 14:54:00 211.80 pounds              

 

             Height Measured 2021-10-19 14:54:00 61.81 inches              

 

             Body Temperature 2021-10-19 14:54:00 98.30 degrees              

 

             Heart Rate   2021-10-19 14:54:00 80.00 /min                

 

             Respiratory Rate 2021-10-19 14:54:00 16.00 /min                

 

             BP Systolic  2021-10-12 10:09:00 114 mm[Hg]                

 

             BP Diastolic 2021-10-12 10:09:00 72 mm[Hg]                 

 

             Weight Measured 2021-10-12 10:09:00 212.00 pounds              

 

             Height Measured 2021-10-12 10:09:00 61.81 inches              

 

             Body Temperature 2021-10-12 10:09:00 98.40 degrees              

 

             Heart Rate   2021-10-12 10:09:00 98.00 /min                

 

             Respiratory Rate 2021-10-12 10:09:00                           

 

             BP Systolic  2021-10-12 10:08:00 114 mm[Hg]                

 

             BP Diastolic 2021-10-12 10:08:00 72 mm[Hg]                 

 

             Weight Measured 2021-10-12 10:08:00 212.00 pounds              

 

             Height Measured 2021-10-12 10:08:00 61.81 inches              

 

             Body Temperature 2021-10-12 10:08:00 98.40 degrees              

 

             Heart Rate   2021-10-12 10:08:00 98.00 /min                

 

             Respiratory Rate 2021-10-12 10:08:00                           

 

             BP Systolic  2021-10-09 11:33:00 119 mm[Hg]                

 

             BP Diastolic 2021-10-09 11:33:00 78 mm[Hg]                 

 

             Weight Measured 2021-10-09 11:33:00 210.60 pounds              

 

             Height Measured 2021-10-09 11:33:00 61.81 inches              

 

             Body Temperature 2021-10-09 11:33:00 98.30 degrees              

 

             Heart Rate   2021-10-09 11:33:00 92.00 /min                

 

             Respiratory Rate 2021-10-09 11:33:00                           







Procedures







                Procedure       Date / Time Performed Performing Clinician Sour

e

 

                NOTICE OF PRIVACY 2022 04:36:17 [facility]igned[facility] No [facility]

 

                CONSENT/REFUSAL FOR 2022 04:35:03 Doctor Unassigned, No [facility] 

[place]



                DIAGNOSIS AND                   Name            Medical Branch



                TREATMENT                                       

 

                XR HIPS 3 VW LEFT 2022 04:42:39 Rosie Jha [facility]

 

                NOTICE OF PRIVACY 2022 04:04:38 Doctor Unassigned, No [facility]                       [facility]            [facility]

 

                CONSENT/REFUSAL FOR 2022 04:04:16 Doctor Unassigned, No [facility]

[facility] 

[place]



                DIAGNOSIS AND                   Name            Medical Branch



                TREATMENT                                       







Plan of Care







             Planned Activity Planned Date Details      Comments     Source

 

             Goal                      Plan of Care Note [code = 55250-3]       

       

 

             Goal                      Plan of Care Note [code = 01387-5]       

       

 

             Goal                      Plan of Care Note [code = 09410-8]       

       

 

             Goal                      Plan of Care Note [code = 91326-0]       

       

 

             Goal                      Plan of Care Note [code = 82639-7]       

       

 

             Goal                      Plan of Care Note [code = 16402-3]       

       

 

             Goal                      Plan of Care Note [code = 53079-2]       

       

 

             Goal                      Plan of Care Note [code = 78279-3]       

       

 

             Goal                      Plan of Care Note [code = 48036-0]       

       

 

             Goal                      Plan of Care Note [code = 43172-9]       

       

 

             Goal                      Plan of Care Note [code = 16331-7]       

       

 

             Goal                      Plan of Care Note [code = 09723-6]       

       

 

             Goal                      Plan of Care Note [code = 93188-7]       

       

 

             Goal                      Plan of Care Note [code = 10987-4]       

       

 

             Goal                      Plan of Care Note [code = 33547-8]       

       

 

             Goal                      Plan of Care Note [code = 26323-7]       

       

 

             Goal                      Plan of Care Note [code = 44455-5]       

       

 

             Goal                      Plan of Care Note [code = 89837-5]       

       

 

             Goal                      Plan of Care Note [code = 89953-5]       

       

 

             Goal                      Plan of Care Note [code = 79964-9]       

       

 

             Goal                      Plan of Care Note [code = 85212-4]       

       

 

             Goal                      Plan of Care Note [code = 03227-5]       

       

 

             Goal                      Plan of Care Note [code = 45322-3]       

       

 

             Goal                      Plan of Care Note [code = 06269-0]       

       

 

             Goal                      Plan of Care Note [code = 92691-0]       

       

 

             Goal                      Plan of Care Note [code = 86898-0]       

       

 

             Goal                      Plan of Care Note [code = 40112-0]       

       

 

             Goal                      Plan of Care Note [code = 66490-8]       

       

 

             Goal                      Plan of Care Note [code = 32642-0]       

       

 

             Goal                      Plan of Care Note [code = 27569-4]       

       

 

             Goal                      Plan of Care Note [code = 98064-2]       

       

 

             Goal                      Plan of Care Note [code = 19848-1]       

       

 

             Goal                      Plan of Care Note [code = 35235-9]       

       

 

             Goal                      Plan of Care Note [code = 35230-1]       

       

 

             Goal                      Plan of Care Note [code = 20180-4]       

       

 

             Goal                      Plan of Care Note [code = 43525-7]       

       

 

             Goal                      Plan of Care Note [code = 85633-7]       

       

 

             Goal                      Plan of Care Note [code = 66859-5]       

       

 

             Goal                      Plan of Care Note [code = 18062-5]       

       

 

             Goal                      Plan of Care Note [code = 21891-0]       

       

 

             Goal                      Plan of Care Note [code = 03871-6]       

       

 

             Goal                      Plan of Care Note [code = 65793-9]       

       

 

             Goal                      Plan of Care Note [code = 19351-2]       

       

 

             Goal                      Plan of Care Note [code = 02746-9]       

       

 

             Goal                      Plan of Care Note [code = 43201-7]       

       

 

             Goal                      Plan of Care Note [code = 37376-2]       

       

 

             Goal                      Plan of Care Note [code = 85751-6]       

       

 

             Goal                      Plan of Care Note [code = 01809-1]       

       

 

             Goal                      Plan of Care Note [code = 97458-1]       

       

 

             Goal                      Plan of Care Note [code = 56344-7]       

       

 

             Goal                      Plan of Care Note [code = 62288-5]       

       

 

             Goal                      Plan of Care Note [code = 75034-3]       

       

 

             Goal                      Plan of Care Note [code = 20169-4]       

       

 

             Goal                      Plan of Care Note [code = 14545-9]       

       

 

             Goal                      Plan of Care Note [code = 03583-2]       

       

 

             Goal                      Plan of Care Note [code = 08511-8]       

       

 

             Goal                      Plan of Care Note [code = 77974-0]       

       

 

             Goal                      Plan of Care Note [code = 44919-5]       

       

 

             Goal                      Plan of Care Note [code = 60918-7]       

       

 

             Goal                      Plan of Care Note [code = 57706-1]       

       

 

             Goal                      Plan of Care Note [code = 33966-6]       

       

 

             Goal                      Plan of Care Note [code = 86240-7]       

       

 

             Goal                      Plan of Care Note [code = 80230-5]       

       

 

             Goal                      Plan of Care Note [code = 36405-1]       

       

 

             Goal                      Plan of Care Note [code = 12335-2]       

       

 

             Goal                      Plan of Care Note [code = 30852-2]       

       

 

             Goal                      Plan of Care Note [code = 75382-8]       

       

 

             Goal                      Plan of Care Note [code = 56597-8]       

       

 

             Goal                      Plan of Care Note [code = 74291-3]       

       

 

             Goal                      Plan of Care Note [code = 87676-3]       

       

 

             Goal                      Plan of Care Note [code = 30067-4]       

       

 

             Goal                      Plan of Care Note [code = 84554-3]       

       

 

             Goal                      Plan of Care Note [code = 03674-2]       

       

 

             Goal                      Plan of Care Note [code = 95518-0]       

       

 

             Goal                      Plan of Care Note [code = 09828-0]       

       

 

             Goal                      Plan of Care Note [code = 13789-6]       

       

 

             Goal                      Plan of Care Note [code = 45559-2]       

       

 

             Goal                      Plan of Care Note [code = 06742-6]       

       

 

             Goal                      Plan of Care Note [code = 62375-8]       

       

 

             Goal                      Plan of Care Note [code = 89439-7]       

       

 

             Goal                      Plan of Care Note [code = 40462-4]       

       

 

             Goal                      Plan of Care Note [code = 05722-1]       

       

 

             Goal                      Plan of Care Note [code = 78883-8]       

       

 

             Goal                      Plan of Care Note [code = 10609-0]       

       

 

             Goal                      Plan of Care Note [code = 44868-7]       

       

 

             Goal                      Plan of Care Note [code = 19432-0]       

       

 

             Goal                      Plan of Care Note [code = 78790-6]       

       

 

             Goal                      Plan of Care Note [code = 25598-4]       

       

 

             Goal                      Plan of Care Note [code = 67602-4]       

       

 

             Goal                      Plan of Care Note [code = 60874-2]       

       

 

             Goal                      Plan of Care Note [code = 86036-7]       

       

 

             Goal                      Plan of Care Note [code = 45436-5]       

       

 

             Goal                      Plan of Care Note [code = 44233-8]       

       

 

             Goal                      Plan of Care Note [code = 24631-1]       

       

 

             Goal                      Plan of Care Note [code = 74996-9]       

       

 

             Goal                      Plan of Care Note [code = 07366-4]       

       

 

             Goal                      Plan of Care Note [code = 09104-8]       

       

 

             Goal                      Plan of Care Note [code = 18320-1]       

       

 

             Goal                      Plan of Care Note [code = 56516-6]       

       

 

             Goal                      Plan of Care Note [code = 30207-7]       

       

 

             Goal                      Plan of Care Note [code = 46646-9]       

       

 

             Goal                      Plan of Care Note [code = 76747-7]       

       

 

             Goal                      Plan of Care Note [code = 91529-2]       

       

 

             Goal                      Plan of Care Note [code = 12286-6]       

       

 

             Goal                      Plan of Care Note [code = 30070-4]       

       

 

             Goal                      Plan of Care Note [code = 34438-3]       

       

 

             Goal                      Plan of Care Note [code = 86202-6]       

       

 

             Goal                      Plan of Care Note [code = 51139-0]       

       

 

             Goal                      Plan of Care Note [code = 97488-3]       

       

 

             Goal                      Plan of Care Note [code = 71952-1]       

       

 

             Goal                      Plan of Care Note [code = 93070-3]       

       







Encounters







        Start   End     Encounter Admission Attending Care    Care    Encounter 

Source



        Date/Time Date/Time Type    Type    Clinicians Facility Department ID   

   

 

        2022-10-03         Outpatient                 [facility]     [id number]

 [place]



        15:02:53                                                 [id number] [facility]

 

        2022         Outpatient         DOMINGA  [facility]     [id number]

 [place]



        01:03:24                         BENNIE                 [id number] [facility]

 

        2022-10-11 2022-10-11 Outpatient                 ROSALIE     [facility]     [id number]

[address]



        10:28:11 10:28:11                                            F



                                                                        [place]

 

        2022-10-11 2022-10-11 Outpatient                 [id number]

[id number]-c 



        00:00:00 00:00:00 Visit                   at04-324e         p30-067z-4 



                                                -9a02-v18         r86-w201vf 



                                                6ae73zbtq         51adba  

 

        2022 Outpatient                 [facility]     [id number]-

[address]



        15:06:03 15:06:03                                         [id number]   F



                                                                        [place]

 

        2022 Emergency X       PRADEEP SPANN [facility]    ERT     1

[id number] [facility]



        23:50:00 01:28:00                 PRADEEP SPANN [facility]

 

        2022 [facility]         [facility]    [id number]

[id number] [facility]



        23:50:00 01:28:00                 Pradeep LOPEZ 350.1.13.10         i

ty of



                                                [place] 4.2.7.2.686         [facility]  [phone]         [address]

 

        2022 Outpatient                 [id number]

[id number] 



        00:00:00 00:00:00 Visit                   9v99-2w63         o30-8y57-9 



                                                -5ih8-r92         [id number]-f99975 



                                                [id number]         [id number]  

 

        2022 Outpatient                 [id number]

[id number] 



        00:00:00 00:00:00 Visit                   422b-482d         22b-482d-8 



                                                -8157-b70         157-b704e0 



                                                7k66g6wqn         0f6bdd  

 

        2022-07-15 2022 Emergency X       [facility]     [id number]

[id number] [facility]



        23:20:00 03:26:00                 ROSIE vaca [facility]

 

        2022-07-15 2022 Emergency         [facility]    [id number]

[address]



        23:20:00 03:26:00                 Rosie LOPEZ 350.1.13.10         i

ty of



                                                [place] 4.2.7.2.686         [facility]  [phone]         [address]

 

        2022 Outpatient                 [id number]

[id number] 



        00:00:00 00:00:00 Visit                   0w7i-1cbz         z3x-9ezu-m 



                                                -o4t3-iss         1d3-ysui17 



                                                [id number]         696ce1  

 

        2022 Outpatient         DOMINGA  [facility]    MED     [address]   

 [facility]



        12:04:00 23:59:00                 BENNIE                         







Results







           Test Description Test Time  Test Comments Results    Result Comments 

Source









                    TSH, THIRD GENERATION 2022-10-12 06:52:55 









                      Test Item  Value      Reference Range Interpretation Comme

nts









             TSH, THIRD GENERATION (test code = 2821) 2.700 UIU/ML 0.400-4.100  

             



COMPREHENSIVE METABOLIC PANEL2022-10-12 04:09:28





             Test Item    Value        Reference Range Interpretation Comments

 

             GLUCOSE (test code = 169 MG/DL    70-99        H            



             )                                               

 

             BUN (test code = 17 MG/DL     8-23                      



             )                                               

 

             CREATININE (test 0.86 MG/DL   0.60-1.30                 



             code = 2214)                                        

 

             eGFR ( CKD-EPI) 76 ML/MIN/1.73 >60                       



             (test code = 88866)                                        

 

             CALC BUN/CREAT (test 20 RATIO     6-28                      



             code = 2235)                                        

 

             SODIUM (test code = 141 MEQ/L    133-146                   



             )                                               

 

             POTASSIUM (test code 4.3 MEQ/L    3.5-5.4                   



             = )                                             

 

             CHLORIDE (test code 99 MEQ/L                         



             = 2215)                                             

 

             CARBON DIOXIDE (test 25 MEQ/L     19-31                     



             code = 2206)                                        

 

             CALCIUM (test code = 9.6 MG/DL    8.5-10.5                  



             )                                               

 

             PROTEIN, TOTAL (test 7.6 G/DL     6.1-8.3                   



             code = 2229)                                        

 

             ALBUMIN (test code = 4.3 G/DL     3.5-5.2                   



             )                                               

 

             CALC GLOBULIN (test 3.3 G/DL     1.9-3.7                   



             code = 2240)                                        

 

             CALC A/G RATIO (test 1.3 RATIO    1.0-2.6                   



             code = 2234)                                        

 

             BILIRUBIN, TOTAL 0.5 MG/DL    See_Comment                [Automated

 message]



             (test code = 2207)                                        The syste

m which



                                                                 generated this



                                                                 result transmit

maximo



                                                                 reference range

:



                                                                 <=1.2. The refe

rence



                                                                 range was not u

sed



                                                                 to interpret th

is



                                                                 result as



                                                                 normal/abnormal

.

 

             ALKALINE PHOSPHATASE 92 U/L                           



             (test code = 2204)                                        

 

             AST (test code = 35 U/L       9-40                      



             )                                               

 

             ALT (test code = 34 U/L       5-40                      



             )                                               



HEMOGLOBIN A1c2022-10-12 02:13:42





             Test Item    Value        Reference Range Interpretation Comments

 

             HEMOGLOBIN A1c (test 6.8 %        4.2-5.6      H             AMERIC

AN DIABETES



             code = 88945)                                        ASSOCIATION 

IDELINES FOR



                                                                 HGB A1C:



                                                                 PREDIABETES/INC

REASED RISK .



                                                                 . . . . . . 5.7

-6.4%



                                                                 DIAGNOSIS OF DI

ABETES . . .



                                                                 . . . . . . >=6

.5% WITH



                                                                 CONFIRMATION OR

 APPROPRIATE



                                                                 SYMPTOMS NOTE: 

ASSAY MAY BE



                                                                 AFFECTED BY



                                                                 HEMOGLOBINOPATH

IES (SICKLE



                                                                 CELL ANEMIA, S-

C DISEASE,



                                                                 OTHERS) OR ZENA

FICIALLY



                                                                 LOWERED BY DECR

EASED RED



                                                                 CELL SURVIVAL (

HEMOLYTIC



                                                                 ANEMIAS, BLOOD 

LOSS, ETC.).



                                                                 CONSIDER ALTERN

ATE TESTING



                                                                 OR LABORATORY C

ONSULTATION.



                                                                 UNLESS OTHERWIS

E INDICATED,



                                                                 ALL TESTING PER

[facility]



                                                                 ATCLINICAL PATH

[facility]. [address] [place] [address]

8575



                                                                 LABORATORY DIRE

CTOR: CLIFTON DARBY M.D.

 CLIA NUMBER



                                                                 [id number] CAP 

ACCREDIT[place]



                                                                 [place]. [id number]



CBC W/AUTO DIFF WITH PLATELETS2022-10-12 02:02:15





             Test Item    Value        Reference Range Interpretation Comments

 

             WBC (test code = 6.8 K/UL     3.5-11.0                  



             1001)                                               

 

             RBC (test code = 3.76 M/UL    3.80-5.40    L            



             1002)                                               

 

             HEMOGLOBIN (test code 10.4 G/DL    11.5-15.5    L            



             = 1003)                                             

 

             HEMATOCRIT (test code 32.8 %       34.0-45.0    L            



             = 1004)                                             

 

             MCV (test code = 87.2 fL      80.0-99.0                 



             1005)                                               

 

             MCH (test code = 27.7 PG      25.0-33.0                 



             1006)                                               

 

             MCHC (test code = 31.7 G/DL    31.0-36.0                 



             1007)                                               

 

             RDW (test code = 13.6 %       11.5-15.0                 



             1038)                                               

 

             NEUTROPHILS (test 70.2 %                                 



             code = 1008)                                        

 

             LYMPHOCYTES (test 17.6 %                                 



             code = 1010)                                        

 

             MONOCYTES (test code 7.2 %                                  



             = 1011)                                             

 

             EOSINOPHILS (test 4.3 %                                  



             code = 1012)                                        

 

             BASOPHILS (test code 0.4 %                                  



             = 1013)                                             

 

             IMMATURE GRANULOCYTES 0.3 %                                  



             (test code = 1036)                                        

 

             NUCLEATED RBCS (test 0.0 /100 WBC'S See_Comment                [Aut

omated



             code = 1065)                                        message] The sy

stem



                                                                 which generated



                                                                 this result



                                                                 transmitted



                                                                 reference range

:



                                                                 0.0. The refere

nce



                                                                 range was not u

sed



                                                                 to interpret th

is



                                                                 result as



                                                                 normal/abnormal

.

 

             PLATELET COUNT (test 104 K/UL     130-400      L            



             code = 1015)                                        

 

             ABSOLUTE NEUTROPHILS 4.74 K/UL    1.50-7.50                 



             (test code = 1066)                                        

 

             ABSOLUTE LYMPHOCYTES 1.19 K/UL    1.00-4.00                 



             (test code = 1067)                                        

 

             ABSOLUTE MONOCYTES 0.49 K/UL    0.20-1.00                 



             (test code = 1068)                                        

 

             ABSOLUTE EOSINOPHILS 0.29 K/UL    0.00-0.50                 



             (test code = 1040)                                        

 

             ABSOLUTE BASOPHILS 0.03 K/UL    0.00-0.20                 



             (test code = 1069)                                        

 

             ABS IMMATURE 0.02 K/UL    0.00-0.10                 



             GRANULOCYTES (test                                        



             code = 1020)                                        

 

             ABS NUCLEATED RBCS 0.00 K/UL    0.00-0.11                 



             (test code = 46478)                                        



COMPREHENSIVE METABOLIC PANEL2022-10-12 00:00:00





             Test Item    Value        Reference Range Interpretation Comments

 

             GLUCOSE (test code = 2217) 169 MG/DL                              

 

             BUN (test code = 2208) 17 MG/DL                               

 

             CREATININE (test code = 2214) 0.86 MG/DL                           

  

 

             eGFR ( CKD-EPI) (test code 76 ML/MIN/1.73                      

     



             = 27772)                                            

 

             CALC BUN/CREAT (test code = 20 RATIO                               



             2235)                                               

 

             SODIUM (test code = 2231) 141 MEQ/L                              

 

             POTASSIUM (test code = 2228) 4.3 MEQ/L                             

 

 

             CHLORIDE (test code = 2215) 99 MEQ/L                               

 

             CARBON DIOXIDE (test code = 25 MEQ/L                               



             )                                               

 

             CALCIUM (test code = 2209) 9.6 MG/DL                              

 

             PROTEIN, TOTAL (test code = 7.6 G/DL                               



             )                                               

 

             ALBUMIN (test code = 2201) 4.3 G/DL                               

 

             CALC GLOBULIN (test code = 3.3 G/DL                               



             )                                               

 

             CALC A/G RATIO (test code = 1.3 RATIO                              



             2234)                                               

 

             BILIRUBIN, TOTAL (test code = 0.5 MG/DL                            

  



             )                                               

 

             ALKALINE PHOSPHATASE (test 92 U/L                                 



             code = 2204)                                        

 

             AST (test code = 2218) 35 U/L                                 

 

             ALT (test code = 2219) 34 U/L                                 



COMPREHENSIVE METABOLIC [id number] 00:00:00





             Test Item    Value        Reference Range Interpretation Comments

 

             GLUCOSE (test code = 2217) 169 MG/DL                              

 

             BUN (test code = 2208) 17 MG/DL                               

 

             CREATININE (test code = 2214) 0.86 MG/DL                           

  

 

             eGFR ( CKD-EPI) (test code 76 ML/MIN/1.73                      

     



             = 07828)                                            

 

             CALC BUN/CREAT (test code = 20 RATIO                               



             2235)                                               

 

             SODIUM (test code = 2231) 141 MEQ/L                              

 

             POTASSIUM (test code = 2228) 4.3 MEQ/L                             

 

 

             CHLORIDE (test code = 2215) 99 MEQ/L                               

 

             CARBON DIOXIDE (test code = 25 MEQ/L                               



             )                                               

 

             CALCIUM (test code = 2209) 9.6 MG/DL                              

 

             PROTEIN, TOTAL (test code = 7.6 G/DL                               



             )                                               

 

             ALBUMIN (test code = 2201) 4.3 G/DL                               

 

             CALC GLOBULIN (test code = 3.3 G/DL                               



             2240)                                               

 

             CALC A/G RATIO (test code = 1.3 RATIO                              



             2234)                                               

 

             BILIRUBIN, TOTAL (test code = 0.5 MG/DL                            

  



             )                                               

 

             ALKALINE PHOSPHATASE (test 92 U/L                                 



             code = 2204)                                        

 

             AST (test code = 2218) 35 U/L                                 

 

             ALT (test code = 2219) 34 U/L                                 



CBC W/AUTO [id number] 00:00:00





             Test Item    Value        Reference Range Interpretation Comments

 

             WBC (test code = 1001) 6.8 K/UL                               

 

             RBC (test code = 1002) 3.76 M/UL                              

 

             HEMOGLOBIN (test code = 1003) 10.4 G/DL                            

  

 

             HEMATOCRIT (test code = 1004) 32.8 %                               

  

 

             MCV (test code = 1005) 87.2 fL                                

 

             MCH (test code = 1006) 27.7 PG                                

 

             MCHC (test code = 1007) 31.7 G/DL                              

 

             RDW (test code = 1038) 13.6 %                                 

 

             NEUTROPHILS (test code = 1008) 70.2 %                              

   

 

             LYMPHOCYTES (test code = 1010) 17.6 %                              

   

 

             MONOCYTES (test code = 1011) 7.2 %                                 

 

 

             EOSINOPHILS (test code = 1012) 4.3 %                               

   

 

             BASOPHILS (test code = 1013) 0.4 %                                 

 

 

             IMMATURE GRANULOCYTES (test 0.3 %                                  



             code = 1036)                                        

 

             NUCLEATED RBCS (test code = 0.0 /100WBC'S                          

 



             1065)                                               

 

             PLATELET COUNT (test code = 104 K/UL                               



             1015)                                               

 

             ABSOLUTE NEUTROPHILS (test code 4.74 K/UL                          

    



             = 1066)                                             

 

             ABSOLUTE LYMPHOCYTES (test code 1.19 K/UL                          

    



             = 1067)                                             

 

             ABSOLUTE MONOCYTES (test code = 0.49 K/UL                          

    



             1068)                                               

 

             ABSOLUTE EOSINOPHILS (test code 0.29 K/UL                          

    



             = 1040)                                             

 

             ABSOLUTE BASOPHILS (test code = 0.03 K/UL                          

    



             1069)                                               

 

             ABS IMMATURE GRANULOCYTES (test 0.02 K/UL                          

    



             code = 1020)                                        

 

             ABS NUCLEATED RBCS (test code = 0.00 K/UL                          

    



             29022)                                              



CBC W/AUTO [id number] 00:00:00





             Test Item    Value        Reference Range Interpretation Comments

 

             WBC (test code = 1001) 6.8 K/UL                               

 

             RBC (test code = 1002) 3.76 M/UL                              

 

             HEMOGLOBIN (test code = 1003) 10.4 G/DL                            

  

 

             HEMATOCRIT (test code = 1004) 32.8 %                               

  

 

             MCV (test code = 1005) 87.2 fL                                

 

             MCH (test code = 1006) 27.7 PG                                

 

             MCHC (test code = 1007) 31.7 G/DL                              

 

             RDW (test code = 1038) 13.6 %                                 

 

             NEUTROPHILS (test code = 1008) 70.2 %                              

   

 

             LYMPHOCYTES (test code = 1010) 17.6 %                              

   

 

             MONOCYTES (test code = 1011) 7.2 %                                 

 

 

             EOSINOPHILS (test code = 1012) 4.3 %                               

   

 

             BASOPHILS (test code = 1013) 0.4 %                                 

 

 

             IMMATURE GRANULOCYTES (test 0.3 %                                  



             code = 1036)                                        

 

             NUCLEATED RBCS (test code = 0.0 /100WBC'S                          

 



             1065)                                               

 

             PLATELET COUNT (test code = 104 K/UL                               



             1015)                                               

 

             ABSOLUTE NEUTROPHILS (test code 4.74 K/UL                          

    



             = 1066)                                             

 

             ABSOLUTE LYMPHOCYTES (test code 1.19 K/UL                          

    



             = 1067)                                             

 

             ABSOLUTE MONOCYTES (test code = 0.49 K/UL                          

    



             1068)                                               

 

             ABSOLUTE EOSINOPHILS (test code 0.29 K/UL                          

    



             = 1040)                                             

 

             ABSOLUTE BASOPHILS (test code = 0.03 K/UL                          

    



             1069)                                               

 

             ABS IMMATURE GRANULOCYTES (test 0.02 K/UL                          

    



             code = 1020)                                        

 

             ABS NUCLEATED RBCS (test code = 0.00 K/UL                          

    



             76677)                                              



CBC W/AUTO [id number] 00:00:00





             Test Item    Value        Reference Range Interpretation Comments

 

             WBC (test code = 1001) 6.8 K/UL                               

 

             RBC (test code = 1002) 3.76 M/UL                              

 

             HEMOGLOBIN (test code = 1003) 10.4 G/DL                            

  

 

             HEMATOCRIT (test code = 1004) 32.8 %                               

  

 

             MCV (test code = 1005) 87.2 fL                                

 

             MCH (test code = 1006) 27.7 PG                                

 

             MCHC (test code = 1007) 31.7 G/DL                              

 

             RDW (test code = 1038) 13.6 %                                 

 

             NEUTROPHILS (test code = 1008) 70.2 %                              

   

 

             LYMPHOCYTES (test code = 1010) 17.6 %                              

   

 

             MONOCYTES (test code = 1011) 7.2 %                                 

 

 

             EOSINOPHILS (test code = 1012) 4.3 %                               

   

 

             BASOPHILS (test code = 1013) 0.4 %                                 

 

 

             IMMATURE GRANULOCYTES (test 0.3 %                                  



             code = 1036)                                        

 

             NUCLEATED RBCS (test code = 0.0 /100WBC'S                          

 



             1065)                                               

 

             PLATELET COUNT (test code = 104 K/UL                               



             1015)                                               

 

             ABSOLUTE NEUTROPHILS (test code 4.74 K/UL                          

    



             = 1066)                                             

 

             ABSOLUTE LYMPHOCYTES (test code 1.19 K/UL                          

    



             = 1067)                                             

 

             ABSOLUTE MONOCYTES (test code = 0.49 K/UL                          

    



             1068)                                               

 

             ABSOLUTE EOSINOPHILS (test code 0.29 K/UL                          

    



             = 1040)                                             

 

             ABSOLUTE BASOPHILS (test code = 0.03 K/UL                          

    



             1069)                                               

 

             ABS IMMATURE GRANULOCYTES (test 0.02 K/UL                          

    



             code = 1020)                                        

 

             ABS NUCLEATED RBCS (test code = 0.00 K/UL                          

    



             61502)                                              



TSH, THIRD [id number] 00:00:00





             Test Item    Value        Reference Range Interpretation Comments

 

             TSH, THIRD GENERATION (test code 2.700 UIU/ML                      

     



             = 2821)                                             



TSH, THIRD [id number] 00:00:00





             Test Item    Value        Reference Range Interpretation Comments

 

             TSH, THIRD GENERATION (test code 2.700 UIU/ML                      

     



             = 2821)                                             



TSH, THIRD [id number] 00:00:00





             Test Item    Value        Reference Range Interpretation Comments

 

             TSH, THIRD GENERATION (test code 2.700 UIU/ML                      

     



             = 2821)                                             



HEMOGLOBIN [id number] 00:00:00





             Test Item    Value        Reference Range Interpretation Comments

 

             HEMOGLOBIN A1c (test code = 78551) 6.8 %                           

       



HEMOGLOBIN A1c2022-10-12 00:00:00





             Test Item    Value        Reference Range Interpretation Comments

 

             HEMOGLOBIN A1c (test code = 85657) 6.8 %                           

       



HEMOGLOBIN [id number] 00:00:00





             Test Item    Value        Reference Range Interpretation Comments

 

             HEMOGLOBIN A1c (test code = 87029) 6.8 %                           

       



VITAMIN D, 25 [id number] 06:58:12





             Test Item    Value        Reference Range Interpretation Comments

 

             VITAMIN D, 25 OH 18 NG/ML     SEE BELOW    L             NOTE: 25-H

YDROXYVITAMIN D



             (test code = 4958)                                        [employer]

LUDES



                                                                 25-HYDROXYVITAM

IN D2 AND



                                                                 D3. METHODOLOGY

 IS



                                                                 CHEMILUMINESCEN

T



                                                                 IMMUNOASSAY.  *

****



                                                                 INTERPRETIVE RA

NGES



                                                                 *****PEDIATRIC 

(<17 YEARS)



                                                                 . . . . . . . .

 . . . NG/ML



                                                                 20-100ADULT: IN

SUFFICIENT .



                                                                 . . . . . . . .

 . . . . .



                                                                 NG/ML <20 SUBOP

TIMAL . . .



                                                                 . . . . . . . .

 . . . .



                                                                 NG/ML 20-29 OPT

IMAL . . . .



                                                                 . . . . . . . .

 . . . . .



                                                                 NG/ML  UN

LESS



                                                                 OTHERWISE INDIC

ATED, ALL



                                                                 TESTING PERFORM

ED



                                                                 ATCLINICAL PATH

[facility], [facility]. [address] [place] [id number]



                                                                 LABORATORY DIRE

CTOR: GILBERTO WARD. CLIA



                                                                 NUMBER [id number]

03 CAP



                                                                 ACCREDITATION N

O. [id number]



TSH, THIRD [id number] 06:45:08





             Test Item    Value        Reference Range Interpretation Comments

 

             TSH, THIRD GENERATION (test code 1.290 UIU/ML 0.400-4.100          

     



             = 2821)                                             



VITAMIN [id number] 06:45:08





             Test Item    Value        Reference Range Interpretation Comments

 

             VITAMIN B-12 (test code = 2840) 647 PG/ML    200-950               

    



COMPREHENSIVE METABOLIC [id number] 05:39:52





             Test Item    Value        Reference Range Interpretation Comments

 

             GLUCOSE (test code = 119 MG/DL    70-99        H            



             )                                               

 

             BUN (test code = 19 MG/DL     )                                               

 

             CREATININE (test 0.98 MG/DL   0.60-1.30                 



             code = 2214)                                        

 

             eGFR ( CKD-EPI) 65 ML/MIN/1.73 >60                       



             (test code = 86008)                                        

 

             CALC BUN/CREAT (test 19 RATIO     6-28                      



             code = 223)                                        

 

             SODIUM (test code = 137 MEQ/L    133-146                   



             )                                               

 

             POTASSIUM (test code 4.9 MEQ/L    3.5-5.4                   



             = )                                             

 

             CHLORIDE (test code 97 MEQ/L                         



             = )                                             

 

             CARBON DIOXIDE (test 23 MEQ/L     19-31                     



             code = 220)                                        

 

             CALCIUM (test code = 9.7 MG/DL    8.5-10.5                  



             )                                               

 

             PROTEIN, TOTAL (test 7.9 G/DL     6.1-8.3                   



             code = 222)                                        

 

             ALBUMIN (test code = 4.6 G/DL     3.5-5.2                   



             )                                               

 

             CALC GLOBULIN (test 3.3 G/DL     1.9-3.7                   



             code = 224)                                        

 

             CALC A/G RATIO (test 1.4 RATIO    1.0-2.6                   



             code = 223)                                        

 

             BILIRUBIN, TOTAL 0.3 MG/DL    See_Comment                [Automated

 message]



             (test code = )                                        The syste

[employer] which



                                                                 generated this



                                                                 result transmit

maximo



                                                                 reference range

:



                                                                 <=1.2. The refe

rence



                                                                 range was not u

sed



                                                                 to interpret th

is



                                                                 result as



                                                                 normal/abnormal

.

 

             ALKALINE PHOSPHATASE 79 U/L                           



             (test code = )                                        

 

             AST (test code = 48 U/L       9-40         H            



             )                                               

 

             ALT (test code = 48 U/L       5-40         H            



             )                                               



CBC W/AUTO DIFF WITH [id number] 02:09:53





             Test Item    Value        Reference Range Interpretation Comments

 

             WBC (test code = 7.0 K/UL     3.5-11.0                  



             1001)                                               

 

             RBC (test code = 3.83 M/UL    3.80-5.40                 



             1002)                                               

 

             HEMOGLOBIN (test code 10.9 G/DL    11.5-15.5    L            



             = 1003)                                             

 

             HEMATOCRIT (test code 33.2 %       34.0-45.0    L            



             = 1004)                                             

 

             MCV (test code = 86.7 fL      80.0-99.0                 



             1005)                                               

 

             MCH (test code = 28.5 PG      25.0-33.0                 



             1006)                                               

 

             MCHC (test code = 32.8 G/DL    31.0-36.0                 



             1007)                                               

 

             RDW (test code = 15.2 %       11.5-15.0    H            



             1038)                                               

 

             NEUTROPHILS (test 63.1 %                                 



             code = 1008)                                        

 

             LYMPHOCYTES (test 28.0 %                                 



             code = 1010)                                        

 

             MONOCYTES (test code 6.4 %                                  



             = 1011)                                             

 

             EOSINOPHILS (test 1.6 %                                  



             code = 1012)                                        

 

             BASOPHILS (test code 0.6 %                                  



             = 1013)                                             

 

             IMMATURE GRANULOCYTES 0.3 %                                  



             (test code = 1036)                                        

 

             NUCLEATED RBCS (test 0.0 /100 WBC'S See_Comment                [Aut

omated



             code = 1065)                                        message] The sy

stem



                                                                 which generated



                                                                 this result



                                                                 transmitted



                                                                 reference range

:



                                                                 0.0. The refere

nce



                                                                 range was not u

sed



                                                                 to interpret th

is



                                                                 result as



                                                                 normal/abnormal

.

 

             PLATELET COUNT (test 115 K/UL     130-400      L            



             code = 1015)                                        

 

             ABSOLUTE NEUTROPHILS 4.41 K/UL    1.50-7.50                 



             (test code = 1066)                                        

 

             ABSOLUTE LYMPHOCYTES 1.96 K/UL    1.00-4.00                 



             (test code = 1067)                                        

 

             ABSOLUTE MONOCYTES 0.45 K/UL    0.20-1.00                 



             (test code = 1068)                                        

 

             ABSOLUTE EOSINOPHILS 0.11 K/UL    0.00-0.50                 



             (test code = 1040)                                        

 

             ABSOLUTE BASOPHILS 0.04 K/UL    0.00-0.20                 



             (test code = 1069)                                        

 

             ABS IMMATURE 0.02 K/UL    0.00-0.10                 



             GRANULOCYTES (test                                        



             code = 1020)                                        

 

             ABS NUCLEATED RBCS 0.00 K/UL    0.00-0.11                 



             (test code = 86116)                                        



TSH, THIRD [id number] 00:00:00





             Test Item    Value        Reference Range Interpretation Comments

 

             TSH, THIRD GENERATION (test code 1.290 UIU/ML                      

     



             = 2821)                                             



TSH, THIRD [id number] 00:00:00





             Test Item    Value        Reference Range Interpretation Comments

 

             TSH, THIRD GENERATION (test code 1.290 UIU/ML                      

     



             = 2821)                                             



TSH, THIRD [id number] 00:00:00





             Test Item    Value        Reference Range Interpretation Comments

 

             TSH, THIRD GENERATION (test code 1.290 UIU/ML                      

     



             = 2821)                                             



CBC W/AUTO [id number] 00:00:00





             Test Item    Value        Reference Range Interpretation Comments

 

             WBC (test code = 1001) 7.0 K/UL                               

 

             RBC (test code = 1002) 3.83 M/UL                              

 

             HEMOGLOBIN (test code = 1003) 10.9 G/DL                            

  

 

             HEMATOCRIT (test code = 1004) 33.2 %                               

  

 

             MCV (test code = 1005) 86.7 fL                                

 

             MCH (test code = 1006) 28.5 PG                                

 

             MCHC (test code = 1007) 32.8 G/DL                              

 

             RDW (test code = 1038) 15.2 %                                 

 

             NEUTROPHILS (test code = 1008) 63.1 %                              

   

 

             LYMPHOCYTES (test code = 1010) 28.0 %                              

   

 

             MONOCYTES (test code = 1011) 6.4 %                                 

 

 

             EOSINOPHILS (test code = 1012) 1.6 %                               

   

 

             BASOPHILS (test code = 1013) 0.6 %                                 

 

 

             IMMATURE GRANULOCYTES (test 0.3 %                                  



             code = 1036)                                        

 

             NUCLEATED RBCS (test code = 0.0 /100WBC'S                          

 



             1065)                                               

 

             PLATELET COUNT (test code = 115 K/UL                               



             1015)                                               

 

             ABSOLUTE NEUTROPHILS (test code 4.41 K/UL                          

    



             = 1066)                                             

 

             ABSOLUTE LYMPHOCYTES (test code 1.96 K/UL                          

    



             = 1067)                                             

 

             ABSOLUTE MONOCYTES (test code = 0.45 K/UL                          

    



             1068)                                               

 

             ABSOLUTE EOSINOPHILS (test code 0.11 K/UL                          

    



             = 1040)                                             

 

             ABSOLUTE BASOPHILS (test code = 0.04 K/UL                          

    



             1069)                                               

 

             ABS IMMATURE GRANULOCYTES (test 0.02 K/UL                          

    



             code = 1020)                                        

 

             ABS NUCLEATED RBCS (test code = 0.00 K/UL                          

    



             46127)                                              



CBC W/AUTO [id number] 00:00:00





             Test Item    Value        Reference Range Interpretation Comments

 

             WBC (test code = 1001) 7.0 K/UL                               

 

             RBC (test code = 1002) 3.83 M/UL                              

 

             HEMOGLOBIN (test code = 1003) 10.9 G/DL                            

  

 

             HEMATOCRIT (test code = 1004) 33.2 %                               

  

 

             MCV (test code = 1005) 86.7 fL                                

 

             MCH (test code = 1006) 28.5 PG                                

 

             MCHC (test code = 1007) 32.8 G/DL                              

 

             RDW (test code = 1038) 15.2 %                                 

 

             NEUTROPHILS (test code = 1008) 63.1 %                              

   

 

             LYMPHOCYTES (test code = 1010) 28.0 %                              

   

 

             MONOCYTES (test code = 1011) 6.4 %                                 

 

 

             EOSINOPHILS (test code = 1012) 1.6 %                               

   

 

             BASOPHILS (test code = 1013) 0.6 %                                 

 

 

             IMMATURE GRANULOCYTES (test 0.3 %                                  



             code = 1036)                                        

 

             NUCLEATED RBCS (test code = 0.0 /100WBC'S                          

 



             1065)                                               

 

             PLATELET COUNT (test code = 115 K/UL                               



             1015)                                               

 

             ABSOLUTE NEUTROPHILS (test code 4.41 K/UL                          

    



             = 1066)                                             

 

             ABSOLUTE LYMPHOCYTES (test code 1.96 K/UL                          

    



             = 1067)                                             

 

             ABSOLUTE MONOCYTES (test code = 0.45 K/UL                          

    



             1068)                                               

 

             ABSOLUTE EOSINOPHILS (test code 0.11 K/UL                          

    



             = 1040)                                             

 

             ABSOLUTE BASOPHILS (test code = 0.04 K/UL                          

    



             1069)                                               

 

             ABS IMMATURE GRANULOCYTES (test 0.02 K/UL                          

    



             code = 1020)                                        

 

             ABS NUCLEATED RBCS (test code = 0.00 K/UL                          

    



             51248)                                              



CBC W/AUTO [id number] 00:00:00





             Test Item    Value        Reference Range Interpretation Comments

 

             WBC (test code = 1001) 7.0 K/UL                               

 

             RBC (test code = 1002) 3.83 M/UL                              

 

             HEMOGLOBIN (test code = 1003) 10.9 G/DL                            

  

 

             HEMATOCRIT (test code = 1004) 33.2 %                               

  

 

             MCV (test code = 1005) 86.7 fL                                

 

             MCH (test code = 1006) 28.5 PG                                

 

             MCHC (test code = 1007) 32.8 G/DL                              

 

             RDW (test code = 1038) 15.2 %                                 

 

             NEUTROPHILS (test code = 1008) 63.1 %                              

   

 

             LYMPHOCYTES (test code = 1010) 28.0 %                              

   

 

             MONOCYTES (test code = 1011) 6.4 %                                 

 

 

             EOSINOPHILS (test code = 1012) 1.6 %                               

   

 

             BASOPHILS (test code = 1013) 0.6 %                                 

 

 

             IMMATURE GRANULOCYTES (test 0.3 %                                  



             code = 1036)                                        

 

             NUCLEATED RBCS (test code = 0.0 /100WBC'S                          

 



             1065)                                               

 

             PLATELET COUNT (test code = 115 K/UL                               



             1015)                                               

 

             ABSOLUTE NEUTROPHILS (test code 4.41 K/UL                          

    



             = 1066)                                             

 

             ABSOLUTE LYMPHOCYTES (test code 1.96 K/UL                          

    



             = 1067)                                             

 

             ABSOLUTE MONOCYTES (test code = 0.45 K/UL                          

    



             1068)                                               

 

             ABSOLUTE EOSINOPHILS (test code 0.11 K/UL                          

    



             = 1040)                                             

 

             ABSOLUTE BASOPHILS (test code = 0.04 K/UL                          

    



             1069)                                               

 

             ABS IMMATURE GRANULOCYTES (test 0.02 K/UL                          

    



             code = 1020)                                        

 

             ABS NUCLEATED RBCS (test code = 0.00 K/UL                          

    



             00169)                                              



COMPREHENSIVE METABOLIC [id number] 00:00:00





             Test Item    Value        Reference Range Interpretation Comments

 

             GLUCOSE (test code = 2217) 119 MG/DL                              

 

             BUN (test code = 2208) 19 MG/DL                               

 

             CREATININE (test code = 2214) 0.98 MG/DL                           

  

 

             eGFR ( CKD-EPI) (test code 65 ML/MIN/1.73                      

     



             = 62697)                                            

 

             CALC BUN/CREAT (test code = 19 RATIO                               



             2235)                                               

 

             SODIUM (test code = 2231) 137 MEQ/L                              

 

             POTASSIUM (test code = 2228) 4.9 MEQ/L                             

 

 

             CHLORIDE (test code = 2215) 97 MEQ/L                               

 

             CARBON DIOXIDE (test code = 23 MEQ/L                               



             2206)                                               

 

             CALCIUM (test code = 2209) 9.7 MG/DL                              

 

             PROTEIN, TOTAL (test code = 7.9 G/DL                               



             )                                               

 

             ALBUMIN (test code = 2201) 4.6 G/DL                               

 

             CALC GLOBULIN (test code = 3.3 G/DL                               



             2240)                                               

 

             CALC A/G RATIO (test code = 1.4 RATIO                              



             2234)                                               

 

             BILIRUBIN, TOTAL (test code = 0.3 MG/DL                            

  



             )                                               

 

             ALKALINE PHOSPHATASE (test 79 U/L                                 



             code = 2204)                                        

 

             AST (test code = 2218) 48 U/L                                 

 

             ALT (test code = 2219) 48 U/L                                 



COMPREHENSIVE METABOLIC [id number] 00:00:00





             Test Item    Value        Reference Range Interpretation Comments

 

             GLUCOSE (test code = 2217) 119 MG/DL                              

 

             BUN (test code = 2208) 19 MG/DL                               

 

             CREATININE (test code = 2214) 0.98 MG/DL                           

  

 

             eGFR ( CKD-EPI) (test code 65 ML/MIN/1.73                      

     



             = 83769)                                            

 

             CALC BUN/CREAT (test code = 19 RATIO                               



             2235)                                               

 

             SODIUM (test code = 2231) 137 MEQ/L                              

 

             POTASSIUM (test code = 2228) 4.9 MEQ/L                             

 

 

             CHLORIDE (test code = 2215) 97 MEQ/L                               

 

             CARBON DIOXIDE (test code = 23 MEQ/L                               



             )                                               

 

             CALCIUM (test code = 2209) 9.7 MG/DL                              

 

             PROTEIN, TOTAL (test code = 7.9 G/DL                               



             )                                               

 

             ALBUMIN (test code = 2201) 4.6 G/DL                               

 

             CALC GLOBULIN (test code = 3.3 G/DL                               



             2240)                                               

 

             CALC A/G RATIO (test code = 1.4 RATIO                              



             2234)                                               

 

             BILIRUBIN, TOTAL (test code = 0.3 MG/DL                            

  



             )                                               

 

             ALKALINE PHOSPHATASE (test 79 U/L                                 



             code = 2204)                                        

 

             AST (test code = 2218) 48 U/L                                 

 

             ALT (test code = 2219) 48 U/L                                 



VITAMIN [id number] 00:00:00





             Test Item    Value        Reference Range Interpretation Comments

 

             VITAMIN B-12 (test code = 2840) 647 PG/ML                          

    



VITAMIN [id number] 00:00:00





             Test Item    Value        Reference Range Interpretation Comments

 

             VITAMIN B-12 (test code = 2840) 647 PG/ML                          

    



VITAMIN [id number] 00:00:00





             Test Item    Value        Reference Range Interpretation Comments

 

             VITAMIN B-12 (test code = 2840) 647 PG/ML                          

    



VITAMIN D, 25 [id number] 00:00:00





             Test Item    Value        Reference Range Interpretation Comments

 

             VITAMIN D, 25 OH (test code = 4958) 18 NG/ML                       

        



VITAMIN D, 25 [id number] 00:00:00





             Test Item    Value        Reference Range Interpretation Comments

 

             VITAMIN D, 25 OH (test code = 4958) 18 NG/ML                       

        



CULTURE, [id number] 12:01:26SPECIMEN NUMBER: [id number] CULTURE, URINE 
SPECIMEN NUMBER: [id number] SPECIMEN COMMENT: URINE SOURCE:URINE REPORT STATUS: 
FINAL FINAL REPORT: 2022 10-50,000 CFU/ML MIXED UROGENITAL ROWENA UNLESS OT
HERWISE INDICATED, ALL TESTING PERFORMED [facility]
[address] [place] [address] : CLIFTON DARBY M.D. CLIA 
NUMBER [id number] [facility] [place]. [id number], [id number] 
00:00:00





             Test Item    Value        Reference Range Interpretation Comments

 

             CULTURE, URINE (test SPECIMEN NUMBER:                           



             code = 02173) [id number]                              



CULTURE, [id number] 00:00:00





             Test Item    Value        Reference Range Interpretation Comments

 

             CULTURE, URINE (test SPECIMEN NUMBER:                           



             code = 15665) [id number]                              



CULTURE, [id number] 00:00:00





             Test Item    Value        Reference Range Interpretation Comments

 

             CULTURE, URINE (test SPECIMEN NUMBER:                           



             code = 05938) [id number]                              



CULTURE, [id number] 00:00:00





             Test Item    Value        Reference Range Interpretation Comments

 

             CULTURE, URINE (test SPECIMEN NUMBER:                           



             code = 05406) [id number]                              



CULTURE, [id number] 00:00:00





             Test Item    Value        Reference Range Interpretation Comments

 

             CULTURE, URINE (test SPECIMEN NUMBER:                           



             code = 53106) [id number]                              



TSH, THIRD [id number] 06:16:09





             Test Item    Value        Reference Range Interpretation Comments

 

             TSH, THIRD   <0.010 UIU/ML 0.400-4.100  L             UNLESS OTHERW

ISE



             GENERATION (test                                        INDICATED, 

ALL



             code = 2821)                                        TESTING PERFORM

ED



                                                                 [facility] PATH

OLOGY



                                                                 LAB[facility]ATOR[facility]

[place].



                                                                 [address] [place] [address] ALEX BOGGS



                                                                 DIRECTOR: CLIFTON DARBY M.D.

 [place]



                                                                 NUMBER [id number]

[id number]



                                                                 ACCREDITATION N

O.



                                                                 [id number]



HEMOGLOBIN [id number] 05:23:24





             Test Item    Value        Reference Range Interpretation Comments

 

             HEMOGLOBIN A1c (test 6.7 %        4.2-5.6      H             AMERIC

AN DIABETES



             code = 43279)                                        ASSOCIATION 

IDELINES FOR



                                                                 HGB A1C:



                                                                 PREDIABETES/INC

REASED RISK .



                                                                 . . . . . . 5.7

-6.4%



                                                                 DIAGNOSIS OF DI

ABETES . . .



                                                                 . . . . . . >=6

.5% WITH



                                                                 CONFIRMATION OR

 APPROPRIATE



                                                                 SYMPTOMS NOTE: 

ASSAY MAY BE



                                                                 AFFECTED BY



                                                                 HEMOGLOBINOPATH

IES (SICKLE



                                                                 CELL ANEMIA, S-

C DISEASE,



                                                                 OTHERS) OR ZENA

FICIALLY



                                                                 LOWERED BY DECR

EASED RED



                                                                 CELL SURVIVAL (

HEMOLYTIC



                                                                 ANEMIAS, BLOOD 

LOSS, ETC.).



                                                                 CONSIDER ALTERN

ATE TESTING



                                                                 OR LABORATORY C

ONSULTATION.



COMPREHENSIVE METABOLIC [id number] 04:45:06





             Test Item    Value        Reference Range Interpretation Comments

 

             GLUCOSE (test code = 266 MG/DL    70-99        H            



             )                                               

 

             BUN (test code = 17 MG/DL     -2208)                                               

 

             CREATININE (test 0.92 MG/DL   0.60-1.30                 



             code = 2214)                                        

 

             eGFR ( CKD-EPI) 70 ML/MIN/1.73 >60                       



             (test code = 86291)                                        

 

             CALC BUN/CREAT (test 18 RATIO     6-28                      



             code = 2235)                                        

 

             SODIUM (test code = 136 MEQ/L    133-146                   



             )                                               

 

             POTASSIUM (test code 5.3 MEQ/L    3.5-5.4                   



             = )                                             

 

             CHLORIDE (test code 98 MEQ/L                         



             = )                                             

 

             CARBON DIOXIDE (test 22 MEQ/L     19-31                     



             code = 2206)                                        

 

             CALCIUM (test code = 8.8 MG/DL    8.5-10.5                  



             )                                               

 

             PROTEIN, TOTAL (test 7.4 G/DL     6.1-8.3                   



             code = 2229)                                        

 

             ALBUMIN (test code = 4.1 G/DL     3.5-5.2                   



             )                                               

 

             CALC GLOBULIN (test 3.3 G/DL     1.9-3.7                   



             code = 224)                                        

 

             CALC A/G RATIO (test 1.2 RATIO    1.0-2.6                   



             code = 2234)                                        

 

             BILIRUBIN, TOTAL 0.3 MG/DL    See_Comment                [Automated

 message]



             (test code = 220)                                        The syste

m which



                                                                 generated this



                                                                 result transmit

maximo



                                                                 reference range

:



                                                                 <=1.2. The refe

rence



                                                                 range was not u

sed



                                                                 to interpret th

is



                                                                 result as



                                                                 normal/abnormal

.

 

             ALKALINE PHOSPHATASE 88 U/L                           



             (test code = 2204)                                        

 

             AST (test code = 32 U/L       9-40                      



             )                                               

 

             ALT (test code = 29 U/L       5-40                      



             )                                               



HEMOGLOBIN [id number] 00:00:00





             Test Item    Value        Reference Range Interpretation Comments

 

             HEMOGLOBIN A1c (test code = 08597) 6.7 %                           

       



HEMOGLOBIN [id number] 00:00:00





             Test Item    Value        Reference Range Interpretation Comments

 

             HEMOGLOBIN A1c (test code = 31033) 6.7 %                           

       



HEMOGLOBIN [id number] 00:00:00





             Test Item    Value        Reference Range Interpretation Comments

 

             HEMOGLOBIN A1c (test code = 65969) 6.7 %                           

       



COMPREHENSIVE METABOLIC [id number] 00:00:00





             Test Item    Value        Reference Range Interpretation Comments

 

             GLUCOSE (test code = 2217) 266 MG/DL                              

 

             BUN (test code = 2208) 17 MG/DL                               

 

             CREATININE (test code = 2214) 0.92 MG/DL                           

  

 

             eGFR ( CKD-EPI) (test code 70 ML/MIN/1.73                      

     



             = 49464)                                            

 

             CALC BUN/CREAT (test code = 18 RATIO                               



             )                                               

 

             SODIUM (test code = 2231) 136 MEQ/L                              

 

             POTASSIUM (test code = 2228) 5.3 MEQ/L                             

 

 

             CHLORIDE (test code = 2215) 98 MEQ/L                               

 

             CARBON DIOXIDE (test code = 22 MEQ/L                               



             )                                               

 

             CALCIUM (test code = ) 8.8 MG/DL                              

 

             PROTEIN, TOTAL (test code = 7.4 G/DL                               



             )                                               

 

             ALBUMIN (test code = 220) 4.1 G/DL                               

 

             CALC GLOBULIN (test code = 3.3 G/DL                               



             )                                               

 

             CALC A/G RATIO (test code = 1.2 RATIO                              



             )                                               

 

             BILIRUBIN, TOTAL (test code = 0.3 MG/DL                            

  



             )                                               

 

             ALKALINE PHOSPHATASE (test 88 U/L                                 



             code = 2204)                                        

 

             AST (test code = 2218) 32 U/L                                 

 

             ALT (test code = 2219) 29 U/L                                 



COMPREHENSIVE METABOLIC [id number] 00:00:00





             Test Item    Value        Reference Range Interpretation Comments

 

             GLUCOSE (test code = 2217) 266 MG/DL                              

 

             BUN (test code = 2208) 17 MG/DL                               

 

             CREATININE (test code = 2214) 0.92 MG/DL                           

  

 

             eGFR ( CKD-EPI) (test code 70 ML/MIN/1.73                      

     



             = 08102)                                            

 

             CALC BUN/CREAT (test code = 18 RATIO                               



             2235)                                               

 

             SODIUM (test code = 2231) 136 MEQ/L                              

 

             POTASSIUM (test code = 2228) 5.3 MEQ/L                             

 

 

             CHLORIDE (test code = 2215) 98 MEQ/L                               

 

             CARBON DIOXIDE (test code = 22 MEQ/L                               



             )                                               

 

             CALCIUM (test code = 2209) 8.8 MG/DL                              

 

             PROTEIN, TOTAL (test code = 7.4 G/DL                               



             )                                               

 

             ALBUMIN (test code = 2201) 4.1 G/DL                               

 

             CALC GLOBULIN (test code = 3.3 G/DL                               



             0)                                               

 

             CALC A/G RATIO (test code = 1.2 RATIO                              



             4)                                               

 

             BILIRUBIN, TOTAL (test code = 0.3 MG/DL                            

  



             )                                               

 

             ALKALINE PHOSPHATASE (test 88 U/L                                 



             code = 2204)                                        

 

             AST (test code = 2218) 32 U/L                                 

 

             ALT (test code = 2219) 29 U/L                                 



[id number] 00:00:00





             Test Item    Value        Reference Range Interpretation Comments

 

             TSH, THIRD GENERATION (test <0.010 UIU/ML                          

 



             code = 2821)                                        



[id number] 00:00:00





             Test Item    Value        Reference Range Interpretation Comments

 

             TSH, THIRD GENERATION (test <0.010 UIU/ML                          

 



             code = 2821)                                        



[id number] 00:00:00





             Test Item    Value        Reference Range Interpretation Comments

 

             TSH, THIRD GENERATION (test <0.010 UIU/ML                          

 



             code = 2821)                                        



HEMOGLOBIN [id number] 00:00:00





             Test Item    Value        Reference Range Interpretation Comments

 

             HEMOGLOBIN A1c (test code = 59542) 6.7 %                           

       



HEMOGLOBIN [id number] 00:00:00





             Test Item    Value        Reference Range Interpretation Comments

 

             HEMOGLOBIN A1c (test code = 44851) 6.7 %                           

       



COMPREHENSIVE METABOLIC [id number] 00:00:00





             Test Item    Value        Reference Range Interpretation Comments

 

             GLUCOSE (test code = 2217) 266 MG/DL                              

 

             BUN (test code = 2208) 17 MG/DL                               

 

             CREATININE (test code = 2214) 0.92 MG/DL                           

  

 

             eGFR ( CKD-EPI) (test code 70 ML/MIN/1.73                      

     



             = 01178)                                            

 

             CALC BUN/CREAT (test code = 18 RATIO                               



             2235)                                               

 

             SODIUM (test code = 2231) 136 MEQ/L                              

 

             POTASSIUM (test code = 2228) 5.3 MEQ/L                             

 

 

             CHLORIDE (test code = 2215) 98 MEQ/L                               

 

             CARBON DIOXIDE (test code = 22 MEQ/L                               



             )                                               

 

             CALCIUM (test code = 2209) 8.8 MG/DL                              

 

             PROTEIN, TOTAL (test code = 7.4 G/DL                               



             )                                               

 

             ALBUMIN (test code = 2201) 4.1 G/DL                               

 

             CALC GLOBULIN (test code = 3.3 G/DL                               



             )                                               

 

             CALC A/G RATIO (test code = 1.2 RATIO                              



             )                                               

 

             BILIRUBIN, TOTAL (test code = 0.3 MG/DL                            

  



             )                                               

 

             ALKALINE PHOSPHATASE (test 88 U/L                                 



             code = 2204)                                        

 

             AST (test code = 2218) 32 U/L                                 

 

             ALT (test code = 2219) 29 U/L                                 



[id number] 00:00:00





             Test Item    Value        Reference Range Interpretation Comments

 

             TSH, THIRD GENERATION (test <0.010 UIU/ML                          

 



             code = 2821)                                        



[id number] 00:00:00





             Test Item    Value        Reference Range Interpretation Comments

 

             TSH, THIRD GENERATION (test <0.010 UIU/ML                          

 



             code = 2821)                                        



HEMOGLOBIN [id number] 00:00:00





             Test Item    Value        Reference Range Interpretation Comments

 

             HEMOGLOBIN A1c (test code = 31900) 6.7 %                           

       



HEMOGLOBIN [id number] 00:00:00





             Test Item    Value        Reference Range Interpretation Comments

 

             HEMOGLOBIN A1c (test code = 72716) 6.7 %                           

       



HEMOGLOBIN [id number] 00:00:00





             Test Item    Value        Reference Range Interpretation Comments

 

             HEMOGLOBIN A1c (test code = 47569) 6.7 %                           

       



COMPREHENSIVE METABOLIC [id number] 00:00:00





             Test Item    Value        Reference Range Interpretation Comments

 

             GLUCOSE (test code = 2217) 266 MG/DL                              

 

             BUN (test code = 2208) 17 MG/DL                               

 

             CREATININE (test code = 2214) 0.92 MG/DL                           

  

 

             eGFR ( CKD-EPI) (test code 70 ML/MIN/1.73                      

     



             = 50366)                                            

 

             CALC BUN/CREAT (test code = 18 RATIO                               



             2235)                                               

 

             SODIUM (test code = 2231) 136 MEQ/L                              

 

             POTASSIUM (test code = 2228) 5.3 MEQ/L                             

 

 

             CHLORIDE (test code = 2215) 98 MEQ/L                               

 

             CARBON DIOXIDE (test code = 22 MEQ/L                               



             220)                                               

 

             CALCIUM (test code = 2209) 8.8 MG/DL                              

 

             PROTEIN, TOTAL (test code = 7.4 G/DL                               



             )                                               

 

             ALBUMIN (test code = 2201) 4.1 G/DL                               

 

             CALC GLOBULIN (test code = 3.3 G/DL                               



             2240)                                               

 

             CALC A/G RATIO (test code = 1.2 RATIO                              



             2234)                                               

 

             BILIRUBIN, TOTAL (test code = 0.3 MG/DL                            

  



             )                                               

 

             ALKALINE PHOSPHATASE (test 88 U/L                                 



             code = 2204)                                        

 

             AST (test code = 2218) 32 U/L                                 

 

             ALT (test code = 2219) 29 U/L                                 



COMPREHENSIVE METABOLIC [id number] 00:00:00





             Test Item    Value        Reference Range Interpretation Comments

 

             GLUCOSE (test code = 2217) 266 MG/DL                              

 

             BUN (test code = 2208) 17 MG/DL                               

 

             CREATININE (test code = 2214) 0.92 MG/DL                           

  

 

             eGFR ( CKD-EPI) (test code 70 ML/MIN/1.73                      

     



             = 31309)                                            

 

             CALC BUN/CREAT (test code = 18 RATIO                               



             2235)                                               

 

             SODIUM (test code = 2231) 136 MEQ/L                              

 

             POTASSIUM (test code = 2228) 5.3 MEQ/L                             

 

 

             CHLORIDE (test code = 2215) 98 MEQ/L                               

 

             CARBON DIOXIDE (test code = 22 MEQ/L                               



             )                                               

 

             CALCIUM (test code = 2209) 8.8 MG/DL                              

 

             PROTEIN, TOTAL (test code = 7.4 G/DL                               



             )                                               

 

             ALBUMIN (test code = 2201) 4.1 G/DL                               

 

             CALC GLOBULIN (test code = 3.3 G/DL                               



             2240)                                               

 

             CALC A/G RATIO (test code = 1.2 RATIO                              



             2234)                                               

 

             BILIRUBIN, TOTAL (test code = 0.3 MG/DL                            

  



             )                                               

 

             ALKALINE PHOSPHATASE (test 88 U/L                                 



             code = 2204)                                        

 

             AST (test code = 2218) 32 U/L                                 

 

             ALT (test code = 2219) 29 U/L                                 



[id number] 00:00:00





             Test Item    Value        Reference Range Interpretation Comments

 

             TSH, THIRD GENERATION (test <0.010 UIU/ML                          

 



             code = 2821)                                        



 00:00:00





             Test Item    Value        Reference Range Interpretation Comments

 

             TSH, THIRD GENERATION (test <0.010 UIU/ML                          

 



             code = 2821)                                        



[id number] 00:00:00





             Test Item    Value        Reference Range Interpretation Comments

 

             TSH, THIRD GENERATION (test <0.010 UIU/ML                          

 



             code = 2821)                                        



TSH, THIRD [id number] 06:18:54





             Test Item    Value        Reference Range Interpretation Comments

 

             TSH, THIRD GENERATION (test code 0.190 UIU/ML 0.400-4.100  L       

     



             = 2821)                                             



HEMOGLOBIN [id number] 03:11:56





             Test Item    Value        Reference Range Interpretation Comments

 

             HEMOGLOBIN A1c (test 8.4 %        4.2-5.6      H             AMERIC

AN DIABETES



             code = 09016)                                        ASSOCIATION 

IDELINES FOR



                                                                 HGB A1C:



                                                                 PREDIABETES/INC

REASED RISK .



                                                                 . . . . . . 5.7

-6.4%



                                                                 DIAGNOSIS OF DI

ABETES . . .



                                                                 . . . . . . >=6

.5% WITH



                                                                 CONFIRMATION OR

 APPROPRIATE



                                                                 SYMPTOMS NOTE: 

ASSAY MAY BE



                                                                 AFFECTED BY



                                                                 HEMOGLOBINOPATH

IES (SICKLE



                                                                 CELL ANEMIA, S-

C DISEASE,



                                                                 OTHERS) OR ZENA

FICIALLY



                                                                 LOWERED BY DECR

EASED RED



                                                                 CELL SURVIVAL (

HEMOLYTIC



                                                                 ANEMIAS, BLOOD 

LOSS, ETC.).



                                                                 CONSIDER ALTERN

ATE TESTING



                                                                 OR LABORATORY C

ONSULTATION.



COMPREHENSIVE METABOLIC [id number] 03:11:06





             Test Item    Value        Reference Range Interpretation Comments

 

             GLUCOSE (test code = 141 MG/DL    70-99        H            



             )                                               

 

             BUN (test code = 31 MG/DL     8-23         H            



             )                                               

 

             CREATININE (test 1.07 MG/DL   0.60-1.30                 



             code = 2214)                                        

 

             eGFR ( CKD-EPI) 59 ML/MIN/1.73 >60          L            



             (test code = 15374)                                        

 

             CALC BUN/CREAT (test 29 RATIO     6-28         H            



             code = 2235)                                        

 

             SODIUM (test code = 141 MEQ/L    133-146                   



             )                                               

 

             POTASSIUM (test code 4.7 MEQ/L    3.5-5.4                   



             = 8)                                             

 

             CHLORIDE (test code 99 MEQ/L                         



             = 5)                                             

 

             CARBON DIOXIDE (test 26 MEQ/L     19-31                     



             code = 2206)                                        

 

             CALCIUM (test code = 8.8 MG/DL    8.5-10.5                  



             )                                               

 

             PROTEIN, TOTAL (test 8.0 G/DL     6.1-8.3                   



             code = 2229)                                        

 

             ALBUMIN (test code = 4.5 G/DL     3.5-5.2                   



             )                                               

 

             CALC GLOBULIN (test 3.5 G/DL     1.9-3.7                   



             code = 2240)                                        

 

             CALC A/G RATIO (test 1.3 RATIO    1.0-2.6                   



             code = 2234)                                        

 

             BILIRUBIN, TOTAL 0.4 MG/DL    See_Comment                [Automated

 message]



             (test code = 2207)                                        The syste

m which



                                                                 generated this



                                                                 result transmit

maximo



                                                                 reference range

:



                                                                 <=1.2. The refe

rence



                                                                 range was not u

sed



                                                                 to interpret th

is



                                                                 result as



                                                                 normal/abnormal

.

 

             ALKALINE PHOSPHATASE 67 U/L                           



             (test code = 2204)                                        

 

             AST (test code = 49 U/L       9-40         H            



             )                                               

 

             ALT (test code = 41 U/L       5-40         H            



             )                                               



LIPID [id number] 03:11:06





             Test Item    Value        Reference Range Interpretation Comments

 

             CHOLESTEROL (test 113 MG/DL    <200                      



             code = 2210)                                        

 

             TRIGLYCERIDES (test 165 MG/DL    <150         H            



             code = 2232)                                        

 

             HDL CHOLESTEROL (test 39 MG/DL     >39          L            



             code = 2220)                                        

 

             CALC LDL CHOL (test 50 MG/DL     <100                       NOTE: C

ALCULATED LDL



             code = 2237)                                        IS BASED ON



                                                                 CAROLANN-FUNES 

METHOD



                                                                 WHICHINCLUDES



                                                                 ADJUSTABLE



                                                                 TRIGLYCERIDE:VL

DL



                                                                 CHOLESTEROL RAT

IO.THIS



                                                                 FACTOR VARIES B

Y



                                                                 MEASURED TRIGLY

CERIDE



                                                                 AND NON-HDLCHOL

ESTEROL



                                                                 CONCENTRATIONS 

WITH



                                                                 INCREASED CALCU

LATED



                                                                 LDL SEENIN HIGH

ER



                                                                 TRIGLYCERIDE OR

 LOWER



                                                                 NON-HDL SPECIME

NS. FOR



                                                                 MOREINFORMATION

, SEE



                                                                 CLIENT ANNOUNCE

MENT AT



                                                                 [email or web address]

[email or web address]



                                                                 /[email or web address]

 

             RISK RATIO LDL/HDL 1.28 RATIO   <3.22                     



             (test code = 2238)                                        



IRON, [id number] 03:11:06





             Test Item    Value        Reference Range Interpretation Comments

 

             IRON, SERUM (test 59 UG/DL                          UNLESS OT

HERWISE



             code = 2222)                                        INDICATED, ALL 

TESTING



                                                                 PERFORMED ATCLI

NICAL



                                                                 PATHOLOGY LABOR

[facility],



                                                                 INC. [address] [place] [id number] [facility]

[employer]



                                                                 DIRECTOR: CLIFTON DARBY M.D.

 CESARIA



                                                                 NUMBER [id number]

[id number]



                                                                 ACCREDITATION N

O. [id number]



HEMOGLOBIN [id number] 00:00:00





             Test Item    Value        Reference Range Interpretation Comments

 

             HEMOGLOBIN A1c (test code = 95694) 8.4 %                           

       



HEMOGLOBIN [id number] 00:00:00





             Test Item    Value        Reference Range Interpretation Comments

 

             HEMOGLOBIN A1c (test code = 60866) 8.4 %                           

       



HEMOGLOBIN [id number] 00:00:00





             Test Item    Value        Reference Range Interpretation Comments

 

             HEMOGLOBIN A1c (test code = 95695) 8.4 %                           

       



COMPREHENSIVE METABOLIC [id number] 00:00:00





             Test Item    Value        Reference Range Interpretation Comments

 

             GLUCOSE (test code = 2217) 141 MG/DL                              

 

             BUN (test code = 2208) 31 MG/DL                               

 

             CREATININE (test code = 2214) 1.07 MG/DL                           

  

 

             eGFR ( CKD-EPI) (test code 59 ML/MIN/1.73                      

     



             = 41728)                                            

 

             CALC BUN/CREAT (test code = 29 RATIO                               



             2235)                                               

 

             SODIUM (test code = 2231) 141 MEQ/L                              

 

             POTASSIUM (test code = 2228) 4.7 MEQ/L                             

 

 

             CHLORIDE (test code = 2215) 99 MEQ/L                               

 

             CARBON DIOXIDE (test code = 26 MEQ/L                               



             )                                               

 

             CALCIUM (test code = 2209) 8.8 MG/DL                              

 

             PROTEIN, TOTAL (test code = 8.0 G/DL                               



             )                                               

 

             ALBUMIN (test code = 2201) 4.5 G/DL                               

 

             CALC GLOBULIN (test code = 3.5 G/DL                               



             2240)                                               

 

             CALC A/G RATIO (test code = 1.3 RATIO                              



             2234)                                               

 

             BILIRUBIN, TOTAL (test code = 0.4 MG/DL                            

  



             )                                               

 

             ALKALINE PHOSPHATASE (test 67 U/L                                 



             code = 2204)                                        

 

             AST (test code = 2218) 49 U/L                                 

 

             ALT (test code = 2219) 41 U/L                                 



COMPREHENSIVE METABOLIC [id number] 00:00:00





             Test Item    Value        Reference Range Interpretation Comments

 

             GLUCOSE (test code = 2217) 141 MG/DL                              

 

             BUN (test code = 2208) 31 MG/DL                               

 

             CREATININE (test code = 2214) 1.07 MG/DL                           

  

 

             eGFR ( CKD-EPI) (test code 59 ML/MIN/1.73                      

     



             = 70334)                                            

 

             CALC BUN/CREAT (test code = 29 RATIO                               



             2235)                                               

 

             SODIUM (test code = 2231) 141 MEQ/L                              

 

             POTASSIUM (test code = 2228) 4.7 MEQ/L                             

 

 

             CHLORIDE (test code = 2215) 99 MEQ/L                               

 

             CARBON DIOXIDE (test code = 26 MEQ/L                               



             )                                               

 

             CALCIUM (test code = 2209) 8.8 MG/DL                              

 

             PROTEIN, TOTAL (test code = 8.0 G/DL                               



             )                                               

 

             ALBUMIN (test code = 2201) 4.5 G/DL                               

 

             CALC GLOBULIN (test code = 3.5 G/DL                               



             )                                               

 

             CALC A/G RATIO (test code = 1.3 RATIO                              



             4)                                               

 

             BILIRUBIN, TOTAL (test code = 0.4 MG/DL                            

  



             )                                               

 

             ALKALINE PHOSPHATASE (test 67 U/L                                 



             code = 2204)                                        

 

             AST (test code = 2218) 49 U/L                                 

 

             ALT (test code = 2219) 41 U/L                                 



LIPID [id number] 00:00:00





             Test Item    Value        Reference Range Interpretation Comments

 

             CHOLESTEROL (test code = 2210) 113 MG/DL                           

   

 

             TRIGLYCERIDES (test code = 2232) 165 MG/DL                         

     

 

             HDL CHOLESTEROL (test code = 2220) 39 MG/DL                        

       

 

             CALC LDL CHOL (test code = 2237) 50 MG/DL                          

     

 

             RISK RATIO LDL/HDL (test code = 1.28 RATIO                         

    



             2238)                                               



LIPID [id number] 00:00:00





             Test Item    Value        Reference Range Interpretation Comments

 

             CHOLESTEROL (test code = 2210) 113 MG/DL                           

   

 

             TRIGLYCERIDES (test code = 2232) 165 MG/DL                         

     

 

             HDL CHOLESTEROL (test code = 2220) 39 MG/DL                        

       

 

             CALC LDL CHOL (test code = 2237) 50 MG/DL                          

     

 

             RISK RATIO LDL/HDL (test code = 1.28 RATIO                         

    



             2238)                                               



[id number] 00:00:00





             Test Item    Value        Reference Range Interpretation Comments

 

             TSH, THIRD GENERATION (test code 0.190 UIU/ML                      

     



             = 2821)                                             



[id number] 00:00:00





             Test Item    Value        Reference Range Interpretation Comments

 

             TSH, THIRD GENERATION (test code 0.190 UIU/ML                      

     



             = 2821)                                             



[id number] 00:00:00





             Test Item    Value        Reference Range Interpretation Comments

 

             TSH, THIRD GENERATION (test code 0.190 UIU/ML                      

     



             = 2821)                                             



IRON, [id number] 00:00:00





             Test Item    Value        Reference Range Interpretation Comments

 

             IRON, SERUM (test code = 2222) 59 UG/DL                            

   



IRON, [id number] 00:00:00





             Test Item    Value        Reference Range Interpretation Comments

 

             IRON, SERUM (test code = 2222) 59 UG/DL                            

   



HEMOGLOBIN [id number] 00:00:00





             Test Item    Value        Reference Range Interpretation Comments

 

             HEMOGLOBIN A1c (test code = 60874) 8.4 %                           

       



HEMOGLOBIN [id number] 00:00:00





             Test Item    Value        Reference Range Interpretation Comments

 

             HEMOGLOBIN A1c (test code = 94749) 8.4 %                           

       



COMPREHENSIVE METABOLIC [id number] 00:00:00





             Test Item    Value        Reference Range Interpretation Comments

 

             GLUCOSE (test code = 2217) 141 MG/DL                              

 

             BUN (test code = 2208) 31 MG/DL                               

 

             CREATININE (test code = 2214) 1.07 MG/DL                           

  

 

             eGFR ( CKD-EPI) (test code 59 ML/MIN/1.73                      

     



             = 46205)                                            

 

             CALC BUN/CREAT (test code = 29 RATIO                               



             2235)                                               

 

             SODIUM (test code = 2231) 141 MEQ/L                              

 

             POTASSIUM (test code = 2228) 4.7 MEQ/L                             

 

 

             CHLORIDE (test code = 2215) 99 MEQ/L                               

 

             CARBON DIOXIDE (test code = 26 MEQ/L                               



             )                                               

 

             CALCIUM (test code = 2209) 8.8 MG/DL                              

 

             PROTEIN, TOTAL (test code = 8.0 G/DL                               



             )                                               

 

             ALBUMIN (test code = 2201) 4.5 G/DL                               

 

             CALC GLOBULIN (test code = 3.5 G/DL                               



             2240)                                               

 

             CALC A/G RATIO (test code = 1.3 RATIO                              



             2234)                                               

 

             BILIRUBIN, TOTAL (test code = 0.4 MG/DL                            

  



             )                                               

 

             ALKALINE PHOSPHATASE (test 67 U/L                                 



             code = 2204)                                        

 

             AST (test code = 2218) 49 U/L                                 

 

             ALT (test code = 2219) 41 U/L                                 



LIPID [id number] 00:00:00





             Test Item    Value        Reference Range Interpretation Comments

 

             CHOLESTEROL (test code = 2210) 113 MG/DL                           

   

 

             TRIGLYCERIDES (test code = 2232) 165 MG/DL                         

     

 

             HDL CHOLESTEROL (test code = 2220) 39 MG/DL                        

       

 

             CALC LDL CHOL (test code = 2237) 50 MG/DL                          

     

 

             RISK RATIO LDL/HDL (test code = 1.28 RATIO                         

    



             2238)                                               



[id number] 00:00:00





             Test Item    Value        Reference Range Interpretation Comments

 

             TSH, THIRD GENERATION (test code 0.190 UIU/ML                      

     



             = 2821)                                             



[id number] 00:00:00





             Test Item    Value        Reference Range Interpretation Comments

 

             TSH, THIRD GENERATION (test code 0.190 UIU/ML                      

     



             = 2821)                                             



IRON, [id number] 00:00:00





             Test Item    Value        Reference Range Interpretation Comments

 

             IRON, SERUM (test code = 2222) 59 UG/DL                            

   



HEMOGLOBIN [id number] 00:00:00





             Test Item    Value        Reference Range Interpretation Comments

 

             HEMOGLOBIN A1c (test code = 46629) 8.4 %                           

       



HEMOGLOBIN [id number] 00:00:00





             Test Item    Value        Reference Range Interpretation Comments

 

             HEMOGLOBIN A1c (test code = 54587) 8.4 %                           

       



COMPREHENSIVE METABOLIC [id number] 00:00:00





             Test Item    Value        Reference Range Interpretation Comments

 

             GLUCOSE (test code = 2217) 141 MG/DL                              

 

             BUN (test code = 2208) 31 MG/DL                               

 

             CREATININE (test code = 2214) 1.07 MG/DL                           

  

 

             eGFR ( CKD-EPI) (test code 59 ML/MIN/1.73                      

     



             = 45299)                                            

 

             CALC BUN/CREAT (test code = 29 RATIO                               



             2235)                                               

 

             SODIUM (test code = 2231) 141 MEQ/L                              

 

             POTASSIUM (test code = 2228) 4.7 MEQ/L                             

 

 

             CHLORIDE (test code = 2215) 99 MEQ/L                               

 

             CARBON DIOXIDE (test code = 26 MEQ/L                               



             )                                               

 

             CALCIUM (test code = 2209) 8.8 MG/DL                              

 

             PROTEIN, TOTAL (test code = 8.0 G/DL                               



             )                                               

 

             ALBUMIN (test code = 2201) 4.5 G/DL                               

 

             CALC GLOBULIN (test code = 3.5 G/DL                               



             2240)                                               

 

             CALC A/G RATIO (test code = 1.3 RATIO                              



             )                                               

 

             BILIRUBIN, TOTAL (test code = 0.4 MG/DL                            

  



             )                                               

 

             ALKALINE PHOSPHATASE (test 67 U/L                                 



             code = 2204)                                        

 

             AST (test code = 2218) 49 U/L                                 

 

             ALT (test code = 2219) 41 U/L                                 



LIPID [id number] 00:00:00





             Test Item    Value        Reference Range Interpretation Comments

 

             CHOLESTEROL (test code = 2210) 113 MG/DL                           

   

 

             TRIGLYCERIDES (test code = 2232) 165 MG/DL                         

     

 

             HDL CHOLESTEROL (test code = 2220) 39 MG/DL                        

       

 

             CALC LDL CHOL (test code = 2237) 50 MG/DL                          

     

 

             RISK RATIO LDL/HDL (test code = 1.28 RATIO                         

    



             8)                                               



[id number] 00:00:00





             Test Item    Value        Reference Range Interpretation Comments

 

             TSH, THIRD GENERATION (test code 0.190 UIU/ML                      

     



             = 2821)                                             



[id number] 00:00:00





             Test Item    Value        Reference Range Interpretation Comments

 

             TSH, THIRD GENERATION (test code 0.190 UIU/ML                      

     



             = 2821)                                             



IRON, [id number] 00:00:00





             Test Item    Value        Reference Range Interpretation Comments

 

             IRON, SERUM (test code = 2222) 59 UG/DL                            

   



HEMOGLOBIN [id number] 00:00:00





             Test Item    Value        Reference Range Interpretation Comments

 

             HEMOGLOBIN A1c (test code = 56373) 8.4 %                           

       



HEMOGLOBIN [id number] 00:00:00





             Test Item    Value        Reference Range Interpretation Comments

 

             HEMOGLOBIN A1c (test code = 49667) 8.4 %                           

       



HEMOGLOBIN [id number] 00:00:00





             Test Item    Value        Reference Range Interpretation Comments

 

             HEMOGLOBIN A1c (test code = 53257) 8.4 %                           

       



COMPREHENSIVE METABOLIC [id number] 00:00:00





             Test Item    Value        Reference Range Interpretation Comments

 

             GLUCOSE (test code = 2217) 141 MG/DL                              

 

             BUN (test code = 2208) 31 MG/DL                               

 

             CREATININE (test code = 2214) 1.07 MG/DL                           

  

 

             eGFR ( CKD-EPI) (test code 59 ML/MIN/1.73                      

     



             = 27992)                                            

 

             CALC BUN/CREAT (test code = 29 RATIO                               



             5)                                               

 

             SODIUM (test code = 2231) 141 MEQ/L                              

 

             POTASSIUM (test code = 2228) 4.7 MEQ/L                             

 

 

             CHLORIDE (test code = 2215) 99 MEQ/L                               

 

             CARBON DIOXIDE (test code = 26 MEQ/L                               



             )                                               

 

             CALCIUM (test code = 2209) 8.8 MG/DL                              

 

             PROTEIN, TOTAL (test code = 8.0 G/DL                               



             )                                               

 

             ALBUMIN (test code = 2201) 4.5 G/DL                               

 

             CALC GLOBULIN (test code = 3.5 G/DL                               



             0)                                               

 

             CALC A/G RATIO (test code = 1.3 RATIO                              



             )                                               

 

             BILIRUBIN, TOTAL (test code = 0.4 MG/DL                            

  



             )                                               

 

             ALKALINE PHOSPHATASE (test 67 U/L                                 



             code = 2204)                                        

 

             AST (test code = 2218) 49 U/L                                 

 

             ALT (test code = 2219) 41 U/L                                 



COMPREHENSIVE METABOLIC [id number] 00:00:00





             Test Item    Value        Reference Range Interpretation Comments

 

             GLUCOSE (test code = 2217) 141 MG/DL                              

 

             BUN (test code = 2208) 31 MG/DL                               

 

             CREATININE (test code = 2214) 1.07 MG/DL                           

  

 

             eGFR ( CKD-EPI) (test code 59 ML/MIN/1.73                      

     



             = 55110)                                            

 

             CALC BUN/CREAT (test code = 29 RATIO                               



             2235)                                               

 

             SODIUM (test code = 2231) 141 MEQ/L                              

 

             POTASSIUM (test code = 2228) 4.7 MEQ/L                             

 

 

             CHLORIDE (test code = 2215) 99 MEQ/L                               

 

             CARBON DIOXIDE (test code = 26 MEQ/L                               



             )                                               

 

             CALCIUM (test code = 2209) 8.8 MG/DL                              

 

             PROTEIN, TOTAL (test code = 8.0 G/DL                               



             )                                               

 

             ALBUMIN (test code = 220) 4.5 G/DL                               

 

             CALC GLOBULIN (test code = 3.5 G/DL                               



             )                                               

 

             CALC A/G RATIO (test code = 1.3 RATIO                              



             4)                                               

 

             BILIRUBIN, TOTAL (test code = 0.4 MG/DL                            

  



             )                                               

 

             ALKALINE PHOSPHATASE (test 67 U/L                                 



             code = 2204)                                        

 

             AST (test code = 2218) 49 U/L                                 

 

             ALT (test code = 2219) 41 U/L                                 



LIPID [id number] 00:00:00





             Test Item    Value        Reference Range Interpretation Comments

 

             CHOLESTEROL (test code = 2210) 113 MG/DL                           

   

 

             TRIGLYCERIDES (test code = 2232) 165 MG/DL                         

     

 

             HDL CHOLESTEROL (test code = 2220) 39 MG/DL                        

       

 

             CALC LDL CHOL (test code = 2237) 50 MG/DL                          

     

 

             RISK RATIO LDL/HDL (test code = 1.28 RATIO                         

    



             2238)                                               



LIPID [id number] 00:00:00





             Test Item    Value        Reference Range Interpretation Comments

 

             CHOLESTEROL (test code = 2210) 113 MG/DL                           

   

 

             TRIGLYCERIDES (test code = 2232) 165 MG/DL                         

     

 

             HDL CHOLESTEROL (test code = 2220) 39 MG/DL                        

       

 

             CALC LDL CHOL (test code = 2237) 50 MG/DL                          

     

 

             RISK RATIO LDL/HDL (test code = 1.28 RATIO                         

    



             2238)                                               



[id number] 00:00:00





             Test Item    Value        Reference Range Interpretation Comments

 

             TSH, THIRD GENERATION (test code 0.190 UIU/ML                      

     



             = 2821)                                             



[id number] 00:00:00





             Test Item    Value        Reference Range Interpretation Comments

 

             TSH, THIRD GENERATION (test code 0.190 UIU/ML                      

     



             = 2821)                                             



[id number] 00:00:00





             Test Item    Value        Reference Range Interpretation Comments

 

             TSH, THIRD GENERATION (test code 0.190 UIU/ML                      

     



             = 2821)                                             



IRON, [id number] 00:00:00





             Test Item    Value        Reference Range Interpretation Comments

 

             IRON, SERUM (test code = 2222) 59 UG/DL                            

   



IRON, [id number] 00:00:00





             Test Item    Value        Reference Range Interpretation Comments

 

             IRON, SERUM (test code = 2222) 59 UG/DL                            

   



ALBUMIN/CREATININE RATIO, URINE, [id number] 05:57:19





             Test Item    Value        Reference Range Interpretation Comments

 

             CREATININE, URINE, 212.0 MG/DL  NOT ESTAB                 



             CONC. (test code =                                        



             2072)                                               

 

             ALBUMIN, URINE, 16.0 MG/DL                              Note: Test 

name is



             RANDOM (test code                                        changed to

 Urine Albumin



             = 07706)                                            from Microalbum

in in



                                                                 accordance with

 ADA and



                                                                 NKF Guidelines,

 and



                                                                 Albumin/Creatin

ine ratio



                                                                 reference inter

mariah



                                                                 reflects those



                                                                 Guidelines. Violet

lytic



                                                                 methodology is



                                                                 unchanged. No r

eference



                                                                 interval for Ra

ndom



                                                                 Urine Albumin i

s



                                                                 available.

 

             CALC         75 MG/G      <30          H             UNLESS OTHERWI

SE



             ALBUMIN/CREAT, RND                                        INDICATED

, ALL TESTING



             (test code =                                        PERFORMED ATCLI

NICAL



             [address])                                              PATHOLOGY LABOR

[facility]



                                                                 INC. [address] [place] [address]

4



                                                                 LABORATORY DIRE

CTOR:



                                                                 CLIFTON THOMSON M.D.



                                                                 CLIA NUMBER [id number]

[id number]



                                                                 CAP ACCREDITATI

ON NO.



                                                                 [id number]



HEMOGLOBIN [id number] 04:12:15





             Test Item    Value        Reference Range Interpretation Comments

 

             HEMOGLOBIN A1c (test 9.1 %        4.2-5.6      H             AMERIC

AN DIABETES



             code = 87593)                                        ASSOCIATION 

IDELIN[facility] FOR



                                                                 HGB A1C:



                                                                 PREDIABETES/INC

REASED RISK .



                                                                 . . . . . . 5.7

-6.4%



                                                                 DIAGNOSIS OF DI

ABETES . . .



                                                                 . . . . . . >=6

.5% WITH



                                                                 CONFIRMATION OR

 APPROPRIATE



                                                                 SYMPTOMS NOTE: 

ASSAY MAY BE



                                                                 AFFECTED BY



                                                                 HEMOGLOBINOPATH

IES (SICKLE



                                                                 CELL ANEMIA, S-

C DISEASE,



                                                                 OTHERS) OR ZENA

FICIALLY



                                                                 LOWERED BY DECR

EASED RED



                                                                 CELL SURVIVAL (

HEMOLYTIC



                                                                 ANEMIAS, BLOOD 

LOSS, ETC.).



                                                                 CONSIDER ALTERN

ATE TESTING



                                                                 OR LABORATORY C

ONSULTATION.



HEMOGLOBIN [id number] 00:00:00





             Test Item    Value        Reference Range Interpretation Comments

 

             HEMOGLOBIN A1c (test code = 23938) 9.1 %                           

       



HEMOGLOBIN [id number] 00:00:00





             Test Item    Value        Reference Range Interpretation Comments

 

             HEMOGLOBIN A1c (test code = 75142) 9.1 %                           

       



HEMOGLOBIN [id number] 00:00:00





             Test Item    Value        Reference Range Interpretation Comments

 

             HEMOGLOBIN A1c (test code = 60381) 9.1 %                           

       



MICROALBUMIN/CREATININE, RANDOM AND [id number] 00:00:00





             Test Item    Value        Reference Range Interpretation Comments

 

             CREATININE, URINE, CONC. (test 212.0 MG/DL                         

   



             code = 2072)                                        

 

             ALBUMIN, URINE, RANDOM (test code 16.0 MG/DL                       

      



             = 92272)                                            

 

             CALC ALBUMIN/CREAT, RND (test 75 MG/G                              

  



             code = 56928)                                        



MICROALBUMIN/CREATININE, RANDOM AND [id number] 00:00:00





             Test Item    Value        Reference Range Interpretation Comments

 

             CREATININE, URINE, CONC. (test 212.0 MG/DL                         

   



             code = 2072)                                        

 

             ALBUMIN, URINE, RANDOM (test code 16.0 MG/DL                       

      



             = 68336)                                            

 

             CALC ALBUMIN/CREAT, RND (test 75 MG/G                              

  



             code = 64713)                                        



HEMOGLOBIN [id number] 00:00:00





             Test Item    Value        Reference Range Interpretation Comments

 

             HEMOGLOBIN A1c (test code = 25657) 9.1 %                           

       



HEMOGLOBIN [id number] 00:00:00





             Test Item    Value        Reference Range Interpretation Comments

 

             HEMOGLOBIN A1c (test code = 39883) 9.1 %                           

       



MICROALBUMIN/CREATININE, RANDOM AND [id number] 00:00:00





             Test Item    Value        Reference Range Interpretation Comments

 

             CREATININE, URINE, CONC. (test 212.0 MG/DL                         

   



             code = 2072)                                        

 

             ALBUMIN, URINE, RANDOM (test code 16.0 MG/DL                       

      



             = 69833)                                            

 

             CALC ALBUMIN/CREAT, RND (test 75 MG/G                              

  



             code = 29617)                                        



HEMOGLOBIN [id number] 00:00:00





             Test Item    Value        Reference Range Interpretation Comments

 

             HEMOGLOBIN A1c (test code = 40334) 9.1 %                           

       



HEMOGLOBIN [id number] 00:00:00





             Test Item    Value        Reference Range Interpretation Comments

 

             HEMOGLOBIN A1c (test code = 92447) 9.1 %                           

       



MICROALBUMIN/CREATININE, RANDOM AND [id number] 00:00:00





             Test Item    Value        Reference Range Interpretation Comments

 

             CREATININE, URINE, CONC. (test 212.0 MG/DL                         

   



             code = 2072)                                        

 

             ALBUMIN, URINE, RANDOM (test code 16.0 MG/DL                       

      



             = 21963)                                            

 

             CALC ALBUMIN/CREAT, RND (test 75 MG/G                              

  



             code = 12752)                                        



HEMOGLOBIN [id number] 00:00:00





             Test Item    Value        Reference Range Interpretation Comments

 

             HEMOGLOBIN A1c (test code = 97896) 9.1 %                           

       



HEMOGLOBIN [id number] 00:00:00





             Test Item    Value        Reference Range Interpretation Comments

 

             HEMOGLOBIN A1c (test code = 40883) 9.1 %                           

       



HEMOGLOBIN [id number] 00:00:00





             Test Item    Value        Reference Range Interpretation Comments

 

             HEMOGLOBIN A1c (test code = 46823) 9.1 %                           

       



MICROALBUMIN/CREATININE, RANDOM AND [id number] 00:00:00





             Test Item    Value        Reference Range Interpretation Comments

 

             CREATININE, URINE, CONC. (test 212.0 MG/DL                         

   



             code = 2072)                                        

 

             ALBUMIN, URINE, RANDOM (test code 16.0 MG/DL                       

      



             = 78160)                                            

 

             CALC ALBUMIN/CREAT, RND (test 75 MG/G                              

  



             code = 95310)                                        



MICROALBUMIN/CREATININE, RANDOM AND [id number] 00:00:00





             Test Item    Value        Reference Range Interpretation Comments

 

             CREATININE, URINE, CONC. (test 212.0 MG/DL                         

   



             code = 2072)                                        

 

             ALBUMIN, URINE, RANDOM (test code 16.0 MG/DL                       

      



             = 28800)                                            

 

             CALC ALBUMIN/CREAT, RND (test 75 MG/G                              

  



             code = 92335)                                        



PATHOLOGIST SMEAR [id number] 00:00:00





             Test Item    Value        Reference Range Interpretation Comments

 

             DIAGNOSIS: (test code = 8200) (NOTE)                               

  

 

             COMMENTS: (test code = 8205) (NOTE)                                

 

 

             MICROSCOPIC DESCRIPTION: (test (NOTE)                              

   



             code = 8210)                                        

 

             PATHOLOGIST: (test code = 8250) (NOTE)                             

    

 

             CPT: (test code = 8400) 98475                                  

 

             WBC (test code = 1001) 2.7 K/UL                               

 

             RBC (test code = 1002) 3.09 M/UL                              

 

             HEMOGLOBIN (test code = 1003) 8.2 G/DL                             

  

 

             HEMATOCRIT (test code = 1004) 25.6 %                               

  

 

             MCV (test code = 1005) 82.8 fL                                

 

             MCH (test code = 1006) 26.5 PG                                

 

             MCHC (test code = 1007) 32.0 G/DL                              

 

             RDW (test code = 1038) 14.1 %                                 

 

             NEUTROPHILS (test code = 1008) 52.0 %                              

   

 

             LYMPHOCYTES (test code = 1010) 35.8 %                              

   

 

             MONOCYTES (test code = 1011) 7.2 %                                 

 

 

             EOSINOPHILS (test code = 1012) 4.2 %                               

   

 

             BASOPHILS (test code = 1013) 0.4 %                                 

 

 

             NUCLEATED RBCS (test code = 0.0 /100WBC'S                          

 



             1065)                                               

 

             PLATELET COUNT (test code = 99 K/UL                                



             1015)                                               

 

             ABSOLUTE NEUTROPHILS (test code 1.38 K/UL                          

    



             = 1066)                                             

 

             ABSOLUTE LYMPHOCYTES (test code 0.95 K/UL                          

    



             = 1067)                                             

 

             ABSOLUTE MONOCYTES (test code = 0.19 K/UL                          

    



             1068)                                               

 

             ABSOLUTE EOSINOPHILS (test code 0.11 K/UL                          

    



             = 1040)                                             

 

             ABSOLUTE BASOPHILS (test code = 0.01 K/UL                          

    



             1069)                                               

 

             ABS NUCLEATED RBCS (test code = 0.00 K/UL                          

    



             36924)                                              

 

             COMMENTS (test code = 1016) (NOTE)                                 



PATHOLOGIST SMEAR [id number] 00:00:00





             Test Item    Value        Reference Range Interpretation Comments

 

             DIAGNOSIS: (test code = 8200) (NOTE)                               

  

 

             COMMENTS: (test code = 8205) (NOTE)                                

 

 

             MICROSCOPIC DESCRIPTION: (test (NOTE)                              

   



             code = 8210)                                        

 

             PATHOLOGIST: (test code = 8250) (NOTE)                             

    

 

             CPT: (test code = 8400) 76209                                  

 

             WBC (test code = 1001) 2.7 K/UL                               

 

             RBC (test code = 1002) 3.09 M/UL                              

 

             HEMOGLOBIN (test code = 1003) 8.2 G/DL                             

  

 

             HEMATOCRIT (test code = 1004) 25.6 %                               

  

 

             MCV (test code = 1005) 82.8 fL                                

 

             MCH (test code = 1006) 26.5 PG                                

 

             MCHC (test code = 1007) 32.0 G/DL                              

 

             RDW (test code = 1038) 14.1 %                                 

 

             NEUTROPHILS (test code = 1008) 52.0 %                              

   

 

             LYMPHOCYTES (test code = 1010) 35.8 %                              

   

 

             MONOCYTES (test code = 1011) 7.2 %                                 

 

 

             EOSINOPHILS (test code = 1012) 4.2 %                               

   

 

             BASOPHILS (test code = 1013) 0.4 %                                 

 

 

             NUCLEATED RBCS (test code = 0.0 /100WBC'S                          

 



             1065)                                               

 

             PLATELET COUNT (test code = 99 K/UL                                



             1015)                                               

 

             ABSOLUTE NEUTROPHILS (test code 1.38 K/UL                          

    



             = 1066)                                             

 

             ABSOLUTE LYMPHOCYTES (test code 0.95 K/UL                          

    



             = 1067)                                             

 

             ABSOLUTE MONOCYTES (test code = 0.19 K/UL                          

    



             1068)                                               

 

             ABSOLUTE EOSINOPHILS (test code 0.11 K/UL                          

    



             = 1040)                                             

 

             ABSOLUTE BASOPHILS (test code = 0.01 K/UL                          

    



             1069)                                               

 

             ABS NUCLEATED RBCS (test code = 0.00 K/UL                          

    



             54122)                                              

 

             COMMENTS (test code = 1016) (NOTE)                                 



PATHOLOGIST SMEAR [id number] 00:00:00





             Test Item    Value        Reference Range Interpretation Comments

 

             DIAGNOSIS: (test code = 8200) (NOTE)                               

  

 

             COMMENTS: (test code = 8205) (NOTE)                                

 

 

             MICROSCOPIC DESCRIPTION: (test (NOTE)                              

   



             code = 8210)                                        

 

             PATHOLOGIST: (test code = 8250) (NOTE)                             

    

 

             CPT: (test code = 8400) 35650                                  

 

             WBC (test code = 1001) 2.7 K/UL                               

 

             RBC (test code = 1002) 3.09 M/UL                              

 

             HEMOGLOBIN (test code = 1003) 8.2 G/DL                             

  

 

             HEMATOCRIT (test code = 1004) 25.6 %                               

  

 

             MCV (test code = 1005) 82.8 fL                                

 

             MCH (test code = 1006) 26.5 PG                                

 

             MCHC (test code = 1007) 32.0 G/DL                              

 

             RDW (test code = 1038) 14.1 %                                 

 

             NEUTROPHILS (test code = 1008) 52.0 %                              

   

 

             LYMPHOCYTES (test code = 1010) 35.8 %                              

   

 

             MONOCYTES (test code = 1011) 7.2 %                                 

 

 

             EOSINOPHILS (test code = 1012) 4.2 %                               

   

 

             BASOPHILS (test code = 1013) 0.4 %                                 

 

 

             NUCLEATED RBCS (test code = 0.0 /100WBC'S                          

 



             1065)                                               

 

             PLATELET COUNT (test code = 99 K/UL                                



             1015)                                               

 

             ABSOLUTE NEUTROPHILS (test code 1.38 K/UL                          

    



             = 1066)                                             

 

             ABSOLUTE LYMPHOCYTES (test code 0.95 K/UL                          

    



             = 1067)                                             

 

             ABSOLUTE MONOCYTES (test code = 0.19 K/UL                          

    



             1068)                                               

 

             ABSOLUTE EOSINOPHILS (test code 0.11 K/UL                          

    



             = 1040)                                             

 

             ABSOLUTE BASOPHILS (test code = 0.01 K/UL                          

    



             1069)                                               

 

             ABS NUCLEATED RBCS (test code = 0.00 K/UL                          

    



             39698)                                              

 

             COMMENTS (test code = 1016) (NOTE)                                 



PATHOLOGIST SMEAR [id number] 00:00:00





             Test Item    Value        Reference Range Interpretation Comments

 

             DIAGNOSIS: (test code = 8200) (NOTE)                               

  

 

             COMMENTS: (test code = 8205) (NOTE)                                

 

 

             MICROSCOPIC DESCRIPTION: (test (NOTE)                              

   



             code = 8210)                                        

 

             PATHOLOGIST: (test code = 8250) (NOTE)                             

    

 

             CPT: (test code = 8400) 13351                                  

 

             WBC (test code = 1001) 2.7 K/UL                               

 

             RBC (test code = 1002) 3.09 M/UL                              

 

             HEMOGLOBIN (test code = 1003) 8.2 G/DL                             

  

 

             HEMATOCRIT (test code = 1004) 25.6 %                               

  

 

             MCV (test code = 1005) 82.8 fL                                

 

             MCH (test code = 1006) 26.5 PG                                

 

             MCHC (test code = 1007) 32.0 G/DL                              

 

             RDW (test code = 1038) 14.1 %                                 

 

             NEUTROPHILS (test code = 1008) 52.0 %                              

   

 

             LYMPHOCYTES (test code = 1010) 35.8 %                              

   

 

             MONOCYTES (test code = 1011) 7.2 %                                 

 

 

             EOSINOPHILS (test code = 1012) 4.2 %                               

   

 

             BASOPHILS (test code = 1013) 0.4 %                                 

 

 

             NUCLEATED RBCS (test code = 0.0 /100WBC'S                          

 



             1065)                                               

 

             PLATELET COUNT (test code = 99 K/UL                                



             1015)                                               

 

             ABSOLUTE NEUTROPHILS (test code 1.38 K/UL                          

    



             = 1066)                                             

 

             ABSOLUTE LYMPHOCYTES (test code 0.95 K/UL                          

    



             = 1067)                                             

 

             ABSOLUTE MONOCYTES (test code = 0.19 K/UL                          

    



             1068)                                               

 

             ABSOLUTE EOSINOPHILS (test code 0.11 K/UL                          

    



             = 1040)                                             

 

             ABSOLUTE BASOPHILS (test code = 0.01 K/UL                          

    



             1069)                                               

 

             ABS NUCLEATED RBCS (test code = 0.00 K/UL                          

    



             90502)                                              

 

             COMMENTS (test code = 1016) (NOTE)                                 



PATHOLOGIST SMEAR [id number] 00:00:00





             Test Item    Value        Reference Range Interpretation Comments

 

             DIAGNOSIS: (test code = 8200) (NOTE)                               

  

 

             COMMENTS: (test code = 8205) (NOTE)                                

 

 

             MICROSCOPIC DESCRIPTION: (test (NOTE)                              

   



             code = 8210)                                        

 

             PATHOLOGIST: (test code = 8250) (NOTE)                             

    

 

             CPT: (test code = 8400) 34076                                  

 

             WBC (test code = 1001) 2.7 K/UL                               

 

             RBC (test code = 1002) 3.09 M/UL                              

 

             HEMOGLOBIN (test code = 1003) 8.2 G/DL                             

  

 

             HEMATOCRIT (test code = 1004) 25.6 %                               

  

 

             MCV (test code = 1005) 82.8 fL                                

 

             MCH (test code = 1006) 26.5 PG                                

 

             MCHC (test code = 1007) 32.0 G/DL                              

 

             RDW (test code = 1038) 14.1 %                                 

 

             NEUTROPHILS (test code = 1008) 52.0 %                              

   

 

             LYMPHOCYTES (test code = 1010) 35.8 %                              

   

 

             MONOCYTES (test code = 1011) 7.2 %                                 

 

 

             EOSINOPHILS (test code = 1012) 4.2 %                               

   

 

             BASOPHILS (test code = 1013) 0.4 %                                 

 

 

             NUCLEATED RBCS (test code = 0.0 /100WBC'S                          

 



             1065)                                               

 

             PLATELET COUNT (test code = 99 K/UL                                



             1015)                                               

 

             ABSOLUTE NEUTROPHILS (test code 1.38 K/UL                          

    



             = 1066)                                             

 

             ABSOLUTE LYMPHOCYTES (test code 0.95 K/UL                          

    



             = 1067)                                             

 

             ABSOLUTE MONOCYTES (test code = 0.19 K/UL                          

    



             1068)                                               

 

             ABSOLUTE EOSINOPHILS (test code 0.11 K/UL                          

    



             = 1040)                                             

 

             ABSOLUTE BASOPHILS (test code = 0.01 K/UL                          

    



             1069)                                               

 

             ABS NUCLEATED RBCS (test code = 0.00 K/UL                          

    



             09064)                                              

 

             COMMENTS (test code = 1016) (NOTE)                                 



PATHOLOGIST SMEAR [id number] 00:00:00





             Test Item    Value        Reference Range Interpretation Comments

 

             DIAGNOSIS: (test code = 8200) (NOTE)                               

  

 

             COMMENTS: (test code = 8205) (NOTE)                                

 

 

             MICROSCOPIC DESCRIPTION: (test (NOTE)                              

   



             code = 8210)                                        

 

             PATHOLOGIST: (test code = 8250) (NOTE)                             

    

 

             CPT: (test code = 8400) 40471                                  

 

             WBC (test code = 1001) 2.7 K/UL                               

 

             RBC (test code = 1002) 3.09 M/UL                              

 

             HEMOGLOBIN (test code = 1003) 8.2 G/DL                             

  

 

             HEMATOCRIT (test code = 1004) 25.6 %                               

  

 

             MCV (test code = 1005) 82.8 fL                                

 

             MCH (test code = 1006) 26.5 PG                                

 

             MCHC (test code = 1007) 32.0 G/DL                              

 

             RDW (test code = 1038) 14.1 %                                 

 

             NEUTROPHILS (test code = 1008) 52.0 %                              

   

 

             LYMPHOCYTES (test code = 1010) 35.8 %                              

   

 

             MONOCYTES (test code = 1011) 7.2 %                                 

 

 

             EOSINOPHILS (test code = 1012) 4.2 %                               

   

 

             BASOPHILS (test code = 1013) 0.4 %                                 

 

 

             NUCLEATED RBCS (test code = 0.0 /100WBC'S                          

 



             1065)                                               

 

             PLATELET COUNT (test code = 99 K/UL                                



             1015)                                               

 

             ABSOLUTE NEUTROPHILS (test code 1.38 K/UL                          

    



             = 1066)                                             

 

             ABSOLUTE LYMPHOCYTES (test code 0.95 K/UL                          

    



             = 1067)                                             

 

             ABSOLUTE MONOCYTES (test code = 0.19 K/UL                          

    



             1068)                                               

 

             ABSOLUTE EOSINOPHILS (test code 0.11 K/UL                          

    



             = 1040)                                             

 

             ABSOLUTE BASOPHILS (test code = 0.01 K/UL                          

    



             1069)                                               

 

             ABS NUCLEATED RBCS (test code = 0.00 K/UL                          

    



             98376)                                              

 

             COMMENTS (test code = 1016) (NOTE)                                 



[id number] 00:00:00





             Test Item    Value        Reference Range Interpretation Comments

 

             FERRITIN (test code = ) 42 NG/ML                               



[id number] 00:00:00





             Test Item    Value        Reference Range Interpretation Comments

 

             FERRITIN (test code = ) 42 NG/ML                               



[id number] 00:00:00





             Test Item    Value        Reference Range Interpretation Comments

 

             TRANSFERRIN (test code = 4936) 281 MG/DL                           

   



[id number] 00:00:00





             Test Item    Value        Reference Range Interpretation Comments

 

             TRANSFERRIN (test code = 4936) 281 MG/DL                           

   



IRON BINDING CAPACITY AND IRON AND % SATURATION2021-10-20 00:00:00





             Test Item    Value        Reference Range Interpretation Comments

 

             IRON, SERUM (test code = ) 28 UG/DL                            

   

 

             UNSATURATED IBC (test code = ) 315 UG/DL                      

        

 

             CALC TOTAL IBC (test code = ) 343 UG/DL                        

      

 

             CALC % IRON SAT (test code = ) 8 %                             

       



IRON BINDING CAPACITY AND IRON AND % SATURATION2021-10-20 00:00:00





             Test Item    Value        Reference Range Interpretation Comments

 

             IRON, SERUM (test code = 2) 28 UG/DL                            

   

 

             UNSATURATED IBC (test code = 10195) 315 UG/DL                      

        

 

             CALC TOTAL IBC (test code = ) 343 UG/DL                        

      

 

             CALC % IRON SAT (test code = ) 8 %                             

       



PROTIME AND PTT2021-10-20 00:00:00





             Test Item    Value        Reference Range Interpretation Comments

 

             PROTHROMBIN TIME (PT) (test code 14.8 SECONDS                      

     



             = 1402)                                             

 

             INR (test code = 79368) 1.1                                    

 

             PTT (test code = 1403) 35.8 SECONDS                           



PROTIME AND PTT2021-10-20 00:00:00





             Test Item    Value        Reference Range Interpretation Comments

 

             PROTHROMBIN TIME (PT) (test code 14.8 SECONDS                      

     



             = 1402)                                             

 

             INR (test code = 53086) 1.1                                    

 

             PTT (test code = 1403) 35.8 SECONDS                           



RHEUMATOID FACTOR, [id number] 00:00:00





             Test Item    Value        Reference Range Interpretation Comments

 

             RHEUMATOID FACTOR, QUANT (test code <10 IU/ML                      

        



             = 3502)                                             



RHEUMATOID FACTOR, [id number] 00:00:00





             Test Item    Value        Reference Range Interpretation Comments

 

             RHEUMATOID FACTOR, QUANT (test code <10 IU/ML                      

        



             = 3502)                                             



RHEUMATOID FACTOR, [id number] 00:00:00





             Test Item    Value        Reference Range Interpretation Comments

 

             RHEUMATOID FACTOR, QUANT (test code <10 IU/ML                      

        



             = 3502)                                             



CCP IgG2021-10-20 00:00:00





             Test Item    Value        Reference Range Interpretation Comments

 

             CCP IgG (test code = 53162) <0.5 U/ML                              



CCP IgG2021-10-20 00:00:00





             Test Item    Value        Reference Range Interpretation Comments

 

             CCP IgG (test code = 28033) <0.5 U/ML                              



CCP IgG2021-10-20 00:00:00





             Test Item    Value        Reference Range Interpretation Comments

 

             CCP IgG (test code = 82560) <0.5 U/ML                              



SMITH (SM) ANTIBODY2021-10-20 00:00:00





             Test Item    Value        Reference Range Interpretation Comments

 

             MCGUIRE (Sm) ANTIBODY (test code = <0.2 AI                           

     



             71050)                                              



SMITH (SM) ANTIBODY2021-10-20 00:00:00





             Test Item    Value        Reference Range Interpretation Comments

 

             MCGUIRE (Sm) ANTIBODY (test code = <0.2 AI                           

     



             25644)                                              



DNA DS ANTIBODY2021-10-20 00:00:00





             Test Item    Value        Reference Range Interpretation Comments

 

             dsDNA ANTIBODY (test code = 4287) 1.0 IU/ML                        

      



DNA DS ANTIBODY2021-10-20 00:00:00





             Test Item    Value        Reference Range Interpretation Comments

 

             dsDNA ANTIBODY (test code = 4287) 1.0 IU/ML                        

      



FERRITIN2021-10-20 00:00:00





             Test Item    Value        Reference Range Interpretation Comments

 

             FERRITIN (test code = 2075) 42 NG/ML                               



[id number] 00:00:00





             Test Item    Value        Reference Range Interpretation Comments

 

             TRANSFERRIN (test code = 4936) 281 MG/DL                           

   



IRON BINDING CAPACITY AND IRON AND % SATURATION2021-10-20 00:00:00





             Test Item    Value        Reference Range Interpretation Comments

 

             IRON, SERUM (test code = 2222) 28 UG/DL                            

   

 

             UNSATURATED IBC (test code = 81175) 315 UG/DL                      

        

 

             CALC TOTAL IBC (test code = ) 343 UG/DL                        

      

 

             CALC % IRON SAT (test code = ) 8 %                             

       



PROTIME AND PTT2021-10-20 00:00:00





             Test Item    Value        Reference Range Interpretation Comments

 

             PROTHROMBIN TIME (PT) (test code 14.8 SECONDS                      

     



             = 1402)                                             

 

             INR (test code = 66651) 1.1                                    

 

             PTT (test code = 1403) 35.8 SECONDS                           



RHEUMATOID FACTOR, [id number] 00:00:00





             Test Item    Value        Reference Range Interpretation Comments

 

             RHEUMATOID FACTOR, QUANT (test code <10 IU/ML                      

        



             = 3502)                                             



RHEUMATOID FACTOR, [id number] 00:00:00





             Test Item    Value        Reference Range Interpretation Comments

 

             RHEUMATOID FACTOR, QUANT (test code <10 IU/ML                      

        



             = 3502)                                             



CCP IgG2021-10-20 00:00:00





             Test Item    Value        Reference Range Interpretation Comments

 

             CCP IgG (test code = 52880) <0.5 U/ML                              



CCP IgG2021-10-20 00:00:00





             Test Item    Value        Reference Range Interpretation Comments

 

             CCP IgG (test code = 48266) <0.5 U/ML                              



SMITH (SM) ANTIBODY2021-10-20 00:00:00





             Test Item    Value        Reference Range Interpretation Comments

 

             MCGUIRE (Sm) ANTIBODY (test code = <0.2 AI                           

     



             25053)                                              



DNA DS ANTIBODY2021-10-20 00:00:00





             Test Item    Value        Reference Range Interpretation Comments

 

             dsDNA ANTIBODY (test code = 4287) 1.0 IU/ML                        

      



FERRITIN2021-10-20 00:00:00





             Test Item    Value        Reference Range Interpretation Comments

 

             FERRITIN (test code = 2075) 42 NG/ML                               



[id number] 00:00:00





             Test Item    Value        Reference Range Interpretation Comments

 

             TRANSFERRIN (test code = 4936) 281 MG/DL                           

   



IRON BINDING CAPACITY AND IRON AND % SATURATION2021-10-20 00:00:00





             Test Item    Value        Reference Range Interpretation Comments

 

             IRON, SERUM (test code = 2222) 28 UG/DL                            

   

 

             UNSATURATED IBC (test code = 02245) 315 UG/DL                      

        

 

             CALC TOTAL IBC (test code = 7) 343 UG/DL                        

      

 

             CALC % IRON SAT (test code = ) 8 %                             

       



PROTIME AND PTT2021-10-20 00:00:00





             Test Item    Value        Reference Range Interpretation Comments

 

             PROTHROMBIN TIME (PT) (test code 14.8 SECONDS                      

     



             = 1402)                                             

 

             INR (test code = 39379) 1.1                                    

 

             PTT (test code = 1403) 35.8 SECONDS                           



RHEUMATOID FACTOR, [id number] 00:00:00





             Test Item    Value        Reference Range Interpretation Comments

 

             RHEUMATOID FACTOR, QUANT (test code <10 IU/ML                      

        



             = 3502)                                             



RHEUMATOID FACTOR, [id number] 00:00:00





             Test Item    Value        Reference Range Interpretation Comments

 

             RHEUMATOID FACTOR, QUANT (test code <10 IU/ML                      

        



             = 3502)                                             



CCP IgG2021-10-20 00:00:00





             Test Item    Value        Reference Range Interpretation Comments

 

             CCP IgG (test code = 46186) <0.5 U/ML                              



CCP IgG2021-10-20 00:00:00





             Test Item    Value        Reference Range Interpretation Comments

 

             CCP IgG (test code = 85369) <0.5 U/ML                              



SMITH (SM) ANTIBODY2021-10-20 00:00:00





             Test Item    Value        Reference Range Interpretation Comments

 

             MCGUIRE (Sm) ANTIBODY (test code = <0.2 AI                           

     



             85427)                                              



DNA DS ANTIBODY2021-10-20 00:00:00





             Test Item    Value        Reference Range Interpretation Comments

 

             dsDNA ANTIBODY (test code = 4287) 1.0 IU/ML                        

      



FERRITIN2021-10-20 00:00:00





             Test Item    Value        Reference Range Interpretation Comments

 

             FERRITIN (test code = ) 42 NG/ML                               



FERRITIN2021-10-20 00:00:00





             Test Item    Value        Reference Range Interpretation Comments

 

             FERRITIN (test code = ) 42 NG/ML                               



[id number] 00:00:00





             Test Item    Value        Reference Range Interpretation Comments

 

             TRANSFERRIN (test code = 4936) 281 MG/DL                           

   



[id number] 00:00:00





             Test Item    Value        Reference Range Interpretation Comments

 

             TRANSFERRIN (test code = 4936) 281 MG/DL                           

   



IRON BINDING CAPACITY AND IRON AND % SATURATION2021-10-20 00:00:00





             Test Item    Value        Reference Range Interpretation Comments

 

             IRON, SERUM (test code = ) 28 UG/DL                            

   

 

             UNSATURATED IBC (test code = ) 315 UG/DL                      

        

 

             CALC TOTAL IBC (test code = ) 343 UG/DL                        

      

 

             CALC % IRON SAT (test code = ) 8 %                             

       



IRON BINDING CAPACITY AND IRON AND % SATURATION2021-10-20 00:00:00





             Test Item    Value        Reference Range Interpretation Comments

 

             IRON, SERUM (test code = ) 28 UG/DL                            

   

 

             UNSATURATED IBC (test code = 35584) 315 UG/DL                      

        

 

             CALC TOTAL IBC (test code = ) 343 UG/DL                        

      

 

             CALC % IRON SAT (test code = ) 8 %                             

       



PROTIME AND PTT2021-10-20 00:00:00





             Test Item    Value        Reference Range Interpretation Comments

 

             PROTHROMBIN TIME (PT) (test code 14.8 SECONDS                      

     



             = 1402)                                             

 

             INR (test code = 55828) 1.1                                    

 

             PTT (test code = 1403) 35.8 SECONDS                           



PROTIME AND PTT2021-10-20 00:00:00





             Test Item    Value        Reference Range Interpretation Comments

 

             PROTHROMBIN TIME (PT) (test code 14.8 SECONDS                      

     



             = 1402)                                             

 

             INR (test code = 11257) 1.1                                    

 

             PTT (test code = 1403) 35.8 SECONDS                           



RHEUMATOID FACTOR, [id number] 00:00:00





             Test Item    Value        Reference Range Interpretation Comments

 

             RHEUMATOID FACTOR, QUANT (test code <10 IU/ML                      

        



             = 3502)                                             



RHEUMATOID FACTOR, [id number] 00:00:00





             Test Item    Value        Reference Range Interpretation Comments

 

             RHEUMATOID FACTOR, QUANT (test code <10 IU/ML                      

        



             = 3502)                                             



RHEUMATOID FACTOR, [id number] 00:00:00





             Test Item    Value        Reference Range Interpretation Comments

 

             RHEUMATOID FACTOR, QUANT (test code <10 IU/ML                      

        



             = 3502)                                             



CCP IgG2021-10-20 00:00:00





             Test Item    Value        Reference Range Interpretation Comments

 

             CCP IgG (test code = 40242) <0.5 U/ML                              



CCP IgG2021-10-20 00:00:00





             Test Item    Value        Reference Range Interpretation Comments

 

             CCP IgG (test code = 61829) <0.5 U/ML                              



CCP IgG2021-10-20 00:00:00





             Test Item    Value        Reference Range Interpretation Comments

 

             CCP IgG (test code = 32791) <0.5 U/ML                              



SMITH (SM) ANTIBODY2021-10-20 00:00:00





             Test Item    Value        Reference Range Interpretation Comments

 

             MCGUIRE (Sm) ANTIBODY (test code = <0.2 AI                           

     



             55416)                                              



SMITH (SM) ANTIBODY2021-10-20 00:00:00





             Test Item    Value        Reference Range Interpretation Comments

 

             MCGUIRE (Sm) ANTIBODY (test code = <0.2 AI                           

     



             02260)                                              



DNA DS ANTIBODY2021-10-20 00:00:00





             Test Item    Value        Reference Range Interpretation Comments

 

             dsDNA ANTIBODY (test code = 4287) 1.0 IU/ML                        

      



DNA DS ANTIBODY2021-10-20 00:00:00





             Test Item    Value        Reference Range Interpretation Comments

 

             dsDNA ANTIBODY (test code = 4287) 1.0 IU/ML                        

      



CULTURE, URINE2021-10-16 00:00:00





             Test Item    Value        Reference Range Interpretation Comments

 

             CULTURE, URINE (test SPECIMEN NUMBER:                           



             code = 51165) [id number]                              



CULTURE, URINE2021-10-16 00:00:00





             Test Item    Value        Reference Range Interpretation Comments

 

             CULTURE, URINE (test SPECIMEN NUMBER:                           



             code = 43821) [id number]                              



CULTURE, URINE2021-10-16 00:00:00





             Test Item    Value        Reference Range Interpretation Comments

 

             CULTURE, URINE (test SPECIMEN NUMBER:                           



             code = 15778) [id number]                              



CULTURE, URINE2021-10-16 00:00:00





             Test Item    Value        Reference Range Interpretation Comments

 

             CULTURE, URINE (test SPECIMEN NUMBER:                           



             code = 03042) [id number]                              



CULTURE, URINE2021-10-16 00:00:00





             Test Item    Value        Reference Range Interpretation Comments

 

             CULTURE, URINE (test SPECIMEN NUMBER:                           



             code = 69503) [id number]                              



CULTURE, URINE2021-10-16 00:00:00





             Test Item    Value        Reference Range Interpretation Comments

 

             CULTURE, URINE (test SPECIMEN NUMBER:                           



             code = 17825) [id number]                              



VIOLET TITER AND PATTERN [REFLEX]2021-10-14 00:00:00





             Test Item    Value        Reference Range Interpretation Comments

 

             PATTERN (test code = SPECKLED                               



             24199)                                              

 

             VIOLET TITER (test code = 1:640 TITER                            



             3550)                                               

 

             PATTERN 2 (test code = NOT DETECTED                           



             73394)                                              

 

             VIOLET TITER 2 (test code = NOT DETECTED TITER                        

   



             46132)                                              

 

             PATTERN 3 (test code = NOT DETECTED                           



             96989)                                              

 

             VIOLET TITER 3 (test code = NOT DETECTED TITER                        

   



             14899)                                              

 

             METHOD (test code = 92886) (NOTE)                                 



VIOLET TITER AND PATTERN [REFLEX]2021-10-14 00:00:00





             Test Item    Value        Reference Range Interpretation Comments

 

             PATTERN (test code = SPECKLED                               



             22883)                                              

 

             VIOLET TITER (test code = 1:640 TITER                            



             3550)                                               

 

             PATTERN 2 (test code = NOT DETECTED                           



             94914)                                              

 

             VIOLET TITER 2 (test code = NOT DETECTED TITER                        

   



             12231)                                              

 

             PATTERN 3 (test code = NOT DETECTED                           



             15758)                                              

 

             VIOLET TITER 3 (test code = NOT DETECTED TITER                        

   



             42235)                                              

 

             METHOD (test code = 31969) (NOTE)                                 



VIOLET TITER AND PATTERN [REFLEX]2021-10-14 00:00:00





             Test Item    Value        Reference Range Interpretation Comments

 

             PATTERN (test code = SPECKLED                               



             87399)                                              

 

             VIOLET TITER (test code = 1:640 TITER                            



             3550)                                               

 

             PATTERN 2 (test code = NOT DETECTED                           



             26752)                                              

 

             VIOLET TITER 2 (test code = NOT DETECTED TITER                        

   



             53601)                                              

 

             PATTERN 3 (test code = NOT DETECTED                           



             20090)                                              

 

             VIOLET TITER 3 (test code = NOT DETECTED TITER                        

   



             76466)                                              

 

             METHOD (test code = 70641) (NOTE)                                 



VIOLET TITER AND PATTERN [REFLEX]2021-10-14 00:00:00





             Test Item    Value        Reference Range Interpretation Comments

 

             PATTERN (test code = SPECKLED                               



             99480)                                              

 

             VIOLET TITER (test code = 1:640 TITER                            



             3550)                                               

 

             PATTERN 2 (test code = NOT DETECTED                           



             40628)                                              

 

             VIOLET TITER 2 (test code = NOT DETECTED TITER                        

   



             25015)                                              

 

             PATTERN 3 (test code = NOT DETECTED                           



             80562)                                              

 

             VIOLET TITER 3 (test code = NOT DETECTED TITER                        

   



             87189)                                              

 

             METHOD (test code = 51640) (NOTE)                                 



CBC W/AUTO DIFF2021-10-13 00:00:00





             Test Item    Value        Reference Range Interpretation Comments

 

             WBC (test code = 1001) 3.1 K/UL                               

 

             RBC (test code = 1002) 3.14 M/UL                              

 

             HEMOGLOBIN (test code = 1003) 8.4 G/DL                             

  

 

             HEMATOCRIT (test code = 1004) 25.9 %                               

  

 

             MCV (test code = 1005) 82.5 fL                                

 

             MCH (test code = 1006) 26.8 PG                                

 

             MCHC (test code = 1007) 32.4 G/DL                              

 

             RDW (test code = 1038) 14.5 %                                 

 

             NEUTROPHILS (test code = 1008) 61.2 %                              

   

 

             LYMPHOCYTES (test code = 1010) 25.5 %                              

   

 

             MONOCYTES (test code = 1011) 8.8 %                                 

 

 

             EOSINOPHILS (test code = 1012) 3.9 %                               

   

 

             BASOPHILS (test code = 1013) 0.3 %                                 

 

 

             IMMATURE GRANULOCYTES (test 0.3 %                                  



             code = 1036)                                        

 

             NUCLEATED RBCS (test code = 0.0 /100WBC'S                          

 



             1065)                                               

 

             PLATELET COUNT (test code = 85 K/UL                                



             1015)                                               

 

             ABSOLUTE NEUTROPHILS (test code 1.87 K/UL                          

    



             = 1066)                                             

 

             ABSOLUTE LYMPHOCYTES (test code 0.78 K/UL                          

    



             = 1067)                                             

 

             ABSOLUTE MONOCYTES (test code = 0.27 K/UL                          

    



             1068)                                               

 

             ABSOLUTE EOSINOPHILS (test code 0.12 K/UL                          

    



             = 1040)                                             

 

             ABSOLUTE BASOPHILS (test code = 0.01 K/UL                          

    



             1069)                                               

 

             ABS IMMATURE GRANULOCYTES (test 0.01 K/UL                          

    



             code = 1020)                                        

 

             ABS NUCLEATED RBCS (test code = 0.00 K/UL                          

    



             68176)                                              



CBC W/AUTO DIFF2021-10-13 00:00:00





             Test Item    Value        Reference Range Interpretation Comments

 

             WBC (test code = 1001) 3.1 K/UL                               

 

             RBC (test code = 1002) 3.14 M/UL                              

 

             HEMOGLOBIN (test code = 1003) 8.4 G/DL                             

  

 

             HEMATOCRIT (test code = 1004) 25.9 %                               

  

 

             MCV (test code = 1005) 82.5 fL                                

 

             MCH (test code = 1006) 26.8 PG                                

 

             MCHC (test code = 1007) 32.4 G/DL                              

 

             RDW (test code = 1038) 14.5 %                                 

 

             NEUTROPHILS (test code = 1008) 61.2 %                              

   

 

             LYMPHOCYTES (test code = 1010) 25.5 %                              

   

 

             MONOCYTES (test code = 1011) 8.8 %                                 

 

 

             EOSINOPHILS (test code = 1012) 3.9 %                               

   

 

             BASOPHILS (test code = 1013) 0.3 %                                 

 

 

             IMMATURE GRANULOCYTES (test 0.3 %                                  



             code = 1036)                                        

 

             NUCLEATED RBCS (test code = 0.0 /100WBC'S                          

 



             1065)                                               

 

             PLATELET COUNT (test code = 85 K/UL                                



             1015)                                               

 

             ABSOLUTE NEUTROPHILS (test code 1.87 K/UL                          

    



             = 1066)                                             

 

             ABSOLUTE LYMPHOCYTES (test code 0.78 K/UL                          

    



             = 1067)                                             

 

             ABSOLUTE MONOCYTES (test code = 0.27 K/UL                          

    



             1068)                                               

 

             ABSOLUTE EOSINOPHILS (test code 0.12 K/UL                          

    



             = 1040)                                             

 

             ABSOLUTE BASOPHILS (test code = 0.01 K/UL                          

    



             1069)                                               

 

             ABS IMMATURE GRANULOCYTES (test 0.01 K/UL                          

    



             code = 1020)                                        

 

             ABS NUCLEATED RBCS (test code = 0.00 K/UL                          

    



             96278)                                              



CBC W/AUTO DIFF2021-10-13 00:00:00





             Test Item    Value        Reference Range Interpretation Comments

 

             WBC (test code = 1001) 3.1 K/UL                               

 

             RBC (test code = 1002) 3.14 M/UL                              

 

             HEMOGLOBIN (test code = 1003) 8.4 G/DL                             

  

 

             HEMATOCRIT (test code = 1004) 25.9 %                               

  

 

             MCV (test code = 1005) 82.5 fL                                

 

             MCH (test code = 1006) 26.8 PG                                

 

             MCHC (test code = 1007) 32.4 G/DL                              

 

             RDW (test code = 1038) 14.5 %                                 

 

             NEUTROPHILS (test code = 1008) 61.2 %                              

   

 

             LYMPHOCYTES (test code = 1010) 25.5 %                              

   

 

             MONOCYTES (test code = 1011) 8.8 %                                 

 

 

             EOSINOPHILS (test code = 1012) 3.9 %                               

   

 

             BASOPHILS (test code = 1013) 0.3 %                                 

 

 

             IMMATURE GRANULOCYTES (test 0.3 %                                  



             code = 1036)                                        

 

             NUCLEATED RBCS (test code = 0.0 /100WBC'S                          

 



             1065)                                               

 

             PLATELET COUNT (test code = 85 K/UL                                



             1015)                                               

 

             ABSOLUTE NEUTROPHILS (test code 1.87 K/UL                          

    



             = 1066)                                             

 

             ABSOLUTE LYMPHOCYTES (test code 0.78 K/UL                          

    



             = 1067)                                             

 

             ABSOLUTE MONOCYTES (test code = 0.27 K/UL                          

    



             1068)                                               

 

             ABSOLUTE EOSINOPHILS (test code 0.12 K/UL                          

    



             = 1040)                                             

 

             ABSOLUTE BASOPHILS (test code = 0.01 K/UL                          

    



             1069)                                               

 

             ABS IMMATURE GRANULOCYTES (test 0.01 K/UL                          

    



             code = 1020)                                        

 

             ABS NUCLEATED RBCS (test code = 0.00 K/UL                          

    



             33441)                                              



COMPREHENSIVE METABOLIC PANEL2021-10-13 00:00:00





             Test Item    Value        Reference Range Interpretation Comments

 

             GLUCOSE (test code = 2217) 139 MG/DL                              

 

             BUN (test code = 2208) 21 MG/DL                               

 

             CREATININE (test code = 2214) 0.83 MG/DL                           

  

 

             eGFR  AMER. (test code 88 ML/MIN/1.73                       

    



             = 77708)                                            

 

             eGFR NON- AMER. (test 76 ML/MIN/1.73                        

   



             code = 16888)                                        

 

             CALC BUN/CREAT (test code = 25 RATIO                               



             2235)                                               

 

             SODIUM (test code = 2231) 140 MEQ/L                              

 

             POTASSIUM (test code = 2228) 4.8 MEQ/L                             

 

 

             CHLORIDE (test code = 2215) 102 MEQ/L                              

 

             CARBON DIOXIDE (test code = 25 MEQ/L                               



             220)                                               

 

             CALCIUM (test code = 2209) 9.2 MG/DL                              

 

             PROTEIN, TOTAL (test code = 7.2 G/DL                               



             )                                               

 

             ALBUMIN (test code = 2201) 3.6 G/DL                               

 

             CALC GLOBULIN (test code = 3.6 G/DL                               



             2240)                                               

 

             CALC A/G RATIO (test code = 1.0 RATIO                              



             2234)                                               

 

             BILIRUBIN, TOTAL (test code = 0.6 MG/DL                            

  



             )                                               

 

             ALKALINE PHOSPHATASE (test 101 U/L                                



             code = 2204)                                        

 

             AST (test code = 2218) 30 U/L                                 

 

             ALT (test code = 2219) 23 U/L                                 



COMPREHENSIVE METABOLIC PANEL2021-10-13 00:00:00





             Test Item    Value        Reference Range Interpretation Comments

 

             GLUCOSE (test code = 2217) 139 MG/DL                              

 

             BUN (test code = 2208) 21 MG/DL                               

 

             CREATININE (test code = 2214) 0.83 MG/DL                           

  

 

             eGFR  AMER. (test code 88 ML/MIN/1.73                       

    



             = 05171)                                            

 

             eGFR NON- AMER. (test 76 ML/MIN/1.73                        

   



             code = 67952)                                        

 

             CALC BUN/CREAT (test code = 25 RATIO                               



             2235)                                               

 

             SODIUM (test code = 2231) 140 MEQ/L                              

 

             POTASSIUM (test code = 2228) 4.8 MEQ/L                             

 

 

             CHLORIDE (test code = 2215) 102 MEQ/L                              

 

             CARBON DIOXIDE (test code = 25 MEQ/L                               



             )                                               

 

             CALCIUM (test code = 2209) 9.2 MG/DL                              

 

             PROTEIN, TOTAL (test code = 7.2 G/DL                               



             )                                               

 

             ALBUMIN (test code = 220) 3.6 G/DL                               

 

             CALC GLOBULIN (test code = 3.6 G/DL                               



             )                                               

 

             CALC A/G RATIO (test code = 1.0 RATIO                              



             )                                               

 

             BILIRUBIN, TOTAL (test code = 0.6 MG/DL                            

  



             )                                               

 

             ALKALINE PHOSPHATASE (test 101 U/L                                



             code = 2204)                                        

 

             AST (test code = 2218) 30 U/L                                 

 

             ALT (test code = 2219) 23 U/L                                 



PROBNP2021-10-13 00:00:00





             Test Item    Value        Reference Range Interpretation Comments

 

             NT-proBNP (test code = 65353) 247 PG/ML                            

  



PROBNP2021-10-13 00:00:00





             Test Item    Value        Reference Range Interpretation Comments

 

             NT-proBNP (test code = 08865) 247 PG/ML                            

  



PROBNP2021-10-13 00:00:00





             Test Item    Value        Reference Range Interpretation Comments

 

             NT-proBNP (test code = 25274) 247 PG/ML                            

  



VIOLET (ANTI-NUCLEAR AB) WITH REFLEX TITER2021-10-13 00:00:00





             Test Item    Value        Reference Range Interpretation Comments

 

             ANTI-NUCLEAR ANTIBODIES (test code = POSITIVE                      

         



             3506)                                               



VIOLET (ANTI-NUCLEAR AB) WITH REFLEX TITER2021-10-13 00:00:00





             Test Item    Value        Reference Range Interpretation Comments

 

             ANTI-NUCLEAR ANTIBODIES (test code = POSITIVE                      

         



             3506)                                               



C-REACTIVE PROTEIN2021-10-13 00:00:00





             Test Item    Value        Reference Range Interpretation Comments

 

             C-REACTIVE PROTEIN (test code = 1.1 MG/DL                          

    



             3513)                                               



C-REACTIVE PROTEIN2021-10-13 00:00:00





             Test Item    Value        Reference Range Interpretation Comments

 

             C-REACTIVE PROTEIN (test code = 1.1 MG/DL                          

    



             3513)                                               



SEDIMENTATION RATE2021-10-13 00:00:00





             Test Item    Value        Reference Range Interpretation Comments

 

             SEDIMENTATION RATE (test code = 88 MM/HOUR                         

    



             1017)                                               



SEDIMENTATION RATE2021-10-13 00:00:00





             Test Item    Value        Reference Range Interpretation Comments

 

             SEDIMENTATION RATE (test code = 88 MM/HOUR                         

    



             1017)                                               



[id number] 00:00:00





             Test Item    Value        Reference Range Interpretation Comments

 

             D-DIMER (test code = 1405) 0.77 UG/MLFEU                           



[id number] 00:00:00





             Test Item    Value        Reference Range Interpretation Comments

 

             D-DIMER (test code = 1405) 0.77 UG/MLFEU                           



CBC W/AUTO DIFF2021-10-13 00:00:00





             Test Item    Value        Reference Range Interpretation Comments

 

             WBC (test code = 1001) 3.1 K/UL                               

 

             RBC (test code = 1002) 3.14 M/UL                              

 

             HEMOGLOBIN (test code = 1003) 8.4 G/DL                             

  

 

             HEMATOCRIT (test code = 1004) 25.9 %                               

  

 

             MCV (test code = 1005) 82.5 fL                                

 

             MCH (test code = 1006) 26.8 PG                                

 

             MCHC (test code = 1007) 32.4 G/DL                              

 

             RDW (test code = 1038) 14.5 %                                 

 

             NEUTROPHILS (test code = 1008) 61.2 %                              

   

 

             LYMPHOCYTES (test code = 1010) 25.5 %                              

   

 

             MONOCYTES (test code = 1011) 8.8 %                                 

 

 

             EOSINOPHILS (test code = 1012) 3.9 %                               

   

 

             BASOPHILS (test code = 1013) 0.3 %                                 

 

 

             IMMATURE GRANULOCYTES (test 0.3 %                                  



             code = 1036)                                        

 

             NUCLEATED RBCS (test code = 0.0 /100WBC'S                          

 



             1065)                                               

 

             PLATELET COUNT (test code = 85 K/UL                                



             1015)                                               

 

             ABSOLUTE NEUTROPHILS (test code 1.87 K/UL                          

    



             = 1066)                                             

 

             ABSOLUTE LYMPHOCYTES (test code 0.78 K/UL                          

    



             = 1067)                                             

 

             ABSOLUTE MONOCYTES (test code = 0.27 K/UL                          

    



             1068)                                               

 

             ABSOLUTE EOSINOPHILS (test code 0.12 K/UL                          

    



             = 1040)                                             

 

             ABSOLUTE BASOPHILS (test code = 0.01 K/UL                          

    



             1069)                                               

 

             ABS IMMATURE GRANULOCYTES (test 0.01 K/UL                          

    



             code = 1020)                                        

 

             ABS NUCLEATED RBCS (test code = 0.00 K/UL                          

    



             49510)                                              



CBC W/AUTO DIFF2021-10-13 00:00:00





             Test Item    Value        Reference Range Interpretation Comments

 

             WBC (test code = 1001) 3.1 K/UL                               

 

             RBC (test code = 1002) 3.14 M/UL                              

 

             HEMOGLOBIN (test code = 1003) 8.4 G/DL                             

  

 

             HEMATOCRIT (test code = 1004) 25.9 %                               

  

 

             MCV (test code = 1005) 82.5 fL                                

 

             MCH (test code = 1006) 26.8 PG                                

 

             MCHC (test code = 1007) 32.4 G/DL                              

 

             RDW (test code = 1038) 14.5 %                                 

 

             NEUTROPHILS (test code = 1008) 61.2 %                              

   

 

             LYMPHOCYTES (test code = 1010) 25.5 %                              

   

 

             MONOCYTES (test code = 1011) 8.8 %                                 

 

 

             EOSINOPHILS (test code = 1012) 3.9 %                               

   

 

             BASOPHILS (test code = 1013) 0.3 %                                 

 

 

             IMMATURE GRANULOCYTES (test 0.3 %                                  



             code = 1036)                                        

 

             NUCLEATED RBCS (test code = 0.0 /100WBC'S                          

 



             1065)                                               

 

             PLATELET COUNT (test code = 85 K/UL                                



             1015)                                               

 

             ABSOLUTE NEUTROPHILS (test code 1.87 K/UL                          

    



             = 1066)                                             

 

             ABSOLUTE LYMPHOCYTES (test code 0.78 K/UL                          

    



             = 1067)                                             

 

             ABSOLUTE MONOCYTES (test code = 0.27 K/UL                          

    



             1068)                                               

 

             ABSOLUTE EOSINOPHILS (test code 0.12 K/UL                          

    



             = 1040)                                             

 

             ABSOLUTE BASOPHILS (test code = 0.01 K/UL                          

    



             1069)                                               

 

             ABS IMMATURE GRANULOCYTES (test 0.01 K/UL                          

    



             code = 1020)                                        

 

             ABS NUCLEATED RBCS (test code = 0.00 K/UL                          

    



             09644)                                              



COMPREHENSIVE METABOLIC PANEL2021-10-13 00:00:00





             Test Item    Value        Reference Range Interpretation Comments

 

             GLUCOSE (test code = 2217) 139 MG/DL                              

 

             BUN (test code = 2208) 21 MG/DL                               

 

             CREATININE (test code = 2214) 0.83 MG/DL                           

  

 

             eGFR  AMER. (test code 88 ML/MIN/1.73                       

    



             = 96516)                                            

 

             eGFR NON- AMER. (test 76 ML/MIN/1.73                        

   



             code = 51411)                                        

 

             CALC BUN/CREAT (test code = 25 RATIO                               



             2235)                                               

 

             SODIUM (test code = 2231) 140 MEQ/L                              

 

             POTASSIUM (test code = 2228) 4.8 MEQ/L                             

 

 

             CHLORIDE (test code = 2215) 102 MEQ/L                              

 

             CARBON DIOXIDE (test code = 25 MEQ/L                               



             2206)                                               

 

             CALCIUM (test code = 2209) 9.2 MG/DL                              

 

             PROTEIN, TOTAL (test code = 7.2 G/DL                               



             )                                               

 

             ALBUMIN (test code = 2201) 3.6 G/DL                               

 

             CALC GLOBULIN (test code = 3.6 G/DL                               



             2240)                                               

 

             CALC A/G RATIO (test code = 1.0 RATIO                              



             2234)                                               

 

             BILIRUBIN, TOTAL (test code = 0.6 MG/DL                            

  



             )                                               

 

             ALKALINE PHOSPHATASE (test 101 U/L                                



             code = 2204)                                        

 

             AST (test code = 2218) 30 U/L                                 

 

             ALT (test code = 2219) 23 U/L                                 



PROBNP2021-10-13 00:00:00





             Test Item    Value        Reference Range Interpretation Comments

 

             NT-proBNP (test code = 70331) 247 PG/ML                            

  



PROBNP2021-10-13 00:00:00





             Test Item    Value        Reference Range Interpretation Comments

 

             NT-proBNP (test code = 88407) 247 PG/ML                            

  



VIOLET (ANTI-NUCLEAR AB) WITH REFLEX TITER2021-10-13 00:00:00





             Test Item    Value        Reference Range Interpretation Comments

 

             ANTI-NUCLEAR ANTIBODIES (test code = POSITIVE                      

         



             3506)                                               



C-REACTIVE PROTEIN2021-10-13 00:00:00





             Test Item    Value        Reference Range Interpretation Comments

 

             C-REACTIVE PROTEIN (test code = 1.1 MG/DL                          

    



             3513)                                               



SEDIMENTATION RATE2021-10-13 00:00:00





             Test Item    Value        Reference Range Interpretation Comments

 

             SEDIMENTATION RATE (test code = 88 MM/HOUR                         

    



             1017)                                               



[id number] 00:00:00





             Test Item    Value        Reference Range Interpretation Comments

 

             D-DIMER (test code = 1405) 0.77 UG/MLFEU                           



CBC W/AUTO DIFF2021-10-13 00:00:00





             Test Item    Value        Reference Range Interpretation Comments

 

             WBC (test code = 1001) 3.1 K/UL                               

 

             RBC (test code = 1002) 3.14 M/UL                              

 

             HEMOGLOBIN (test code = 1003) 8.4 G/DL                             

  

 

             HEMATOCRIT (test code = 1004) 25.9 %                               

  

 

             MCV (test code = 1005) 82.5 fL                                

 

             MCH (test code = 1006) 26.8 PG                                

 

             MCHC (test code = 1007) 32.4 G/DL                              

 

             RDW (test code = 1038) 14.5 %                                 

 

             NEUTROPHILS (test code = 1008) 61.2 %                              

   

 

             LYMPHOCYTES (test code = 1010) 25.5 %                              

   

 

             MONOCYTES (test code = 1011) 8.8 %                                 

 

 

             EOSINOPHILS (test code = 1012) 3.9 %                               

   

 

             BASOPHILS (test code = 1013) 0.3 %                                 

 

 

             IMMATURE GRANULOCYTES (test 0.3 %                                  



             code = 1036)                                        

 

             NUCLEATED RBCS (test code = 0.0 /100WBC'S                          

 



             1065)                                               

 

             PLATELET COUNT (test code = 85 K/UL                                



             1015)                                               

 

             ABSOLUTE NEUTROPHILS (test code 1.87 K/UL                          

    



             = 1066)                                             

 

             ABSOLUTE LYMPHOCYTES (test code 0.78 K/UL                          

    



             = 1067)                                             

 

             ABSOLUTE MONOCYTES (test code = 0.27 K/UL                          

    



             1068)                                               

 

             ABSOLUTE EOSINOPHILS (test code 0.12 K/UL                          

    



             = 1040)                                             

 

             ABSOLUTE BASOPHILS (test code = 0.01 K/UL                          

    



             1069)                                               

 

             ABS IMMATURE GRANULOCYTES (test 0.01 K/UL                          

    



             code = 1020)                                        

 

             ABS NUCLEATED RBCS (test code = 0.00 K/UL                          

    



             34157)                                              



CBC W/AUTO DIFF2021-10-13 00:00:00





             Test Item    Value        Reference Range Interpretation Comments

 

             WBC (test code = 1001) 3.1 K/UL                               

 

             RBC (test code = 1002) 3.14 M/UL                              

 

             HEMOGLOBIN (test code = 1003) 8.4 G/DL                             

  

 

             HEMATOCRIT (test code = 1004) 25.9 %                               

  

 

             MCV (test code = 1005) 82.5 fL                                

 

             MCH (test code = 1006) 26.8 PG                                

 

             MCHC (test code = 1007) 32.4 G/DL                              

 

             RDW (test code = 1038) 14.5 %                                 

 

             NEUTROPHILS (test code = 1008) 61.2 %                              

   

 

             LYMPHOCYTES (test code = 1010) 25.5 %                              

   

 

             MONOCYTES (test code = 1011) 8.8 %                                 

 

 

             EOSINOPHILS (test code = 1012) 3.9 %                               

   

 

             BASOPHILS (test code = 1013) 0.3 %                                 

 

 

             IMMATURE GRANULOCYTES (test 0.3 %                                  



             code = 1036)                                        

 

             NUCLEATED RBCS (test code = 0.0 /100WBC'S                          

 



             1065)                                               

 

             PLATELET COUNT (test code = 85 K/UL                                



             1015)                                               

 

             ABSOLUTE NEUTROPHILS (test code 1.87 K/UL                          

    



             = 1066)                                             

 

             ABSOLUTE LYMPHOCYTES (test code 0.78 K/UL                          

    



             = 1067)                                             

 

             ABSOLUTE MONOCYTES (test code = 0.27 K/UL                          

    



             1068)                                               

 

             ABSOLUTE EOSINOPHILS (test code 0.12 K/UL                          

    



             = 1040)                                             

 

             ABSOLUTE BASOPHILS (test code = 0.01 K/UL                          

    



             1069)                                               

 

             ABS IMMATURE GRANULOCYTES (test 0.01 K/UL                          

    



             code = 1020)                                        

 

             ABS NUCLEATED RBCS (test code = 0.00 K/UL                          

    



             81608)                                              



COMPREHENSIVE METABOLIC PANEL2021-10-13 00:00:00





             Test Item    Value        Reference Range Interpretation Comments

 

             GLUCOSE (test code = 2217) 139 MG/DL                              

 

             BUN (test code = 2208) 21 MG/DL                               

 

             CREATININE (test code = 2214) 0.83 MG/DL                           

  

 

             eGFR  AMER. (test code 88 ML/MIN/1.73                       

    



             = 75412)                                            

 

             eGFR NON- AMER. (test 76 ML/MIN/1.73                        

   



             code = 93356)                                        

 

             CALC BUN/CREAT (test code = 25 RATIO                               



             2235)                                               

 

             SODIUM (test code = 2231) 140 MEQ/L                              

 

             POTASSIUM (test code = 2228) 4.8 MEQ/L                             

 

 

             CHLORIDE (test code = 2215) 102 MEQ/L                              

 

             CARBON DIOXIDE (test code = 25 MEQ/L                               



             220)                                               

 

             CALCIUM (test code = 2209) 9.2 MG/DL                              

 

             PROTEIN, TOTAL (test code = 7.2 G/DL                               



             222)                                               

 

             ALBUMIN (test code = 2201) 3.6 G/DL                               

 

             CALC GLOBULIN (test code = 3.6 G/DL                               



             2240)                                               

 

             CALC A/G RATIO (test code = 1.0 RATIO                              



             2234)                                               

 

             BILIRUBIN, TOTAL (test code = 0.6 MG/DL                            

  



             220)                                               

 

             ALKALINE PHOSPHATASE (test 101 U/L                                



             code = 2204)                                        

 

             AST (test code = 2218) 30 U/L                                 

 

             ALT (test code = 2219) 23 U/L                                 



PROBNP2021-10-13 00:00:00





             Test Item    Value        Reference Range Interpretation Comments

 

             NT-proBNP (test code = 38591) 247 PG/ML                            

  



PROBNP2021-10-13 00:00:00





             Test Item    Value        Reference Range Interpretation Comments

 

             NT-proBNP (test code = 22522) 247 PG/ML                            

  



VIOLET (ANTI-NUCLEAR AB) WITH REFLEX TITER2021-10-13 00:00:00





             Test Item    Value        Reference Range Interpretation Comments

 

             ANTI-NUCLEAR ANTIBODIES (test code = POSITIVE                      

         



             3506)                                               



C-REACTIVE PROTEIN2021-10-13 00:00:00





             Test Item    Value        Reference Range Interpretation Comments

 

             C-REACTIVE PROTEIN (test code = 1.1 MG/DL                          

    



             3513)                                               



SEDIMENTATION RATE2021-10-13 00:00:00





             Test Item    Value        Reference Range Interpretation Comments

 

             SEDIMENTATION RATE (test code = 88 MM/HOUR                         

    



             1017)                                               



[id number] 00:00:00





             Test Item    Value        Reference Range Interpretation Comments

 

             D-DIMER (test code = 1405) 0.77 UG/MLFEU                           



CBC W/AUTO DIFF2021-10-13 00:00:00





             Test Item    Value        Reference Range Interpretation Comments

 

             WBC (test code = 1001) 3.1 K/UL                               

 

             RBC (test code = 1002) 3.14 M/UL                              

 

             HEMOGLOBIN (test code = 1003) 8.4 G/DL                             

  

 

             HEMATOCRIT (test code = 1004) 25.9 %                               

  

 

             MCV (test code = 1005) 82.5 fL                                

 

             MCH (test code = 1006) 26.8 PG                                

 

             MCHC (test code = 1007) 32.4 G/DL                              

 

             RDW (test code = 1038) 14.5 %                                 

 

             NEUTROPHILS (test code = 1008) 61.2 %                              

   

 

             LYMPHOCYTES (test code = 1010) 25.5 %                              

   

 

             MONOCYTES (test code = 1011) 8.8 %                                 

 

 

             EOSINOPHILS (test code = 1012) 3.9 %                               

   

 

             BASOPHILS (test code = 1013) 0.3 %                                 

 

 

             IMMATURE GRANULOCYTES (test 0.3 %                                  



             code = 1036)                                        

 

             NUCLEATED RBCS (test code = 0.0 /100WBC'S                          

 



             1065)                                               

 

             PLATELET COUNT (test code = 85 K/UL                                



             1015)                                               

 

             ABSOLUTE NEUTROPHILS (test code 1.87 K/UL                          

    



             = 1066)                                             

 

             ABSOLUTE LYMPHOCYTES (test code 0.78 K/UL                          

    



             = 1067)                                             

 

             ABSOLUTE MONOCYTES (test code = 0.27 K/UL                          

    



             1068)                                               

 

             ABSOLUTE EOSINOPHILS (test code 0.12 K/UL                          

    



             = 1040)                                             

 

             ABSOLUTE BASOPHILS (test code = 0.01 K/UL                          

    



             1069)                                               

 

             ABS IMMATURE GRANULOCYTES (test 0.01 K/UL                          

    



             code = 1020)                                        

 

             ABS NUCLEATED RBCS (test code = 0.00 K/UL                          

    



             00061)                                              



CBC W/AUTO DIFF2021-10-13 00:00:00





             Test Item    Value        Reference Range Interpretation Comments

 

             WBC (test code = 1001) 3.1 K/UL                               

 

             RBC (test code = 1002) 3.14 M/UL                              

 

             HEMOGLOBIN (test code = 1003) 8.4 G/DL                             

  

 

             HEMATOCRIT (test code = 1004) 25.9 %                               

  

 

             MCV (test code = 1005) 82.5 fL                                

 

             MCH (test code = 1006) 26.8 PG                                

 

             MCHC (test code = 1007) 32.4 G/DL                              

 

             RDW (test code = 1038) 14.5 %                                 

 

             NEUTROPHILS (test code = 1008) 61.2 %                              

   

 

             LYMPHOCYTES (test code = 1010) 25.5 %                              

   

 

             MONOCYTES (test code = 1011) 8.8 %                                 

 

 

             EOSINOPHILS (test code = 1012) 3.9 %                               

   

 

             BASOPHILS (test code = 1013) 0.3 %                                 

 

 

             IMMATURE GRANULOCYTES (test 0.3 %                                  



             code = 1036)                                        

 

             NUCLEATED RBCS (test code = 0.0 /100WBC'S                          

 



             1065)                                               

 

             PLATELET COUNT (test code = 85 K/UL                                



             1015)                                               

 

             ABSOLUTE NEUTROPHILS (test code 1.87 K/UL                          

    



             = 1066)                                             

 

             ABSOLUTE LYMPHOCYTES (test code 0.78 K/UL                          

    



             = 1067)                                             

 

             ABSOLUTE MONOCYTES (test code = 0.27 K/UL                          

    



             1068)                                               

 

             ABSOLUTE EOSINOPHILS (test code 0.12 K/UL                          

    



             = 1040)                                             

 

             ABSOLUTE BASOPHILS (test code = 0.01 K/UL                          

    



             1069)                                               

 

             ABS IMMATURE GRANULOCYTES (test 0.01 K/UL                          

    



             code = 1020)                                        

 

             ABS NUCLEATED RBCS (test code = 0.00 K/UL                          

    



             45018)                                              



CBC W/AUTO DIFF2021-10-13 00:00:00





             Test Item    Value        Reference Range Interpretation Comments

 

             WBC (test code = 1001) 3.1 K/UL                               

 

             RBC (test code = 1002) 3.14 M/UL                              

 

             HEMOGLOBIN (test code = 1003) 8.4 G/DL                             

  

 

             HEMATOCRIT (test code = 1004) 25.9 %                               

  

 

             MCV (test code = 1005) 82.5 fL                                

 

             MCH (test code = 1006) 26.8 PG                                

 

             MCHC (test code = 1007) 32.4 G/DL                              

 

             RDW (test code = 1038) 14.5 %                                 

 

             NEUTROPHILS (test code = 1008) 61.2 %                              

   

 

             LYMPHOCYTES (test code = 1010) 25.5 %                              

   

 

             MONOCYTES (test code = 1011) 8.8 %                                 

 

 

             EOSINOPHILS (test code = 1012) 3.9 %                               

   

 

             BASOPHILS (test code = 1013) 0.3 %                                 

 

 

             IMMATURE GRANULOCYTES (test 0.3 %                                  



             code = 1036)                                        

 

             NUCLEATED RBCS (test code = 0.0 /100WBC'S                          

 



             1065)                                               

 

             PLATELET COUNT (test code = 85 K/UL                                



             1015)                                               

 

             ABSOLUTE NEUTROPHILS (test code 1.87 K/UL                          

    



             = 1066)                                             

 

             ABSOLUTE LYMPHOCYTES (test code 0.78 K/UL                          

    



             = 1067)                                             

 

             ABSOLUTE MONOCYTES (test code = 0.27 K/UL                          

    



             1068)                                               

 

             ABSOLUTE EOSINOPHILS (test code 0.12 K/UL                          

    



             = 1040)                                             

 

             ABSOLUTE BASOPHILS (test code = 0.01 K/UL                          

    



             1069)                                               

 

             ABS IMMATURE GRANULOCYTES (test 0.01 K/UL                          

    



             code = 1020)                                        

 

             ABS NUCLEATED RBCS (test code = 0.00 K/UL                          

    



             30397)                                              



COMPREHENSIVE METABOLIC PANEL2021-10-13 00:00:00





             Test Item    Value        Reference Range Interpretation Comments

 

             GLUCOSE (test code = 2217) 139 MG/DL                              

 

             BUN (test code = 2208) 21 MG/DL                               

 

             CREATININE (test code = 2214) 0.83 MG/DL                           

  

 

             eGFR  AMER. (test code 88 ML/MIN/1.73                       

    



             = 53170)                                            

 

             eGFR NON- AMER. (test 76 ML/MIN/1.73                        

   



             code = 00865)                                        

 

             CALC BUN/CREAT (test code = 25 RATIO                               



             2235)                                               

 

             SODIUM (test code = 2231) 140 MEQ/L                              

 

             POTASSIUM (test code = 2228) 4.8 MEQ/L                             

 

 

             CHLORIDE (test code = 2215) 102 MEQ/L                              

 

             CARBON DIOXIDE (test code = 25 MEQ/L                               



             2206)                                               

 

             CALCIUM (test code = 2209) 9.2 MG/DL                              

 

             PROTEIN, TOTAL (test code = 7.2 G/DL                               



             )                                               

 

             ALBUMIN (test code = 2201) 3.6 G/DL                               

 

             CALC GLOBULIN (test code = 3.6 G/DL                               



             2240)                                               

 

             CALC A/G RATIO (test code = 1.0 RATIO                              



             4)                                               

 

             BILIRUBIN, TOTAL (test code = 0.6 MG/DL                            

  



             )                                               

 

             ALKALINE PHOSPHATASE (test 101 U/L                                



             code = 2204)                                        

 

             AST (test code = 2218) 30 U/L                                 

 

             ALT (test code = 2219) 23 U/L                                 



COMPREHENSIVE METABOLIC PANEL2021-10-13 00:00:00





             Test Item    Value        Reference Range Interpretation Comments

 

             GLUCOSE (test code = 2217) 139 MG/DL                              

 

             BUN (test code = 2208) 21 MG/DL                               

 

             CREATININE (test code = 2214) 0.83 MG/DL                           

  

 

             eGFR  AMER. (test code 88 ML/MIN/1.73                       

    



             = 94792)                                            

 

             eGFR NON- AMER. (test 76 ML/MIN/1.73                        

   



             code = 19951)                                        

 

             CALC BUN/CREAT (test code = 25 RATIO                               



             2235)                                               

 

             SODIUM (test code = 2231) 140 MEQ/L                              

 

             POTASSIUM (test code = 2228) 4.8 MEQ/L                             

 

 

             CHLORIDE (test code = 2215) 102 MEQ/L                              

 

             CARBON DIOXIDE (test code = 25 MEQ/L                               



             )                                               

 

             CALCIUM (test code = 2209) 9.2 MG/DL                              

 

             PROTEIN, TOTAL (test code = 7.2 G/DL                               



             )                                               

 

             ALBUMIN (test code = 2201) 3.6 G/DL                               

 

             CALC GLOBULIN (test code = 3.6 G/DL                               



             2240)                                               

 

             CALC A/G RATIO (test code = 1.0 RATIO                              



             2234)                                               

 

             BILIRUBIN, TOTAL (test code = 0.6 MG/DL                            

  



             )                                               

 

             ALKALINE PHOSPHATASE (test 101 U/L                                



             code = 2204)                                        

 

             AST (test code = 2218) 30 U/L                                 

 

             ALT (test code = 2219) 23 U/L                                 



PROBNP2021-10-13 00:00:00





             Test Item    Value        Reference Range Interpretation Comments

 

             NT-proBNP (test code = 06854) 247 PG/ML                            

  



PROBNP2021-10-13 00:00:00





             Test Item    Value        Reference Range Interpretation Comments

 

             NT-proBNP (test code = 94033) 247 PG/ML                            

  



PROBNP2021-10-13 00:00:00





             Test Item    Value        Reference Range Interpretation Comments

 

             NT-proBNP (test code = 91067) 247 PG/ML                            

  



VIOLET (ANTI-NUCLEAR AB) WITH REFLEX TITER2021-10-13 00:00:00





             Test Item    Value        Reference Range Interpretation Comments

 

             ANTI-NUCLEAR ANTIBODIES (test code = POSITIVE                      

         



             3506)                                               



VIOLET (ANTI-NUCLEAR AB) WITH REFLEX TITER2021-10-13 00:00:00





             Test Item    Value        Reference Range Interpretation Comments

 

             ANTI-NUCLEAR ANTIBODIES (test code = POSITIVE                      

         



             3506)                                               



C-REACTIVE PROTEIN2021-10-13 00:00:00





             Test Item    Value        Reference Range Interpretation Comments

 

             C-REACTIVE PROTEIN (test code = 1.1 MG/DL                          

    



             3513)                                               



C-REACTIVE PROTEIN2021-10-13 00:00:00





             Test Item    Value        Reference Range Interpretation Comments

 

             C-REACTIVE PROTEIN (test code = 1.1 MG/DL                          

    



             3513)                                               



SEDIMENTATION RATE2021-10-13 00:00:00





             Test Item    Value        Reference Range Interpretation Comments

 

             SEDIMENTATION RATE (test code = 88 MM/HOUR                         

    



             1017)                                               



SEDIMENTATION RATE2021-10-13 00:00:00





             Test Item    Value        Reference Range Interpretation Comments

 

             SEDIMENTATION RATE (test code = 88 MM/HOUR                         

    



             1017)                                               



[id number] 00:00:00





             Test Item    Value        Reference Range Interpretation Comments

 

             D-DIMER (test code = 1405) 0.77 UG/MLFEU                           



[id number] 00:00:00





             Test Item    Value        Reference Range Interpretation Comments

 

             D-DIMER (test code = 1405) 0.77 UG/MLFEU                           



HEMOGLOBIN A1c2021-10-10 00:00:00





             Test Item    Value        Reference Range Interpretation Comments

 

             HEMOGLOBIN A1c (test code = 06936) 7.5 %                           

       



HEMOGLOBIN A1c2021-10-10 00:00:00





             Test Item    Value        Reference Range Interpretation Comments

 

             HEMOGLOBIN A1c (test code = 13885) 7.5 %                           

       



HEMOGLOBIN [id number] 00:00:00





             Test Item    Value        Reference Range Interpretation Comments

 

             HEMOGLOBIN A1c (test code = 36426) 7.5 %                           

       



HEMOGLOBIN [id number] 00:00:00





             Test Item    Value        Reference Range Interpretation Comments

 

             HEMOGLOBIN A1c (test code = 07490) 7.5 %                           

       



HEMOGLOBIN [id number] 00:00:00





             Test Item    Value        Reference Range Interpretation Comments

 

             HEMOGLOBIN A1c (test code = 76479) 7.5 %                           

       



HEMOGLOBIN [id number] 00:00:00





             Test Item    Value        Reference Range Interpretation Comments

 

             HEMOGLOBIN A1c (test code = 07011) 7.5 %                           

       



HEMOGLOBIN [id number] 00:00:00





             Test Item    Value        Reference Range Interpretation Comments

 

             HEMOGLOBIN A1c (test code = 60391) 7.5 %                           

       



HEMOGLOBIN [id number] 00:00:00





             Test Item    Value        Reference Range Interpretation Comments

 

             HEMOGLOBIN A1c (test code = 34853) 7.5 %                           

       



HEMOGLOBIN [id number] 00:00:00





             Test Item    Value        Reference Range Interpretation Comments

 

             HEMOGLOBIN A1c (test code = 42906) 7.5 %                           

       



HEMOGLOBIN [id number] 00:00:00





             Test Item    Value        Reference Range Interpretation Comments

 

             HEMOGLOBIN A1c (test code = 08598) 7.5 %                           

       



CULTURE, URINE2021-07-15 00:00:00





             Test Item    Value        Reference Range Interpretation Comments

 

             CULTURE, URINE (test SPECIMEN NUMBER:                           



             code = 25356) [id number]                              



CULTURE, URINE2021-07-15 00:00:00





             Test Item    Value        Reference Range Interpretation Comments

 

             CULTURE, URINE (test SPECIMEN NUMBER:                           



             code = 64787) [id number]                              



CULTURE, URINE2021-07-15 00:00:00





             Test Item    Value        Reference Range Interpretation Comments

 

             CULTURE, URINE (test SPECIMEN NUMBER:                           



             code = 06753) [id number]                              



CULTURE, URINE2021-07-15 00:00:00





             Test Item    Value        Reference Range Interpretation Comments

 

             CULTURE, URINE (test SPECIMEN NUMBER:                           



             code = 59828) [id number]                              



CULTURE, URINE2021-07-15 00:00:00





             Test Item    Value        Reference Range Interpretation Comments

 

             CULTURE, URINE (test SPECIMEN NUMBER:                           



             code = 52352) [id number]                              



CULTURE, URINE2021-07-15 00:00:00





             Test Item    Value        Reference Range Interpretation Comments

 

             CULTURE, URINE (test SPECIMEN NUMBER:                           



             code = 19219) [id number]                              



HEMOGLOBIN [id number] 00:00:00





             Test Item    Value        Reference Range Interpretation Comments

 

             HEMOGLOBIN A1c (test code = 54273) 8.6 %                           

       



HEMOGLOBIN [id number] 00:00:00





             Test Item    Value        Reference Range Interpretation Comments

 

             HEMOGLOBIN A1c (test code = 05459) 8.6 %                           

       



HEMOGLOBIN [id number] 00:00:00





             Test Item    Value        Reference Range Interpretation Comments

 

             HEMOGLOBIN A1c (test code = 57918) 8.6 %                           

       



LIPID [id number] 00:00:00





             Test Item    Value        Reference Range Interpretation Comments

 

             CHOLESTEROL (test code = 2210) 104 MG/DL                           

   

 

             TRIGLYCERIDES (test code = 2232) 164 MG/DL                         

     

 

             HDL CHOLESTEROL (test code = 2220) 39 MG/DL                        

       

 

             CALC LDL CHOL (test code = 2237) 41 MG/DL                          

     

 

             RISK RATIO LDL/HDL (test code = 1.05 RATIO                         

    



             2238)                                               



LIPID [id number] 00:00:00





             Test Item    Value        Reference Range Interpretation Comments

 

             CHOLESTEROL (test code = 2210) 104 MG/DL                           

   

 

             TRIGLYCERIDES (test code = 2232) 164 MG/DL                         

     

 

             HDL CHOLESTEROL (test code = 2220) 39 MG/DL                        

       

 

             CALC LDL CHOL (test code = 2237) 41 MG/DL                          

     

 

             RISK RATIO LDL/HDL (test code = 1.05 RATIO                         

    



             2238)                                               



COMPREHENSIVE METABOLIC [id number] 00:00:00





             Test Item    Value        Reference Range Interpretation Comments

 

             GLUCOSE (test code = 2217) 330 MG/DL                              

 

             BUN (test code = 2208) 11 MG/DL                               

 

             CREATININE (test code = 2214) 0.84 MG/DL                           

  

 

             eGFR  AMER. (test code 87 ML/MIN/1.73                       

    



             = 28940)                                            

 

             eGFR NON- AMER. (test 75 ML/MIN/1.73                        

   



             code = 63596)                                        

 

             CALC BUN/CREAT (test code = 13 RATIO                               



             2235)                                               

 

             SODIUM (test code = 2231) 136 MEQ/L                              

 

             POTASSIUM (test code = 2228) 4.5 MEQ/L                             

 

 

             CHLORIDE (test code = 2215) 97 MEQ/L                               

 

             CARBON DIOXIDE (test code = 27 MEQ/L                               



             220)                                               

 

             CALCIUM (test code = 2209) 9.1 MG/DL                              

 

             PROTEIN, TOTAL (test code = 7.7 G/DL                               



             )                                               

 

             ALBUMIN (test code = 2201) 3.8 G/DL                               

 

             CALC GLOBULIN (test code = 3.9 G/DL                               



             2240)                                               

 

             CALC A/G RATIO (test code = 1.0 RATIO                              



             2234)                                               

 

             BILIRUBIN, TOTAL (test code = 0.4 MG/DL                            

  



             )                                               

 

             ALKALINE PHOSPHATASE (test 106 U/L                                



             code = 2204)                                        

 

             AST (test code = 2218) 44 U/L                                 

 

             ALT (test code = 2219) 29 U/L                                 



COMPREHENSIVE METABOLIC [id number] 00:00:00





             Test Item    Value        Reference Range Interpretation Comments

 

             GLUCOSE (test code = 2217) 330 MG/DL                              

 

             BUN (test code = 2208) 11 MG/DL                               

 

             CREATININE (test code = 2214) 0.84 MG/DL                           

  

 

             eGFR  AMER. (test code 87 ML/MIN/1.73                       

    



             = 15332)                                            

 

             eGFR NON- AMER. (test 75 ML/MIN/1.73                        

   



             code = 31590)                                        

 

             CALC BUN/CREAT (test code = 13 RATIO                               



             2235)                                               

 

             SODIUM (test code = 2231) 136 MEQ/L                              

 

             POTASSIUM (test code = 2228) 4.5 MEQ/L                             

 

 

             CHLORIDE (test code = 2215) 97 MEQ/L                               

 

             CARBON DIOXIDE (test code = 27 MEQ/L                               



             )                                               

 

             CALCIUM (test code = 2209) 9.1 MG/DL                              

 

             PROTEIN, TOTAL (test code = 7.7 G/DL                               



             )                                               

 

             ALBUMIN (test code = 2201) 3.8 G/DL                               

 

             CALC GLOBULIN (test code = 3.9 G/DL                               



             2240)                                               

 

             CALC A/G RATIO (test code = 1.0 RATIO                              



             2234)                                               

 

             BILIRUBIN, TOTAL (test code = 0.4 MG/DL                            

  



             )                                               

 

             ALKALINE PHOSPHATASE (test 106 U/L                                



             code = 2204)                                        

 

             AST (test code = 2218) 44 U/L                                 

 

             ALT (test code = 2219) 29 U/L                                 



MICROALBUMIN/CREATININE, RANDOM AND [id number] 00:00:00





             Test Item    Value        Reference Range Interpretation Comments

 

             CREATININE, URINE, CONC. (test 131.3 MG/DL                         

   



             code = 2072)                                        

 

             ALBUMIN, URINE, RANDOM (test code 0.4 MG/DL                        

      



             = 68410)                                            

 

             CALC ALBUMIN/CREAT, RND (test 3 MG/G                               

  



             code = 15428)                                        



MICROALBUMIN/CREATININE, RANDOM AND [id number] 00:00:00





             Test Item    Value        Reference Range Interpretation Comments

 

             CREATININE, URINE, CONC. (test 131.3 MG/DL                         

   



             code = 2072)                                        

 

             ALBUMIN, URINE, RANDOM (test code 0.4 MG/DL                        

      



             = 31136)                                            

 

             CALC ALBUMIN/CREAT, RND (test 3 MG/G                               

  



             code = 59371)                                        



HEMOGLOBIN [id number] 00:00:00





             Test Item    Value        Reference Range Interpretation Comments

 

             HEMOGLOBIN A1c (test code = 76932) 8.6 %                           

       



HEMOGLOBIN [id number] 00:00:00





             Test Item    Value        Reference Range Interpretation Comments

 

             HEMOGLOBIN A1c (test code = 85297) 8.6 %                           

       



LIPID [id number] 00:00:00





             Test Item    Value        Reference Range Interpretation Comments

 

             CHOLESTEROL (test code = 2210) 104 MG/DL                           

   

 

             TRIGLYCERIDES (test code = 2232) 164 MG/DL                         

     

 

             HDL CHOLESTEROL (test code = 2220) 39 MG/DL                        

       

 

             CALC LDL CHOL (test code = 2237) 41 MG/DL                          

     

 

             RISK RATIO LDL/HDL (test code = 1.05 RATIO                         

    



             2238)                                               



COMPREHENSIVE METABOLIC [id number] 00:00:00





             Test Item    Value        Reference Range Interpretation Comments

 

             GLUCOSE (test code = 2217) 330 MG/DL                              

 

             BUN (test code = 2208) 11 MG/DL                               

 

             CREATININE (test code = 2214) 0.84 MG/DL                           

  

 

             eGFR  AMER. (test code 87 ML/MIN/1.73                       

    



             = 86880)                                            

 

             eGFR NON- AMER. (test 75 ML/MIN/1.73                        

   



             code = 34235)                                        

 

             CALC BUN/CREAT (test code = 13 RATIO                               



             2235)                                               

 

             SODIUM (test code = 2231) 136 MEQ/L                              

 

             POTASSIUM (test code = 2228) 4.5 MEQ/L                             

 

 

             CHLORIDE (test code = 2215) 97 MEQ/L                               

 

             CARBON DIOXIDE (test code = 27 MEQ/L                               



             )                                               

 

             CALCIUM (test code = 2209) 9.1 MG/DL                              

 

             PROTEIN, TOTAL (test code = 7.7 G/DL                               



             )                                               

 

             ALBUMIN (test code = 2201) 3.8 G/DL                               

 

             CALC GLOBULIN (test code = 3.9 G/DL                               



             )                                               

 

             CALC A/G RATIO (test code = 1.0 RATIO                              



             2234)                                               

 

             BILIRUBIN, TOTAL (test code = 0.4 MG/DL                            

  



             )                                               

 

             ALKALINE PHOSPHATASE (test 106 U/L                                



             code = 2204)                                        

 

             AST (test code = 2218) 44 U/L                                 

 

             ALT (test code = 2219) 29 U/L                                 



MICROALBUMIN/CREATININE, RANDOM AND [id number] 00:00:00





             Test Item    Value        Reference Range Interpretation Comments

 

             CREATININE, URINE, CONC. (test 131.3 MG/DL                         

   



             code = 2072)                                        

 

             ALBUMIN, URINE, RANDOM (test code 0.4 MG/DL                        

      



             = 32346)                                            

 

             CALC ALBUMIN/CREAT, RND (test 3 MG/G                               

  



             code = 09844)                                        



HEMOGLOBIN [id number] 00:00:00





             Test Item    Value        Reference Range Interpretation Comments

 

             HEMOGLOBIN A1c (test code = 12183) 8.6 %                           

       



HEMOGLOBIN [id number] 00:00:00





             Test Item    Value        Reference Range Interpretation Comments

 

             HEMOGLOBIN A1c (test code = 26915) 8.6 %                           

       



LIPID [id number] 00:00:00





             Test Item    Value        Reference Range Interpretation Comments

 

             CHOLESTEROL (test code = 2210) 104 MG/DL                           

   

 

             TRIGLYCERIDES (test code = 2232) 164 MG/DL                         

     

 

             HDL CHOLESTEROL (test code = 2220) 39 MG/DL                        

       

 

             CALC LDL CHOL (test code = 2237) 41 MG/DL                          

     

 

             RISK RATIO LDL/HDL (test code = 1.05 RATIO                         

    



             2238)                                               



COMPREHENSIVE METABOLIC [id number] 00:00:00





             Test Item    Value        Reference Range Interpretation Comments

 

             GLUCOSE (test code = 2217) 330 MG/DL                              

 

             BUN (test code = 2208) 11 MG/DL                               

 

             CREATININE (test code = 2214) 0.84 MG/DL                           

  

 

             eGFR  AMER. (test code 87 ML/MIN/1.73                       

    



             = 03749)                                            

 

             eGFR NON- AMER. (test 75 ML/MIN/1.73                        

   



             code = 07858)                                        

 

             CALC BUN/CREAT (test code = 13 RATIO                               



             2235)                                               

 

             SODIUM (test code = 2231) 136 MEQ/L                              

 

             POTASSIUM (test code = 2228) 4.5 MEQ/L                             

 

 

             CHLORIDE (test code = 2215) 97 MEQ/L                               

 

             CARBON DIOXIDE (test code = 27 MEQ/L                               



             )                                               

 

             CALCIUM (test code = 2209) 9.1 MG/DL                              

 

             PROTEIN, TOTAL (test code = 7.7 G/DL                               



             )                                               

 

             ALBUMIN (test code = 2201) 3.8 G/DL                               

 

             CALC GLOBULIN (test code = 3.9 G/DL                               



             2240)                                               

 

             CALC A/G RATIO (test code = 1.0 RATIO                              



             2234)                                               

 

             BILIRUBIN, TOTAL (test code = 0.4 MG/DL                            

  



             )                                               

 

             ALKALINE PHOSPHATASE (test 106 U/L                                



             code = 2204)                                        

 

             AST (test code = 2218) 44 U/L                                 

 

             ALT (test code = 2219) 29 U/L                                 



MICROALBUMIN/CREATININE, RANDOM AND [id number] 00:00:00





             Test Item    Value        Reference Range Interpretation Comments

 

             CREATININE, URINE, CONC. (test 131.3 MG/DL                         

   



             code = 2072)                                        

 

             ALBUMIN, URINE, RANDOM (test code 0.4 MG/DL                        

      



             = 13181)                                            

 

             CALC ALBUMIN/CREAT, RND (test 3 MG/G                               

  



             code = 27111)                                        



HEMOGLOBIN [id number] 00:00:00





             Test Item    Value        Reference Range Interpretation Comments

 

             HEMOGLOBIN A1c (test code = 19614) 8.6 %                           

       



HEMOGLOBIN [id number] 00:00:00





             Test Item    Value        Reference Range Interpretation Comments

 

             HEMOGLOBIN A1c (test code = 56275) 8.6 %                           

       



HEMOGLOBIN [id number] 00:00:00





             Test Item    Value        Reference Range Interpretation Comments

 

             HEMOGLOBIN A1c (test code = 36903) 8.6 %                           

       



LIPID [id number] 00:00:00





             Test Item    Value        Reference Range Interpretation Comments

 

             CHOLESTEROL (test code = 2210) 104 MG/DL                           

   

 

             TRIGLYCERIDES (test code = 2232) 164 MG/DL                         

     

 

             HDL CHOLESTEROL (test code = 2220) 39 MG/DL                        

       

 

             CALC LDL CHOL (test code = 2237) 41 MG/DL                          

     

 

             RISK RATIO LDL/HDL (test code = 1.05 RATIO                         

    



             2238)                                               



LIPID [id number] 00:00:00





             Test Item    Value        Reference Range Interpretation Comments

 

             CHOLESTEROL (test code = 2210) 104 MG/DL                           

   

 

             TRIGLYCERIDES (test code = 2232) 164 MG/DL                         

     

 

             HDL CHOLESTEROL (test code = 2220) 39 MG/DL                        

       

 

             CALC LDL CHOL (test code = 2237) 41 MG/DL                          

     

 

             RISK RATIO LDL/HDL (test code = 1.05 RATIO                         

    



             2238)                                               



COMPREHENSIVE METABOLIC [id number] 00:00:00





             Test Item    Value        Reference Range Interpretation Comments

 

             GLUCOSE (test code = 2217) 330 MG/DL                              

 

             BUN (test code = 2208) 11 MG/DL                               

 

             CREATININE (test code = 2214) 0.84 MG/DL                           

  

 

             eGFR  AMER. (test code 87 ML/MIN/1.73                       

    



             = 19664)                                            

 

             eGFR NON- AMER. (test 75 ML/MIN/1.73                        

   



             code = 32591)                                        

 

             CALC BUN/CREAT (test code = 13 RATIO                               



             2235)                                               

 

             SODIUM (test code = 2231) 136 MEQ/L                              

 

             POTASSIUM (test code = 2228) 4.5 MEQ/L                             

 

 

             CHLORIDE (test code = 2215) 97 MEQ/L                               

 

             CARBON DIOXIDE (test code = 27 MEQ/L                               



             )                                               

 

             CALCIUM (test code = 2209) 9.1 MG/DL                              

 

             PROTEIN, TOTAL (test code = 7.7 G/DL                               



             )                                               

 

             ALBUMIN (test code = 2201) 3.8 G/DL                               

 

             CALC GLOBULIN (test code = 3.9 G/DL                               



             2240)                                               

 

             CALC A/G RATIO (test code = 1.0 RATIO                              



             2234)                                               

 

             BILIRUBIN, TOTAL (test code = 0.4 MG/DL                            

  



             )                                               

 

             ALKALINE PHOSPHATASE (test 106 U/L                                



             code = 2204)                                        

 

             AST (test code = 2218) 44 U/L                                 

 

             ALT (test code = 2219) 29 U/L                                 



COMPREHENSIVE METABOLIC [id number] 00:00:00





             Test Item    Value        Reference Range Interpretation Comments

 

             GLUCOSE (test code = 2217) 330 MG/DL                              

 

             BUN (test code = 2208) 11 MG/DL                               

 

             CREATININE (test code = 2214) 0.84 MG/DL                           

  

 

             eGFR  AMER. (test code 87 ML/MIN/1.73                       

    



             = 03050)                                            

 

             eGFR NON- AMER. (test 75 ML/MIN/1.73                        

   



             code = 82653)                                        

 

             CALC BUN/CREAT (test code = 13 RATIO                               



             2235)                                               

 

             SODIUM (test code = 2231) 136 MEQ/L                              

 

             POTASSIUM (test code = 2228) 4.5 MEQ/L                             

 

 

             CHLORIDE (test code = 2215) 97 MEQ/L                               

 

             CARBON DIOXIDE (test code = 27 MEQ/L                               



             )                                               

 

             CALCIUM (test code = 2209) 9.1 MG/DL                              

 

             PROTEIN, TOTAL (test code = 7.7 G/DL                               



             )                                               

 

             ALBUMIN (test code = 2201) 3.8 G/DL                               

 

             CALC GLOBULIN (test code = 3.9 G/DL                               



             2240)                                               

 

             CALC A/G RATIO (test code = 1.0 RATIO                              



             2234)                                               

 

             BILIRUBIN, TOTAL (test code = 0.4 MG/DL                            

  



             2207)                                               

 

             ALKALINE PHOSPHATASE (test 106 U/L                                



             code = 2204)                                        

 

             AST (test code = 2218) 44 U/L                                 

 

             ALT (test code = 2219) 29 U/L                                 



MICROALBUMIN/CREATININE, RANDOM AND [id number] 00:00:00





             Test Item    Value        Reference Range Interpretation Comments

 

             CREATININE, URINE, CONC. (test 131.3 MG/DL                         

   



             code = 2072)                                        

 

             ALBUMIN, URINE, RANDOM (test code 0.4 MG/DL                        

      



             = 27775)                                            

 

             CALC ALBUMIN/CREAT, RND (test 3 MG/G                               

  



             code = 09801)                                        



MICROALBUMIN/CREATININE, RANDOM AND [id number] 00:00:00





             Test Item    Value        Reference Range Interpretation Comments

 

             CREATININE, URINE, CONC. (test 131.3 MG/DL                         

   



             code = 2072)                                        

 

             ALBUMIN, URINE, RANDOM (test code 0.4 MG/DL                        

      



             = 90346)                                            

 

             CALC ALBUMIN/CREAT, RND (test 3 MG/G                               

  



             code = 15869)

## 2022-11-27 ENCOUNTER — HOSPITAL ENCOUNTER (EMERGENCY)
Dept: HOSPITAL 97 - ER | Age: 62
Discharge: HOME | End: 2022-11-27
Payer: COMMERCIAL

## 2022-11-27 VITALS — SYSTOLIC BLOOD PRESSURE: 147 MMHG | DIASTOLIC BLOOD PRESSURE: 90 MMHG

## 2022-11-27 VITALS — OXYGEN SATURATION: 99 % | TEMPERATURE: 98.9 F

## 2022-11-27 DIAGNOSIS — Z88.0: ICD-10-CM

## 2022-11-27 DIAGNOSIS — R11.0: ICD-10-CM

## 2022-11-27 DIAGNOSIS — Z88.8: ICD-10-CM

## 2022-11-27 DIAGNOSIS — R19.7: Primary | ICD-10-CM

## 2022-11-27 DIAGNOSIS — Z88.5: ICD-10-CM

## 2022-11-27 LAB
ALBUMIN SERPL BCP-MCNC: 3.4 G/DL (ref 3.4–5)
ALP SERPL-CCNC: 82 U/L (ref 45–117)
ALT SERPL W P-5'-P-CCNC: 37 U/L (ref 12–78)
AST SERPL W P-5'-P-CCNC: 26 U/L (ref 15–37)
BUN BLD-MCNC: 20 MG/DL (ref 7–18)
GLUCOSE SERPLBLD-MCNC: 216 MG/DL (ref 74–106)
HCT VFR BLD CALC: 31.3 % (ref 36–45)
LIPASE SERPL-CCNC: 179 U/L (ref 73–393)
LYMPHOCYTES # SPEC AUTO: 0.7 K/UL (ref 0.7–4.9)
MCV RBC: 87 FL (ref 80–100)
PMV BLD: 6.5 FL (ref 7.6–11.3)
POTASSIUM SERPL-SCNC: 4.1 MMOL/L (ref 3.5–5.1)
RBC # BLD: 3.6 M/UL (ref 3.86–4.86)

## 2022-11-27 PROCEDURE — 74177 CT ABD & PELVIS W/CONTRAST: CPT

## 2022-11-27 PROCEDURE — 83690 ASSAY OF LIPASE: CPT

## 2022-11-27 PROCEDURE — 80053 COMPREHEN METABOLIC PANEL: CPT

## 2022-11-27 PROCEDURE — 85025 COMPLETE CBC W/AUTO DIFF WBC: CPT

## 2022-11-27 PROCEDURE — 96374 THER/PROPH/DIAG INJ IV PUSH: CPT

## 2022-11-27 PROCEDURE — 96375 TX/PRO/DX INJ NEW DRUG ADDON: CPT

## 2022-11-27 PROCEDURE — 36415 COLL VENOUS BLD VENIPUNCTURE: CPT

## 2022-11-27 PROCEDURE — 81003 URINALYSIS AUTO W/O SCOPE: CPT

## 2022-11-27 PROCEDURE — 81015 MICROSCOPIC EXAM OF URINE: CPT

## 2022-11-27 PROCEDURE — 99284 EMERGENCY DEPT VISIT MOD MDM: CPT

## 2022-11-27 PROCEDURE — 96361 HYDRATE IV INFUSION ADD-ON: CPT

## 2022-11-27 NOTE — ER
Nurse's Notes                                                                                     

 [facility]                                                                 

Name: Meredith Stark                                                                                 

Age: 62 yrs                                                                                       

Sex: Female                                                                                       

: 1960                                                                                   

MRN: [id number]                                                                                   

Arrival Date: 2022                                                                          

Time: 15:01                                                                                       

Account#: [id number]                                                                            

Bed 11                                                                                            

Private MD:                                                                                       

Diagnosis: Diarrhea, unspecified                                                                  

                                                                                                  

Presentation:                                                                                     

                                                                                             

15:06 Chief complaint: Patient states: upper abdominal pain, nausea, diarrhea with dark stool kb3 

      x1 week. Coronavirus screen: Vaccine status: Patient reports receiving the 2nd dose of      

      the covid vaccine. Client denies travel out of the [place] in the last 14 days. Ebola          

      Screen: Patient negative for fever greater than or equal to 101.5 degrees Fahrenheit,       

      and additional compatible Ebola Virus Disease symptoms Patient denies exposure to           

      infectious person. Patient denies travel to an Ebola-affected area in the 21 days           

      before illness onset. Initial Sepsis Screen: Does the patient meet any 2 criteria? No.      

      Patient's initial sepsis screen is negative. Does the patient have a suspected source       

      of infection? No. Patient's initial sepsis screen is negative. Risk Assessment: Do you      

      want to hurt yourself or someone else? Patient reports no desire to harm self or            

      others. Onset of symptoms was 2022.                                            

15:06 Method Of Arrival: Ambulatory                                                           kb3 

15:06 Acuity: SP 3                                                                           kb3 

                                                                                                  

Triage Assessment:                                                                                

15:07 General: Appears in no apparent distress. uncomfortable, Behavior is calm, cooperative. kb3 

      Pain: Complains of pain in epigastric area, right upper quadrant and left upper             

      quadrant Pain does not radiate. Pain currently is 10 out of 10 on a pain scale. Quality     

      of pain is described as squeezing, Pain began 1 week. GI: Reports upper abdominal pain,     

      diarrhea, bloody stool, nausea.                                                             

                                                                                                  

Historical:                                                                                       

- Allergies:                                                                                      

15:07 Darvocet-N 100;                                                                         kb3 

15:07 Ketorolac;                                                                              kb3 

15:07 Nubain;                                                                                 kb3 

15:07 PENICILLINS;                                                                            kb3 

- PMHx:                                                                                           

15:07 chronic back pain; cirrhosis of liver; diabetes mellitus; Hypertensive disorder;        kb3 

      Thyroid problem; Hypercholesterolemia;                                                      

- PSHx:                                                                                           

15:07 Appendectomy; cheryl knee reconstructive sx; carpal tunnel repair; Cholecystectomy;        kb3 

      hysterectomy;                                                                               

                                                                                                  

- Immunization history:: Adult Immunizations up to date, Client reports receiving the             

  2nd dose of the Covid vaccine, Last tetanus immunization: up to date.                           

- Social history:: Smoking status: Patient denies any tobacco usage or history of.                

                                                                                                  

                                                                                                  

Screening:                                                                                        

15:35 Abuse screen: Denies threats or abuse. Denies injuries from another. Nutritional        hb  

      screening: No deficits noted. Tuberculosis screening: No symptoms or risk factors           

      identified. Fall Risk None identified.                                                      

                                                                                                  

Assessment:                                                                                       

15:35 General: Appears in no apparent distress. Behavior is calm, cooperative. Pain: Pain     hb  

      currently is 9 out of 10 on a pain scale. Neuro: Level of Consciousness is awake,           

      alert, obeys commands, Oriented to person, place, time, situation. Cardiovascular:          

      Patient's skin is warm and dry. Respiratory: Respiratory effort is even, unlabored,         

      Respiratory pattern is regular, symmetrical. GI: Reports upper abdominal pain,              

      diarrhea, nausea, vomiting. : No signs and/or symptoms were reported regarding the        

      genitourinary system. EENT: No signs and/or symptoms were reported regarding the EENT       

      system. Derm: Skin is pink, warm \T\ dry. Musculoskeletal: No signs and/or symptoms         

      reported regarding the musculoskeletal system.                                              

17:45 Reassessment: Patient appears in no apparent distress at this time. Patient and/or      hb  

      family updated on plan of care and expected duration. Pain level reassessed. Patient is     

      alert, oriented x 3, equal unlabored respirations, skin warm/dry/pink.                      

19:00 Reassessment: Patient appears in no apparent distress at this time. Patient and/or      hb  

      family updated on plan of care and expected duration. Pain level reassessed. Patient is     

      alert, oriented x 3, equal unlabored respirations, skin warm/dry/pink.                      

                                                                                                  

Vital Signs:                                                                                      

15:06  / 78; Pulse 112; Resp 20; Temp 98.9; Pulse Ox 99% ; Weight 88.9 kg; Height 5 ft. kb3 

      2 in. (157.48 cm); Pain 10/10;                                                              

16:45  / 82; Pulse 102; Resp 16; Pulse Ox 99% on R/A;                                   hb  

18:10  / 90; Pulse 88; Resp 16; Pulse Ox 99% on R/A; Pain 7/10;                         hb  

15:06 Body Mass Index 35.85 (88.90 kg, 157.48 cm)                                             kb3 

                                                                                                  

ED Course:                                                                                        

15:01 Patient arrived in ED.                                                                  jl7 

15:04 [facility]                                                        kb  

15:04 Temo Huitron DO is Attending Physician.                                                kb  

15:07 Triage completed.                                                                       kb3 

15:07 Arm band placed on left wrist.                                                          kb3 

15:23 Loraine Brown, RN is Primary Nurse.                                                   hb  

15:32 Inserted saline lock: 20 gauge in right antecubital area, using aseptic technique.      hb  

      Blood collected.                                                                            

18:29 Abdomen In Process Unspecified.                                                         EDMS

19:35 Patient has correct armband on for positive identification.                             hb  

19:35 No provider procedures requiring assistance completed. IV discontinued, intact,         hb  

      bleeding controlled, No redness/swelling at site.                                           

                                                                                                  

Administered Medications:                                                                         

15:34 Drug: NS 0.9% 1000 ml Route: IV; Rate: 1 bolus; Site: right antecubital;                hb  

16:55 Follow up: IV Status: Infusion continued; IV Intake: 1000ml                             hb  

15:34 Drug: Pepcid (famotidine) 20 mg Route: IVP; Site: right antecubital;                    hb  

16:46 Follow up: Response: No adverse reaction                                                hb  

15:34 Drug: Zofran (Ondansetron) 4 mg Route: IVP; Site: right antecubital;                    hb  

16:46 Follow up: Response: No adverse reaction                                                hb  

16:56 Drug: morphine 4 mg Route: IVP; Infused Over: 4 mins; Site: right antecubital;          hb  

17:44 Follow up: Response: No adverse reaction                                                hb  

19:42 Drug: Bentyl (dicyclomine) 20 mg Route: PO;                                             hb  

21:00 Follow up: Response: No adverse reaction                                                hb  

19:42 Drug: Zofran (Ondansetron) 4 mg Route: IVP; Site: right antecubital;                    hb  

21:00 Follow up: Response: No adverse reaction                                                hb  

19:42 Drug: ProTONIX (pantoprazole) 40 mg Route: IVP; Site: right antecubital;                hb  

21:30 Follow up: Response: No adverse reaction                                                hb  

                                                                                                  

                                                                                                  

Medication:                                                                                       

19:35 VIS not applicable for this client.                                                     hb  

                                                                                                  

Intake:                                                                                           

16:55 IV: 1000ml; Total: 1000ml.                                                              hb  

                                                                                                  

Outcome:                                                                                          

19:16 Discharge ordered by MD.                                                                kb  

19:35 Discharged to home                                                                      hb  

19:35 Condition: stable                                                                           

19:35 Discharge instructions given to patient, Instructed on discharge instructions, follow       

      up and referral plans. medication usage, Demonstrated understanding of instructions,        

      follow-up care.                                                                             

19:43 Patient left the ED.                                                                    hb  

                                                                                                  

Signatures:                                                                                       

Dispatcher [employer]                           EDMS                                                 

Lindsey Stephenson, AMINATAP-C                 FNP-Loraine Beyer, RN                     RN                                                      

Artemio Eng RN                        RN   jl7                                                  

Clover Moreno RN                    RN   kb3                                                  

                                                                                                  

Corrections: (The following items were deleted from the chart)                                    

22:24 21:00 No provider procedures requiring assistance completed. hb                         hb  

22:24 21:00 IV discontinued, intact, bleeding controlled, No redness/swelling at site. hb     hb  

                                                                                                  

**************************************************************************************************

## 2022-11-27 NOTE — RAD REPORT
EXAM DESCRIPTION:  CT - Abdomen   Pelvis W Contrast - 11/27/2022 6:27 pm

 

CLINICAL HISTORY:  Abdominal pain

 

COMPARISON:  June 2022

 

TECHNIQUE:  Computed axial tomography of the abdomen pelvis was obtained. 100 cc Isovue-300 was admin
istered intravenously. Oral contrast was not requested which limits evaluation of bowel and appendix

 

All CT scans are performed using dose optimization technique as appropriate and may include automated
 exposure control or mA/KV adjustment according to patient size.

 

FINDINGS:  Cirrhotic liver. Cholecystectomy. Portal vein is patent. Paraesophageal varices

 

Spleen 14 centimeters.

 

Pancreas, adrenals and kidneys unremarkable.

 

Small abdominal lymph nodes are unchanged

 

There is no evidence of diverticulitis. Hysterectomy. No adnexal mass

 

Old compression fracture L1. Small ventral and small umbilical hernias contain fat

 

IMPRESSION:  Cirrhosis

 

No acute abnormality seen

## 2022-11-27 NOTE — XMS REPORT
Continuity of Care Document

                          Created on:2022



Patient:SUDHA BABIN

Sex:Female

:1960

External Reference #:[id number]





Demographics







                          Address                   [address]



                                                    [place] [address]

 

                          Home Phone                [phone]

 

                          Mobile Phone              [phone] )

 

                          Email Address             NONE

 

                          Preferred Language        English

 

                          Marital Status            Unknown

 

                          Tenriism Affiliation     Unknown

 

                          Race                      Unknown

 

                          Additional Race(s)        Unavailable



                                                    Unavailable



                                                    White

 

                          Ethnic Group              Unknown









Author







                          Organization              [facility]

t

 

                          Address                   [address]



                                                    [place] [address]

 

                          Phone                     [phone]









Support







                Name            Relationship    Address         Phone

 

                JUNIE BABIN  Spouse          [phone]





                                                [place] [address] 

 

                REGINA BABIN  X               [address] [phone]

[address]



                                                         



                                                [place] [address] 









Care Team Providers







                    Name                Role                Phone

 

                    PCP, PATIENT DOES NOT HAVE A Primary Care Physician Unavaila

BENNIE Ramirez      Attending Clinician Unavailable

 

                    GANESH JAQUEZ      Attending Clinician Unavailable

 

                    GANESH JAQUEZ      Attending Clinician Unavailable

 

                    Ganesh Jaquez DO   Attending Clinician [phone]

 

                    PRADEEP SPANN      Attending Clinician Unavailable

 

                    PRADEEP SPANN      Attending Clinician Unavailable

 

                    ROSIE JHA     Attending Clinician Unavailable

 

                    Rosie Jha DO  Attending Clinician [phone]

 

                    BENNIE ORR Attending Clinician Unavailable

 

                    GANESH JAQUEZ      Admitting Clinician Unavailable

 

                    ROSIE JHA     Admitting Clinician Unavailable









Payers







           Payer Name Policy Type Policy Number Effective Date Expiration Date S

christ

 

           [facility] [employer]            [id number]  2022            



                                            00:00:00              

 

           [employer]/[facility]            [id number]  [id number]            



           MEDICARE GOLD PPO                       00:00:00              



           CSNP                                                   







Problems







       Condition Condition Condition Status Onset  Resolution Last   Treating Co

mments 

Source



       Name   Details Category        Date   Date   Treatment Clinician        



                                                 Date                 

 

       No known No known Disease                                           Unive

rs



       active active                                                  ity of



       problems problems                                                  [facility]







Allergies, Adverse Reactions, Alerts







       Allergy Allergy Status Severity Reaction(s) Onset  Inactive Treating Comm

ents 

Source



       Name   Type                        Date   Date   Clinician        

 

       KETOROLA DRUG   Active        Hives                        Univers



       C      INGREDI                                              ity of



                                          00:00:                      [place]



                                          00                          Medical



                                                                      Branch

 

       Ketorola Propensi Active        Hives                        Univer

s



       c      ty to                       9-12                        ity of



              adverse                      00:00:                      [place]



              reaction                      00                          Medical



              s                                                       Branch

 

       Penicill Propensi Active        Other - See                       U

nivers



       in     ty to                comments 7-15                        ity of



              adverse                      00:00:                      [place]



              reaction                      00                          Medical



              s                                                       Branch

 

       PENICILL DRUG   Active        Other-Cmnt                       Univ

ers



       IN     INGREDI                      7-15                        ity of



                                          00:00:                      [place]



                                          00                          Medical



                                                                      Branch

 

       n      Propensi Active                                     



              ty to                       623                        



              adverse                      00:00:                      



              reaction                      00                          



              to drug                                                  

 

       Bactrim Propensi Active                                     



       - Oral ty to                       4-                        



              adverse                      00:00:                      



              reaction                      00                          



              to drug                                                  

 

       NO KNOWN Drug   Active                                           [facility]



       ALLERGIE Class                                                   ity of



       [facility]







Social History







           Social Habit Start Date Stop Date  Quantity   Comments   Source

 

           ASSERTION                        Pregnant              [facility]

 

           Exposure to 2022-10-26 2022 Not sure              [facility]

 [place]



           SARS-CoV-2 (event) 00:00:00   07:25:00                         [facility]

 Branch

 

           Sex Assigned At [birth year]                       [facility]



           Birth      00:00:00   00:00:00                         Medical Branch









                Smoking Status  Start Date      Stop Date       Source

 

                Tobacco smoking consumption                                 Univ

[facility]                                         Branch







Medications







       Ordered Filled Start  Stop   Current Ordering Indication Dosage Frequency

 Signature

                    Comments            Components          Source



     Medication Medication Date Date Medication? Clinician                (SIG) 

          



     Name Name                                                   

 

     HYDROXYZINE      2022      No                                      



     HCL 50 MG      1-15                                              



     TABS      00:00:                                              



               00                                                

 

     HYDROcodone      2022- No             1{tbl}      1 tablet,         

  Univers



     -acetaminop                                Oral,           ity of



     hen (NORCO      12:45: 12:48                          ONCE, 1           Cornelio

as



     5) 5-325 mg      00   :00                           dose, On           Medi

marisa



     tablet 1                                         Sat            Branch



     tablet                                         22 at           



                                                  0745, ASAP           

 

     HYDROcodone      2022- Yes       4647 1{tbl}      Take 1           U

nivers



     -acetaminop      -13                          tablet by           it

y of



     hen 5-325      00:00: 05:59                          mouth           [place]



     mg tablet      00   :00                           every 4           Medical



                                                  (four)           Branch



                                                  hours as           



                                                  needed for           



                                                  Pain           



                                                  (scale           



                                                  1-3) for           



                                                  up to 7           



                                                  days.           



                                                  Indication           



                                                  s: acute           



                                                  pain           

 

     METFORMIN      2022      No                                      



     HYDROCHLORI      0-31                                              



     DE 1000 MG      00:00:                                              



     TABS      00                                                

 

     MORPHINE      -1      No             30                       



     SUL 30MG ER      0-24                                              



     Tablets      00:00:                                              



               00                                                

 

     LISINOPRIL      -1      No                                      



     5 MG TABS      0-11                                              



               00:00:                                              



               00                                                

 

     LISINOPRIL      -1      No                                      



     5 MG TABS      0-11                                              



               00:00:                                              



               00                                                

 

     UNITHROID      -0      No                                      



     75 MCG TABS                                                    



               00:00:                                              



               00                                                

 

     UNITHROID      -0      No                                      



     75 MCG TABS                                                    



               00:00:                                              



               00                                                

 

     HUMULIN      -0      No                                      



     70/30                                                    



     KWIKPEN      00:00:                                              



     (70-30) 100      00                                                



     UNIT/ML                                                        



     SUPN                                                        

 

     Dose            No                                      



     Unknown                                                    



               00:00:                                              



               00                                                

 

     HYDROcodone      -2022- No             1{tbl}      1 tablet,         

  Univers



     -acetaminop                                Oral,           ity of



     hen (NORCO)      05:52: 05:58                          ONCE, 1           Te

[place]



      mg      00   :00                           dose, On           Medica

l



     tablet 1                                         Tue            Branch



     tablet                                         22 at           



                                                  0100, ASAP           

 

     MORPHINE      -0      No             30                       



     SULFATE ER      8-22                                              



     30 MG TBCR      00:00:                                              



               00                                                

 

     MORPHINE      -0      No             30                       



     SULFATE ER      8-22                                              



     30 MG TBCR      00:00:                                              



               00                                                

 

     MORPHINE      -0      No             30                       



     SULFATE ER      8-22                                              



     30 MG TBCR      00:00:                                              



               00                                                

 

     TRINTELLIX      2-0      No                                      



     20 MG TABS      8-18                                              



               00:00:                                              



               00                                                

 

     TRINTELLIX      2-0      No                                      



     20 MG TABS      8-18                                              



               00:00:                                              



               00                                                

 

     TRINTELLIX      2-0      No             20                       



     20 MG TABS      8-18                                              



               00:00:                                              



               00                                                

 

     Dose      2-0      No                                      



     Unknown      8-10                                              



               00:00:                                              



               00                                                

 

     &lt       2022-0      No             10                       



               8-10                                              



               00:00:                                              



               00                                                

 

     Dose      2-0      No                                      



     Unknown      8-10                                              



               00:00:                                              



               00                                                

 

     Dose      2-0      No                                      



     Unknown      8-10                                              



               00:00:                                              



               00                                                

 

     Dose      2-0      No             5                        



     Unknown      8-10                                              



               00:00:                                              



               00                                                

 

     Dose      2-0      No                                      



     Unknown      8-10                                              



               00:00:                                              



               00                                                

 

     &lt       2022-0      No             10                       



               8-10                                              



               00:00:                                              



               00                                                

 

     Dose      2-0      No                                      



     Unknown      8-10                                              



               00:00:                                              



               00                                                

 

     Dose      2-0      No                                      



     Unknown      8-10                                              



               00:00:                                              



               00                                                

 

     Dose      2022-0      No             5                        



     Unknown      8-10                                              



               00:00:                                              



               00                                                

 

     Dose      2022-0      No             25                       



     Unknown      8-10                                              



               00:00:                                              



               00                                                

 

     &lt       2022-0      No             10                       



               8-10                                              



               00:00:                                              



               00                                                

 

     Dose      2022-0      No                                      



     Unknown      8-10                                              



               00:00:                                              



               00                                                

 

     Dose      2022-0      No             15                       



     Unknown      8-10                                              



               00:00:                                              



               00                                                

 

     Dose      2022-0      No             5                        



     Unknown      8-10                                              



               00:00:                                              



               00                                                

 

     Dose      2022-0      No             4                        



     Unknown      7                                              



               00:00:                                              



               00                                                

 

     Dose      2022-0      No             10                       



     Unknown      7                                              



               00:00:                                              



               00                                                

 

     Dose      2022-0      No             4                        



     Unknown                                                    



               00:00:                                              



               00                                                

 

     Dose      2022-0      No                                      



     Unknown                                                    



               00:00:                                              



               00                                                

 

     Dose      2022-0      No             4                        



     Unknown                                                    



               00:00:                                              



               00                                                

 

     Dose      2022-0      No             10                       



     Unknown                                                    



               00:00:                                              



               00                                                

 

     Dose      2022-0      No                                      



     Unknown      7                                              



               00:00:                                              



               00                                                

 

     Dose      2022-0      No                                      



     Unknown      7                                              



               00:00:                                              



               00                                                

 

     &lt       2022-0      No                                      



               7-                                              



               00:00:                                              



               00                                                

 

     lisinopril      2022-0      No             1mg                      



     5 mg tablet      7-                                              



               00:00:                                              



               00                                                

 

     Myrbetriq      2022-0      No             1mg                      



     25 mg      7-21                                              



     tablet,exte      00:00:                                              



     nded      00                                                



     release                                                        

 

     citalopram      2-0      No             1mg                      



     20 mg      7-21                                              



     tablet      00:00:                                              



               00                                                

 

     atorvastati      2-0      No             1mg                      



     n 10 mg      7-21                                              



     tablet      00:00:                                              



               00                                                

 

     metformin      2022-0      No             1mg                      



     1,000 mg      7-21                                              



     tablet      00:00:                                              



               00                                                

 

     Dose      2022-0      No                                      



     Unknown      7-                                              



               00:00:                                              



               00                                                

 

     metoclopram      2022-0      No             1mg                      



     fabio 10 mg      7-21                                              



     tablet      00:00:                                              



               00                                                

 

     ferrous      2-0      No             1(65 mg                     



     sulfate 325      7-21                     iron)                     



     mg (65 mg      00:00:                                              



     iron)      00                                                



     tablet                                                        

 

     omeprazole      2-0      No             1mg                      



     40 mg      7-21                                              



     capsule,del      00:00:                                              



     ayed      00                                                



     release                                                        

 

     TAKE 1      -0      No             1000                     



     TABLET      7-21                                              



     TWICE      00:00:                                              



     DAILY.      00                                                

 

     TAKE 1      -0      No             500                      



     TABLET BY      7-21                                              



     MOUTH EVERY      00:00:                                              



     8 HOURS FOR      00                                                



     10 DAYS                                                        

 

     &lt       2022-0      No             250                      



               7-21                                              



               00:00:                                              



               00                                                

 

     &lt       2022-0      No             10                       



               7-21                                              



               00:00:                                              



               00                                                

 

     &lt       2022-0      No             25                       



               7-21                                              



               00:00:                                              



               00                                                

 

     &lt       2022-0      No                                      



               7-21                                              



               00:00:                                              



               00                                                

 

     &lt       2022-0      No                                      



               7-21                                              



               00:00:                                              



               00                                                

 

     TAKE 1      2022-0      No             1000                     



     TABLET      7-21                                              



     TWICE      00:00:                                              



     DAILY.      00                                                

 

     Dose      2022-0      No                                      



     Unknown      7-                                              



               00:00:                                              



               00                                                

 

     Dose      2022-0      No                                      



     Unknown      7-                                              



               00:00:                                              



               00                                                

 

     citalopram      2022-0      No             1mg                      



     20 mg      7-21                                              



     tablet      00:00:                                              



               00                                                

 

     Dose      2022-0      No                                      



     Unknown      7-                                              



               00:00:                                              



               00                                                

 

     Dose      2022-0      No                                      



     Unknown      7-                                              



               00:00:                                              



               00                                                

 

     Dose      2022-0      No                                      



     Unknown      7-                                              



               00:00:                                              



               00                                                

 

     metoclopram      2022-0      No             1mg                      



     fabio 10 mg      7-21                                              



     tablet      00:00:                                              



               00                                                

 

     Dose      2022-0      No                                      



     Unknown      7-                                              



               00:00:                                              



               00                                                

 

     Dose      2022-0      No                                      



     Unknown      7                                              



               00:00:                                              



               00                                                

 

     TAKE 1      2-0      No             1000                     



     TABLET      7-                                              



     TWICE      00:00:                                              



     DAILY.      00                                                

 

     TAKE 1      2-0      No             500                      



     TABLET BY      7-                                              



     MOUTH EVERY      00:00:                                              



     8 HOURS FOR      00                                                



     10 DAYS                                                        

 

     &lt       2022-0      No             250                      



               7-21                                              



               00:00:                                              



               00                                                

 

     &lt       2022-0      No             10                       



               7-21                                              



               00:00:                                              



               00                                                

 

     &lt       2022-0      No             25                       



               7-21                                              



               00:00:                                              



               00                                                

 

     &lt       2022-0      No                                      



               7-21                                              



               00:00:                                              



               00                                                

 

     &lt       2022-0      No                                      



               7-21                                              



               00:00:                                              



               00                                                

 

     TAKE 1      2-0      No             1000                     



     TABLET      7-                                              



     TWICE      00:00:                                              



     DAILY.      00                                                

 

     lisinopril      2-0      No             1mg                      



     5 mg tablet      7-                                              



               00:00:                                              



               00                                                

 

     Myrbetriq      2022-0      No             1mg                      



     25 mg      7-21                                              



     tablet,exte      00:00:                                              



     nded      00                                                



     release                                                        

 

     citalopram      2022-0      No             1mg                      



     20 mg      7-21                                              



     tablet      00:00:                                              



               00                                                

 

     atorvastati      2022-0      No             1mg                      



     n 10 mg      7-21                                              



     tablet      00:00:                                              



               00                                                

 

     metformin      2022-0      No             1mg                      



     1,000 mg      7-21                                              



     tablet      00:00:                                              



               00                                                

 

     glimepiride      2-0      No             1mg                      



     1 mg tablet                                                    



               00:00:                                              



               00                                                

 

     metoclopram      2022-0      No             1mg                      



     fabio 10 mg      7-21                                              



     tablet      00:00:                                              



               00                                                

 

     ferrous      2022-0      No             1(65 mg                     



     sulfate 325      -                     iron)                     



     mg (65 mg      00:00:                                              



     iron)      00                                                



     tablet                                                        

 

     omeprazole      -0      No             1mg                      



     40 mg      7-                                              



     capsule,del      00:00:                                              



     ayed      00                                                



     release                                                        

 

     TAKE 1      -0      No             1000                     



     TABLET                                                    



     TWICE      00:00:                                              



     DAILY.      00                                                

 

     TAKE 1      -0      No             500                      



     TABLET BY      7-                                              



     MOUTH EVERY      00:00:                                              



     8 HOURS FOR      00                                                



     10 DAYS                                                        

 

     &lt       2022-0      No             250                      



               7                                              



               00:00:                                              



               00                                                

 

     &lt       2022-0      No             10                       



               7-                                              



               00:00:                                              



               00                                                

 

     &lt       2022-0      No             25                       



               7-                                              



               00:00:                                              



               00                                                

 

     &lt       2022-0      No                                      



               7-                                              



               00:00:                                              



               00                                                

 

     &lt       2022-0      No                                      



               7-                                              



               00:00:                                              



               00                                                

 

     TAKE 1      -0      No             1000                     



     TABLET                                                    



     TWICE      00:00:                                              



     DAILY.      00                                                

 

     lisinopril      2-0      No             1mg                      



     5 mg tablet                                                    



               00:00:                                              



               00                                                

 

     Myrbetriq      2-0      No             1mg                      



     25 mg      7-                                              



     tablet,exte      00:00:                                              



     nded      00                                                



     release                                                        

 

     citalopram      2-0      No             1mg                      



     20 mg      7-                                              



     tablet      00:00:                                              



               00                                                

 

     atorvastati      2-0      No             1mg                      



     n 10 mg      7-21                                              



     tablet      00:00:                                              



               00                                                

 

     metformin      2-0      No             1mg                      



     1,000 mg      -                                              



     tablet      00:00:                                              



               00                                                

 

     glimepiride      2-0      No             1mg                      



     1 mg tablet                                                    



               00:00:                                              



               00                                                

 

     metoclopram      2-0      No             1mg                      



     fabio 10 mg      7-21                                              



     tablet      00:00:                                              



               00                                                

 

     ferrous      2-0      No             1(65 mg                     



     sulfate 325      -                     iron)                     



     mg (65 mg      00:00:                                              



     iron)      00                                                



     tablet                                                        

 

     omeprazole      2-0      No             1mg                      



     40 mg      -                                              



     capsule,del      00:00:                                              



     ayed      00                                                



     release                                                        

 

     TAKE 1      -0      No             1000                     



     TABLET                                                    



     TWICE      00:00:                                              



     DAILY.      00                                                

 

     TAKE 1      -0      No             500                      



     TABLET BY      7-                                              



     MOUTH EVERY      00:00:                                              



     8 HOURS FOR      00                                                



     10 DAYS                                                        

 

     &lt       2022-0      No             250                      



               7-                                              



               00:00:                                              



               00                                                

 

     &lt       2022-0      No             10                       



               7-                                              



               00:00:                                              



               00                                                

 

     &lt       2022-0      No             25                       



               7                                              



               00:00:                                              



               00                                                

 

     &lt       2022-0      No                                      



               7-                                              



               00:00:                                              



               00                                                

 

     &lt       2022-0      No                                      



               7-                                              



               00:00:                                              



               00                                                

 

     TAKE 1      -0      No             1000                     



     TABLET                                                    



     TWICE      00:00:                                              



     DAILY.      00                                                

 

     Dose      2022-0      No             5                        



     Unknown                                                    



               00:00:                                              



               00                                                

 

     Dose      2022-0      No             5                        



     Unknown                                                    



               00:00:                                              



               00                                                

 

     Dose      2022-0      No             5                        



     Unknown                                                    



               00:00:                                              



               00                                                

 

     Dose      2022-0      No             5                        



     Unknown                                                    



               00:00:                                              



               00                                                

 

     HYDROcodone      -0 - No             1{tbl}      1 tablet,         

  [facility]



     -acetaminop                                Oral,           [facility] of



     hen (NORCO)      06:30: 05:28                          ONCE, 1           [facility]

[place]



      mg      00   :00                           dose, On           [facility]

l



     tablet 1                                         Sat            [facility]



     tablet                                         22 at           



                                                  0130,           



                                                  Routine           

 

     No known      -0      No                       No known           [facility]

rs



     medications                                     medication           it

y of



               04:06:                               s              [address]

 

     TAKE 1      -0      No             500                      



     TABLET BY      7-13                                              



     MOUTH EVERY      00:00:                                              



     12 HOURS      00                                                



     FOR 10 DAYS                                                        

 

     &lt       2022-0      No                                      



               7-                                              



               00:00:                                              



               00                                                

 

     TAKE 1      -0      No                                      



     TABLET BY      7-13                                              



     MOUTH DAILY      00:00:                                              



               00                                                

 

     &lt       2022-0      No                                      



               7-                                              



               00:00:                                              



               00                                                

 

     &lt       2022-0      No             20                       



                                                             



               00:00:                                              



               00                                                

 

     Dose      2022-0      No             50                       



     Unknown                                                    



               00:00:                                              



               00                                                

 

     TAKE 1      -0      No             500                      



     TABLET BY      7-13                                              



     MOUTH EVERY      00:00:                                              



     8 HOURS FOR      00                                                



     10 DAYS                                                        

 

     Dose      2022-0      No             20                       



     Unknown                                                    



               00:00:                                              



               00                                                

 

     Dose      2022-0      No             10                       



     Unknown                                                    



               00:00:                                              



               00                                                

 

     &lt       2022-0      No             25                       



                                                             



               00:00:                                              



               00                                                

 

     DISSOLVE 1      2-0      No             4                        



     TABLET BY      7-13                                              



     MOUTH EVERY      00:00:                                              



     8 HOURS AS      00                                                



     NEEDED                                                        

 

     &lt       2022-0      No                                      



               7-                                              



               00:00:                                              



               00                                                

 

     TAKE 1      -0      No             1000                     



     TABLET      7-13                                              



     TWICE      00:00:                                              



     DAILY.      00                                                

 

     &lt       2022-0      No             10                       



               7-13                                              



               00:00:                                              



               00                                                

 

     &lt       2022-0      No             15                       



               7-13                                              



               00:00:                                              



               00                                                

 

     &lt       2022-0      No             5                        



               7-                                              



               00:00:                                              



               00                                                

 

     TAKE 1      2022-0      No             5                        



     TABLET BY      7-13                                              



     MOUTH TWICE      00:00:                                              



     A DAY AS      00                                                



     NEEDED FOR                                                        



     ANXIETY                                                        

 

     Dose      2022-0      No                                      



     Unknown      7-                                              



               00:00:                                              



               00                                                

 

     &lt       2022-0      No                                      



               7-13                                              



               00:00:                                              



               00                                                

 

     TAKE 1      2022-0      No             500                      



     TABLET BY      7-13                                              



     MOUTH EVERY      00:00:                                              



     12 HOURS      00                                                



     FOR 10 DAYS                                                        

 

     &lt       2022-0      No                                      



               7-                                              



               00:00:                                              



               00                                                

 

     TAKE 1      2022-0      No                                      



     TABLET BY      7-13                                              



     MOUTH DAILY      00:00:                                              



               00                                                

 

     &lt       2022-0      No                                      



               7-                                              



               00:00:                                              



               00                                                

 

     &lt       2022-0      No             20                       



               7-                                              



               00:00:                                              



               00                                                

 

     Dose      2022-0      No             50                       



     Unknown      7                                              



               00:00:                                              



               00                                                

 

     TAKE 1      2022-0      No             500                      



     TABLET BY      7-13                                              



     MOUTH EVERY      00:00:                                              



     8 HOURS FOR      00                                                



     10 DAYS                                                        

 

     Dose      2022-0      No             20                       



     Unknown      7                                              



               00:00:                                              



               00                                                

 

     Dose      2022-0      No             10                       



     Unknown      7                                              



               00:00:                                              



               00                                                

 

     &lt       2022-0      No             25                       



               7-                                              



               00:00:                                              



               00                                                

 

     DISSOLVE 1      2022-0      No             4                        



     TABLET BY      7-13                                              



     MOUTH EVERY      00:00:                                              



     8 HOURS AS      00                                                



     NEEDED                                                        

 

     &lt       2022-0      No                                      



               7-                                              



               00:00:                                              



               00                                                

 

     TAKE 1      2022-0      No             1000                     



     TABLET      7-                                              



     TWICE      00:00:                                              



     DAILY.      00                                                

 

     &lt       2022-0      No             10                       



               7-                                              



               00:00:                                              



               00                                                

 

     &lt       2022-0      No             15                       



               7-                                              



               00:00:                                              



               00                                                

 

     &lt       2022-0      No             5                        



               7-                                              



               00:00:                                              



               00                                                

 

     TAKE 1      2022-0      No             5                        



     TABLET BY      7-13                                              



     MOUTH TWICE      00:00:                                              



     A DAY AS      00                                                



     NEEDED FOR                                                        



     ANXIETY                                                        

 

     Dose      2022-0      No                                      



     Unknown      7-                                              



               00:00:                                              



               00                                                

 

     &lt       2022-0      No                                      



               7-13                                              



               00:00:                                              



               00                                                

 

     TAKE 1      2022-0      No             500                      



     TABLET BY      7-13                                              



     MOUTH EVERY      00:00:                                              



     12 HOURS      00                                                



     FOR 10 DAYS                                                        

 

     &lt       2022-0      No                                      



               7-                                              



               00:00:                                              



               00                                                

 

     TAKE 1      2022-0      No                                      



     TABLET BY      7-13                                              



     MOUTH DAILY      00:00:                                              



               00                                                

 

     &lt       2022-0      No                                      



               7-13                                              



               00:00:                                              



               00                                                

 

     &lt       2022-0      No             20                       



               7-                                              



               00:00:                                              



               00                                                

 

     Dose      2022-0      No             50                       



     Unknown      7                                              



               00:00:                                              



               00                                                

 

     TAKE 1      2022-0      No             500                      



     TABLET BY      7-13                                              



     MOUTH EVERY      00:00:                                              



     8 HOURS FOR      00                                                



     10 DAYS                                                        

 

     Dose      2022-0      No             20                       



     Unknown      7                                              



               00:00:                                              



               00                                                

 

     Dose      2022-0      No             10                       



     Unknown      7                                              



               00:00:                                              



               00                                                

 

     &lt       2022-0      No             25                       



               7-                                              



               00:00:                                              



               00                                                

 

     DISSOLVE 1      2022-0      No             4                        



     TABLET BY      7-13                                              



     MOUTH EVERY      00:00:                                              



     8 HOURS AS      00                                                



     NEEDED                                                        

 

     &lt       2022-0      No                                      



               7-                                              



               00:00:                                              



               00                                                

 

     TAKE 1      2022-0      No             1000                     



     TABLET      7-                                              



     TWICE      00:00:                                              



     DAILY.      00                                                

 

     &lt       2022-0      No             10                       



               7-                                              



               00:00:                                              



               00                                                

 

     &lt       2022-0      No             15                       



               7                                              



               00:00:                                              



               00                                                

 

     &lt       2022-0      No             5                        



               7-                                              



               00:00:                                              



               00                                                

 

     TAKE 1      2022-0      No             5                        



     TABLET BY      7-13                                              



     MOUTH TWICE      00:00:                                              



     A DAY AS      00                                                



     NEEDED FOR                                                        



     ANXIETY                                                        

 

     Dose      2022-0      No                                      



     Unknown      7                                              



               00:00:                                              



               00                                                

 

     &lt       2022-0      No                                      



               7-                                              



               00:00:                                              



               00                                                

 

     TAKE 1      2022-0      No             500                      



     TABLET BY      7-13                                              



     MOUTH EVERY      00:00:                                              



     12 HOURS      00                                                



     FOR 10 DAYS                                                        

 

     &lt       2022-0      No                                      



               7-                                              



               00:00:                                              



               00                                                

 

     TAKE 1      2022-0      No                                      



     TABLET BY      7-13                                              



     MOUTH DAILY      00:00:                                              



               00                                                

 

     &lt       2022-0      No                                      



               7-                                              



               00:00:                                              



               00                                                

 

     &lt       2022-0      No             20                       



               7-                                              



               00:00:                                              



               00                                                

 

     Dose      2022-0      No             50                       



     Unknown                                                    



               00:00:                                              



               00                                                

 

     TAKE 1      2022-0      No             500                      



     TABLET BY      7-13                                              



     MOUTH EVERY      00:00:                                              



     8 HOURS FOR      00                                                



     10 DAYS                                                        

 

     Dose      2022-0      No             20                       



     Unknown                                                    



               00:00:                                              



               00                                                

 

     Dose      2022-0      No             10                       



     Unknown                                                    



               00:00:                                              



               00                                                

 

     &lt       2022-0      No             25                       



               7-                                              



               00:00:                                              



               00                                                

 

     DISSOLVE 1      2022-0      No             4                        



     TABLET BY      7-13                                              



     MOUTH EVERY      00:00:                                              



     8 HOURS AS      00                                                



     NEEDED                                                        

 

     &lt       2022-0      No                                      



               7-13                                              



               00:00:                                              



               00                                                

 

     TAKE 1      2022-0      No             1000                     



     TABLET                                                    



     TWICE      00:00:                                              



     DAILY.      00                                                

 

     &lt       2022-0      No             10                       



               7                                              



               00:00:                                              



               00                                                

 

     &lt       2022-0      No             15                       



                                                             



               00:00:                                              



               00                                                

 

     &lt       2022-0      No             5                        



                                                             



               00:00:                                              



               00                                                

 

     TAKE 1      2022-0      No             5                        



     TABLET BY      7-13                                              



     MOUTH TWICE      00:00:                                              



     A DAY AS      00                                                



     NEEDED FOR                                                        



     ANXIETY                                                        

 

     Dose      2022-0      No                                      



     Unknown                                                    



               00:00:                                              



               00                                                

 

     &lt       2022-0      No                                      



                                                             



               00:00:                                              



               00                                                

 

     TAKE 1      2022-0      No             5                        



     TABLET BY      7-12                                              



     MOUTH TWICE      00:00:                                              



     A DAY AS      00                                                



     NEEDED FOR                                                        



     ANXIETY                                                        

 

     TAKE 1      2022-0      No             5                        



     TABLET BY      7-12                                              



     MOUTH TWICE      00:00:                                              



     A DAY AS      00                                                



     NEEDED FOR                                                        



     ANXIETY                                                        

 

     TAKE 1      2022-0      No             5                        



     TABLET BY      7-12                                              



     MOUTH TWICE      00:00:                                              



     A DAY AS      00                                                



     NEEDED FOR                                                        



     ANXIETY                                                        

 

     TAKE 1      2022-0      No             5                        



     TABLET BY      7-12                                              



     MOUTH TWICE      00:00:                                              



     A DAY AS      00                                                



     NEEDED FOR                                                        



     ANXIETY                                                        

 

     DISSOLVE 1      2022-0      No             4                        



     TABLET BY      7-11                                              



     MOUTH EVERY      00:00:                                              



     8 HOURS AS      00                                                



     NEEDED                                                        

 

     DISSOLVE 1      2022-0      No             4                        



     TABLET BY      7-11                                              



     MOUTH EVERY      00:00:                                              



     8 HOURS AS      00                                                



     NEEDED                                                        

 

     DISSOLVE 1      2022-0      No             4                        



     TABLET BY      7-11                                              



     MOUTH EVERY      00:00:                                              



     8 HOURS AS      00                                                



     NEEDED                                                        

 

     DISSOLVE 1      2022-0      No             4                        



     TABLET BY      7-11                                              



     MOUTH EVERY      00:00:                                              



     8 HOURS AS      00                                                



     NEEDED                                                        

 

     &lt       2022-0      No                                      



               -                                              



               00:00:                                              



               00                                                

 

     &lt       2022-0      No             30                       



                                                             



               00:00:                                              



               00                                                

 

     TAKE 1      2022-0      No             30                       



     TABLET BY      -                                              



     MOUTH AT      00:00:                                              



     BEDTIME      00                                                

 

     &lt       2022-0      No                                      



               7-                                              



               00:00:                                              



               00                                                

 

     Dose      2022-0      No             50                       



     Unknown                                                    



               00:00:                                              



               00                                                

 

     &lt       2022-0      No             20                       



                                                             



               00:00:                                              



               00                                                

 

     &lt       2022-0      No                                      



               -                                              



               00:00:                                              



               00                                                

 

     TAKE 1      2022-0      No             [id number]                     



     TABLET                                                    



     TWICE      00:00:                                              



     DAILY.      00                                                

 

     &lt       2022-0      No             25                       



                                                             



               00:00:                                              



               00                                                

 

     TAKE 1      2022-0      No             500                      



     TABLET BY      7-                                              



     MOUTH EVERY      00:00:                                              



     8 HOURS FOR      00                                                



     10 DAYS                                                        

 

     &lt       2022-0      No                                      



                                                             



               00:00:                                              



               00                                                

 

     &lt       2022-0      No             30                       



                                                             



               00:00:                                              



               00                                                

 

     TAKE 1      2022-0      No             30                       



     TABLET BY      -                                              



     MOUTH AT      00:00:                                              



     BEDTIME      00                                                

 

     &lt       2022-0      No                                      



                                                             



               00:00:                                              



               00                                                

 

     Dose      2022-0      No             50                       



     Unknown                                                    



               00:00:                                              



               00                                                

 

     &lt       2022-0      No             20                       



                                                             



               00:00:                                              



               00                                                

 

     &lt       2022-0      No                                      



                                                             



               00:00:                                              



               00                                                

 

     TAKE 1      2022-0      No             [id number]                     



     TABLET                                                    



     TWICE      00:00:                                              



     DAILY.      00                                                

 

     &lt       2022-0      No             25                       



                                                             



               00:00:                                              



               00                                                

 

     TAKE 1      2022-0      No             500                      



     TABLET BY                                                    



     MOUTH EVERY      00:00:                                              



     8 HOURS FOR      00                                                



     10 DAYS                                                        

 

     &lt       2022-0      No                                      



                                                             



               00:00:                                              



               00                                                

 

     &lt       2022-0      No             30                       



                                                             



               00:00:                                              



               00                                                

 

     TAKE 1      2022-0      No             30                       



     TABLET BY                                                    



     MOUTH AT      00:00:                                              



     BEDTIME      00                                                

 

     &lt       2022-0      No                                      



                                                             



               00:00:                                              



               00                                                

 

     Dose      2022-0      No             50                       



     Unknown                                                    



               00:00:                                              



               00                                                

 

     &lt       2022-0      No             20                       



                                                             



               00:00:                                              



               00                                                

 

     &lt       2022-0      No                                      



                                                             



               00:00:                                              



               00                                                

 

     TAKE 1      2022-0      No             [id number]                     



     TABLET                                                    



     TWICE      00:00:                                              



     DAILY.      00                                                

 

     &lt       2022-0      No             25                       



                                                             



               00:00:                                              



               00                                                

 

     TAKE 1      2022-0      No             500                      



     TABLET BY                                                    



     MOUTH EVERY      00:00:                                              



     8 HOURS FOR      00                                                



     10 DAYS                                                        

 

     &lt       2022-0      No                                      



                                                             



               00:00:                                              



               00                                                

 

     &lt       2022-0      No             30                       



                                                             



               00:00:                                              



               00                                                

 

     TAKE 1      2022-0      No             30                       



     TABLET BY      -                                              



     MOUTH AT      00:00:                                              



     BEDTIME      00                                                

 

     &lt       2022-0      No                                      



                                                             



               00:00:                                              



               00                                                

 

     Dose      2022-0      No             50                       



     Unknown                                                    



               00:00:                                              



               00                                                

 

     &lt       2022-0      No             20                       



                                                             



               00:00:                                              



               00                                                

 

     &lt       2022-0      No                                      



                                                             



               00:00:                                              



               00                                                

 

     TAKE 1      2022-0      No             [id number]                     



     TABLET      7-06                                              



     TWICE      00:00:                                              



     DAILY.      00                                                

 

     &lt       2022-0      No             25                       



               7-06                                              



               00:00:                                              



               00                                                

 

     TAKE 1      2022-0      No             500                      



     TABLET BY      7-06                                              



     MOUTH EVERY      00:00:                                              



     8 HOURS FOR      00                                                



     10 DAYS                                                        

 

     TAKE 1      2022-0      No             20                       



     TABLET BY      7-05                                              



     MOUTH ONCE      00:00:                                              



     A DAY WITH      00                                                



     MEALS                                                        

 

     TAKE 1      2022-0      No             20                       



     TABLET BY      7-05                                              



     MOUTH ONCE      00:00:                                              



     A DAY WITH      00                                                



     MEALS                                                        

 

     TAKE 1      2022-0      No             20                       



     TABLET BY      7-05                                              



     MOUTH ONCE      00:00:                                              



     A DAY WITH      00                                                



     MEALS                                                        

 

     TAKE 1      2022-0      No             20                       



     TABLET BY      7-05                                              



     MOUTH ONCE      00:00:                                              



     A DAY WITH      00                                                



     MEALS                                                        

 

     &lt       2022-0      No                                      



               6-30                                              



               00:00:                                              



               00                                                

 

     TAKE 1      2022-0      No             200                      



     TABLET      6-30                                              



     DAILY.      00:00:                                              



               00                                                

 

     &lt       2022-0      No                                      



               6-30                                              



               00:00:                                              



               00                                                

 

     TAKE 1      2022-0      No             200                      



     TABLET      6-30                                              



     DAILY.      00:00:                                              



               00                                                

 

     &lt       2022-0      No                                      



               6-30                                              



               00:00:                                              



               00                                                

 

     TAKE 1      2022-0      No             200                      



     TABLET      6-30                                              



     DAILY.      00:00:                                              



               00                                                

 

     &lt       2022-0      No                                      



               6-30                                              



               00:00:                                              



               00                                                

 

     TAKE 1      2022-0      No             200                      



     TABLET      6-30                                              



     DAILY.      00:00:                                              



               00                                                

 

     TAKE 1      2022-0      No                                      



     TABLET BY      6-29                                              



     MOUTH EVERY      00:00:                                              



     8 HOURS FOR      00                                                



     10 DAYS                                                        

 

     TAKE 1      2022-0      No                                      



     TABLET BY      6-29                                              



     MOUTH EVERY      00:00:                                              



     8 HOURS FOR      00                                                



     10 DAYS                                                        

 

     &lt       2022-0      No                                      



               6-29                                              



               00:00:                                              



               00                                                

 

     &lt       2022-0      No                                      



               6-29                                              



               00:00:                                              



               00                                                

 

     &lt       2022-0      No                                      



               6-29                                              



               00:00:                                              



               00                                                

 

     &lt       2022-0      No                                      



               6-29                                              



               00:00:                                              



               00                                                

 

     TAKE 1      2022-0      No                                      



     TABLET BY      6-29                                              



     MOUTH EVERY      00:00:                                              



     8 HOURS FOR      00                                                



     10 DAYS                                                        

 

     TAKE 1      2022-0      No                                      



     TABLET BY      6-29                                              



     MOUTH EVERY      00:00:                                              



     8 HOURS FOR      00                                                



     10 DAYS                                                        

 

     &lt       2022-0      No                                      



               6-29                                              



               00:00:                                              



               00                                                

 

     &lt       2022-0      No                                      



               6-29                                              



               00:00:                                              



               00                                                

 

     &lt       2022-0      No                                      



               6-29                                              



               00:00:                                              



               00                                                

 

     &lt       2022-0      No                                      



               6-29                                              



               00:00:                                              



               00                                                

 

     TAKE 1      2022-0      No                                      



     TABLET BY      6-29                                              



     MOUTH EVERY      00:00:                                              



     8 HOURS FOR      00                                                



     10 DAYS                                                        

 

     TAKE 1      2022-0      No                                      



     TABLET BY      6-29                                              



     MOUTH EVERY      00:00:                                              



     8 HOURS FOR      00                                                



     10 DAYS                                                        

 

     &lt       2022-0      No                                      



               6                                              



               00:00:                                              



               00                                                

 

     &lt       2022-0      No                                      



               6                                              



               00:00:                                              



               00                                                

 

     &lt       2022-0      No                                      



               6                                              



               00:00:                                              



               00                                                

 

     &lt       2022-0      No                                      



               6                                              



               00:00:                                              



               00                                                

 

     TAKE 1      2022-0      No                                      



     TABLET BY      6-29                                              



     MOUTH EVERY      00:00:                                              



     8 HOURS FOR      00                                                



     10 DAYS                                                        

 

     TAKE 1      2022-0      No                                      



     TABLET BY      6-29                                              



     MOUTH EVERY      00:00:                                              



     8 HOURS FOR      00                                                



     10 DAYS                                                        

 

     &lt       2022-0      No                                      



               6                                              



               00:00:                                              



               00                                                

 

     &lt       2022-0      No                                      



               6                                              



               00:00:                                              



               00                                                

 

     &lt       2022-0      No                                      



               6                                              



               00:00:                                              



               00                                                

 

     &lt       2022-0      No                                      



               6                                              



               00:00:                                              



               00                                                

 

     TAKE ONE      2022-0      No                                      



     (1) TO TWO                                                    



     (2)       00:00:                                              



     TABLET(S)      00                                                



     BY MOUTH                                                        



     EVERY 6                                                        



     HOURS AS                                                        



     NEEDED FOR                                                        



     PAIN , NO                                                        



     MORE THAN 8                                                        



     TABLETS IN                                                        



     24 HOURS.                                                        

 

     &lt       2022-0      No                                      



               6                                              



               00:00:                                              



               00                                                

 

     &lt       2022-0      No                                      



               627                                              



               00:00:                                              



               00                                                

 

     &lt       2022-0      No                                      



               627                                              



               00:00:                                              



               00                                                

 

     &lt       2022-0      No                                      



               627                                              



               00:00:                                              



               00                                                

 

     TAKE ONE      2022-0      No                                      



     (1) TO TWO      27                                              



     (2)       00:00:                                              



     TABLET(S)      00                                                



     BY MOUTH                                                        



     EVERY 6                                                        



     HOURS AS                                                        



     NEEDED FOR                                                        



     PAIN , NO                                                        



     MORE THAN 8                                                        



     TABLETS IN                                                        



     24 HOURS.                                                        

 

     &lt       2022-0      No                                      



               627                                              



               00:00:                                              



               00                                                

 

     &lt       2022-0      No                                      



               6-27                                              



               00:00:                                              



               00                                                

 

     &lt       2022-0      No                                      



               6-27                                              



               00:00:                                              



               00                                                

 

     &lt       2022-0      No                                      



               6-27                                              



               00:00:                                              



               00                                                

 

     &lt       2022-0      No                                      



               627                                              



               00:00:                                              



               00                                                

 

     &lt       2022-0      No                                      



               627                                              



               00:00:                                              



               00                                                

 

     TAKE ONE      2022-0      No                                      



     (1) TO TWO      -27                                              



     (2)       00:00:                                              



     TABLET(S)      00                                                



     BY MOUTH                                                        



     EVERY 6                                                        



     HOURS AS                                                        



     NEEDED FOR                                                        



     PAIN , NO                                                        



     MORE THAN 8                                                        



     TABLETS IN                                                        



     24 HOURS.                                                        

 

     &lt       2022-0      No                                      



               627                                              



               00:00:                                              



               00                                                

 

     &lt       2022-0      No                                      



               627                                              



               00:00:                                              



               00                                                

 

     &lt       2022-0      No                                      



               627                                              



               00:00:                                              



               00                                                

 

     &lt       2022-0      No                                      



               627                                              



               00:00:                                              



               00                                                

 

     &lt       2022-0      No                                      



               627                                              



               00:00:                                              



               00                                                

 

     TAKE ONE      2022-0      No                                      



     (1) TO TWO      6-27                                              



     (2)       00:00:                                              



     TABLET(S)      00                                                



     BY MOUTH                                                        



     EVERY 6                                                        



     HOURS AS                                                        



     NEEDED FOR                                                        



     PAIN , NO                                                        



     MORE THAN 8                                                        



     TABLETS IN                                                        



     24 HOURS.                                                        

 

     &lt       2022-0      No                                      



               627                                              



               00:00:                                              



               00                                                

 

     &lt       2022-0      No                                      



               6-27                                              



               00:00:                                              



               00                                                

 

     &lt       2022-0      No                                      



               627                                              



               00:00:                                              



               00                                                

 

     &lt       2022-0      No                                      



               627                                              



               00:00:                                              



               00                                                

 

     &lt       2022-0      No                                      



               627                                              



               00:00:                                              



               00                                                

 

     TAKE ONE      2-0      No                                      



     (1) TO TWO      -27                                              



     (2)       00:00:                                              



     TABLET(S)      00                                                



     BY MOUTH                                                        



     EVERY 6                                                        



     HOURS AS                                                        



     NEEDED FOR                                                        



     PAIN , NO                                                        



     MORE THAN 8                                                        



     TABLETS IN                                                        



     24 HOURS.                                                        

 

     &lt       2022-0      No                                      



               627                                              



               00:00:                                              



               00                                                

 

     &lt       2022-0      No                                      



               627                                              



               00:00:                                              



               00                                                

 

     &lt       2022-0      No                                      



               627                                              



               00:00:                                              



               00                                                

 

     &lt       2022-0      No                                      



               627                                              



               00:00:                                              



               00                                                

 

     &lt       2022-0      No                                      



               627                                              



               00:00:                                              



               00                                                

 

     TAKE 1      2022-0      No                                      



     TABLET BY      6-24                                              



     MOUTH AT      00:00:                                              



     BEDTIME      00                                                

 

     DISSOLVE 1      2022-0      No                                      



     TABLET BY      6-24                                              



     MOUTH EVERY      00:00:                                              



     8 HOURS AS      00                                                



     NEEDED                                                        

 

     TAKE 1      2022-0      No                                      



     TABLET BY      6-24                                              



     MOUTH AT      00:00:                                              



     BEDTIME      00                                                

 

     DISSOLVE 1      2022-0      No                                      



     TABLET BY      6-24                                              



     MOUTH EVERY      00:00:                                              



     8 HOURS AS      00                                                



     NEEDED                                                        

 

     TAKE 1      2022-0      No                                      



     TABLET BY      6-24                                              



     MOUTH AT      00:00:                                              



     BEDTIME      00                                                

 

     DISSOLVE 1      2022-0      No                                      



     TABLET BY      6-24                                              



     MOUTH EVERY      00:00:                                              



     8 HOURS AS      00                                                



     NEEDED                                                        

 

     TAKE 1      2022-0      No                                      



     TABLET BY      6-24                                              



     MOUTH AT      00:00:                                              



     BEDTIME      00                                                

 

     DISSOLVE 1      2022-0      No                                      



     TABLET BY      6-24                                              



     MOUTH EVERY      00:00:                                              



     8 HOURS AS      00                                                



     NEEDED                                                        

 

     TAKE 1      2022-0      No                                      



     TABLET BY      6-24                                              



     MOUTH AT      00:00:                                              



     BEDTIME      00                                                

 

     DISSOLVE 1      2022-0      No                                      



     TABLET BY      6-24                                              



     MOUTH EVERY      00:00:                                              



     8 HOURS AS      00                                                



     NEEDED                                                        

 

     metformin      2022-0      No             1mg                      



     1,000 mg      6-23                                              



     tablet      00:00:                                              



               00                                                

 

     levothyroxi      2022-0      No             1mcg                     



     ne 75 mcg      6-23                                              



     tablet      00:00:                                              



               00                                                

 

     levothyroxi      2022-0      No             1mcg                     



     ne 200 mcg      6-23                                              



     tablet      00:00:                                              



               00                                                

 

     Dose      2022-0      No                                      



     Unknown      6-23                                              



               00:00:                                              



               00                                                

 

     levothyroxi      2022-0      No             1mcg                     



     ne 75 mcg      6-23                                              



     tablet      00:00:                                              



               00                                                

 

     levothyroxi      2022-0      No             1mcg                     



     ne 200 mcg      6-23                                              



     tablet      00:00:                                              



               00                                                

 

     metformin      2022-0      No             1mg                      



     1,000 mg      6-23                                              



     tablet      00:00:                                              



               00                                                

 

     levothyroxi      2022-0      No             1mcg                     



     ne 75 mcg      6-23                                              



     tablet      00:00:                                              



               00                                                

 

     levothyroxi      2022-0      No             1mcg                     



     ne 200 mcg      6-23                                              



     tablet      00:00:                                              



               00                                                

 

     metformin      2022-0      No             1mg                      



     1,000 mg      6-23                                              



     tablet      00:00:                                              



               00                                                

 

     levothyroxi      2022-0      No             1mcg                     



     ne 75 mcg      6-23                                              



     tablet      00:00:                                              



               00                                                

 

     levothyroxi      2022-0      No             1mcg                     



     ne 200 mcg      6-23                                              



     tablet      00:00:                                              



               00                                                

 

     metformin      2022-0      No             1mg                      



     1,000 mg      6-23                                              



     tablet      00:00:                                              



               00                                                

 

     levothyroxi      2022-0      No             1mcg                     



     ne 75 mcg      6-23                                              



     tablet      00:00:                                              



               00                                                

 

     levothyroxi      2022-0      No             1mcg                     



     ne 200 mcg      6-23                                              



     tablet      00:00:                                              



               00                                                

 

     Myrbetriq      2022-0      No             1mg                      



     25 mg      4-29                                              



     tablet,exte      00:00:                                              



     nded      00                                                



     release                                                        

 

     hydroxyzine      2022-0      No             1mg                      



     HCl 50 mg      4-29                                              



     tablet      00:00:                                              



               00                                                

 

     lisinopril      2022-0      No             1mg                      



     5 mg tablet      4-29                                              



               00:00:                                              



               00                                                

 

     metformin      2022-0      No             1mg                      



     1,000 mg      4-29                                              



     tablet      00:00:                                              



               00                                                

 

     metoclopram      2022-0      No             1mg                      



     fabio 10 mg      4-29                                              



     tablet      00:00:                                              



               00                                                

 

     Myrbetriq      2022-0      No             1mg                      



     25 mg      4-29                                              



     tablet,exte      00:00:                                              



     nded      00                                                



     release                                                        

 

     hydroxyzine      2022-0      No             1mg                      



     HCl 50 mg      4-29                                              



     tablet      00:00:                                              



               00                                                

 

     lisinopril      2022-0      No             1mg                      



     5 mg tablet      4-                                              



               00:00:                                              



               00                                                

 

     Dose      2022-0      No                                      



     Unknown      4-29                                              



               00:00:                                              



               00                                                

 

     metoclopram      2022-0      No             1mg                      



     fabio 10 mg      4-29                                              



     tablet      00:00:                                              



               00                                                

 

     Myrbetriq      2022-0      No             1mg                      



     25 mg      4-29                                              



     tablet,exte      00:00:                                              



     nded      00                                                



     release                                                        

 

     hydroxyzine      2022-0      No             1mg                      



     HCl 50 mg      4-29                                              



     tablet      00:00:                                              



               00                                                

 

     lisinopril      2022-0      No             1mg                      



     5 mg tablet      4-                                              



               00:00:                                              



               00                                                

 

     metformin      2022-0      No             1mg                      



     1,000 mg      4-29                                              



     tablet      00:00:                                              



               00                                                

 

     metoclopram      2022-0      No             1mg                      



     fabio 10 mg      4-29                                              



     tablet      00:00:                                              



               00                                                

 

     Myrbetriq      2022-0      No             1mg                      



     25 mg      4-29                                              



     tablet,exte      00:00:                                              



     nded      00                                                



     release                                                        

 

     hydroxyzine      2-0      No             1mg                      



     HCl 50 mg      4-29                                              



     tablet      00:00:                                              



               00                                                

 

     lisinopril      2022-0      No             1mg                      



     5 mg tablet      4-                                              



               00:00:                                              



               00                                                

 

     metformin      2022-0      No             1mg                      



     1,000 mg      4-29                                              



     tablet      00:00:                                              



               00                                                

 

     metoclopram      2022-0      No             1mg                      



     fabio 10 mg      4-29                                              



     tablet      00:00:                                              



               00                                                

 

     Myrbetriq      2022-0      No             1mg                      



     25 mg      4-29                                              



     tablet,exte      00:00:                                              



     nded      00                                                



     release                                                        

 

     hydroxyzine      2-0      No             1mg                      



     HCl 50 mg      4-29                                              



     tablet      00:00:                                              



               00                                                

 

     lisinopril      2022-0      No             1mg                      



     5 mg tablet      4-                                              



               00:00:                                              



               00                                                

 

     metformin      2022-0      No             1mg                      



     1,000 mg      4-29                                              



     tablet      00:00:                                              



               00                                                

 

     metoclopram      2022-0      No             1mg                      



     fabio 10 mg      4-29                                              



     tablet      00:00:                                              



               00                                                

 

     levothyroxi      2022-0      No             1mcg                     



     ne 300 mcg      4-27                                              



     tablet      00:00:                                              



               00                                                

 

     ferrous      2022-0      No             1(65 mg                     



     sulfate 325      4-                     iron)                     



     mg (65 mg      00:00:                                              



     iron)      00                                                



     tablet                                                        

 

     omeprazole      2-0      No             1mg                      



     40 mg      4-27                                              



     capsule,del      00:00:                                              



     ayed      00                                                



     release                                                        

 

     citalopram      2022-0      No             1mg                      



     20 mg      4-27                                              



     tablet      00:00:                                              



               00                                                

 

     atorvastati      2022-0      No             1mg                      



     n 10 mg      4-27                                              



     tablet      00:00:                                              



               00                                                

 

     glimepiride      2-0      No             1mg                      



     1 mg tablet      427                                              



               00:00:                                              



               00                                                

 

     levothyroxi      2022-0      No             1mcg                     



     ne 75 mcg      4-27                                              



     tablet      00:00:                                              



               00                                                

 

     levothyroxi      2022-0      No             1mcg                     



     ne 200 mcg      4-27                                              



     tablet      00:00:                                              



               00                                                

 

     levothyroxi      2022-0      No             1mcg                     



     ne 300 mcg      4-27                                              



     tablet      00:00:                                              



               00                                                

 

     ferrous      2022-0      No             1(65 mg                     



     sulfate 325      4-27                     iron)                     



     mg (65 mg      00:00:                                              



     iron)      00                                                



     tablet                                                        

 

     omeprazole      2-0      No             1mg                      



     40 mg      4-27                                              



     capsule,del      00:00:                                              



     ayed      00                                                



     release                                                        

 

     citalopram      2-0      No             1mg                      



     20 mg      4-27                                              



     tablet      00:00:                                              



               00                                                

 

     atorvastati      2-0      No             1mg                      



     n 10 mg      4-27                                              



     tablet      00:00:                                              



               00                                                

 

     glimepiride      2-0      No             1mg                      



     1 mg tablet                                                    



               00:00:                                              



               00                                                

 

     levothyroxi      2-0      No             1mcg                     



     ne 75 mcg      4-27                                              



     tablet      00:00:                                              



               00                                                

 

     levothyroxi      2-0      No             1mcg                     



     ne 200 mcg      4-27                                              



     tablet      00:00:                                              



               00                                                

 

     levothyroxi      2-0      No             1mcg                     



     ne 300 mcg      4-27                                              



     tablet      00:00:                                              



               00                                                

 

     ferrous      2022-0      No             1(65 mg                     



     sulfate 325      4-27                     iron)                     



     mg (65 mg      00:00:                                              



     iron)      00                                                



     tablet                                                        

 

     omeprazole      2-0      No             1mg                      



     40 mg      4-27                                              



     capsule,del      00:00:                                              



     ayed      00                                                



     release                                                        

 

     citalopram      2-0      No             1mg                      



     20 mg      4-27                                              



     tablet      00:00:                                              



               00                                                

 

     atorvastati      2022-0      No             1mg                      



     n 10 mg      4-27                                              



     tablet      00:00:                                              



               00                                                

 

     Dose      2022-0      No                                      



     Unknown      4-27                                              



               00:00:                                              



               00                                                

 

     levothyroxi      2022-0      No             1mcg                     



     ne 75 mcg      4-27                                              



     tablet      00:00:                                              



               00                                                

 

     levothyroxi      2022-0      No             1mcg                     



     ne 200 mcg      4-27                                              



     tablet      00:00:                                              



               00                                                

 

     levothyroxi      2022-0      No             1mcg                     



     ne 300 mcg      4-27                                              



     tablet      00:00:                                              



               00                                                

 

     ferrous      2022-0      No             1(65 mg                     



     sulfate 325      4-27                     iron)                     



     mg (65 mg      00:00:                                              



     iron)      00                                                



     tablet                                                        

 

     omeprazole      2-0      No             1mg                      



     40 mg      4-27                                              



     capsule,del      00:00:                                              



     ayed      00                                                



     release                                                        

 

     citalopram      2022-0      No             1mg                      



     20 mg      4-27                                              



     tablet      00:00:                                              



               00                                                

 

     atorvastati      2022-0      No             1mg                      



     n 10 mg      4-27                                              



     tablet      00:00:                                              



               00                                                

 

     glimepiride      2-0      No             1mg                      



     1 mg tablet      4-27                                              



               00:00:                                              



               00                                                

 

     levothyroxi      2022-0      No             1mcg                     



     ne 75 mcg      4-27                                              



     tablet      00:00:                                              



               00                                                

 

     levothyroxi      2022-0      No             1mcg                     



     ne 200 mcg      4-27                                              



     tablet      00:00:                                              



               00                                                

 

     levothyroxi      2022-0      No             1mcg                     



     ne 300 mcg      4-27                                              



     tablet      00:00:                                              



               00                                                

 

     ferrous      2022-0      No             1(65 mg                     



     sulfate 325      4-27                     iron)                     



     mg (65 mg      00:00:                                              



     iron)      00                                                



     tablet                                                        

 

     omeprazole      2-0      No             1mg                      



     40 mg      4-27                                              



     capsule,del      00:00:                                              



     ayed      00                                                



     release                                                        

 

     citalopram      2-0      No             1mg                      



     20 mg      4-27                                              



     tablet      00:00:                                              



               00                                                

 

     atorvastati      2022-0      No             1mg                      



     n 10 mg      4-27                                              



     tablet      00:00:                                              



               00                                                

 

     Dose      2022-0      No                                      



     Unknown      4-27                                              



               00:00:                                              



               00                                                

 

     levothyroxi      2022-0      No             1mcg                     



     ne 75 mcg      4-27                                              



     tablet      00:00:                                              



               00                                                

 

     levothyroxi      2022-0      No             1mcg                     



     ne 200 mcg      4-27                                              



     tablet      00:00:                                              



               00                                                

 

     Dose      2022-0      No                                      



     Unknown      4-01                                              



               00:00:                                              



               00                                                

 

     Dose      2022-0      No                                      



     Unknown      4-01                                              



               00:00:                                              



               00                                                

 

     Dose      2022-0      No                                      



     Unknown      4-01                                              



               00:00:                                              



               00                                                

 

     Dose      2022-0      No                                      



     Unknown      4-01                                              



               00:00:                                              



               00                                                

 

     Dose      2022-0      No                                      



     Unknown      4-01                                              



               00:00:                                              



               00                                                

 

     Dose      2022-0      No                                      



     Unknown      4-01                                              



               00:00:                                              



               00                                                

 

     Dose      2022-0      No                                      



     Unknown      4-01                                              



               00:00:                                              



               00                                                

 

     Dose      2022-0      No                                      



     Unknown      4-01                                              



               00:00:                                              



               00                                                

 

     Dose      2022-0      No                                      



     Unknown      4-01                                              



               00:00:                                              



               00                                                

 

     Dose      2022-0      No                                      



     Unknown      4-01                                              



               00:00:                                              



               00                                                

 

     Dose      2022-0      No                                      



     Unknown      4-01                                              



               00:00:                                              



               00                                                

 

     levothyroxi      2022-0      No             1mcg                     



     ne 75 mcg      4-01                                              



     tablet      00:00:                                              



               00                                                

 

     levothyroxi      2022-0      No             1mcg                     



     ne 200 mcg      4-01                                              



     tablet      00:00:                                              



               00                                                

 

     hydrocortis      2-0      No             1mg                      



     one acetate      4-01                                              



     25 mg      00:00:                                              



     rectal      00                                                



     suppository                                                        

 

     nitrofurant      2-0      No             1mg                      



     oin       4-01                                              



     monohydrate      00:00:                                              



     /macrocryst      00                                                



     als 100 mg                                                        



     capsule                                                        

 

     Dose      -0      No                                      



     Unknown      4-01                                              



               00:00:                                              



               00                                                

 

     Dose      2022-0      No                                      



     Unknown      4-01                                              



               00:00:                                              



               00                                                

 

     Dose      2022-0      No                                      



     Unknown      4-01                                              



               00:00:                                              



               00                                                

 

     Dose      2022-0      No                                      



     Unknown      4-01                                              



               00:00:                                              



               00                                                

 

     Dose      2022-0      No                                      



     Unknown      4-01                                              



               00:00:                                              



               00                                                

 

     Dose      2022-0      No                                      



     Unknown      4-01                                              



               00:00:                                              



               00                                                

 

     Dose      2022-0      No                                      



     Unknown      4-01                                              



               00:00:                                              



               00                                                

 

     Dose      2022-0      No                                      



     Unknown      4-01                                              



               00:00:                                              



               00                                                

 

     Dose      2022-0      No                                      



     Unknown      4-01                                              



               00:00:                                              



               00                                                

 

     Dose      2022-0      No                                      



     Unknown      4-01                                              



               00:00:                                              



               00                                                

 

     Dose      2022-0      No                                      



     Unknown      4-01                                              



               00:00:                                              



               00                                                

 

     Dose      2022-0      No                                      



     Unknown      4-01                                              



               00:00:                                              



               00                                                

 

     Dose      2022-0      No                                      



     Unknown      4-01                                              



               00:00:                                              



               00                                                

 

     Dose      2022-0      No                                      



     Unknown      4-01                                              



               00:00:                                              



               00                                                

 

     Dose      2022-0      No                                      



     Unknown      4-01                                              



               00:00:                                              



               00                                                

 

     Dose      2022-0      No                                      



     Unknown      4-01                                              



               00:00:                                              



               00                                                

 

     Dose      2022-0      No                                      



     Unknown      4-01                                              



               00:00:                                              



               00                                                

 

     Dose      2022-0      No                                      



     Unknown      4-01                                              



               00:00:                                              



               00                                                

 

     Dose      2022-0      No                                      



     Unknown      4-01                                              



               00:00:                                              



               00                                                

 

     Dose      2022-0      No                                      



     Unknown      4-01                                              



               00:00:                                              



               00                                                

 

     Dose      2022-0      No                                      



     Unknown      4-01                                              



               00:00:                                              



               00                                                

 

     Dose      2022-0      No                                      



     Unknown      4-01                                              



               00:00:                                              



               00                                                

 

     Dose      2022-0      No                                      



     Unknown      4-01                                              



               00:00:                                              



               00                                                

 

     Dose      2022-0      No                                      



     Unknown      4-01                                              



               00:00:                                              



               00                                                

 

     Dose      2022-0      No                                      



     Unknown      4-01                                              



               00:00:                                              



               00                                                

 

     Dose      2022-0      No                                      



     Unknown      4-01                                              



               00:00:                                              



               00                                                

 

     Dose      2022-0      No                                      



     Unknown      4-01                                              



               00:00:                                              



               00                                                

 

     Dose      2022-0      No                                      



     Unknown      4-01                                              



               00:00:                                              



               00                                                

 

     Dose      2022-0      No                                      



     Unknown      4-01                                              



               00:00:                                              



               00                                                

 

     metformin      2022-0      No             1mg                      



     1,000 mg      4-01                                              



     tablet      00:00:                                              



               00                                                

 

     levothyroxi      2022-0      No             1mcg                     



     ne 75 mcg      4-01                                              



     tablet      00:00:                                              



               00                                                

 

     levothyroxi      2022-0      No             1mcg                     



     ne 200 mcg      4-01                                              



     tablet      00:00:                                              



               00                                                

 

     hydrocortis      2-0      No             1mg                      



     one acetate      4-01                                              



     25 mg      00:00:                                              



     rectal      00                                                



     suppository                                                        

 

     nitrofurant      2-0      No             1mg                      



     oin       4-01                                              



     monohydrate      00:00:                                              



     /macrocryst      00                                                



     als 100 mg                                                        



     capsule                                                        

 

     Dose      2-0      No                                      



     Unknown      4-01                                              



               00:00:                                              



               00                                                

 

     Dose      2022-0      No                                      



     Unknown      4-01                                              



               00:00:                                              



               00                                                

 

     Dose      2022-0      No                                      



     Unknown      4-01                                              



               00:00:                                              



               00                                                

 

     Dose      2022-0      No                                      



     Unknown      4-01                                              



               00:00:                                              



               00                                                

 

     Dose      2022-0      No                                      



     Unknown      4-01                                              



               00:00:                                              



               00                                                

 

     Dose      2022-0      No                                      



     Unknown      4-01                                              



               00:00:                                              



               00                                                

 

     Dose      2022-0      No                                      



     Unknown      4-01                                              



               00:00:                                              



               00                                                

 

     Dose      2022-0      No                                      



     Unknown      4-01                                              



               00:00:                                              



               00                                                

 

     Dose      2022-0      No                                      



     Unknown      4-01                                              



               00:00:                                              



               00                                                

 

     Dose      2022-0      No                                      



     Unknown      4-01                                              



               00:00:                                              



               00                                                

 

     Dose      2022-0      No                                      



     Unknown      4-01                                              



               00:00:                                              



               00                                                

 

     Dose      2022-0      No                                      



     Unknown      4-01                                              



               00:00:                                              



               00                                                

 

     Dose      2022-0      No                                      



     Unknown      4-01                                              



               00:00:                                              



               00                                                

 

     Dose      2022-0      No                                      



     Unknown      4-01                                              



               00:00:                                              



               00                                                

 

     Dose      2022-0      No                                      



     Unknown      4-01                                              



               00:00:                                              



               00                                                

 

     Dose      2022-0      No                                      



     Unknown      4-01                                              



               00:00:                                              



               00                                                

 

     Dose      2022-0      No                                      



     Unknown      4-01                                              



               00:00:                                              



               00                                                

 

     Dose      2022-0      No                                      



     Unknown      4-01                                              



               00:00:                                              



               00                                                

 

     Dose      2022-0      No                                      



     Unknown      4-01                                              



               00:00:                                              



               00                                                

 

     Dose      2022-0      No                                      



     Unknown      4-01                                              



               00:00:                                              



               00                                                

 

     Dose      2022-0      No                                      



     Unknown      4-01                                              



               00:00:                                              



               00                                                

 

     Dose      2022-0      No                                      



     Unknown      4-                                              



               00:00:                                              



               00                                                

 

     Dose      2022-0      No                                      



     Unknown      4-                                              



               00:00:                                              



               00                                                

 

     Dose      2022-0      No                                      



     Unknown      4-                                              



               00:00:                                              



               00                                                

 

     Dose      2022-0      No                                      



     Unknown      4-                                              



               00:00:                                              



               00                                                

 

     Dose      2022-0      No                                      



     Unknown      4-                                              



               00:00:                                              



               00                                                

 

     Dose      2022-0      No                                      



     Unknown      4-                                              



               00:00:                                              



               00                                                

 

     Dose      2022-0      No                                      



     Unknown      4-                                              



               00:00:                                              



               00                                                

 

     Dose      2022-0      No                                      



     Unknown      4-01                                              



               00:00:                                              



               00                                                

 

     metformin      2-0      No             1mg                      



     1,000 mg      4-01                                              



     tablet      00:00:                                              



               00                                                

 

     levothyroxi      2-0      No             1mcg                     



     ne 75 mcg      4-                                              



     tablet      00:00:                                              



               00                                                

 

     levothyroxi      2-0      No             1mcg                     



     ne 200 mcg      4-01                                              



     tablet      00:00:                                              



               00                                                

 

     hydrocortis      2-0      No             1mg                      



     one acetate      4-01                                              



     25 mg      00:00:                                              



     rectal      00                                                



     suppository                                                        

 

     nitrofurant      2-0      No             1mg                      



     oin       4-01                                              



     monohydrate      00:00:                                              



     /macrocryst      00                                                



     als 100 mg                                                        



     capsule                                                        

 

     Dose      -0      No                                      



     Unknown      4-01                                              



               00:00:                                              



               00                                                

 

     Dose      2022-0      No                                      



     Unknown      4-01                                              



               00:00:                                              



               00                                                

 

     Dose      2022-0      No                                      



     Unknown      4-01                                              



               00:00:                                              



               00                                                

 

     Dose      2022-0      No                                      



     Unknown      4-01                                              



               00:00:                                              



               00                                                

 

     Dose      2022-0      No                                      



     Unknown      4-01                                              



               00:00:                                              



               00                                                

 

     Dose      2022-0      No                                      



     Unknown      4-                                              



               00:00:                                              



               00                                                

 

     Dose      2022-0      No                                      



     Unknown      4-01                                              



               00:00:                                              



               00                                                

 

     Dose      2022-0      No                                      



     Unknown      4-01                                              



               00:00:                                              



               00                                                

 

     Dose      2022-0      No                                      



     Unknown      4-01                                              



               00:00:                                              



               00                                                

 

     Dose      2022-0      No                                      



     Unknown      4-01                                              



               00:00:                                              



               00                                                

 

     Dose      2022-0      No                                      



     Unknown      4-                                              



               00:00:                                              



               00                                                

 

     Dose      2022-0      No                                      



     Unknown      4-01                                              



               00:00:                                              



               00                                                

 

     Dose      2022-0      No                                      



     Unknown      4-01                                              



               00:00:                                              



               00                                                

 

     Dose      2022-0      No                                      



     Unknown      4-01                                              



               00:00:                                              



               00                                                

 

     Dose      2022-0      No                                      



     Unknown      4-                                              



               00:00:                                              



               00                                                

 

     Dose      2022-0      No                                      



     Unknown      4-                                              



               00:00:                                              



               00                                                

 

     Dose      2022-0      No                                      



     Unknown      4-                                              



               00:00:                                              



               00                                                

 

     Dose      2022-0      No                                      



     Unknown      4-                                              



               00:00:                                              



               00                                                

 

     Dose      2022-0      No                                      



     Unknown      4-                                              



               00:00:                                              



               00                                                

 

     Dose      2022-0      No                                      



     Unknown      4-                                              



               00:00:                                              



               00                                                

 

     Dose      2022-0      No                                      



     Unknown      4-                                              



               00:00:                                              



               00                                                

 

     Dose      2022-0      No                                      



     Unknown      4-                                              



               00:00:                                              



               00                                                

 

     Dose      2022-0      No                                      



     Unknown      4-                                              



               00:00:                                              



               00                                                

 

     Dose      2022-0      No                                      



     Unknown      4-                                              



               00:00:                                              



               00                                                

 

     Dose      2022-0      No                                      



     Unknown      4-                                              



               00:00:                                              



               00                                                

 

     Dose      2-0      No                                      



     Unknown      4-                                              



               00:00:                                              



               00                                                

 

     Dose      2022-0      No                                      



     Unknown      4-                                              



               00:00:                                              



               00                                                

 

     Dose      2-0      No                                      



     Unknown      4-                                              



               00:00:                                              



               00                                                

 

     Dose      2-0      No                                      



     Unknown      4-                                              



               00:00:                                              



               00                                                

 

     metformin      2-0      No             1mg                      



     1,000 mg      4-01                                              



     tablet      00:00:                                              



               00                                                

 

     levothyroxi      -0      No             1mcg                     



     ne 75 mcg      4-01                                              



     tablet      00:00:                                              



               00                                                

 

     levothyroxi      2-0      No             1mcg                     



     ne 200 mcg      4-01                                              



     tablet      00:00:                                              



               00                                                

 

     hydrocortis      -0      No             1mg                      



     one acetate      4-01                                              



     25 mg      00:00:                                              



     rectal      00                                                



     suppository                                                        

 

     nitrofurant      2-0      No             1mg                      



     oin       4-01                                              



     monohydrate      00:00:                                              



     /macrocryst      00                                                



     als 100 mg                                                        



     capsule                                                        

 

     Dose      -0      No                                      



     Unknown      4-                                              



               00:00:                                              



               00                                                

 

     Dose      2022-0      No                                      



     Unknown      4-                                              



               00:00:                                              



               00                                                

 

     Dose      2022-0      No                                      



     Unknown      4-                                              



               00:00:                                              



               00                                                

 

     Dose      2022-0      No                                      



     Unknown      4-01                                              



               00:00:                                              



               00                                                

 

     Dose      2022-0      No                                      



     Unknown      4-                                              



               00:00:                                              



               00                                                

 

     Dose      2-0      No                                      



     Unknown      4-                                              



               00:00:                                              



               00                                                

 

     Dose      2022-0      No                                      



     Unknown      4-                                              



               00:00:                                              



               00                                                

 

     Dose      2022-0      No                                      



     Unknown      4-                                              



               00:00:                                              



               00                                                

 

     Dose      2022-0      No                                      



     Unknown      4-                                              



               00:00:                                              



               00                                                

 

     Dose      2022-0      No                                      



     Unknown      4-01                                              



               00:00:                                              



               00                                                

 

     Dose      2022-0      No                                      



     Unknown      4-01                                              



               00:00:                                              



               00                                                

 

     Dose      2022-0      No                                      



     Unknown      4-01                                              



               00:00:                                              



               00                                                

 

     Dose      2022-0      No                                      



     Unknown      4-01                                              



               00:00:                                              



               00                                                

 

     metformin      2-0      No             1mg                      



     1,000 mg      4-01                                              



     tablet      00:00:                                              



               00                                                

 

     levothyroxi      2-0      No             1mcg                     



     ne 75 mcg      4-01                                              



     tablet      00:00:                                              



               00                                                

 

     levothyroxi      2-0      No             1mcg                     



     ne 200 mcg      4-01                                              



     tablet      00:00:                                              



               00                                                

 

     hydrocortis      2-0      No             1mg                      



     one acetate      4-01                                              



     25 mg      00:00:                                              



     rectal      00                                                



     suppository                                                        

 

     nitrofurant      2-0      No             1mg                      



     oin       4-01                                              



     monohydrate      00:00:                                              



     /macrocryst      00                                                



     als 100 mg                                                        



     capsule                                                        

 

     Dose      2-0      No                                      



     Unknown      4-01                                              



               00:00:                                              



               00                                                

 

     Dose      2022-0      No                                      



     Unknown      4-01                                              



               00:00:                                              



               00                                                

 

     Dose      2022-0      No                                      



     Unknown      4-01                                              



               00:00:                                              



               00                                                

 

     Dose      2022-0      No                                      



     Unknown      4-01                                              



               00:00:                                              



               00                                                

 

     Dose      2022-0      No                                      



     Unknown      4-01                                              



               00:00:                                              



               00                                                

 

     Dose      2022-0      No                                      



     Unknown      4-01                                              



               00:00:                                              



               00                                                

 

     Dose      2022-0      No                                      



     Unknown      4-01                                              



               00:00:                                              



               00                                                

 

     Dose      2022-0      No                                      



     Unknown      4-01                                              



               00:00:                                              



               00                                                

 

     Dose      2022-0      No                                      



     Unknown      4-01                                              



               00:00:                                              



               00                                                

 

     Dose      2022-0      No                                      



     Unknown      4-01                                              



               00:00:                                              



               00                                                

 

     Dose      2022-0      No                                      



     Unknown      4-01                                              



               00:00:                                              



               00                                                

 

     Dose      2022-0      No                                      



     Unknown      4-01                                              



               00:00:                                              



               00                                                

 

     Dose      2022-0      No                                      



     Unknown      4-01                                              



               00:00:                                              



               00                                                

 

     Dose      2022-0      No                                      



     Unknown      4-01                                              



               00:00:                                              



               00                                                

 

     Dose      2022-0      No                                      



     Unknown      4-01                                              



               00:00:                                              



               00                                                

 

     Dose      2022-0      No                                      



     Unknown      4-01                                              



               00:00:                                              



               00                                                

 

     Dose      2022-0      No                                      



     Unknown      4-01                                              



               00:00:                                              



               00                                                

 

     Dose      2022-0      No                                      



     Unknown      4-01                                              



               00:00:                                              



               00                                                

 

     Dose      2022-0      No                                      



     Unknown      4-01                                              



               00:00:                                              



               00                                                

 

     Dose      2022-0      No                                      



     Unknown      4-01                                              



               00:00:                                              



               00                                                

 

     Dose      2022-0      No                                      



     Unknown      4-01                                              



               00:00:                                              



               00                                                

 

     Dose      2022-0      No                                      



     Unknown      4-01                                              



               00:00:                                              



               00                                                

 

     Dose      2022-0      No                                      



     Unknown      4-                                              



               00:00:                                              



               00                                                

 

     Dose      2022-0      No                                      



     Unknown      4-                                              



               00:00:                                              



               00                                                

 

     Dose      2022-0      No                                      



     Unknown      4-                                              



               00:00:                                              



               00                                                

 

     Dose      2022-0      No                                      



     Unknown      4-                                              



               00:00:                                              



               00                                                

 

     Dose      2022-0      No                                      



     Unknown      4-                                              



               00:00:                                              



               00                                                

 

     Dose      2022-0      No                                      



     Unknown      4-                                              



               00:00:                                              



               00                                                

 

     Dose      2022-0      No                                      



     Unknown      4-                                              



               00:00:                                              



               00                                                

 

     Dose      2022-0      No                                      



     Unknown      4-                                              



               00:00:                                              



               00                                                

 

     Dose      2022-0      No                                      



     Unknown      4-                                              



               00:00:                                              



               00                                                

 

     Dose      2022-0      No                                      



     Unknown      4-                                              



               00:00:                                              



               00                                                

 

     Dose      2022-0      No                                      



     Unknown      4-                                              



               00:00:                                              



               00                                                

 

     Dose      2022-0      No                                      



     Unknown      4-                                              



               00:00:                                              



               00                                                

 

     Dose      2022-0      No                                      



     Unknown      4-                                              



               00:00:                                              



               00                                                

 

     Dose      2022-0      No                                      



     Unknown      3-31                                              



               00:00:                                              



               00                                                

 

     Dose      2022-0      No                                      



     Unknown      3-31                                              



               00:00:                                              



               00                                                

 

     Dose      2022-0      No                                      



     Unknown      3-31                                              



               00:00:                                              



               00                                                

 

     Dose      2022-0      No                                      



     Unknown      3-31                                              



               00:00:                                              



               00                                                

 

     Dose      2022-0      No                                      



     Unknown      3-31                                              



               00:00:                                              



               00                                                

 

     Dose      2022-0      No                                      



     Unknown      3-31                                              



               00:00:                                              



               00                                                

 

     Dose      2022-0      No                                      



     Unknown      3-31                                              



               00:00:                                              



               00                                                

 

     Dose      2022-0      No                                      



     Unknown      3-31                                              



               00:00:                                              



               00                                                

 

     Dose      2022-0      No                                      



     Unknown      3-31                                              



               00:00:                                              



               00                                                

 

     Dose      2022-0      No                                      



     Unknown      3-31                                              



               00:00:                                              



               00                                                

 

     Dose      2022-0      No                                      



     Unknown      3-31                                              



               00:00:                                              



               00                                                

 

     Dose      2022-0      No                                      



     Unknown      3-31                                              



               00:00:                                              



               00                                                

 

     Dose      2022-0      No                                      



     Unknown      3-31                                              



               00:00:                                              



               00                                                

 

     Dose      2022-0      No                                      



     Unknown      3-31                                              



               00:00:                                              



               00                                                

 

     Dose      2022-0      No                                      



     Unknown      3-31                                              



               00:00:                                              



               00                                                

 

     Dose      2022-0      No                                      



     Unknown      3-31                                              



               00:00:                                              



               00                                                

 

     Dose      2022-0      No                                      



     Unknown      3-31                                              



               00:00:                                              



               00                                                

 

     Dose      2022-0      No                                      



     Unknown      3-31                                              



               00:00:                                              



               00                                                

 

     Dose      2022-0      No                                      



     Unknown      3-31                                              



               00:00:                                              



               00                                                

 

     Dose      2022-0      No                                      



     Unknown      3-31                                              



               00:00:                                              



               00                                                

 

     Dose      2022-0      No                                      



     Unknown      3-31                                              



               00:00:                                              



               00                                                

 

     Dose      2022-0      No                                      



     Unknown      3-31                                              



               00:00:                                              



               00                                                

 

     Dose      2022-0      No                                      



     Unknown      3-31                                              



               00:00:                                              



               00                                                

 

     Dose      2022-0      No                                      



     Unknown      3-31                                              



               00:00:                                              



               00                                                

 

     Dose      2022-0      No                                      



     Unknown      3-31                                              



               00:00:                                              



               00                                                

 

     Dose      2022-0      No                                      



     Unknown      3-31                                              



               00:00:                                              



               00                                                

 

     Dose      2022-0      No                                      



     Unknown      3-31                                              



               00:00:                                              



               00                                                

 

     Dose      2022-0      No                                      



     Unknown      3-31                                              



               00:00:                                              



               00                                                

 

     Dose      2022-0      No                                      



     Unknown      3-31                                              



               00:00:                                              



               00                                                

 

     Dose      2022-0      No                                      



     Unknown      3-31                                              



               00:00:                                              



               00                                                

 

     Dose      2022-0      No                                      



     Unknown      3-31                                              



               00:00:                                              



               00                                                

 

     Dose      2022-0      No                                      



     Unknown      3-31                                              



               00:00:                                              



               00                                                

 

     Dose      2022-0      No                                      



     Unknown      3-31                                              



               00:00:                                              



               00                                                

 

     Dose      2022-0      No                                      



     Unknown      3-31                                              



               00:00:                                              



               00                                                

 

     Dose      2022-0      No                                      



     Unknown      3-31                                              



               00:00:                                              



               00                                                

 

     Dose      2022-0      No                                      



     Unknown      3-31                                              



               00:00:                                              



               00                                                

 

     Dose      2022-0      No                                      



     Unknown      3-31                                              



               00:00:                                              



               00                                                

 

     Dose      2022-0      No                                      



     Unknown      3-31                                              



               00:00:                                              



               00                                                

 

     Dose      2022-0      No                                      



     Unknown      3-31                                              



               00:00:                                              



               00                                                

 

     Dose      2022-0      No                                      



     Unknown      3-31                                              



               00:00:                                              



               00                                                

 

     Dose      2022-0      No                                      



     Unknown      3-31                                              



               00:00:                                              



               00                                                

 

     Dose      2022-0      No                                      



     Unknown      3-31                                              



               00:00:                                              



               00                                                

 

     Dose      2022-0      No                                      



     Unknown      3-31                                              



               00:00:                                              



               00                                                

 

     Dose      2022-0      No                                      



     Unknown      3-31                                              



               00:00:                                              



               00                                                

 

     Dose      2022-0      No                                      



     Unknown      3-31                                              



               00:00:                                              



               00                                                

 

     Dose      2022-0      No                                      



     Unknown      1-25                                              



               00:00:                                              



               00                                                

 

     Dose      2022-0      No                                      



     Unknown      1-25                                              



               00:00:                                              



               00                                                

 

     Dose      2022-0      No                                      



     Unknown      1-25                                              



               00:00:                                              



               00                                                

 

     Dose      2022-0      No                                      



     Unknown      1-25                                              



               00:00:                                              



               00                                                

 

     Dose      2022-0      No                                      



     Unknown      1-25                                              



               00:00:                                              



               00                                                

 

     Dose      2022-0      No                                      



     Unknown      1-25                                              



               00:00:                                              



               00                                                

 

     Dose      2022-0      No                                      



     Unknown      1-25                                              



               00:00:                                              



               00                                                

 

     Dose      2022-0      No                                      



     Unknown      1-25                                              



               00:00:                                              



               00                                                

 

     Dose      2022-0      No                                      



     Unknown      1-25                                              



               00:00:                                              



               00                                                

 

     Dose      2022-0      No                                      



     Unknown      1-25                                              



               00:00:                                              



               00                                                

 

     Dose      2022-0      No                                      



     Unknown      1-25                                              



               00:00:                                              



               00                                                

 

     Dose      2022-0      No                                      



     Unknown      1-25                                              



               00:00:                                              



               00                                                

 

     Dose      2022-0      No                                      



     Unknown      1-25                                              



               00:00:                                              



               00                                                

 

     Dose      2022-0      No                                      



     Unknown      1-                                              



               00:00:                                              



               00                                                

 

     Dose      2022-0      No                                      



     Unknown      1-                                              



               00:00:                                              



               00                                                

 

     Dose      2022-0      No                                      



     Unknown      1-                                              



               00:00:                                              



               00                                                

 

     Dose      2022-0      No                                      



     Unknown      1-                                              



               00:00:                                              



               00                                                

 

     Dose      2022-0      No                                      



     Unknown      1-                                              



               00:00:                                              



               00                                                

 

     Dose      2-0      No                                      



     Unknown      1-                                              



               00:00:                                              



               00                                                

 

     Dose      2-0      No                                      



     Unknown      1-                                              



               00:00:                                              



               00                                                

 

     neomycin-po      2-0      No             4mg/mL-                     



     lymyxin-hyd      1-19                     unit/mL                     



     rocort 3.5      00:00:                     -%                       



     mg-10,000      00                                                



     unit/mL-1 %                                                        



     ear                                                         



     drops,susp                                                        

 

     lisinopril      2-0      No             1mg                      



     5 mg tablet      1-19                                              



               00:00:                                              



               00                                                

 

     hydroxyzine      2-0      No             1mg                      



     HCl 50 mg      1-19                                              



     tablet      00:00:                                              



               00                                                

 

     Myrbetriq      2-0      No             1mg                      



     25 mg      1-19                                              



     tablet,exte      00:00:                                              



     nded      00                                                



     release                                                        

 

     Dose      2-0      No                                      



     Unknown      1-19                                              



               00:00:                                              



               00                                                

 

     metformin      2-0      No             1mg                      



     1,000 mg      1-19                                              



     tablet      00:00:                                              



               00                                                

 

     Dose      2-0      No                                      



     Unknown      1-19                                              



               00:00:                                              



               00                                                

 

     metoprolol      2-0      No             1mg                      



     tartrate 25      1-19                                              



     mg tablet      00:00:                                              



               00                                                

 

     metoclopram      2-0      No             1mg                      



     fabio 10 mg      1-19                                              



     tablet      00:00:                                              



               00                                                

 

     azithromyci      2-0      No             mg                       



     n 250 mg      1-19                                              



     tablet      00:00:                                              



               00                                                

 

     Dose      2-0      No                                      



     Unknown      1-19                                              



               00:00:                                              



               00                                                

 

     Dose      2-0      No                                      



     Unknown      1-19                                              



               00:00:                                              



               00                                                

 

     Dose      2-0      No                                      



     Unknown      1-19                                              



               00:00:                                              



               00                                                

 

     levothyroxi      2-0      No             1mcg                     



     ne 150 mcg      1-19                                              



     capsule      00:00:                                              



               00                                                

 

     neomycin-po      2-0      No             4mg/mL-                     



     lymyxin-hyd      1-19                     unit/mL                     



     rocort 3.5      00:00:                     -%                       



     mg-10,000      00                                                



     unit/mL-1 %                                                        



     ear                                                         



     drops,susp                                                        

 

     Humulin      2022-0      No             1unit/m                     



     70/30 U-100      1-19                     L                        



     Insulin 100      00:00:                     (70-30)                     



     unit/mL      00                                                



     subcutaneou                                                        



     s                                                           



     suspension                                                        

 

     lisinopril      2022-0      No             1mg                      



     5 mg tablet      -19                                              



               00:00:                                              



               00                                                

 

     hydroxyzine      2022-0      No             1mg                      



     HCl 50 mg      1-19                                              



     tablet      00:00:                                              



               00                                                

 

     Myrbetriq      2022-0      No             1mg                      



     25 mg      1-19                                              



     tablet,exte      00:00:                                              



     nded      00                                                



     release                                                        

 

     Dose      2022-0      No                                      



     Unknown      1-19                                              



               00:00:                                              



               00                                                

 

     metformin      2022-0      No             1mg                      



     1,000 mg      1-19                                              



     tablet      00:00:                                              



               00                                                

 

     metformin      2022-0      No             1mg                      



     500 mg      1-19                                              



     tablet      00:00:                                              



               00                                                

 

     metoprolol      2022-0      No             1mg                      



     tartrate 25      1-19                                              



     mg tablet      00:00:                                              



               00                                                

 

     metoclopram      2022-0      No             1mg                      



     fabio 10 mg      1-19                                              



     tablet      00:00:                                              



               00                                                

 

     azithromyci      2022-0      No             mg                       



     n 250 mg      1-19                                              



     tablet      00:00:                                              



               00                                                

 

     Dose      2022-0      No                                      



     Unknown      1-19                                              



               00:00:                                              



               00                                                

 

     Dose      2022-0      No                                      



     Unknown      1-19                                              



               00:00:                                              



               00                                                

 

     Dose      2022-0      No                                      



     Unknown      1-19                                              



               00:00:                                              



               00                                                

 

     levothyroxi      2022-0      No             1mcg                     



     ne 150 mcg      1-19                                              



     capsule      00:00:                                              



               00                                                

 

     neomycin-po      2022-0      No             4mg/mL-                     



     lymyxin-hyd      1-19                     unit/mL                     



     rocort 3.5      00:00:                     -%                       



     mg-10,000      00                                                



     unit/mL-1 %                                                        



     ear                                                         



     drops,susp                                                        

 

     Humulin      2022-0      No             1unit/m                     



     70/30 U-100      1-19                     L                        



     Insulin 100      00:00:                     (70-30)                     



     unit/mL      00                                                



     subcutaneou                                                        



     s                                                           



     suspension                                                        

 

     lisinopril      2022-0      No             1mg                      



     5 mg tablet      -19                                              



               00:00:                                              



               00                                                

 

     hydroxyzine      2022-0      No             1mg                      



     HCl 50 mg      1-19                                              



     tablet      00:00:                                              



               00                                                

 

     Myrbetriq      2022-0      No             1mg                      



     25 mg      1-19                                              



     tablet,exte      00:00:                                              



     nded      00                                                



     release                                                        

 

     Dose      2022-0      No                                      



     Unknown      1-19                                              



               00:00:                                              



               00                                                

 

     metformin      2022-0      No             1mg                      



     1,000 mg      1-19                                              



     tablet      00:00:                                              



               00                                                

 

     metformin      2022-0      No             1mg                      



     500 mg      1-19                                              



     tablet      00:00:                                              



               00                                                

 

     metoprolol      2022-0      No             1mg                      



     tartrate 25      1-19                                              



     mg tablet      00:00:                                              



               00                                                

 

     metoclopram      2022-0      No             1mg                      



     fabio 10 mg      1-19                                              



     tablet      00:00:                                              



               00                                                

 

     azithromyci      2022-0      No             mg                       



     n 250 mg      1-19                                              



     tablet      00:00:                                              



               00                                                

 

     neomycin-po      2-0      No             4mg/mL-                     



     lymyxin-hyd      1-19                     unit/mL                     



     rocort 3.5      00:00:                     -%                       



     mg-10,000      00                                                



     unit/mL-1 %                                                        



     ear                                                         



     drops,susp                                                        

 

     Humulin      2-0      No             1unit/m                     



     70/30 U-100      1-19                     L                        



     Insulin 100      00:00:                     (70-30)                     



     unit/mL      00                                                



     subcutaneou                                                        



     s                                                           



     suspension                                                        

 

     lisinopril      2-0      No             1mg                      



     5 mg tablet      -19                                              



               00:00:                                              



               00                                                

 

     Dose      2022-0      No                                      



     Unknown      1-19                                              



               00:00:                                              



               00                                                

 

     hydroxyzine      2022-0      No             1mg                      



     HCl 50 mg      1-19                                              



     tablet      00:00:                                              



               00                                                

 

     Myrbetriq      2-0      No             1mg                      



     25 mg      1-19                                              



     tablet,exte      00:00:                                              



     nded      00                                                



     release                                                        

 

     Dose      2-0      No                                      



     Unknown      1-19                                              



               00:00:                                              



               00                                                

 

     metformin      2022-0      No             1mg                      



     1,000 mg      1-19                                              



     tablet      00:00:                                              



               00                                                

 

     metformin      2022-0      No             1mg                      



     500 mg      1-19                                              



     tablet      00:00:                                              



               00                                                

 

     metoprolol      2022-0      No             1mg                      



     tartrate 25      1-19                                              



     mg tablet      00:00:                                              



               00                                                

 

     metoclopram      2022-0      No             1mg                      



     fabio 10 mg      1-19                                              



     tablet      00:00:                                              



               00                                                

 

     azithromyci      2022-0      No             mg                       



     n 250 mg      1-19                                              



     tablet      00:00:                                              



               00                                                

 

     Dose      2022-0      No                                      



     Unknown      1-19                                              



               00:00:                                              



               00                                                

 

     Dose      2022-0      No                                      



     Unknown      1-19                                              



               00:00:                                              



               00                                                

 

     Dose      2022-0      No                                      



     Unknown      1-19                                              



               00:00:                                              



               00                                                

 

     Dose      2022-0      No                                      



     Unknown      1-19                                              



               00:00:                                              



               00                                                

 

     levothyroxi      2022-0      No             1mcg                     



     ne 150 mcg      1-19                                              



     capsule      00:00:                                              



               00                                                

 

     Dose      2022-0      No                                      



     Unknown      1-19                                              



               00:00:                                              



               00                                                

 

     levothyroxi      2022-0      No             1mcg                     



     ne 150 mcg      1-19                                              



     capsule      00:00:                                              



               00                                                

 

     neomycin-po      2-0      No             4mg/mL-                     



     lymyxin-hyd      1-19                     unit/mL                     



     rocort 3.5      00:00:                     -%                       



     mg-10,000      00                                                



     unit/mL-1 %                                                        



     ear                                                         



     drops,susp                                                        

 

     Humulin      2-0      No             1unit/m                     



     70/30 U-100      1-19                     L                        



     Insulin 100      00:00:                     (70-30)                     



     unit/mL      00                                                



     subcutaneou                                                        



     s                                                           



     suspension                                                        

 

     lisinopril      2-0      No             1mg                      



     5 mg tablet                                                    



               00:00:                                              



               00                                                

 

     hydroxyzine      2-0      No             1mg                      



     HCl 50 mg      -19                                              



     tablet      00:00:                                              



               00                                                

 

     Myrbetriq      2-0      No             1mg                      



     25 mg      1-19                                              



     tablet,exte      00:00:                                              



     nded      00                                                



     release                                                        

 

     Dose      2-0      No                                      



     Unknown                                                    



               00:00:                                              



               00                                                

 

     metformin      2-0      No             1mg                      



     1,000 mg      1-19                                              



     tablet      00:00:                                              



               00                                                

 

     metformin      2-0      No             1mg                      



     500 mg      1-19                                              



     tablet      00:00:                                              



               00                                                

 

     metoprolol      2-0      No             1mg                      



     tartrate 25      1-19                                              



     mg tablet      00:00:                                              



               00                                                

 

     metoclopram      2-0      No             1mg                      



     fabio 10 mg      1-19                                              



     tablet      00:00:                                              



               00                                                

 

     azithromyci      2-0      No             mg                       



     n 250 mg      -19                                              



     tablet      00:00:                                              



               00                                                

 

     Dose      2022-0      No                                      



     Unknown      -19                                              



               00:00:                                              



               00                                                

 

     Dose      2022-0      No                                      



     Unknown      -19                                              



               00:00:                                              



               00                                                

 

     Dose      2022-0      No                                      



     Unknown      -19                                              



               00:00:                                              



               00                                                

 

     levothyroxi      2022-0      No             1mcg                     



     ne 150 mcg      1-19                                              



     capsule      00:00:                                              



               00                                                

 

     Dose      2022-0      No                                      



     Unknown                                                    



               00:00:                                              



               00                                                

 

     lisinopril      2022-0      No             1mg                      



     5 mg tablet                                                    



               00:00:                                              



               00                                                

 

     lisinopril      2022-0      No             1mg                      



     5 mg tablet                                                    



               00:00:                                              



               00                                                

 

     lisinopril      2022-0      No             1mg                      



     5 mg tablet                                                    



               00:00:                                              



               00                                                

 

     lisinopril      2022-0      No             1mg                      



     5 mg tablet      06                                              



               00:00:                                              



               00                                                

 

     hydroxyzine      2022-0      No             1mg                      



     HCl 50 mg      1-03                                              



     tablet      00:00:                                              



               00                                                

 

     hydroxyzine      2022-0      No             1mg                      



     HCl 50 mg      1-03                                              



     tablet      00:00:                                              



               00                                                

 

     metformin      2022-0      No             1mg                      



     1,000 mg      1-03                                              



     tablet      00:00:                                              



               00                                                

 

     Humulin      2022-0      No             1unit/m                     



     70/30 U-100      1-03                     L                        



     Insulin 100      00:00:                     (70-30)                     



     unit/mL      00                                                



     subcutaneou                                                        



     s                                                           



     suspension                                                        

 

     hydroxyzine      2022-0      No             1mg                      



     HCl 50 mg      1-03                                              



     tablet      00:00:                                              



               00                                                

 

     hydroxyzine      2022-0      No             1mg                      



     HCl 50 mg      1-03                                              



     tablet      00:00:                                              



               00                                                

 

     metformin      2022-0      No             1mg                      



     1,000 mg      1-03                                              



     tablet      00:00:                                              



               00                                                

 

     Humulin      2022-0      No             1unit/m                     



     70/30 U-100      1-03                     L                        



     Insulin 100      00:00:                     (70-30)                     



     unit/mL      00                                                



     subcutaneou                                                        



     s                                                           



     suspension                                                        

 

     hydroxyzine      2022-0      No             1mg                      



     HCl 50 mg      1-03                                              



     tablet      00:00:                                              



               00                                                

 

     hydroxyzine      2022-0      No             1mg                      



     HCl 50 mg      1-03                                              



     tablet      00:00:                                              



               00                                                

 

     metformin      2022-0      No             1mg                      



     1,000 mg      1-03                                              



     tablet      00:00:                                              



               00                                                

 

     Humulin      2022-0      No             1unit/m                     



     70/30 U-100      1-03                     L                        



     Insulin 100      00:00:                     (70-30)                     



     unit/mL      00                                                



     subcutaneou                                                        



     s                                                           



     suspension                                                        

 

     hydroxyzine      2022-0      No             1mg                      



     HCl 50 mg      1-03                                              



     tablet      00:00:                                              



               00                                                

 

     hydroxyzine      2022-0      No             1mg                      



     HCl 50 mg      1-03                                              



     tablet      00:00:                                              



               00                                                

 

     metformin      2022-0      No             1mg                      



     1,000 mg      1-03                                              



     tablet      00:00:                                              



               00                                                

 

     Humulin      2022-0      No             1unit/m                     



     70/30 U-100      1-03                     L                        



     Insulin 100      00:00:                     (70-30)                     



     unit/mL      00                                                



     subcutaneou                                                        



     s                                                           



     suspension                                                        

 

     hydroxyzine      2022-0      No             1mg                      



     HCl 50 mg      1-03                                              



     tablet      00:00:                                              



               00                                                

 

     hydroxyzine      2022-0      No             1mg                      



     HCl 50 mg      1-03                                              



     tablet      00:00:                                              



               00                                                

 

     metformin      2022-0      No             1mg                      



     1,000 mg      1-03                                              



     tablet      00:00:                                              



               00                                                

 

     Zofran 4 mg      -1      No             1mg                      



     tablet      2-16                                              



               00:00:                                              



               00                                                

 

     levothyroxi      -1      No             1mcg                     



     ne 150 mcg      2-16                                              



     capsule      00:00:                                              



               00                                                

 

     Zofran 4 mg      -1      No             1mg                      



     tablet      2-16                                              



               00:00:                                              



               00                                                

 

     levothyroxi      -1      No             1mcg                     



     ne 150 mcg      2-16                                              



     capsule      00:00:                                              



               00                                                

 

     Zofran 4 mg      -1      No             1mg                      



     tablet      2-16                                              



               00:00:                                              



               00                                                

 

     levothyroxi      -1      No             1mcg                     



     ne 150 mcg      2-16                                              



     capsule      00:00:                                              



               00                                                

 

     Zofran 4 mg      -1      No             1mg                      



     tablet      2-16                                              



               00:00:                                              



               00                                                

 

     levothyroxi      -1      No             1mcg                     



     ne 150 mcg      2-16                                              



     capsule      00:00:                                              



               00                                                

 

     Zofran 4 mg      -1      No             1mg                      



     tablet      2-16                                              



               00:00:                                              



               00                                                

 

     levothyroxi      2021      No             1mcg                     



     ne 150 mcg      2-16                                              



     capsule      00:00:                                              



               00                                                

 

     metformin      -1      No             1mg                      



     500 mg      1-27                                              



     tablet      00:00:                                              



               00                                                

 

     metformin      -1      No             1mg                      



     500 mg      1-27                                              



     tablet      00:00:                                              



               00                                                

 

     metformin      -1      No             1mg                      



     500 mg      1-27                                              



     tablet      00:00:                                              



               00                                                

 

     metformin      -1      No             1mg                      



     500 mg      1-27                                              



     tablet      00:00:                                              



               00                                                

 

     metformin      -1      No             1mg                      



     500 mg      1-27                                              



     tablet      00:00:                                              



               00                                                

 

     metoprolol      -1      No             1mg                      



     tartrate 25      1-22                                              



     mg tablet      00:00:                                              



               00                                                

 

     metoprolol      -1      No             1mg                      



     tartrate 25      1-22                                              



     mg tablet      00:00:                                              



               00                                                

 

     metoprolol      -1      No             1mg                      



     tartrate 25      1-22                                              



     mg tablet      00:00:                                              



               00                                                

 

     metoprolol      -1      No             1mg                      



     tartrate 25      1-22                                              



     mg tablet      00:00:                                              



               00                                                

 

     metoprolol      -1      No             1mg                      



     tartrate 25      1-22                                              



     mg tablet      00:00:                                              



               00                                                

 

     Myrbetriq      -1      No             1mg                      



     25 mg      1-16                                              



     tablet,exte      00:00:                                              



     nded      00                                                



     release                                                        

 

     metoclopram      -1      No             1mg                      



     fabio 10 mg      1-16                                              



     tablet      00:00:                                              



               00                                                

 

     Myrbetriq      -      No             1mg                      



     25 mg      1-16                                              



     tablet,exte      00:00:                                              



     nded      00                                                



     release                                                        

 

     metoclopram      -1      No             1mg                      



     fabio 10 mg      1-16                                              



     tablet      00:00:                                              



               00                                                

 

     Myrbetriq      -1      No             1mg                      



     25 mg      1-16                                              



     tablet,exte      00:00:                                              



     nded      00                                                



     release                                                        

 

     metoclopram      -1      No             1mg                      



     fabio 10 mg      1-16                                              



     tablet      00:00:                                              



               00                                                

 

     Myrbetriq      -      No             1mg                      



     25 mg      1-16                                              



     tablet,exte      00:00:                                              



     nded      00                                                



     release                                                        

 

     metoclopram      -1      No             1mg                      



     fabio 10 mg      1-16                                              



     tablet      00:00:                                              



               00                                                

 

     Myrbetriq      -      No             1mg                      



     25 mg      1-16                                              



     tablet,exte      00:00:                                              



     nded      00                                                



     release                                                        

 

     metoclopram      -1      No             1mg                      



     fabio 10 mg      1-16                                              



     tablet      00:00:                                              



               00                                                

 

     atorvastati      -1      No             1mg                      



     n 10 mg      1-14                                              



     tablet      00:00:                                              



               00                                                

 

     omeprazole      -1      No             1mg                      



     40 mg      1-14                                              



     capsule,del      00:00:                                              



     ayed      00                                                



     release                                                        

 

     atorvastati      -1      No             1mg                      



     n 10 mg      1-14                                              



     tablet      00:00:                                              



               00                                                

 

     omeprazole      -1      No             1mg                      



     40 mg      1-14                                              



     capsule,del      00:00:                                              



     ayed      00                                                



     release                                                        

 

     atorvastati      -1      No             1mg                      



     n 10 mg      1-14                                              



     tablet      00:00:                                              



               00                                                

 

     omeprazole      -1      No             1mg                      



     40 mg      1-14                                              



     capsule,del      00:00:                                              



     ayed      00                                                



     release                                                        

 

     atorvastati      -1      No             1mg                      



     n 10 mg      1-14                                              



     tablet      00:00:                                              



               00                                                

 

     omeprazole      -1      No             1mg                      



     40 mg      1-14                                              



     capsule,del      00:00:                                              



     ayed      00                                                



     release                                                        

 

     atorvastati      -1      No             1mg                      



     n 10 mg      1-14                                              



     tablet      00:00:                                              



               00                                                

 

     omeprazole      -1      No             1mg                      



     40 mg      1-14                                              



     capsule,del      00:00:                                              



     ayed      00                                                



     release                                                        

 

     levothyroxi      -1      No             1mcg                     



     ne 150 mcg      1-04                                              



     capsule      00:00:                                              



               00                                                

 

     levothyroxi      -1      No             1mcg                     



     ne 150 mcg      1-04                                              



     capsule      00:00:                                              



               00                                                

 

     levothyroxi      -1      No             1mcg                     



     ne 150 mcg      1-04                                              



     capsule      00:00:                                              



               00                                                

 

     levothyroxi      2021      No             1mcg                     



     ne 150 mcg      1-04                                              



     capsule      00:00:                                              



               00                                                

 

     levothyroxi      2021      No             1mcg                     



     ne 150 mcg      1-04                                              



     capsule      00:00:                                              



               00                                                

 

     ferrous      2021      No             1(65 mg                     



     sulfate 325      0-21                     iron)                     



     mg (65 mg      00:00:                                              



     iron)      00                                                



     tablet                                                        

 

     ferrous      2021      No             1(65 mg                     



     sulfate 325      0-21                     iron)                     



     mg (65 mg      00:00:                                              



     iron)      00                                                



     tablet                                                        

 

     ferrous      2021      No             1(65 mg                     



     sulfate 325      0-21                     iron)                     



     mg (65 mg      00:00:                                              



     iron)      00                                                



     tablet                                                        

 

     ferrous      2021      No             1(65 mg                     



     sulfate 325      0-21                     iron)                     



     mg (65 mg      00:00:                                              



     iron)      00                                                



     tablet                                                        

 

     ferrous      2021      No             1(65 mg                     



     sulfate 325      0-21                     iron)                     



     mg (65 mg      00:00:                                              



     iron)      00                                                



     tablet                                                        

 

     Myrbetriq      2021      No             1mg                      



     25 mg      0-13                                              



     tablet,exte      00:00:                                              



     nded      00                                                



     release                                                        

 

     levothyroxi      2021      No             1mcg                     



     ne 300 mcg      0-13                                              



     tablet      00:00:                                              



               00                                                

 

     Myrbetriq      2021      No             1mg                      



     25 mg      0-13                                              



     tablet,exte      00:00:                                              



     nded      00                                                



     release                                                        

 

     levothyroxi      2021      No             1mcg                     



     ne 300 mcg      0-13                                              



     tablet      00:00:                                              



               00                                                

 

     Myrbetriq      2021      No             1mg                      



     25 mg      0-13                                              



     tablet,exte      00:00:                                              



     nded      00                                                



     release                                                        

 

     levothyroxi      2021      No             1mcg                     



     ne 300 mcg      0-13                                              



     tablet      00:00:                                              



               00                                                

 

     Myrbetriq      2021      No             1mg                      



     25 mg      0-13                                              



     tablet,exte      00:00:                                              



     nded      00                                                



     release                                                        

 

     levothyroxi      2021      No             1mcg                     



     ne 300 mcg      0-13                                              



     tablet      00:00:                                              



               00                                                

 

     Myrbetriq      2021      No             1mg                      



     25 mg      0-13                                              



     tablet,exte      00:00:                                              



     nded      00                                                



     release                                                        

 

     levothyroxi      2021      No             1mcg                     



     ne 300 mcg      0-13                                              



     tablet      00:00:                                              



               00                                                

 

     nitrofurant      2021      No             1mg                      



     oin       0-12                                              



     macrocrysta      00:00:                                              



     l 100 mg      00                                                



     capsule                                                        

 

     nitrofurant      2021-1      No             1mg                      



     oin       0-12                                              



     macrocrysta      00:00:                                              



     l 100 mg      00                                                



     capsule                                                        

 

     nitrofurant      1-1      No             1mg                      



     oin       0-12                                              



     macrocrysta      00:00:                                              



     l 100 mg      00                                                



     capsule                                                        

 

     nitrofurant      1-1      No             1mg                      



     oin       0-12                                              



     macrocrysta      00:00:                                              



     l 100 mg      00                                                



     capsule                                                        

 

     nitrofurant      1-1      No             1mg                      



     oin       0-12                                              



     macrocrysta      00:00:                                              



     l 100 mg      00                                                



     capsule                                                        

 

     metoclopram      2021-0      No             1mg                      



     fabio 10 mg      9-13                                              



     tablet      00:00:                                              



               00                                                

 

     metoclopram      2021-0      No             1mg                      



     fabio 10 mg      9-13                                              



     tablet      00:00:                                              



               00                                                

 

     metoclopram      2021-0      No             1mg                      



     fabio 10 mg      9-13                                              



     tablet      00:00:                                              



               00                                                

 

     metoclopram      2021-0      No             1mg                      



     fabio 10 mg      9-13                                              



     tablet      00:00:                                              



               00                                                

 

     metoclopram      2021-0      No             1mg                      



     fabio 10 mg      9-13                                              



     tablet      00:00:                                              



               00                                                

 

     Myrbetriq      2021-0      No             1mg                      



     25 mg      9-01                                              



     tablet,exte      00:00:                                              



     nded      00                                                



     release                                                        

 

     Myrbetriq      2021-0      No             1mg                      



     25 mg      9-01                                              



     tablet,exte      00:00:                                              



     nded      00                                                



     release                                                        

 

     Myrbetriq      2021-0      No             1mg                      



     25 mg      9-01                                              



     tablet,exte      00:00:                                              



     nded      00                                                



     release                                                        

 

     Myrbetriq      2021-0      No             1mg                      



     25 mg      9-01                                              



     tablet,exte      00:00:                                              



     nded      00                                                



     release                                                        

 

     Myrbetriq      2021-0      No             1mg                      



     25 mg      9-01                                              



     tablet,exte      00:00:                                              



     nded      00                                                



     release                                                        

 

     metoclopram      2021-0      No             1mg                      



     fabio 10 mg      8-13                                              



     tablet      00:00:                                              



               00                                                

 

     metoclopram      2021-0      No             1mg                      



     fabio 10 mg      8-13                                              



     tablet      00:00:                                              



               00                                                

 

     metoclopram      2021-0      No             1mg                      



     fabio 10 mg      8-13                                              



     tablet      00:00:                                              



               00                                                

 

     metoclopram      2021-0      No             1mg                      



     fabio 10 mg      8-13                                              



     tablet      00:00:                                              



               00                                                

 

     metoclopram      2021-0      No             1mg                      



     fabio 10 mg      8-13                                              



     tablet      00:00:                                              



               00                                                

 

     metoclopram      2021-0      No             1mg                      



     fabio 10 mg      7-28                                              



     tablet      00:00:                                              



               00                                                

 

     metoclopram      2021-0      No             1mg                      



     fabio 10 mg      7-28                                              



     tablet      00:00:                                              



               00                                                

 

     metoclopram      2021-0      No             1mg                      



     fabio 10 mg      7-28                                              



     tablet      00:00:                                              



               00                                                

 

     metoclopram      2021-0      No             1mg                      



     fabio 10 mg      7-28                                              



     tablet      00:00:                                              



               00                                                

 

     metoclopram      2021-0      No             1mg                      



     fabio 10 mg      7-28                                              



     tablet      00:00:                                              



               00                                                

 

     atorvastati      2021-0      No             1mg                      



     n 10 mg      7-22                                              



     tablet      00:00:                                              



               00                                                

 

     Myrbetriq      2021-0      No             1mg                      



     25 mg      7-22                                              



     tablet,exte      00:00:                                              



     nded      00                                                



     release                                                        

 

     Trintellix      2021-0      No             1mg                      



     20 mg      7-22                                              



     tablet      00:00:                                              



               00                                                

 

     metformin      2021-0      No             1mg                      



     500 mg      7-22                                              



     tablet      00:00:                                              



               00                                                

 

     metoprolol      2021-0      No             1mg                      



     tartrate 25      7-22                                              



     mg tablet      00:00:                                              



               00                                                

 

     mirtazapine      2021-0      No             1mg                      



     15 mg      7-22                                              



     tablet      00:00:                                              



               00                                                

 

     levothyroxi      2021-0      No             1mcg                     



     ne 300 mcg      7-22                                              



     tablet      00:00:                                              



               00                                                

 

     omeprazole      2021-0      No             1mg                      



     40 mg      7-22                                              



     capsule,del      00:00:                                              



     ayed      00                                                



     release                                                        

 

     Humulin      2021-0      No             1unit/m                     



     70/30 U-100      7-22                     L                        



     Insulin 100      00:00:                     (70-30)                     



     unit/mL      00                                                



     subcutaneou                                                        



     s                                                           



     suspension                                                        

 

     atorvastati      1-0      No             1mg                      



     n 10 mg      7-22                                              



     tablet      00:00:                                              



               00                                                

 

     Myrbetriq      2021-0      No             1mg                      



     25 mg      7-22                                              



     tablet,exte      00:00:                                              



     nded      00                                                



     release                                                        

 

     Trintellix      2021-0      No             1mg                      



     20 mg      7-22                                              



     tablet      00:00:                                              



               00                                                

 

     metformin      2021-0      No             1mg                      



     500 mg      7-22                                              



     tablet      00:00:                                              



               00                                                

 

     metoprolol      2021-0      No             1mg                      



     tartrate 25      7-22                                              



     mg tablet      00:00:                                              



               00                                                

 

     mirtazapine      2021-0      No             1mg                      



     15 mg      7-22                                              



     tablet      00:00:                                              



               00                                                

 

     levothyroxi      2021-0      No             1mcg                     



     ne 300 mcg      7-22                                              



     tablet      00:00:                                              



               00                                                

 

     omeprazole      2021-0      No             1mg                      



     40 mg      7-22                                              



     capsule,del      00:00:                                              



     ayed      00                                                



     release                                                        

 

     Humulin      2021-0      No             1unit/m                     



     70/30 U-100      7-22                     L                        



     Insulin 100      00:00:                     (70-30)                     



     unit/mL      00                                                



     subcutaneou                                                        



     s                                                           



     suspension                                                        

 

     atorvastati      2021-0      No             1mg                      



     n 10 mg      7-22                                              



     tablet      00:00:                                              



               00                                                

 

     Myrbetriq      2021-0      No             1mg                      



     25 mg      7-22                                              



     tablet,exte      00:00:                                              



     nded      00                                                



     release                                                        

 

     Trintellix      2021-0      No             1mg                      



     20 mg      7-22                                              



     tablet      00:00:                                              



               00                                                

 

     metformin      2021-0      No             1mg                      



     500 mg      7-22                                              



     tablet      00:00:                                              



               00                                                

 

     metoprolol      2021-0      No             1mg                      



     tartrate 25      7-22                                              



     mg tablet      00:00:                                              



               00                                                

 

     mirtazapine      2021-0      No             1mg                      



     15 mg      7-22                                              



     tablet      00:00:                                              



               00                                                

 

     levothyroxi      2021-0      No             1mcg                     



     ne 300 mcg      7-22                                              



     tablet      00:00:                                              



               00                                                

 

     omeprazole      2021-0      No             1mg                      



     40 mg      7-22                                              



     capsule,del      00:00:                                              



     ayed      00                                                



     release                                                        

 

     Humulin      2021-0      No             1unit/m                     



     70/30 U-100      7-22                     L                        



     Insulin 100      00:00:                     (70-30)                     



     unit/mL      00                                                



     subcutaneou                                                        



     s                                                           



     suspension                                                        

 

     atorvastati      2021-0      No             1mg                      



     n 10 mg      7-22                                              



     tablet      00:00:                                              



               00                                                

 

     Myrbetriq      2021-0      No             1mg                      



     25 mg      7-22                                              



     tablet,exte      00:00:                                              



     nded      00                                                



     release                                                        

 

     Trintellix      2021-0      No             1mg                      



     20 mg      7-22                                              



     tablet      00:00:                                              



               00                                                

 

     metformin      2021-0      No             1mg                      



     500 mg      7-22                                              



     tablet      00:00:                                              



               00                                                

 

     metoprolol      2021-0      No             1mg                      



     tartrate 25      7-22                                              



     mg tablet      00:00:                                              



               00                                                

 

     mirtazapine      2021-0      No             1mg                      



     15 mg      7-22                                              



     tablet      00:00:                                              



               00                                                

 

     levothyroxi      2021-0      No             1mcg                     



     ne 300 mcg      7-22                                              



     tablet      00:00:                                              



               00                                                

 

     omeprazole      2021-0      No             1mg                      



     40 mg      7-22                                              



     capsule,del      00:00:                                              



     ayed      00                                                



     release                                                        

 

     Humulin      2021-0      No             1unit/m                     



     70/30 U-100      7-22                     L                        



     Insulin 100      00:00:                     (70-30)                     



     unit/mL      00                                                



     subcutaneou                                                        



     s                                                           



     suspension                                                        

 

     atorvastati      2021-0      No             1mg                      



     n 10 mg      7-22                                              



     tablet      00:00:                                              



               00                                                

 

     Myrbetriq      2021-0      No             1mg                      



     25 mg      7-22                                              



     tablet,exte      00:00:                                              



     nded      00                                                



     release                                                        

 

     Trintellix      2021-0      No             1mg                      



     20 mg      7-22                                              



     tablet      00:00:                                              



               00                                                

 

     metformin      2021-0      No             1mg                      



     500 mg      7-22                                              



     tablet      00:00:                                              



               00                                                

 

     metoprolol      2021-0      No             1mg                      



     tartrate 25      7-22                                              



     mg tablet      00:00:                                              



               00                                                

 

     mirtazapine      2021-0      No             1mg                      



     15 mg      7-22                                              



     tablet      00:00:                                              



               00                                                

 

     levothyroxi      2021-0      No             1mcg                     



     ne 300 mcg      7-22                                              



     tablet      00:00:                                              



               00                                                

 

     omeprazole      2021-0      No             1mg                      



     40 mg      7-22                                              



     capsule,del      00:00:                                              



     ayed      00                                                



     release                                                        

 

     alprazolam      2021-0      No             1mg                      



     0.5 mg      7-13                                              



     tablet      00:00:                                              



               00                                                

 

     metoprolol      2021-0      No             1mg                      



     tartrate 25      7-13                                              



     mg tablet      00:00:                                              



               00                                                

 

     metoclopram      2021-0      No             1mg                      



     fabio 10 mg      7-13                                              



     tablet      00:00:                                              



               00                                                

 

     Trintellix      2021-0      No             1mg                      



     20 mg      7-13                                              



     tablet      00:00:                                              



               00                                                

 

     Myrbetriq      2021-0      No             1mg                      



     25 mg      7-13                                              



     tablet,exte      00:00:                                              



     nded      00                                                



     release                                                        

 

     Dose      2021-0      No                                      



     Unknown      7-13                                              



               00:00:                                              



               00                                                

 

     Dose      2021-0      No                                      



     Unknown      7-13                                              



               00:00:                                              



               00                                                

 

     alprazolam      2021-0      No             1mg                      



     0.5 mg      7-13                                              



     tablet      00:00:                                              



               00                                                

 

     metoprolol      2021-0      No             1mg                      



     tartrate 25      7-13                                              



     mg tablet      00:00:                                              



               00                                                

 

     metoclopram      2021-0      No             1mg                      



     fabio 10 mg      7-13                                              



     tablet      00:00:                                              



               00                                                

 

     mirtazapine      2021-0      No             1mg                      



     15 mg      7-13                                              



     tablet      00:00:                                              



               00                                                

 

     levothyroxi      2021-0      No             1mcg                     



     ne 300 mcg      7-13                                              



     tablet      00:00:                                              



               00                                                

 

     Dose      2021-0      No                                      



     Unknown      7-13                                              



               00:00:                                              



               00                                                

 

     Vitamin D2      2021-0      No             1(50,00                     



     1,250 mcg      7-13                     0 unit)                     



     (50,000      00:00:                                              



     unit)      00                                                



     capsule                                                        

 

     mirtazapine      2021-0      No             1mg                      



     15 mg      7-13                                              



     tablet      00:00:                                              



               00                                                

 

     levothyroxi      2021-0      No             1mcg                     



     ne 300 mcg      7-13                                              



     tablet      00:00:                                              



               00                                                

 

     omeprazole      2021-0      No             1mg                      



     40 mg      7-13                                              



     capsule,del      00:00:                                              



     ayed      00                                                



     release                                                        

 

     Vitamin D2      2021-0      No             1(50,00                     



     1,250 mcg      7-13                     0 unit)                     



     (50,000      00:00:                                              



     unit)      00                                                



     capsule                                                        

 

     Humulin      2021-0      No             1unit/m                     



     70/30 U-100      7-13                     L                        



     Insulin 100      00:00:                     (70-30)                     



     unit/mL      00                                                



     subcutaneou                                                        



     s                                                           



     suspension                                                        

 

     Trintellix      2021-0      No             1mg                      



     20 mg      7-13                                              



     tablet      00:00:                                              



               00                                                

 

     Myrbetriq      2021-0      No             1mg                      



     25 mg      7-13                                              



     tablet,exte      00:00:                                              



     nded      00                                                



     release                                                        

 

     atorvastati      1-0      No             1mg                      



     n 10 mg      7-13                                              



     tablet      00:00:                                              



               00                                                

 

     metformin      2021-0      No             1mg                      



     500 mg      7-13                                              



     tablet      00:00:                                              



               00                                                

 

     alprazolam      2021-0      No             1mg                      



     0.5 mg      7-13                                              



     tablet      00:00:                                              



               00                                                

 

     metoprolol      2021-0      No             1mg                      



     tartrate 25      7-13                                              



     mg tablet      00:00:                                              



               00                                                

 

     metoclopram      2021-0      No             1mg                      



     fabio 10 mg      7-13                                              



     tablet      00:00:                                              



               00                                                

 

     mirtazapine      2021-0      No             1mg                      



     15 mg      7-13                                              



     tablet      00:00:                                              



               00                                                

 

     levothyroxi      2021-0      No             1mcg                     



     ne 300 mcg      7-13                                              



     tablet      00:00:                                              



               00                                                

 

     omeprazole      1-0      No             1mg                      



     40 mg      7-13                                              



     capsule,del      00:00:                                              



     ayed      00                                                



     release                                                        

 

     Vitamin D2      1-0      No             1(50,00                     



     1,250 mcg      7-13                     0 unit)                     



     (50,000      00:00:                                              



     unit)      00                                                



     capsule                                                        

 

     Humulin      2021-0      No             1unit/m                     



     70/30 U-100      7-13                     L                        



     Insulin 100      00:00:                     (70-30)                     



     unit/mL      00                                                



     subcutaneou                                                        



     s                                                           



     suspension                                                        

 

     Humulin      2021-0      No             1unit/m                     



     70/30 U-100      7-13                     L                        



     Insulin 100      00:00:                     (70-30)                     



     unit/mL      00                                                



     subcutaneou                                                        



     s                                                           



     suspension                                                        

 

     Trintellix      2021-0      No             1mg                      



     20 mg      7-13                                              



     tablet      00:00:                                              



               00                                                

 

     Myrbetriq      2021-0      No             1mg                      



     25 mg      7-13                                              



     tablet,exte      00:00:                                              



     nded      00                                                



     release                                                        

 

     atorvastati      2021-0      No             1mg                      



     n 10 mg      7-13                                              



     tablet      00:00:                                              



               00                                                

 

     metformin      2021-0      No             1mg                      



     500 mg      7-13                                              



     tablet      00:00:                                              



               00                                                

 

     alprazolam      2021-0      No             1mg                      



     0.5 mg      7-13                                              



     tablet      00:00:                                              



               00                                                

 

     metoprolol      2021-0      No             1mg                      



     tartrate 25      7-13                                              



     mg tablet      00:00:                                              



               00                                                

 

     metoclopram      2021-0      No             1mg                      



     fabio 10 mg      7-13                                              



     tablet      00:00:                                              



               00                                                

 

     mirtazapine      2021-0      No             1mg                      



     15 mg      7-13                                              



     tablet      00:00:                                              



               00                                                

 

     levothyroxi      1-0      No             1mcg                     



     ne 300 mcg      7-13                                              



     tablet      00:00:                                              



               00                                                

 

     omeprazole      1-0      No             1mg                      



     40 mg      7-13                                              



     capsule,del      00:00:                                              



     ayed      00                                                



     release                                                        

 

     Vitamin D2      1-0      No             1(50,00                     



     1,250 mcg      7-13                     0 unit)                     



     (50,000      00:00:                                              



     unit)      00                                                



     capsule                                                        

 

     Trintellix      1-0      No             1mg                      



     20 mg      7-13                                              



     tablet      00:00:                                              



               00                                                

 

     Myrbetriq      2021-0      No             1mg                      



     25 mg      7-13                                              



     tablet,exte      00:00:                                              



     nded      00                                                



     release                                                        

 

     atorvastati      1-0      No             1mg                      



     n 10 mg      7-13                                              



     tablet      00:00:                                              



               00                                                

 

     Humulin      2021-0      No             1unit/m                     



     70/30 U-100      7-13                     L                        



     Insulin 100      00:00:                     (70-30)                     



     unit/mL      00                                                



     subcutaneou                                                        



     s                                                           



     suspension                                                        

 

     Trintellix      2021-0      No             1mg                      



     20 mg      7-13                                              



     tablet      00:00:                                              



               00                                                

 

     Myrbetriq      2021-0      No             1mg                      



     25 mg      7-13                                              



     tablet,exte      00:00:                                              



     nded      00                                                



     release                                                        

 

     atorvastati      2021-0      No             1mg                      



     n 10 mg      7-13                                              



     tablet      00:00:                                              



               00                                                

 

     metformin      2021-0      No             1mg                      



     500 mg      7-13                                              



     tablet      00:00:                                              



               00                                                

 

     alprazolam      2021-0      No             1mg                      



     0.5 mg      7-13                                              



     tablet      00:00:                                              



               00                                                

 

     metoprolol      2021-0      No             1mg                      



     tartrate 25      7-13                                              



     mg tablet      00:00:                                              



               00                                                

 

     metoclopram      -0      No             1mg                      



     fabio 10 mg      7-13                                              



     tablet      00:00:                                              



               00                                                

 

     mirtazapine      -0      No             1mg                      



     15 mg      7-13                                              



     tablet      00:00:                                              



               00                                                

 

     levothyroxi      -      No             1mcg                     



     ne 300 mcg      7-13                                              



     tablet      00:00:                                              



               00                                                

 

     omeprazole      -0      No             1mg                      



     40 mg      7-13                                              



     capsule,del      00:00:                                              



     ayed      00                                                



     release                                                        

 

     Vitamin D2            No             1(50,00                     



     1,250 mcg      7-13                     0 unit)                     



     (50,000      00:00:                                              



     unit)      00                                                



     capsule                                                        

 

     metformin      -      No             1mg                      



     500 mg      7-13                                              



     tablet      00:00:                                              



               00                                                







Immunizations







           Ordered Immunization Filled Immunization Date       Status     Commen

ts   Source



           Name       Name                                        

 

           Influenza, injectable,            2022-11-15 Completed             



           [facility] [place] [facility]            00:00:00                         



           Kidney,                                                



           preservative-free,                                             



           quadrivalent                                             

 

           Moderna COVID-19            2021 Completed             



           [facility]accine               00:00:00                         

 

           Moderna COVID-19            2021 Completed             



           Vaccine               00:00:00                         

 

           Moderna COVID-19            2021 Completed             



           Vaccine               00:00:00                         

 

           Moderna COVID-19            2021 Completed             



           Vaccine               00:00:00                         

 

           Moderna COVID-19            2021 Completed             



           Vaccine               00:00:00                         

 

           Moderna COVID-19            2021-04-15 Completed             



           Vaccine               00:00:00                         

 

           Moderna COVID-19            2021 Completed             



           Vaccine               00:00:00                         







Vital Signs







             Vital Name   Observation Time Observation Value Comments     Source

 

             Systolic blood 2022 12:26:00 151 mm[Hg]                [facility]

[facility] of



             pressure                                            [facility]

 

             Diastolic blood 2022 12:26:00 76 mm[Hg]                 Unive

rsity of



             [facility]

 

             Heart rate   2022 12:26:00 102 /min                  [facility]

 

             Body temperature 2022 12:26:00 37 Maricarmen                    [facility]

 

             Respiratory rate 2022 12:26:00 20 /min                   [facility]

 

             Body height  2022 12:26:00 157.5 cm                  [facility]

 

             Body weight  2022 12:26:00 91.627 kg                 [facility]

ty of



                                                                 [facility]



                                                                 [facility]

 

             BMI          2022 12:26:00 36.95 kg/m2               [facility]

ty of



                                                                 [facility]



                                                                 Branch

 

             Oxygen saturation in 2022 12:26:00 99 /min                   

[facility]



             Arterial blood by                                        [facility]

marisa



             Pulse oximetry                                        Branch

 

             Systolic blood 2022 04:49:00 147 mm[Hg]                [facility]

sity of



             pressure                                            [facility]



                                                                 Branch

 

             Diastolic blood 2022 04:49:00 74 mm[Hg]                 Unive

rsity of



             pressure                                            [facility]



                                                                 Branch

 

             Heart rate   2022 04:49:00 97 /min                   [facility]

ty of



                                                                 [facility]



                                                                 Branch

 

             Body temperature 2022 04:49:00 37 Maricarmen                    Univ

ersity of



                                                                 [facility]



                                                                 Branch

 

             Respiratory rate 2022 04:49:00 18 /min                   Univ

ersity of



                                                                 [facility]



                                                                 Branch

 

             Body weight  2022 04:49:00 88.905 kg                 [facility]

ty of



                                                                 [facility]



                                                                 Branch

 

             BMI          2022 04:49:00 35.85 kg/m2               [facility]

ty of



                                                                 [facility]



                                                                 Branch

 

             Oxygen saturation in 2022 04:49:00 100 /min                  

[facility]



             Arterial blood by                                        [facility]

marisa



             Pulse oximetry                                        Branch

 

             Systolic blood 2022 08:10:00 123 mm[Hg]                [facility]

sity of



             pressure                                            [facility]



                                                                 Branch

 

             Diastolic blood 2022 08:10:00 87 mm[Hg]                 Unive

rsity of



             pressure                                            [facility]



                                                                 Branch

 

             Heart rate   2022 08:10:00 82 /min                   [facility]

ty of



                                                                 [facility]



                                                                 [facility]

 

             Respiratory rate 2022 08:10:00 18 /min                   Univ

ersity of



                                                                 [facility]



                                                                 Branch

 

             Oxygen saturation in 2022 08:10:00 98 /min                   

[facility]



             Arterial blood by                                        [facility]

[facility]isa



             Pulse oximetry                                        Branch

 

             Body temperature 2022 04:15:00 36.83 Maricarmen                 Univ

ersity of



                                                                 [facility]



                                                                 Branch

 

             Body height  2022 04:15:00 157.5 cm                  [facility]

ty of



                                                                 [facility]



                                                                 [facility]anch

 

             Body weight  2022 04:15:00 88.905 kg                 [facility]

ty of



                                                                 [facility]



                                                                 Branch

 

             BMI          2022 04:15:00 35.85 kg/m2               [facility]

ty of



                                                                 [facility]



                                                                 Branch

 

             BP Systolic  2022-11-15 11:05:00 97 mm[Hg]                 

 

             BP Diastolic 2022-11-15 11:05:00 67 mm[Hg]                 

 

             Weight Measured 2022-11-15 11:05:00 210.60 pounds              

 

             Height Measured 2022-11-15 11:05:00 61.81 inches              

 

             Body Temperature 2022-11-15 11:05:00 98.20 degrees              

 

             Heart Rate   2022-11-15 11:05:00 98.00 /min                

 

             Respiratory Rate 2022-11-15 11:05:00 18.00 /min                

 

             BP Systolic  2022-10-11 10:36:00 122 mm[Hg]                

 

             BP Diastolic 2022-10-11 10:36:00 78 mm[Hg]                 

 

             Weight Measured 2022-10-11 10:36:00 202.40 pounds              

 

             Height Measured 2022-10-11 10:36:00 61.81 inches              

 

             Body Temperature 2022-10-11 10:36:00 97.80 degrees              

 

             Heart Rate   2022-10-11 10:36:00 86.00 /min                

 

             Respiratory Rate 2022-10-11 10:36:00 17.00 /min                

 

             BP Systolic  2022 13:09:00 142 mm[Hg]                

 

             BP Diastolic 2022 13:09:00 81 mm[Hg]                 

 

             Weight Measured 2022 13:09:00 195.40 pounds              

 

             Height Measured 2022 13:09:00 61.81 inches              

 

             Body Temperature 2022 13:09:00 97.80 degrees              

 

             Heart Rate   2022 13:09:00 91.00 /min                

 

             Respiratory Rate 2022 13:09:00                           

 

             BP Systolic  2022 11:31:00 110 mm[Hg]                

 

             BP Diastolic 2022 11:31:00 49 mm[Hg]                 

 

             Weight Measured 2022 11:31:00 195.80 pounds              

 

             Height Measured 2022 11:31:00 61.81 inches              

 

             Body Temperature 2022 11:31:00 97.80 degrees              

 

             Heart Rate   2022 11:31:00 95.00 /min                

 

             Respiratory Rate 2022 11:31:00 18.00 /min                

 

             BP Systolic  2022 16:36:00 129 mm[Hg]                

 

             BP Diastolic 2022 16:36:00 68 mm[Hg]                 

 

             Weight Measured 2022 16:36:00 196.00 pounds              

 

             Height Measured 2022 16:36:00 61.81 inches              

 

             Body Temperature 2022 16:36:00 97.60 degrees              

 

             Heart Rate   2022 16:36:00 108.00 /min               

 

             Respiratory Rate 2022 16:36:00 18.00 /min                

 

             BP Systolic  2022 11:46:00 115 mm[Hg]                

 

             BP Diastolic 2022 11:46:00 75 mm[Hg]                 

 

             Weight Measured 2022 11:46:00 190.40 pounds              

 

             Height Measured 2022 11:46:00 61.81 inches              

 

             Body Temperature 2022 11:46:00 98.10 degrees              

 

             Heart Rate   2022 11:46:00 89.00 /min                

 

             Respiratory Rate 2022 11:46:00                           

 

             BP Systolic  2022 11:22:00 123 mm[Hg]                

 

             BP Diastolic 2022 11:22:00 57 mm[Hg]                 

 

             Weight Measured 2022 11:22:00 194.60 pounds              

 

             Height Measured 2022 11:22:00 61.81 inches              

 

             Body Temperature 2022 11:22:00 97.50 degrees              

 

             Heart Rate   2022 11:22:00 85.00 /min                

 

             Respiratory Rate 2022 11:22:00 16.00 /min                

 

             BP Systolic  2022 15:21:00 148 mm[Hg]                

 

             BP Diastolic 2022 15:21:00 82 mm[Hg]                 

 

             Weight Measured 2022 15:21:00 192.60 pounds              

 

             Height Measured 2022 15:21:00 61.81 inches              

 

             Body Temperature 2022 15:21:00 98.50 degrees              

 

             Heart Rate   2022 15:21:00 100.00 /min               

 

             Respiratory Rate 2022 15:21:00 25.00 /min                

 

             BP Systolic  2021 16:45:00 135 mm[Hg]                

 

             BP Diastolic 2021 16:45:00 82 mm[Hg]                 

 

             Weight Measured 2021 16:45:00 206.80 pounds              

 

             Height Measured 2021 16:45:00                           

 

             Body Temperature 2021 16:45:00 98.20 degrees              

 

             Heart Rate   2021 16:45:00 86.00 /min                

 

             Respiratory Rate 2021 16:45:00 25.00 /min                

 

             BP Systolic  2021 11:18:00 136 mm[Hg]                

 

             BP Diastolic 2021 11:18:00 84 mm[Hg]                 

 

             Weight Measured 2021 11:18:00 201.60 pounds              

 

             Height Measured 2021 11:18:00 61.81 inches              

 

             Body Temperature 2021 11:18:00 98.20 degrees              

 

             Heart Rate   2021 11:18:00 87.00 /min                

 

             Respiratory Rate 2021 11:18:00 17.00 /min                

 

             BP Systolic  2021-10-19 14:54:00 122 mm[Hg]                

 

             BP Diastolic 2021-10-19 14:54:00 76 mm[Hg]                 

 

             Weight Measured 2021-10-19 14:54:00 211.80 pounds              

 

             Height Measured 2021-10-19 14:54:00 61.81 inches              

 

             Body Temperature 2021-10-19 14:54:00 98.30 degrees              

 

             Heart Rate   2021-10-19 14:54:00 80.00 /min                

 

             Respiratory Rate 2021-10-19 14:54:00 16.00 /min                

 

             BP Systolic  2021-10-12 10:09:00 114 mm[Hg]                

 

             BP Diastolic 2021-10-12 10:09:00 72 mm[Hg]                 

 

             Weight Measured 2021-10-12 10:09:00 212.00 pounds              

 

             Height Measured 2021-10-12 10:09:00 61.81 inches              

 

             Body Temperature 2021-10-12 10:09:00 98.40 degrees              

 

             Heart Rate   2021-10-12 10:09:00 98.00 /min                

 

             Respiratory Rate 2021-10-12 10:09:00                           

 

             BP Systolic  2021-10-12 10:08:00 114 mm[Hg]                

 

             BP Diastolic 2021-10-12 10:08:00 72 mm[Hg]                 

 

             Weight Measured 2021-10-12 10:08:00 212.00 pounds              

 

             Height Measured 2021-10-12 10:08:00 61.81 inches              

 

             Body Temperature 2021-10-12 10:08:00 98.40 degrees              

 

             Heart Rate   2021-10-12 10:08:00 98.00 /min                

 

             Respiratory Rate 2021-10-12 10:08:00                           

 

             BP Systolic  2021-10-09 11:33:00 119 mm[Hg]                

 

             BP Diastolic 2021-10-09 11:33:00 78 mm[Hg]                 

 

             Weight Measured 2021-10-09 11:33:00 210.60 pounds              

 

             Height Measured 2021-10-09 11:33:00 61.81 inches              

 

             Body Temperature 2021-10-09 11:33:00 98.30 degrees              

 

             Heart Rate   2021-10-09 11:33:00 92.00 /min                

 

             Respiratory Rate 2021-10-09 11:33:00                           







Procedures







                Procedure       Date / Time Performed Performing Clinician Sour

e

 

                XR KNEE 3 VW LEFT 2022 13:19:39 Ganesh Jaquez  [facility]

 

                CONSENT/REFUSAL FOR 2022 12:22:24 Doctor Unassigned, No [facility] 

[place]



                DIAGNOSIS AND                   Name            [facility]



                TREATMENT                                       

 

                NOTICE OF PRIVACY 2022 04:36:17 [facility]

 

                CONSENT/REFUSAL FOR 2022 04:35:03 Doctor Unassigned, No [facility]

[facility] 

[place]



                DIAGNOSIS AND                   Name            Medical [facility]



                TREATMENT                                       

 

                XR HIPS 3 VW LEFT 2022 04:42:39 Rosie Jha [facility]

 

                NOTICE OF PRIVACY 2022 04:04:38 Doctor Unassigned, No Univ

[facility]

 

                CONSENT/REFUSAL FOR 2022 04:04:16 Doctor Unassigned, No [facility]

[facility] 

[place]



                DIAGNOSIS AND                   Name            Medical [facility]



                TREATMENT                                       







Plan of Care







             Planned Activity Planned Date Details      Comments     Source

 

             Goal                      Plan of Care Note [code = 94677-6]       

       

 

             Goal                      Plan of Care Note [code = 51198-3]       

       

 

             Goal                      Plan of Care Note [code = 18260-4]       

       

 

             Goal                      Plan of Care Note [code = 51626-2]       

       

 

             Goal                      Plan of Care Note [code = 25869-2]       

       

 

             Goal                      Plan of Care Note [code = 95146-3]       

       

 

             Goal                      Plan of Care Note [code = 11126-5]       

       

 

             Goal                      Plan of Care Note [code = 87895-3]       

       

 

             Goal                      Plan of Care Note [code = 70559-8]       

       

 

             Goal                      Plan of Care Note [code = 36760-5]       

       

 

             Goal                      Plan of Care Note [code = 84280-4]       

       

 

             Goal                      Plan of Care Note [code = 84864-7]       

       

 

             Goal                      Plan of Care Note [code = 69891-1]       

       

 

             Goal                      Plan of Care Note [code = 48045-9]       

       

 

             Goal                      Plan of Care Note [code = 94532-5]       

       

 

             Goal                      Plan of Care Note [code = 71807-1]       

       

 

             Goal                      Plan of Care Note [code = 53466-0]       

       

 

             Goal                      Plan of Care Note [code = 86299-1]       

       

 

             Goal                      Plan of Care Note [code = 22595-5]       

       

 

             Goal                      Plan of Care Note [code = 21786-0]       

       

 

             Goal                      Plan of Care Note [code = 83091-2]       

       

 

             Goal                      Plan of Care Note [code = 98343-5]       

       

 

             Goal                      Plan of Care Note [code = 89820-6]       

       

 

             Goal                      Plan of Care Note [code = 53609-0]       

       

 

             Goal                      Plan of Care Note [code = 41775-6]       

       

 

             Goal                      Plan of Care Note [code = 53713-8]       

       

 

             Goal                      Plan of Care Note [code = 21183-7]       

       

 

             Goal                      Plan of Care Note [code = 14733-3]       

       

 

             Goal                      Plan of Care Note [code = 61046-0]       

       

 

             Goal                      Plan of Care Note [code = 87275-1]       

       

 

             Goal                      Plan of Care Note [code = 88371-3]       

       

 

             Goal                      Plan of Care Note [code = 63866-1]       

       

 

             Goal                      Plan of Care Note [code = 24960-9]       

       

 

             Goal                      Plan of Care Note [code = 31275-7]       

       

 

             Goal                      Plan of Care Note [code = 73189-1]       

       

 

             Goal                      Plan of Care Note [code = 83502-5]       

       

 

             Goal                      Plan of Care Note [code = 70135-4]       

       

 

             Goal                      Plan of Care Note [code = 98383-0]       

       

 

             Goal                      Plan of Care Note [code = 33218-1]       

       

 

             Goal                      Plan of Care Note [code = 56569-7]       

       

 

             Goal                      Plan of Care Note [code = 43417-0]       

       

 

             Goal                      Plan of Care Note [code = 13697-4]       

       

 

             Goal                      Plan of Care Note [code = 64616-1]       

       

 

             Goal                      Plan of Care Note [code = 24659-9]       

       

 

             Goal                      Plan of Care Note [code = 07332-9]       

       

 

             Goal                      Plan of Care Note [code = 81838-5]       

       

 

             Goal                      Plan of Care Note [code = 06649-9]       

       

 

             Goal                      Plan of Care Note [code = 74235-8]       

       

 

             Goal                      Plan of Care Note [code = 02110-7]       

       

 

             Goal                      Plan of Care Note [code = 10933-5]       

       

 

             Goal                      Plan of Care Note [code = 10706-0]       

       

 

             Goal                      Plan of Care Note [code = 12173-8]       

       

 

             Goal                      Plan of Care Note [code = 22154-9]       

       

 

             Goal                      Plan of Care Note [code = 52413-9]       

       

 

             Goal                      Plan of Care Note [code = 99800-6]       

       

 

             Goal                      Plan of Care Note [code = 27896-5]       

       

 

             Goal                      Plan of Care Note [code = 71478-3]       

       

 

             Goal                      Plan of Care Note [code = 67000-4]       

       

 

             Goal                      Plan of Care Note [code = 75262-7]       

       

 

             Goal                      Plan of Care Note [code = 07738-9]       

       

 

             Goal                      Plan of Care Note [code = 61194-8]       

       

 

             Goal                      Plan of Care Note [code = 72334-2]       

       

 

             Goal                      Plan of Care Note [code = 80875-8]       

       

 

             Goal                      Plan of Care Note [code = 03461-9]       

       

 

             Goal                      Plan of Care Note [code = 75667-3]       

       

 

             Goal                      Plan of Care Note [code = 14501-9]       

       

 

             Goal                      Plan of Care Note [code = 65529-1]       

       

 

             Goal                      Plan of Care Note [code = 85338-2]       

       

 

             Goal                      Plan of Care Note [code = 02021-0]       

       

 

             Goal                      Plan of Care Note [code = 75055-5]       

       

 

             Goal                      Plan of Care Note [code = 05854-5]       

       

 

             Goal                      Plan of Care Note [code = 85893-2]       

       

 

             Goal                      Plan of Care Note [code = 70982-1]       

       

 

             Goal                      Plan of Care Note [code = 29502-8]       

       

 

             Goal                      Plan of Care Note [code = 17697-2]       

       

 

             Goal                      Plan of Care Note [code = 07708-3]       

       

 

             Goal                      Plan of Care Note [code = 68839-1]       

       

 

             Goal                      Plan of Care Note [code = 76360-8]       

       

 

             Goal                      Plan of Care Note [code = 90516-3]       

       

 

             Goal                      Plan of Care Note [code = 71670-4]       

       

 

             Goal                      Plan of Care Note [code = 02692-0]       

       

 

             Goal                      Plan of Care Note [code = 08267-1]       

       

 

             Goal                      Plan of Care Note [code = 94679-7]       

       

 

             Goal                      Plan of Care Note [code = 33320-3]       

       

 

             Goal                      Plan of Care Note [code = 24841-7]       

       

 

             Goal                      Plan of Care Note [code = 37136-4]       

       

 

             Goal                      Plan of Care Note [code = 27835-3]       

       

 

             Goal                      Plan of Care Note [code = 55275-6]       

       

 

             Goal                      Plan of Care Note [code = 50006-6]       

       

 

             Goal                      Plan of Care Note [code = 33957-0]       

       

 

             Goal                      Plan of Care Note [code = 65586-5]       

       

 

             Goal                      Plan of Care Note [code = 34255-6]       

       

 

             Goal                      Plan of Care Note [code = 51208-2]       

       

 

             Goal                      Plan of Care Note [code = 53167-1]       

       

 

             Goal                      Plan of Care Note [code = 68074-2]       

       

 

             Goal                      Plan of Care Note [code = 86572-9]       

       

 

             Goal                      Plan of Care Note [code = 40987-8]       

       

 

             Goal                      Plan of Care Note [code = 25173-8]       

       

 

             Goal                      Plan of Care Note [code = 30920-3]       

       

 

             Goal                      Plan of Care Note [code = 99821-4]       

       

 

             Goal                      Plan of Care Note [code = 53783-7]       

       

 

             Goal                      Plan of Care Note [code = 09785-8]       

       

 

             Goal                      Plan of Care Note [code = 89909-3]       

       

 

             Goal                      Plan of Care Note [code = 74954-3]       

       

 

             Goal                      Plan of Care Note [code = 24904-5]       

       

 

             Goal                      Plan of Care Note [code = 48266-1]       

       

 

             Goal                      Plan of Care Note [code = 76753-9]       

       

 

             Goal                      Plan of Care Note [code = 83378-6]       

       

 

             Goal                      Plan of Care Note [code = 43284-5]       

       

 

             Goal                      Plan of Care Note [code = 41416-2]       

       

 

             Goal                      Plan of Care Note [code = 39357-4]       

       

 

             Goal                      Plan of Care Note [code = 16772-3]       

       

 

             Goal                      Plan of Care Note [code = 32489-5]       

       

 

             Goal                      Plan of Care Note [code = 39357-3]       

       

 

             Goal                      Plan of Care Note [code = 98039-6]       

       

 

             Goal                      Plan of Care Note [code = 89785-5]       

       

 

             Goal                      Plan of Care Note [code = 42697-0]       

       

 

             Goal                      Plan of Care Note [code = 83753-6]       

       

 

             Goal                      Plan of Care Note [code = 44251-8]       

       

 

             Goal                      Plan of Care Note [code = 18456-8]       

       

 

             Goal                      Plan of Care Note [code = 30432-9]       

       

 

             Goal                      Plan of Care Note [code = 38389-2]       

       

 

             Goal                      Plan of Care Note [code = 36422-5]       

       

 

             Goal                      Plan of Care Note [code = 01066-5]       

       

 

             Goal                      Plan of Care Note [code = 15407-1]       

       

 

             Goal                      Plan of Care Note [code = 07911-1]       

       

 

             Goal                      Plan of Care Note [code = 48563-8]       

       

 

             Goal                      Plan of Care Note [code = 53051-7]       

       

 

             Goal                      Plan of Care Note [code = 21736-2]       

       

 

             Goal                      Plan of Care Note [code = 87071-4]       

       

 

             Goal                      Plan of Care Note [code = 19059-9]       

       

 

             Goal                      Plan of Care Note [code = 98792-3]       

       

 

             Goal                      Plan of Care Note [code = 82545-4]       

       

 

             Goal                      Plan of Care Note [code = 92003-7]       

       

 

             Goal                      Plan of Care Note [code = 89564-3]       

       

 

             Goal                      Plan of Care Note [code = 61517-9]       

       

 

             Goal                      Plan of Care Note [code = 67681-8]       

       

 

             Goal                      Plan of Care Note [code = 94187-5]       

       

 

             Goal                      Plan of Care Note [code = 95471-7]       

       

 

             Goal                      Plan of Care Note [code = 98177-7]       

       

 

             Goal                      Plan of Care Note [code = 73551-6]       

       

 

             Goal                      Plan of Care Note [code = 27550-5]       

       







Encounters







        Start   End     Encounter Admission Attending Care    Care    Encounter 

Source



        Date/Time Date/Time Type    Type    Clinicians Facility Department ID   

   

 

        2022-10-03         Outpatient                 [facility]     [id number]

 [place]



        15:02:53                                                 [id number] [facility]

 

        2022         Outpatient         DOMINGA,  [facility]     [id number]

 [place]



        01:03:24                         BENNIE                 [id number] [facility]

 

        2022-11-15 2022-11-15 Outpatient                 [facility]     [id number]

[id number]



        10:55:27 10:55:27                                         [id number]   F



                                                                        Sanders

 

        2022-11-15 2022-11-15 Outpatient                 [id number]- [id number]

[id number] 



        00:00:00 00:00:00 Visit                   te58-12e8         p62-79t1-9 



                                                -8289-9be         289-9be0be 



                                                7vsyo083g         fy475v  

 

        2022 Emergency X       GANESH JAQUEZ [facility]    ERT     1

[id number] [facility]



        07:27:00 08:55:00                 GANESH JAQUEZ                        

 [facility]

 

        2022 Emergency         Leonid    [facility]    [id number]

[address] [facility]



        07:27:00 08:55:00                 Ganesh LOPEZ 350.1.13.10        

 ity of



                                                [place] 4.2.7.2.686         [facility]  [phone]         [address]

 

        2022-10-11 2022-10-11 Outpatient                 SFA     SFA     [id number]-

 [place]



        10:28:11 10:28:11                                         [id number]   F



                                                                        Fernandez

 

        2022-10-11 2022-10-11 Outpatient                 [id number]- [id number]

[id number]-b 



        00:00:00 00:00:00 Visit                   rr15-892m         f48-036i-2 



                                                -4o91-k67         o61-z336fp 



                                                1ww48xuvd         51adba  

 

        2022 Outpatient                 SFA     SFA     [id number]-

 [place]



        15:06:03 15:06:03                                         [id number]   F



                                                                        Fernandez

 

        2022 Emergency X       PRADEEP SPANN [facility]    ERT     1

[id number] [facility]



        23:50:00 01:28:00                 PRADEEP SPANN [facility]

 

        2022 Emergency         Deborah  [facility]    [id number]

[id number] [facility]



        23:50:00 01:28:00                 Pradeep LOPEZ 350.1.13.10         i

ty of



                                                [place] 4.2.7.2.686         [facility]  [phone]         [address]

 

        2022 Outpatient                 [id number]

14hv57-3 



        00:00:00 00:00:00 Visit                   5v07-8f30         f83-4l41-3 



                                                -4ha7-q29         af5-l72014 



                                                [id number]         [id number]  

 

        2022 Outpatient                 [id number]- [id number]

[id number] 



        00:00:00 00:00:00 Visit                   422b-482d         22b-482d-8 



                                                -8157-b70         157-b704e0 



                                                4s74y9cxf         0f6bdd  

 

        2022-07-15 2022 Emergency X       SINGER [facility]    ERT     [id number]

[id number] [facility]



        23:20:00 03:26:00                 ROSIE vaca [facility]

 

        2022-07-15 2022 Emergency         [facility]    [id number]

[id number] [facility]



        23:20:00 03:26:00                 Rosie LOPEZ 350.1.13.10         i

[facility] 4.2.7.2.686         [facility]  [phone]         [address]

 

        2022 Outpatient                 [id number]- [id number]

f30v06-3 



        00:00:00 00:00:00 Visit                   6b0y-1vtu         d9v-6yyo-k 



                                                -r8y2-fsq         5h5-ltdx39 



                                                [id number]         696ce1  

 

        2022 [facility]     [id number]   

 [facility]



        12:04:00 23:59:00                 BENNIE                         







Results







           Test Description Test Time  Test Comments Results    Result Comments 

Source









                    TSH, THIRD GENERATION 2022-10-12 06:52:55 









                      Test Item  Value      Reference Range Interpretation Comme

nts









             TSH, THIRD GENERATION (test code = 2821) 2.700 UIU/ML 0.400-4.100  

             



COMPREHENSIVE METABOLIC PANEL2022-10-12 04:09:28





             Test Item    Value        Reference Range Interpretation Comments

 

             GLUCOSE (test code = 169 MG/DL    70-99        H            



             )                                               

 

             BUN (test code = 17 MG/DL     )                                               

 

             CREATININE (test 0.86 MG/DL   0.60-1.30                 



             code = 2214)                                        

 

             eGFR ( CKD-EPI) 76 ML/MIN/1.73 >60                       



             (test code = 91502)                                        

 

             CALC BUN/CREAT (test 20 RATIO                           



             code = 2235)                                        

 

             SODIUM (test code = 141 MEQ/L    133-146                   



             )                                               

 

             POTASSIUM (test code 4.3 MEQ/L    3.5-5.4                   



             = 2228)                                             

 

             CHLORIDE (test code 99 MEQ/L                         



             = 2215)                                             

 

             CARBON DIOXIDE (test 25 MEQ/L     19-31                     



             code = 220)                                        

 

             CALCIUM (test code = 9.6 MG/DL    8.5-10.5                  



             )                                               

 

             PROTEIN, TOTAL (test 7.6 G/DL     6.1-8.3                   



             code = 222)                                        

 

             ALBUMIN (test code = 4.3 G/DL     3.5-5.2                   



             )                                               

 

             CALC GLOBULIN (test 3.3 G/DL     1.9-3.7                   



             code = 2240)                                        

 

             CALC A/G RATIO (test 1.3 RATIO    1.0-2.6                   



             code = 2234)                                        

 

             BILIRUBIN, TOTAL 0.5 MG/DL    See_Comment                [Automated

 message]



             (test code = )                                        The syste

m which



                                                                 generated this



                                                                 result transmit

maximo



                                                                 reference range

:



                                                                 <=1.2. The refe

rence



                                                                 range was not u

sed



                                                                 to interpret th

is



                                                                 result as



                                                                 normal/abnormal

.

 

             ALKALINE PHOSPHATASE 92 U/L                           



             (test code = )                                        

 

             AST (test code = 35 U/L       9-40                      



             )                                               

 

             ALT (test code = 34 U/L       5-40                      



             )                                               



HEMOGLOBIN A1c2022-10-12 02:13:42





             Test Item    Value        Reference Range Interpretation Comments

 

             HEMOGLOBIN A1c (test 6.8 %        4.2-5.6      H             AMERIC

AN DIABETES



             code = 53549)                                        ASSOCIATION 

IDELINES FOR



                                                                 HGB A1C:



                                                                 PREDIABETES/INC

REASED RISK .



                                                                 . . . . . . 5.7

-6.4%



                                                                 DIAGNOSIS OF DI

ABETES . . .



                                                                 . . . . . . >=6

.5% WITH



                                                                 CONFIRMATION OR

 APPROPRIATE



                                                                 SYMPTOMS NOTE: 

ASSAY MAY BE



                                                                 AFFECTED BY



                                                                 HEMOGLOBINOPATH

IES (SICKLE



                                                                 CELL ANEMIA, S-

C DISEASE,



                                                                 OTHERS) OR ZENA

FICIALLY



                                                                 LOWERED BY DECR

EASED RED



                                                                 CELL SURVIVAL (

HEMOLYTIC



                                                                 ANEMIAS, BLOOD 

LOSS, ETC.).



                                                                 CONSIDER ALTERN

ATE TESTING



                                                                 OR LABORATORY C

ONSULTATION.



                                                                 UNLESS OTHERWIS

E INDICATED,



                                                                 ALL TESTING PER

FORMED



                                                                 ATCLINICAL PATH

OLOGY



                                                                 LABORATORIES, I

NC. [address] [place] [address]

[id number]



                                                                 LABORATORY DIRE

CTOR: CLIFTON DARBY M.D.

 CLIA NUMBER



                                                                 [id number] CAP 

[place]CR[place]



                                                                 [place]. [id number]



CBC W/AUTO DIFF WITH PLATELETS2022-10-12 02:02:15





             Test Item    Value        Reference Range Interpretation Comments

 

             WBC (test code = 6.8 K/UL     3.5-11.0                  



             1001)                                               

 

             RBC (test code = 3.76 M/UL    3.80-5.40    L            



             1002)                                               

 

             HEMOGLOBIN (test code 10.4 G/DL    11.5-15.5    L            



             = 1003)                                             

 

             HEMATOCRIT (test code 32.8 %       34.0-45.0    L            



             = 1004)                                             

 

             MCV (test code = 87.2 fL      80.0-99.0                 



             1005)                                               

 

             MCH (test code = 27.7 PG      25.0-33.0                 



             1006)                                               

 

             MCHC (test code = 31.7 G/DL    31.0-36.0                 



             1007)                                               

 

             RDW (test code = 13.6 %       11.5-15.0                 



             1038)                                               

 

             NEUTROPHILS (test 70.2 %                                 



             code = 1008)                                        

 

             LYMPHOCYTES (test 17.6 %                                 



             code = 1010)                                        

 

             MONOCYTES (test code 7.2 %                                  



             = 1011)                                             

 

             EOSINOPHILS (test 4.3 %                                  



             code = 1012)                                        

 

             BASOPHILS (test code 0.4 %                                  



             = 1013)                                             

 

             IMMATURE GRANULOCYTES 0.3 %                                  



             (test code = 1036)                                        

 

             NUCLEATED RBCS (test 0.0 /100 WBC'S See_Comment                [Aut

omated



             code = 1065)                                        message] The sy

stem



                                                                 which generated



                                                                 this result



                                                                 transmitted



                                                                 reference range

:



                                                                 0.0. The refere

nce



                                                                 range was not u

sed



                                                                 to interpret th

is



                                                                 result as



                                                                 normal/abnormal

.

 

             PLATELET COUNT (test 104 K/UL     130-400      L            



             code = 1015)                                        

 

             ABSOLUTE NEUTROPHILS 4.74 K/UL    1.50-7.50                 



             (test code = 1066)                                        

 

             ABSOLUTE LYMPHOCYTES 1.19 K/UL    1.00-4.00                 



             (test code = 1067)                                        

 

             ABSOLUTE MONOCYTES 0.49 K/UL    0.20-1.00                 



             (test code = 1068)                                        

 

             ABSOLUTE EOSINOPHILS 0.29 K/UL    0.00-0.50                 



             (test code = 1040)                                        

 

             ABSOLUTE BASOPHILS 0.03 K/UL    0.00-0.20                 



             (test code = 1069)                                        

 

             ABS IMMATURE 0.02 K/UL    0.00-0.10                 



             GRANULOCYTES (test                                        



             code = 1020)                                        

 

             ABS NUCLEATED RBCS 0.00 K/UL    0.00-0.11                 



             (test code = 51698)                                        



COMPREHENSIVE METABOLIC PANEL2022-10-12 00:00:00





             Test Item    Value        Reference Range Interpretation Comments

 

             GLUCOSE (test code = 2217) 169 MG/DL                              

 

             BUN (test code = 2208) 17 MG/DL                               

 

             CREATININE (test code = 2214) 0.86 MG/DL                           

  

 

             eGFR ( CKD-EPI) (test code 76 ML/MIN/1.73                      

     



             = 09283)                                            

 

             CALC BUN/CREAT (test code = 20 RATIO                               



             2235)                                               

 

             SODIUM (test code = 2231) 141 MEQ/L                              

 

             POTASSIUM (test code = 2228) 4.3 MEQ/L                             

 

 

             CHLORIDE (test code = 2215) 99 MEQ/L                               

 

             CARBON DIOXIDE (test code = 25 MEQ/L                               



             )                                               

 

             CALCIUM (test code = 2209) 9.6 MG/DL                              

 

             PROTEIN, TOTAL (test code = 7.6 G/DL                               



             )                                               

 

             ALBUMIN (test code = 2201) 4.3 G/DL                               

 

             CALC GLOBULIN (test code = 3.3 G/DL                               



             2240)                                               

 

             CALC A/G RATIO (test code = 1.3 RATIO                              



             2234)                                               

 

             BILIRUBIN, TOTAL (test code = 0.5 MG/DL                            

  



             )                                               

 

             ALKALINE PHOSPHATASE (test 92 U/L                                 



             code = 2204)                                        

 

             AST (test code = 2218) 35 U/L                                 

 

             ALT (test code = 2219) 34 U/L                                 



COMPREHENSIVE METABOLIC PANEL2022-10- 00:00:00





             Test Item    Value        Reference Range Interpretation Comments

 

             GLUCOSE (test code = 2217) 169 MG/DL                              

 

             BUN (test code = 2208) 17 MG/DL                               

 

             CREATININE (test code = 2214) 0.86 MG/DL                           

  

 

             eGFR ( CKD-EPI) (test code 76 ML/MIN/1.73                      

     



             = 25644)                                            

 

             CALC BUN/CREAT (test code = 20 RATIO                               



             2235)                                               

 

             SODIUM (test code = 2231) 141 MEQ/L                              

 

             POTASSIUM (test code = 2228) 4.3 MEQ/L                             

 

 

             CHLORIDE (test code = 2215) 99 MEQ/L                               

 

             CARBON DIOXIDE (test code = 25 MEQ/L                               



             )                                               

 

             CALCIUM (test code = 2209) 9.6 MG/DL                              

 

             PROTEIN, TOTAL (test code = 7.6 G/DL                               



             )                                               

 

             ALBUMIN (test code = 2201) 4.3 G/DL                               

 

             CALC GLOBULIN (test code = 3.3 G/DL                               



             2240)                                               

 

             CALC A/G RATIO (test code = 1.3 RATIO                              



             2234)                                               

 

             BILIRUBIN, TOTAL (test code = 0.5 MG/DL                            

  



             )                                               

 

             ALKALINE PHOSPHATASE (test 92 U/L                                 



             code = 2204)                                        

 

             AST (test code = 2218) 35 U/L                                 

 

             ALT (test code = 2219) 34 U/L                                 



CBC W/AUTO DIFF2022-10-12 00:00:00





             Test Item    Value        Reference Range Interpretation Comments

 

             WBC (test code = 1001) 6.8 K/UL                               

 

             RBC (test code = 1002) 3.76 M/UL                              

 

             HEMOGLOBIN (test code = 1003) 10.4 G/DL                            

  

 

             HEMATOCRIT (test code = 1004) 32.8 %                               

  

 

             MCV (test code = 1005) 87.2 fL                                

 

             MCH (test code = 1006) 27.7 PG                                

 

             MCHC (test code = 1007) 31.7 G/DL                              

 

             RDW (test code = 1038) 13.6 %                                 

 

             NEUTROPHILS (test code = 1008) 70.2 %                              

   

 

             LYMPHOCYTES (test code = 1010) 17.6 %                              

   

 

             MONOCYTES (test code = 1011) 7.2 %                                 

 

 

             EOSINOPHILS (test code = 1012) 4.3 %                               

   

 

             BASOPHILS (test code = 1013) 0.4 %                                 

 

 

             IMMATURE GRANULOCYTES (test 0.3 %                                  



             code = 1036)                                        

 

             NUCLEATED RBCS (test code = 0.0 /100WBC'S                          

 



             1065)                                               

 

             PLATELET COUNT (test code = 104 K/UL                               



             1015)                                               

 

             ABSOLUTE NEUTROPHILS (test code 4.74 K/UL                          

    



             = 1066)                                             

 

             ABSOLUTE LYMPHOCYTES (test code 1.19 K/UL                          

    



             = 1067)                                             

 

             ABSOLUTE MONOCYTES (test code = 0.49 K/UL                          

    



             1068)                                               

 

             ABSOLUTE EOSINOPHILS (test code 0.29 K/UL                          

    



             = 1040)                                             

 

             ABSOLUTE BASOPHILS (test code = 0.03 K/UL                          

    



             1069)                                               

 

             ABS IMMATURE GRANULOCYTES (test 0.02 K/UL                          

    



             code = 1020)                                        

 

             ABS NUCLEATED RBCS (test code = 0.00 K/UL                          

    



             76201)                                              



CBC W/AUTO [id number] 00:00:00





             Test Item    Value        Reference Range Interpretation Comments

 

             WBC (test code = 1001) 6.8 K/UL                               

 

             RBC (test code = 1002) 3.76 M/UL                              

 

             HEMOGLOBIN (test code = 1003) 10.4 G/DL                            

  

 

             HEMATOCRIT (test code = 1004) 32.8 %                               

  

 

             MCV (test code = 1005) 87.2 fL                                

 

             MCH (test code = 1006) 27.7 PG                                

 

             MCHC (test code = 1007) 31.7 G/DL                              

 

             RDW (test code = 1038) 13.6 %                                 

 

             NEUTROPHILS (test code = 1008) 70.2 %                              

   

 

             LYMPHOCYTES (test code = 1010) 17.6 %                              

   

 

             MONOCYTES (test code = 1011) 7.2 %                                 

 

 

             EOSINOPHILS (test code = 1012) 4.3 %                               

   

 

             BASOPHILS (test code = 1013) 0.4 %                                 

 

 

             IMMATURE GRANULOCYTES (test 0.3 %                                  



             code = 1036)                                        

 

             NUCLEATED RBCS (test code = 0.0 /100WBC'S                          

 



             1065)                                               

 

             PLATELET COUNT (test code = 104 K/UL                               



             1015)                                               

 

             ABSOLUTE NEUTROPHILS (test code 4.74 K/UL                          

    



             = 1066)                                             

 

             ABSOLUTE LYMPHOCYTES (test code 1.19 K/UL                          

    



             = 1067)                                             

 

             ABSOLUTE MONOCYTES (test code = 0.49 K/UL                          

    



             1068)                                               

 

             ABSOLUTE EOSINOPHILS (test code 0.29 K/UL                          

    



             = 1040)                                             

 

             ABSOLUTE BASOPHILS (test code = 0.03 K/UL                          

    



             1069)                                               

 

             ABS IMMATURE GRANULOCYTES (test 0.02 K/UL                          

    



             code = 1020)                                        

 

             ABS NUCLEATED RBCS (test code = 0.00 K/UL                          

    



             56214)                                              



CBC W/AUTO [id number] 00:00:00





             Test Item    Value        Reference Range Interpretation Comments

 

             WBC (test code = 1001) 6.8 K/UL                               

 

             RBC (test code = 1002) 3.76 M/UL                              

 

             HEMOGLOBIN (test code = 1003) 10.4 G/DL                            

  

 

             HEMATOCRIT (test code = 1004) 32.8 %                               

  

 

             MCV (test code = 1005) 87.2 fL                                

 

             MCH (test code = 1006) 27.7 PG                                

 

             MCHC (test code = 1007) 31.7 G/DL                              

 

             RDW (test code = 1038) 13.6 %                                 

 

             NEUTROPHILS (test code = 1008) 70.2 %                              

   

 

             LYMPHOCYTES (test code = 1010) 17.6 %                              

   

 

             MONOCYTES (test code = 1011) 7.2 %                                 

 

 

             EOSINOPHILS (test code = 1012) 4.3 %                               

   

 

             BASOPHILS (test code = 1013) 0.4 %                                 

 

 

             IMMATURE GRANULOCYTES (test 0.3 %                                  



             code = 1036)                                        

 

             NUCLEATED RBCS (test code = 0.0 /100WBC'S                          

 



             1065)                                               

 

             PLATELET COUNT (test code = 104 K/UL                               



             1015)                                               

 

             ABSOLUTE NEUTROPHILS (test code 4.74 K/UL                          

    



             = 1066)                                             

 

             ABSOLUTE LYMPHOCYTES (test code 1.19 K/UL                          

    



             = 1067)                                             

 

             ABSOLUTE MONOCYTES (test code = 0.49 K/UL                          

    



             1068)                                               

 

             ABSOLUTE EOSINOPHILS (test code 0.29 K/UL                          

    



             = 1040)                                             

 

             ABSOLUTE BASOPHILS (test code = 0.03 K/UL                          

    



             1069)                                               

 

             ABS IMMATURE GRANULOCYTES (test 0.02 K/UL                          

    



             code = 1020)                                        

 

             ABS NUCLEATED RBCS (test code = 0.00 K/UL                          

    



             62708)                                              



TSH, THIRD [id number] 00:00:00





             Test Item    Value        Reference Range Interpretation Comments

 

             TSH, THIRD GENERATION (test code 2.700 UIU/ML                      

     



             = 2821)                                             



TSH, THIRD [id number] 00:00:00





             Test Item    Value        Reference Range Interpretation Comments

 

             TSH, THIRD GENERATION (test code 2.700 UIU/ML                      

     



             = 2821)                                             



TSH, THIRD [id number] 00:00:00





             Test Item    Value        Reference Range Interpretation Comments

 

             TSH, THIRD GENERATION (test code 2.700 UIU/ML                      

     



             = 2821)                                             



HEMOGLOBIN A1c2022-10-12 00:00:00





             Test Item    Value        Reference Range Interpretation Comments

 

             HEMOGLOBIN A1c (test code = 55854) 6.8 %                           

       



HEMOGLOBIN A1c2022-10-12 00:00:00





             Test Item    Value        Reference Range Interpretation Comments

 

             HEMOGLOBIN A1c (test code = 88600) 6.8 %                           

       



HEMOGLOBIN A1c2022-10-12 00:00:00





             Test Item    Value        Reference Range Interpretation Comments

 

             HEMOGLOBIN A1c (test code = 01522) 6.8 %                           

       



COMPREHENSIVE METABOLIC PANEL2022-10-12 00:00:00





             Test Item    Value        Reference Range Interpretation Comments

 

             GLUCOSE (test code = 2217) 169 MG/DL                              

 

             BUN (test code = 2208) 17 MG/DL                               

 

             CREATININE (test code = 2214) 0.86 MG/DL                           

  

 

             eGFR ( CKD-EPI) (test code 76 ML/MIN/1.73                      

     



             = 59273)                                            

 

             CALC BUN/CREAT (test code = 20 RATIO                               



             223)                                               

 

             SODIUM (test code = 2231) 141 MEQ/L                              

 

             POTASSIUM (test code = 2228) 4.3 MEQ/L                             

 

 

             CHLORIDE (test code = 2215) 99 MEQ/L                               

 

             CARBON DIOXIDE (test code = 25 MEQ/L                               



             )                                               

 

             CALCIUM (test code = 2209) 9.6 MG/DL                              

 

             PROTEIN, TOTAL (test code = 7.6 G/DL                               



             )                                               

 

             ALBUMIN (test code = 2201) 4.3 G/DL                               

 

             CALC GLOBULIN (test code = 3.3 G/DL                               



             )                                               

 

             CALC A/G RATIO (test code = 1.3 RATIO                              



             )                                               

 

             BILIRUBIN, TOTAL (test code = 0.5 MG/DL                            

  



             )                                               

 

             ALKALINE PHOSPHATASE (test 92 U/L                                 



             code = 2204)                                        

 

             AST (test code = 2218) 35 U/L                                 

 

             ALT (test code = 2219) 34 U/L                                 



COMPREHENSIVE METABOLIC PANEL2022-10-12 00:00:00





             Test Item    Value        Reference Range Interpretation Comments

 

             GLUCOSE (test code = 2217) 169 MG/DL                              

 

             BUN (test code = 2208) 17 MG/DL                               

 

             CREATININE (test code = 2214) 0.86 MG/DL                           

  

 

             eGFR ( CKD-EPI) (test code 76 ML/MIN/1.73                      

     



             = 42734)                                            

 

             CALC BUN/CREAT (test code = 20 RATIO                               



             2235)                                               

 

             SODIUM (test code = 2231) 141 MEQ/L                              

 

             POTASSIUM (test code = 2228) 4.3 MEQ/L                             

 

 

             CHLORIDE (test code = 2215) 99 MEQ/L                               

 

             CARBON DIOXIDE (test code = 25 MEQ/L                               



             )                                               

 

             CALCIUM (test code = 2209) 9.6 MG/DL                              

 

             PROTEIN, TOTAL (test code = 7.6 G/DL                               



             )                                               

 

             ALBUMIN (test code = 2201) 4.3 G/DL                               

 

             CALC GLOBULIN (test code = 3.3 G/DL                               



             2240)                                               

 

             CALC A/G RATIO (test code = 1.3 RATIO                              



             2234)                                               

 

             BILIRUBIN, TOTAL (test code = 0.5 MG/DL                            

  



             )                                               

 

             ALKALINE PHOSPHATASE (test 92 U/L                                 



             code = 2204)                                        

 

             AST (test code = 2218) 35 U/L                                 

 

             ALT (test code = 2219) 34 U/L                                 



CBC W/AUTO DIFF2022-10-12 00:00:00





             Test Item    Value        Reference Range Interpretation Comments

 

             WBC (test code = 1001) 6.8 K/UL                               

 

             RBC (test code = 1002) 3.76 M/UL                              

 

             HEMOGLOBIN (test code = 1003) 10.4 G/DL                            

  

 

             HEMATOCRIT (test code = 1004) 32.8 %                               

  

 

             MCV (test code = 1005) 87.2 fL                                

 

             MCH (test code = 1006) 27.7 PG                                

 

             MCHC (test code = 1007) 31.7 G/DL                              

 

             RDW (test code = 1038) 13.6 %                                 

 

             NEUTROPHILS (test code = 1008) 70.2 %                              

   

 

             LYMPHOCYTES (test code = 1010) 17.6 %                              

   

 

             MONOCYTES (test code = 1011) 7.2 %                                 

 

 

             EOSINOPHILS (test code = 1012) 4.3 %                               

   

 

             BASOPHILS (test code = 1013) 0.4 %                                 

 

 

             IMMATURE GRANULOCYTES (test 0.3 %                                  



             code = 1036)                                        

 

             NUCLEATED RBCS (test code = 0.0 /100WBC'S                          

 



             1065)                                               

 

             PLATELET COUNT (test code = 104 K/UL                               



             1015)                                               

 

             ABSOLUTE NEUTROPHILS (test code 4.74 K/UL                          

    



             = 1066)                                             

 

             ABSOLUTE LYMPHOCYTES (test code 1.19 K/UL                          

    



             = 1067)                                             

 

             ABSOLUTE MONOCYTES (test code = 0.49 K/UL                          

    



             1068)                                               

 

             ABSOLUTE EOSINOPHILS (test code 0.29 K/UL                          

    



             = 1040)                                             

 

             ABSOLUTE BASOPHILS (test code = 0.03 K/UL                          

    



             1069)                                               

 

             ABS IMMATURE GRANULOCYTES (test 0.02 K/UL                          

    



             code = 1020)                                        

 

             ABS NUCLEATED RBCS (test code = 0.00 K/UL                          

    



             98152)                                              



CBC W/AUTO DIFF2022-10-12 00:00:00





             Test Item    Value        Reference Range Interpretation Comments

 

             WBC (test code = 1001) 6.8 K/UL                               

 

             RBC (test code = 1002) 3.76 M/UL                              

 

             HEMOGLOBIN (test code = 1003) 10.4 G/DL                            

  

 

             HEMATOCRIT (test code = 1004) 32.8 %                               

  

 

             MCV (test code = 1005) 87.2 fL                                

 

             MCH (test code = 1006) 27.7 PG                                

 

             MCHC (test code = 1007) 31.7 G/DL                              

 

             RDW (test code = 1038) 13.6 %                                 

 

             NEUTROPHILS (test code = 1008) 70.2 %                              

   

 

             LYMPHOCYTES (test code = 1010) 17.6 %                              

   

 

             MONOCYTES (test code = 1011) 7.2 %                                 

 

 

             EOSINOPHILS (test code = 1012) 4.3 %                               

   

 

             BASOPHILS (test code = 1013) 0.4 %                                 

 

 

             IMMATURE GRANULOCYTES (test 0.3 %                                  



             code = 1036)                                        

 

             NUCLEATED RBCS (test code = 0.0 /100WBC'S                          

 



             1065)                                               

 

             PLATELET COUNT (test code = 104 K/UL                               



             1015)                                               

 

             ABSOLUTE NEUTROPHILS (test code 4.74 K/UL                          

    



             = 1066)                                             

 

             ABSOLUTE LYMPHOCYTES (test code 1.19 K/UL                          

    



             = 1067)                                             

 

             ABSOLUTE MONOCYTES (test code = 0.49 K/UL                          

    



             1068)                                               

 

             ABSOLUTE EOSINOPHILS (test code 0.29 K/UL                          

    



             = 1040)                                             

 

             ABSOLUTE BASOPHILS (test code = 0.03 K/UL                          

    



             1069)                                               

 

             ABS IMMATURE GRANULOCYTES (test 0.02 K/UL                          

    



             code = 1020)                                        

 

             ABS NUCLEATED RBCS (test code = 0.00 K/UL                          

    



             66907)                                              



CBC W/AUTO DIFF2022-10-12 00:00:00





             Test Item    Value        Reference Range Interpretation Comments

 

             WBC (test code = 1001) 6.8 K/UL                               

 

             RBC (test code = 1002) 3.76 M/UL                              

 

             HEMOGLOBIN (test code = 1003) 10.4 G/DL                            

  

 

             HEMATOCRIT (test code = 1004) 32.8 %                               

  

 

             MCV (test code = 1005) 87.2 fL                                

 

             MCH (test code = 1006) 27.7 PG                                

 

             MCHC (test code = 1007) 31.7 G/DL                              

 

             RDW (test code = 1038) 13.6 %                                 

 

             NEUTROPHILS (test code = 1008) 70.2 %                              

   

 

             LYMPHOCYTES (test code = 1010) 17.6 %                              

   

 

             MONOCYTES (test code = 1011) 7.2 %                                 

 

 

             EOSINOPHILS (test code = 1012) 4.3 %                               

   

 

             BASOPHILS (test code = 1013) 0.4 %                                 

 

 

             IMMATURE GRANULOCYTES (test 0.3 %                                  



             code = 1036)                                        

 

             NUCLEATED RBCS (test code = 0.0 /100WBC'S                          

 



             1065)                                               

 

             PLATELET COUNT (test code = 104 K/UL                               



             1015)                                               

 

             ABSOLUTE NEUTROPHILS (test code 4.74 K/UL                          

    



             = 1066)                                             

 

             ABSOLUTE LYMPHOCYTES (test code 1.19 K/UL                          

    



             = 1067)                                             

 

             ABSOLUTE MONOCYTES (test code = 0.49 K/UL                          

    



             1068)                                               

 

             ABSOLUTE EOSINOPHILS (test code 0.29 K/UL                          

    



             = 1040)                                             

 

             ABSOLUTE BASOPHILS (test code = 0.03 K/UL                          

    



             1069)                                               

 

             ABS IMMATURE GRANULOCYTES (test 0.02 K/UL                          

    



             code = 1020)                                        

 

             ABS NUCLEATED RBCS (test code = 0.00 K/UL                          

    



             64621)                                              



TSH, THIRD [id number] 00:00:00





             Test Item    Value        Reference Range Interpretation Comments

 

             TSH, THIRD GENERATION (test code 2.700 UIU/ML                      

     



             = 2821)                                             



TSH, THIRD [id number] 00:00:00





             Test Item    Value        Reference Range Interpretation Comments

 

             TSH, THIRD GENERATION (test code 2.700 UIU/ML                      

     



             = 2821)                                             



TSH, THIRD [id number] 00:00:00





             Test Item    Value        Reference Range Interpretation Comments

 

             TSH, THIRD GENERATION (test code 2.700 UIU/ML                      

     



             = 2821)                                             



HEMOGLOBIN A1c2022-10-12 00:00:00





             Test Item    Value        Reference Range Interpretation Comments

 

             HEMOGLOBIN A1c (test code = 38369) 6.8 %                           

       



HEMOGLOBIN A1c2022-10-12 00:00:00





             Test Item    Value        Reference Range Interpretation Comments

 

             HEMOGLOBIN A1c (test code = 63803) 6.8 %                           

       



HEMOGLOBIN A1c2022-10-12 00:00:00





             Test Item    Value        Reference Range Interpretation Comments

 

             HEMOGLOBIN A1c (test code = 46451) 6.8 %                           

       



VITAMIN D, 25 [id number] 06:58:12





             Test Item    Value        Reference Range Interpretation Comments

 

             VITAMIN D, 25 OH 18 NG/ML     SEE BELOW    L             NOTE: 25-H

YDROXYVITAMIN D



             (test code = 4958)                                        [facility]

LUDES



                                                                 25-HYDROXYVITAM

IN D2 AND



                                                                 D3. METHODOLOGY

 IS



                                                                 CHEMILUMINESCEN

T



                                                                 IMMUNOASSAY. **

***



                                                                 INTERPRETIVE RA

NGES



                                                                 *****PEDIATRIC 

(<17 YEARS)



                                                                 . . . . . . . .

 . . . NG/ML



                                                                  20-100ADULT: I

NSUFFICIENT



                                                                 . . . . . . . .

 . . . . . .



                                                                 NG/ML <20 SUBOP

TIMAL . . .



                                                                 . . . . . . . .

 . . . .



                                                                 NG/ML 20-29 OPT

IMAL . . . .



                                                                 . . . . . . . .

 . . . . .



                                                                 NG/ML   U

NLESS



                                                                 OTHERWISE INDIC

ATED, ALL



                                                                 TESTING PERFORM

ED



                                                                 ATCLINICAL PATH

OLOGY



                                                                 LABORATORIES, [facility]. [address] [place] [id number]



                                                                 LABORATORY DIRE

CTOR: GILBERTO WARD. CLIA



                                                                 NUMBER [id number]

[id number] CAP



                                                                 ACCREDITATION N

O. [id number]



TSH, THIRD [id number] 06:45:08





             Test Item    Value        Reference Range Interpretation Comments

 

             TSH, THIRD GENERATION (test code 1.290 UIU/ML 0.400-4.100          

     



             = 2821)                                             



VITAMIN [id number] 06:45:08





             Test Item    Value        Reference Range Interpretation Comments

 

             VITAMIN B-12 (test code = 2840) 647 PG/ML    200-950               

    



COMPREHENSIVE METABOLIC [id number] 05:39:52





             Test Item    Value        Reference Range Interpretation Comments

 

             GLUCOSE (test code = 119 MG/DL    70-99        H            



             )                                               

 

             BUN (test code = 19 MG/DL     )                                               

 

             CREATININE (test 0.98 MG/DL   0.60-1.30                 



             code = 2214)                                        

 

             eGFR ( CKD-EPI) 65 ML/MIN/1.73 >60                       



             (test code = 69615)                                        

 

             CALC BUN/CREAT (test 19 RATIO                           



             code = 2235)                                        

 

             SODIUM (test code = 137 MEQ/L    133-146                   



             )                                               

 

             POTASSIUM (test code 4.9 MEQ/L    3.5-5.4                   



             = )                                             

 

             CHLORIDE (test code 97 MEQ/L                         



             = )                                             

 

             CARBON DIOXIDE (test 23 MEQ/L     19-31                     



             code = )                                        

 

             CALCIUM (test code = 9.7 MG/DL    8.5-10.5                  



             )                                               

 

             PROTEIN, TOTAL (test 7.9 G/DL     6.1-8.3                   



             code = )                                        

 

             ALBUMIN (test code = 4.6 G/DL     3.5-5.2                   



             )                                               

 

             CALC GLOBULIN (test 3.3 G/DL     1.9-3.7                   



             code = )                                        

 

             CALC A/G RATIO (test 1.4 RATIO    1.0-2.6                   



             code = )                                        

 

             BILIRUBIN, TOTAL 0.3 MG/DL    See_Comment                [Automated

 message]



             (test code = )                                        The syste

m which



                                                                 generated this



                                                                 result transmit

maximo



                                                                 reference range

:



                                                                 <=1.2. The refe

rence



                                                                 range was not u

sed



                                                                 to interpret th

is



                                                                 result as



                                                                 normal/abnormal

.

 

             ALKALINE PHOSPHATASE 79 U/L                           



             (test code = )                                        

 

             AST (test code = 48 U/L       9-40         H            



             )                                               

 

             ALT (test code = 48 U/L       5-40         H            



             )                                               



CBC W/AUTO DIFF WITH [id number] 02:09:53





             Test Item    Value        Reference Range Interpretation Comments

 

             WBC (test code = 7.0 K/UL     3.5-11.0                  



             1001)                                               

 

             RBC (test code = 3.83 M/UL    3.80-5.40                 



             1002)                                               

 

             HEMOGLOBIN (test code 10.9 G/DL    11.5-15.5    L            



             = 1003)                                             

 

             HEMATOCRIT (test code 33.2 %       34.0-45.0    L            



             = 1004)                                             

 

             MCV (test code = 86.7 fL      80.0-99.0                 



             1005)                                               

 

             MCH (test code = 28.5 PG      25.0-33.0                 



             1006)                                               

 

             MCHC (test code = 32.8 G/DL    31.0-36.0                 



             1007)                                               

 

             RDW (test code = 15.2 %       11.5-15.0    H            



             1038)                                               

 

             NEUTROPHILS (test 63.1 %                                 



             code = 1008)                                        

 

             LYMPHOCYTES (test 28.0 %                                 



             code = 1010)                                        

 

             MONOCYTES (test code 6.4 %                                  



             = 1011)                                             

 

             EOSINOPHILS (test 1.6 %                                  



             code = 1012)                                        

 

             BASOPHILS (test code 0.6 %                                  



             = 1013)                                             

 

             IMMATURE GRANULOCYTES 0.3 %                                  



             (test code = 1036)                                        

 

             NUCLEATED RBCS (test 0.0 /100 WBC'S See_Comment                [Aut

omated



             code = 1065)                                        message] The sy

stem



                                                                 which generated



                                                                 this result



                                                                 transmitted



                                                                 reference range

:



                                                                 0.0. The refere

nce



                                                                 range was not u

sed



                                                                 to interpret th

is



                                                                 result as



                                                                 normal/abnormal

.

 

             PLATELET COUNT (test 115 K/UL     130-400      L            



             code = 1015)                                        

 

             ABSOLUTE NEUTROPHILS 4.41 K/UL    1.50-7.50                 



             (test code = 1066)                                        

 

             ABSOLUTE LYMPHOCYTES 1.96 K/UL    1.00-4.00                 



             (test code = 1067)                                        

 

             ABSOLUTE MONOCYTES 0.45 K/UL    0.20-1.00                 



             (test code = 1068)                                        

 

             ABSOLUTE EOSINOPHILS 0.11 K/UL    0.00-0.50                 



             (test code = 1040)                                        

 

             ABSOLUTE BASOPHILS 0.04 K/UL    0.00-0.20                 



             (test code = 1069)                                        

 

             ABS IMMATURE 0.02 K/UL    0.00-0.10                 



             GRANULOCYTES (test                                        



             code = 1020)                                        

 

             ABS NUCLEATED RBCS 0.00 K/UL    0.00-0.11                 



             (test code = 98796)                                        



TSH, THIRD [id number] 00:00:00





             Test Item    Value        Reference Range Interpretation Comments

 

             TSH, THIRD GENERATION (test code 1.290 UIU/ML                      

     



             = 2821)                                             



TSH, THIRD [id number] 00:00:00





             Test Item    Value        Reference Range Interpretation Comments

 

             TSH, THIRD GENERATION (test code 1.290 UIU/ML                      

     



             = 2821)                                             



TSH, THIRD [id number] 00:00:00





             Test Item    Value        Reference Range Interpretation Comments

 

             TSH, THIRD GENERATION (test code 1.290 UIU/ML                      

     



             = 2821)                                             



CBC W/AUTO [id number] 00:00:00





             Test Item    Value        Reference Range Interpretation Comments

 

             WBC (test code = 1001) 7.0 K/UL                               

 

             RBC (test code = 1002) 3.83 M/UL                              

 

             HEMOGLOBIN (test code = 1003) 10.9 G/DL                            

  

 

             HEMATOCRIT (test code = 1004) 33.2 %                               

  

 

             MCV (test code = 1005) 86.7 fL                                

 

             MCH (test code = 1006) 28.5 PG                                

 

             MCHC (test code = 1007) 32.8 G/DL                              

 

             RDW (test code = 1038) 15.2 %                                 

 

             NEUTROPHILS (test code = 1008) 63.1 %                              

   

 

             LYMPHOCYTES (test code = 1010) 28.0 %                              

   

 

             MONOCYTES (test code = 1011) 6.4 %                                 

 

 

             EOSINOPHILS (test code = 1012) 1.6 %                               

   

 

             BASOPHILS (test code = 1013) 0.6 %                                 

 

 

             IMMATURE GRANULOCYTES (test 0.3 %                                  



             code = 1036)                                        

 

             NUCLEATED RBCS (test code = 0.0 /100WBC'S                          

 



             1065)                                               

 

             PLATELET COUNT (test code = 115 K/UL                               



             1015)                                               

 

             ABSOLUTE NEUTROPHILS (test code 4.41 K/UL                          

    



             = 1066)                                             

 

             ABSOLUTE LYMPHOCYTES (test code 1.96 K/UL                          

    



             = 1067)                                             

 

             ABSOLUTE MONOCYTES (test code = 0.45 K/UL                          

    



             1068)                                               

 

             ABSOLUTE EOSINOPHILS (test code 0.11 K/UL                          

    



             = 1040)                                             

 

             ABSOLUTE BASOPHILS (test code = 0.04 K/UL                          

    



             1069)                                               

 

             ABS IMMATURE GRANULOCYTES (test 0.02 K/UL                          

    



             code = 1020)                                        

 

             ABS NUCLEATED RBCS (test code = 0.00 K/UL                          

    



             10492)                                              



CBC W/AUTO [id number] 00:00:00





             Test Item    Value        Reference Range Interpretation Comments

 

             WBC (test code = 1001) 7.0 K/UL                               

 

             RBC (test code = 1002) 3.83 M/UL                              

 

             HEMOGLOBIN (test code = 1003) 10.9 G/DL                            

  

 

             HEMATOCRIT (test code = 1004) 33.2 %                               

  

 

             MCV (test code = 1005) 86.7 fL                                

 

             MCH (test code = 1006) 28.5 PG                                

 

             MCHC (test code = 1007) 32.8 G/DL                              

 

             RDW (test code = 1038) 15.2 %                                 

 

             NEUTROPHILS (test code = 1008) 63.1 %                              

   

 

             LYMPHOCYTES (test code = 1010) 28.0 %                              

   

 

             MONOCYTES (test code = 1011) 6.4 %                                 

 

 

             EOSINOPHILS (test code = 1012) 1.6 %                               

   

 

             BASOPHILS (test code = 1013) 0.6 %                                 

 

 

             IMMATURE GRANULOCYTES (test 0.3 %                                  



             code = 1036)                                        

 

             NUCLEATED RBCS (test code = 0.0 /100WBC'S                          

 



             1065)                                               

 

             PLATELET COUNT (test code = 115 K/UL                               



             1015)                                               

 

             ABSOLUTE NEUTROPHILS (test code 4.41 K/UL                          

    



             = 1066)                                             

 

             ABSOLUTE LYMPHOCYTES (test code 1.96 K/UL                          

    



             = 1067)                                             

 

             ABSOLUTE MONOCYTES (test code = 0.45 K/UL                          

    



             1068)                                               

 

             ABSOLUTE EOSINOPHILS (test code 0.11 K/UL                          

    



             = 1040)                                             

 

             ABSOLUTE BASOPHILS (test code = 0.04 K/UL                          

    



             1069)                                               

 

             ABS IMMATURE GRANULOCYTES (test 0.02 K/UL                          

    



             code = 1020)                                        

 

             ABS NUCLEATED RBCS (test code = 0.00 K/UL                          

    



             78333)                                              



CBC W/AUTO [id number] 00:00:00





             Test Item    Value        Reference Range Interpretation Comments

 

             WBC (test code = 1001) 7.0 K/UL                               

 

             RBC (test code = 1002) 3.83 M/UL                              

 

             HEMOGLOBIN (test code = 1003) 10.9 G/DL                            

  

 

             HEMATOCRIT (test code = 1004) 33.2 %                               

  

 

             MCV (test code = 1005) 86.7 fL                                

 

             MCH (test code = 1006) 28.5 PG                                

 

             MCHC (test code = 1007) 32.8 G/DL                              

 

             RDW (test code = 1038) 15.2 %                                 

 

             NEUTROPHILS (test code = 1008) 63.1 %                              

   

 

             LYMPHOCYTES (test code = 1010) 28.0 %                              

   

 

             MONOCYTES (test code = 1011) 6.4 %                                 

 

 

             EOSINOPHILS (test code = 1012) 1.6 %                               

   

 

             BASOPHILS (test code = 1013) 0.6 %                                 

 

 

             IMMATURE GRANULOCYTES (test 0.3 %                                  



             code = 1036)                                        

 

             NUCLEATED RBCS (test code = 0.0 /100WBC'S                          

 



             1065)                                               

 

             PLATELET COUNT (test code = 115 K/UL                               



             1015)                                               

 

             ABSOLUTE NEUTROPHILS (test code 4.41 K/UL                          

    



             = 1066)                                             

 

             ABSOLUTE LYMPHOCYTES (test code 1.96 K/UL                          

    



             = 1067)                                             

 

             ABSOLUTE MONOCYTES (test code = 0.45 K/UL                          

    



             1068)                                               

 

             ABSOLUTE EOSINOPHILS (test code 0.11 K/UL                          

    



             = 1040)                                             

 

             ABSOLUTE BASOPHILS (test code = 0.04 K/UL                          

    



             1069)                                               

 

             ABS IMMATURE GRANULOCYTES (test 0.02 K/UL                          

    



             code = 1020)                                        

 

             ABS NUCLEATED RBCS (test code = 0.00 K/UL                          

    



             40224)                                              



COMPREHENSIVE METABOLIC [id number] 00:00:00





             Test Item    Value        Reference Range Interpretation Comments

 

             GLUCOSE (test code = 2217) 119 MG/DL                              

 

             BUN (test code = 2208) 19 MG/DL                               

 

             CREATININE (test code = 2214) 0.98 MG/DL                           

  

 

             eGFR ( CKD-EPI) (test code 65 ML/MIN/1.73                      

     



             = 91998)                                            

 

             CALC BUN/CREAT (test code = 19 RATIO                               



             2235)                                               

 

             SODIUM (test code = 2231) 137 MEQ/L                              

 

             POTASSIUM (test code = 2228) 4.9 MEQ/L                             

 

 

             CHLORIDE (test code = 2215) 97 MEQ/L                               

 

             CARBON DIOXIDE (test code = 23 MEQ/L                               



             2206)                                               

 

             CALCIUM (test code = 2209) 9.7 MG/DL                              

 

             PROTEIN, TOTAL (test code = 7.9 G/DL                               



             )                                               

 

             ALBUMIN (test code = 2201) 4.6 G/DL                               

 

             CALC GLOBULIN (test code = 3.3 G/DL                               



             2240)                                               

 

             CALC A/G RATIO (test code = 1.4 RATIO                              



             2234)                                               

 

             BILIRUBIN, TOTAL (test code = 0.3 MG/DL                            

  



             )                                               

 

             ALKALINE PHOSPHATASE (test 79 U/L                                 



             code = 2204)                                        

 

             AST (test code = 2218) 48 U/L                                 

 

             ALT (test code = 2219) 48 U/L                                 



COMPREHENSIVE METABOLIC [id number] 00:00:00





             Test Item    Value        Reference Range Interpretation Comments

 

             GLUCOSE (test code = 2217) 119 MG/DL                              

 

             BUN (test code = 2208) 19 MG/DL                               

 

             CREATININE (test code = 2214) 0.98 MG/DL                           

  

 

             eGFR ( CKD-EPI) (test code 65 ML/MIN/1.73                      

     



             = 47200)                                            

 

             CALC BUN/CREAT (test code = 19 RATIO                               



             2235)                                               

 

             SODIUM (test code = 2231) 137 MEQ/L                              

 

             POTASSIUM (test code = 2228) 4.9 MEQ/L                             

 

 

             CHLORIDE (test code = 2215) 97 MEQ/L                               

 

             CARBON DIOXIDE (test code = 23 MEQ/L                               



             )                                               

 

             CALCIUM (test code = 2209) 9.7 MG/DL                              

 

             PROTEIN, TOTAL (test code = 7.9 G/DL                               



             )                                               

 

             ALBUMIN (test code = 2201) 4.6 G/DL                               

 

             CALC GLOBULIN (test code = 3.3 G/DL                               



             2240)                                               

 

             CALC A/G RATIO (test code = 1.4 RATIO                              



             2234)                                               

 

             BILIRUBIN, TOTAL (test code = 0.3 MG/DL                            

  



             )                                               

 

             ALKALINE PHOSPHATASE (test 79 U/L                                 



             code = 2204)                                        

 

             AST (test code = 2218) 48 U/L                                 

 

             ALT (test code = 2219) 48 U/L                                 



VITAMIN [id number] 00:00:00





             Test Item    Value        Reference Range Interpretation Comments

 

             VITAMIN B-12 (test code = 2840) 647 PG/ML                          

    



VITAMIN [id number] 00:00:00





             Test Item    Value        Reference Range Interpretation Comments

 

             VITAMIN B-12 (test code = 2840) 647 PG/ML                          

    



VITAMIN [id number] 00:00:00





             Test Item    Value        Reference Range Interpretation Comments

 

             VITAMIN B-12 (test code = 2840) 647 PG/ML                          

    



VITAMIN D, 25 [id number] 00:00:00





             Test Item    Value        Reference Range Interpretation Comments

 

             VITAMIN D, 25 OH (test code = 4958) 18 NG/ML                       

        



VITAMIN D, 25 [id number] 00:00:00





             Test Item    Value        Reference Range Interpretation Comments

 

             VITAMIN D, 25 OH (test code = 4958) 18 NG/ML                       

        



TSH, THIRD [id number] 00:00:00





             Test Item    Value        Reference Range Interpretation Comments

 

             TSH, THIRD GENERATION (test code 1.290 UIU/ML                      

     



             = 2821)                                             



TSH, THIRD [id number] 00:00:00





             Test Item    Value        Reference Range Interpretation Comments

 

             TSH, THIRD GENERATION (test code 1.290 UIU/ML                      

     



             = 2821)                                             



TSH, THIRD [id number] 00:00:00





             Test Item    Value        Reference Range Interpretation Comments

 

             TSH, THIRD GENERATION (test code 1.290 UIU/ML                      

     



             = 2821)                                             



CBC W/AUTO [id number] 00:00:00





             Test Item    Value        Reference Range Interpretation Comments

 

             WBC (test code = 1001) 7.0 K/UL                               

 

             RBC (test code = 1002) 3.83 M/UL                              

 

             HEMOGLOBIN (test code = 1003) 10.9 G/DL                            

  

 

             HEMATOCRIT (test code = 1004) 33.2 %                               

  

 

             MCV (test code = 1005) 86.7 fL                                

 

             MCH (test code = 1006) 28.5 PG                                

 

             MCHC (test code = 1007) 32.8 G/DL                              

 

             RDW (test code = 1038) 15.2 %                                 

 

             NEUTROPHILS (test code = 1008) 63.1 %                              

   

 

             LYMPHOCYTES (test code = 1010) 28.0 %                              

   

 

             MONOCYTES (test code = 1011) 6.4 %                                 

 

 

             EOSINOPHILS (test code = 1012) 1.6 %                               

   

 

             BASOPHILS (test code = 1013) 0.6 %                                 

 

 

             IMMATURE GRANULOCYTES (test 0.3 %                                  



             code = 1036)                                        

 

             NUCLEATED RBCS (test code = 0.0 /100WBC'S                          

 



             1065)                                               

 

             PLATELET COUNT (test code = 115 K/UL                               



             1015)                                               

 

             ABSOLUTE NEUTROPHILS (test code 4.41 K/UL                          

    



             = 1066)                                             

 

             ABSOLUTE LYMPHOCYTES (test code 1.96 K/UL                          

    



             = 1067)                                             

 

             ABSOLUTE MONOCYTES (test code = 0.45 K/UL                          

    



             1068)                                               

 

             ABSOLUTE EOSINOPHILS (test code 0.11 K/UL                          

    



             = 1040)                                             

 

             ABSOLUTE BASOPHILS (test code = 0.04 K/UL                          

    



             1069)                                               

 

             ABS IMMATURE GRANULOCYTES (test 0.02 K/UL                          

    



             code = 1020)                                        

 

             ABS NUCLEATED RBCS (test code = 0.00 K/UL                          

    



             62541)                                              



CBC W/AUTO [id number] 00:00:00





             Test Item    Value        Reference Range Interpretation Comments

 

             WBC (test code = 1001) 7.0 K/UL                               

 

             RBC (test code = 1002) 3.83 M/UL                              

 

             HEMOGLOBIN (test code = 1003) 10.9 G/DL                            

  

 

             HEMATOCRIT (test code = 1004) 33.2 %                               

  

 

             MCV (test code = 1005) 86.7 fL                                

 

             MCH (test code = 1006) 28.5 PG                                

 

             MCHC (test code = 1007) 32.8 G/DL                              

 

             RDW (test code = 1038) 15.2 %                                 

 

             NEUTROPHILS (test code = 1008) 63.1 %                              

   

 

             LYMPHOCYTES (test code = 1010) 28.0 %                              

   

 

             MONOCYTES (test code = 1011) 6.4 %                                 

 

 

             EOSINOPHILS (test code = 1012) 1.6 %                               

   

 

             BASOPHILS (test code = 1013) 0.6 %                                 

 

 

             IMMATURE GRANULOCYTES (test 0.3 %                                  



             code = 1036)                                        

 

             NUCLEATED RBCS (test code = 0.0 /100WBC'S                          

 



             1065)                                               

 

             PLATELET COUNT (test code = 115 K/UL                               



             1015)                                               

 

             ABSOLUTE NEUTROPHILS (test code 4.41 K/UL                          

    



             = 1066)                                             

 

             ABSOLUTE LYMPHOCYTES (test code 1.96 K/UL                          

    



             = 1067)                                             

 

             ABSOLUTE MONOCYTES (test code = 0.45 K/UL                          

    



             1068)                                               

 

             ABSOLUTE EOSINOPHILS (test code 0.11 K/UL                          

    



             = 1040)                                             

 

             ABSOLUTE BASOPHILS (test code = 0.04 K/UL                          

    



             1069)                                               

 

             ABS IMMATURE GRANULOCYTES (test 0.02 K/UL                          

    



             code = 1020)                                        

 

             ABS NUCLEATED RBCS (test code = 0.00 K/UL                          

    



             00608)                                              



CBC W/AUTO [id number] 00:00:00





             Test Item    Value        Reference Range Interpretation Comments

 

             WBC (test code = 1001) 7.0 K/UL                               

 

             RBC (test code = 1002) 3.83 M/UL                              

 

             HEMOGLOBIN (test code = 1003) 10.9 G/DL                            

  

 

             HEMATOCRIT (test code = 1004) 33.2 %                               

  

 

             MCV (test code = 1005) 86.7 fL                                

 

             MCH (test code = 1006) 28.5 PG                                

 

             MCHC (test code = 1007) 32.8 G/DL                              

 

             RDW (test code = 1038) 15.2 %                                 

 

             NEUTROPHILS (test code = 1008) 63.1 %                              

   

 

             LYMPHOCYTES (test code = 1010) 28.0 %                              

   

 

             MONOCYTES (test code = 1011) 6.4 %                                 

 

 

             EOSINOPHILS (test code = 1012) 1.6 %                               

   

 

             BASOPHILS (test code = 1013) 0.6 %                                 

 

 

             IMMATURE GRANULOCYTES (test 0.3 %                                  



             code = 1036)                                        

 

             NUCLEATED RBCS (test code = 0.0 /100WBC'S                          

 



             1065)                                               

 

             PLATELET COUNT (test code = 115 K/UL                               



             1015)                                               

 

             ABSOLUTE NEUTROPHILS (test code 4.41 K/UL                          

    



             = 1066)                                             

 

             ABSOLUTE LYMPHOCYTES (test code 1.96 K/UL                          

    



             = 1067)                                             

 

             ABSOLUTE MONOCYTES (test code = 0.45 K/UL                          

    



             1068)                                               

 

             ABSOLUTE EOSINOPHILS (test code 0.11 K/UL                          

    



             = 1040)                                             

 

             ABSOLUTE BASOPHILS (test code = 0.04 K/UL                          

    



             1069)                                               

 

             ABS IMMATURE GRANULOCYTES (test 0.02 K/UL                          

    



             code = 1020)                                        

 

             ABS NUCLEATED RBCS (test code = 0.00 K/UL                          

    



             25064)                                              



COMPREHENSIVE METABOLIC [id number] 00:00:00





             Test Item    Value        Reference Range Interpretation Comments

 

             GLUCOSE (test code = 2217) 119 MG/DL                              

 

             BUN (test code = 2208) 19 MG/DL                               

 

             CREATININE (test code = 2214) 0.98 MG/DL                           

  

 

             eGFR ( CKD-EPI) (test code 65 ML/MIN/1.73                      

     



             = 53310)                                            

 

             CALC BUN/CREAT (test code = 19 RATIO                               



             2235)                                               

 

             SODIUM (test code = 2231) 137 MEQ/L                              

 

             POTASSIUM (test code = 2228) 4.9 MEQ/L                             

 

 

             CHLORIDE (test code = 2215) 97 MEQ/L                               

 

             CARBON DIOXIDE (test code = 23 MEQ/L                               



             )                                               

 

             CALCIUM (test code = 2209) 9.7 MG/DL                              

 

             PROTEIN, TOTAL (test code = 7.9 G/DL                               



             )                                               

 

             ALBUMIN (test code = 2201) 4.6 G/DL                               

 

             CALC GLOBULIN (test code = 3.3 G/DL                               



             224)                                               

 

             CALC A/G RATIO (test code = 1.4 RATIO                              



             2234)                                               

 

             BILIRUBIN, TOTAL (test code = 0.3 MG/DL                            

  



             )                                               

 

             ALKALINE PHOSPHATASE (test 79 U/L                                 



             code = 2204)                                        

 

             AST (test code = 2218) 48 U/L                                 

 

             ALT (test code = 2219) 48 U/L                                 



COMPREHENSIVE METABOLIC [id number] 00:00:00





             Test Item    Value        Reference Range Interpretation Comments

 

             GLUCOSE (test code = 2217) 119 MG/DL                              

 

             BUN (test code = 2208) 19 MG/DL                               

 

             CREATININE (test code = 2214) 0.98 MG/DL                           

  

 

             eGFR ( CKD-EPI) (test code 65 ML/MIN/1.73                      

     



             = 00976)                                            

 

             CALC BUN/CREAT (test code = 19 RATIO                               



             2235)                                               

 

             SODIUM (test code = 223) 137 MEQ/L                              

 

             POTASSIUM (test code = 2228) 4.9 MEQ/L                             

 

 

             CHLORIDE (test code = 2215) 97 MEQ/L                               

 

             CARBON DIOXIDE (test code = 23 MEQ/L                               



             )                                               

 

             CALCIUM (test code = 2209) 9.7 MG/DL                              

 

             PROTEIN, TOTAL (test code = 7.9 G/DL                               



             )                                               

 

             ALBUMIN (test code = 2201) 4.6 G/DL                               

 

             CALC GLOBULIN (test code = 3.3 G/DL                               



             2240)                                               

 

             CALC A/G RATIO (test code = 1.4 RATIO                              



             4)                                               

 

             BILIRUBIN, TOTAL (test code = 0.3 MG/DL                            

  



             )                                               

 

             ALKALINE PHOSPHATASE (test 79 U/L                                 



             code = 2204)                                        

 

             AST (test code = 2218) 48 U/L                                 

 

             ALT (test code = 2219) 48 U/L                                 



VITAMIN [id number] 00:00:00





             Test Item    Value        Reference Range Interpretation Comments

 

             VITAMIN B-12 (test code = 2840) 647 PG/ML                          

    



VITAMIN [id number] 00:00:00





             Test Item    Value        Reference Range Interpretation Comments

 

             VITAMIN B-12 (test code = 2840) 647 PG/ML                          

    



VITAMIN [id number] 00:00:00





             Test Item    Value        Reference Range Interpretation Comments

 

             VITAMIN B-12 (test code = 2840) 647 PG/ML                          

    



VITAMIN D, 25 [id number] 00:00:00





             Test Item    Value        Reference Range Interpretation Comments

 

             VITAMIN D, 25 OH (test code = 4958) 18 NG/ML                       

        



VITAMIN D, 25 [id number] 00:00:00





             Test Item    Value        Reference Range Interpretation Comments

 

             VITAMIN D, 25 OH (test code = 4958) 18 NG/ML                       

        



CULTURE, [id number] 12:01:26SPECIMEN NUMBER: [id number] CULTURE, URINE 
SPECIMEN NUMBER: [id number] SPECIMEN COMMENT: URINE SOURCE:URINE REPORT STATUS: 
FINAL FINAL REPORT: 2022 10-50,000 CFU/ML MIXED UROGENITAL ROWENA UNLESS OT
HERWISE INDICATED, ALL TESTING PERFORMED [facility]
[address] [place] [id number] : CLIFTON DARBY M.D. CLIA 
NUMBER [id number] [facility] [place]. [id number], [id number] 
00:00:00





             Test Item    Value        Reference Range Interpretation Comments

 

             CULTURE, URINE (test SPECIMEN NUMBER:                           



             code = 45034) [id number]                              



CULTURE, [id number] 00:00:00





             Test Item    Value        Reference Range Interpretation Comments

 

             CULTURE, URINE (test SPECIMEN NUMBER:                           



             code = 71727) [id number]                              



CULTURE, [id number] 00:00:00





             Test Item    Value        Reference Range Interpretation Comments

 

             CULTURE, URINE (test SPECIMEN NUMBER:                           



             code = 02521) [id number]                              



CULTURE, [id number] 00:00:00





             Test Item    Value        Reference Range Interpretation Comments

 

             CULTURE, URINE (test SPECIMEN NUMBER:                           



             code = 98936) [id number]                              



CULTURE, [id number] 00:00:00





             Test Item    Value        Reference Range Interpretation Comments

 

             CULTURE, URINE (test SPECIMEN NUMBER:                           



             code = 87755) [id number]                              



CULTURE, [id number] 00:00:00





             Test Item    Value        Reference Range Interpretation Comments

 

             CULTURE, URINE (test SPECIMEN NUMBER:                           



             code = 66245) [id number]                              



CULTURE, [id number] 00:00:00





             Test Item    Value        Reference Range Interpretation Comments

 

             CULTURE, URINE (test SPECIMEN NUMBER:                           



             code = 23602) [id number]                              



TSH, THIRD [id number] 06:16:09





             Test Item    Value        Reference Range Interpretation Comments

 

             TSH, THIRD   <0.010 UIU/ML 0.400-4.100  L             UNLESS OTHERW

ISE



             GENERATION (test                                        INDICATED, 

ALL



             code = 2821)                                        TESTING PERFORM

ED



                                                                 ATCLINICAL PATH

OLOGY



                                                                 LABORATORIES, [facility]

[place].



                                                                 [address] [place] [id number] [facility]



                                                                 DIRECTOR: CLIFTON DARBY M.D.

 [place]



                                                                 NUMBER [id number]

[id number]



                                                                 ACCREDITATION N

O.



                                                                 [id number]



HEMOGLOBIN [id number] 05:23:24





             Test Item    Value        Reference Range Interpretation Comments

 

             HEMOGLOBIN A1c (test 6.7 %        4.2-5.6      H             AMERIC

AN DIABETES



             code = 13183)                                        ASSOCIATION 

IDELINES FOR



                                                                 HGB A1C:



                                                                 PREDIABETES/INC

REASED RISK .



                                                                 . . . . . . 5.7

-6.4%



                                                                 DIAGNOSIS OF DI

ABETES . . .



                                                                 . . . . . . >=6

.5% WITH



                                                                 CONFIRMATION OR

 APPROPRIATE



                                                                 SYMPTOMS NOTE: 

ASSAY MAY BE



                                                                 AFFECTED BY



                                                                 HEMOGLOBINOPATH

IES (SICKLE



                                                                 CELL ANEMIA, S-

C DISEASE,



                                                                 OTHERS) OR ZENA

FICIALLY



                                                                 LOWERED BY DECR

EASED RED



                                                                 CELL SURVIVAL (

HEMOLYTIC



                                                                 ANEMIAS, BLOOD 

LOSS, ETC.).



                                                                 CONSIDER ALTERN

ATE TESTING



                                                                 OR LABORATORY C

ONSULTATION.



COMPREHENSIVE METABOLIC [id number] 04:45:06





             Test Item    Value        Reference Range Interpretation Comments

 

             GLUCOSE (test code = 266 MG/DL    70-99        H            



             )                                               

 

             BUN (test code = 17 MG/DL     8-23                      



             )                                               

 

             CREATININE (test 0.92 MG/DL   0.60-1.30                 



             code = 2214)                                        

 

             eGFR ( CKD-EPI) 70 ML/MIN/1.73 >60                       



             (test code = 95636)                                        

 

             CALC BUN/CREAT (test 18 RATIO     6-28                      



             code = 2235)                                        

 

             SODIUM (test code = 136 MEQ/L    133-146                   



             )                                               

 

             POTASSIUM (test code 5.3 MEQ/L    3.5-5.4                   



             = )                                             

 

             CHLORIDE (test code 98 MEQ/L                         



             = 221)                                             

 

             CARBON DIOXIDE (test 22 MEQ/L     19-31                     



             code = 2206)                                        

 

             CALCIUM (test code = 8.8 MG/DL    8.5-10.5                  



             )                                               

 

             PROTEIN, TOTAL (test 7.4 G/DL     6.1-8.3                   



             code = 2229)                                        

 

             ALBUMIN (test code = 4.1 G/DL     3.5-5.2                   



             )                                               

 

             CALC GLOBULIN (test 3.3 G/DL     1.9-3.7                   



             code = 2240)                                        

 

             CALC A/G RATIO (test 1.2 RATIO    1.0-2.6                   



             code = 2234)                                        

 

             BILIRUBIN, TOTAL 0.3 MG/DL    See_Comment                [Automated

 message]



             (test code = 2207)                                        The syste

m which



                                                                 generated this



                                                                 result transmit

maximo



                                                                 reference range

:



                                                                 <=1.2. The refe

rence



                                                                 range was not u

sed



                                                                 to interpret th

is



                                                                 result as



                                                                 normal/abnormal

.

 

             ALKALINE PHOSPHATASE 88 U/L                           



             (test code = 2204)                                        

 

             AST (test code = 32 U/L       9-40                      



             )                                               

 

             ALT (test code = 29 U/L       5-40                      



             )                                               



HEMOGLOBIN [id number] 00:00:00





             Test Item    Value        Reference Range Interpretation Comments

 

             HEMOGLOBIN A1c (test code = 67084) 6.7 %                           

       



HEMOGLOBIN [id number] 00:00:00





             Test Item    Value        Reference Range Interpretation Comments

 

             HEMOGLOBIN A1c (test code = 79983) 6.7 %                           

       



HEMOGLOBIN [id number] 00:00:00





             Test Item    Value        Reference Range Interpretation Comments

 

             HEMOGLOBIN A1c (test code = 65398) 6.7 %                           

       



COMPREHENSIVE METABOLIC [id number] 00:00:00





             Test Item    Value        Reference Range Interpretation Comments

 

             GLUCOSE (test code = ) 266 MG/DL                              

 

             BUN (test code = 8) 17 MG/DL                               

 

             CREATININE (test code = 2214) 0.92 MG/DL                           

  

 

             eGFR ( CKD-EPI) (test code 70 ML/MIN/1.73                      

     



             = 69323)                                            

 

             CALC BUN/CREAT (test code = 18 RATIO                               



             )                                               

 

             SODIUM (test code = 2231) 136 MEQ/L                              

 

             POTASSIUM (test code = 2228) 5.3 MEQ/L                             

 

 

             CHLORIDE (test code = 2215) 98 MEQ/L                               

 

             CARBON DIOXIDE (test code = 22 MEQ/L                               



             )                                               

 

             CALCIUM (test code = 2209) 8.8 MG/DL                              

 

             PROTEIN, TOTAL (test code = 7.4 G/DL                               



             )                                               

 

             ALBUMIN (test code = 2201) 4.1 G/DL                               

 

             CALC GLOBULIN (test code = 3.3 G/DL                               



             )                                               

 

             CALC A/G RATIO (test code = 1.2 RATIO                              



             )                                               

 

             BILIRUBIN, TOTAL (test code = 0.3 MG/DL                            

  



             )                                               

 

             ALKALINE PHOSPHATASE (test 88 U/L                                 



             code = 2204)                                        

 

             AST (test code = 2218) 32 U/L                                 

 

             ALT (test code = 2219) 29 U/L                                 



COMPREHENSIVE METABOLIC [id number] 00:00:00





             Test Item    Value        Reference Range Interpretation Comments

 

             GLUCOSE (test code = 2217) 266 MG/DL                              

 

             BUN (test code = 2208) 17 MG/DL                               

 

             CREATININE (test code = 2214) 0.92 MG/DL                           

  

 

             eGFR ( CKD-EPI) (test code 70 ML/MIN/1.73                      

     



             = 73971)                                            

 

             CALC BUN/CREAT (test code = 18 RATIO                               



             2235)                                               

 

             SODIUM (test code = 2231) 136 MEQ/L                              

 

             POTASSIUM (test code = 2228) 5.3 MEQ/L                             

 

 

             CHLORIDE (test code = 2215) 98 MEQ/L                               

 

             CARBON DIOXIDE (test code = 22 MEQ/L                               



             )                                               

 

             CALCIUM (test code = 2209) 8.8 MG/DL                              

 

             PROTEIN, TOTAL (test code = 7.4 G/DL                               



             )                                               

 

             ALBUMIN (test code = 220) 4.1 G/DL                               

 

             CALC GLOBULIN (test code = 3.3 G/DL                               



             )                                               

 

             CALC A/G RATIO (test code = 1.2 RATIO                              



             )                                               

 

             BILIRUBIN, TOTAL (test code = 0.3 MG/DL                            

  



             )                                               

 

             ALKALINE PHOSPHATASE (test 88 U/L                                 



             code = 2204)                                        

 

             AST (test code = 2218) 32 U/L                                 

 

             ALT (test code = 2219) 29 U/L                                 



[id number] 00:00:00





             Test Item    Value        Reference Range Interpretation Comments

 

             TSH, THIRD GENERATION (test <0.010 UIU/ML                          

 



             code = 2821)                                        



[id number] 00:00:00





             Test Item    Value        Reference Range Interpretation Comments

 

             TSH, THIRD GENERATION (test <0.010 UIU/ML                          

 



             code = 2821)                                        



[id number] 00:00:00





             Test Item    Value        Reference Range Interpretation Comments

 

             TSH, THIRD GENERATION (test <0.010 UIU/ML                          

 



             code = 2821)                                        



HEMOGLOBIN [id number] 00:00:00





             Test Item    Value        Reference Range Interpretation Comments

 

             HEMOGLOBIN A1c (test code = 50169) 6.7 %                           

       



HEMOGLOBIN [id number] 00:00:00





             Test Item    Value        Reference Range Interpretation Comments

 

             HEMOGLOBIN A1c (test code = 69714) 6.7 %                           

       



HEMOGLOBIN [id number] 00:00:00





             Test Item    Value        Reference Range Interpretation Comments

 

             HEMOGLOBIN A1c (test code = 93220) 6.7 %                           

       



COMPREHENSIVE METABOLIC [id number] 00:00:00





             Test Item    Value        Reference Range Interpretation Comments

 

             GLUCOSE (test code = 2217) 266 MG/DL                              

 

             BUN (test code = 2208) 17 MG/DL                               

 

             CREATININE (test code = 2214) 0.92 MG/DL                           

  

 

             eGFR ( CKD-EPI) (test code 70 ML/MIN/1.73                      

     



             = 82422)                                            

 

             CALC BUN/CREAT (test code = 18 RATIO                               



             2235)                                               

 

             SODIUM (test code = 2231) 136 MEQ/L                              

 

             POTASSIUM (test code = 2228) 5.3 MEQ/L                             

 

 

             CHLORIDE (test code = 2215) 98 MEQ/L                               

 

             CARBON DIOXIDE (test code = 22 MEQ/L                               



             )                                               

 

             CALCIUM (test code = 2209) 8.8 MG/DL                              

 

             PROTEIN, TOTAL (test code = 7.4 G/DL                               



             )                                               

 

             ALBUMIN (test code = 2201) 4.1 G/DL                               

 

             CALC GLOBULIN (test code = 3.3 G/DL                               



             224)                                               

 

             CALC A/G RATIO (test code = 1.2 RATIO                              



             2234)                                               

 

             BILIRUBIN, TOTAL (test code = 0.3 MG/DL                            

  



             )                                               

 

             ALKALINE PHOSPHATASE (test 88 U/L                                 



             code = 2204)                                        

 

             AST (test code = 2218) 32 U/L                                 

 

             ALT (test code = 2219) 29 U/L                                 



COMPREHENSIVE METABOLIC [id number] 00:00:00





             Test Item    Value        Reference Range Interpretation Comments

 

             GLUCOSE (test code = 2217) 266 MG/DL                              

 

             BUN (test code = 2208) 17 MG/DL                               

 

             CREATININE (test code = 2214) 0.92 MG/DL                           

  

 

             eGFR ( CKD-EPI) (test code 70 ML/MIN/1.73                      

     



             = 43267)                                            

 

             CALC BUN/CREAT (test code = 18 RATIO                               



             2235)                                               

 

             SODIUM (test code = 2231) 136 MEQ/L                              

 

             POTASSIUM (test code = 2228) 5.3 MEQ/L                             

 

 

             CHLORIDE (test code = 2215) 98 MEQ/L                               

 

             CARBON DIOXIDE (test code = 22 MEQ/L                               



             )                                               

 

             CALCIUM (test code = 2209) 8.8 MG/DL                              

 

             PROTEIN, TOTAL (test code = 7.4 G/DL                               



             )                                               

 

             ALBUMIN (test code = 2201) 4.1 G/DL                               

 

             CALC GLOBULIN (test code = 3.3 G/DL                               



             )                                               

 

             CALC A/G RATIO (test code = 1.2 RATIO                              



             2234)                                               

 

             BILIRUBIN, TOTAL (test code = 0.3 MG/DL                            

  



             )                                               

 

             ALKALINE PHOSPHATASE (test 88 U/L                                 



             code = 2204)                                        

 

             AST (test code = 2218) 32 U/L                                 

 

             ALT (test code = 2219) 29 U/L                                 



[id number] 00:00:00





             Test Item    Value        Reference Range Interpretation Comments

 

             TSH, THIRD GENERATION (test <0.010 UIU/ML                          

 



             code = 2821)                                        



[id number] 00:00:00





             Test Item    Value        Reference Range Interpretation Comments

 

             TSH, THIRD GENERATION (test <0.010 UIU/ML                          

 



             code = 2821)                                        



[id number] 00:00:00





             Test Item    Value        Reference Range Interpretation Comments

 

             TSH, THIRD GENERATION (test <0.010 UIU/ML                          

 



             code = 2821)                                        



HEMOGLOBIN [id number] 00:00:00





             Test Item    Value        Reference Range Interpretation Comments

 

             HEMOGLOBIN A1c (test code = 67275) 6.7 %                           

       



HEMOGLOBIN [id number] 00:00:00





             Test Item    Value        Reference Range Interpretation Comments

 

             HEMOGLOBIN A1c (test code = 88062) 6.7 %                           

       



COMPREHENSIVE METABOLIC [id number] 00:00:00





             Test Item    Value        Reference Range Interpretation Comments

 

             GLUCOSE (test code = 2217) 266 MG/DL                              

 

             BUN (test code = 2208) 17 MG/DL                               

 

             CREATININE (test code = 2214) 0.92 MG/DL                           

  

 

             eGFR ( CKD-EPI) (test code 70 ML/MIN/1.73                      

     



             = 82268)                                            

 

             CALC BUN/CREAT (test code = 18 RATIO                               



             2235)                                               

 

             SODIUM (test code = 2231) 136 MEQ/L                              

 

             POTASSIUM (test code = 2228) 5.3 MEQ/L                             

 

 

             CHLORIDE (test code = 2215) 98 MEQ/L                               

 

             CARBON DIOXIDE (test code = 22 MEQ/L                               



             )                                               

 

             CALCIUM (test code = 2209) 8.8 MG/DL                              

 

             PROTEIN, TOTAL (test code = 7.4 G/DL                               



             )                                               

 

             ALBUMIN (test code = 2201) 4.1 G/DL                               

 

             CALC GLOBULIN (test code = 3.3 G/DL                               



             0)                                               

 

             CALC A/G RATIO (test code = 1.2 RATIO                              



             4)                                               

 

             BILIRUBIN, TOTAL (test code = 0.3 MG/DL                            

  



             )                                               

 

             ALKALINE PHOSPHATASE (test 88 U/L                                 



             code = 2204)                                        

 

             AST (test code = 2218) 32 U/L                                 

 

             ALT (test code = 2219) 29 U/L                                 



[id number] 00:00:00





             Test Item    Value        Reference Range Interpretation Comments

 

             TSH, THIRD GENERATION (test <0.010 UIU/ML                          

 



             code = 2821)                                        



[id number] 00:00:00





             Test Item    Value        Reference Range Interpretation Comments

 

             TSH, THIRD GENERATION (test <0.010 UIU/ML                          

 



             code = 2821)                                        



HEMOGLOBIN [id number] 00:00:00





             Test Item    Value        Reference Range Interpretation Comments

 

             HEMOGLOBIN A1c (test code = 46367) 6.7 %                           

       



HEMOGLOBIN [id number] 00:00:00





             Test Item    Value        Reference Range Interpretation Comments

 

             HEMOGLOBIN A1c (test code = 26277) 6.7 %                           

       



HEMOGLOBIN [id number] 00:00:00





             Test Item    Value        Reference Range Interpretation Comments

 

             HEMOGLOBIN A1c (test code = 75229) 6.7 %                           

       



COMPREHENSIVE METABOLIC [id number] 00:00:00





             Test Item    Value        Reference Range Interpretation Comments

 

             GLUCOSE (test code = 2217) 266 MG/DL                              

 

             BUN (test code = 2208) 17 MG/DL                               

 

             CREATININE (test code = 2214) 0.92 MG/DL                           

  

 

             eGFR ( CKD-EPI) (test code 70 ML/MIN/1.73                      

     



             = 16369)                                            

 

             CALC BUN/CREAT (test code = 18 RATIO                               



             5)                                               

 

             SODIUM (test code = 2231) 136 MEQ/L                              

 

             POTASSIUM (test code = 2228) 5.3 MEQ/L                             

 

 

             CHLORIDE (test code = 2215) 98 MEQ/L                               

 

             CARBON DIOXIDE (test code = 22 MEQ/L                               



             )                                               

 

             CALCIUM (test code = 2209) 8.8 MG/DL                              

 

             PROTEIN, TOTAL (test code = 7.4 G/DL                               



             )                                               

 

             ALBUMIN (test code = 2201) 4.1 G/DL                               

 

             CALC GLOBULIN (test code = 3.3 G/DL                               



             0)                                               

 

             CALC A/G RATIO (test code = 1.2 RATIO                              



             )                                               

 

             BILIRUBIN, TOTAL (test code = 0.3 MG/DL                            

  



             )                                               

 

             ALKALINE PHOSPHATASE (test 88 U/L                                 



             code = 2204)                                        

 

             AST (test code = 2218) 32 U/L                                 

 

             ALT (test code = 2219) 29 U/L                                 



COMPREHENSIVE METABOLIC [id number] 00:00:00





             Test Item    Value        Reference Range Interpretation Comments

 

             GLUCOSE (test code = 2217) 266 MG/DL                              

 

             BUN (test code = 2208) 17 MG/DL                               

 

             CREATININE (test code = 2214) 0.92 MG/DL                           

  

 

             eGFR ( CKD-EPI) (test code 70 ML/MIN/1.73                      

     



             = 17877)                                            

 

             CALC BUN/CREAT (test code = 18 RATIO                               



             )                                               

 

             SODIUM (test code = 2231) 136 MEQ/L                              

 

             POTASSIUM (test code = 2228) 5.3 MEQ/L                             

 

 

             CHLORIDE (test code = 2215) 98 MEQ/L                               

 

             CARBON DIOXIDE (test code = 22 MEQ/L                               



             )                                               

 

             CALCIUM (test code = 2209) 8.8 MG/DL                              

 

             PROTEIN, TOTAL (test code = 7.4 G/DL                               



             )                                               

 

             ALBUMIN (test code = 220) 4.1 G/DL                               

 

             CALC GLOBULIN (test code = 3.3 G/DL                               



             )                                               

 

             CALC A/G RATIO (test code = 1.2 RATIO                              



             )                                               

 

             BILIRUBIN, TOTAL (test code = 0.3 MG/DL                            

  



             )                                               

 

             ALKALINE PHOSPHATASE (test 88 U/L                                 



             code = 220)                                        

 

             AST (test code = 2218) 32 U/L                                 

 

             ALT (test code = 2219) 29 U/L                                 



[id number] 00:00:00





             Test Item    Value        Reference Range Interpretation Comments

 

             TSH, THIRD GENERATION (test <0.010 UIU/ML                          

 



             code = 2821)                                        



[id number] 00:00:00





             Test Item    Value        Reference Range Interpretation Comments

 

             TSH, THIRD GENERATION (test <0.010 UIU/ML                          

 



             code = 2821)                                        



[id number] 00:00:00





             Test Item    Value        Reference Range Interpretation Comments

 

             TSH, THIRD GENERATION (test <0.010 UIU/ML                          

 



             code = 2821)                                        



TSH, THIRD [id number] 06:18:54





             Test Item    Value        Reference Range Interpretation Comments

 

             TSH, THIRD GENERATION (test code 0.190 UIU/ML 0.400-4.100  L       

     



             = 2821)                                             



HEMOGLOBIN [id number] 03:11:56





             Test Item    Value        Reference Range Interpretation Comments

 

             HEMOGLOBIN A1c (test 8.4 %        4.2-5.6      H             AMERIC

AN DIABETES



             code = 54883)                                        ASSOCIATION 

IDELINES FOR



                                                                 HGB A1C:



                                                                 PREDIABETES/INC

REASED RISK .



                                                                 . . . . . . 5.7

-6.4%



                                                                 DIAGNOSIS OF DI

ABETES . . .



                                                                 . . . . . . >=6

.5% WITH



                                                                 CONFIRMATION OR

 APPROPRIATE



                                                                 SYMPTOMS NOTE: 

ASSAY MAY BE



                                                                 AFFECTED BY



                                                                 HEMOGLOBINOPATH

IES (SICKLE



                                                                 CELL ANEMIA, S-

C DISEASE,



                                                                 OTHERS) OR ZENA

FICIALLY



                                                                 LOWERED BY DECR

EASED RED



                                                                 CELL SURVIVAL (

HEMOLYTIC



                                                                 ANEMIAS, BLOOD 

LOSS, ETC.).



                                                                 CONSIDER ALTERN

ATE TESTING



                                                                 OR LABORATORY C

ONSULTATION.



COMPREHENSIVE METABOLIC [id number] 03:11:06





             Test Item    Value        Reference Range Interpretation Comments

 

             GLUCOSE (test code = 141 MG/DL    70-99        H            



             )                                               

 

             BUN (test code = 31 MG/DL     8-23         H            



             )                                               

 

             CREATININE (test 1.07 MG/DL   0.60-1.30                 



             code = 2214)                                        

 

             eGFR ( CKD-EPI) 59 ML/MIN/1.73 >60          L            



             (test code = 79999)                                        

 

             CALC BUN/CREAT (test 29 RATIO     6-28         H            



             code = 2235)                                        

 

             SODIUM (test code = 141 MEQ/L    133-146                   



             )                                               

 

             POTASSIUM (test code 4.7 MEQ/L    3.5-5.4                   



             = )                                             

 

             CHLORIDE (test code 99 MEQ/L                         



             = )                                             

 

             CARBON DIOXIDE (test 26 MEQ/L     19-31                     



             code = 220)                                        

 

             CALCIUM (test code = 8.8 MG/DL    8.5-10.5                  



             )                                               

 

             PROTEIN, TOTAL (test 8.0 G/DL     6.1-8.3                   



             code = 222)                                        

 

             ALBUMIN (test code = 4.5 G/DL     3.5-5.2                   



             )                                               

 

             CALC GLOBULIN (test 3.5 G/DL     1.9-3.7                   



             code = 2240)                                        

 

             CALC A/G RATIO (test 1.3 RATIO    1.0-2.6                   



             code = 2234)                                        

 

             BILIRUBIN, TOTAL 0.4 MG/DL    See_Comment                [Automated

 message]



             (test code = 2207)                                        The syste

m which



                                                                 generated this



                                                                 result transmit

maximo



                                                                 reference range

:



                                                                 <=1.2. The refe

rence



                                                                 range was not u

sed



                                                                 to interpret th

is



                                                                 result as



                                                                 normal/abnormal

.

 

             ALKALINE PHOSPHATASE 67 U/L                           



             (test code = 2204)                                        

 

             AST (test code = 49 U/L       9-40         H            



             )                                               

 

             ALT (test code = 41 U/L       5-40         H            



             )                                               



LIPID [id number] 03:11:06





             Test Item    Value        Reference Range Interpretation Comments

 

             CHOLESTEROL (test 113 MG/DL    <200                      



             code = 2210)                                        

 

             TRIGLYCERIDES (test 165 MG/DL    <150         H            



             code = 2232)                                        

 

             HDL CHOLESTEROL (test 39 MG/DL     >39          L            



             code = 2220)                                        

 

             CALC LDL CHOL (test 50 MG/DL     <100                       NOTE: C

ALCULATED LDL



             code = 2237)                                        IS BASED ON



                                                                 CAROLANN-FUNES 

METHOD



                                                                 WHICHINCLUDES



                                                                 ADJUSTABLE



                                                                 TRIGLYCERIDE:VL

DL



                                                                 CHOLESTEROL RAT

IO.THIS



                                                                 FACTOR VARIES B

Y



                                                                 MEASURED TRIGLY

CERIDE



                                                                 AND NON-HDLCHOL

ESTEROL



                                                                 CONCENTRATIONS 

WITH



                                                                 INCREASED CALCU

LATED



                                                                 LDL SEENIN HIGH

ER



                                                                 TRIGLYCERIDE OR

 LOWER



                                                                 NON-HDL SPECIME

NS. FOR



                                                                 MOREINFORMATION

, SEE



                                                                 CLIENT ANNOUNCE

MENT AT



                                                                 [email or web address]



                                                                 [email or web address]

 

             RISK RATIO LDL/HDL 1.28 RATIO   <3.22                     



             (test code = 2238)                                        



IRON, [id number] 03:11:06





             Test Item    Value        Reference Range Interpretation Comments

 

             IRON, SERUM (test 59 UG/DL                          UNLESS OT

HERWISE



             code = 2222)                                        INDICATED, ALL 

TESTING



                                                                 PERFORMED ATCLI

NICAL



                                                                 PATHOLOGY [facility],



                                                                 [employer]. [address] [place] [id number]  [place]



                                                                 DIRECTOR: CLIFTON DARBY M.D.

 CLIA



                                                                 NUMBER [id number]

[id number]



                                                                 ACCREDITATION N

O. [id number]



HEMOGLOBIN [id number] 00:00:00





             Test Item    Value        Reference Range Interpretation Comments

 

             HEMOGLOBIN A1c (test code = 52667) 8.4 %                           

       



HEMOGLOBIN [id number] 00:00:00





             Test Item    Value        Reference Range Interpretation Comments

 

             HEMOGLOBIN A1c (test code = 18330) 8.4 %                           

       



HEMOGLOBIN [id number] 00:00:00





             Test Item    Value        Reference Range Interpretation Comments

 

             HEMOGLOBIN A1c (test code = 20242) 8.4 %                           

       



COMPREHENSIVE METABOLIC [id number] 00:00:00





             Test Item    Value        Reference Range Interpretation Comments

 

             GLUCOSE (test code = 2217) 141 MG/DL                              

 

             BUN (test code = 2208) 31 MG/DL                               

 

             CREATININE (test code = 2214) 1.07 MG/DL                           

  

 

             eGFR ( CKD-EPI) (test code 59 ML/MIN/1.73                      

     



             = 19574)                                            

 

             CALC BUN/CREAT (test code = 29 RATIO                               



             5)                                               

 

             SODIUM (test code = 2231) 141 MEQ/L                              

 

             POTASSIUM (test code = 2228) 4.7 MEQ/L                             

 

 

             CHLORIDE (test code = 2215) 99 MEQ/L                               

 

             CARBON DIOXIDE (test code = 26 MEQ/L                               



             )                                               

 

             CALCIUM (test code = 2209) 8.8 MG/DL                              

 

             PROTEIN, TOTAL (test code = 8.0 G/DL                               



             )                                               

 

             ALBUMIN (test code = 2201) 4.5 G/DL                               

 

             CALC GLOBULIN (test code = 3.5 G/DL                               



             2240)                                               

 

             CALC A/G RATIO (test code = 1.3 RATIO                              



             2234)                                               

 

             BILIRUBIN, TOTAL (test code = 0.4 MG/DL                            

  



             )                                               

 

             ALKALINE PHOSPHATASE (test 67 U/L                                 



             code = 2204)                                        

 

             AST (test code = 2218) 49 U/L                                 

 

             ALT (test code = 2219) 41 U/L                                 



COMPREHENSIVE METABOLIC [id number] 00:00:00





             Test Item    Value        Reference Range Interpretation Comments

 

             GLUCOSE (test code = 2217) 141 MG/DL                              

 

             BUN (test code = 2208) 31 MG/DL                               

 

             CREATININE (test code = 2214) 1.07 MG/DL                           

  

 

             eGFR ( CKD-EPI) (test code 59 ML/MIN/1.73                      

     



             = 96508)                                            

 

             CALC BUN/CREAT (test code = 29 RATIO                               



             2235)                                               

 

             SODIUM (test code = 2231) 141 MEQ/L                              

 

             POTASSIUM (test code = 2228) 4.7 MEQ/L                             

 

 

             CHLORIDE (test code = 2215) 99 MEQ/L                               

 

             CARBON DIOXIDE (test code = 26 MEQ/L                               



             )                                               

 

             CALCIUM (test code = 2209) 8.8 MG/DL                              

 

             PROTEIN, TOTAL (test code = 8.0 G/DL                               



             )                                               

 

             ALBUMIN (test code = 2201) 4.5 G/DL                               

 

             CALC GLOBULIN (test code = 3.5 G/DL                               



             2240)                                               

 

             CALC A/G RATIO (test code = 1.3 RATIO                              



             2234)                                               

 

             BILIRUBIN, TOTAL (test code = 0.4 MG/DL                            

  



             )                                               

 

             ALKALINE PHOSPHATASE (test 67 U/L                                 



             code = 2204)                                        

 

             AST (test code = 2218) 49 U/L                                 

 

             ALT (test code = 2219) 41 U/L                                 



LIPID [id number] 00:00:00





             Test Item    Value        Reference Range Interpretation Comments

 

             CHOLESTEROL (test code = 2210) 113 MG/DL                           

   

 

             TRIGLYCERIDES (test code = 2232) 165 MG/DL                         

     

 

             HDL CHOLESTEROL (test code = 2220) 39 MG/DL                        

       

 

             CALC LDL CHOL (test code = 2237) 50 MG/DL                          

     

 

             RISK RATIO LDL/HDL (test code = 1.28 RATIO                         

    



             2238)                                               



LIPID [id number] 00:00:00





             Test Item    Value        Reference Range Interpretation Comments

 

             CHOLESTEROL (test code = 2210) 113 MG/DL                           

   

 

             TRIGLYCERIDES (test code = 2232) 165 MG/DL                         

     

 

             HDL CHOLESTEROL (test code = 2220) 39 MG/DL                        

       

 

             CALC LDL CHOL (test code = 2237) 50 MG/DL                          

     

 

             RISK RATIO LDL/HDL (test code = 1.28 RATIO                         

    



             2238)                                               



[id number] 00:00:00





             Test Item    Value        Reference Range Interpretation Comments

 

             TSH, THIRD GENERATION (test code 0.190 UIU/ML                      

     



             = 2821)                                             



[id number] 00:00:00





             Test Item    Value        Reference Range Interpretation Comments

 

             TSH, THIRD GENERATION (test code 0.190 UIU/ML                      

     



             = 2821)                                             



[id number] 00:00:00





             Test Item    Value        Reference Range Interpretation Comments

 

             TSH, THIRD GENERATION (test code 0.190 UIU/ML                      

     



             = 2821)                                             



IRON, [id number] 00:00:00





             Test Item    Value        Reference Range Interpretation Comments

 

             IRON, SERUM (test code = 2222) 59 UG/DL                            

   



IRON, [id number] 00:00:00





             Test Item    Value        Reference Range Interpretation Comments

 

             IRON, SERUM (test code = 2222) 59 UG/DL                            

   



HEMOGLOBIN [id number] 00:00:00





             Test Item    Value        Reference Range Interpretation Comments

 

             HEMOGLOBIN A1c (test code = 40869) 8.4 %                           

       



HEMOGLOBIN [id number] 00:00:00





             Test Item    Value        Reference Range Interpretation Comments

 

             HEMOGLOBIN A1c (test code = 75362) 8.4 %                           

       



HEMOGLOBIN [id number] 00:00:00





             Test Item    Value        Reference Range Interpretation Comments

 

             HEMOGLOBIN A1c (test code = 60465) 8.4 %                           

       



HEMOGLOBIN [id number] 00:00:00





             Test Item    Value        Reference Range Interpretation Comments

 

             HEMOGLOBIN A1c (test code = 75353) 8.4 %                           

       



COMPREHENSIVE METABOLIC [id number] 00:00:00





             Test Item    Value        Reference Range Interpretation Comments

 

             GLUCOSE (test code = 2217) 141 MG/DL                              

 

             BUN (test code = 2208) 31 MG/DL                               

 

             CREATININE (test code = 2214) 1.07 MG/DL                           

  

 

             eGFR ( CKD-EPI) (test code 59 ML/MIN/1.73                      

     



             = 91593)                                            

 

             CALC BUN/CREAT (test code = 29 RATIO                               



             2235)                                               

 

             SODIUM (test code = 2231) 141 MEQ/L                              

 

             POTASSIUM (test code = 2228) 4.7 MEQ/L                             

 

 

             CHLORIDE (test code = 2215) 99 MEQ/L                               

 

             CARBON DIOXIDE (test code = 26 MEQ/L                               



             )                                               

 

             CALCIUM (test code = 2209) 8.8 MG/DL                              

 

             PROTEIN, TOTAL (test code = 8.0 G/DL                               



             )                                               

 

             ALBUMIN (test code = 2201) 4.5 G/DL                               

 

             CALC GLOBULIN (test code = 3.5 G/DL                               



             2240)                                               

 

             CALC A/G RATIO (test code = 1.3 RATIO                              



             2234)                                               

 

             BILIRUBIN, TOTAL (test code = 0.4 MG/DL                            

  



             )                                               

 

             ALKALINE PHOSPHATASE (test 67 U/L                                 



             code = 2204)                                        

 

             AST (test code = 2218) 49 U/L                                 

 

             ALT (test code = 2219) 41 U/L                                 



COMPREHENSIVE METABOLIC [id number] 00:00:00





             Test Item    Value        Reference Range Interpretation Comments

 

             GLUCOSE (test code = ) 141 MG/DL                              

 

             BUN (test code = 2208) 31 MG/DL                               

 

             CREATININE (test code = 2214) 1.07 MG/DL                           

  

 

             eGFR ( CKD-EPI) (test code 59 ML/MIN/1.73                      

     



             = 88706)                                            

 

             CALC BUN/CREAT (test code = 29 RATIO                               



             2235)                                               

 

             SODIUM (test code = 2231) 141 MEQ/L                              

 

             POTASSIUM (test code = 2228) 4.7 MEQ/L                             

 

 

             CHLORIDE (test code = 2215) 99 MEQ/L                               

 

             CARBON DIOXIDE (test code = 26 MEQ/L                               



             )                                               

 

             CALCIUM (test code = 2209) 8.8 MG/DL                              

 

             PROTEIN, TOTAL (test code = 8.0 G/DL                               



             )                                               

 

             ALBUMIN (test code = 2201) 4.5 G/DL                               

 

             CALC GLOBULIN (test code = 3.5 G/DL                               



             2240)                                               

 

             CALC A/G RATIO (test code = 1.3 RATIO                              



             2234)                                               

 

             BILIRUBIN, TOTAL (test code = 0.4 MG/DL                            

  



             )                                               

 

             ALKALINE PHOSPHATASE (test 67 U/L                                 



             code = 2204)                                        

 

             AST (test code = 2218) 49 U/L                                 

 

             ALT (test code = 2219) 41 U/L                                 



HEMOGLOBIN [id number] 00:00:00





             Test Item    Value        Reference Range Interpretation Comments

 

             HEMOGLOBIN A1c (test code = 60749) 8.4 %                           

       



LIPID [id number] 00:00:00





             Test Item    Value        Reference Range Interpretation Comments

 

             CHOLESTEROL (test code = 2210) 113 MG/DL                           

   

 

             TRIGLYCERIDES (test code = 2232) 165 MG/DL                         

     

 

             HDL CHOLESTEROL (test code = 2220) 39 MG/DL                        

       

 

             CALC LDL CHOL (test code = 2237) 50 MG/DL                          

     

 

             RISK RATIO LDL/HDL (test code = 1.28 RATIO                         

    



             2238)                                               



LIPID [id number] 00:00:00





             Test Item    Value        Reference Range Interpretation Comments

 

             CHOLESTEROL (test code = 2210) 113 MG/DL                           

   

 

             TRIGLYCERIDES (test code = 2232) 165 MG/DL                         

     

 

             HDL CHOLESTEROL (test code = 2220) 39 MG/DL                        

       

 

             CALC LDL CHOL (test code = 2237) 50 MG/DL                          

     

 

             RISK RATIO LDL/HDL (test code = 1.28 RATIO                         

    



             2238)                                               



COMPREHENSIVE METABOLIC [id number] 00:00:00





             Test Item    Value        Reference Range Interpretation Comments

 

             GLUCOSE (test code = 2217) 141 MG/DL                              

 

             BUN (test code = 2208) 31 MG/DL                               

 

             CREATININE (test code = 2214) 1.07 MG/DL                           

  

 

             eGFR ( CKD-EPI) (test code 59 ML/MIN/1.73                      

     



             = 80566)                                            

 

             CALC BUN/CREAT (test code = 29 RATIO                               



             2235)                                               

 

             SODIUM (test code = 2231) 141 MEQ/L                              

 

             POTASSIUM (test code = 2228) 4.7 MEQ/L                             

 

 

             CHLORIDE (test code = 2215) 99 MEQ/L                               

 

             CARBON DIOXIDE (test code = 26 MEQ/L                               



             )                                               

 

             CALCIUM (test code = 2209) 8.8 MG/DL                              

 

             PROTEIN, TOTAL (test code = 8.0 G/DL                               



             )                                               

 

             ALBUMIN (test code = 2201) 4.5 G/DL                               

 

             CALC GLOBULIN (test code = 3.5 G/DL                               



             0)                                               

 

             CALC A/G RATIO (test code = 1.3 RATIO                              



             4)                                               

 

             BILIRUBIN, TOTAL (test code = 0.4 MG/DL                            

  



             )                                               

 

             ALKALINE PHOSPHATASE (test 67 U/L                                 



             code = 2204)                                        

 

             AST (test code = 2218) 49 U/L                                 

 

             ALT (test code = 2219) 41 U/L                                 



[id number] 00:00:00





             Test Item    Value        Reference Range Interpretation Comments

 

             TSH, THIRD GENERATION (test code 0.190 UIU/ML                      

     



             = 2821)                                             



 00:00:00





             Test Item    Value        Reference Range Interpretation Comments

 

             TSH, THIRD GENERATION (test code 0.190 UIU/ML                      

     



             = 2821)                                             



[id number] 00:00:00





             Test Item    Value        Reference Range Interpretation Comments

 

             TSH, THIRD GENERATION (test code 0.190 UIU/ML                      

     



             = 2821)                                             



IRON, [id number] 00:00:00





             Test Item    Value        Reference Range Interpretation Comments

 

             IRON, SERUM (test code = 2222) 59 UG/DL                            

   



IRON, [id number] 00:00:00





             Test Item    Value        Reference Range Interpretation Comments

 

             IRON, SERUM (test code = 2222) 59 UG/DL                            

   



LIPID [id number] 00:00:00





             Test Item    Value        Reference Range Interpretation Comments

 

             CHOLESTEROL (test code = 2210) 113 MG/DL                           

   

 

             TRIGLYCERIDES (test code = 2232) 165 MG/DL                         

     

 

             HDL CHOLESTEROL (test code = 2220) 39 MG/DL                        

       

 

             CALC LDL CHOL (test code = 2237) 50 MG/DL                          

     

 

             RISK RATIO LDL/HDL (test code = 1.28 RATIO                         

    



             8)                                               



[id number] 00:00:00





             Test Item    Value        Reference Range Interpretation Comments

 

             TSH, THIRD GENERATION (test code 0.190 UIU/ML                      

     



             = 2821)                                             



[id number] 00:00:00





             Test Item    Value        Reference Range Interpretation Comments

 

             TSH, THIRD GENERATION (test code 0.190 UIU/ML                      

     



             = 2821)                                             



IRON, [id number] 00:00:00





             Test Item    Value        Reference Range Interpretation Comments

 

             IRON, SERUM (test code = 2222) 59 UG/DL                            

   



HEMOGLOBIN [id number] 00:00:00





             Test Item    Value        Reference Range Interpretation Comments

 

             HEMOGLOBIN A1c (test code = 15802) 8.4 %                           

       



HEMOGLOBIN [id number] 00:00:00





             Test Item    Value        Reference Range Interpretation Comments

 

             HEMOGLOBIN A1c (test code = 45310) 8.4 %                           

       



COMPREHENSIVE METABOLIC [id number] 00:00:00





             Test Item    Value        Reference Range Interpretation Comments

 

             GLUCOSE (test code = 2217) 141 MG/DL                              

 

             BUN (test code = 2208) 31 MG/DL                               

 

             CREATININE (test code = 2214) 1.07 MG/DL                           

  

 

             eGFR ( CKD-EPI) (test code 59 ML/MIN/1.73                      

     



             = 82256)                                            

 

             CALC BUN/CREAT (test code = 29 RATIO                               



             5)                                               

 

             SODIUM (test code = 2231) 141 MEQ/L                              

 

             POTASSIUM (test code = 2228) 4.7 MEQ/L                             

 

 

             CHLORIDE (test code = 2215) 99 MEQ/L                               

 

             CARBON DIOXIDE (test code = 26 MEQ/L                               



             )                                               

 

             CALCIUM (test code = 220) 8.8 MG/DL                              

 

             PROTEIN, TOTAL (test code = 8.0 G/DL                               



             )                                               

 

             ALBUMIN (test code = 2201) 4.5 G/DL                               

 

             CALC GLOBULIN (test code = 3.5 G/DL                               



             )                                               

 

             CALC A/G RATIO (test code = 1.3 RATIO                              



             )                                               

 

             BILIRUBIN, TOTAL (test code = 0.4 MG/DL                            

  



             )                                               

 

             ALKALINE PHOSPHATASE (test 67 U/L                                 



             code = 2204)                                        

 

             AST (test code = 2218) 49 U/L                                 

 

             ALT (test code = 2219) 41 U/L                                 



LIPID [id number] 00:00:00





             Test Item    Value        Reference Range Interpretation Comments

 

             CHOLESTEROL (test code = 2210) 113 MG/DL                           

   

 

             TRIGLYCERIDES (test code = 2232) 165 MG/DL                         

     

 

             HDL CHOLESTEROL (test code = 2220) 39 MG/DL                        

       

 

             CALC LDL CHOL (test code = 223) 50 MG/DL                          

     

 

             RISK RATIO LDL/HDL (test code = 1.28 RATIO                         

    



             2238)                                               



[id number] 00:00:00





             Test Item    Value        Reference Range Interpretation Comments

 

             TSH, THIRD GENERATION (test code 0.190 UIU/ML                      

     



             = 2821)                                             



[id number] 00:00:00





             Test Item    Value        Reference Range Interpretation Comments

 

             TSH, THIRD GENERATION (test code 0.190 UIU/ML                      

     



             = 2821)                                             



IRON, [id number] 00:00:00





             Test Item    Value        Reference Range Interpretation Comments

 

             IRON, SERUM (test code = 2222) 59 UG/DL                            

   



HEMOGLOBIN [id number] 00:00:00





             Test Item    Value        Reference Range Interpretation Comments

 

             HEMOGLOBIN A1c (test code = 52904) 8.4 %                           

       



HEMOGLOBIN [id number] 00:00:00





             Test Item    Value        Reference Range Interpretation Comments

 

             HEMOGLOBIN A1c (test code = 38832) 8.4 %                           

       



HEMOGLOBIN [id number] 00:00:00





             Test Item    Value        Reference Range Interpretation Comments

 

             HEMOGLOBIN A1c (test code = 09252) 8.4 %                           

       



COMPREHENSIVE METABOLIC [id number] 00:00:00





             Test Item    Value        Reference Range Interpretation Comments

 

             GLUCOSE (test code = 7) 141 MG/DL                              

 

             BUN (test code = 2208) 31 MG/DL                               

 

             CREATININE (test code = 2214) 1.07 MG/DL                           

  

 

             eGFR ( CKD-EPI) (test code 59 ML/MIN/1.73                      

     



             = 93546)                                            

 

             CALC BUN/CREAT (test code = 29 RATIO                               



             2235)                                               

 

             SODIUM (test code = 2231) 141 MEQ/L                              

 

             POTASSIUM (test code = 2228) 4.7 MEQ/L                             

 

 

             CHLORIDE (test code = 2215) 99 MEQ/L                               

 

             CARBON DIOXIDE (test code = 26 MEQ/L                               



             )                                               

 

             CALCIUM (test code = 2209) 8.8 MG/DL                              

 

             PROTEIN, TOTAL (test code = 8.0 G/DL                               



             )                                               

 

             ALBUMIN (test code = 2201) 4.5 G/DL                               

 

             CALC GLOBULIN (test code = 3.5 G/DL                               



             2240)                                               

 

             CALC A/G RATIO (test code = 1.3 RATIO                              



             2234)                                               

 

             BILIRUBIN, TOTAL (test code = 0.4 MG/DL                            

  



             )                                               

 

             ALKALINE PHOSPHATASE (test 67 U/L                                 



             code = 2204)                                        

 

             AST (test code = 2218) 49 U/L                                 

 

             ALT (test code = 2219) 41 U/L                                 



COMPREHENSIVE METABOLIC [id number] 00:00:00





             Test Item    Value        Reference Range Interpretation Comments

 

             GLUCOSE (test code = 2217) 141 MG/DL                              

 

             BUN (test code = 2208) 31 MG/DL                               

 

             CREATININE (test code = 2214) 1.07 MG/DL                           

  

 

             eGFR ( CKD-EPI) (test code 59 ML/MIN/1.73                      

     



             = 46290)                                            

 

             CALC BUN/CREAT (test code = 29 RATIO                               



             2235)                                               

 

             SODIUM (test code = 2231) 141 MEQ/L                              

 

             POTASSIUM (test code = 2228) 4.7 MEQ/L                             

 

 

             CHLORIDE (test code = 2215) 99 MEQ/L                               

 

             CARBON DIOXIDE (test code = 26 MEQ/L                               



             )                                               

 

             CALCIUM (test code = 2209) 8.8 MG/DL                              

 

             PROTEIN, TOTAL (test code = 8.0 G/DL                               



             )                                               

 

             ALBUMIN (test code = 2201) 4.5 G/DL                               

 

             CALC GLOBULIN (test code = 3.5 G/DL                               



             2240)                                               

 

             CALC A/G RATIO (test code = 1.3 RATIO                              



             2234)                                               

 

             BILIRUBIN, TOTAL (test code = 0.4 MG/DL                            

  



             )                                               

 

             ALKALINE PHOSPHATASE (test 67 U/L                                 



             code = 2204)                                        

 

             AST (test code = 2218) 49 U/L                                 

 

             ALT (test code = 2219) 41 U/L                                 



LIPID [id number] 00:00:00





             Test Item    Value        Reference Range Interpretation Comments

 

             CHOLESTEROL (test code = 2210) 113 MG/DL                           

   

 

             TRIGLYCERIDES (test code = 2232) 165 MG/DL                         

     

 

             HDL CHOLESTEROL (test code = 2220) 39 MG/DL                        

       

 

             CALC LDL CHOL (test code = 2237) 50 MG/DL                          

     

 

             RISK RATIO LDL/HDL (test code = 1.28 RATIO                         

    



             2238)                                               



LIPID [id number] 00:00:00





             Test Item    Value        Reference Range Interpretation Comments

 

             CHOLESTEROL (test code = 2210) 113 MG/DL                           

   

 

             TRIGLYCERIDES (test code = 2232) 165 MG/DL                         

     

 

             HDL CHOLESTEROL (test code = 2220) 39 MG/DL                        

       

 

             CALC LDL CHOL (test code = 2237) 50 MG/DL                          

     

 

             RISK RATIO LDL/HDL (test code = 1.28 RATIO                         

    



             2238)                                               



[id number] 00:00:00





             Test Item    Value        Reference Range Interpretation Comments

 

             TSH, THIRD GENERATION (test code 0.190 UIU/ML                      

     



             = 2821)                                             



[id number] 00:00:00





             Test Item    Value        Reference Range Interpretation Comments

 

             TSH, THIRD GENERATION (test code 0.190 UIU/ML                      

     



             = 2821)                                             



[id number] 00:00:00





             Test Item    Value        Reference Range Interpretation Comments

 

             TSH, THIRD GENERATION (test code 0.190 UIU/ML                      

     



             = 2821)                                             



IRON, [id number] 00:00:00





             Test Item    Value        Reference Range Interpretation Comments

 

             IRON, SERUM (test code = 2222) 59 UG/DL                            

   



IRON, [id number] 00:00:00





             Test Item    Value        Reference Range Interpretation Comments

 

             IRON, SERUM (test code = 2222) 59 UG/DL                            

   



ALBUMIN/CREATININE RATIO, URINE, [id number] 05:57:19





             Test Item    Value        Reference Range Interpretation Comments

 

             CREATININE, URINE, 212.0 MG/DL  NOT ESTAB                 



             CONC. (test code =                                        



             2072)                                               

 

             ALBUMIN, URINE, 16.0 MG/DL                              Note: Test 

name is



             RANDOM (test code                                        changed to

 Urine Albumin



             = 48955)                                            from Microalbum

in in



                                                                 accordance with

 ADA and



                                                                 NKF Guidelines,

 and



                                                                 Albumin/Creatin

ine ratio



                                                                 reference inter

maraih



                                                                 reflects those



                                                                 Guidelines. Violet

lytic



                                                                 methodology is



                                                                 unchanged. No r

eference



                                                                 interval for Ra

ndom



                                                                 Urine Albumin i

s



                                                                 available.

 

             CALC         75 MG/G      <30          H             UNLESS OTHERWI

SE



             ALBUMIN/CREAT, RND                                        INDICATED

, ALL TESTING



             (test code =                                        PERFORMED ATCLI

NICAL



             67936)                                              PATHOLOGY LABOR

[employer],



                                                                 INC[facility] [address] [facility] [place], [place] [address]

4



                                                                 LABORATORY DIRE

CTOR:



                                                                 CLIFTON THOMSON M.D.



                                                                 CLIA NUMBER [id number]

[id number]



                                                                 [facility]

ON [address].



                                                                 [id number]



HEMOGLOBIN [id number] 04:12:15





             Test Item    Value        Reference Range Interpretation Comments

 

             HEMOGLOBIN A1c (test 9.1 %        4.2-5.6      H             AMERIC

AN DIABETES



             code = 57780)                                        ASSOCIATION 

IDELINES FOR



                                                                 HGB A1C:



                                                                 PREDIABETES/INC

REASED RISK .



                                                                 . . . . . . 5.7

-6.4%



                                                                 DIAGNOSIS OF DI

ABETES . . .



                                                                 . . . . . . >=6

.5% WITH



                                                                 CONFIRMATION OR

 APPROPRIATE



                                                                 SYMPTOMS NOTE: 

ASSAY MAY BE



                                                                 AFFECTED BY



                                                                 HEMOGLOBINOPATH

IES (SICKLE



                                                                 CELL ANEMIA, S-

C DISEASE,



                                                                 OTHERS) OR ZENA

FICIALLY



                                                                 LOWERED BY DECR

EASED RED



                                                                 CELL SURVIVAL (

HEMOLYTIC



                                                                 ANEMIAS, BLOOD 

LOSS, ETC.).



                                                                 CONSIDER ALTERN

ATE TESTING



                                                                 OR LABORATORY C

ONSULTATION.



HEMOGLOBIN [id number] 00:00:00





             Test Item    Value        Reference Range Interpretation Comments

 

             HEMOGLOBIN A1c (test code = 54489) 9.1 %                           

       



HEMOGLOBIN [id number] 00:00:00





             Test Item    Value        Reference Range Interpretation Comments

 

             HEMOGLOBIN A1c (test code = 88144) 9.1 %                           

       



HEMOGLOBIN [id number] 00:00:00





             Test Item    Value        Reference Range Interpretation Comments

 

             HEMOGLOBIN A1c (test code = 31099) 9.1 %                           

       



MICROALBUMIN/CREATININE, RANDOM AND [id number] 00:00:00





             Test Item    Value        Reference Range Interpretation Comments

 

             CREATININE, URINE, CONC. (test 212.0 MG/DL                         

   



             code = 2072)                                        

 

             ALBUMIN, URINE, RANDOM (test code 16.0 MG/DL                       

      



             = 75249)                                            

 

             CALC ALBUMIN/CREAT, RND (test 75 MG/G                              

  



             code = 83393)                                        



MICROALBUMIN/CREATININE, RANDOM AND [id number] 00:00:00





             Test Item    Value        Reference Range Interpretation Comments

 

             CREATININE, URINE, CONC. (test 212.0 MG/DL                         

   



             code = 2072)                                        

 

             ALBUMIN, URINE, RANDOM (test code 16.0 MG/DL                       

      



             = 32441)                                            

 

             CALC ALBUMIN/CREAT, RND (test 75 MG/G                              

  



             code = 91310)                                        



HEMOGLOBIN [id number] 00:00:00





             Test Item    Value        Reference Range Interpretation Comments

 

             HEMOGLOBIN A1c (test code = 04811) 9.1 %                           

       



HEMOGLOBIN [id number] 00:00:00





             Test Item    Value        Reference Range Interpretation Comments

 

             HEMOGLOBIN A1c (test code = 17681) 9.1 %                           

       



HEMOGLOBIN [id number] 00:00:00





             Test Item    Value        Reference Range Interpretation Comments

 

             HEMOGLOBIN A1c (test code = 83654) 9.1 %                           

       



HEMOGLOBIN [id number] 00:00:00





             Test Item    Value        Reference Range Interpretation Comments

 

             HEMOGLOBIN A1c (test code = 47471) 9.1 %                           

       



HEMOGLOBIN [id number] 00:00:00





             Test Item    Value        Reference Range Interpretation Comments

 

             HEMOGLOBIN A1c (test code = 62579) 9.1 %                           

       



MICROALBUMIN/CREATININE, RANDOM AND [id number] 00:00:00





             Test Item    Value        Reference Range Interpretation Comments

 

             CREATININE, URINE, CONC. (test 212.0 MG/DL                         

   



             code = 2072)                                        

 

             ALBUMIN, URINE, RANDOM (test code 16.0 MG/DL                       

      



             = 75861)                                            

 

             CALC ALBUMIN/CREAT, RND (test 75 MG/G                              

  



             code = 86495)                                        



MICROALBUMIN/CREATININE, RANDOM AND [id number] 00:00:00





             Test Item    Value        Reference Range Interpretation Comments

 

             CREATININE, URINE, CONC. (test 212.0 MG/DL                         

   



             code = 2072)                                        

 

             ALBUMIN, URINE, RANDOM (test code 16.0 MG/DL                       

      



             = 91033)                                            

 

             CALC ALBUMIN/CREAT, RND (test 75 MG/G                              

  



             code = 50686)                                        



MICROALBUMIN/CREATININE, RANDOM AND [id number] 00:00:00





             Test Item    Value        Reference Range Interpretation Comments

 

             CREATININE, URINE, CONC. (test 212.0 MG/DL                         

   



             code = 2072)                                        

 

             ALBUMIN, URINE, RANDOM (test code 16.0 MG/DL                       

      



             = 20669)                                            

 

             CALC ALBUMIN/CREAT, RND (test 75 MG/G                              

  



             code = 39973)                                        



HEMOGLOBIN [id number] 00:00:00





             Test Item    Value        Reference Range Interpretation Comments

 

             HEMOGLOBIN A1c (test code = 08894) 9.1 %                           

       



HEMOGLOBIN [id number] 00:00:00





             Test Item    Value        Reference Range Interpretation Comments

 

             HEMOGLOBIN A1c (test code = 23245) 9.1 %                           

       



MICROALBUMIN/CREATININE, RANDOM AND [id number] 00:00:00





             Test Item    Value        Reference Range Interpretation Comments

 

             CREATININE, URINE, CONC. (test 212.0 MG/DL                         

   



             code = 2072)                                        

 

             ALBUMIN, URINE, RANDOM (test code 16.0 MG/DL                       

      



             = 90113)                                            

 

             CALC ALBUMIN/CREAT, RND (test 75 MG/G                              

  



             code = 37644)                                        



HEMOGLOBIN [id number] 00:00:00





             Test Item    Value        Reference Range Interpretation Comments

 

             HEMOGLOBIN A1c (test code = 43813) 9.1 %                           

       



HEMOGLOBIN [id number] 00:00:00





             Test Item    Value        Reference Range Interpretation Comments

 

             HEMOGLOBIN A1c (test code = 25563) 9.1 %                           

       



HEMOGLOBIN [id number] 00:00:00





             Test Item    Value        Reference Range Interpretation Comments

 

             HEMOGLOBIN A1c (test code = 22628) 9.1 %                           

       



MICROALBUMIN/CREATININE, RANDOM AND [id number] 00:00:00





             Test Item    Value        Reference Range Interpretation Comments

 

             CREATININE, URINE, CONC. (test 212.0 MG/DL                         

   



             code = 2072)                                        

 

             ALBUMIN, URINE, RANDOM (test code 16.0 MG/DL                       

      



             = 70974)                                            

 

             CALC ALBUMIN/CREAT, RND (test 75 MG/G                              

  



             code = 07335)                                        



MICROALBUMIN/CREATININE, RANDOM AND [id number] 00:00:00





             Test Item    Value        Reference Range Interpretation Comments

 

             CREATININE, URINE, CONC. (test 212.0 MG/DL                         

   



             code = 2072)                                        

 

             ALBUMIN, URINE, RANDOM (test code 16.0 MG/DL                       

      



             = 30930)                                            

 

             CALC ALBUMIN/CREAT, RND (test 75 MG/G                              

  



             code = 72002)                                        



PATHOLOGIST SMEAR [id number] 00:00:00





             Test Item    Value        Reference Range Interpretation Comments

 

             DIAGNOSIS: (test code = 8200) (NOTE)                               

  

 

             COMMENTS: (test code = 8205) (NOTE)                                

 

 

             MICROSCOPIC DESCRIPTION: (test (NOTE)                              

   



             code = 8210)                                        

 

             PATHOLOGIST: (test code = 8250) (NOTE)                             

    

 

             CPT: (test code = 8400) 02466                                  

 

             WBC (test code = 1001) 2.7 K/UL                               

 

             RBC (test code = 1002) 3.09 M/UL                              

 

             HEMOGLOBIN (test code = 1003) 8.2 G/DL                             

  

 

             HEMATOCRIT (test code = 1004) 25.6 %                               

  

 

             MCV (test code = 1005) 82.8 fL                                

 

             MCH (test code = 1006) 26.5 PG                                

 

             MCHC (test code = 1007) 32.0 G/DL                              

 

             RDW (test code = 1038) 14.1 %                                 

 

             NEUTROPHILS (test code = 1008) 52.0 %                              

   

 

             LYMPHOCYTES (test code = 1010) 35.8 %                              

   

 

             MONOCYTES (test code = 1011) 7.2 %                                 

 

 

             EOSINOPHILS (test code = 1012) 4.2 %                               

   

 

             BASOPHILS (test code = 1013) 0.4 %                                 

 

 

             NUCLEATED RBCS (test code = 0.0 /100WBC'S                          

 



             1065)                                               

 

             PLATELET COUNT (test code = 99 K/UL                                



             1015)                                               

 

             ABSOLUTE NEUTROPHILS (test code 1.38 K/UL                          

    



             = 1066)                                             

 

             ABSOLUTE LYMPHOCYTES (test code 0.95 K/UL                          

    



             = 1067)                                             

 

             ABSOLUTE MONOCYTES (test code = 0.19 K/UL                          

    



             1068)                                               

 

             ABSOLUTE EOSINOPHILS (test code 0.11 K/UL                          

    



             = 1040)                                             

 

             ABSOLUTE BASOPHILS (test code = 0.01 K/UL                          

    



             1069)                                               

 

             ABS NUCLEATED RBCS (test code = 0.00 K/UL                          

    



             95988)                                              

 

             COMMENTS (test code = 1016) (NOTE)                                 



PATHOLOGIST SMEAR [id number] 00:00:00





             Test Item    Value        Reference Range Interpretation Comments

 

             DIAGNOSIS: (test code = 8200) (NOTE)                               

  

 

             COMMENTS: (test code = 8205) (NOTE)                                

 

 

             MICROSCOPIC DESCRIPTION: (test (NOTE)                              

   



             code = 8210)                                        

 

             PATHOLOGIST: (test code = 8250) (NOTE)                             

    

 

             CPT: (test code = 8400) 50714                                  

 

             WBC (test code = 1001) 2.7 K/UL                               

 

             RBC (test code = 1002) 3.09 M/UL                              

 

             HEMOGLOBIN (test code = 1003) 8.2 G/DL                             

  

 

             HEMATOCRIT (test code = 1004) 25.6 %                               

  

 

             MCV (test code = 1005) 82.8 fL                                

 

             MCH (test code = 1006) 26.5 PG                                

 

             MCHC (test code = 1007) 32.0 G/DL                              

 

             RDW (test code = 1038) 14.1 %                                 

 

             NEUTROPHILS (test code = 1008) 52.0 %                              

   

 

             LYMPHOCYTES (test code = 1010) 35.8 %                              

   

 

             MONOCYTES (test code = 1011) 7.2 %                                 

 

 

             EOSINOPHILS (test code = 1012) 4.2 %                               

   

 

             BASOPHILS (test code = 1013) 0.4 %                                 

 

 

             NUCLEATED RBCS (test code = 0.0 /100WBC'S                          

 



             1065)                                               

 

             PLATELET COUNT (test code = 99 K/UL                                



             1015)                                               

 

             ABSOLUTE NEUTROPHILS (test code 1.38 K/UL                          

    



             = 1066)                                             

 

             ABSOLUTE LYMPHOCYTES (test code 0.95 K/UL                          

    



             = 1067)                                             

 

             ABSOLUTE MONOCYTES (test code = 0.19 K/UL                          

    



             1068)                                               

 

             ABSOLUTE EOSINOPHILS (test code 0.11 K/UL                          

    



             = 1040)                                             

 

             ABSOLUTE BASOPHILS (test code = 0.01 K/UL                          

    



             1069)                                               

 

             ABS NUCLEATED RBCS (test code = 0.00 K/UL                          

    



             19477)                                              

 

             COMMENTS (test code = 1016) (NOTE)                                 



PATHOLOGIST SMEAR [id number] 00:00:00





             Test Item    Value        Reference Range Interpretation Comments

 

             DIAGNOSIS: (test code = 8200) (NOTE)                               

  

 

             COMMENTS: (test code = 8205) (NOTE)                                

 

 

             MICROSCOPIC DESCRIPTION: (test (NOTE)                              

   



             code = 8210)                                        

 

             PATHOLOGIST: (test code = 8250) (NOTE)                             

    

 

             CPT: (test code = 8400) 87028                                  

 

             WBC (test code = 1001) 2.7 K/UL                               

 

             RBC (test code = 1002) 3.09 M/UL                              

 

             HEMOGLOBIN (test code = 1003) 8.2 G/DL                             

  

 

             HEMATOCRIT (test code = 1004) 25.6 %                               

  

 

             MCV (test code = 1005) 82.8 fL                                

 

             MCH (test code = 1006) 26.5 PG                                

 

             MCHC (test code = 1007) 32.0 G/DL                              

 

             RDW (test code = 1038) 14.1 %                                 

 

             NEUTROPHILS (test code = 1008) 52.0 %                              

   

 

             LYMPHOCYTES (test code = 1010) 35.8 %                              

   

 

             MONOCYTES (test code = 1011) 7.2 %                                 

 

 

             EOSINOPHILS (test code = 1012) 4.2 %                               

   

 

             BASOPHILS (test code = 1013) 0.4 %                                 

 

 

             NUCLEATED RBCS (test code = 0.0 /100WBC'S                          

 



             1065)                                               

 

             PLATELET COUNT (test code = 99 K/UL                                



             1015)                                               

 

             ABSOLUTE NEUTROPHILS (test code 1.38 K/UL                          

    



             = 1066)                                             

 

             ABSOLUTE LYMPHOCYTES (test code 0.95 K/UL                          

    



             = 1067)                                             

 

             ABSOLUTE MONOCYTES (test code = 0.19 K/UL                          

    



             1068)                                               

 

             ABSOLUTE EOSINOPHILS (test code 0.11 K/UL                          

    



             = 1040)                                             

 

             ABSOLUTE BASOPHILS (test code = 0.01 K/UL                          

    



             1069)                                               

 

             ABS NUCLEATED RBCS (test code = 0.00 K/UL                          

    



             97862)                                              

 

             COMMENTS (test code = 1016) (NOTE)                                 



PATHOLOGIST SMEAR [id number] 00:00:00





             Test Item    Value        Reference Range Interpretation Comments

 

             DIAGNOSIS: (test code = 8200) (NOTE)                               

  

 

             COMMENTS: (test code = 8205) (NOTE)                                

 

 

             MICROSCOPIC DESCRIPTION: (test (NOTE)                              

   



             code = 8210)                                        

 

             PATHOLOGIST: (test code = 8250) (NOTE)                             

    

 

             CPT: (test code = 8400) 70917                                  

 

             WBC (test code = 1001) 2.7 K/UL                               

 

             RBC (test code = 1002) 3.09 M/UL                              

 

             HEMOGLOBIN (test code = 1003) 8.2 G/DL                             

  

 

             HEMATOCRIT (test code = 1004) 25.6 %                               

  

 

             MCV (test code = 1005) 82.8 fL                                

 

             MCH (test code = 1006) 26.5 PG                                

 

             MCHC (test code = 1007) 32.0 G/DL                              

 

             RDW (test code = 1038) 14.1 %                                 

 

             NEUTROPHILS (test code = 1008) 52.0 %                              

   

 

             LYMPHOCYTES (test code = 1010) 35.8 %                              

   

 

             MONOCYTES (test code = 1011) 7.2 %                                 

 

 

             EOSINOPHILS (test code = 1012) 4.2 %                               

   

 

             BASOPHILS (test code = 1013) 0.4 %                                 

 

 

             NUCLEATED RBCS (test code = 0.0 /100WBC'S                          

 



             1065)                                               

 

             PLATELET COUNT (test code = 99 K/UL                                



             1015)                                               

 

             ABSOLUTE NEUTROPHILS (test code 1.38 K/UL                          

    



             = 1066)                                             

 

             ABSOLUTE LYMPHOCYTES (test code 0.95 K/UL                          

    



             = 1067)                                             

 

             ABSOLUTE MONOCYTES (test code = 0.19 K/UL                          

    



             1068)                                               

 

             ABSOLUTE EOSINOPHILS (test code 0.11 K/UL                          

    



             = 1040)                                             

 

             ABSOLUTE BASOPHILS (test code = 0.01 K/UL                          

    



             1069)                                               

 

             ABS NUCLEATED RBCS (test code = 0.00 K/UL                          

    



             13800)                                              

 

             COMMENTS (test code = 1016) (NOTE)                                 



PATHOLOGIST SMEAR [id number] 00:00:00





             Test Item    Value        Reference Range Interpretation Comments

 

             DIAGNOSIS: (test code = 8200) (NOTE)                               

  

 

             COMMENTS: (test code = 8205) (NOTE)                                

 

 

             MICROSCOPIC DESCRIPTION: (test (NOTE)                              

   



             code = 8210)                                        

 

             PATHOLOGIST: (test code = 8250) (NOTE)                             

    

 

             CPT: (test code = 8400) 24465                                  

 

             WBC (test code = 1001) 2.7 K/UL                               

 

             RBC (test code = 1002) 3.09 M/UL                              

 

             HEMOGLOBIN (test code = 1003) 8.2 G/DL                             

  

 

             HEMATOCRIT (test code = 1004) 25.6 %                               

  

 

             MCV (test code = 1005) 82.8 fL                                

 

             MCH (test code = 1006) 26.5 PG                                

 

             MCHC (test code = 1007) 32.0 G/DL                              

 

             RDW (test code = 1038) 14.1 %                                 

 

             NEUTROPHILS (test code = 1008) 52.0 %                              

   

 

             LYMPHOCYTES (test code = 1010) 35.8 %                              

   

 

             MONOCYTES (test code = 1011) 7.2 %                                 

 

 

             EOSINOPHILS (test code = 1012) 4.2 %                               

   

 

             BASOPHILS (test code = 1013) 0.4 %                                 

 

 

             NUCLEATED RBCS (test code = 0.0 /100WBC'S                          

 



             1065)                                               

 

             PLATELET COUNT (test code = 99 K/UL                                



             1015)                                               

 

             ABSOLUTE NEUTROPHILS (test code 1.38 K/UL                          

    



             = 1066)                                             

 

             ABSOLUTE LYMPHOCYTES (test code 0.95 K/UL                          

    



             = 1067)                                             

 

             ABSOLUTE MONOCYTES (test code = 0.19 K/UL                          

    



             1068)                                               

 

             ABSOLUTE EOSINOPHILS (test code 0.11 K/UL                          

    



             = 1040)                                             

 

             ABSOLUTE BASOPHILS (test code = 0.01 K/UL                          

    



             1069)                                               

 

             ABS NUCLEATED RBCS (test code = 0.00 K/UL                          

    



             12166)                                              

 

             COMMENTS (test code = 1016) (NOTE)                                 



PATHOLOGIST SMEAR [id number] 00:00:00





             Test Item    Value        Reference Range Interpretation Comments

 

             DIAGNOSIS: (test code = 8200) (NOTE)                               

  

 

             COMMENTS: (test code = 8205) (NOTE)                                

 

 

             MICROSCOPIC DESCRIPTION: (test (NOTE)                              

   



             code = 8210)                                        

 

             PATHOLOGIST: (test code = 8250) (NOTE)                             

    

 

             CPT: (test code = 8400) 30750                                  

 

             WBC (test code = 1001) 2.7 K/UL                               

 

             RBC (test code = 1002) 3.09 M/UL                              

 

             HEMOGLOBIN (test code = 1003) 8.2 G/DL                             

  

 

             HEMATOCRIT (test code = 1004) 25.6 %                               

  

 

             MCV (test code = 1005) 82.8 fL                                

 

             MCH (test code = 1006) 26.5 PG                                

 

             MCHC (test code = 1007) 32.0 G/DL                              

 

             RDW (test code = 1038) 14.1 %                                 

 

             NEUTROPHILS (test code = 1008) 52.0 %                              

   

 

             LYMPHOCYTES (test code = 1010) 35.8 %                              

   

 

             MONOCYTES (test code = 1011) 7.2 %                                 

 

 

             EOSINOPHILS (test code = 1012) 4.2 %                               

   

 

             BASOPHILS (test code = 1013) 0.4 %                                 

 

 

             NUCLEATED RBCS (test code = 0.0 /100WBC'S                          

 



             1065)                                               

 

             PLATELET COUNT (test code = 99 K/UL                                



             1015)                                               

 

             ABSOLUTE NEUTROPHILS (test code 1.38 K/UL                          

    



             = 1066)                                             

 

             ABSOLUTE LYMPHOCYTES (test code 0.95 K/UL                          

    



             = 1067)                                             

 

             ABSOLUTE MONOCYTES (test code = 0.19 K/UL                          

    



             1068)                                               

 

             ABSOLUTE EOSINOPHILS (test code 0.11 K/UL                          

    



             = 1040)                                             

 

             ABSOLUTE BASOPHILS (test code = 0.01 K/UL                          

    



             1069)                                               

 

             ABS NUCLEATED RBCS (test code = 0.00 K/UL                          

    



             50280)                                              

 

             COMMENTS (test code = 1016) (NOTE)                                 



PATHOLOGIST SMEAR [id number] 00:00:00





             Test Item    Value        Reference Range Interpretation Comments

 

             DIAGNOSIS: (test code = 8200) (NOTE)                               

  

 

             COMMENTS: (test code = 8205) (NOTE)                                

 

 

             MICROSCOPIC DESCRIPTION: (test (NOTE)                              

   



             code = 8210)                                        

 

             PATHOLOGIST: (test code = 8250) (NOTE)                             

    

 

             CPT: (test code = 8400) 00583                                  

 

             WBC (test code = 1001) 2.7 K/UL                               

 

             RBC (test code = 1002) 3.09 M/UL                              

 

             HEMOGLOBIN (test code = 1003) 8.2 G/DL                             

  

 

             HEMATOCRIT (test code = 1004) 25.6 %                               

  

 

             MCV (test code = 1005) 82.8 fL                                

 

             MCH (test code = 1006) 26.5 PG                                

 

             MCHC (test code = 1007) 32.0 G/DL                              

 

             RDW (test code = 1038) 14.1 %                                 

 

             NEUTROPHILS (test code = 1008) 52.0 %                              

   

 

             LYMPHOCYTES (test code = 1010) 35.8 %                              

   

 

             MONOCYTES (test code = 1011) 7.2 %                                 

 

 

             EOSINOPHILS (test code = 1012) 4.2 %                               

   

 

             BASOPHILS (test code = 1013) 0.4 %                                 

 

 

             NUCLEATED RBCS (test code = 0.0 /100WBC'S                          

 



             1065)                                               

 

             PLATELET COUNT (test code = 99 K/UL                                



             1015)                                               

 

             ABSOLUTE NEUTROPHILS (test code 1.38 K/UL                          

    



             = 1066)                                             

 

             ABSOLUTE LYMPHOCYTES (test code 0.95 K/UL                          

    



             = 1067)                                             

 

             ABSOLUTE MONOCYTES (test code = 0.19 K/UL                          

    



             1068)                                               

 

             ABSOLUTE EOSINOPHILS (test code 0.11 K/UL                          

    



             = 1040)                                             

 

             ABSOLUTE BASOPHILS (test code = 0.01 K/UL                          

    



             1069)                                               

 

             ABS NUCLEATED RBCS (test code = 0.00 K/UL                          

    



             18925)                                              

 

             COMMENTS (test code = 1016) (NOTE)                                 



PATHOLOGIST SMEAR [id number] 00:00:00





             Test Item    Value        Reference Range Interpretation Comments

 

             DIAGNOSIS: (test code = 8200) (NOTE)                               

  

 

             COMMENTS: (test code = 8205) (NOTE)                                

 

 

             MICROSCOPIC DESCRIPTION: (test (NOTE)                              

   



             code = 8210)                                        

 

             PATHOLOGIST: (test code = 8250) (NOTE)                             

    

 

             CPT: (test code = 8400) 26891                                  

 

             WBC (test code = 1001) 2.7 K/UL                               

 

             RBC (test code = 1002) 3.09 M/UL                              

 

             HEMOGLOBIN (test code = 1003) 8.2 G/DL                             

  

 

             HEMATOCRIT (test code = 1004) 25.6 %                               

  

 

             MCV (test code = 1005) 82.8 fL                                

 

             MCH (test code = 1006) 26.5 PG                                

 

             MCHC (test code = 1007) 32.0 G/DL                              

 

             RDW (test code = 1038) 14.1 %                                 

 

             NEUTROPHILS (test code = 1008) 52.0 %                              

   

 

             LYMPHOCYTES (test code = 1010) 35.8 %                              

   

 

             MONOCYTES (test code = 1011) 7.2 %                                 

 

 

             EOSINOPHILS (test code = 1012) 4.2 %                               

   

 

             BASOPHILS (test code = 1013) 0.4 %                                 

 

 

             NUCLEATED RBCS (test code = 0.0 /100WBC'S                          

 



             1065)                                               

 

             PLATELET COUNT (test code = 99 K/UL                                



             1015)                                               

 

             ABSOLUTE NEUTROPHILS (test code 1.38 K/UL                          

    



             = 1066)                                             

 

             ABSOLUTE LYMPHOCYTES (test code 0.95 K/UL                          

    



             = 1067)                                             

 

             ABSOLUTE MONOCYTES (test code = 0.19 K/UL                          

    



             1068)                                               

 

             ABSOLUTE EOSINOPHILS (test code 0.11 K/UL                          

    



             = 1040)                                             

 

             ABSOLUTE BASOPHILS (test code = 0.01 K/UL                          

    



             1069)                                               

 

             ABS NUCLEATED RBCS (test code = 0.00 K/UL                          

    



             84360)                                              

 

             COMMENTS (test code = 1016) (NOTE)                                 



[id number] 00:00:00





             Test Item    Value        Reference Range Interpretation Comments

 

             FERRITIN (test code = 5) 42 NG/ML                               



FERRITIN2021-10-20 00:00:00





             Test Item    Value        Reference Range Interpretation Comments

 

             FERRITIN (test code = 5) 42 NG/ML                               



[id number] 00:00:00





             Test Item    Value        Reference Range Interpretation Comments

 

             TRANSFERRIN (test code = 4936) 281 MG/DL                           

   



[id number] 00:00:00





             Test Item    Value        Reference Range Interpretation Comments

 

             TRANSFERRIN (test code = 4936) 281 MG/DL                           

   



IRON BINDING CAPACITY AND IRON AND % SATURATION2021-10-20 00:00:00





             Test Item    Value        Reference Range Interpretation Comments

 

             IRON, SERUM (test code = 2222) 28 UG/DL                            

   

 

             UNSATURATED IBC (test code = 01546) 315 UG/DL                      

        

 

             CALC TOTAL IBC (test code = ) 343 UG/DL                        

      

 

             CALC % IRON SAT (test code = ) 8 %                             

       



IRON BINDING CAPACITY AND IRON AND % SATURATION2021-10-20 00:00:00





             Test Item    Value        Reference Range Interpretation Comments

 

             IRON, SERUM (test code = 2222) 28 UG/DL                            

   

 

             UNSATURATED IBC (test code = 49088) 315 UG/DL                      

        

 

             CALC TOTAL IBC (test code = ) 343 UG/DL                        

      

 

             CALC % IRON SAT (test code = ) 8 %                             

       



PROTIME AND PTT2021-10-20 00:00:00





             Test Item    Value        Reference Range Interpretation Comments

 

             PROTHROMBIN TIME (PT) (test code 14.8 SECONDS                      

     



             = 1402)                                             

 

             INR (test code = 44582) 1.1                                    

 

             PTT (test code = 1403) 35.8 SECONDS                           



PROTIME AND PTT2021-10-20 00:00:00





             Test Item    Value        Reference Range Interpretation Comments

 

             PROTHROMBIN TIME (PT) (test code 14.8 SECONDS                      

     



             = 1402)                                             

 

             INR (test code = 93874) 1.1                                    

 

             PTT (test code = 1403) 35.8 SECONDS                           



RHEUMATOID FACTOR, [id number] 00:00:00





             Test Item    Value        Reference Range Interpretation Comments

 

             RHEUMATOID FACTOR, QUANT (test code <10 IU/ML                      

        



             = 3502)                                             



RHEUMATOID FACTOR, [id number] 00:00:00





             Test Item    Value        Reference Range Interpretation Comments

 

             RHEUMATOID FACTOR, QUANT (test code <10 IU/ML                      

        



             = 3502)                                             



RHEUMATOID FACTOR, [id number] 00:00:00





             Test Item    Value        Reference Range Interpretation Comments

 

             RHEUMATOID FACTOR, QUANT (test code <10 IU/ML                      

        



             = 3502)                                             



CCP IgG2021-10-20 00:00:00





             Test Item    Value        Reference Range Interpretation Comments

 

             CCP IgG (test code = 61933) <0.5 U/ML                              



CCP IgG2021-10-20 00:00:00





             Test Item    Value        Reference Range Interpretation Comments

 

             CCP IgG (test code = 51673) <0.5 U/ML                              



CCP IgG2021-10-20 00:00:00





             Test Item    Value        Reference Range Interpretation Comments

 

             CCP IgG (test code = 33601) <0.5 U/ML                              



SMITH (SM) ANTIBODY2021-10-20 00:00:00





             Test Item    Value        Reference Range Interpretation Comments

 

             MCGUIRE (Sm) ANTIBODY (test code = <0.2 AI                           

     



             48466)                                              



SMITH (SM) ANTIBODY2021-10-20 00:00:00





             Test Item    Value        Reference Range Interpretation Comments

 

             MCGUIRE (Sm) ANTIBODY (test code = <0.2 AI                           

     



             94840)                                              



DNA DS ANTIBODY2021-10-20 00:00:00





             Test Item    Value        Reference Range Interpretation Comments

 

             dsDNA ANTIBODY (test code = 4287) 1.0 IU/ML                        

      



DNA DS ANTIBODY2021-10-20 00:00:00





             Test Item    Value        Reference Range Interpretation Comments

 

             dsDNA ANTIBODY (test code = 4287) 1.0 IU/ML                        

      



FERRITIN2021-10-20 00:00:00





             Test Item    Value        Reference Range Interpretation Comments

 

             FERRITIN (test code = ) 42 NG/ML                               



FERRITIN2021-10-20 00:00:00





             Test Item    Value        Reference Range Interpretation Comments

 

             FERRITIN (test code = ) 42 NG/ML                               



FERRITIN2021-10-20 00:00:00





             Test Item    Value        Reference Range Interpretation Comments

 

             FERRITIN (test code = ) 42 NG/ML                               



[id number] 00:00:00





             Test Item    Value        Reference Range Interpretation Comments

 

             TRANSFERRIN (test code = 4936) 281 MG/DL                           

   



[id number] 00:00:00





             Test Item    Value        Reference Range Interpretation Comments

 

             TRANSFERRIN (test code = 4936) 281 MG/DL                           

   



IRON BINDING CAPACITY AND IRON AND % SATURATION2021-10-20 00:00:00





             Test Item    Value        Reference Range Interpretation Comments

 

             IRON, SERUM (test code = 2) 28 UG/DL                            

   

 

             UNSATURATED IBC (test code = ) 315 UG/DL                      

        

 

             CALC TOTAL IBC (test code = ) 343 UG/DL                        

      

 

             CALC % IRON SAT (test code = ) 8 %                             

       



IRON BINDING CAPACITY AND IRON AND % SATURATION2021-10-20 00:00:00





             Test Item    Value        Reference Range Interpretation Comments

 

             IRON, SERUM (test code = 2222) 28 UG/DL                            

   

 

             UNSATURATED IBC (test code = 04549) 315 UG/DL                      

        

 

             CALC TOTAL IBC (test code = ) 343 UG/DL                        

      

 

             CALC % IRON SAT (test code = 9) 8 %                             

       



PROTIME AND PTT2021-10-20 00:00:00





             Test Item    Value        Reference Range Interpretation Comments

 

             PROTHROMBIN TIME (PT) (test code 14.8 SECONDS                      

     



             = 1402)                                             

 

             INR (test code = 43039) 1.1                                    

 

             PTT (test code = 1403) 35.8 SECONDS                           



PROTIME AND PTT2021-10-20 00:00:00





             Test Item    Value        Reference Range Interpretation Comments

 

             PROTHROMBIN TIME (PT) (test code 14.8 SECONDS                      

     



             = 1402)                                             

 

             INR (test code = 77188) 1.1                                    

 

             PTT (test code = 1403) 35.8 SECONDS                           



RHEUMATOID FACTOR, [id number] 00:00:00





             Test Item    Value        Reference Range Interpretation Comments

 

             RHEUMATOID FACTOR, QUANT (test code <10 IU/ML                      

        



             = 3502)                                             



RHEUMATOID FACTOR, [id number] 00:00:00





             Test Item    Value        Reference Range Interpretation Comments

 

             RHEUMATOID FACTOR, QUANT (test code <10 IU/ML                      

        



             = 3502)                                             



RHEUMATOID FACTOR, [id number] 00:00:00





             Test Item    Value        Reference Range Interpretation Comments

 

             RHEUMATOID FACTOR, QUANT (test code <10 IU/ML                      

        



             = 3502)                                             



CCP IgG2021-10-20 00:00:00





             Test Item    Value        Reference Range Interpretation Comments

 

             CCP IgG (test code = 10688) <0.5 U/ML                              



[id number] 00:00:00





             Test Item    Value        Reference Range Interpretation Comments

 

             TRANSFERRIN (test code = 4936) 281 MG/DL                           

   



CCP IgG2021-10-20 00:00:00





             Test Item    Value        Reference Range Interpretation Comments

 

             CCP IgG (test code = 33542) <0.5 U/ML                              



CCP IgG2021-10-20 00:00:00





             Test Item    Value        Reference Range Interpretation Comments

 

             CCP IgG (test code = 80940) <0.5 U/ML                              



SMITH (SM) ANTIBODY2021-10-20 00:00:00





             Test Item    Value        Reference Range Interpretation Comments

 

             MCGUIRE (Sm) ANTIBODY (test code = <0.2 AI                           

     



             44609)                                              



SMITH (SM) ANTIBODY2021-10-20 00:00:00





             Test Item    Value        Reference Range Interpretation Comments

 

             MCGUIRE (Sm) ANTIBODY (test code = <0.2 AI                           

     



             19884)                                              



DNA DS ANTIBODY2021-10-20 00:00:00





             Test Item    Value        Reference Range Interpretation Comments

 

             dsDNA ANTIBODY (test code = 4287) 1.0 IU/ML                        

      



DNA DS ANTIBODY2021-10-20 00:00:00





             Test Item    Value        Reference Range Interpretation Comments

 

             dsDNA ANTIBODY (test code = 4287) 1.0 IU/ML                        

      



IRON BINDING CAPACITY AND IRON AND % SATURATION2021-10-20 00:00:00





             Test Item    Value        Reference Range Interpretation Comments

 

             IRON, SERUM (test code = 2222) 28 UG/DL                            

   

 

             UNSATURATED IBC (test code = 27020) 315 UG/DL                      

        

 

             CALC TOTAL IBC (test code = ) 343 UG/DL                        

      

 

             CALC % IRON SAT (test code = 2079) 8 %                             

       



PROTIME AND PTT2021-10-20 00:00:00





             Test Item    Value        Reference Range Interpretation Comments

 

             PROTHROMBIN TIME (PT) (test code 14.8 SECONDS                      

     



             = 1402)                                             

 

             INR (test code = 48977) 1.1                                    

 

             PTT (test code = 1403) 35.8 SECONDS                           



RHEUMATOID FACTOR, [id number] 00:00:00





             Test Item    Value        Reference Range Interpretation Comments

 

             RHEUMATOID FACTOR, QUANT (test code <10 IU/ML                      

        



             = 3502)                                             



RHEUMATOID FACTOR, [id number] 00:00:00





             Test Item    Value        Reference Range Interpretation Comments

 

             RHEUMATOID FACTOR, QUANT (test code <10 IU/ML                      

        



             = 3502)                                             



CCP IgG2021-10-20 00:00:00





             Test Item    Value        Reference Range Interpretation Comments

 

             CCP IgG (test code = 07122) <0.5 U/ML                              



CCP IgG2021-10-20 00:00:00





             Test Item    Value        Reference Range Interpretation Comments

 

             CCP IgG (test code = 03897) <0.5 U/ML                              



SMITH (SM) ANTIBODY2021-10-20 00:00:00





             Test Item    Value        Reference Range Interpretation Comments

 

             MCGUIRE (Sm) ANTIBODY (test code = <0.2 AI                           

     



             95242)                                              



DNA DS ANTIBODY2021-10-20 00:00:00





             Test Item    Value        Reference Range Interpretation Comments

 

             dsDNA ANTIBODY (test code = 4287) 1.0 IU/ML                        

      



FERRITIN2021-10-20 00:00:00





             Test Item    Value        Reference Range Interpretation Comments

 

             FERRITIN (test code = 2075) 42 NG/ML                               



[id number] 00:00:00





             Test Item    Value        Reference Range Interpretation Comments

 

             TRANSFERRIN (test code = 4936) 281 MG/DL                           

   



IRON BINDING CAPACITY AND IRON AND % SATURATION2021-10-20 00:00:00





             Test Item    Value        Reference Range Interpretation Comments

 

             IRON, SERUM (test code = 2222) 28 UG/DL                            

   

 

             UNSATURATED IBC (test code = 73004) 315 UG/DL                      

        

 

             CALC TOTAL IBC (test code = 7) 343 UG/DL                        

      

 

             CALC % IRON SAT (test code = 207) 8 %                             

       



PROTIME AND PTT2021-10-20 00:00:00





             Test Item    Value        Reference Range Interpretation Comments

 

             PROTHROMBIN TIME (PT) (test code 14.8 SECONDS                      

     



             = 1402)                                             

 

             INR (test code = 09668) 1.1                                    

 

             PTT (test code = 1403) 35.8 SECONDS                           



RHEUMATOID FACTOR, [id number] 00:00:00





             Test Item    Value        Reference Range Interpretation Comments

 

             RHEUMATOID FACTOR, QUANT (test code <10 IU/ML                      

        



             = 3502)                                             



RHEUMATOID FACTOR, [id number] 00:00:00





             Test Item    Value        Reference Range Interpretation Comments

 

             RHEUMATOID FACTOR, QUANT (test code <10 IU/ML                      

        



             = 3502)                                             



CCP IgG2021-10-20 00:00:00





             Test Item    Value        Reference Range Interpretation Comments

 

             CCP IgG (test code = 80770) <0.5 U/ML                              



CCP IgG2021-10-20 00:00:00





             Test Item    Value        Reference Range Interpretation Comments

 

             CCP IgG (test code = 52376) <0.5 U/ML                              



SMITH (SM) ANTIBODY2021-10-20 00:00:00





             Test Item    Value        Reference Range Interpretation Comments

 

             CMGUIRE (Sm) ANTIBODY (test code = <0.2 AI                           

     



             72173)                                              



DNA DS ANTIBODY2021-10-20 00:00:00





             Test Item    Value        Reference Range Interpretation Comments

 

             dsDNA ANTIBODY (test code = 4287) 1.0 IU/ML                        

      



FERRITIN2021-10-20 00:00:00





             Test Item    Value        Reference Range Interpretation Comments

 

             FERRITIN (test code = ) 42 NG/ML                               



FERRITIN2021-10-20 00:00:00





             Test Item    Value        Reference Range Interpretation Comments

 

             FERRITIN (test code = ) 42 NG/ML                               



[id number] 00:00:00





             Test Item    Value        Reference Range Interpretation Comments

 

             TRANSFERRIN (test code = 4936) 281 MG/DL                           

   



[id number] 00:00:00





             Test Item    Value        Reference Range Interpretation Comments

 

             TRANSFERRIN (test code = 4936) 281 MG/DL                           

   



IRON BINDING CAPACITY AND IRON AND % SATURATION2021-10-20 00:00:00





             Test Item    Value        Reference Range Interpretation Comments

 

             IRON, SERUM (test code = ) 28 UG/DL                            

   

 

             UNSATURATED IBC (test code = ) 315 UG/DL                      

        

 

             CALC TOTAL IBC (test code = ) 343 UG/DL                        

      

 

             CALC % IRON SAT (test code = ) 8 %                             

       



IRON BINDING CAPACITY AND IRON AND % SATURATION2021-10-20 00:00:00





             Test Item    Value        Reference Range Interpretation Comments

 

             IRON, SERUM (test code = ) 28 UG/DL                            

   

 

             UNSATURATED IBC (test code = 86009) 315 UG/DL                      

        

 

             CALC TOTAL IBC (test code = ) 343 UG/DL                        

      

 

             CALC % IRON SAT (test code = ) 8 %                             

       



PROTIME AND PTT2021-10-20 00:00:00





             Test Item    Value        Reference Range Interpretation Comments

 

             PROTHROMBIN TIME (PT) (test code 14.8 SECONDS                      

     



             = 1402)                                             

 

             INR (test code = 36849) 1.1                                    

 

             PTT (test code = 1403) 35.8 SECONDS                           



PROTIME AND PTT2021-10-20 00:00:00





             Test Item    Value        Reference Range Interpretation Comments

 

             PROTHROMBIN TIME (PT) (test code 14.8 SECONDS                      

     



             = 1402)                                             

 

             INR (test code = 30387) 1.1                                    

 

             PTT (test code = 1403) 35.8 SECONDS                           



RHEUMATOID FACTOR, [id number] 00:00:00





             Test Item    Value        Reference Range Interpretation Comments

 

             RHEUMATOID FACTOR, QUANT (test code <10 IU/ML                      

        



             = 3502)                                             



RHEUMATOID FACTOR, [id number] 00:00:00





             Test Item    Value        Reference Range Interpretation Comments

 

             RHEUMATOID FACTOR, QUANT (test code <10 IU/ML                      

        



             = 3502)                                             



RHEUMATOID FACTOR, [id number] 00:00:00





             Test Item    Value        Reference Range Interpretation Comments

 

             RHEUMATOID FACTOR, QUANT (test code <10 IU/ML                      

        



             = 3502)                                             



CCP IgG2021-10-20 00:00:00





             Test Item    Value        Reference Range Interpretation Comments

 

             CCP IgG (test code = 57857) <0.5 U/ML                              



CCP IgG2021-10-20 00:00:00





             Test Item    Value        Reference Range Interpretation Comments

 

             CCP IgG (test code = 94572) <0.5 U/ML                              



CCP IgG2021-10-20 00:00:00





             Test Item    Value        Reference Range Interpretation Comments

 

             CCP IgG (test code = 13491) <0.5 U/ML                              



SMITH (SM) ANTIBODY2021-10-20 00:00:00





             Test Item    Value        Reference Range Interpretation Comments

 

             MCGUIRE (Sm) ANTIBODY (test code = <0.2 AI                           

     



             54504)                                              



SMITH (SM) ANTIBODY2021-10-20 00:00:00





             Test Item    Value        Reference Range Interpretation Comments

 

             MCGUIRE (Sm) ANTIBODY (test code = <0.2 AI                           

     



             23958)                                              



DNA DS ANTIBODY2021-10-20 00:00:00





             Test Item    Value        Reference Range Interpretation Comments

 

             dsDNA ANTIBODY (test code = 4287) 1.0 IU/ML                        

      



DNA DS ANTIBODY2021-10-20 00:00:00





             Test Item    Value        Reference Range Interpretation Comments

 

             dsDNA ANTIBODY (test code = 4287) 1.0 IU/ML                        

      



CULTURE, URINE2021-10-16 00:00:00





             Test Item    Value        Reference Range Interpretation Comments

 

             CULTURE, URINE (test SPECIMEN NUMBER:                           



             code = 23596) [id number]                              



CULTURE, URINE2021-10-16 00:00:00





             Test Item    Value        Reference Range Interpretation Comments

 

             CULTURE, URINE (test SPECIMEN NUMBER:                           



             code = 82548) [id number]                              



CULTURE, URINE2021-10-16 00:00:00





             Test Item    Value        Reference Range Interpretation Comments

 

             CULTURE, URINE (test SPECIMEN NUMBER:                           



             code = 90603) [id number]                              



CULTURE, URINE2021-10-16 00:00:00





             Test Item    Value        Reference Range Interpretation Comments

 

             CULTURE, URINE (test SPECIMEN NUMBER:                           



             code = 04142) [id number]                              



CULTURE, URINE2021-10-16 00:00:00





             Test Item    Value        Reference Range Interpretation Comments

 

             CULTURE, URINE (test SPECIMEN NUMBER:                           



             code = 69967) [id number]                              



CULTURE, URINE2021-10-16 00:00:00





             Test Item    Value        Reference Range Interpretation Comments

 

             CULTURE, URINE (test SPECIMEN NUMBER:                           



             code = 39882) [id number]                              



CULTURE, URINE2021-10-16 00:00:00





             Test Item    Value        Reference Range Interpretation Comments

 

             CULTURE, URINE (test SPECIMEN NUMBER:                           



             code = 37322) [id number]                              



CULTURE, URINE2021-10-16 00:00:00





             Test Item    Value        Reference Range Interpretation Comments

 

             CULTURE, URINE (test SPECIMEN NUMBER:                           



             code = 28323) [id number]                              



VIOLET TITER AND PATTERN [REFLEX]2021-10-14 00:00:00





             Test Item    Value        Reference Range Interpretation Comments

 

             PATTERN (test code = SPECKLED                               



             84663)                                              

 

             VIOLET TITER (test code = 1:640 TITER                            



             3550)                                               

 

             PATTERN 2 (test code = NOT DETECTED                           



             71498)                                              

 

             VIOLET TITER 2 (test code = NOT DETECTED TITER                        

   



             99667)                                              

 

             PATTERN 3 (test code = NOT DETECTED                           



             89696)                                              

 

             VIOLET TITER 3 (test code = NOT DETECTED TITER                        

   



             98770)                                              

 

             METHOD (test code = 60099) (NOTE)                                 



VIOLET TITER AND PATTERN [REFLEX]2021-10-14 00:00:00





             Test Item    Value        Reference Range Interpretation Comments

 

             PATTERN (test code = SPECKLED                               



             38897)                                              

 

             VIOLET TITER (test code = 1:640 TITER                            



             3550)                                               

 

             PATTERN 2 (test code = NOT DETECTED                           



             54637)                                              

 

             VIOLET TITER 2 (test code = NOT DETECTED TITER                        

   



             77998)                                              

 

             PATTERN 3 (test code = NOT DETECTED                           



             52991)                                              

 

             VIOLET TITER 3 (test code = NOT DETECTED TITER                        

   



             55126)                                              

 

             METHOD (test code = 47876) (NOTE)                                 



VIOLET TITER AND PATTERN [REFLEX]2021-10-14 00:00:00





             Test Item    Value        Reference Range Interpretation Comments

 

             PATTERN (test code = SPECKLED                               



             40823)                                              

 

             VIOLET TITER (test code = 1:640 TITER                            



             3550)                                               

 

             PATTERN 2 (test code = NOT DETECTED                           



             18203)                                              

 

             VIOLET TITER 2 (test code = NOT DETECTED TITER                        

   



             66675)                                              

 

             PATTERN 3 (test code = NOT DETECTED                           



             33635)                                              

 

             VIOLET TITER 3 (test code = NOT DETECTED TITER                        

   



             09836)                                              

 

             METHOD (test code = 55493) (NOTE)                                 



VIOLET TITER AND PATTERN [REFLEX]2021-10-14 00:00:00





             Test Item    Value        Reference Range Interpretation Comments

 

             PATTERN (test code = SPECKLED                               



             37063)                                              

 

             VIOLET TITER (test code = 1:640 TITER                            



             3550)                                               

 

             PATTERN 2 (test code = NOT DETECTED                           



             27166)                                              

 

             VIOLET TITER 2 (test code = NOT DETECTED TITER                        

   



             31059)                                              

 

             PATTERN 3 (test code = NOT DETECTED                           



             23153)                                              

 

             VIOLET TITER 3 (test code = NOT DETECTED TITER                        

   



             98998)                                              

 

             METHOD (test code = 12378) (NOTE)                                 



VIOLET TITER AND PATTERN [REFLEX]2021-10-14 00:00:00





             Test Item    Value        Reference Range Interpretation Comments

 

             PATTERN (test code = SPECKLED                               



             19775)                                              

 

             VOILET TITER (test code = 1:640 TITER                            



             3550)                                               

 

             PATTERN 2 (test code = NOT DETECTED                           



             68248)                                              

 

             VIOLET TITER 2 (test code = NOT DETECTED TITER                        

   



             46573)                                              

 

             PATTERN 3 (test code = NOT DETECTED                           



             18163)                                              

 

             VIOLET TITER 3 (test code = NOT DETECTED TITER                        

   



             71605)                                              

 

             METHOD (test code = 21036) (NOTE)                                 



CBC W/AUTO DIFF2021-10-13 00:00:00





             Test Item    Value        Reference Range Interpretation Comments

 

             WBC (test code = 1001) 3.1 K/UL                               

 

             RBC (test code = 1002) 3.14 M/UL                              

 

             HEMOGLOBIN (test code = 1003) 8.4 G/DL                             

  

 

             HEMATOCRIT (test code = 1004) 25.9 %                               

  

 

             MCV (test code = 1005) 82.5 fL                                

 

             MCH (test code = 1006) 26.8 PG                                

 

             MCHC (test code = 1007) 32.4 G/DL                              

 

             RDW (test code = 1038) 14.5 %                                 

 

             NEUTROPHILS (test code = 1008) 61.2 %                              

   

 

             LYMPHOCYTES (test code = 1010) 25.5 %                              

   

 

             MONOCYTES (test code = 1011) 8.8 %                                 

 

 

             EOSINOPHILS (test code = 1012) 3.9 %                               

   

 

             BASOPHILS (test code = 1013) 0.3 %                                 

 

 

             IMMATURE GRANULOCYTES (test 0.3 %                                  



             code = 1036)                                        

 

             NUCLEATED RBCS (test code = 0.0 /100WBC'S                          

 



             1065)                                               

 

             PLATELET COUNT (test code = 85 K/UL                                



             1015)                                               

 

             ABSOLUTE NEUTROPHILS (test code 1.87 K/UL                          

    



             = 1066)                                             

 

             ABSOLUTE LYMPHOCYTES (test code 0.78 K/UL                          

    



             = 1067)                                             

 

             ABSOLUTE MONOCYTES (test code = 0.27 K/UL                          

    



             1068)                                               

 

             ABSOLUTE EOSINOPHILS (test code 0.12 K/UL                          

    



             = 1040)                                             

 

             ABSOLUTE BASOPHILS (test code = 0.01 K/UL                          

    



             1069)                                               

 

             ABS IMMATURE GRANULOCYTES (test 0.01 K/UL                          

    



             code = 1020)                                        

 

             ABS NUCLEATED RBCS (test code = 0.00 K/UL                          

    



             04989)                                              



CBC W/AUTO DIFF2021-10-13 00:00:00





             Test Item    Value        Reference Range Interpretation Comments

 

             WBC (test code = 1001) 3.1 K/UL                               

 

             RBC (test code = 1002) 3.14 M/UL                              

 

             HEMOGLOBIN (test code = 1003) 8.4 G/DL                             

  

 

             HEMATOCRIT (test code = 1004) 25.9 %                               

  

 

             MCV (test code = 1005) 82.5 fL                                

 

             MCH (test code = 1006) 26.8 PG                                

 

             MCHC (test code = 1007) 32.4 G/DL                              

 

             RDW (test code = 1038) 14.5 %                                 

 

             NEUTROPHILS (test code = 1008) 61.2 %                              

   

 

             LYMPHOCYTES (test code = 1010) 25.5 %                              

   

 

             MONOCYTES (test code = 1011) 8.8 %                                 

 

 

             EOSINOPHILS (test code = 1012) 3.9 %                               

   

 

             BASOPHILS (test code = 1013) 0.3 %                                 

 

 

             IMMATURE GRANULOCYTES (test 0.3 %                                  



             code = 1036)                                        

 

             NUCLEATED RBCS (test code = 0.0 /100WBC'S                          

 



             1065)                                               

 

             PLATELET COUNT (test code = 85 K/UL                                



             1015)                                               

 

             ABSOLUTE NEUTROPHILS (test code 1.87 K/UL                          

    



             = 1066)                                             

 

             ABSOLUTE LYMPHOCYTES (test code 0.78 K/UL                          

    



             = 1067)                                             

 

             ABSOLUTE MONOCYTES (test code = 0.27 K/UL                          

    



             1068)                                               

 

             ABSOLUTE EOSINOPHILS (test code 0.12 K/UL                          

    



             = 1040)                                             

 

             ABSOLUTE BASOPHILS (test code = 0.01 K/UL                          

    



             1069)                                               

 

             ABS IMMATURE GRANULOCYTES (test 0.01 K/UL                          

    



             code = 1020)                                        

 

             ABS NUCLEATED RBCS (test code = 0.00 K/UL                          

    



             93067)                                              



CBC W/AUTO DIFF2021-10-13 00:00:00





             Test Item    Value        Reference Range Interpretation Comments

 

             WBC (test code = 1001) 3.1 K/UL                               

 

             RBC (test code = 1002) 3.14 M/UL                              

 

             HEMOGLOBIN (test code = 1003) 8.4 G/DL                             

  

 

             HEMATOCRIT (test code = 1004) 25.9 %                               

  

 

             MCV (test code = 1005) 82.5 fL                                

 

             MCH (test code = 1006) 26.8 PG                                

 

             MCHC (test code = 1007) 32.4 G/DL                              

 

             RDW (test code = 1038) 14.5 %                                 

 

             NEUTROPHILS (test code = 1008) 61.2 %                              

   

 

             LYMPHOCYTES (test code = 1010) 25.5 %                              

   

 

             MONOCYTES (test code = 1011) 8.8 %                                 

 

 

             EOSINOPHILS (test code = 1012) 3.9 %                               

   

 

             BASOPHILS (test code = 1013) 0.3 %                                 

 

 

             IMMATURE GRANULOCYTES (test 0.3 %                                  



             code = 1036)                                        

 

             NUCLEATED RBCS (test code = 0.0 /100WBC'S                          

 



             1065)                                               

 

             PLATELET COUNT (test code = 85 K/UL                                



             1015)                                               

 

             ABSOLUTE NEUTROPHILS (test code 1.87 K/UL                          

    



             = 1066)                                             

 

             ABSOLUTE LYMPHOCYTES (test code 0.78 K/UL                          

    



             = 1067)                                             

 

             ABSOLUTE MONOCYTES (test code = 0.27 K/UL                          

    



             1068)                                               

 

             ABSOLUTE EOSINOPHILS (test code 0.12 K/UL                          

    



             = 1040)                                             

 

             ABSOLUTE BASOPHILS (test code = 0.01 K/UL                          

    



             1069)                                               

 

             ABS IMMATURE GRANULOCYTES (test 0.01 K/UL                          

    



             code = 1020)                                        

 

             ABS NUCLEATED RBCS (test code = 0.00 K/UL                          

    



             31304)                                              



COMPREHENSIVE METABOLIC PANEL2021-10-13 00:00:00





             Test Item    Value        Reference Range Interpretation Comments

 

             GLUCOSE (test code = 2217) 139 MG/DL                              

 

             BUN (test code = 2208) 21 MG/DL                               

 

             CREATININE (test code = 2214) 0.83 MG/DL                           

  

 

             eGFR  AMER. (test code 88 ML/MIN/1.73                       

    



             = 68019)                                            

 

             eGFR NON- AMER. (test 76 ML/MIN/1.73                        

   



             code = 91964)                                        

 

             CALC BUN/CREAT (test code = 25 RATIO                               



             2235)                                               

 

             SODIUM (test code = 2231) 140 MEQ/L                              

 

             POTASSIUM (test code = 2228) 4.8 MEQ/L                             

 

 

             CHLORIDE (test code = 2215) 102 MEQ/L                              

 

             CARBON DIOXIDE (test code = 25 MEQ/L                               



             220)                                               

 

             CALCIUM (test code = 2209) 9.2 MG/DL                              

 

             PROTEIN, TOTAL (test code = 7.2 G/DL                               



             222)                                               

 

             ALBUMIN (test code = 2201) 3.6 G/DL                               

 

             CALC GLOBULIN (test code = 3.6 G/DL                               



             2240)                                               

 

             CALC A/G RATIO (test code = 1.0 RATIO                              



             2234)                                               

 

             BILIRUBIN, TOTAL (test code = 0.6 MG/DL                            

  



             220)                                               

 

             ALKALINE PHOSPHATASE (test 101 U/L                                



             code = 2204)                                        

 

             AST (test code = 2218) 30 U/L                                 

 

             ALT (test code = 2219) 23 U/L                                 



COMPREHENSIVE METABOLIC PANEL2021-10-13 00:00:00





             Test Item    Value        Reference Range Interpretation Comments

 

             GLUCOSE (test code = 2217) 139 MG/DL                              

 

             BUN (test code = 2208) 21 MG/DL                               

 

             CREATININE (test code = 2214) 0.83 MG/DL                           

  

 

             eGFR  AMER. (test code 88 ML/MIN/1.73                       

    



             = 39533)                                            

 

             eGFR NON- AMER. (test 76 ML/MIN/1.73                        

   



             code = 35594)                                        

 

             CALC BUN/CREAT (test code = 25 RATIO                               



             2235)                                               

 

             SODIUM (test code = 2231) 140 MEQ/L                              

 

             POTASSIUM (test code = 2228) 4.8 MEQ/L                             

 

 

             CHLORIDE (test code = 2215) 102 MEQ/L                              

 

             CARBON DIOXIDE (test code = 25 MEQ/L                               



             )                                               

 

             CALCIUM (test code = 2209) 9.2 MG/DL                              

 

             PROTEIN, TOTAL (test code = 7.2 G/DL                               



             )                                               

 

             ALBUMIN (test code = 2201) 3.6 G/DL                               

 

             CALC GLOBULIN (test code = 3.6 G/DL                               



             2240)                                               

 

             CALC A/G RATIO (test code = 1.0 RATIO                              



             2234)                                               

 

             BILIRUBIN, TOTAL (test code = 0.6 MG/DL                            

  



             2207)                                               

 

             ALKALINE PHOSPHATASE (test 101 U/L                                



             code = 2204)                                        

 

             AST (test code = 2218) 30 U/L                                 

 

             ALT (test code = 2219) 23 U/L                                 



PROBNP2021-10-13 00:00:00





             Test Item    Value        Reference Range Interpretation Comments

 

             NT-proBNP (test code = 92262) 247 PG/ML                            

  



PROBNP2021-10-13 00:00:00





             Test Item    Value        Reference Range Interpretation Comments

 

             NT-proBNP (test code = 37811) 247 PG/ML                            

  



PROBNP2021-10-13 00:00:00





             Test Item    Value        Reference Range Interpretation Comments

 

             NT-proBNP (test code = 47916) 247 PG/ML                            

  



VIOLET (ANTI-NUCLEAR AB) WITH REFLEX TITER2021-10-13 00:00:00





             Test Item    Value        Reference Range Interpretation Comments

 

             ANTI-NUCLEAR ANTIBODIES (test code = POSITIVE                      

         



             3506)                                               



VIOLET (ANTI-NUCLEAR AB) WITH REFLEX TITER2021-10-13 00:00:00





             Test Item    Value        Reference Range Interpretation Comments

 

             ANTI-NUCLEAR ANTIBODIES (test code = POSITIVE                      

         



             3506)                                               



C-REACTIVE PROTEIN2021-10-13 00:00:00





             Test Item    Value        Reference Range Interpretation Comments

 

             C-REACTIVE PROTEIN (test code = 1.1 MG/DL                          

    



             3513)                                               



C-REACTIVE PROTEIN2021-10-13 00:00:00





             Test Item    Value        Reference Range Interpretation Comments

 

             C-REACTIVE PROTEIN (test code = 1.1 MG/DL                          

    



             3513)                                               



SEDIMENTATION RATE2021-10-13 00:00:00





             Test Item    Value        Reference Range Interpretation Comments

 

             SEDIMENTATION RATE (test code = 88 MM/HOUR                         

    



             1017)                                               



SEDIMENTATION RATE2021-10-13 00:00:00





             Test Item    Value        Reference Range Interpretation Comments

 

             SEDIMENTATION RATE (test code = 88 MM/HOUR                         

    



             1017)                                               



[id number] 00:00:00





             Test Item    Value        Reference Range Interpretation Comments

 

             D-DIMER (test code = 1405) 0.77 UG/MLFEU                           



[id number] 00:00:00





             Test Item    Value        Reference Range Interpretation Comments

 

             D-DIMER (test code = 1405) 0.77 UG/MLFEU                           



CBC W/AUTO DIFF2021-10-13 00:00:00





             Test Item    Value        Reference Range Interpretation Comments

 

             WBC (test code = 1001) 3.1 K/UL                               

 

             RBC (test code = 1002) 3.14 M/UL                              

 

             HEMOGLOBIN (test code = 1003) 8.4 G/DL                             

  

 

             HEMATOCRIT (test code = 1004) 25.9 %                               

  

 

             MCV (test code = 1005) 82.5 fL                                

 

             MCH (test code = 1006) 26.8 PG                                

 

             MCHC (test code = 1007) 32.4 G/DL                              

 

             RDW (test code = 1038) 14.5 %                                 

 

             NEUTROPHILS (test code = 1008) 61.2 %                              

   

 

             LYMPHOCYTES (test code = 1010) 25.5 %                              

   

 

             MONOCYTES (test code = 1011) 8.8 %                                 

 

 

             EOSINOPHILS (test code = 1012) 3.9 %                               

   

 

             BASOPHILS (test code = 1013) 0.3 %                                 

 

 

             IMMATURE GRANULOCYTES (test 0.3 %                                  



             code = 1036)                                        

 

             NUCLEATED RBCS (test code = 0.0 /100WBC'S                          

 



             1065)                                               

 

             PLATELET COUNT (test code = 85 K/UL                                



             1015)                                               

 

             ABSOLUTE NEUTROPHILS (test code 1.87 K/UL                          

    



             = 1066)                                             

 

             ABSOLUTE LYMPHOCYTES (test code 0.78 K/UL                          

    



             = 1067)                                             

 

             ABSOLUTE MONOCYTES (test code = 0.27 K/UL                          

    



             1068)                                               

 

             ABSOLUTE EOSINOPHILS (test code 0.12 K/UL                          

    



             = 1040)                                             

 

             ABSOLUTE BASOPHILS (test code = 0.01 K/UL                          

    



             1069)                                               

 

             ABS IMMATURE GRANULOCYTES (test 0.01 K/UL                          

    



             code = 1020)                                        

 

             ABS NUCLEATED RBCS (test code = 0.00 K/UL                          

    



             84553)                                              



CBC W/AUTO DIFF2021-10-13 00:00:00





             Test Item    Value        Reference Range Interpretation Comments

 

             WBC (test code = 1001) 3.1 K/UL                               

 

             RBC (test code = 1002) 3.14 M/UL                              

 

             HEMOGLOBIN (test code = 1003) 8.4 G/DL                             

  

 

             HEMATOCRIT (test code = 1004) 25.9 %                               

  

 

             MCV (test code = 1005) 82.5 fL                                

 

             MCH (test code = 1006) 26.8 PG                                

 

             MCHC (test code = 1007) 32.4 G/DL                              

 

             RDW (test code = 1038) 14.5 %                                 

 

             NEUTROPHILS (test code = 1008) 61.2 %                              

   

 

             LYMPHOCYTES (test code = 1010) 25.5 %                              

   

 

             MONOCYTES (test code = 1011) 8.8 %                                 

 

 

             EOSINOPHILS (test code = 1012) 3.9 %                               

   

 

             BASOPHILS (test code = 1013) 0.3 %                                 

 

 

             IMMATURE GRANULOCYTES (test 0.3 %                                  



             code = 1036)                                        

 

             NUCLEATED RBCS (test code = 0.0 /100WBC'S                          

 



             1065)                                               

 

             PLATELET COUNT (test code = 85 K/UL                                



             1015)                                               

 

             ABSOLUTE NEUTROPHILS (test code 1.87 K/UL                          

    



             = 1066)                                             

 

             ABSOLUTE LYMPHOCYTES (test code 0.78 K/UL                          

    



             = 1067)                                             

 

             ABSOLUTE MONOCYTES (test code = 0.27 K/UL                          

    



             1068)                                               

 

             ABSOLUTE EOSINOPHILS (test code 0.12 K/UL                          

    



             = 1040)                                             

 

             ABSOLUTE BASOPHILS (test code = 0.01 K/UL                          

    



             1069)                                               

 

             ABS IMMATURE GRANULOCYTES (test 0.01 K/UL                          

    



             code = 1020)                                        

 

             ABS NUCLEATED RBCS (test code = 0.00 K/UL                          

    



             04403)                                              



CBC W/AUTO DIFF2021-10-13 00:00:00





             Test Item    Value        Reference Range Interpretation Comments

 

             WBC (test code = 1001) 3.1 K/UL                               

 

             RBC (test code = 1002) 3.14 M/UL                              

 

             HEMOGLOBIN (test code = 1003) 8.4 G/DL                             

  

 

             HEMATOCRIT (test code = 1004) 25.9 %                               

  

 

             MCV (test code = 1005) 82.5 fL                                

 

             MCH (test code = 1006) 26.8 PG                                

 

             MCHC (test code = 1007) 32.4 G/DL                              

 

             RDW (test code = 1038) 14.5 %                                 

 

             NEUTROPHILS (test code = 1008) 61.2 %                              

   

 

             LYMPHOCYTES (test code = 1010) 25.5 %                              

   

 

             MONOCYTES (test code = 1011) 8.8 %                                 

 

 

             EOSINOPHILS (test code = 1012) 3.9 %                               

   

 

             BASOPHILS (test code = 1013) 0.3 %                                 

 

 

             IMMATURE GRANULOCYTES (test 0.3 %                                  



             code = 1036)                                        

 

             NUCLEATED RBCS (test code = 0.0 /100WBC'S                          

 



             1065)                                               

 

             PLATELET COUNT (test code = 85 K/UL                                



             1015)                                               

 

             ABSOLUTE NEUTROPHILS (test code 1.87 K/UL                          

    



             = 1066)                                             

 

             ABSOLUTE LYMPHOCYTES (test code 0.78 K/UL                          

    



             = 1067)                                             

 

             ABSOLUTE MONOCYTES (test code = 0.27 K/UL                          

    



             1068)                                               

 

             ABSOLUTE EOSINOPHILS (test code 0.12 K/UL                          

    



             = 1040)                                             

 

             ABSOLUTE BASOPHILS (test code = 0.01 K/UL                          

    



             1069)                                               

 

             ABS IMMATURE GRANULOCYTES (test 0.01 K/UL                          

    



             code = 1020)                                        

 

             ABS NUCLEATED RBCS (test code = 0.00 K/UL                          

    



             58848)                                              



CBC W/AUTO DIFF2021-10-13 00:00:00





             Test Item    Value        Reference Range Interpretation Comments

 

             WBC (test code = 1001) 3.1 K/UL                               

 

             RBC (test code = 1002) 3.14 M/UL                              

 

             HEMOGLOBIN (test code = 1003) 8.4 G/DL                             

  

 

             HEMATOCRIT (test code = 1004) 25.9 %                               

  

 

             MCV (test code = 1005) 82.5 fL                                

 

             MCH (test code = 1006) 26.8 PG                                

 

             MCHC (test code = 1007) 32.4 G/DL                              

 

             RDW (test code = 1038) 14.5 %                                 

 

             NEUTROPHILS (test code = 1008) 61.2 %                              

   

 

             LYMPHOCYTES (test code = 1010) 25.5 %                              

   

 

             MONOCYTES (test code = 1011) 8.8 %                                 

 

 

             EOSINOPHILS (test code = 1012) 3.9 %                               

   

 

             BASOPHILS (test code = 1013) 0.3 %                                 

 

 

             IMMATURE GRANULOCYTES (test 0.3 %                                  



             code = 1036)                                        

 

             NUCLEATED RBCS (test code = 0.0 /100WBC'S                          

 



             1065)                                               

 

             PLATELET COUNT (test code = 85 K/UL                                



             1015)                                               

 

             ABSOLUTE NEUTROPHILS (test code 1.87 K/UL                          

    



             = 1066)                                             

 

             ABSOLUTE LYMPHOCYTES (test code 0.78 K/UL                          

    



             = 1067)                                             

 

             ABSOLUTE MONOCYTES (test code = 0.27 K/UL                          

    



             1068)                                               

 

             ABSOLUTE EOSINOPHILS (test code 0.12 K/UL                          

    



             = 1040)                                             

 

             ABSOLUTE BASOPHILS (test code = 0.01 K/UL                          

    



             1069)                                               

 

             ABS IMMATURE GRANULOCYTES (test 0.01 K/UL                          

    



             code = 1020)                                        

 

             ABS NUCLEATED RBCS (test code = 0.00 K/UL                          

    



             12872)                                              



CBC W/AUTO DIFF2021-10-13 00:00:00





             Test Item    Value        Reference Range Interpretation Comments

 

             WBC (test code = 1001) 3.1 K/UL                               

 

             RBC (test code = 1002) 3.14 M/UL                              

 

             HEMOGLOBIN (test code = 1003) 8.4 G/DL                             

  

 

             HEMATOCRIT (test code = 1004) 25.9 %                               

  

 

             MCV (test code = 1005) 82.5 fL                                

 

             MCH (test code = 1006) 26.8 PG                                

 

             MCHC (test code = 1007) 32.4 G/DL                              

 

             RDW (test code = 1038) 14.5 %                                 

 

             NEUTROPHILS (test code = 1008) 61.2 %                              

   

 

             LYMPHOCYTES (test code = 1010) 25.5 %                              

   

 

             MONOCYTES (test code = 1011) 8.8 %                                 

 

 

             EOSINOPHILS (test code = 1012) 3.9 %                               

   

 

             BASOPHILS (test code = 1013) 0.3 %                                 

 

 

             IMMATURE GRANULOCYTES (test 0.3 %                                  



             code = 1036)                                        

 

             NUCLEATED RBCS (test code = 0.0 /100WBC'S                          

 



             1065)                                               

 

             PLATELET COUNT (test code = 85 K/UL                                



             1015)                                               

 

             ABSOLUTE NEUTROPHILS (test code 1.87 K/UL                          

    



             = 1066)                                             

 

             ABSOLUTE LYMPHOCYTES (test code 0.78 K/UL                          

    



             = 1067)                                             

 

             ABSOLUTE MONOCYTES (test code = 0.27 K/UL                          

    



             1068)                                               

 

             ABSOLUTE EOSINOPHILS (test code 0.12 K/UL                          

    



             = 1040)                                             

 

             ABSOLUTE BASOPHILS (test code = 0.01 K/UL                          

    



             1069)                                               

 

             ABS IMMATURE GRANULOCYTES (test 0.01 K/UL                          

    



             code = 1020)                                        

 

             ABS NUCLEATED RBCS (test code = 0.00 K/UL                          

    



             42002)                                              



COMPREHENSIVE METABOLIC PANEL2021-10-13 00:00:00





             Test Item    Value        Reference Range Interpretation Comments

 

             GLUCOSE (test code = 2217) 139 MG/DL                              

 

             BUN (test code = 2208) 21 MG/DL                               

 

             CREATININE (test code = 2214) 0.83 MG/DL                           

  

 

             eGFR  AMER. (test code 88 ML/MIN/1.73                       

    



             = 54910)                                            

 

             eGFR NON- AMER. (test 76 ML/MIN/1.73                        

   



             code = 34748)                                        

 

             CALC BUN/CREAT (test code = 25 RATIO                               



             2235)                                               

 

             SODIUM (test code = 2231) 140 MEQ/L                              

 

             POTASSIUM (test code = 2228) 4.8 MEQ/L                             

 

 

             CHLORIDE (test code = 2215) 102 MEQ/L                              

 

             CARBON DIOXIDE (test code = 25 MEQ/L                               



             2206)                                               

 

             CALCIUM (test code = 2209) 9.2 MG/DL                              

 

             PROTEIN, TOTAL (test code = 7.2 G/DL                               



             )                                               

 

             ALBUMIN (test code = 2201) 3.6 G/DL                               

 

             CALC GLOBULIN (test code = 3.6 G/DL                               



             2240)                                               

 

             CALC A/G RATIO (test code = 1.0 RATIO                              



             2234)                                               

 

             BILIRUBIN, TOTAL (test code = 0.6 MG/DL                            

  



             )                                               

 

             ALKALINE PHOSPHATASE (test 101 U/L                                



             code = 2204)                                        

 

             AST (test code = 2218) 30 U/L                                 

 

             ALT (test code = 2219) 23 U/L                                 



COMPREHENSIVE METABOLIC PANEL2021-10-13 00:00:00





             Test Item    Value        Reference Range Interpretation Comments

 

             GLUCOSE (test code = 2217) 139 MG/DL                              

 

             BUN (test code = 2208) 21 MG/DL                               

 

             CREATININE (test code = 2214) 0.83 MG/DL                           

  

 

             eGFR  AMER. (test code 88 ML/MIN/1.73                       

    



             = 02048)                                            

 

             eGFR NON- AMER. (test 76 ML/MIN/1.73                        

   



             code = 08730)                                        

 

             CALC BUN/CREAT (test code = 25 RATIO                               



             2235)                                               

 

             SODIUM (test code = 2231) 140 MEQ/L                              

 

             POTASSIUM (test code = 2228) 4.8 MEQ/L                             

 

 

             CHLORIDE (test code = 2215) 102 MEQ/L                              

 

             CARBON DIOXIDE (test code = 25 MEQ/L                               



             2206)                                               

 

             CALCIUM (test code = 2209) 9.2 MG/DL                              

 

             PROTEIN, TOTAL (test code = 7.2 G/DL                               



             )                                               

 

             ALBUMIN (test code = 2201) 3.6 G/DL                               

 

             CALC GLOBULIN (test code = 3.6 G/DL                               



             2240)                                               

 

             CALC A/G RATIO (test code = 1.0 RATIO                              



             2234)                                               

 

             BILIRUBIN, TOTAL (test code = 0.6 MG/DL                            

  



             220)                                               

 

             ALKALINE PHOSPHATASE (test 101 U/L                                



             code = 2204)                                        

 

             AST (test code = 2218) 30 U/L                                 

 

             ALT (test code = 2219) 23 U/L                                 



PROBNP2021-10-13 00:00:00





             Test Item    Value        Reference Range Interpretation Comments

 

             NT-proBNP (test code = 76584) 247 PG/ML                            

  



PROBNP2021-10-13 00:00:00





             Test Item    Value        Reference Range Interpretation Comments

 

             NT-proBNP (test code = 73965) 247 PG/ML                            

  



PROBNP2021-10-13 00:00:00





             Test Item    Value        Reference Range Interpretation Comments

 

             NT-proBNP (test code = 73111) 247 PG/ML                            

  



VIOLET (ANTI-NUCLEAR AB) WITH REFLEX TITER2021-10-13 00:00:00





             Test Item    Value        Reference Range Interpretation Comments

 

             ANTI-NUCLEAR ANTIBODIES (test code = POSITIVE                      

         



             3506)                                               



VIOLET (ANTI-NUCLEAR AB) WITH REFLEX TITER2021-10-13 00:00:00





             Test Item    Value        Reference Range Interpretation Comments

 

             ANTI-NUCLEAR ANTIBODIES (test code = POSITIVE                      

         



             3506)                                               



C-REACTIVE PROTEIN2021-10-13 00:00:00





             Test Item    Value        Reference Range Interpretation Comments

 

             C-REACTIVE PROTEIN (test code = 1.1 MG/DL                          

    



             3513)                                               



C-REACTIVE PROTEIN2021-10-13 00:00:00





             Test Item    Value        Reference Range Interpretation Comments

 

             C-REACTIVE PROTEIN (test code = 1.1 MG/DL                          

    



             3513)                                               



COMPREHENSIVE METABOLIC PANEL2021-10-13 00:00:00





             Test Item    Value        Reference Range Interpretation Comments

 

             GLUCOSE (test code = 2217) 139 MG/DL                              

 

             BUN (test code = 2208) 21 MG/DL                               

 

             CREATININE (test code = 2214) 0.83 MG/DL                           

  

 

             eGFR  AMER. (test code 88 ML/MIN/1.73                       

    



             = 86800)                                            

 

             eGFR NON- AMER. (test 76 ML/MIN/1.73                        

   



             code = 75663)                                        

 

             CALC BUN/CREAT (test code = 25 RATIO                               



             2235)                                               

 

             SODIUM (test code = 2231) 140 MEQ/L                              

 

             POTASSIUM (test code = 2228) 4.8 MEQ/L                             

 

 

             CHLORIDE (test code = 2215) 102 MEQ/L                              

 

             CARBON DIOXIDE (test code = 25 MEQ/L                               



             )                                               

 

             CALCIUM (test code = 2209) 9.2 MG/DL                              

 

             PROTEIN, TOTAL (test code = 7.2 G/DL                               



             )                                               

 

             ALBUMIN (test code = 2201) 3.6 G/DL                               

 

             CALC GLOBULIN (test code = 3.6 G/DL                               



             )                                               

 

             CALC A/G RATIO (test code = 1.0 RATIO                              



             )                                               

 

             BILIRUBIN, TOTAL (test code = 0.6 MG/DL                            

  



             )                                               

 

             ALKALINE PHOSPHATASE (test 101 U/L                                



             code = 220)                                        

 

             AST (test code = 2218) 30 U/L                                 

 

             ALT (test code = 2219) 23 U/L                                 



SEDIMENTATION RATE2021-10-13 00:00:00





             Test Item    Value        Reference Range Interpretation Comments

 

             SEDIMENTATION RATE (test code = 88 MM/HOUR                         

    



             1017)                                               



SEDIMENTATION RATE2021-10-13 00:00:00





             Test Item    Value        Reference Range Interpretation Comments

 

             SEDIMENTATION RATE (test code = 88 MM/HOUR                         

    



             1017)                                               



[id number] 00:00:00





             Test Item    Value        Reference Range Interpretation Comments

 

             D-DIMER (test code = 1405) 0.77 UG/MLFEU                           



[id number] 00:00:00





             Test Item    Value        Reference Range Interpretation Comments

 

             D-DIMER (test code = 1405) 0.77 UG/MLFEU                           



PROBNP2021-10-13 00:00:00





             Test Item    Value        Reference Range Interpretation Comments

 

             NT-proBNP (test code = 74981) 247 PG/ML                            

  



PROBNP2021-10-13 00:00:00





             Test Item    Value        Reference Range Interpretation Comments

 

             NT-proBNP (test code = 74641) 247 PG/ML                            

  



VIOLET (ANTI-NUCLEAR AB) WITH REFLEX TITER2021-10-13 00:00:00





             Test Item    Value        Reference Range Interpretation Comments

 

             ANTI-NUCLEAR ANTIBODIES (test code = POSITIVE                      

         



             3506)                                               



C-REACTIVE PROTEIN2021-10-13 00:00:00





             Test Item    Value        Reference Range Interpretation Comments

 

             C-REACTIVE PROTEIN (test code = 1.1 MG/DL                          

    



             3513)                                               



SEDIMENTATION RATE2021-10-13 00:00:00





             Test Item    Value        Reference Range Interpretation Comments

 

             SEDIMENTATION RATE (test code = 88 MM/HOUR                         

    



             1017)                                               



[id number] 00:00:00





             Test Item    Value        Reference Range Interpretation Comments

 

             D-DIMER (test code = 1405) 0.77 UG/MLFEU                           



CBC W/AUTO DIFF2021-10-13 00:00:00





             Test Item    Value        Reference Range Interpretation Comments

 

             WBC (test code = 1001) 3.1 K/UL                               

 

             RBC (test code = 1002) 3.14 M/UL                              

 

             HEMOGLOBIN (test code = 1003) 8.4 G/DL                             

  

 

             HEMATOCRIT (test code = 1004) 25.9 %                               

  

 

             MCV (test code = 1005) 82.5 fL                                

 

             MCH (test code = 1006) 26.8 PG                                

 

             MCHC (test code = 1007) 32.4 G/DL                              

 

             RDW (test code = 1038) 14.5 %                                 

 

             NEUTROPHILS (test code = 1008) 61.2 %                              

   

 

             LYMPHOCYTES (test code = 1010) 25.5 %                              

   

 

             MONOCYTES (test code = 1011) 8.8 %                                 

 

 

             EOSINOPHILS (test code = 1012) 3.9 %                               

   

 

             BASOPHILS (test code = 1013) 0.3 %                                 

 

 

             IMMATURE GRANULOCYTES (test 0.3 %                                  



             code = 1036)                                        

 

             NUCLEATED RBCS (test code = 0.0 /100WBC'S                          

 



             1065)                                               

 

             PLATELET COUNT (test code = 85 K/UL                                



             1015)                                               

 

             ABSOLUTE NEUTROPHILS (test code 1.87 K/UL                          

    



             = 1066)                                             

 

             ABSOLUTE LYMPHOCYTES (test code 0.78 K/UL                          

    



             = 1067)                                             

 

             ABSOLUTE MONOCYTES (test code = 0.27 K/UL                          

    



             1068)                                               

 

             ABSOLUTE EOSINOPHILS (test code 0.12 K/UL                          

    



             = 1040)                                             

 

             ABSOLUTE BASOPHILS (test code = 0.01 K/UL                          

    



             1069)                                               

 

             ABS IMMATURE GRANULOCYTES (test 0.01 K/UL                          

    



             code = 1020)                                        

 

             ABS NUCLEATED RBCS (test code = 0.00 K/UL                          

    



             58633)                                              



CBC W/AUTO DIFF2021-10-13 00:00:00





             Test Item    Value        Reference Range Interpretation Comments

 

             WBC (test code = 1001) 3.1 K/UL                               

 

             RBC (test code = 1002) 3.14 M/UL                              

 

             HEMOGLOBIN (test code = 1003) 8.4 G/DL                             

  

 

             HEMATOCRIT (test code = 1004) 25.9 %                               

  

 

             MCV (test code = 1005) 82.5 fL                                

 

             MCH (test code = 1006) 26.8 PG                                

 

             MCHC (test code = 1007) 32.4 G/DL                              

 

             RDW (test code = 1038) 14.5 %                                 

 

             NEUTROPHILS (test code = 1008) 61.2 %                              

   

 

             LYMPHOCYTES (test code = 1010) 25.5 %                              

   

 

             MONOCYTES (test code = 1011) 8.8 %                                 

 

 

             EOSINOPHILS (test code = 1012) 3.9 %                               

   

 

             BASOPHILS (test code = 1013) 0.3 %                                 

 

 

             IMMATURE GRANULOCYTES (test 0.3 %                                  



             code = 1036)                                        

 

             NUCLEATED RBCS (test code = 0.0 /100WBC'S                          

 



             1065)                                               

 

             PLATELET COUNT (test code = 85 K/UL                                



             1015)                                               

 

             ABSOLUTE NEUTROPHILS (test code 1.87 K/UL                          

    



             = 1066)                                             

 

             ABSOLUTE LYMPHOCYTES (test code 0.78 K/UL                          

    



             = 1067)                                             

 

             ABSOLUTE MONOCYTES (test code = 0.27 K/UL                          

    



             1068)                                               

 

             ABSOLUTE EOSINOPHILS (test code 0.12 K/UL                          

    



             = 1040)                                             

 

             ABSOLUTE BASOPHILS (test code = 0.01 K/UL                          

    



             1069)                                               

 

             ABS IMMATURE GRANULOCYTES (test 0.01 K/UL                          

    



             code = 1020)                                        

 

             ABS NUCLEATED RBCS (test code = 0.00 K/UL                          

    



             20950)                                              



COMPREHENSIVE METABOLIC PANEL-10-13 00:00:00





             Test Item    Value        Reference Range Interpretation Comments

 

             GLUCOSE (test code = 2217) 139 MG/DL                              

 

             BUN (test code = 2208) 21 MG/DL                               

 

             CREATININE (test code = 2214) 0.83 MG/DL                           

  

 

             eGFR  AMER. (test code 88 ML/MIN/1.73                       

    



             = 64560)                                            

 

             eGFR NON- AMER. (test 76 ML/MIN/1.73                        

   



             code = 72448)                                        

 

             CALC BUN/CREAT (test code = 25 RATIO                               



             2235)                                               

 

             SODIUM (test code = 2231) 140 MEQ/L                              

 

             POTASSIUM (test code = 2228) 4.8 MEQ/L                             

 

 

             CHLORIDE (test code = 2215) 102 MEQ/L                              

 

             CARBON DIOXIDE (test code = 25 MEQ/L                               



             2206)                                               

 

             CALCIUM (test code = 2209) 9.2 MG/DL                              

 

             PROTEIN, TOTAL (test code = 7.2 G/DL                               



             222)                                               

 

             ALBUMIN (test code = 2201) 3.6 G/DL                               

 

             CALC GLOBULIN (test code = 3.6 G/DL                               



             2240)                                               

 

             CALC A/G RATIO (test code = 1.0 RATIO                              



             2234)                                               

 

             BILIRUBIN, TOTAL (test code = 0.6 MG/DL                            

  



             2207)                                               

 

             ALKALINE PHOSPHATASE (test 101 U/L                                



             code = 2204)                                        

 

             AST (test code = 2218) 30 U/L                                 

 

             ALT (test code = 2219) 23 U/L                                 



PROBNP2021-10-13 00:00:00





             Test Item    Value        Reference Range Interpretation Comments

 

             NT-proBNP (test code = 40481) 247 PG/ML                            

  



PROBNP2021-10-13 00:00:00





             Test Item    Value        Reference Range Interpretation Comments

 

             NT-proBNP (test code = 14055) 247 PG/ML                            

  



VIOLET (ANTI-NUCLEAR AB) WITH REFLEX TITER2021-10-13 00:00:00





             Test Item    Value        Reference Range Interpretation Comments

 

             ANTI-NUCLEAR ANTIBODIES (test code = POSITIVE                      

         



             3506)                                               



C-REACTIVE PROTEIN2021-10-13 00:00:00





             Test Item    Value        Reference Range Interpretation Comments

 

             C-REACTIVE PROTEIN (test code = 1.1 MG/DL                          

    



             3513)                                               



SEDIMENTATION RATE2021-10-13 00:00:00





             Test Item    Value        Reference Range Interpretation Comments

 

             SEDIMENTATION RATE (test code = 88 MM/HOUR                         

    



             1017)                                               



[id number] 00:00:00





             Test Item    Value        Reference Range Interpretation Comments

 

             D-DIMER (test code = 1405) 0.77 UG/MLFEU                           



CBC W/AUTO DIFF2021-10-13 00:00:00





             Test Item    Value        Reference Range Interpretation Comments

 

             WBC (test code = 1001) 3.1 K/UL                               

 

             RBC (test code = 1002) 3.14 M/UL                              

 

             HEMOGLOBIN (test code = 1003) 8.4 G/DL                             

  

 

             HEMATOCRIT (test code = 1004) 25.9 %                               

  

 

             MCV (test code = 1005) 82.5 fL                                

 

             MCH (test code = 1006) 26.8 PG                                

 

             MCHC (test code = 1007) 32.4 G/DL                              

 

             RDW (test code = 1038) 14.5 %                                 

 

             NEUTROPHILS (test code = 1008) 61.2 %                              

   

 

             LYMPHOCYTES (test code = 1010) 25.5 %                              

   

 

             MONOCYTES (test code = 1011) 8.8 %                                 

 

 

             EOSINOPHILS (test code = 1012) 3.9 %                               

   

 

             BASOPHILS (test code = 1013) 0.3 %                                 

 

 

             IMMATURE GRANULOCYTES (test 0.3 %                                  



             code = 1036)                                        

 

             NUCLEATED RBCS (test code = 0.0 /100WBC'S                          

 



             1065)                                               

 

             PLATELET COUNT (test code = 85 K/UL                                



             1015)                                               

 

             ABSOLUTE NEUTROPHILS (test code 1.87 K/UL                          

    



             = 1066)                                             

 

             ABSOLUTE LYMPHOCYTES (test code 0.78 K/UL                          

    



             = 1067)                                             

 

             ABSOLUTE MONOCYTES (test code = 0.27 K/UL                          

    



             1068)                                               

 

             ABSOLUTE EOSINOPHILS (test code 0.12 K/UL                          

    



             = 1040)                                             

 

             ABSOLUTE BASOPHILS (test code = 0.01 K/UL                          

    



             1069)                                               

 

             ABS IMMATURE GRANULOCYTES (test 0.01 K/UL                          

    



             code = 1020)                                        

 

             ABS NUCLEATED RBCS (test code = 0.00 K/UL                          

    



             84095)                                              



CBC W/AUTO DIFF2021-10-13 00:00:00





             Test Item    Value        Reference Range Interpretation Comments

 

             WBC (test code = 1001) 3.1 K/UL                               

 

             RBC (test code = 1002) 3.14 M/UL                              

 

             HEMOGLOBIN (test code = 1003) 8.4 G/DL                             

  

 

             HEMATOCRIT (test code = 1004) 25.9 %                               

  

 

             MCV (test code = 1005) 82.5 fL                                

 

             MCH (test code = 1006) 26.8 PG                                

 

             MCHC (test code = 1007) 32.4 G/DL                              

 

             RDW (test code = 1038) 14.5 %                                 

 

             NEUTROPHILS (test code = 1008) 61.2 %                              

   

 

             LYMPHOCYTES (test code = 1010) 25.5 %                              

   

 

             MONOCYTES (test code = 1011) 8.8 %                                 

 

 

             EOSINOPHILS (test code = 1012) 3.9 %                               

   

 

             BASOPHILS (test code = 1013) 0.3 %                                 

 

 

             IMMATURE GRANULOCYTES (test 0.3 %                                  



             code = 1036)                                        

 

             NUCLEATED RBCS (test code = 0.0 /100WBC'S                          

 



             1065)                                               

 

             PLATELET COUNT (test code = 85 K/UL                                



             1015)                                               

 

             ABSOLUTE NEUTROPHILS (test code 1.87 K/UL                          

    



             = 1066)                                             

 

             ABSOLUTE LYMPHOCYTES (test code 0.78 K/UL                          

    



             = 1067)                                             

 

             ABSOLUTE MONOCYTES (test code = 0.27 K/UL                          

    



             1068)                                               

 

             ABSOLUTE EOSINOPHILS (test code 0.12 K/UL                          

    



             = 1040)                                             

 

             ABSOLUTE BASOPHILS (test code = 0.01 K/UL                          

    



             1069)                                               

 

             ABS IMMATURE GRANULOCYTES (test 0.01 K/UL                          

    



             code = 1020)                                        

 

             ABS NUCLEATED RBCS (test code = 0.00 K/UL                          

    



             28145)                                              



CBC W/AUTO DIFF2021-10-13 00:00:00





             Test Item    Value        Reference Range Interpretation Comments

 

             WBC (test code = 1001) 3.1 K/UL                               

 

             RBC (test code = 1002) 3.14 M/UL                              

 

             HEMOGLOBIN (test code = 1003) 8.4 G/DL                             

  

 

             HEMATOCRIT (test code = 1004) 25.9 %                               

  

 

             MCV (test code = 1005) 82.5 fL                                

 

             MCH (test code = 1006) 26.8 PG                                

 

             MCHC (test code = 1007) 32.4 G/DL                              

 

             RDW (test code = 1038) 14.5 %                                 

 

             NEUTROPHILS (test code = 1008) 61.2 %                              

   

 

             LYMPHOCYTES (test code = 1010) 25.5 %                              

   

 

             MONOCYTES (test code = 1011) 8.8 %                                 

 

 

             EOSINOPHILS (test code = 1012) 3.9 %                               

   

 

             BASOPHILS (test code = 1013) 0.3 %                                 

 

 

             IMMATURE GRANULOCYTES (test 0.3 %                                  



             code = 1036)                                        

 

             NUCLEATED RBCS (test code = 0.0 /100WBC'S                          

 



             1065)                                               

 

             PLATELET COUNT (test code = 85 K/UL                                



             1015)                                               

 

             ABSOLUTE NEUTROPHILS (test code 1.87 K/UL                          

    



             = 1066)                                             

 

             ABSOLUTE LYMPHOCYTES (test code 0.78 K/UL                          

    



             = 1067)                                             

 

             ABSOLUTE MONOCYTES (test code = 0.27 K/UL                          

    



             1068)                                               

 

             ABSOLUTE EOSINOPHILS (test code 0.12 K/UL                          

    



             = 1040)                                             

 

             ABSOLUTE BASOPHILS (test code = 0.01 K/UL                          

    



             1069)                                               

 

             ABS IMMATURE GRANULOCYTES (test 0.01 K/UL                          

    



             code = 1020)                                        

 

             ABS NUCLEATED RBCS (test code = 0.00 K/UL                          

    



             64425)                                              



COMPREHENSIVE METABOLIC PANEL-10-13 00:00:00





             Test Item    Value        Reference Range Interpretation Comments

 

             GLUCOSE (test code = 2217) 139 MG/DL                              

 

             BUN (test code = 2208) 21 MG/DL                               

 

             CREATININE (test code = 2214) 0.83 MG/DL                           

  

 

             eGFR  AMER. (test code 88 ML/MIN/1.73                       

    



             = 52891)                                            

 

             eGFR NON- AMER. (test 76 ML/MIN/1.73                        

   



             code = 28975)                                        

 

             CALC BUN/CREAT (test code = 25 RATIO                               



             2235)                                               

 

             SODIUM (test code = 2231) 140 MEQ/L                              

 

             POTASSIUM (test code = 2228) 4.8 MEQ/L                             

 

 

             CHLORIDE (test code = 2215) 102 MEQ/L                              

 

             CARBON DIOXIDE (test code = 25 MEQ/L                               



             2206)                                               

 

             CALCIUM (test code = 2209) 9.2 MG/DL                              

 

             PROTEIN, TOTAL (test code = 7.2 G/DL                               



             )                                               

 

             ALBUMIN (test code = 2201) 3.6 G/DL                               

 

             CALC GLOBULIN (test code = 3.6 G/DL                               



             2240)                                               

 

             CALC A/G RATIO (test code = 1.0 RATIO                              



             2234)                                               

 

             BILIRUBIN, TOTAL (test code = 0.6 MG/DL                            

  



             )                                               

 

             ALKALINE PHOSPHATASE (test 101 U/L                                



             code = 2204)                                        

 

             AST (test code = 2218) 30 U/L                                 

 

             ALT (test code = 2219) 23 U/L                                 



COMPREHENSIVE METABOLIC PANEL2021-10-13 00:00:00





             Test Item    Value        Reference Range Interpretation Comments

 

             GLUCOSE (test code = 2217) 139 MG/DL                              

 

             BUN (test code = 2208) 21 MG/DL                               

 

             CREATININE (test code = 2214) 0.83 MG/DL                           

  

 

             eGFR  AMER. (test code 88 ML/MIN/1.73                       

    



             = 04468)                                            

 

             eGFR NON- AMER. (test 76 ML/MIN/1.73                        

   



             code = 25263)                                        

 

             CALC BUN/CREAT (test code = 25 RATIO                               



             2235)                                               

 

             SODIUM (test code = 2231) 140 MEQ/L                              

 

             POTASSIUM (test code = 2228) 4.8 MEQ/L                             

 

 

             CHLORIDE (test code = 2215) 102 MEQ/L                              

 

             CARBON DIOXIDE (test code = 25 MEQ/L                               



             )                                               

 

             CALCIUM (test code = 2209) 9.2 MG/DL                              

 

             PROTEIN, TOTAL (test code = 7.2 G/DL                               



             222)                                               

 

             ALBUMIN (test code = 2201) 3.6 G/DL                               

 

             CALC GLOBULIN (test code = 3.6 G/DL                               



             2240)                                               

 

             CALC A/G RATIO (test code = 1.0 RATIO                              



             2234)                                               

 

             BILIRUBIN, TOTAL (test code = 0.6 MG/DL                            

  



             220)                                               

 

             ALKALINE PHOSPHATASE (test 101 U/L                                



             code = 2204)                                        

 

             AST (test code = 2218) 30 U/L                                 

 

             ALT (test code = 2219) 23 U/L                                 



PROBNP2021-10-13 00:00:00





             Test Item    Value        Reference Range Interpretation Comments

 

             NT-proBNP (test code = 90412) 247 PG/ML                            

  



PROBNP2021-10-13 00:00:00





             Test Item    Value        Reference Range Interpretation Comments

 

             NT-proBNP (test code = 05812) 247 PG/ML                            

  



PROBNP2021-10-13 00:00:00





             Test Item    Value        Reference Range Interpretation Comments

 

             NT-proBNP (test code = 47612) 247 PG/ML                            

  



VIOLET (ANTI-NUCLEAR AB) WITH REFLEX TITER2021-10-13 00:00:00





             Test Item    Value        Reference Range Interpretation Comments

 

             ANTI-NUCLEAR ANTIBODIES (test code = POSITIVE                      

         



             3506)                                               



VIOLET (ANTI-NUCLEAR AB) WITH REFLEX TITER2021-10-13 00:00:00





             Test Item    Value        Reference Range Interpretation Comments

 

             ANTI-NUCLEAR ANTIBODIES (test code = POSITIVE                      

         



             3506)                                               



C-REACTIVE PROTEIN2021-10-13 00:00:00





             Test Item    Value        Reference Range Interpretation Comments

 

             C-REACTIVE PROTEIN (test code = 1.1 MG/DL                          

    



             3513)                                               



C-REACTIVE PROTEIN2021-10-13 00:00:00





             Test Item    Value        Reference Range Interpretation Comments

 

             C-REACTIVE PROTEIN (test code = 1.1 MG/DL                          

    



             3513)                                               



SEDIMENTATION RATE2021-10-13 00:00:00





             Test Item    Value        Reference Range Interpretation Comments

 

             SEDIMENTATION RATE (test code = 88 MM/HOUR                         

    



             1017)                                               



SEDIMENTATION RATE2021-10-13 00:00:00





             Test Item    Value        Reference Range Interpretation Comments

 

             SEDIMENTATION RATE (test code = 88 MM/HOUR                         

    



             1017)                                               



[id number] 00:00:00





             Test Item    Value        Reference Range Interpretation Comments

 

             D-DIMER (test code = 1405) 0.77 UG/MLFEU                           



[id number] 00:00:00





             Test Item    Value        Reference Range Interpretation Comments

 

             D-DIMER (test code = 1405) 0.77 UG/MLFEU                           



HEMOGLOBIN A1c2021-10-10 00:00:00





             Test Item    Value        Reference Range Interpretation Comments

 

             HEMOGLOBIN A1c (test code = 42671) 7.5 %                           

       



HEMOGLOBIN A1c2021-10-10 00:00:00





             Test Item    Value        Reference Range Interpretation Comments

 

             HEMOGLOBIN A1c (test code = 50178) 7.5 %                           

       



HEMOGLOBIN A1c2021-10-10 00:00:00





             Test Item    Value        Reference Range Interpretation Comments

 

             HEMOGLOBIN A1c (test code = 93280) 7.5 %                           

       



HEMOGLOBIN A1c2021-10-10 00:00:00





             Test Item    Value        Reference Range Interpretation Comments

 

             HEMOGLOBIN A1c (test code = 73967) 7.5 %                           

       



HEMOGLOBIN A1c2021-10-10 00:00:00





             Test Item    Value        Reference Range Interpretation Comments

 

             HEMOGLOBIN A1c (test code = 68386) 7.5 %                           

       



HEMOGLOBIN A1c2021-10-10 00:00:00





             Test Item    Value        Reference Range Interpretation Comments

 

             HEMOGLOBIN A1c (test code = 82263) 7.5 %                           

       



HEMOGLOBIN A1c2021-10-10 00:00:00





             Test Item    Value        Reference Range Interpretation Comments

 

             HEMOGLOBIN A1c (test code = 90499) 7.5 %                           

       



HEMOGLOBIN A1c2021-10-10 00:00:00





             Test Item    Value        Reference Range Interpretation Comments

 

             HEMOGLOBIN A1c (test code = 30138) 7.5 %                           

       



HEMOGLOBIN [id number] 00:00:00





             Test Item    Value        Reference Range Interpretation Comments

 

             HEMOGLOBIN A1c (test code = 04041) 7.5 %                           

       



HEMOGLOBIN [id number] 00:00:00





             Test Item    Value        Reference Range Interpretation Comments

 

             HEMOGLOBIN A1c (test code = 35223) 7.5 %                           

       



HEMOGLOBIN [id number] 00:00:00





             Test Item    Value        Reference Range Interpretation Comments

 

             HEMOGLOBIN A1c (test code = 80941) 7.5 %                           

       



HEMOGLOBIN [id number] 00:00:00





             Test Item    Value        Reference Range Interpretation Comments

 

             HEMOGLOBIN A1c (test code = 49021) 7.5 %                           

       



HEMOGLOBIN [id number] 00:00:00





             Test Item    Value        Reference Range Interpretation Comments

 

             HEMOGLOBIN A1c (test code = 53417) 7.5 %                           

       



CULTURE, URINE2021-07-15 00:00:00





             Test Item    Value        Reference Range Interpretation Comments

 

             CULTURE, URINE (test SPECIMEN NUMBER:                           



             code = 84651) [id number]                              



CULTURE, URINE2021-07-15 00:00:00





             Test Item    Value        Reference Range Interpretation Comments

 

             CULTURE, URINE (test SPECIMEN NUMBER:                           



             code = 49127) [id number]                              



CULTURE, URINE2021-07-15 00:00:00





             Test Item    Value        Reference Range Interpretation Comments

 

             CULTURE, URINE (test SPECIMEN NUMBER:                           



             code = 28486) [id number]                              



CULTURE, URINE2021-07-15 00:00:00





             Test Item    Value        Reference Range Interpretation Comments

 

             CULTURE, URINE (test SPECIMEN NUMBER:                           



             code = 93873) [id number]                              



CULTURE, URINE2021-07-15 00:00:00





             Test Item    Value        Reference Range Interpretation Comments

 

             CULTURE, URINE (test SPECIMEN NUMBER:                           



             code = 49498) [id number]                              



CULTURE, URINE2021-07-15 00:00:00





             Test Item    Value        Reference Range Interpretation Comments

 

             CULTURE, URINE (test SPECIMEN NUMBER:                           



             code = 10189) [id number]                              



CULTURE, URINE2021-07-15 00:00:00





             Test Item    Value        Reference Range Interpretation Comments

 

             CULTURE, URINE (test SPECIMEN NUMBER:                           



             code = 44626) [id number]                              



CULTURE, URINE2021-07-15 00:00:00





             Test Item    Value        Reference Range Interpretation Comments

 

             CULTURE, URINE (test SPECIMEN NUMBER:                           



             code = 13000) [id number]                              



HEMOGLOBIN [id number] 00:00:00





             Test Item    Value        Reference Range Interpretation Comments

 

             HEMOGLOBIN A1c (test code = 12645) 8.6 %                           

       



HEMOGLOBIN [id number] 00:00:00





             Test Item    Value        Reference Range Interpretation Comments

 

             HEMOGLOBIN A1c (test code = 25105) 8.6 %                           

       



HEMOGLOBIN [id number] 00:00:00





             Test Item    Value        Reference Range Interpretation Comments

 

             HEMOGLOBIN A1c (test code = 04729) 8.6 %                           

       



LIPID [id number] 00:00:00





             Test Item    Value        Reference Range Interpretation Comments

 

             CHOLESTEROL (test code = 2210) 104 MG/DL                           

   

 

             TRIGLYCERIDES (test code = 2232) 164 MG/DL                         

     

 

             HDL CHOLESTEROL (test code = 2220) 39 MG/DL                        

       

 

             CALC LDL CHOL (test code = 2237) 41 MG/DL                          

     

 

             RISK RATIO LDL/HDL (test code = 1.05 RATIO                         

    



             2238)                                               



LIPID [id number] 00:00:00





             Test Item    Value        Reference Range Interpretation Comments

 

             CHOLESTEROL (test code = 2210) 104 MG/DL                           

   

 

             TRIGLYCERIDES (test code = 2232) 164 MG/DL                         

     

 

             HDL CHOLESTEROL (test code = 2220) 39 MG/DL                        

       

 

             CALC LDL CHOL (test code = 2237) 41 MG/DL                          

     

 

             RISK RATIO LDL/HDL (test code = 1.05 RATIO                         

    



             2238)                                               



COMPREHENSIVE METABOLIC [id number] 00:00:00





             Test Item    Value        Reference Range Interpretation Comments

 

             GLUCOSE (test code = 2217) 330 MG/DL                              

 

             BUN (test code = 2208) 11 MG/DL                               

 

             CREATININE (test code = 2214) 0.84 MG/DL                           

  

 

             eGFR  AMER. (test code 87 ML/MIN/1.73                       

    



             = 83917)                                            

 

             eGFR NON- AMER. (test 75 ML/MIN/1.73                        

   



             code = 56369)                                        

 

             CALC BUN/CREAT (test code = 13 RATIO                               



             2235)                                               

 

             SODIUM (test code = 2231) 136 MEQ/L                              

 

             POTASSIUM (test code = 2228) 4.5 MEQ/L                             

 

 

             CHLORIDE (test code = 2215) 97 MEQ/L                               

 

             CARBON DIOXIDE (test code = 27 MEQ/L                               



             )                                               

 

             CALCIUM (test code = 2209) 9.1 MG/DL                              

 

             PROTEIN, TOTAL (test code = 7.7 G/DL                               



             )                                               

 

             ALBUMIN (test code = 2201) 3.8 G/DL                               

 

             CALC GLOBULIN (test code = 3.9 G/DL                               



             )                                               

 

             CALC A/G RATIO (test code = 1.0 RATIO                              



             2234)                                               

 

             BILIRUBIN, TOTAL (test code = 0.4 MG/DL                            

  



             )                                               

 

             ALKALINE PHOSPHATASE (test 106 U/L                                



             code = 2204)                                        

 

             AST (test code = 2218) 44 U/L                                 

 

             ALT (test code = 2219) 29 U/L                                 



COMPREHENSIVE METABOLIC [id number] 00:00:00





             Test Item    Value        Reference Range Interpretation Comments

 

             GLUCOSE (test code = 2217) 330 MG/DL                              

 

             BUN (test code = 2208) 11 MG/DL                               

 

             CREATININE (test code = 2214) 0.84 MG/DL                           

  

 

             eGFR  AMER. (test code 87 ML/MIN/1.73                       

    



             = 04698)                                            

 

             eGFR NON- AMER. (test 75 ML/MIN/1.73                        

   



             code = 27189)                                        

 

             CALC BUN/CREAT (test code = 13 RATIO                               



             2235)                                               

 

             SODIUM (test code = 2231) 136 MEQ/L                              

 

             POTASSIUM (test code = 2228) 4.5 MEQ/L                             

 

 

             CHLORIDE (test code = 2215) 97 MEQ/L                               

 

             CARBON DIOXIDE (test code = 27 MEQ/L                               



             )                                               

 

             CALCIUM (test code = 2209) 9.1 MG/DL                              

 

             PROTEIN, TOTAL (test code = 7.7 G/DL                               



             )                                               

 

             ALBUMIN (test code = 2201) 3.8 G/DL                               

 

             CALC GLOBULIN (test code = 3.9 G/DL                               



             2240)                                               

 

             CALC A/G RATIO (test code = 1.0 RATIO                              



             4)                                               

 

             BILIRUBIN, TOTAL (test code = 0.4 MG/DL                            

  



             )                                               

 

             ALKALINE PHOSPHATASE (test 106 U/L                                



             code = 2204)                                        

 

             AST (test code = 2218) 44 U/L                                 

 

             ALT (test code = 2219) 29 U/L                                 



MICROALBUMIN/CREATININE, RANDOM AND [id number] 00:00:00





             Test Item    Value        Reference Range Interpretation Comments

 

             CREATININE, URINE, CONC. (test 131.3 MG/DL                         

   



             code = 2072)                                        

 

             ALBUMIN, URINE, RANDOM (test code 0.4 MG/DL                        

      



             = 89388)                                            

 

             CALC ALBUMIN/CREAT, RND (test 3 MG/G                               

  



             code = 22900)                                        



MICROALBUMIN/CREATININE, RANDOM AND [id number] 00:00:00





             Test Item    Value        Reference Range Interpretation Comments

 

             CREATININE, URINE, CONC. (test 131.3 MG/DL                         

   



             code = 2072)                                        

 

             ALBUMIN, URINE, RANDOM (test code 0.4 MG/DL                        

      



             = 40876)                                            

 

             CALC ALBUMIN/CREAT, RND (test 3 MG/G                               

  



             code = 41808)                                        



HEMOGLOBIN [id number] 00:00:00





             Test Item    Value        Reference Range Interpretation Comments

 

             HEMOGLOBIN A1c (test code = 11468) 8.6 %                           

       



HEMOGLOBIN [id number] 00:00:00





             Test Item    Value        Reference Range Interpretation Comments

 

             HEMOGLOBIN A1c (test code = 87404) 8.6 %                           

       



HEMOGLOBIN [id number] 00:00:00





             Test Item    Value        Reference Range Interpretation Comments

 

             HEMOGLOBIN A1c (test code = 56626) 8.6 %                           

       



HEMOGLOBIN [id number] 00:00:00





             Test Item    Value        Reference Range Interpretation Comments

 

             HEMOGLOBIN A1c (test code = 95034) 8.6 %                           

       



HEMOGLOBIN [id number] 00:00:00





             Test Item    Value        Reference Range Interpretation Comments

 

             HEMOGLOBIN A1c (test code = 33373) 8.6 %                           

       



LIPID [id number] 00:00:00





             Test Item    Value        Reference Range Interpretation Comments

 

             CHOLESTEROL (test code = 2210) 104 MG/DL                           

   

 

             TRIGLYCERIDES (test code = 2232) 164 MG/DL                         

     

 

             HDL CHOLESTEROL (test code = 2220) 39 MG/DL                        

       

 

             CALC LDL CHOL (test code = 2237) 41 MG/DL                          

     

 

             RISK RATIO LDL/HDL (test code = 1.05 RATIO                         

    



             2238)                                               



LIPID [id number] 00:00:00





             Test Item    Value        Reference Range Interpretation Comments

 

             CHOLESTEROL (test code = 2210) 104 MG/DL                           

   

 

             TRIGLYCERIDES (test code = 2232) 164 MG/DL                         

     

 

             HDL CHOLESTEROL (test code = 2220) 39 MG/DL                        

       

 

             CALC LDL CHOL (test code = 2237) 41 MG/DL                          

     

 

             RISK RATIO LDL/HDL (test code = 1.05 RATIO                         

    



             2238)                                               



COMPREHENSIVE METABOLIC [id number] 00:00:00





             Test Item    Value        Reference Range Interpretation Comments

 

             GLUCOSE (test code = 2217) 330 MG/DL                              

 

             BUN (test code = 2208) 11 MG/DL                               

 

             CREATININE (test code = 2214) 0.84 MG/DL                           

  

 

             eGFR  AMER. (test code 87 ML/MIN/1.73                       

    



             = 80059)                                            

 

             eGFR NON- AMER. (test 75 ML/MIN/1.73                        

   



             code = 43592)                                        

 

             CALC BUN/CREAT (test code = 13 RATIO                               



             2235)                                               

 

             SODIUM (test code = 2231) 136 MEQ/L                              

 

             POTASSIUM (test code = 2228) 4.5 MEQ/L                             

 

 

             CHLORIDE (test code = 2215) 97 MEQ/L                               

 

             CARBON DIOXIDE (test code = 27 MEQ/L                               



             )                                               

 

             CALCIUM (test code = 2209) 9.1 MG/DL                              

 

             PROTEIN, TOTAL (test code = 7.7 G/DL                               



             )                                               

 

             ALBUMIN (test code = 2201) 3.8 G/DL                               

 

             CALC GLOBULIN (test code = 3.9 G/DL                               



             2240)                                               

 

             CALC A/G RATIO (test code = 1.0 RATIO                              



             2234)                                               

 

             BILIRUBIN, TOTAL (test code = 0.4 MG/DL                            

  



             )                                               

 

             ALKALINE PHOSPHATASE (test 106 U/L                                



             code = 2204)                                        

 

             AST (test code = 2218) 44 U/L                                 

 

             ALT (test code = 2219) 29 U/L                                 



COMPREHENSIVE METABOLIC [id number] 00:00:00





             Test Item    Value        Reference Range Interpretation Comments

 

             GLUCOSE (test code = 2217) 330 MG/DL                              

 

             BUN (test code = 2208) 11 MG/DL                               

 

             CREATININE (test code = 2214) 0.84 MG/DL                           

  

 

             eGFR  AMER. (test code 87 ML/MIN/1.73                       

    



             = 36199)                                            

 

             eGFR NON- AMER. (test 75 ML/MIN/1.73                        

   



             code = 77014)                                        

 

             CALC BUN/CREAT (test code = 13 RATIO                               



             2235)                                               

 

             SODIUM (test code = 2231) 136 MEQ/L                              

 

             POTASSIUM (test code = 2228) 4.5 MEQ/L                             

 

 

             CHLORIDE (test code = 2215) 97 MEQ/L                               

 

             CARBON DIOXIDE (test code = 27 MEQ/L                               



             )                                               

 

             CALCIUM (test code = 2209) 9.1 MG/DL                              

 

             PROTEIN, TOTAL (test code = 7.7 G/DL                               



             )                                               

 

             ALBUMIN (test code = 2201) 3.8 G/DL                               

 

             CALC GLOBULIN (test code = 3.9 G/DL                               



             2240)                                               

 

             CALC A/G RATIO (test code = 1.0 RATIO                              



             2234)                                               

 

             BILIRUBIN, TOTAL (test code = 0.4 MG/DL                            

  



             )                                               

 

             ALKALINE PHOSPHATASE (test 106 U/L                                



             code = 2204)                                        

 

             AST (test code = 2218) 44 U/L                                 

 

             ALT (test code = 2219) 29 U/L                                 



MICROALBUMIN/CREATININE, RANDOM AND [id number] 00:00:00





             Test Item    Value        Reference Range Interpretation Comments

 

             CREATININE, URINE, CONC. (test 131.3 MG/DL                         

   



             code = 2072)                                        

 

             ALBUMIN, URINE, RANDOM (test code 0.4 MG/DL                        

      



             = 30025)                                            

 

             CALC ALBUMIN/CREAT, RND (test 3 MG/G                               

  



             code = 69825)                                        



MICROALBUMIN/CREATININE, RANDOM AND [id number] 00:00:00





             Test Item    Value        Reference Range Interpretation Comments

 

             CREATININE, URINE, CONC. (test 131.3 MG/DL                         

   



             code = 2072)                                        

 

             ALBUMIN, URINE, RANDOM (test code 0.4 MG/DL                        

      



             = 62216)                                            

 

             CALC ALBUMIN/CREAT, RND (test 3 MG/G                               

  



             code = 65449)                                        



LIPID [id number] 00:00:00





             Test Item    Value        Reference Range Interpretation Comments

 

             CHOLESTEROL (test code = 2210) 104 MG/DL                           

   

 

             TRIGLYCERIDES (test code = 2232) 164 MG/DL                         

     

 

             HDL CHOLESTEROL (test code = 2220) 39 MG/DL                        

       

 

             CALC LDL CHOL (test code = 2237) 41 MG/DL                          

     

 

             RISK RATIO LDL/HDL (test code = 1.05 RATIO                         

    



             2238)                                               



COMPREHENSIVE METABOLIC [id number] 00:00:00





             Test Item    Value        Reference Range Interpretation Comments

 

             GLUCOSE (test code = 2217) 330 MG/DL                              

 

             BUN (test code = 2208) 11 MG/DL                               

 

             CREATININE (test code = 2214) 0.84 MG/DL                           

  

 

             eGFR  AMER. (test code 87 ML/MIN/1.73                       

    



             = 22617)                                            

 

             eGFR NON- AMER. (test 75 ML/MIN/1.73                        

   



             code = 83962)                                        

 

             CALC BUN/CREAT (test code = 13 RATIO                               



             2235)                                               

 

             SODIUM (test code = 2231) 136 MEQ/L                              

 

             POTASSIUM (test code = 2228) 4.5 MEQ/L                             

 

 

             CHLORIDE (test code = 2215) 97 MEQ/L                               

 

             CARBON DIOXIDE (test code = 27 MEQ/L                               



             2206)                                               

 

             CALCIUM (test code = 2209) 9.1 MG/DL                              

 

             PROTEIN, TOTAL (test code = 7.7 G/DL                               



             )                                               

 

             ALBUMIN (test code = 2201) 3.8 G/DL                               

 

             CALC GLOBULIN (test code = 3.9 G/DL                               



             2240)                                               

 

             CALC A/G RATIO (test code = 1.0 RATIO                              



             2234)                                               

 

             BILIRUBIN, TOTAL (test code = 0.4 MG/DL                            

  



             )                                               

 

             ALKALINE PHOSPHATASE (test 106 U/L                                



             code = 2204)                                        

 

             AST (test code = 2218) 44 U/L                                 

 

             ALT (test code = 2219) 29 U/L                                 



MICROALBUMIN/CREATININE, RANDOM AND [id number] 00:00:00





             Test Item    Value        Reference Range Interpretation Comments

 

             CREATININE, URINE, CONC. (test 131.3 MG/DL                         

   



             code = 2072)                                        

 

             ALBUMIN, URINE, RANDOM (test code 0.4 MG/DL                        

      



             = 21540)                                            

 

             CALC ALBUMIN/CREAT, RND (test 3 MG/G                               

  



             code = 20194)                                        



HEMOGLOBIN [id number] 00:00:00





             Test Item    Value        Reference Range Interpretation Comments

 

             HEMOGLOBIN A1c (test code = 59777) 8.6 %                           

       



HEMOGLOBIN [id number] 00:00:00





             Test Item    Value        Reference Range Interpretation Comments

 

             HEMOGLOBIN A1c (test code = 12403) 8.6 %                           

       



LIPID [id number] 00:00:00





             Test Item    Value        Reference Range Interpretation Comments

 

             CHOLESTEROL (test code = 2210) 104 MG/DL                           

   

 

             TRIGLYCERIDES (test code = 2232) 164 MG/DL                         

     

 

             HDL CHOLESTEROL (test code = 2220) 39 MG/DL                        

       

 

             CALC LDL CHOL (test code = 2237) 41 MG/DL                          

     

 

             RISK RATIO LDL/HDL (test code = 1.05 RATIO                         

    



             2238)                                               



COMPREHENSIVE METABOLIC [id number] 00:00:00





             Test Item    Value        Reference Range Interpretation Comments

 

             GLUCOSE (test code = 2217) 330 MG/DL                              

 

             BUN (test code = 2208) 11 MG/DL                               

 

             CREATININE (test code = 2214) 0.84 MG/DL                           

  

 

             eGFR  AMER. (test code 87 ML/MIN/1.73                       

    



             = 89378)                                            

 

             eGFR NON- AMER. (test 75 ML/MIN/1.73                        

   



             code = 80723)                                        

 

             CALC BUN/CREAT (test code = 13 RATIO                               



             2235)                                               

 

             SODIUM (test code = 2231) 136 MEQ/L                              

 

             POTASSIUM (test code = 2228) 4.5 MEQ/L                             

 

 

             CHLORIDE (test code = 2215) 97 MEQ/L                               

 

             CARBON DIOXIDE (test code = 27 MEQ/L                               



             )                                               

 

             CALCIUM (test code = 2209) 9.1 MG/DL                              

 

             PROTEIN, TOTAL (test code = 7.7 G/DL                               



             )                                               

 

             ALBUMIN (test code = 2201) 3.8 G/DL                               

 

             CALC GLOBULIN (test code = 3.9 G/DL                               



             2240)                                               

 

             CALC A/G RATIO (test code = 1.0 RATIO                              



             2234)                                               

 

             BILIRUBIN, TOTAL (test code = 0.4 MG/DL                            

  



             )                                               

 

             ALKALINE PHOSPHATASE (test 106 U/L                                



             code = 2204)                                        

 

             AST (test code = 2218) 44 U/L                                 

 

             ALT (test code = 2219) 29 U/L                                 



MICROALBUMIN/CREATININE, RANDOM AND [id number] 00:00:00





             Test Item    Value        Reference Range Interpretation Comments

 

             CREATININE, URINE, CONC. (test 131.3 MG/DL                         

   



             code = 2072)                                        

 

             ALBUMIN, URINE, RANDOM (test code 0.4 MG/DL                        

      



             = 52486)                                            

 

             CALC ALBUMIN/CREAT, RND (test 3 MG/G                               

  



             code = 36028)                                        



HEMOGLOBIN [id number] 00:00:00





             Test Item    Value        Reference Range Interpretation Comments

 

             HEMOGLOBIN A1c (test code = 19824) 8.6 %                           

       



HEMOGLOBIN [id number] 00:00:00





             Test Item    Value        Reference Range Interpretation Comments

 

             HEMOGLOBIN A1c (test code = 72679) 8.6 %                           

       



HEMOGLOBIN [id number] 00:00:00





             Test Item    Value        Reference Range Interpretation Comments

 

             HEMOGLOBIN A1c (test code = 30121) 8.6 %                           

       



LIPID [id number] 00:00:00





             Test Item    Value        Reference Range Interpretation Comments

 

             CHOLESTEROL (test code = 2210) 104 MG/DL                           

   

 

             TRIGLYCERIDES (test code = 2232) 164 MG/DL                         

     

 

             HDL CHOLESTEROL (test code = 2220) 39 MG/DL                        

       

 

             CALC LDL CHOL (test code = 2237) 41 MG/DL                          

     

 

             RISK RATIO LDL/HDL (test code = 1.05 RATIO                         

    



             2238)                                               



LIPID [id number] 00:00:00





             Test Item    Value        Reference Range Interpretation Comments

 

             CHOLESTEROL (test code = 2210) 104 MG/DL                           

   

 

             TRIGLYCERIDES (test code = 2232) 164 MG/DL                         

     

 

             HDL CHOLESTEROL (test code = 2220) 39 MG/DL                        

       

 

             CALC LDL CHOL (test code = 2237) 41 MG/DL                          

     

 

             RISK RATIO LDL/HDL (test code = 1.05 RATIO                         

    



             2238)                                               



COMPREHENSIVE METABOLIC [id number] 00:00:00





             Test Item    Value        Reference Range Interpretation Comments

 

             GLUCOSE (test code = 2217) 330 MG/DL                              

 

             BUN (test code = 2208) 11 MG/DL                               

 

             CREATININE (test code = 2214) 0.84 MG/DL                           

  

 

             eGFR  AMER. (test code 87 ML/MIN/1.73                       

    



             = 76007)                                            

 

             eGFR NON- AMER. (test 75 ML/MIN/1.73                        

   



             code = 97592)                                        

 

             CALC BUN/CREAT (test code = 13 RATIO                               



             2235)                                               

 

             SODIUM (test code = 2231) 136 MEQ/L                              

 

             POTASSIUM (test code = 2228) 4.5 MEQ/L                             

 

 

             CHLORIDE (test code = 2215) 97 MEQ/L                               

 

             CARBON DIOXIDE (test code = 27 MEQ/L                               



             )                                               

 

             CALCIUM (test code = 2209) 9.1 MG/DL                              

 

             PROTEIN, TOTAL (test code = 7.7 G/DL                               



             )                                               

 

             ALBUMIN (test code = 2201) 3.8 G/DL                               

 

             CALC GLOBULIN (test code = 3.9 G/DL                               



             2240)                                               

 

             CALC A/G RATIO (test code = 1.0 RATIO                              



             2234)                                               

 

             BILIRUBIN, TOTAL (test code = 0.4 MG/DL                            

  



             )                                               

 

             ALKALINE PHOSPHATASE (test 106 U/L                                



             code = 2204)                                        

 

             AST (test code = 2218) 44 U/L                                 

 

             ALT (test code = 2219) 29 U/L                                 



COMPREHENSIVE METABOLIC [id number] 00:00:00





             Test Item    Value        Reference Range Interpretation Comments

 

             GLUCOSE (test code = 2217) 330 MG/DL                              

 

             BUN (test code = 2208) 11 MG/DL                               

 

             CREATININE (test code = 2214) 0.84 MG/DL                           

  

 

             eGFR  AMER. (test code 87 ML/MIN/1.73                       

    



             = 71836)                                            

 

             eGFR NON- AMER. (test 75 ML/MIN/1.73                        

   



             code = 74568)                                        

 

             CALC BUN/CREAT (test code = 13 RATIO                               



             2235)                                               

 

             SODIUM (test code = 2231) 136 MEQ/L                              

 

             POTASSIUM (test code = 2228) 4.5 MEQ/L                             

 

 

             CHLORIDE (test code = 2215) 97 MEQ/L                               

 

             CARBON DIOXIDE (test code = 27 MEQ/L                               



             )                                               

 

             CALCIUM (test code = 2209) 9.1 MG/DL                              

 

             PROTEIN, TOTAL (test code = 7.7 G/DL                               



             )                                               

 

             ALBUMIN (test code = 2201) 3.8 G/DL                               

 

             CALC GLOBULIN (test code = 3.9 G/DL                               



             2240)                                               

 

             CALC A/G RATIO (test code = 1.0 RATIO                              



             2234)                                               

 

             BILIRUBIN, TOTAL (test code = 0.4 MG/DL                            

  



             )                                               

 

             ALKALINE PHOSPHATASE (test 106 U/L                                



             code = 2204)                                        

 

             AST (test code = 2218) 44 U/L                                 

 

             ALT (test code = 2219) 29 U/L                                 



MICROALBUMIN/CREATININE, RANDOM AND [id number] 00:00:00





             Test Item    Value        Reference Range Interpretation Comments

 

             CREATININE, URINE, CONC. (test 131.3 MG/DL                         

   



             code = 2072)                                        

 

             ALBUMIN, URINE, RANDOM (test code 0.4 MG/DL                        

      



             = 88136)                                            

 

             CALC ALBUMIN/CREAT, RND (test 3 MG/G                               

  



             code = 69103)                                        



MICROALBUMIN/CREATININE, RANDOM AND [id number] 00:00:00





             Test Item    Value        Reference Range Interpretation Comments

 

             CREATININE, URINE, CONC. (test 131.3 MG/DL                         

   



             code = 2072)                                        

 

             ALBUMIN, URINE, RANDOM (test code 0.4 MG/DL                        

      



             = 62459)                                            

 

             CALC ALBUMIN/CREAT, RND (test 3 MG/G                               

  



             code = 26615)

## 2022-12-20 NOTE — RAD REPORT
EXAM DESCRIPTION:  CT - Spine Lumbar Wo Con - 8/14/2022 5:51 pm

 

CLINICAL HISTORY:   Radiculopathy.

Fall, back pain, abnormal plain film

 

COMPARISON:  Lumbar Spine 3 Views dated 8/14/2022

 

TECHNIQUE:  Axial noncontrast CT imaging of the lumbar spine was performed with coronal and sagittal 
re-formatted images.

 

All CT scans are performed using dose optimization technique as appropriate and may include automated
 exposure control or mA/KV adjustment according to patient size.

 

FINDINGS:  There is a compression fracture seen of the L1 vertebral body. Loss of vertebral body heig
ht is estimated at 70-80%. There is mild canal narrowing caused by this. General appearance of this f
racture is that of an old/chronic injury. Acute fracture currently not suspected.

 

Paraspinal tissues are normal in thickness. No paraspinal abscess or hematoma seen.

 

Lower lumbar degenerative changes are noted with a posterior disc bulge at L5-S1 and moderate facet h
ypertrophy.

 

IMPRESSION:  Old osteoporotic moderate compression fracture of L1. No acute fracture seen.

 

Moderate lower lumbar degenerative spondylosis.
EXAM DESCRIPTION:  RAD - Lumbar Spine 3 Views - 8/14/2022 3:42 pm

 

CLINICAL HISTORY:  back pain

Radiculopathy

 

COMPARISON:  No comparisons

 

FINDINGS:  Diffuse osteopenia is seen. There is a moderately severe compression fracture affecting L1
 vertebral body. Loss of vertebral body height is 70%. Disc thinning is present lower lumbar levels g
reatest at L5-S1. No spondylolysis or spondylolisthesis.

 

 

IMPRESSION:  Moderate compression fracture is present affecting the L1 vertebral body.
No

## 2023-01-07 ENCOUNTER — HOSPITAL ENCOUNTER (EMERGENCY)
Dept: HOSPITAL 97 - ER | Age: 63
Discharge: HOME | End: 2023-01-07
Payer: COMMERCIAL

## 2023-01-07 VITALS — DIASTOLIC BLOOD PRESSURE: 64 MMHG | OXYGEN SATURATION: 98 % | SYSTOLIC BLOOD PRESSURE: 109 MMHG

## 2023-01-07 VITALS — TEMPERATURE: 98.2 F

## 2023-01-07 DIAGNOSIS — R19.7: Primary | ICD-10-CM

## 2023-01-07 DIAGNOSIS — Z88.5: ICD-10-CM

## 2023-01-07 DIAGNOSIS — Z88.8: ICD-10-CM

## 2023-01-07 DIAGNOSIS — Z88.0: ICD-10-CM

## 2023-01-07 DIAGNOSIS — K74.60: ICD-10-CM

## 2023-01-07 DIAGNOSIS — I10: ICD-10-CM

## 2023-01-07 DIAGNOSIS — E11.9: ICD-10-CM

## 2023-01-07 LAB
ALBUMIN SERPL BCP-MCNC: 3.6 G/DL (ref 3.4–5)
ALP SERPL-CCNC: 73 U/L (ref 45–117)
ALT SERPL W P-5'-P-CCNC: 27 U/L (ref 13–56)
AST SERPL W P-5'-P-CCNC: 24 U/L (ref 15–37)
BLD SMEAR INTERP: (no result)
BUN BLD-MCNC: 20 MG/DL (ref 7–18)
GLUCOSE SERPLBLD-MCNC: 207 MG/DL (ref 74–106)
HCT VFR BLD CALC: 30.4 % (ref 36–45)
LIPASE SERPL-CCNC: 220 U/L (ref 73–393)
LYMPHOCYTES # SPEC AUTO: 0.9 K/UL (ref 0.7–4.9)
MCV RBC: 85.1 FL (ref 80–100)
MORPHOLOGY BLD-IMP: (no result)
PMV BLD: 6.9 FL (ref 7.6–11.3)
POTASSIUM SERPL-SCNC: 4.6 MMOL/L (ref 3.5–5.1)
RBC # BLD: 3.58 M/UL (ref 3.86–4.86)

## 2023-01-07 PROCEDURE — 87046 STOOL CULTR AEROBIC BACT EA: CPT

## 2023-01-07 PROCEDURE — 87324 CLOSTRIDIUM AG IA: CPT

## 2023-01-07 PROCEDURE — 83690 ASSAY OF LIPASE: CPT

## 2023-01-07 PROCEDURE — 87493 C DIFF AMPLIFIED PROBE: CPT

## 2023-01-07 PROCEDURE — 99283 EMERGENCY DEPT VISIT LOW MDM: CPT

## 2023-01-07 PROCEDURE — 36415 COLL VENOUS BLD VENIPUNCTURE: CPT

## 2023-01-07 PROCEDURE — 87045 FECES CULTURE AEROBIC BACT: CPT

## 2023-01-07 PROCEDURE — 80053 COMPREHEN METABOLIC PANEL: CPT

## 2023-01-07 PROCEDURE — 85025 COMPLETE CBC W/AUTO DIFF WBC: CPT

## 2023-01-07 NOTE — XMS REPORT
Continuity of Care Document

                           Created on:2023



Patient:SUDHA BABIN

Sex:Female

:1960

External Reference #:[id number]





Demographics







                          Address                   [address]



                                                    [place] [address]

 

                          Home Phone                [phone]

 

                          Work Phone                [phone]

 

                          Mobile Phone              [phone] )

 

                          Email Address             NONE

 

                          Preferred Language        English

 

                          Marital Status            Unknown

 

                          Rastafarian Affiliation     Unknown

 

                          Race                      Unknown

 

                          Additional Race(s)        Unavailable



                                                    Unavailable



                                                    White

 

                          Ethnic Group              Unknown









Author







                          Organization              [facility]

t

 

                          Address                   [address]



                                                    [place] [address]

 

                          Phone                     [phone]









Support







                Name            Relationship    Address         Phone

 

                JUNIE BABIN              [phone] 



                                                [place] [address] 

 

                EMILY BABINAVAALISHA  SP              Unavailable     [phone]

 

                REGINA BABIN  X               [address] [phone]

[address]



                                                         



                                                [place] [address] 









Care Team Providers







                    Name                Role                Phone

 

                    PCP, PATIENT DOES NOT HAVE A Primary Care Physician Unavaila

BENNIE Ramirez      Attending Clinician Unavailable

 

                    Ganesh Chauhan DO   Attending Clinician [phone]

 

                    GANESH CHAUHAN      Attending Clinician Unavailable

 

                    GANESH CHAUHAN      Attending Clinician Unavailable

 

                    PRADEEP SPANN      Attending Clinician Unavailable

 

                    PRADEEP SPANN      Attending Clinician Unavailable

 

                    ROSIE JHA     Attending Clinician Unavailable

 

                    Rosie Jha DO  Attending Clinician [phone]

 

                    BENNIE ORR Attending Clinician Unavailable

 

                    GANESH CHAUHAN      Admitting Clinician Unavailable

 

                    ROSIE JHA     Admitting Clinician Unavailable









Payers







           Payer Name Policy Type Policy Number Effective Date Expiration Date HORACIO polo

 

           [facility] [employer]            [id number]  2022 00:00:00            







Problems







       Condition Condition Condition Status Onset  Resolution Last   Treating Co

mments 

Source



       Name   Details Category        Date   Date   Treatment Clinician        



                                                 Date                 

 

       No known No known Disease                                           Unive

rs



       active active                                                  ity of



       problems problems                                                  [facility]







Allergies, Adverse Reactions, Alerts







       Allergy Allergy Status Severity Reaction(s) Onset  Inactive Treating Comm

ents 

Source



       Name   Type                        Date   Date   Clinician        

 

       [facility]   Active        Hives                        Univers



       C      INGREDI                                              ity of



                                          00:00:                      [place]



                                          00                          Medical



                                                                      Branch

 

       [facility] Propensi Active        Hives                        Univer

s



       c      ty to                       9-12                        ity of



              adverse                      00:00:                      [place]



              reaction                      00                          Medical



              s                                                       Branch

 

       [facility] Propensi Active        Other - See                       U

nivers



       in     ty to                comments 7-15                        ity of



              adverse                      00:00:                      [place]



              reaction                      00                          Medical



              s                                                       Branch

 

       [facility]   Active        Other-Cmnt                       Univ

ers



       IN     INGREDI                      7-15                        ity of



                                          00:00:                      [place]



                                          00                          Medical



                                                                      Branch

 

       n      Propensi Active                                     



              ty to                       6                        



              adverse                      00:00:                      



              reaction                      00                          



              to drug                                                  

 

       Bactrim Propensi Active                                     



       - Oral ty to                       4-                        



              adverse                      00:00:                      



              reaction                      00                          



              to drug                                                  

 

       NO KNOWN Drug   Active                                           Univers



       ALLERGIE Class                                                   ity of



       [facility]







Social History







           Social Habit Start Date Stop Date  Quantity   Comments   Source

 

           ASSERTION                        Pregnant              [facility]

 

           Exposure to 2022-10-26 2022 Not sure              [facility]

 [place]



           SARS-CoV-2 (event) 00:00:00   07:25:00                         Medica

l Branch

 

           Sex Assigned At [birth year]                       [facility]



           Birth      00:00:00   00:00:00                         Medical Branch









                Smoking Status  Start Date      Stop Date       Source

 

                Tobacco smoking consumption                                 [facility]                                         Branch







Medications







       Ordered Filled Start  Stop   Current Ordering Indication Dosage Frequency

 Signature

                    Comments            Components          Source



     Medication Medication Date Date Medication? Clinician                (SIG) 

          



     Name Name                                                   

 

     TAKE 2022      No             30unit                     



     TABLET      2-16                                              



     DAILY AS      00:00:                                              



     DIRECTED.      00                                                

 

     TAKE 2022      No                                      



     TABLET BY      2-16                                              



     MOUTH EVERY      00:00:                                              



     8 HOURS AS      00                                                



     NEEDED FOR                                                        



     ACUTE PAIN                                                        

 

     HYDROXYZINE      2022      No             30                       



     HYDROCHLORI      2-16                                              



     DE 25 MG      00:00:                                              



     TABS      00                                                

 

     Dose      2022      No             30                       



     Unknown      2-16                                              



               00:00:                                              



               00                                                

 

     Dose      2022      No             30                       



     Unknown      2-16                                              



               00:00:                                              



               00                                                

 

     TAKE 2022      No             30                       



     TABLET BY      2-16                                              



     MOUTH ONCE      00:00:                                              



     A DAY WITH      00                                                



     MEALS                                                        

 

     TAKE 2022      No                                      



     TABLET BY      2-16                                              



     MOUTH EVERY      00:00:                                              



     6 HOURS AS      00                                                



     NEEDED FOR                                                        



     PAIN DUE                                                        



                                                             

 

     TAKE 2022      No             30                       



     CAPSULE BY      2-16                                              



     MOUTH DAILY      00:00:                                              



     30 MINUTES      00                                                



     BEFORE                                                        



     BREAKFAST                                                        

 

     TAKE 2022      No                                      



     TABLET      2-14                                              



     DAILY.      00:00:                                              



               00                                                

 

     TAKE 1      2022      No                                      



     TABLET      2-14                                              



     DAILY WITH      00:00:                                              



     BREAKFAST.      00                                                

 

     TAKE 1      2022      No             50                       



     TABLET BY      2-14                                              



     MOUTH EVERY      00:00:                                              



     4 TO 6      00                                                



     HOURS AS                                                        



     NEEDED                                                        

 

     Dose      2022      No                                      



     Unknown      2-14                                              



               00:00:                                              



               00                                                

 

     Dose      2022      No                                      



     Unknown      2-14                                              



               00:00:                                              



               00                                                

 

     TAKE 1      2022      No                                      



     TABLET BY      2-14                                              



     MOUTH EVERY      00:00:                                              



     6 HOURS AS      00                                                



     NEEDED FOR                                                        



     PAIN                                                        

 

     AZITHROMYCI      2022      No             30                       



     N 250 MG      2-14                                              



     TABS      00:00:                                              



               00                                                

 

     Dose      2022      No                                      



     Unknown      2-14                                              



               00:00:                                              



               00                                                

 

     Dose      2022      No             30                       



     Unknown      2-14                                              



               00:00:                                              



               00                                                

 

     Dose      2022      No                                      



     Unknown      2-14                                              



               00:00:                                              



               00                                                

 

     Dose      2022      No                                      



     Unknown      2-14                                              



               00:00:                                              



               00                                                

 

     CYCLOBENZAP      2022      No             30                       



     RINE      2-14                                              



     HYDROCHLORI      00:00:                                              



     DE 10 MG      00                                                



     TABS                                                        

 

     Dose      2022      No             5                        



     Unknown      2-14                                              



               00:00:                                              



               00                                                

 

     TAKE 1      2022      No             30                       



     TABLET BY      2-14                                              



     MOUTH 2      00:00:                                              



     TIMES A DAY      00                                                



     AS NEEDED                                                        

 

     ONDANSETRON      2022      No             30                       



     HYDROCHLORI      2-14                                              



     DE 4 MG      00:00:                                              



     TABS      00                                                

 

     TAKE 1      2022      No             20                       



     TABLET BY      2-14                                              



     MOUTH EVERY      00:00:                                              



     DAY AS      00                                                



     DIRECTED                                                        

 

     Dose      2022      No                                      



     Unknown      2-14                                              



               00:00:                                              



               00                                                

 

     Dose      2022      No             30                       



     Unknown      2-14                                              



               00:00:                                              



               00                                                

 

     Dose      2022      No             30                       



     Unknown      2-14                                              



               00:00:                                              



               00                                                

 

     Dose      2022      No                                      



     Unknown      2-14                                              



               00:00:                                              



               00                                                

 

     OMEPRAZOLE      2022      No                                      



     40 MG CPDR      2-14                                              



               00:00:                                              



               00                                                

 

     Dose      2022      No             30                       



     Unknown      2-14                                              



               00:00:                                              



               00                                                

 

     Dose      2022      No             30                       



     Unknown      2-14                                              



               00:00:                                              



               00                                                

 

     Dose      2022      No             30                       



     Unknown      2-14                                              



               00:00:                                              



               00                                                

 

     Dose      2022      No                                      



     Unknown      2-14                                              



               00:00:                                              



               00                                                

 

     Dose      2022      No             30                       



     Unknown      2-14                                              



               00:00:                                              



               00                                                

 

     NITROFURANT      2022      No             5                        



     OIN       2-14                                              



     MONOHYDRATE      00:00:                                              



     /MACROCRY      00                                                



     STALS 100                                                        



     MG CAPS                                                        

 

     Dose      2022      No                                      



     Unknown      2-14                                              



               00:00:                                              



               00                                                

 

     LANSOPRAZOL      2022      No             2                        



     E 30 MG      2-14                                              



     CPDR      00:00:                                              



               00                                                

 

     IBUPROFEN      2022      No             30                       



     600 MG TABS      2-14                                              



               00:00:                                              



               00                                                

 

     TAKE 2022      No             30                       



     TABLET BY      2-14                                              



     MOUTH EVERY      00:00:                                              



     12 HOURS      00                                                



     FOR 10 DAYS                                                        

 

     UNITHROID      2022      No             30                       



     150 MCG      2-14                                              



     TABS      00:00:                                              



               00                                                

 

     TAKE 2022      No             30                       



     TABLET BY      2-14                                              



     MOUTH AS      00:00:                                              



     DIRECTED 1      00                                                



     UNIT AS                                                        



     DIRECTED                                                        



     USED AS                                                        



     DIRECTED BY                                                        



     YOUR                                                        



     COLONOSCOPY                                                        



     PACKET                                                        



     INSTRUCTION                                                        



     S                                                           

 

     LEVOTHYROXI      2022      No             30                       



     NE SODIUM      2-14                                              



     300 MCG      00:00:                                              



     TABS      00                                                

 

     TAKE 2022      No             30                       



     TABLET BY      2-14                                              



     MOUTH EVERY      00:00:                                              



     DAY AT      00                                                



     BEDTIME AS                                                        



     NEEDED FOR                                                        



     INSOMNIA                                                        

 

     OXYCODONE/A      2022      No                                      



     CETAMINOPHE      2-14                                              



     N  MG      00:00:                                              



     TABS      00                                                

 

     GLIMEPIRIDE      2022      No             30                       



     1 MG TABS      2-14                                              



               00:00:                                              



               00                                                

 

     IBUPROFEN      2022      No             30                       



     800 MG TABS      2-14                                              



               00:00:                                              



               00                                                

 

     Dose      2022      No             30                       



     Unknown      2-14                                              



               00:00:                                              



               00                                                

 

     CITALOPRAM      2022      No             30                       



     HYDROBROMID      2-14                                              



     E 20 MG      00:00:                                              



     TABS      00                                                

 

     TAKE 2022      No             30                       



     TABLET BY      2-14                                              



     MOUTH EVERY      00:00:                                              



     DAY       00                                                

 

     CEFPODOXIME      2022      No             30                       



     PROXETIL      2-14                                              



     200 MG TABS      00:00:                                              



               00                                                

 

     KETOCONAZOL      2022      No             30                       



     E 2 % CREA      2-14                                              



               00:00:                                              



               00                                                

 

     TAKE 2022      No                                      



     CAPSULE      1-21                                              



     WEEKLY.      00:00:                                              



               00                                                

 

     TAKE 2022      No             30unit                     



     CAPSULE      1-21                                              



     WEEKLY.      00:00:                                              



               00                                                

 

     HYDROXYZINE      2022      No                                      



     HCL 50 MG      1-15                                              



     TABS      00:00:                                              



               00                                                

 

     HYDROXYZINE      2022      No                                      



     HCL 50 MG      1-15                                              



     TABS      00:00:                                              



               00                                                

 

     Dose      2022      No             5                        



     Unknown      1-15                                              



               00:00:                                              



               00                                                

 

     HYDROcodone      2022- No             1{tbl}      1 tablet,         

  [facility]



     -acetaminop      - 11-05                          Oral,           ity of



     hen ([facility]      12:45: 12:48                          ONCE, 1           [facility]

as



     5) 5-325 mg      00   :00                           dose, On           Medi

marisa



     tablet 1                                         Sat            [facility]



     tablet                                         22 at           



                                                  0745, ASAP           

 

     HYDROcodone      2022- Yes       4647 1{tbl}      Take 1           [facility]

nivers



     -acetaminop       11-13                          tablet by           it

y of



     hen 5-325      00:00: 05:59                          mouth           [place]



     mg tablet      00   :00                           every 4           [facility]



                                                  (four)           [facility]



                                                  hours as           



                                                  needed for           



                                                  Pain           



                                                  (scale           



                                                  1-3) for           



                                                  up to 7           



                                                  days.           



                                                  Indication           



                                                  s: acute           



                                                  pain           

 

     METFORMIN      2022      No                                      



     HYDROCHLORI      0-31                                              



     DE 1000 MG      00:00:                                              



     TABS      00                                                

 

     METFORMIN      2022      No                                      



     HYDROCHLORI      0-31                                              



     DE 1000 MG      00:00:                                              



     TABS      00                                                

 

     METFORMIN      2022      No                                      



     HYDROCHLORI      0-31                                              



     DE 1000 MG      00:00:                                              



     TABS      00                                                

 

     MORPHINE      2022      No             30                       



     SUL 30MG ER      0-24                                              



     Tablets      00:00:                                              



               00                                                

 

     MORPHINE      2022      No             30                       



     SUL 30MG ER      0-24                                              



     Tablets      00:00:                                              



               00                                                

 

     TAKE 1      2022      No                                      



     TABLET BY      0-24                                              



     MOUTH DAILY      00:00:                                              



     AT NIGHT      00                                                

 

     LISINOPRIL      2022      No                                      



     5 MG TABS      0-11                                              



               00:00:                                              



               00                                                

 

     LISINOPRIL      2022      No                                      



     5 MG TABS      0-11                                              



               00:00:                                              



               00                                                

 

     LISINOPRIL      1      No                                      



     5 MG TABS      0-11                                              



               00:00:                                              



               00                                                

 

     LISINOPRIL      1      No                                      



     5 MG TABS      0-11                                              



               00:00:                                              



               00                                                

 

     UNITHROID      -0      No                                      



     75 MCG TABS      9-26                                              



               00:00:                                              



               00                                                

 

     UNITHROID      -0      No                                      



     75 MCG TABS      9-26                                              



               00:00:                                              



               00                                                

 

     UNITHROID      -0      No                                      



     75 MCG TABS      9-26                                              



               00:00:                                              



               00                                                

 

     Dose      -0      No             30                       



     Unknown                                                    



               00:00:                                              



               00                                                

 

     HUMULIN      2-0      No                                      



     70/30      -                                              



     KWIKPEN      00:00:                                              



     (70-30) 100      00                                                



     UNIT/ML                                                        



     SUPN                                                        

 

     Dose      -0      No                                      



     Unknown                                                    



               00:00:                                              



               00                                                

 

     Dose      -0      No                                      



     Unknown                                                    



               00:00:                                              



               00                                                

 

     HUMULIN      2-0      No             30                       



     70/30      9-23                                              



     KWIKPEN      00:00:                                              



     (70-30) 100      00                                                



     UNIT/ML                                                        



     SUPN                                                        

 

     HYDROcodone      2-0 2- No             1{tbl}      1 tablet,         

  [facility]



     -acetaminop                                Oral,           ity of



     hen ([facility])      05:52: 05:58                          ONCE, 1           [facility]

[place]



      mg      00   :00                           dose, On           [facility]

l



     tablet 1                                         Tue            [facility]



     tablet                                         22 at           



                                                  0100, ASAP           

 

     MORPHINE      2-0      No             30                       



     SULFATE ER      8-22                                              



     30 MG TBCR      00:00:                                              



               00                                                

 

     MORPHINE      2-0      No             30                       



     SULFATE ER      8-22                                              



     30 MG TBCR      00:00:                                              



               00                                                

 

     MORPHINE      2-0      No             30                       



     SULFATE ER      8-22                                              



     30 MG TBCR      00:00:                                              



               00                                                

 

     MORPHINE      2-0      No                                      



     SULFATE ER      8-22                                              



     30 MG TBCR      00:00:                                              



               00                                                

 

     MORPHINE      2-0      No             30                       



     SULFATE ER      8-22                                              



     30 MG TBCR      00:00:                                              



               00                                                

 

     TRINTELLIX      2-0      No                                      



     20 MG TABS      8-18                                              



               00:00:                                              



               00                                                

 

     TRINTELLIX      2022-0      No                                      



     20 MG TABS      8-18                                              



               00:00:                                              



               00                                                

 

     TRINTELLIX      2022-0      No                                      



     20 MG TABS      8-18                                              



               00:00:                                              



               00                                                

 

     TRINTELLIX      2022-0      No                                      



     20 MG TABS      8-18                                              



               00:00:                                              



               00                                                

 

     TRINTELLIX      2022-0      No             20                       



     20 MG TABS      8-18                                              



               00:00:                                              



               00                                                

 

     Dose      2-0      No                                      



     Unknown      8-10                                              



               00:00:                                              



               00                                                

 

     Dose      2-0      No                                      



     Unknown      8-10                                              



               00:00:                                              



               00                                                

 

     Dose      2-0      No             5                        



     Unknown      8-10                                              



               00:00:                                              



               00                                                

 

     Dose      2-0      No                                      



     Unknown      8-10                                              



               00:00:                                              



               00                                                

 

     &lt       2022-0      No             10                       



               8-10                                              



               00:00:                                              



               00                                                

 

     Dose      2-0      No                                      



     Unknown      8-10                                              



               00:00:                                              



               00                                                

 

     Dose      2-0      No                                      



     Unknown      8-10                                              



               00:00:                                              



               00                                                

 

     Dose      2-0      No             5                        



     Unknown      8-10                                              



               00:00:                                              



               00                                                

 

     Dose      2-0      No                                      



     Unknown      8-10                                              



               00:00:                                              



               00                                                

 

     &lt       2022-0      No             10                       



               8-10                                              



               00:00:                                              



               00                                                

 

     Dose      2-0      No                                      



     Unknown      8-10                                              



               00:00:                                              



               00                                                

 

     Dose      2-0      No                                      



     Unknown      8-10                                              



               00:00:                                              



               00                                                

 

     Dose      2022-0      No             5                        



     Unknown      8-10                                              



               00:00:                                              



               00                                                

 

     Dose      2022-0      No             30                       



     Unknown      8-10                                              



               00:00:                                              



               00                                                

 

     METOCLOPRAM      2022-0      No                                      



     FITO       8-10                                              



     HYDROCHLORI      00:00:                                              



     DE 10 MG      00                                                



     TABS                                                        

 

     Dose      2022-0      No             30                       



     Unknown      8-10                                              



               00:00:                                              



               00                                                

 

     MIRTAZAPINE      2022-0      No                                      



     15 MG TABS      8-10                                              



               00:00:                                              



               00                                                

 

     Dose      2022-0      No             30                       



     Unknown      8-10                                              



               00:00:                                              



               00                                                

 

     Dose      2022-0      No             25                       



     Unknown      8-10                                              



               00:00:                                              



               00                                                

 

     &lt       2022-0      No             10                       



               8-10                                              



               00:00:                                              



               00                                                

 

     Dose      2022-0      No                                      



     Unknown      8-10                                              



               00:00:                                              



               00                                                

 

     Dose      2022-0      No             15                       



     Unknown      8-10                                              



               00:00:                                              



               00                                                

 

     Dose      2022-0      No             5                        



     Unknown      8-10                                              



               00:00:                                              



               00                                                

 

     Dose      2022-0      No                                      



     Unknown      8-10                                              



               00:00:                                              



               00                                                

 

     &lt       2022-0      No             10                       



               8-10                                              



               00:00:                                              



               00                                                

 

     Dose      2022-0      No             4                        



     Unknown      729                                              



               00:00:                                              



               00                                                

 

     Dose      2022-0      No             10                       



     Unknown      7                                              



               00:00:                                              



               00                                                

 

     Dose      2022-0      No             4                        



     Unknown      7                                              



               00:00:                                              



               00                                                

 

     Dose      2022-0      No                                      



     Unknown      7                                              



               00:00:                                              



               00                                                

 

     Dose      2022-0      No             4                        



     Unknown      7                                              



               00:00:                                              



               00                                                

 

     Dose      2022-0      No                                      



     Unknown      7                                              



               00:00:                                              



               00                                                

 

     Dose      2022-0      No             30                       



     Unknown      7                                              



               00:00:                                              



               00                                                

 

     ATORVASTATI      2022-0      No                                      



     N CALCIUM      7-29                                              



     10 MG TABS      00:00:                                              



               00                                                

 

     Dose      2022-0      No             4                        



     Unknown      7                                              



               00:00:                                              



               00                                                

 

     Dose      2022-0      No             10                       



     Unknown      7                                              



               00:00:                                              



               00                                                

 

     Dose      2022-0      No                                      



     Unknown      7                                              



               00:00:                                              



               00                                                

 

     Dose      2022-0      No                                      



     Unknown      7                                              



               00:00:                                              



               00                                                

 

     Dose      2022-0      No                                      



     Unknown      7                                              



               00:00:                                              



               00                                                

 

     Dose      2022-0      No                                      



     Unknown      7                                              



               00:00:                                              



               00                                                

 

     &lt       2022-0      No                                      



               7-                                              



               00:00:                                              



               00                                                

 

     lisinopril      2022-0      No             1mg                      



     5 mg tablet      7-                                              



               00:00:                                              



               00                                                

 

     Myrbetriq      2022-0      No             1mg                      



     25 mg      7-21                                              



     tablet,exte      00:00:                                              



     nded      00                                                



     release                                                        

 

     citalopram      2-0      No             1mg                      



     20 mg      7-21                                              



     tablet      00:00:                                              



               00                                                

 

     atorvastati      2-0      No             1mg                      



     n 10 mg      7-21                                              



     tablet      00:00:                                              



               00                                                

 

     metformin      2-0      No             1mg                      



     1,000 mg      7-21                                              



     tablet      00:00:                                              



               00                                                

 

     Dose      2022-0      No                                      



     Unknown      7-                                              



               00:00:                                              



               00                                                

 

     metoclopram      2-0      No             1mg                      



     fito 10 mg      7-21                                              



     tablet      00:00:                                              



               00                                                

 

     ferrous      2-0      No             1(65 mg                     



     sulfate 325      -                     iron)                     



     mg (65 mg      00:00:                                              



     iron)      00                                                



     tablet                                                        

 

     omeprazole      -0      No             1mg                      



     40 mg      7-                                              



     capsule,del      00:00:                                              



     ayed      00                                                



     release                                                        

 

     TAKE 1      -0      No             1000                     



     TABLET      7-                                              



     TWICE      00:00:                                              



     DAILY.      00                                                

 

     TAKE 1      -0      No             500                      



     TABLET BY      7-                                              



     MOUTH EVERY      00:00:                                              



     8 HOURS FOR      00                                                



     10 DAYS                                                        

 

     &lt       2022-0      No             250                      



               7-                                              



               00:00:                                              



               00                                                

 

     &lt       2022-0      No             10                       



               7-                                              



               00:00:                                              



               00                                                

 

     &lt       2022-0      No             25                       



               7-                                              



               00:00:                                              



               00                                                

 

     &lt       2022-0      No                                      



               7-21                                              



               00:00:                                              



               00                                                

 

     &lt       2022-0      No                                      



               7-21                                              



               00:00:                                              



               00                                                

 

     TAKE 1      -0      No             1000                     



     TABLET      7-                                              



     TWICE      00:00:                                              



     DAILY.      00                                                

 

     Dose      2-0      No                                      



     Unknown      7-                                              



               00:00:                                              



               00                                                

 

     Dose      2022-0      No                                      



     Unknown      7-                                              



               00:00:                                              



               00                                                

 

     citalopram      2-0      No             1mg                      



     20 mg      7-21                                              



     tablet      00:00:                                              



               00                                                

 

     Dose      2022-0      No                                      



     Unknown      7-                                              



               00:00:                                              



               00                                                

 

     Dose      2022-0      No                                      



     Unknown      7-                                              



               00:00:                                              



               00                                                

 

     Dose      2022-0      No                                      



     Unknown      7-                                              



               00:00:                                              



               00                                                

 

     metoclopram      2-0      No             1mg                      



     fito 10 mg      7-21                                              



     tablet      00:00:                                              



               00                                                

 

     Dose      2022-0      No                                      



     Unknown      7-                                              



               00:00:                                              



               00                                                

 

     Dose      2022-0      No                                      



     Unknown      7-                                              



               00:00:                                              



               00                                                

 

     TAKE 1      2-0      No             1000                     



     TABLET      7-                                              



     TWICE      00:00:                                              



     DAILY.      00                                                

 

     TAKE 1      -0      No             500                      



     TABLET BY      7-                                              



     MOUTH EVERY      00:00:                                              



     8 HOURS FOR      00                                                



     10 DAYS                                                        

 

     &lt       2022-0      No             250                      



               7-21                                              



               00:00:                                              



               00                                                

 

     &lt       2022-0      No             10                       



               7-21                                              



               00:00:                                              



               00                                                

 

     &lt       2022-0      No             25                       



               7-21                                              



               00:00:                                              



               00                                                

 

     &lt       2022-0      No                                      



               7-21                                              



               00:00:                                              



               00                                                

 

     &lt       2022-0      No                                      



               7-21                                              



               00:00:                                              



               00                                                

 

     TAKE 1      2-0      No             1000                     



     TABLET      7-21                                              



     TWICE      00:00:                                              



     DAILY.      00                                                

 

     Dose      2022-0      No                                      



     Unknown      7-                                              



               00:00:                                              



               00                                                

 

     Dose      2022-0      No                                      



     Unknown      7-                                              



               00:00:                                              



               00                                                

 

     citalopram      2022-0      No             1mg                      



     20 mg      7-21                                              



     tablet      00:00:                                              



               00                                                

 

     Dose      2022-0      No                                      



     Unknown      7-                                              



               00:00:                                              



               00                                                

 

     Dose      2022-0      No                                      



     Unknown      7-                                              



               00:00:                                              



               00                                                

 

     Dose      2022-0      No                                      



     Unknown      7                                              



               00:00:                                              



               00                                                

 

     metoclopram      2022-0      No             1mg                      



     fito 10 mg      7-21                                              



     tablet      00:00:                                              



               00                                                

 

     Dose      2022-0      No                                      



     Unknown      7                                              



               00:00:                                              



               00                                                

 

     Dose      2022-0      No                                      



     Unknown      7-                                              



               00:00:                                              



               00                                                

 

     TAKE 1      2-0      No             1000                     



     TABLET      7-                                              



     TWICE      00:00:                                              



     DAILY.      00                                                

 

     TAKE 1      2-0      No             500                      



     TABLET BY      7-                                              



     MOUTH EVERY      00:00:                                              



     8 HOURS FOR      00                                                



     10 DAYS                                                        

 

     &lt       2022-0      No             250                      



               7-                                              



               00:00:                                              



               00                                                

 

     &lt       2022-0      No             10                       



               7-21                                              



               00:00:                                              



               00                                                

 

     &lt       2022-0      No             25                       



               7-                                              



               00:00:                                              



               00                                                

 

     &lt       2022-0      No                                      



               7-21                                              



               00:00:                                              



               00                                                

 

     &lt       2022-0      No                                      



               7-21                                              



               00:00:                                              



               00                                                

 

     TAKE 1      2-0      No             1000                     



     TABLET      7-                                              



     TWICE      00:00:                                              



     DAILY.      00                                                

 

     TAKE 1      2-0      No             30unit                     



     TABLET      7-21                                              



     DAILY AS      00:00:                                              



     DIRECTED.      00                                                

 

     TAKE 1      2-0      No                                      



     TABLET      7-21                                              



     DAILY WITH      00:00:                                              



     BREAKFAST.      00                                                

 

     citalopram      2-0      No             1mg                      



     20 mg      7-21                                              



     tablet      00:00:                                              



               00                                                

 

     Dose      2022-0      No             30                       



     Unknown      7-                                              



               00:00:                                              



               00                                                

 

     Dose      2022-0      No                                      



     Unknown      7-                                              



               00:00:                                              



               00                                                

 

     Dose      2022-0      No             5                        



     Unknown      7                                              



               00:00:                                              



               00                                                

 

     Dose      2022-0      No             30                       



     Unknown      7                                              



               00:00:                                              



               00                                                

 

     Dose      2022-0      No             30                       



     Unknown                                                    



               00:00:                                              



               00                                                

 

     Dose      2022-0      No             30                       



     Unknown                                                    



               00:00:                                              



               00                                                

 

     TAKE 1      -0      No             1000                     



     TABLET                                                    



     TWICE      00:00:                                              



     DAILY.      00                                                

 

     TAKE 1      -0      No             500                      



     TABLET BY      7-                                              



     MOUTH EVERY      00:00:                                              



     8 HOURS FOR      00                                                



     10 DAYS                                                        

 

     &lt       2022-0      No             250                      



               7-                                              



               00:00:                                              



               00                                                

 

     &lt       2022-0      No             10                       



               7-                                              



               00:00:                                              



               00                                                

 

     &lt       2022-0      No             25                       



               7-                                              



               00:00:                                              



               00                                                

 

     &lt       2022-0      No                                      



               7-21                                              



               00:00:                                              



               00                                                

 

     &lt       2022-0      No                                      



               7-                                              



               00:00:                                              



               00                                                

 

     TAKE 1      -0      No             1000                     



     TABLET                                                    



     TWICE      00:00:                                              



     DAILY.      00                                                

 

     lisinopril      -0      No             1mg                      



     5 mg tablet                                                    



               00:00:                                              



               00                                                

 

     Myrbetriq      2-0      No             1mg                      



     25 mg      -                                              



     tablet,exte      00:00:                                              



     nded      00                                                



     release                                                        

 

     citalopram      -0      No             1mg                      



     20 mg      -                                              



     tablet      00:00:                                              



               00                                                

 

     atorvastati      2-0      No             1mg                      



     n 10 mg      -                                              



     tablet      00:00:                                              



               00                                                

 

     metformin      2-0      No             1mg                      



     1,000 mg      -                                              



     tablet      00:00:                                              



               00                                                

 

     glimepiride      -0      No             1mg                      



     1 mg tablet                                                    



               00:00:                                              



               00                                                

 

     metoclopram      2-0      No             1mg                      



     fito 10 mg      -                                              



     tablet      00:00:                                              



               00                                                

 

     ferrous      2-0      No             1(65 mg                     



     sulfate 325                           iron)                     



     mg (65 mg      00:00:                                              



     iron)      00                                                



     tablet                                                        

 

     omeprazole      -0      No             1mg                      



     40 mg      -                                              



     capsule,del      00:00:                                              



     ayed      00                                                



     release                                                        

 

     TAKE 1      -0      No             1000                     



     TABLET                                                    



     TWICE      00:00:                                              



     DAILY.      00                                                

 

     TAKE 1      -0      No             500                      



     TABLET BY      7-                                              



     MOUTH EVERY      00:00:                                              



     8 HOURS FOR      00                                                



     10 DAYS                                                        

 

     &lt       2022-0      No             250                      



               7-                                              



               00:00:                                              



               00                                                

 

     &lt       2022-0      No             10                       



               7-                                              



               00:00:                                              



               00                                                

 

     &lt       2022-0      No             25                       



               7-21                                              



               00:00:                                              



               00                                                

 

     &lt       2022-0      No                                      



               7-21                                              



               00:00:                                              



               00                                                

 

     &lt       2022-0      No                                      



               7-21                                              



               00:00:                                              



               00                                                

 

     TAKE 1      -0      No             1000                     



     TABLET      7-21                                              



     TWICE      00:00:                                              



     DAILY.      00                                                

 

     lisinopril      2-0      No             1mg                      



     5 mg tablet      7-                                              



               00:00:                                              



               00                                                

 

     Myrbetriq      2022-0      No             1mg                      



     25 mg      7-21                                              



     tablet,exte      00:00:                                              



     nded      00                                                



     release                                                        

 

     citalopram      2-0      No             1mg                      



     20 mg      7-21                                              



     tablet      00:00:                                              



               00                                                

 

     atorvastati      2-0      No             1mg                      



     n 10 mg      7-21                                              



     tablet      00:00:                                              



               00                                                

 

     metformin      2-0      No             1mg                      



     1,000 mg      7-21                                              



     tablet      00:00:                                              



               00                                                

 

     glimepiride      2-0      No             1mg                      



     1 mg tablet                                                    



               00:00:                                              



               00                                                

 

     metoclopram      2-0      No             1mg                      



     fito 10 mg      7-                                              



     tablet      00:00:                                              



               00                                                

 

     ferrous      2022-0      No             1(65 mg                     



     sulfate 325      -                     iron)                     



     mg (65 mg      00:00:                                              



     iron)      00                                                



     tablet                                                        

 

     omeprazole      -0      No             1mg                      



     40 mg      -                                              



     capsule,del      00:00:                                              



     ayed      00                                                



     release                                                        

 

     TAKE 1      -0      No             1000                     



     TABLET      7-                                              



     TWICE      00:00:                                              



     DAILY.      00                                                

 

     TAKE 1      -0      No             500                      



     TABLET BY      7-                                              



     MOUTH EVERY      00:00:                                              



     8 HOURS FOR      00                                                



     10 DAYS                                                        

 

     &lt       2022-0      No             250                      



               7-21                                              



               00:00:                                              



               00                                                

 

     &lt       2022-0      No             10                       



               7-21                                              



               00:00:                                              



               00                                                

 

     &lt       2022-0      No             25                       



               7-21                                              



               00:00:                                              



               00                                                

 

     &lt       2022-0      No                                      



               7-21                                              



               00:00:                                              



               00                                                

 

     &lt       2022-0      No                                      



               7-21                                              



               00:00:                                              



               00                                                

 

     TAKE 1      -0      No             1000                     



     TABLET      7-                                              



     TWICE      00:00:                                              



     DAILY.      00                                                

 

     Dose      2-0      No             5                        



     Unknown      7-20                                              



               00:00:                                              



               00                                                

 

     Dose      2022-0      No             5                        



     Unknown      7-20                                              



               00:00:                                              



               00                                                

 

     Dose      2022-0      No             5                        



     Unknown      7-20                                              



               00:00:                                              



               00                                                

 

     Dose      2022-0      No             5                        



     Unknown      7-20                                              



               00:00:                                              



               00                                                

 

     Dose      2022-0      No             5                        



     Unknown      7-20                                              



               00:00:                                              



               00                                                

 

     Dose      2022-0      No             5                        



     Unknown      7-20                                              



               00:00:                                              



               00                                                

 

     HYDROcodone      2022-0 2022- No             1{tbl}      1 tablet,         

  [facility]



     -acetaminop      16                          Oral,           ity of



     hen (NORCO)      06:30: 05:28                          ONCE, 1           [facility]

[facility]



      mg      00   :00                           dose, On           [facility]

l



     tablet 1                                         Sat            [facility]



     tablet                                         22 at           



                                                  0130,           



                                                  Routine           

 

     No known      -0      No                       No known           [facility]

rs



     medications                                     medication           it

y of



               04:06:                               s              [address]

 

     TAKE 1      -0      No             500                      



     TABLET BY      7-13                                              



     MOUTH EVERY      00:00:                                              



     12 HOURS      00                                                



     FOR 10 DAYS                                                        

 

     &lt       2022-0      No                                      



                                                             



               00:00:                                              



               00                                                

 

     TAKE 1      2-0      No                                      



     TABLET BY      7-13                                              



     MOUTH DAILY      00:00:                                              



               00                                                

 

     &lt       2022-0      No                                      



               7                                              



               00:00:                                              



               00                                                

 

     &lt       2022-0      No             20                       



                                                             



               00:00:                                              



               00                                                

 

     Dose      2022-0      No             50                       



     Unknown                                                    



               00:00:                                              



               00                                                

 

     TAKE 1      -0      No             500                      



     TABLET BY      7-13                                              



     MOUTH EVERY      00:00:                                              



     8 HOURS FOR      00                                                



     10 DAYS                                                        

 

     Dose      2022-0      No             20                       



     Unknown                                                    



               00:00:                                              



               00                                                

 

     Dose      2022-0      No             10                       



     Unknown                                                    



               00:00:                                              



               00                                                

 

     &lt       2022-0      No             25                       



                                                             



               00:00:                                              



               00                                                

 

     DISSOLVE 1      2-0      No             4                        



     TABLET BY      7-13                                              



     MOUTH EVERY      00:00:                                              



     8 HOURS AS      00                                                



     NEEDED                                                        

 

     &lt       2022-0      No                                      



                                                             



               00:00:                                              



               00                                                

 

     TAKE 1      -0      No             1000                     



     TABLET                                                    



     TWICE      00:00:                                              



     DAILY.      00                                                

 

     &lt       2022-0      No             10                       



                                                             



               00:00:                                              



               00                                                

 

     &lt       2022-0      No             15                       



                                                             



               00:00:                                              



               00                                                

 

     &lt       2022-0      No             5                        



                                                             



               00:00:                                              



               00                                                

 

     TAKE 1      2-0      No             5                        



     TABLET BY      7-13                                              



     MOUTH TWICE      00:00:                                              



     A DAY AS      00                                                



     NEEDED FOR                                                        



     ANXIETY                                                        

 

     Dose      2022-0      No                                      



     Unknown                                                    



               00:00:                                              



               00                                                

 

     &lt       2022-0      No                                      



               7-                                              



               00:00:                                              



               00                                                

 

     TAKE 1      -0      No             500                      



     TABLET BY      7-13                                              



     MOUTH EVERY      00:00:                                              



     12 HOURS      00                                                



     FOR 10 DAYS                                                        

 

     &lt       2022-0      No                                      



               7-                                              



               00:00:                                              



               00                                                

 

     TAKE 1      2-0      No                                      



     TABLET BY      7-13                                              



     MOUTH DAILY      00:00:                                              



               00                                                

 

     &lt       2022-0      No                                      



               7-13                                              



               00:00:                                              



               00                                                

 

     &lt       2022-0      No             20                       



               7-13                                              



               00:00:                                              



               00                                                

 

     Dose      2022-0      No             50                       



     Unknown      7-                                              



               00:00:                                              



               00                                                

 

     TAKE 1      2022-0      No             500                      



     TABLET BY      7-13                                              



     MOUTH EVERY      00:00:                                              



     8 HOURS FOR      00                                                



     10 DAYS                                                        

 

     Dose      2022-0      No             20                       



     Unknown      7-                                              



               00:00:                                              



               00                                                

 

     Dose      2022-0      No             10                       



     Unknown      7-                                              



               00:00:                                              



               00                                                

 

     &lt       2022-0      No             25                       



               7-                                              



               00:00:                                              



               00                                                

 

     DISSOLVE 1      2022-0      No             4                        



     TABLET BY      7-13                                              



     MOUTH EVERY      00:00:                                              



     8 HOURS AS      00                                                



     NEEDED                                                        

 

     &lt       2022-0      No                                      



               7-                                              



               00:00:                                              



               00                                                

 

     TAKE 1      2022-0      No             1000                     



     TABLET      7-13                                              



     TWICE      00:00:                                              



     DAILY.      00                                                

 

     &lt       2022-0      No             10                       



               7-                                              



               00:00:                                              



               00                                                

 

     &lt       2022-0      No             15                       



               7-                                              



               00:00:                                              



               00                                                

 

     &lt       2022-0      No             5                        



               7-                                              



               00:00:                                              



               00                                                

 

     TAKE 1      2022-0      No             5                        



     TABLET BY      7-13                                              



     MOUTH TWICE      00:00:                                              



     A DAY AS      00                                                



     NEEDED FOR                                                        



     ANXIETY                                                        

 

     Dose      2022-0      No                                      



     Unknown      7-                                              



               00:00:                                              



               00                                                

 

     &lt       2022-0      No                                      



               7-13                                              



               00:00:                                              



               00                                                

 

     TAKE 1      2022-0      No             500                      



     TABLET BY      7-13                                              



     MOUTH EVERY      00:00:                                              



     12 HOURS      00                                                



     FOR 10 DAYS                                                        

 

     &lt       2022-0      No                                      



               7-                                              



               00:00:                                              



               00                                                

 

     TAKE 1      2022-0      No                                      



     TABLET BY      7-13                                              



     MOUTH DAILY      00:00:                                              



               00                                                

 

     &lt       2022-0      No                                      



               7-13                                              



               00:00:                                              



               00                                                

 

     &lt       2022-0      No             20                       



               7-                                              



               00:00:                                              



               00                                                

 

     Dose      2022-0      No             50                       



     Unknown      7                                              



               00:00:                                              



               00                                                

 

     TAKE 1      2022-0      No             500                      



     TABLET BY      7-13                                              



     MOUTH EVERY      00:00:                                              



     8 HOURS FOR      00                                                



     10 DAYS                                                        

 

     Dose      2022-0      No             20                       



     Unknown      7-                                              



               00:00:                                              



               00                                                

 

     Dose      2022-0      No             10                       



     Unknown      7-                                              



               00:00:                                              



               00                                                

 

     &lt       2022-0      No             25                       



               7-                                              



               00:00:                                              



               00                                                

 

     DISSOLVE 1      2022-0      No             4                        



     TABLET BY      7-13                                              



     MOUTH EVERY      00:00:                                              



     8 HOURS AS      00                                                



     NEEDED                                                        

 

     &lt       2022-0      No                                      



               7-13                                              



               00:00:                                              



               00                                                

 

     TAKE 1      2022-0      No             1000                     



     TABLET      7-13                                              



     TWICE      00:00:                                              



     DAILY.      00                                                

 

     &lt       2022-0      No             10                       



               7-13                                              



               00:00:                                              



               00                                                

 

     &lt       2022-0      No             15                       



               7-                                              



               00:00:                                              



               00                                                

 

     &lt       2022-0      No             5                        



               7-                                              



               00:00:                                              



               00                                                

 

     TAKE 1      2022-0      No             5                        



     TABLET BY      7-13                                              



     MOUTH TWICE      00:00:                                              



     A DAY AS      00                                                



     NEEDED FOR                                                        



     ANXIETY                                                        

 

     Dose      2022-0      No                                      



     Unknown      7-                                              



               00:00:                                              



               00                                                

 

     &lt       2022-0      No                                      



               7-13                                              



               00:00:                                              



               00                                                

 

     TAKE 1      2022-0      No             500                      



     TABLET BY      7-13                                              



     MOUTH EVERY      00:00:                                              



     12 HOURS      00                                                



     FOR 10 DAYS                                                        

 

     &lt       2022-0      No                                      



               7-                                              



               00:00:                                              



               00                                                

 

     TAKE 1      2022-0      No                                      



     TABLET BY      7-13                                              



     MOUTH DAILY      00:00:                                              



               00                                                

 

     &lt       2022-0      No                                      



               7-                                              



               00:00:                                              



               00                                                

 

     &lt       2022-0      No             20                       



               7-                                              



               00:00:                                              



               00                                                

 

     Dose      2022-0      No             50                       



     Unknown                                                    



               00:00:                                              



               00                                                

 

     TAKE 1      2022-0      No             500                      



     TABLET BY      7-13                                              



     MOUTH EVERY      00:00:                                              



     8 HOURS FOR      00                                                



     10 DAYS                                                        

 

     Dose      2022-0      No             20                       



     Unknown      7                                              



               00:00:                                              



               00                                                

 

     Dose      2022-0      No             10                       



     Unknown                                                    



               00:00:                                              



               00                                                

 

     &lt       2022-0      No             25                       



               7-                                              



               00:00:                                              



               00                                                

 

     DISSOLVE 1      2022-0      No             4                        



     TABLET BY      7-13                                              



     MOUTH EVERY      00:00:                                              



     8 HOURS AS      00                                                



     NEEDED                                                        

 

     &lt       2022-0      No                                      



               7-                                              



               00:00:                                              



               00                                                

 

     TAKE 1      2022-0      No             1000                     



     TABLET      7-                                              



     TWICE      00:00:                                              



     DAILY.      00                                                

 

     &lt       2022-0      No             10                       



               7-                                              



               00:00:                                              



               00                                                

 

     &lt       2022-0      No             15                       



               7-                                              



               00:00:                                              



               00                                                

 

     &lt       2022-0      No             5                        



               7-                                              



               00:00:                                              



               00                                                

 

     TAKE 1      2022-0      No             5                        



     TABLET BY      7-13                                              



     MOUTH TWICE      00:00:                                              



     A DAY AS      00                                                



     NEEDED FOR                                                        



     ANXIETY                                                        

 

     Dose      2022-0      No                                      



     Unknown      7-                                              



               00:00:                                              



               00                                                

 

     &lt       2022-0      No                                      



               7-                                              



               00:00:                                              



               00                                                

 

     TAKE 1      2022-0      No             500                      



     TABLET BY      7-13                                              



     MOUTH EVERY      00:00:                                              



     12 HOURS      00                                                



     FOR 10 DAYS                                                        

 

     &lt       2022-0      No                                      



               7-13                                              



               00:00:                                              



               00                                                

 

     TAKE 1      2022-0      No                                      



     TABLET BY      7-13                                              



     MOUTH DAILY      00:00:                                              



               00                                                

 

     &lt       2022-0      No                                      



               7-13                                              



               00:00:                                              



               00                                                

 

     &lt       2022-0      No             20                       



               7-                                              



               00:00:                                              



               00                                                

 

     Dose      2022-0      No             50                       



     Unknown      7                                              



               00:00:                                              



               00                                                

 

     TAKE 1      2022-0      No             500                      



     TABLET BY      7-13                                              



     MOUTH EVERY      00:00:                                              



     8 HOURS FOR      00                                                



     10 DAYS                                                        

 

     Dose      2022-0      No             20                       



     Unknown      7                                              



               00:00:                                              



               00                                                

 

     Dose      2022-0      No             10                       



     Unknown      7-                                              



               00:00:                                              



               00                                                

 

     &lt       2022-0      No             25                       



               7-                                              



               00:00:                                              



               00                                                

 

     DISSOLVE 1      2022-0      No             4                        



     TABLET BY      7-13                                              



     MOUTH EVERY      00:00:                                              



     8 HOURS AS      00                                                



     NEEDED                                                        

 

     &lt       2022-0      No                                      



               7-                                              



               00:00:                                              



               00                                                

 

     TAKE 1      2022-0      No             1000                     



     TABLET      7-13                                              



     TWICE      00:00:                                              



     DAILY.      00                                                

 

     &lt       2022-0      No             10                       



               7-                                              



               00:00:                                              



               00                                                

 

     &lt       2022-0      No             15                       



               7-                                              



               00:00:                                              



               00                                                

 

     &lt       2022-0      No             5                        



               7-                                              



               00:00:                                              



               00                                                

 

     TAKE 1      2022-0      No             5                        



     TABLET BY      7-13                                              



     MOUTH TWICE      00:00:                                              



     A DAY AS      00                                                



     NEEDED FOR                                                        



     ANXIETY                                                        

 

     Dose      2022-0      No                                      



     Unknown      7                                              



               00:00:                                              



               00                                                

 

     &lt       2022-0      No                                      



               7-13                                              



               00:00:                                              



               00                                                

 

     TAKE 1      2022-0      No             500                      



     TABLET BY      7-13                                              



     MOUTH EVERY      00:00:                                              



     12 HOURS      00                                                



     FOR 10 DAYS                                                        

 

     &lt       2022-0      No                                      



               7-13                                              



               00:00:                                              



               00                                                

 

     TAKE 1      2022-0      No                                      



     TABLET BY      7-13                                              



     MOUTH DAILY      00:00:                                              



               00                                                

 

     &lt       2022-0      No                                      



               7-13                                              



               00:00:                                              



               00                                                

 

     &lt       2022-0      No             20                       



               7-                                              



               00:00:                                              



               00                                                

 

     Dose      2022-0      No             50                       



     Unknown                                                    



               00:00:                                              



               00                                                

 

     TAKE 1      2022-0      No             500                      



     TABLET BY      7-13                                              



     MOUTH EVERY      00:00:                                              



     8 HOURS FOR      00                                                



     10 DAYS                                                        

 

     Dose      2022-0      No             20                       



     Unknown      7                                              



               00:00:                                              



               00                                                

 

     Dose      2022-0      No             10                       



     Unknown      7-                                              



               00:00:                                              



               00                                                

 

     &lt       2022-0      No             25                       



               7-                                              



               00:00:                                              



               00                                                

 

     DISSOLVE 1      2022-0      No             4                        



     TABLET BY      7-13                                              



     MOUTH EVERY      00:00:                                              



     8 HOURS AS      00                                                



     NEEDED                                                        

 

     &lt       2022-0      No                                      



               7-13                                              



               00:00:                                              



               00                                                

 

     TAKE 1      2022-0      No             1000                     



     TABLET      7-13                                              



     TWICE      00:00:                                              



     DAILY.      00                                                

 

     &lt       2022-0      No             10                       



               7-13                                              



               00:00:                                              



               00                                                

 

     &lt       2022-0      No             15                       



               7                                              



               00:00:                                              



               00                                                

 

     &lt       2022-0      No             5                        



               7-                                              



               00:00:                                              



               00                                                

 

     TAKE 1      2022-0      No             5                        



     TABLET BY      7-13                                              



     MOUTH TWICE      00:00:                                              



     A DAY AS      00                                                



     NEEDED FOR                                                        



     ANXIETY                                                        

 

     Dose      2022-0      No                                      



     Unknown      7                                              



               00:00:                                              



               00                                                

 

     &lt       2022-0      No                                      



               7-13                                              



               00:00:                                              



               00                                                

 

     TAKE 1      2022-0      No             5                        



     TABLET BY      7-12                                              



     MOUTH TWICE      00:00:                                              



     A DAY AS      00                                                



     NEEDED FOR                                                        



     ANXIETY                                                        

 

     TAKE 1      2022-0      No             5                        



     TABLET BY      7-12                                              



     MOUTH TWICE      00:00:                                              



     A DAY AS      00                                                



     NEEDED FOR                                                        



     ANXIETY                                                        

 

     TAKE 1      2022-0      No             5                        



     TABLET BY      7-12                                              



     MOUTH TWICE      00:00:                                              



     A DAY AS      00                                                



     NEEDED FOR                                                        



     ANXIETY                                                        

 

     TAKE 1      2022-0      No             5                        



     TABLET BY      7-12                                              



     MOUTH TWICE      00:00:                                              



     A DAY AS      00                                                



     NEEDED FOR                                                        



     ANXIETY                                                        

 

     TAKE 1      2022-0      No             5                        



     TABLET BY      7-12                                              



     MOUTH TWICE      00:00:                                              



     A DAY AS      00                                                



     NEEDED FOR                                                        



     ANXIETY                                                        

 

     TAKE 1      2022-0      No             5                        



     TABLET BY      7-12                                              



     MOUTH TWICE      00:00:                                              



     A DAY AS      00                                                



     NEEDED FOR                                                        



     ANXIETY                                                        

 

     DISSOLVE 1      2022-0      No             4                        



     TABLET BY      7-11                                              



     MOUTH EVERY      00:00:                                              



     8 HOURS AS      00                                                



     NEEDED                                                        

 

     DISSOLVE 1      2022-0      No             4                        



     TABLET BY      7-11                                              



     MOUTH EVERY      00:00:                                              



     8 HOURS AS      00                                                



     NEEDED                                                        

 

     DISSOLVE 1      2022-0      No             4                        



     TABLET BY      7-11                                              



     MOUTH EVERY      00:00:                                              



     8 HOURS AS      00                                                



     NEEDED                                                        

 

     DISSOLVE 1      2022-0      No             4                        



     TABLET BY      7-11                                              



     MOUTH EVERY      00:00:                                              



     8 HOURS AS      00                                                



     NEEDED                                                        

 

     DISSOLVE 1      2022-0      No             4                        



     TABLET BY      7-11                                              



     MOUTH EVERY      00:00:                                              



     8 HOURS AS      00                                                



     NEEDED                                                        

 

     DISSOLVE 1      2022-0      No             4                        



     TABLET BY      7-11                                              



     MOUTH EVERY      00:00:                                              



     8 HOURS AS      00                                                



     NEEDED                                                        

 

     &lt       2022-0      No                                      



               7-06                                              



               00:00:                                              



               00                                                

 

     &lt       2022-0      No             30                       



               7-                                              



               00:00:                                              



               00                                                

 

     TAKE 1      2022-0      No             30                       



     TABLET BY      7-06                                              



     MOUTH AT      00:00:                                              



     BEDTIME      00                                                

 

     &lt       2022-0      No                                      



                                                             



               00:00:                                              



               00                                                

 

     Dose      2022-0      No             50                       



     Unknown                                                    



               00:00:                                              



               00                                                

 

     &lt       2022-0      No             20                       



                                                             



               00:00:                                              



               00                                                

 

     &lt       2022-0      No                                      



                                                             



               00:00:                                              



               00                                                

 

     TAKE 1      2022-0      No             [id number]                     



     TABLET                                                    



     TWICE      00:00:                                              



     DAILY.      00                                                

 

     &lt       2022-0      No             25                       



                                                             



               00:00:                                              



               00                                                

 

     TAKE 1      2022-0      No             500                      



     TABLET BY                                                    



     MOUTH EVERY      00:00:                                              



     8 HOURS FOR      00                                                



     10 DAYS                                                        

 

     &lt       2022-0      No                                      



                                                             



               00:00:                                              



               00                                                

 

     &lt       2022-0      No             30                       



                                                             



               00:00:                                              



               00                                                

 

     TAKE 1      2022-0      No             30                       



     TABLET BY                                                    



     MOUTH AT      00:00:                                              



     BEDTIME      00                                                

 

     &lt       2022-0      No                                      



                                                             



               00:00:                                              



               00                                                

 

     Dose      2022-0      No             50                       



     Unknown                                                    



               00:00:                                              



               00                                                

 

     &lt       2022-0      No             20                       



                                                             



               00:00:                                              



               00                                                

 

     &lt       2022-0      No                                      



                                                             



               00:00:                                              



               00                                                

 

     TAKE 1      2022-0      No             [id number]                     



     TABLET                                                    



     TWICE      00:00:                                              



     DAILY.      00                                                

 

     &lt       2022-0      No             25                       



                                                             



               00:00:                                              



               00                                                

 

     TAKE 1      2022-0      No             500                      



     TABLET BY                                                    



     MOUTH EVERY      00:00:                                              



     8 HOURS FOR      00                                                



     10 DAYS                                                        

 

     &lt       2022-0      No                                      



                                                             



               00:00:                                              



               00                                                

 

     &lt       2022-0      No             30                       



                                                             



               00:00:                                              



               00                                                

 

     TAKE 1      2022-0      No             30                       



     TABLET BY                                                    



     MOUTH AT      00:00:                                              



     BEDTIME      00                                                

 

     &lt       2022-0      No                                      



                                                             



               00:00:                                              



               00                                                

 

     Dose      2022-0      No             50                       



     Unknown                                                    



               00:00:                                              



               00                                                

 

     &lt       2022-0      No             20                       



                                                             



               00:00:                                              



               00                                                

 

     &lt       2022-0      No                                      



                                                             



               00:00:                                              



               00                                                

 

     TAKE 1      2022-0      No             [id number]                     



     TABLET                                                    



     TWICE      00:00:                                              



     DAILY.      00                                                

 

     &lt       2022-0      No             25                       



                                                             



               00:00:                                              



               00                                                

 

     TAKE 1      2022-0      No             500                      



     TABLET BY      -                                              



     MOUTH EVERY      00:00:                                              



     8 HOURS FOR      00                                                



     10 DAYS                                                        

 

     &lt       2022-0      No                                      



                                                             



               00:00:                                              



               00                                                

 

     &lt       2022-0      No             30                       



                                                             



               00:00:                                              



               00                                                

 

     TAKE 1      2022-0      No             30                       



     TABLET BY                                                    



     MOUTH AT      00:00:                                              



     BEDTIME      00                                                

 

     &lt       2022-0      No                                      



                                                             



               00:00:                                              



               00                                                

 

     Dose      2022-0      No             50                       



     Unknown                                                    



               00:00:                                              



               00                                                

 

     &lt       2022-0      No             20                       



                                                             



               00:00:                                              



               00                                                

 

     &lt       2022-0      No                                      



                                                             



               00:00:                                              



               00                                                

 

     TAKE 1      2022-0      No             [id number]                     



     TABLET                                                    



     TWICE      00:00:                                              



     DAILY.      00                                                

 

     &lt       2022-0      No             25                       



                                                             



               00:00:                                              



               00                                                

 

     TAKE 1      2022-0      No             500                      



     TABLET BY                                                    



     MOUTH EVERY      00:00:                                              



     8 HOURS FOR      00                                                



     10 DAYS                                                        

 

     &lt       2022-0      No                                      



                                                             



               00:00:                                              



               00                                                

 

     &lt       2022-0      No             30                       



                                                             



               00:00:                                              



               00                                                

 

     TAKE 1      2022-0      No             30                       



     TABLET BY                                                    



     MOUTH AT      00:00:                                              



     BEDTIME      00                                                

 

     &lt       2022-0      No                                      



                                                             



               00:00:                                              



               00                                                

 

     Dose      2022-0      No             50                       



     Unknown                                                    



               00:00:                                              



               00                                                

 

     &lt       2022-0      No             20                       



                                                             



               00:00:                                              



               00                                                

 

     &lt       2022-0      No                                      



                                                             



               00:00:                                              



               00                                                

 

     TAKE 1      2022-0      No             [id number]                     



     TABLET                                                    



     TWICE      00:00:                                              



     DAILY.      00                                                

 

     &lt       2022-0      No             25                       



                                                             



               00:00:                                              



               00                                                

 

     TAKE 1      2022-0      No             500                      



     TABLET BY                                                    



     MOUTH EVERY      00:00:                                              



     8 HOURS FOR      00                                                



     10 DAYS                                                        

 

     &lt       2022-0      No                                      



                                                             



               00:00:                                              



               00                                                

 

     &lt       2022-0      No             30                       



                                                             



               00:00:                                              



               00                                                

 

     TAKE 1      2022-0      No             30                       



     TABLET BY                                                    



     MOUTH AT      00:00:                                              



     BEDTIME      00                                                

 

     &lt       2022-0      No                                      



                                                             



               00:00:                                              



               00                                                

 

     Dose      2022-0      No             50                       



     Unknown                                                    



               00:00:                                              



               00                                                

 

     &lt       2022-0      No             20                       



                                                             



               00:00:                                              



               00                                                

 

     &lt       2022-0      No                                      



                                                             



               00:00:                                              



               00                                                

 

     TAKE 1      2022-0      No             [id number]                     



     TABLET                                                    



     TWICE      00:00:                                              



     DAILY.      00                                                

 

     &lt       2022-0      No             25                       



                                                             



               00:00:                                              



               00                                                

 

     TAKE 1      2022-0      No             500                      



     TABLET BY      7-06                                              



     MOUTH EVERY      00:00:                                              



     8 HOURS FOR      00                                                



     10 DAYS                                                        

 

     TAKE 1      2022-0      No             20                       



     TABLET BY      7-05                                              



     MOUTH ONCE      00:00:                                              



     A DAY WITH      00                                                



     MEALS                                                        

 

     TAKE 1      2022-0      No             20                       



     TABLET BY      7-05                                              



     MOUTH ONCE      00:00:                                              



     A DAY WITH      00                                                



     MEALS                                                        

 

     TAKE 1      2022-0      No             20                       



     TABLET BY      7-05                                              



     MOUTH ONCE      00:00:                                              



     A DAY WITH      00                                                



     MEALS                                                        

 

     TAKE 1      2022-0      No             20                       



     TABLET BY      7-05                                              



     MOUTH ONCE      00:00:                                              



     A DAY WITH      00                                                



     MEALS                                                        

 

     TAKE 1      2022-0      No             20                       



     TABLET BY      7-05                                              



     MOUTH ONCE      00:00:                                              



     A DAY WITH      00                                                



     MEALS                                                        

 

     TAKE 1      2022-0      No             20                       



     TABLET BY      7-05                                              



     MOUTH ONCE      00:00:                                              



     A DAY WITH      00                                                



     MEALS                                                        

 

     &lt       2022-0      No                                      



               6-30                                              



               00:00:                                              



               00                                                

 

     TAKE 1      2022-0      No             200                      



     TABLET      6-30                                              



     DAILY.      00:00:                                              



               00                                                

 

     &lt       2022-0      No                                      



               6-30                                              



               00:00:                                              



               00                                                

 

     TAKE 1      2022-0      No             200                      



     TABLET      6-30                                              



     DAILY.      00:00:                                              



               00                                                

 

     &lt       2022-0      No                                      



               6-30                                              



               00:00:                                              



               00                                                

 

     TAKE 1      2022-0      No             200                      



     TABLET      6-30                                              



     DAILY.      00:00:                                              



               00                                                

 

     &lt       2022-0      No                                      



               6-30                                              



               00:00:                                              



               00                                                

 

     TAKE 1      2022-0      No             200                      



     TABLET      6-30                                              



     DAILY.      00:00:                                              



               00                                                

 

     &lt       2022-0      No                                      



               6-30                                              



               00:00:                                              



               00                                                

 

     TAKE 1      2022-0      No             200                      



     TABLET      6-30                                              



     DAILY.      00:00:                                              



               00                                                

 

     &lt       2022-0      No                                      



               6-30                                              



               00:00:                                              



               00                                                

 

     TAKE 1      2022-0      No             200                      



     TABLET      6-30                                              



     DAILY.      00:00:                                              



               00                                                

 

     TAKE 1      2022-0      No                                      



     TABLET BY      6-29                                              



     MOUTH EVERY      00:00:                                              



     8 HOURS FOR      00                                                



     10 DAYS                                                        

 

     TAKE 1      2022-0      No                                      



     TABLET BY      6-29                                              



     MOUTH EVERY      00:00:                                              



     8 HOURS FOR      00                                                



     10 DAYS                                                        

 

     &lt       2022-0      No                                      



               6-29                                              



               00:00:                                              



               00                                                

 

     &lt       2022-0      No                                      



               6-29                                              



               00:00:                                              



               00                                                

 

     &lt       2022-0      No                                      



               6-29                                              



               00:00:                                              



               00                                                

 

     &lt       2022-0      No                                      



               6-29                                              



               00:00:                                              



               00                                                

 

     TAKE 1      2022-0      No                                      



     TABLET BY      6-29                                              



     MOUTH EVERY      00:00:                                              



     8 HOURS FOR      00                                                



     10 DAYS                                                        

 

     TAKE 1      2022-0      No                                      



     TABLET BY      6-29                                              



     MOUTH EVERY      00:00:                                              



     8 HOURS FOR                                                      



     10 DAYS                                                        

 

     &lt       2022-0      No                                      



               6-29                                              



               00:00:                                              



               00                                                

 

     &lt       2022-0      No                                      



               6-29                                              



               00:00:                                              



               00                                                

 

     &lt       2022-0      No                                      



               6-29                                              



               00:00:                                              



               00                                                

 

     &lt       2022-0      No                                      



               6-29                                              



               00:00:                                              



               00                                                

 

     TAKE 1      2022-0      No                                      



     TABLET BY      6-29                                              



     MOUTH EVERY      00:00:                                              



     8 HOURS FOR                                                      



     10 DAYS                                                        

 

     TAKE 1      2022-0      No                                      



     TABLET BY      6-29                                              



     MOUTH EVERY      00:00:                                              



     8 HOURS FOR                                                      



     10 DAYS                                                        

 

     &lt       2022-0      No                                      



               6-29                                              



               00:00:                                              



               00                                                

 

     &lt       2022-0      No                                      



               6-29                                              



               00:00:                                              



               00                                                

 

     &lt       2022-0      No                                      



               6-29                                              



               00:00:                                              



               00                                                

 

     &lt       2022-0      No                                      



               6-                                              



               00:00:                                              



               00                                                

 

     TAKE 1      2022-0      No                                      



     TABLET BY      6-29                                              



     MOUTH EVERY      00:00:                                              



     8 HOURS FOR                                                      



     10 DAYS                                                        

 

     TAKE 1      2022-0      No                                      



     TABLET BY      6-29                                              



     MOUTH EVERY      00:00:                                              



     8 HOURS FOR                                                      



     10 DAYS                                                        

 

     &lt       2022-0      No                                      



               6-29                                              



               00:00:                                              



               00                                                

 

     &lt       2022-0      No                                      



               6-                                              



               00:00:                                              



               00                                                

 

     &lt       2022-0      No                                      



               6-29                                              



               00:00:                                              



               00                                                

 

     &lt       2022-0      No                                      



               6-29                                              



               00:00:                                              



               00                                                

 

     TAKE 1      2022-0      No                                      



     TABLET BY      6-29                                              



     MOUTH EVERY      00:00:                                              



     8 HOURS FOR      00                                                



     10 DAYS                                                        

 

     TAKE 1      2022-0      No                                      



     TABLET BY      6-29                                              



     MOUTH EVERY      00:00:                                              



     8 HOURS FOR                                                      



     10 DAYS                                                        

 

     &lt       2022-0      No                                      



               6-29                                              



               00:00:                                              



               00                                                

 

     &lt       2022-0      No                                      



               6-29                                              



               00:00:                                              



               00                                                

 

     &lt       2022-0      No                                      



               6-29                                              



               00:00:                                              



               00                                                

 

     &lt       2022-0      No                                      



               6-29                                              



               00:00:                                              



               00                                                

 

     TAKE 1      2022-0      No                                      



     TABLET BY      6-29                                              



     MOUTH EVERY      00:00:                                              



     8 HOURS FOR      00                                                



     10 DAYS                                                        

 

     TAKE 1      2022-0      No                                      



     TABLET BY      6-29                                              



     MOUTH EVERY      00:00:                                              



     8 HOURS FOR                                                      



     10 DAYS                                                        

 

     &lt       2022-0      No                                      



               6-29                                              



               00:00:                                              



               00                                                

 

     &lt       2022-0      No                                      



               6-29                                              



               00:00:                                              



               00                                                

 

     &lt       2022-0      No                                      



               6-29                                              



               00:00:                                              



               00                                                

 

     &lt       2022-0      No                                      



               6-29                                              



               00:00:                                              



               00                                                

 

     TAKE ONE      2-0      No                                      



     (1) TO TWO                                                    



     (2)       00:00:                                              



     TABLET(S)      00                                                



     BY MOUTH                                                        



     EVERY 6                                                        



     HOURS AS                                                        



     NEEDED FOR                                                        



     PAIN , NO                                                        



     MORE THAN 8                                                        



     TABLETS IN                                                        



     24 HOURS.                                                        

 

     &lt       2022-0      No                                      



               6                                              



               00:00:                                              



               00                                                

 

     &lt       2022-0      No                                      



               6                                              



               00:00:                                              



               00                                                

 

     &lt       2022-0      No                                      



               6                                              



               00:00:                                              



               00                                                

 

     &lt       2022-0      No                                      



               6                                              



               00:00:                                              



               00                                                

 

     TAKE ONE      2-0      No                                      



     (1) TO TWO                                                    



     (2)       00:00:                                              



     TABLET(S)      00                                                



     BY MOUTH                                                        



     EVERY 6                                                        



     HOURS AS                                                        



     NEEDED FOR                                                        



     PAIN , NO                                                        



     MORE THAN 8                                                        



     TABLETS IN                                                        



     24 HOURS.                                                        

 

     &lt       2022-0      No                                      



               6                                              



               00:00:                                              



               00                                                

 

     &lt       2022-0      No                                      



               6                                              



               00:00:                                              



               00                                                

 

     &lt       2022-0      No                                      



               6                                              



               00:00:                                              



               00                                                

 

     &lt       2022-0      No                                      



               6                                              



               00:00:                                              



               00                                                

 

     &lt       2022-0      No                                      



               6                                              



               00:00:                                              



               00                                                

 

     &lt       2022-0      No                                      



               6                                              



               00:00:                                              



               00                                                

 

     TAKE ONE      2-0      No                                      



     (1) TO TWO                                                    



     (2)       00:00:                                              



     TABLET(S)      00                                                



     BY MOUTH                                                        



     EVERY 6                                                        



     HOURS AS                                                        



     NEEDED FOR                                                        



     PAIN , NO                                                        



     MORE THAN 8                                                        



     TABLETS IN                                                        



     24 HOURS.                                                        

 

     &lt       2022-0      No                                      



               6                                              



               00:00:                                              



               00                                                

 

     &lt       2022-0      No                                      



               6                                              



               00:00:                                              



               00                                                

 

     &lt       2022-0      No                                      



               6                                              



               00:00:                                              



               00                                                

 

     &lt       2022-0      No                                      



               6                                              



               00:00:                                              



               00                                                

 

     &lt       2022-0      No                                      



               6                                              



               00:00:                                              



               00                                                

 

     TAKE ONE      2-0      No                                      



     (1) TO TWO                                                    



     (2)       00:00:                                              



     TABLET(S)      00                                                



     BY MOUTH                                                        



     EVERY 6                                                        



     HOURS AS                                                        



     NEEDED FOR                                                        



     PAIN , NO                                                        



     MORE THAN 8                                                        



     TABLETS IN                                                        



     24 HOURS.                                                        

 

     &lt       2022-0      No                                      



               6                                              



               00:00:                                              



               00                                                

 

     &lt       2022-0      No                                      



               6                                              



               00:00:                                              



               00                                                

 

     &lt       2022-0      No                                      



               6                                              



               00:00:                                              



               00                                                

 

     &lt       2022-0      No                                      



               6                                              



               00:00:                                              



               00                                                

 

     &lt       2022-0      No                                      



               6                                              



               00:00:                                              



               00                                                

 

     TAKE ONE      2-0      No                                      



     (1) TO TWO      27                                              



     (2)       00:00:                                              



     TABLET(S)      00                                                



     BY MOUTH                                                        



     EVERY 6                                                        



     HOURS AS                                                        



     NEEDED FOR                                                        



     PAIN , NO                                                        



     MORE THAN 8                                                        



     TABLETS IN                                                        



     24 HOURS.                                                        

 

     &lt       2022-0      No                                      



               627                                              



               00:00:                                              



               00                                                

 

     &lt       2022-0      No                                      



               627                                              



               00:00:                                              



               00                                                

 

     &lt       2022-0      No                                      



               627                                              



               00:00:                                              



               00                                                

 

     &lt       2022-0      No                                      



               627                                              



               00:00:                                              



               00                                                

 

     &lt       2022-0      No                                      



               627                                              



               00:00:                                              



               00                                                

 

     TAKE ONE      2-0      No                                      



     (1) TO TWO      27                                              



     (2)       00:00:                                              



     TABLET(S)      00                                                



     BY MOUTH                                                        



     EVERY 6                                                        



     HOURS AS                                                        



     NEEDED FOR                                                        



     PAIN , NO                                                        



     MORE THAN 8                                                        



     TABLETS IN                                                        



     24 HOURS.                                                        

 

     &lt       2022-0      No                                      



               6                                              



               00:00:                                              



               00                                                

 

     &lt       2022-0      No                                      



               6                                              



               00:00:                                              



               00                                                

 

     &lt       2022-0      No                                      



               6                                              



               00:00:                                              



               00                                                

 

     &lt       2022-0      No                                      



               6                                              



               00:00:                                              



               00                                                

 

     &lt       2022-0      No                                      



                                                             



               00:00:                                              



               00                                                

 

     TAKE ONE      2-0      No                                      



     (1) TO TWO                                                    



     (2)       00:00:                                              



     TABLET(S)      00                                                



     BY MOUTH                                                        



     EVERY 6                                                        



     HOURS AS                                                        



     NEEDED FOR                                                        



     PAIN , NO                                                        



     MORE THAN 8                                                        



     TABLETS IN                                                        



     24 HOURS.                                                        

 

     &lt       2022-0      No                                      



                                                             



               00:00:                                              



               00                                                

 

     &lt       2022-0      No                                      



                                                             



               00:00:                                              



               00                                                

 

     &lt       2022-0      No                                      



                                                             



               00:00:                                              



               00                                                

 

     &lt       2022-0      No                                      



               6                                              



               00:00:                                              



               00                                                

 

     &lt       2022-0      No                                      



                                                             



               00:00:                                              



               00                                                

 

     TAKE 1      2022-0      No                                      



     TABLET BY      6-24                                              



     MOUTH AT      00:00:                                              



     BEDTIME      00                                                

 

     DISSOLVE 1      2022-0      No                                      



     TABLET BY      6-24                                              



     MOUTH EVERY      00:00:                                              



     8 HOURS AS      00                                                



     NEEDED                                                        

 

     TAKE 1      2022-0      No                                      



     TABLET BY      6-24                                              



     MOUTH AT      00:00:                                              



     BEDTIME      00                                                

 

     DISSOLVE 1      2022-0      No                                      



     TABLET BY      6-24                                              



     MOUTH EVERY      00:00:                                              



     8 HOURS AS      00                                                



     NEEDED                                                        

 

     TAKE 1      2022-0      No                                      



     TABLET BY      6-24                                              



     MOUTH AT      00:00:                                              



     BEDTIME      00                                                

 

     DISSOLVE 1      2022-0      No                                      



     TABLET BY      6-24                                              



     MOUTH EVERY      00:00:                                              



     8 HOURS AS      00                                                



     NEEDED                                                        

 

     TAKE 1      2022-0      No                                      



     TABLET BY      6-24                                              



     MOUTH AT      00:00:                                              



     BEDTIME      00                                                

 

     DISSOLVE 1      2022-0      No                                      



     TABLET BY      6-24                                              



     MOUTH EVERY      00:00:                                              



     8 HOURS AS      00                                                



     NEEDED                                                        

 

     TAKE 1      2022-0      No                                      



     TABLET BY      6-24                                              



     MOUTH AT      00:00:                                              



     BEDTIME      00                                                

 

     DISSOLVE 1      2022-0      No                                      



     TABLET BY      6-24                                              



     MOUTH EVERY      00:00:                                              



     8 HOURS AS      00                                                



     NEEDED                                                        

 

     TAKE 1      2022-0      No                                      



     TABLET BY      6-24                                              



     MOUTH AT      00:00:                                              



     BEDTIME      00                                                

 

     DISSOLVE 1      2022-0      No                                      



     TABLET BY      6-24                                              



     MOUTH EVERY      00:00:                                              



     8 HOURS AS      00                                                



     NEEDED                                                        

 

     TAKE 1      2022-0      No                                      



     TABLET BY      6-24                                              



     MOUTH AT      00:00:                                              



     BEDTIME      00                                                

 

     DISSOLVE 1      2022-0      No                                      



     TABLET BY      6-24                                              



     MOUTH EVERY      00:00:                                              



     8 HOURS AS      00                                                



     NEEDED                                                        

 

     metformin      2022-0      No             1mg                      



     1,000 mg      6-23                                              



     tablet      00:00:                                              



               00                                                

 

     levothyroxi      2022-0      No             1mcg                     



     ne 75 mcg      6-23                                              



     tablet      00:00:                                              



               00                                                

 

     levothyroxi      2022-0      No             1mcg                     



     ne 200 mcg      6-23                                              



     tablet      00:00:                                              



               00                                                

 

     Dose      2022-0      No                                      



     Unknown      6-23                                              



               00:00:                                              



               00                                                

 

     levothyroxi      2022-0      No             1mcg                     



     ne 75 mcg      6-23                                              



     tablet      00:00:                                              



               00                                                

 

     levothyroxi      2022-0      No             1mcg                     



     ne 200 mcg      6-23                                              



     tablet      00:00:                                              



               00                                                

 

     Dose      2022-0      No                                      



     Unknown      6-23                                              



               00:00:                                              



               00                                                

 

     Dose      2022-0      No                                      



     Unknown      6-23                                              



               00:00:                                              



               00                                                

 

     Dose      2022-0      No                                      



     Unknown      6-23                                              



               00:00:                                              



               00                                                

 

     Dose      2022-0      No             30                       



     Unknown      6-23                                              



               00:00:                                              



               00                                                

 

     Dose      2022-0      No             30                       



     Unknown      6-23                                              



               00:00:                                              



               00                                                

 

     Dose      2022-0      No             30                       



     Unknown      6-23                                              



               00:00:                                              



               00                                                

 

     metformin      2022-0      No             1mg                      



     1,000 mg      6-23                                              



     tablet      00:00:                                              



               00                                                

 

     levothyroxi      2022-0      No             1mcg                     



     ne 75 mcg      6-23                                              



     tablet      00:00:                                              



               00                                                

 

     levothyroxi      2022-0      No             1mcg                     



     ne 200 mcg      6-23                                              



     tablet      00:00:                                              



               00                                                

 

     metformin      2022-0      No             1mg                      



     1,000 mg      6-23                                              



     tablet      00:00:                                              



               00                                                

 

     levothyroxi      2022-0      No             1mcg                     



     ne 75 mcg      6-23                                              



     tablet      00:00:                                              



               00                                                

 

     levothyroxi      2022-0      No             1mcg                     



     ne 200 mcg      6-23                                              



     tablet      00:00:                                              



               00                                                

 

     metformin      2022-0      No             1mg                      



     1,000 mg      6-23                                              



     tablet      00:00:                                              



               00                                                

 

     levothyroxi      2022-0      No             1mcg                     



     ne 75 mcg      6-23                                              



     tablet      00:00:                                              



               00                                                

 

     levothyroxi      2022-0      No             1mcg                     



     ne 200 mcg      6-23                                              



     tablet      00:00:                                              



               00                                                

 

     Myrbetriq      2022-0      No             1mg                      



     25 mg      4-29                                              



     tablet,exte      00:00:                                              



     nded      00                                                



     release                                                        

 

     hydroxyzine      2022-0      No             1mg                      



     HCl 50 mg      4-29                                              



     tablet      00:00:                                              



               00                                                

 

     lisinopril      2022-0      No             1mg                      



     5 mg tablet      4-29                                              



               00:00:                                              



               00                                                

 

     metformin      2022-0      No             1mg                      



     1,000 mg      4-29                                              



     tablet      00:00:                                              



               00                                                

 

     metoclopram      2022-0      No             1mg                      



     fito 10 mg      4-29                                              



     tablet      00:00:                                              



               00                                                

 

     Myrbetriq      2022-0      No             1mg                      



     25 mg      4-29                                              



     tablet,exte      00:00:                                              



     nded      00                                                



     release                                                        

 

     hydroxyzine      2022-0      No             1mg                      



     HCl 50 mg      4-29                                              



     tablet      00:00:                                              



               00                                                

 

     lisinopril      2022-0      No             1mg                      



     5 mg tablet      4-                                              



               00:00:                                              



               00                                                

 

     Dose      2022-0      No                                      



     Unknown      4-29                                              



               00:00:                                              



               00                                                

 

     metoclopram      2022-0      No             1mg                      



     fito 10 mg      4-29                                              



     tablet      00:00:                                              



               00                                                

 

     Myrbetriq      2022-0      No             1mg                      



     25 mg      4-29                                              



     tablet,exte      00:00:                                              



     nded      00                                                



     release                                                        

 

     hydroxyzine      2022-0      No             1mg                      



     HCl 50 mg      4-29                                              



     tablet      00:00:                                              



               00                                                

 

     lisinopril      2022-0      No             1mg                      



     5 mg tablet      4-                                              



               00:00:                                              



               00                                                

 

     Dose      2022-0      No                                      



     Unknown      4-29                                              



               00:00:                                              



               00                                                

 

     metoclopram      2022-0      No             1mg                      



     fito 10 mg      4-29                                              



     tablet      00:00:                                              



               00                                                

 

     Myrbetriq      2022-0      No             1mg                      



     25 mg      4-29                                              



     tablet,exte      00:00:                                              



     nded      00                                                



     release                                                        

 

     lisinopril      2022-0      No             1mg                      



     5 mg tablet      4-29                                              



               00:00:                                              



               00                                                

 

     metoclopram      2022-0      No             1mg                      



     fito 10 mg      4-29                                              



     tablet      00:00:                                              



               00                                                

 

     Dose      2022-0      No             30                       



     Unknown      4-29                                              



               00:00:                                              



               00                                                

 

     Dose      2022-0      No             30                       



     Unknown      4-29                                              



               00:00:                                              



               00                                                

 

     Myrbetriq      2022-0      No             1mg                      



     25 mg      4-29                                              



     tablet,exte      00:00:                                              



     nded      00                                                



     release                                                        

 

     hydroxyzine      2022-0      No             1mg                      



     HCl 50 mg      4-29                                              



     tablet      00:00:                                              



               00                                                

 

     lisinopril      2022-0      No             1mg                      



     5 mg tablet      4-29                                              



               00:00:                                              



               00                                                

 

     metformin      2022-0      No             1mg                      



     1,000 mg      4-29                                              



     tablet      00:00:                                              



               00                                                

 

     metoclopram      2022-0      No             1mg                      



     fito 10 mg      4-29                                              



     tablet      00:00:                                              



               00                                                

 

     Myrbetriq      2022-0      No             1mg                      



     25 mg      4-29                                              



     tablet,exte      00:00:                                              



     nded      00                                                



     release                                                        

 

     hydroxyzine      2022-0      No             1mg                      



     HCl 50 mg      4-29                                              



     tablet      00:00:                                              



               00                                                

 

     lisinopril      2022-0      No             1mg                      



     5 mg tablet      4-                                              



               00:00:                                              



               00                                                

 

     metformin      2022-0      No             1mg                      



     1,000 mg      4-29                                              



     tablet      00:00:                                              



               00                                                

 

     metoclopram      2022-0      No             1mg                      



     fito 10 mg      4-29                                              



     tablet      00:00:                                              



               00                                                

 

     Myrbetriq      2022-0      No             1mg                      



     25 mg      4-29                                              



     tablet,exte      00:00:                                              



     nded      00                                                



     release                                                        

 

     hydroxyzine      2-0      No             1mg                      



     HCl 50 mg      4-29                                              



     tablet      00:00:                                              



               00                                                

 

     lisinopril      2022-0      No             1mg                      



     5 mg tablet      4-29                                              



               00:00:                                              



               00                                                

 

     metformin      2022-0      No             1mg                      



     1,000 mg      4-29                                              



     tablet      00:00:                                              



               00                                                

 

     metoclopram      2022-0      No             1mg                      



     fito 10 mg      4-29                                              



     tablet      00:00:                                              



               00                                                

 

     citalopram      2022-0      No             1mg                      



     20 mg      4-27                                              



     tablet      00:00:                                              



               00                                                

 

     atorvastati      2022-0      No             1mg                      



     n 10 mg      4-27                                              



     tablet      00:00:                                              



               00                                                

 

     glimepiride      2022-0      No             1mg                      



     1 mg tablet      4-27                                              



               00:00:                                              



               00                                                

 

     levothyroxi      2022-0      No             1mcg                     



     ne 75 mcg      4-27                                              



     tablet      00:00:                                              



               00                                                

 

     levothyroxi      2022-0      No             1mcg                     



     ne 200 mcg      4-27                                              



     tablet      00:00:                                              



               00                                                

 

     levothyroxi      2022-0      No             1mcg                     



     ne 300 mcg      4-27                                              



     tablet      00:00:                                              



               00                                                

 

     ferrous      2022-0      No             1(65 mg                     



     sulfate 325      4-27                     iron)                     



     mg (65 mg      00:00:                                              



     iron)      00                                                



     tablet                                                        

 

     omeprazole      2-0      No             1mg                      



     40 mg      4-27                                              



     capsule,del      00:00:                                              



     ayed      00                                                



     release                                                        

 

     citalopram      2-0      No             1mg                      



     20 mg      4-27                                              



     tablet      00:00:                                              



               00                                                

 

     atorvastati      2022-0      No             1mg                      



     n 10 mg      4-27                                              



     tablet      00:00:                                              



               00                                                

 

     Dose      2022-0      No                                      



     Unknown      4-27                                              



               00:00:                                              



               00                                                

 

     levothyroxi      2-0      No             1mcg                     



     ne 75 mcg      4-27                                              



     tablet      00:00:                                              



               00                                                

 

     levothyroxi      2022-0      No             1mcg                     



     ne 200 mcg      4-27                                              



     tablet      00:00:                                              



               00                                                

 

     levothyroxi      2022-0      No             1mcg                     



     ne 300 mcg      4-27                                              



     tablet      00:00:                                              



               00                                                

 

     ferrous      2-0      No             1(65 mg                     



     sulfate 325      4-27                     iron)                     



     mg (65 mg      00:00:                                              



     iron)      00                                                



     tablet                                                        

 

     omeprazole      2-0      No             1mg                      



     40 mg      4-27                                              



     capsule,del      00:00:                                              



     ayed      00                                                



     release                                                        

 

     citalopram      2-0      No             1mg                      



     20 mg      4-27                                              



     tablet      00:00:                                              



               00                                                

 

     atorvastati      2-0      No             1mg                      



     n 10 mg      4-27                                              



     tablet      00:00:                                              



               00                                                

 

     Dose      2022-0      No                                      



     Unknown      4-27                                              



               00:00:                                              



               00                                                

 

     Dose      2022-0      No                                      



     Unknown      4-27                                              



               00:00:                                              



               00                                                

 

     Dose      2022-0      No                                      



     Unknown      4-27                                              



               00:00:                                              



               00                                                

 

     Dose      2022-0      No                                      



     Unknown      4-27                                              



               00:00:                                              



               00                                                

 

     ferrous      2022-0      No             1(65 mg                     



     sulfate 325      4-27                     iron)                     



     mg (65 mg      00:00:                                              



     iron)      00                                                



     tablet                                                        

 

     omeprazole      2-0      No             1mg                      



     40 mg      4-27                                              



     capsule,del      00:00:                                              



     ayed      00                                                



     release                                                        

 

     TAKE 1      2-0      No                                      



     TABLET      4-27                                              



     DAILY WITH      00:00:                                              



     BREAKFAST.      00                                                

 

     citalopram      2-0      No             1mg                      



     20 mg      4-27                                              



     tablet      00:00:                                              



               00                                                

 

     atorvastati      2022-0      No             1mg                      



     n 10 mg      4-27                                              



     tablet      00:00:                                              



               00                                                

 

     ferrous      2022-0      No             1(65 mg                     



     sulfate 325      4-27                     iron)                     



     mg (65 mg      00:00:                                              



     iron)      00                                                



     tablet                                                        

 

     Dose      2-0      No             30                       



     Unknown      4-27                                              



               00:00:                                              



               00                                                

 

     Dose      2022-0      No             30                       



     Unknown      4-27                                              



               00:00:                                              



               00                                                

 

     Dose      2022-0      No             30                       



     Unknown      4-27                                              



               00:00:                                              



               00                                                

 

     omeprazole      2-0      No             1mg                      



     40 mg      4-27                                              



     capsule,del      00:00:                                              



     ayed      00                                                



     release                                                        

 

     citalopram      2022-0      No             1mg                      



     20 mg      4-27                                              



     tablet      00:00:                                              



               00                                                

 

     atorvastati      2022-0      No             1mg                      



     n 10 mg      4-27                                              



     tablet      00:00:                                              



               00                                                

 

     glimepiride      2-0      No             1mg                      



     1 mg tablet      27                                              



               00:00:                                              



               00                                                

 

     levothyroxi      2022-0      No             1mcg                     



     ne 75 mcg      4-27                                              



     tablet      00:00:                                              



               00                                                

 

     levothyroxi      2022-0      No             1mcg                     



     ne 200 mcg      4-27                                              



     tablet      00:00:                                              



               00                                                

 

     levothyroxi      2022-0      No             1mcg                     



     ne 300 mcg      4-27                                              



     tablet      00:00:                                              



               00                                                

 

     ferrous      2022-0      No             1(65 mg                     



     sulfate 325      4-27                     iron)                     



     mg (65 mg      00:00:                                              



     iron)      00                                                



     tablet                                                        

 

     omeprazole      2-0      No             1mg                      



     40 mg      4-27                                              



     capsule,del      00:00:                                              



     ayed      00                                                



     release                                                        

 

     citalopram      2-0      No             1mg                      



     20 mg      4-27                                              



     tablet      00:00:                                              



               00                                                

 

     atorvastati      2-0      No             1mg                      



     n 10 mg      4-27                                              



     tablet      00:00:                                              



               00                                                

 

     glimepiride      2-0      No             1mg                      



     1 mg tablet      27                                              



               00:00:                                              



               00                                                

 

     levothyroxi      2022-0      No             1mcg                     



     ne 75 mcg      4-27                                              



     tablet      00:00:                                              



               00                                                

 

     levothyroxi      2-0      No             1mcg                     



     ne 200 mcg      4-27                                              



     tablet      00:00:                                              



               00                                                

 

     levothyroxi      2-0      No             1mcg                     



     ne 300 mcg      4-27                                              



     tablet      00:00:                                              



               00                                                

 

     ferrous      2022-0      No             1(65 mg                     



     sulfate 325      4-27                     iron)                     



     mg (65 mg      00:00:                                              



     iron)      00                                                



     tablet                                                        

 

     omeprazole      2-0      No             1mg                      



     40 mg      4-27                                              



     capsule,del      00:00:                                              



     ayed      00                                                



     release                                                        

 

     citalopram      2-0      No             1mg                      



     20 mg      4-27                                              



     tablet      00:00:                                              



               00                                                

 

     atorvastati      2022-0      No             1mg                      



     n 10 mg      4-27                                              



     tablet      00:00:                                              



               00                                                

 

     Dose      -0      No                                      



     Unknown      4-27                                              



               00:00:                                              



               00                                                

 

     levothyroxi      -0      No             1mcg                     



     ne 75 mcg      4-27                                              



     tablet      00:00:                                              



               00                                                

 

     levothyroxi      -0      No             1mcg                     



     ne 200 mcg      4-27                                              



     tablet      00:00:                                              



               00                                                

 

     levothyroxi      -0      No             1mcg                     



     ne 300 mcg      4-27                                              



     tablet      00:00:                                              



               00                                                

 

     ferrous      -0      No             1(65 mg                     



     sulfate 325      4-27                     iron)                     



     mg (65 mg      00:00:                                              



     iron)      00                                                



     tablet                                                        

 

     omeprazole      -0      No             1mg                      



     40 mg      4-27                                              



     capsule,del      00:00:                                              



     ayed      00                                                



     release                                                        

 

     Dose      -0      No                                      



     Unknown      4-01                                              



               00:00:                                              



               00                                                

 

     Dose      2-0      No                                      



     Unknown      4-01                                              



               00:00:                                              



               00                                                

 

     Dose      2-0      No                                      



     Unknown      4-01                                              



               00:00:                                              



               00                                                

 

     Dose      2-0      No                                      



     Unknown      4-01                                              



               00:00:                                              



               00                                                

 

     Dose      2-0      No                                      



     Unknown      4-01                                              



               00:00:                                              



               00                                                

 

     Dose      2-0      No                                      



     Unknown      4-01                                              



               00:00:                                              



               00                                                

 

     Dose      2-0      No                                      



     Unknown      4-01                                              



               00:00:                                              



               00                                                

 

     Dose      2-0      No                                      



     Unknown      4-01                                              



               00:00:                                              



               00                                                

 

     Dose      2-0      No                                      



     Unknown      4-01                                              



               00:00:                                              



               00                                                

 

     Dose      2-0      No                                      



     Unknown      4-01                                              



               00:00:                                              



               00                                                

 

     Dose      2-0      No                                      



     Unknown      4-01                                              



               00:00:                                              



               00                                                

 

     levothyroxi      2-0      No             1mcg                     



     ne 75 mcg      4-01                                              



     tablet      00:00:                                              



               00                                                

 

     levothyroxi      -0      No             1mcg                     



     ne 200 mcg      4-01                                              



     tablet      00:00:                                              



               00                                                

 

     hydrocortis      -0      No             1mg                      



     one acetate      4-01                                              



     25 mg      00:00:                                              



     rectal      00                                                



     suppository                                                        

 

     nitrofurant      2-0      No             1mg                      



     oin       4-01                                              



     monohydrate      00:00:                                              



     /macrocryst      00                                                



     als 100 mg                                                        



     capsule                                                        

 

     Dose      -0      No                                      



     Unknown      4-01                                              



               00:00:                                              



               00                                                

 

     Dose      2-0      No                                      



     Unknown      4-01                                              



               00:00:                                              



               00                                                

 

     Dose      2-0      No                                      



     Unknown      4-01                                              



               00:00:                                              



               00                                                

 

     Dose      2-0      No                                      



     Unknown      4-01                                              



               00:00:                                              



               00                                                

 

     Dose      2-0      No                                      



     Unknown      4-01                                              



               00:00:                                              



               00                                                

 

     Dose      2022-0      No                                      



     Unknown      4-01                                              



               00:00:                                              



               00                                                

 

     Dose      2022-0      No                                      



     Unknown      4-01                                              



               00:00:                                              



               00                                                

 

     Dose      2022-0      No                                      



     Unknown      4-01                                              



               00:00:                                              



               00                                                

 

     Dose      2022-0      No                                      



     Unknown      4-01                                              



               00:00:                                              



               00                                                

 

     Dose      2022-0      No                                      



     Unknown      4-01                                              



               00:00:                                              



               00                                                

 

     Dose      2022-0      No                                      



     Unknown      4-01                                              



               00:00:                                              



               00                                                

 

     Dose      2022-0      No                                      



     Unknown      4-01                                              



               00:00:                                              



               00                                                

 

     Dose      2022-0      No                                      



     Unknown      4-01                                              



               00:00:                                              



               00                                                

 

     Dose      2022-0      No                                      



     Unknown      4-01                                              



               00:00:                                              



               00                                                

 

     Dose      2022-0      No                                      



     Unknown      4-01                                              



               00:00:                                              



               00                                                

 

     Dose      2022-0      No                                      



     Unknown      4-01                                              



               00:00:                                              



               00                                                

 

     Dose      2022-0      No                                      



     Unknown      4-01                                              



               00:00:                                              



               00                                                

 

     Dose      2022-0      No                                      



     Unknown      4-01                                              



               00:00:                                              



               00                                                

 

     Dose      2022-0      No                                      



     Unknown      4-01                                              



               00:00:                                              



               00                                                

 

     Dose      2022-0      No                                      



     Unknown      4-01                                              



               00:00:                                              



               00                                                

 

     Dose      2022-0      No                                      



     Unknown      4-01                                              



               00:00:                                              



               00                                                

 

     Dose      2022-0      No                                      



     Unknown      4-01                                              



               00:00:                                              



               00                                                

 

     Dose      2022-0      No                                      



     Unknown      4-01                                              



               00:00:                                              



               00                                                

 

     Dose      2022-0      No                                      



     Unknown      4-01                                              



               00:00:                                              



               00                                                

 

     Dose      2022-0      No                                      



     Unknown      4-01                                              



               00:00:                                              



               00                                                

 

     Dose      2022-0      No                                      



     Unknown      4-01                                              



               00:00:                                              



               00                                                

 

     Dose      2022-0      No                                      



     Unknown      4-01                                              



               00:00:                                              



               00                                                

 

     Dose      2022-0      No                                      



     Unknown      4-01                                              



               00:00:                                              



               00                                                

 

     Dose      2022-0      No                                      



     Unknown      4-01                                              



               00:00:                                              



               00                                                

 

     Dose      2022-0      No                                      



     Unknown      4-01                                              



               00:00:                                              



               00                                                

 

     Dose      2022-0      No                                      



     Unknown      4-01                                              



               00:00:                                              



               00                                                

 

     Dose      2022-0      No                                      



     Unknown      4-01                                              



               00:00:                                              



               00                                                

 

     hydrocortis      2-0      No             1mg                      



     one acetate      4-01                                              



     25 mg      00:00:                                              



     rectal      00                                                



     suppository                                                        

 

     nitrofurant      2-0      No             1mg                      



     oin       4-01                                              



     monohydrate      00:00:                                              



     /macrocryst      00                                                



     als 100 mg                                                        



     capsule                                                        

 

     Dose      2-0      No                                      



     Unknown      4-01                                              



               00:00:                                              



               00                                                

 

     Dose      2022-0      No                                      



     Unknown      4-01                                              



               00:00:                                              



               00                                                

 

     Dose      2022-0      No                                      



     Unknown      4-01                                              



               00:00:                                              



               00                                                

 

     Dose      2022-0      No                                      



     Unknown      4-01                                              



               00:00:                                              



               00                                                

 

     Dose      2022-0      No                                      



     Unknown      4-01                                              



               00:00:                                              



               00                                                

 

     Dose      2022-0      No                                      



     Unknown      4-01                                              



               00:00:                                              



               00                                                

 

     Dose      2022-0      No                                      



     Unknown      4-01                                              



               00:00:                                              



               00                                                

 

     Dose      2022-0      No                                      



     Unknown      4-01                                              



               00:00:                                              



               00                                                

 

     Dose      2022-0      No                                      



     Unknown      4-01                                              



               00:00:                                              



               00                                                

 

     Dose      2022-0      No                                      



     Unknown      4-01                                              



               00:00:                                              



               00                                                

 

     Dose      2022-0      No                                      



     Unknown      4-01                                              



               00:00:                                              



               00                                                

 

     Dose      2022-0      No                                      



     Unknown      4-01                                              



               00:00:                                              



               00                                                

 

     Dose      2022-0      No                                      



     Unknown      4-01                                              



               00:00:                                              



               00                                                

 

     Dose      2022-0      No                                      



     Unknown      4-01                                              



               00:00:                                              



               00                                                

 

     Dose      2022-0      No                                      



     Unknown      4-01                                              



               00:00:                                              



               00                                                

 

     Dose      2022-0      No                                      



     Unknown      4-01                                              



               00:00:                                              



               00                                                

 

     Dose      2022-0      No                                      



     Unknown      4-01                                              



               00:00:                                              



               00                                                

 

     Dose      2022-0      No                                      



     Unknown      4-01                                              



               00:00:                                              



               00                                                

 

     Dose      2022-0      No                                      



     Unknown      4-01                                              



               00:00:                                              



               00                                                

 

     Dose      2022-0      No                                      



     Unknown      4-01                                              



               00:00:                                              



               00                                                

 

     Dose      2022-0      No                                      



     Unknown      4-01                                              



               00:00:                                              



               00                                                

 

     Dose      2022-0      No                                      



     Unknown      4-01                                              



               00:00:                                              



               00                                                

 

     Dose      2022-0      No                                      



     Unknown      4-01                                              



               00:00:                                              



               00                                                

 

     Dose      2022-0      No                                      



     Unknown      4-                                              



               00:00:                                              



               00                                                

 

     Dose      2022-0      No                                      



     Unknown      4-01                                              



               00:00:                                              



               00                                                

 

     Dose      2022-0      No                                      



     Unknown      4-01                                              



               00:00:                                              



               00                                                

 

     Dose      2022-0      No                                      



     Unknown      4-01                                              



               00:00:                                              



               00                                                

 

     Dose      2022-0      No                                      



     Unknown      4-                                              



               00:00:                                              



               00                                                

 

     Dose      2022-0      No                                      



     Unknown      4-01                                              



               00:00:                                              



               00                                                

 

     Dose      2022-0      No             30                       



     Unknown      4-01                                              



               00:00:                                              



               00                                                

 

     Dose      2022-0      No             30                       



     Unknown      4-01                                              



               00:00:                                              



               00                                                

 

     Dose      2022-0      No             30                       



     Unknown      4-01                                              



               00:00:                                              



               00                                                

 

     Dose      2022-0      No             30                       



     Unknown      4-                                              



               00:00:                                              



               00                                                

 

     Dose      2022-0      No             30                       



     Unknown      4-01                                              



               00:00:                                              



               00                                                

 

     Dose      2022-0      No                                      



     Unknown      4-01                                              



               00:00:                                              



               00                                                

 

     Dose      2022-0      No                                      



     Unknown      4-01                                              



               00:00:                                              



               00                                                

 

     Dose      2022-0      No                                      



     Unknown      4-01                                              



               00:00:                                              



               00                                                

 

     Dose      2022-0      No                                      



     Unknown      4-01                                              



               00:00:                                              



               00                                                

 

     Dose      2022-0      No                                      



     Unknown      4-01                                              



               00:00:                                              



               00                                                

 

     Dose      2022-0      No                                      



     Unknown      4-01                                              



               00:00:                                              



               00                                                

 

     Dose      2022-0      No                                      



     Unknown      4-01                                              



               00:00:                                              



               00                                                

 

     Dose      2022-0      No                                      



     Unknown      4-01                                              



               00:00:                                              



               00                                                

 

     Dose      2022-0      No                                      



     Unknown      4-01                                              



               00:00:                                              



               00                                                

 

     Dose      2022-0      No                                      



     Unknown      4-01                                              



               00:00:                                              



               00                                                

 

     Dose      2022-0      No                                      



     Unknown      4-01                                              



               00:00:                                              



               00                                                

 

     Dose      2022-0      No                                      



     Unknown      4-01                                              



               00:00:                                              



               00                                                

 

     Dose      2022-0      No                                      



     Unknown      4-01                                              



               00:00:                                              



               00                                                

 

     Dose      2022-0      No                                      



     Unknown      4-01                                              



               00:00:                                              



               00                                                

 

     Dose      2022-0      No                                      



     Unknown      4-01                                              



               00:00:                                              



               00                                                

 

     Dose      2022-0      No                                      



     Unknown      4-01                                              



               00:00:                                              



               00                                                

 

     Dose      2022-0      No                                      



     Unknown      4-01                                              



               00:00:                                              



               00                                                

 

     Dose      2022-0      No                                      



     Unknown      4-01                                              



               00:00:                                              



               00                                                

 

     Dose      2022-0      No                                      



     Unknown      4-01                                              



               00:00:                                              



               00                                                

 

     Dose      2022-0      No                                      



     Unknown      4-01                                              



               00:00:                                              



               00                                                

 

     Dose      2022-0      No                                      



     Unknown      4-01                                              



               00:00:                                              



               00                                                

 

     Dose      2022-0      No                                      



     Unknown      4-01                                              



               00:00:                                              



               00                                                

 

     Dose      2022-0      No                                      



     Unknown      4-01                                              



               00:00:                                              



               00                                                

 

     Dose      2022-0      No                                      



     Unknown      4-01                                              



               00:00:                                              



               00                                                

 

     Dose      2022-0      No                                      



     Unknown      4-01                                              



               00:00:                                              



               00                                                

 

     Dose      2022-0      No                                      



     Unknown      4-01                                              



               00:00:                                              



               00                                                

 

     Dose      2022-0      No                                      



     Unknown      4-01                                              



               00:00:                                              



               00                                                

 

     Dose      2022-0      No                                      



     Unknown      4-01                                              



               00:00:                                              



               00                                                

 

     Dose      2022-0      No                                      



     Unknown      4-01                                              



               00:00:                                              



               00                                                

 

     metformin      2022-0      No             1mg                      



     1,000 mg      4-01                                              



     tablet      00:00:                                              



               00                                                

 

     levothyroxi      2022-0      No             1mcg                     



     ne 75 mcg      4-01                                              



     tablet      00:00:                                              



               00                                                

 

     levothyroxi      2022-0      No             1mcg                     



     ne 200 mcg      4-01                                              



     tablet      00:00:                                              



               00                                                

 

     hydrocortis      2-0      No             1mg                      



     one acetate      4-01                                              



     25 mg      00:00:                                              



     rectal      00                                                



     suppository                                                        

 

     nitrofurant      2-0      No             1mg                      



     oin       4-01                                              



     monohydrate      00:00:                                              



     /macrocryst      00                                                



     als 100 mg                                                        



     capsule                                                        

 

     Dose      -0      No                                      



     Unknown      4-01                                              



               00:00:                                              



               00                                                

 

     Dose      2022-0      No                                      



     Unknown      4-01                                              



               00:00:                                              



               00                                                

 

     Dose      2022-0      No                                      



     Unknown      4-01                                              



               00:00:                                              



               00                                                

 

     Dose      2022-0      No                                      



     Unknown      4-01                                              



               00:00:                                              



               00                                                

 

     Dose      2022-0      No                                      



     Unknown      4-01                                              



               00:00:                                              



               00                                                

 

     Dose      2022-0      No                                      



     Unknown      4-01                                              



               00:00:                                              



               00                                                

 

     Dose      2022-0      No                                      



     Unknown      4-01                                              



               00:00:                                              



               00                                                

 

     Dose      2022-0      No                                      



     Unknown      4-01                                              



               00:00:                                              



               00                                                

 

     Dose      2022-0      No                                      



     Unknown      4-01                                              



               00:00:                                              



               00                                                

 

     Dose      2022-0      No                                      



     Unknown      4-01                                              



               00:00:                                              



               00                                                

 

     Dose      2022-0      No                                      



     Unknown      4-01                                              



               00:00:                                              



               00                                                

 

     Dose      2022-0      No                                      



     Unknown      4-01                                              



               00:00:                                              



               00                                                

 

     Dose      2022-0      No                                      



     Unknown      4-01                                              



               00:00:                                              



               00                                                

 

     Dose      2022-0      No                                      



     Unknown      4-01                                              



               00:00:                                              



               00                                                

 

     Dose      2022-0      No                                      



     Unknown      4-01                                              



               00:00:                                              



               00                                                

 

     Dose      2022-0      No                                      



     Unknown      4-01                                              



               00:00:                                              



               00                                                

 

     Dose      2022-0      No                                      



     Unknown      4-01                                              



               00:00:                                              



               00                                                

 

     Dose      2022-0      No                                      



     Unknown      4-01                                              



               00:00:                                              



               00                                                

 

     Dose      2022-0      No                                      



     Unknown      4-01                                              



               00:00:                                              



               00                                                

 

     Dose      2022-0      No                                      



     Unknown      4-01                                              



               00:00:                                              



               00                                                

 

     Dose      2022-0      No                                      



     Unknown      4-01                                              



               00:00:                                              



               00                                                

 

     Dose      2022-0      No                                      



     Unknown      4-01                                              



               00:00:                                              



               00                                                

 

     Dose      2022-0      No                                      



     Unknown      4-01                                              



               00:00:                                              



               00                                                

 

     Dose      2022-0      No                                      



     Unknown      4-01                                              



               00:00:                                              



               00                                                

 

     Dose      2022-0      No                                      



     Unknown      4-01                                              



               00:00:                                              



               00                                                

 

     Dose      2022-0      No                                      



     Unknown      4-01                                              



               00:00:                                              



               00                                                

 

     Dose      2022-0      No                                      



     Unknown      4-01                                              



               00:00:                                              



               00                                                

 

     Dose      2022-0      No                                      



     Unknown      4-01                                              



               00:00:                                              



               00                                                

 

     Dose      2022-0      No                                      



     Unknown      4-01                                              



               00:00:                                              



               00                                                

 

     metformin      2-0      No             1mg                      



     1,000 mg      4-01                                              



     tablet      00:00:                                              



               00                                                

 

     levothyroxi      2022-0      No             1mcg                     



     ne 75 mcg      4-01                                              



     tablet      00:00:                                              



               00                                                

 

     levothyroxi      2-0      No             1mcg                     



     ne 200 mcg      4-01                                              



     tablet      00:00:                                              



               00                                                

 

     hydrocortis      2-0      No             1mg                      



     one acetate      4-01                                              



     25 mg      00:00:                                              



     rectal      00                                                



     suppository                                                        

 

     nitrofurant      -0      No             1mg                      



     oin       4-01                                              



     monohydrate      00:00:                                              



     /macrocryst      00                                                



     als 100 mg                                                        



     capsule                                                        

 

     Dose      -0      No                                      



     Unknown      4-01                                              



               00:00:                                              



               00                                                

 

     Dose      2022-0      No                                      



     Unknown      4-01                                              



               00:00:                                              



               00                                                

 

     Dose      2-0      No                                      



     Unknown      4-01                                              



               00:00:                                              



               00                                                

 

     Dose      2-0      No                                      



     Unknown      4-01                                              



               00:00:                                              



               00                                                

 

     Dose      2-0      No                                      



     Unknown      4-01                                              



               00:00:                                              



               00                                                

 

     Dose      2-0      No                                      



     Unknown      4-01                                              



               00:00:                                              



               00                                                

 

     Dose      2022-0      No                                      



     Unknown      4-01                                              



               00:00:                                              



               00                                                

 

     Dose      2022-0      No                                      



     Unknown      4-01                                              



               00:00:                                              



               00                                                

 

     Dose      2022-0      No                                      



     Unknown      4-01                                              



               00:00:                                              



               00                                                

 

     Dose      2022-0      No                                      



     Unknown      4-01                                              



               00:00:                                              



               00                                                

 

     Dose      2022-0      No                                      



     Unknown      4-01                                              



               00:00:                                              



               00                                                

 

     Dose      2022-0      No                                      



     Unknown      4-01                                              



               00:00:                                              



               00                                                

 

     Dose      2022-0      No                                      



     Unknown      4-01                                              



               00:00:                                              



               00                                                

 

     Dose      2-0      No                                      



     Unknown      4-01                                              



               00:00:                                              



               00                                                

 

     Dose      2022-0      No                                      



     Unknown      4-01                                              



               00:00:                                              



               00                                                

 

     Dose      2022-0      No                                      



     Unknown      4-01                                              



               00:00:                                              



               00                                                

 

     Dose      2022-0      No                                      



     Unknown      4-01                                              



               00:00:                                              



               00                                                

 

     Dose      2022-0      No                                      



     Unknown      4-01                                              



               00:00:                                              



               00                                                

 

     Dose      2022-0      No                                      



     Unknown      4-01                                              



               00:00:                                              



               00                                                

 

     Dose      2022-0      No                                      



     Unknown      4-01                                              



               00:00:                                              



               00                                                

 

     Dose      2022-0      No                                      



     Unknown      4-01                                              



               00:00:                                              



               00                                                

 

     Dose      2022-0      No                                      



     Unknown      4-01                                              



               00:00:                                              



               00                                                

 

     Dose      2022-0      No                                      



     Unknown      4-01                                              



               00:00:                                              



               00                                                

 

     Dose      2022-0      No                                      



     Unknown      4-01                                              



               00:00:                                              



               00                                                

 

     Dose      2022-0      No                                      



     Unknown      4-01                                              



               00:00:                                              



               00                                                

 

     Dose      2022-0      No                                      



     Unknown      4-01                                              



               00:00:                                              



               00                                                

 

     Dose      2022-0      No                                      



     Unknown      4-01                                              



               00:00:                                              



               00                                                

 

     Dose      2022-0      No                                      



     Unknown      4-01                                              



               00:00:                                              



               00                                                

 

     Dose      2022-0      No                                      



     Unknown      4-01                                              



               00:00:                                              



               00                                                

 

     metformin      2022-0      No             1mg                      



     1,000 mg      4-01                                              



     tablet      00:00:                                              



               00                                                

 

     levothyroxi      2022-0      No             1mcg                     



     ne 75 mcg      4-01                                              



     tablet      00:00:                                              



               00                                                

 

     levothyroxi      2-0      No             1mcg                     



     ne 200 mcg      4-01                                              



     tablet      00:00:                                              



               00                                                

 

     hydrocortis      2-0      No             1mg                      



     one acetate      4-01                                              



     25 mg      00:00:                                              



     rectal      00                                                



     suppository                                                        

 

     nitrofurant      2-0      No             1mg                      



     oin       4-01                                              



     monohydrate      00:00:                                              



     /macrocryst      00                                                



     als 100 mg                                                        



     capsule                                                        

 

     Dose      2-0      No                                      



     Unknown      4-01                                              



               00:00:                                              



               00                                                

 

     Dose      2022-0      No                                      



     Unknown      4-01                                              



               00:00:                                              



               00                                                

 

     Dose      2022-0      No                                      



     Unknown      4-01                                              



               00:00:                                              



               00                                                

 

     Dose      2022-0      No                                      



     Unknown      4-01                                              



               00:00:                                              



               00                                                

 

     Dose      2022-0      No                                      



     Unknown      4-01                                              



               00:00:                                              



               00                                                

 

     Dose      2022-0      No                                      



     Unknown      4-01                                              



               00:00:                                              



               00                                                

 

     Dose      2022-0      No                                      



     Unknown      4-01                                              



               00:00:                                              



               00                                                

 

     Dose      2022-0      No                                      



     Unknown      4-01                                              



               00:00:                                              



               00                                                

 

     Dose      2022-0      No                                      



     Unknown      4-01                                              



               00:00:                                              



               00                                                

 

     Dose      2022-0      No                                      



     Unknown      4-01                                              



               00:00:                                              



               00                                                

 

     Dose      2022-0      No                                      



     Unknown      4-01                                              



               00:00:                                              



               00                                                

 

     Dose      2022-0      No                                      



     Unknown      4-01                                              



               00:00:                                              



               00                                                

 

     Dose      2022-0      No                                      



     Unknown      4-01                                              



               00:00:                                              



               00                                                

 

     metformin      2-0      No             1mg                      



     1,000 mg      4-01                                              



     tablet      00:00:                                              



               00                                                

 

     levothyroxi      2-0      No             1mcg                     



     ne 75 mcg      4-01                                              



     tablet      00:00:                                              



               00                                                

 

     levothyroxi      2-0      No             1mcg                     



     ne 200 mcg      4-01                                              



     tablet      00:00:                                              



               00                                                

 

     hydrocortis      2-0      No             1mg                      



     one acetate      4-01                                              



     25 mg      00:00:                                              



     rectal      00                                                



     suppository                                                        

 

     nitrofurant      2-0      No             1mg                      



     oin       4-01                                              



     monohydrate      00:00:                                              



     /macrocryst      00                                                



     als 100 mg                                                        



     capsule                                                        

 

     Dose      2-0      No                                      



     Unknown      4-01                                              



               00:00:                                              



               00                                                

 

     Dose      2022-0      No                                      



     Unknown      4-01                                              



               00:00:                                              



               00                                                

 

     Dose      2022-0      No                                      



     Unknown      4-01                                              



               00:00:                                              



               00                                                

 

     Dose      2022-0      No                                      



     Unknown      4-01                                              



               00:00:                                              



               00                                                

 

     Dose      2022-0      No                                      



     Unknown      4-01                                              



               00:00:                                              



               00                                                

 

     Dose      2022-0      No                                      



     Unknown      4-01                                              



               00:00:                                              



               00                                                

 

     Dose      2022-0      No                                      



     Unknown      4-01                                              



               00:00:                                              



               00                                                

 

     Dose      2022-0      No                                      



     Unknown      4-01                                              



               00:00:                                              



               00                                                

 

     Dose      2022-0      No                                      



     Unknown      4-01                                              



               00:00:                                              



               00                                                

 

     Dose      2022-0      No                                      



     Unknown      4-01                                              



               00:00:                                              



               00                                                

 

     Dose      2022-0      No                                      



     Unknown      4-01                                              



               00:00:                                              



               00                                                

 

     Dose      2022-0      No                                      



     Unknown      4-01                                              



               00:00:                                              



               00                                                

 

     Dose      2022-0      No                                      



     Unknown      4-01                                              



               00:00:                                              



               00                                                

 

     Dose      2022-0      No                                      



     Unknown      4-01                                              



               00:00:                                              



               00                                                

 

     Dose      2022-0      No                                      



     Unknown      4-01                                              



               00:00:                                              



               00                                                

 

     Dose      2022-0      No                                      



     Unknown      4-01                                              



               00:00:                                              



               00                                                

 

     Dose      2022-0      No                                      



     Unknown      4-01                                              



               00:00:                                              



               00                                                

 

     Dose      2022-0      No                                      



     Unknown      4-01                                              



               00:00:                                              



               00                                                

 

     Dose      2022-0      No                                      



     Unknown      4-01                                              



               00:00:                                              



               00                                                

 

     Dose      2022-0      No                                      



     Unknown      4-01                                              



               00:00:                                              



               00                                                

 

     Dose      2022-0      No                                      



     Unknown      4-01                                              



               00:00:                                              



               00                                                

 

     Dose      2022-0      No                                      



     Unknown      4-01                                              



               00:00:                                              



               00                                                

 

     Dose      2022-0      No                                      



     Unknown      4-01                                              



               00:00:                                              



               00                                                

 

     Dose      2022-0      No                                      



     Unknown      4-01                                              



               00:00:                                              



               00                                                

 

     Dose      2022-0      No                                      



     Unknown      4-01                                              



               00:00:                                              



               00                                                

 

     Dose      2022-0      No                                      



     Unknown      4-01                                              



               00:00:                                              



               00                                                

 

     Dose      2022-0      No                                      



     Unknown      4-                                              



               00:00:                                              



               00                                                

 

     Dose      2022-0      No                                      



     Unknown      4-01                                              



               00:00:                                              



               00                                                

 

     Dose      2022-0      No                                      



     Unknown      4-01                                              



               00:00:                                              



               00                                                

 

     Dose      2022-0      No                                      



     Unknown      4-01                                              



               00:00:                                              



               00                                                

 

     Dose      2022-0      No                                      



     Unknown      4-                                              



               00:00:                                              



               00                                                

 

     Dose      2022-0      No                                      



     Unknown      4-                                              



               00:00:                                              



               00                                                

 

     Dose      2022-0      No                                      



     Unknown      4-                                              



               00:00:                                              



               00                                                

 

     Dose      2022-0      No                                      



     Unknown      4-01                                              



               00:00:                                              



               00                                                

 

     Dose      2022-0      No                                      



     Unknown      4-01                                              



               00:00:                                              



               00                                                

 

     Dose      2022-0      No                                      



     Unknown      3-31                                              



               00:00:                                              



               00                                                

 

     Dose      2022-0      No                                      



     Unknown      3-31                                              



               00:00:                                              



               00                                                

 

     Dose      2022-0      No                                      



     Unknown      3-31                                              



               00:00:                                              



               00                                                

 

     Dose      2022-0      No                                      



     Unknown      3-31                                              



               00:00:                                              



               00                                                

 

     Dose      2022-0      No                                      



     Unknown      3-31                                              



               00:00:                                              



               00                                                

 

     Dose      2022-0      No                                      



     Unknown      3-31                                              



               00:00:                                              



               00                                                

 

     Dose      2022-0      No                                      



     Unknown      3-31                                              



               00:00:                                              



               00                                                

 

     Dose      2022-0      No                                      



     Unknown      3-31                                              



               00:00:                                              



               00                                                

 

     Dose      2022-0      No                                      



     Unknown      3-31                                              



               00:00:                                              



               00                                                

 

     Dose      2022-0      No                                      



     Unknown      3-31                                              



               00:00:                                              



               00                                                

 

     Dose      2022-0      No                                      



     Unknown      3-31                                              



               00:00:                                              



               00                                                

 

     Dose      2022-0      No                                      



     Unknown      3-31                                              



               00:00:                                              



               00                                                

 

     Dose      2022-0      No                                      



     Unknown      3-31                                              



               00:00:                                              



               00                                                

 

     Dose      2022-0      No                                      



     Unknown      3-31                                              



               00:00:                                              



               00                                                

 

     Dose      2022-0      No                                      



     Unknown      3-31                                              



               00:00:                                              



               00                                                

 

     Dose      2022-0      No                                      



     Unknown      3-31                                              



               00:00:                                              



               00                                                

 

     Dose      2022-0      No                                      



     Unknown      3-31                                              



               00:00:                                              



               00                                                

 

     Dose      2022-0      No                                      



     Unknown      3-31                                              



               00:00:                                              



               00                                                

 

     Dose      2022-0      No                                      



     Unknown      3-31                                              



               00:00:                                              



               00                                                

 

     Dose      2022-0      No                                      



     Unknown      3-31                                              



               00:00:                                              



               00                                                

 

     Dose      2022-0      No                                      



     Unknown      3-31                                              



               00:00:                                              



               00                                                

 

     Dose      2022-0      No                                      



     Unknown      3-31                                              



               00:00:                                              



               00                                                

 

     Dose      2022-0      No                                      



     Unknown      3-31                                              



               00:00:                                              



               00                                                

 

     Dose      2022-0      No                                      



     Unknown      3-31                                              



               00:00:                                              



               00                                                

 

     Dose      2022-0      No                                      



     Unknown      3-31                                              



               00:00:                                              



               00                                                

 

     Dose      2022-0      No                                      



     Unknown      3-31                                              



               00:00:                                              



               00                                                

 

     Dose      2022-0      No                                      



     Unknown      3-31                                              



               00:00:                                              



               00                                                

 

     Dose      2022-0      No                                      



     Unknown      3-31                                              



               00:00:                                              



               00                                                

 

     Dose      2022-0      No                                      



     Unknown      3-31                                              



               00:00:                                              



               00                                                

 

     Dose      2022-0      No                                      



     Unknown      3-31                                              



               00:00:                                              



               00                                                

 

     Dose      2022-0      No                                      



     Unknown      3-31                                              



               00:00:                                              



               00                                                

 

     Dose      2022-0      No                                      



     Unknown      3-31                                              



               00:00:                                              



               00                                                

 

     Dose      2022-0      No                                      



     Unknown      3-31                                              



               00:00:                                              



               00                                                

 

     Dose      2022-0      No                                      



     Unknown      3-31                                              



               00:00:                                              



               00                                                

 

     Dose      2022-0      No                                      



     Unknown      3-31                                              



               00:00:                                              



               00                                                

 

     Dose      2022-0      No                                      



     Unknown      3-31                                              



               00:00:                                              



               00                                                

 

     Dose      2022-0      No                                      



     Unknown      3-31                                              



               00:00:                                              



               00                                                

 

     Dose      2022-0      No                                      



     Unknown      3-31                                              



               00:00:                                              



               00                                                

 

     Dose      2022-0      No                                      



     Unknown      3-31                                              



               00:00:                                              



               00                                                

 

     Dose      2022-0      No                                      



     Unknown      3-31                                              



               00:00:                                              



               00                                                

 

     Dose      2022-0      No                                      



     Unknown      3-31                                              



               00:00:                                              



               00                                                

 

     Dose      2022-0      No                                      



     Unknown      3-31                                              



               00:00:                                              



               00                                                

 

     Dose      2022-0      No                                      



     Unknown      3-31                                              



               00:00:                                              



               00                                                

 

     Dose      2022-0      No                                      



     Unknown      3-31                                              



               00:00:                                              



               00                                                

 

     Dose      2022-0      No                                      



     Unknown      3-31                                              



               00:00:                                              



               00                                                

 

     Dose      2022-0      No                                      



     Unknown      3-31                                              



               00:00:                                              



               00                                                

 

     Dose      2022-0      No                                      



     Unknown      3-31                                              



               00:00:                                              



               00                                                

 

     Dose      2022-0      No                                      



     Unknown      3-31                                              



               00:00:                                              



               00                                                

 

     Dose      2022-0      No                                      



     Unknown      3-31                                              



               00:00:                                              



               00                                                

 

     Dose      2022-0      No                                      



     Unknown      3-31                                              



               00:00:                                              



               00                                                

 

     Dose      2022-0      No                                      



     Unknown      3-31                                              



               00:00:                                              



               00                                                

 

     Dose      2022-0      No                                      



     Unknown      3-31                                              



               00:00:                                              



               00                                                

 

     Dose      2022-0      No                                      



     Unknown      3-31                                              



               00:00:                                              



               00                                                

 

     Dose      2022-0      No                                      



     Unknown      3-31                                              



               00:00:                                              



               00                                                

 

     Dose      2022-0      No                                      



     Unknown      3-31                                              



               00:00:                                              



               00                                                

 

     Dose      2022-0      No                                      



     Unknown      3-31                                              



               00:00:                                              



               00                                                

 

     Dose      2022-0      No                                      



     Unknown      3-31                                              



               00:00:                                              



               00                                                

 

     Dose      2022-0      No                                      



     Unknown      3-31                                              



               00:00:                                              



               00                                                

 

     Dose      2022-0      No                                      



     Unknown      3-31                                              



               00:00:                                              



               00                                                

 

     Dose      2022-0      No                                      



     Unknown      3-31                                              



               00:00:                                              



               00                                                

 

     Dose      2022-0      No                                      



     Unknown      3-31                                              



               00:00:                                              



               00                                                

 

     Dose      2022-0      No                                      



     Unknown      3-31                                              



               00:00:                                              



               00                                                

 

     Dose      2022-0      No                                      



     Unknown      3-                                              



               00:00:                                              



               00                                                

 

     Dose      2022-0      No                                      



     Unknown      1-25                                              



               00:00:                                              



               00                                                

 

     Dose      2022-0      No                                      



     Unknown      1-25                                              



               00:00:                                              



               00                                                

 

     Dose      2022-0      No                                      



     Unknown      1-25                                              



               00:00:                                              



               00                                                

 

     Dose      2022-0      No                                      



     Unknown      1-25                                              



               00:00:                                              



               00                                                

 

     Dose      2022-0      No                                      



     Unknown      1-25                                              



               00:00:                                              



               00                                                

 

     Dose      2022-0      No                                      



     Unknown      1-25                                              



               00:00:                                              



               00                                                

 

     Dose      2022-0      No                                      



     Unknown      1-25                                              



               00:00:                                              



               00                                                

 

     Dose      2022-0      No                                      



     Unknown      1-25                                              



               00:00:                                              



               00                                                

 

     Dose      2022-0      No                                      



     Unknown      1-25                                              



               00:00:                                              



               00                                                

 

     Dose      2022-0      No                                      



     Unknown      1-25                                              



               00:00:                                              



               00                                                

 

     Dose      2022-0      No                                      



     Unknown      1-25                                              



               00:00:                                              



               00                                                

 

     Dose      2022-0      No                                      



     Unknown      1-25                                              



               00:00:                                              



               00                                                

 

     Dose      2022-0      No                                      



     Unknown      1-25                                              



               00:00:                                              



               00                                                

 

     Dose      2022-0      No                                      



     Unknown      1-25                                              



               00:00:                                              



               00                                                

 

     Dose      2022-0      No                                      



     Unknown      1-25                                              



               00:00:                                              



               00                                                

 

     Dose      2022-0      No                                      



     Unknown      1-25                                              



               00:00:                                              



               00                                                

 

     Dose      2022-0      No                                      



     Unknown      1-25                                              



               00:00:                                              



               00                                                

 

     Dose      2022-0      No                                      



     Unknown      1-25                                              



               00:00:                                              



               00                                                

 

     Dose      2022-0      No                                      



     Unknown      1-25                                              



               00:00:                                              



               00                                                

 

     Dose      2022-0      No                                      



     Unknown      1-25                                              



               00:00:                                              



               00                                                

 

     Dose      2022-0      No                                      



     Unknown      1-25                                              



               00:00:                                              



               00                                                

 

     Dose      2022-0      No                                      



     Unknown      1-20                                              



               00:00:                                              



               00                                                

 

     Dose      2022-0      No                                      



     Unknown      1-20                                              



               00:00:                                              



               00                                                

 

     Dose      2022-0      No                                      



     Unknown      1-20                                              



               00:00:                                              



               00                                                

 

     Dose      2022-0      No                                      



     Unknown      1-20                                              



               00:00:                                              



               00                                                

 

     Dose      2022-0      No                                      



     Unknown      1-20                                              



               00:00:                                              



               00                                                

 

     Dose      2022-0      No                                      



     Unknown      1-20                                              



               00:00:                                              



               00                                                

 

     Dose      2022-0      No                                      



     Unknown      1-20                                              



               00:00:                                              



               00                                                

 

     neomycin-po      2022-0      No             4mg/mL-                     



     lymyxin-hyd      1-19                     unit/mL                     



     rocort 3.5      00:00:                     -%                       



     mg-10,000      00                                                



     unit/mL-1 %                                                        



     ear                                                         



     drops,susp                                                        

 

     lisinopril      2-0      No             1mg                      



     5 mg tablet      1-19                                              



               00:00:                                              



               00                                                

 

     hydroxyzine      2022-0      No             1mg                      



     HCl 50 mg      1-19                                              



     tablet      00:00:                                              



               00                                                

 

     Myrbetriq      2022-0      No             1mg                      



     25 mg      1-19                                              



     tablet,exte      00:00:                                              



     nded      00                                                



     release                                                        

 

     Dose      2022-0      No                                      



     Unknown      1-19                                              



               00:00:                                              



               00                                                

 

     metformin      2022-0      No             1mg                      



     1,000 mg      1-19                                              



     tablet      00:00:                                              



               00                                                

 

     Dose      2022-0      No                                      



     Unknown      1-19                                              



               00:00:                                              



               00                                                

 

     metoprolol      2022-0      No             1mg                      



     tartrate 25      1-19                                              



     mg tablet      00:00:                                              



               00                                                

 

     metoclopram      2-0      No             1mg                      



     fito 10 mg      1-19                                              



     tablet      00:00:                                              



               00                                                

 

     azithromyci      2022-0      No             mg                       



     n 250 mg      1-19                                              



     tablet      00:00:                                              



               00                                                

 

     Dose      2022-0      No                                      



     Unknown      1-                                              



               00:00:                                              



               00                                                

 

     Dose      2022-0      No                                      



     Unknown      -                                              



               00:00:                                              



               00                                                

 

     Dose      2-0      No                                      



     Unknown      -                                              



               00:00:                                              



               00                                                

 

     levothyroxi      2-0      No             1mcg                     



     ne 150 mcg      -19                                              



     capsule      00:00:                                              



               00                                                

 

     neomycin-po      2-0      No             4mg/mL-                     



     lymyxin-hyd      1-19                     unit/mL                     



     rocort 3.5      00:00:                     -%                       



     mg-10,000      00                                                



     unit/mL-1 %                                                        



     ear                                                         



     drops,susp                                                        

 

     lisinopril      2-0      No             1mg                      



     5 mg tablet                                                    



               00:00:                                              



               00                                                

 

     hydroxyzine      2-0      No             1mg                      



     HCl 50 mg      1-19                                              



     tablet      00:00:                                              



               00                                                

 

     Myrbetriq      2-0      No             1mg                      



     25 mg      1-19                                              



     tablet,exte      00:00:                                              



     nded      00                                                



     release                                                        

 

     Dose      2-0      No                                      



     Unknown                                                    



               00:00:                                              



               00                                                

 

     metformin      2022-0      No             1mg                      



     1,000 mg      1-19                                              



     tablet      00:00:                                              



               00                                                

 

     Dose      2-0      No                                      



     Unknown      -                                              



               00:00:                                              



               00                                                

 

     metoprolol      2-0      No             1mg                      



     tartrate 25      1-19                                              



     mg tablet      00:00:                                              



               00                                                

 

     metoclopram      2-0      No             1mg                      



     fito 10 mg      1-19                                              



     tablet      00:00:                                              



               00                                                

 

     azithromyci      2-0      No             mg                       



     n 250 mg      1-19                                              



     tablet      00:00:                                              



               00                                                

 

     Dose      2-0      No                                      



     Unknown      -                                              



               00:00:                                              



               00                                                

 

     Dose      2022-0      No                                      



     Unknown      -                                              



               00:00:                                              



               00                                                

 

     Dose      2-0      No                                      



     Unknown      -                                              



               00:00:                                              



               00                                                

 

     Dose      2-0      No                                      



     Unknown      -                                              



               00:00:                                              



               00                                                

 

     neomycin-po      2022-0      No             4mg/mL-                     



     lymyxin-hyd      1-19                     unit/mL                     



     rocort 3.5      00:00:                     -%                       



     mg-10,000      00                                                



     unit/mL-1 %                                                        



     ear                                                         



     drops,susp                                                        

 

     lisinopril      2-0      No             1mg                      



     5 mg tablet      -19                                              



               00:00:                                              



               00                                                

 

     hydroxyzine      2022-0      No             1mg                      



     HCl 50 mg      1-19                                              



     tablet      00:00:                                              



               00                                                

 

     Myrbetriq      2022-0      No             1mg                      



     25 mg      1-19                                              



     tablet,exte      00:00:                                              



     nded      00                                                



     release                                                        

 

     Dose      2022-0      No                                      



     Unknown      -19                                              



               00:00:                                              



               00                                                

 

     metformin      2022-0      No             1mg                      



     1,000 mg      1-19                                              



     tablet      00:00:                                              



               00                                                

 

     metoclopram      2022-0      No             1mg                      



     fito 10 mg      1-19                                              



     tablet      00:00:                                              



               00                                                

 

     azithromyci      2022-0      No             mg                       



     n 250 mg      -19                                              



     tablet      00:00:                                              



               00                                                

 

     Dose      2022-0      No                                      



     Unknown      -19                                              



               00:00:                                              



               00                                                

 

     Dose      2022-0      No                                      



     Unknown      1-19                                              



               00:00:                                              



               00                                                

 

     Dose      2022-0      No             30                       



     Unknown      1-19                                              



               00:00:                                              



               00                                                

 

     Dose      2022-0      No             30                       



     Unknown      1-19                                              



               00:00:                                              



               00                                                

 

     Dose      2022-0      No                                      



     Unknown      1-19                                              



               00:00:                                              



               00                                                

 

     Dose      2022-0      No             30                       



     Unknown      1-19                                              



               00:00:                                              



               00                                                

 

     neomycin-po      2-0      No             4mg/mL-                     



     lymyxin-hyd      1-19                     unit/mL                     



     rocort 3.5      00:00:                     -%                       



     mg-10,000      00                                                



     unit/mL-1 %                                                        



     ear                                                         



     drops,susp                                                        

 

     Humulin      2-0      No             1unit/m                     



     70/30 U-100      1-19                     L                        



     Insulin 100      00:00:                     (70-30)                     



     unit/mL      00                                                



     subcutaneou                                                        



     s                                                           



     suspension                                                        

 

     lisinopril      2-0      No             1mg                      



     5 mg tablet      -19                                              



               00:00:                                              



               00                                                

 

     hydroxyzine      2022-0      No             1mg                      



     HCl 50 mg      1-19                                              



     tablet      00:00:                                              



               00                                                

 

     Myrbetriq      2022-0      No             1mg                      



     25 mg      1-19                                              



     tablet,exte      00:00:                                              



     nded      00                                                



     release                                                        

 

     Dose      2022-0      No                                      



     Unknown      -19                                              



               00:00:                                              



               00                                                

 

     metformin      2022-0      No             1mg                      



     1,000 mg      1-19                                              



     tablet      00:00:                                              



               00                                                

 

     metformin      2022-0      No             1mg                      



     500 mg      1-19                                              



     tablet      00:00:                                              



               00                                                

 

     metoprolol      2022-0      No             1mg                      



     tartrate 25      1-19                                              



     mg tablet      00:00:                                              



               00                                                

 

     metoclopram      2022-0      No             1mg                      



     fito 10 mg      1-19                                              



     tablet      00:00:                                              



               00                                                

 

     azithromyci      2022-0      No             mg                       



     n 250 mg      1-19                                              



     tablet      00:00:                                              



               00                                                

 

     Dose      2022-0      No                                      



     Unknown      1-19                                              



               00:00:                                              



               00                                                

 

     Dose      2022-0      No                                      



     Unknown      -19                                              



               00:00:                                              



               00                                                

 

     Dose      2022-0      No                                      



     Unknown      -19                                              



               00:00:                                              



               00                                                

 

     levothyroxi      2-0      No             1mcg                     



     ne 150 mcg      -19                                              



     capsule      00:00:                                              



               00                                                

 

     neomycin-po      2022-0      No             4mg/mL-                     



     lymyxin-hyd      1-19                     unit/mL                     



     rocort 3.5      00:00:                     -%                       



     mg-10,000      00                                                



     unit/mL-1 %                                                        



     ear                                                         



     drops,susp                                                        

 

     Humulin      2-0      No             1unit/m                     



     70/30 U-100      1-19                     L                        



     Insulin 100      00:00:                     (70-30)                     



     unit/mL      00                                                



     subcutaneou                                                        



     s                                                           



     suspension                                                        

 

     lisinopril      2-0      No             1mg                      



     5 mg tablet      -19                                              



               00:00:                                              



               00                                                

 

     hydroxyzine      2-0      No             1mg                      



     HCl 50 mg      1-19                                              



     tablet      00:00:                                              



               00                                                

 

     Myrbetriq      2-0      No             1mg                      



     25 mg      -19                                              



     tablet,exte      00:00:                                              



     nded      00                                                



     release                                                        

 

     Dose      2-0      No                                      



     Unknown                                                    



               00:00:                                              



               00                                                

 

     metformin      2022-0      No             1mg                      



     1,000 mg      1-19                                              



     tablet      00:00:                                              



               00                                                

 

     metformin      2022-0      No             1mg                      



     500 mg      1-19                                              



     tablet      00:00:                                              



               00                                                

 

     metoprolol      2-0      No             1mg                      



     tartrate 25      1-19                                              



     mg tablet      00:00:                                              



               00                                                

 

     metoclopram      2022-0      No             1mg                      



     fito 10 mg      1-19                                              



     tablet      00:00:                                              



               00                                                

 

     azithromyci      2022-0      No             mg                       



     n 250 mg      1-19                                              



     tablet      00:00:                                              



               00                                                

 

     neomycin-po      2022-0      No             4mg/mL-                     



     lymyxin-hyd      1-19                     unit/mL                     



     rocort 3.5      00:00:                     -%                       



     mg-10,000      00                                                



     unit/mL-1 %                                                        



     ear                                                         



     drops,susp                                                        

 

     Humulin      2022-0      No             1unit/m                     



     70/30 U-100      1-19                     L                        



     Insulin 100      00:00:                     (70-30)                     



     unit/mL      00                                                



     subcutaneou                                                        



     s                                                           



     suspension                                                        

 

     lisinopril      2-0      No             1mg                      



     5 mg tablet      -19                                              



               00:00:                                              



               00                                                

 

     Dose      2022-0      No                                      



     Unknown      1-19                                              



               00:00:                                              



               00                                                

 

     hydroxyzine      2022-0      No             1mg                      



     HCl 50 mg      1-19                                              



     tablet      00:00:                                              



               00                                                

 

     Myrbetriq      2022-0      No             1mg                      



     25 mg      1-19                                              



     tablet,exte      00:00:                                              



     nded      00                                                



     release                                                        

 

     Dose      2-0      No                                      



     Unknown      -19                                              



               00:00:                                              



               00                                                

 

     metformin      2022-0      No             1mg                      



     1,000 mg      1-19                                              



     tablet      00:00:                                              



               00                                                

 

     metformin      2022-0      No             1mg                      



     500 mg      1-19                                              



     tablet      00:00:                                              



               00                                                

 

     metoprolol      2-0      No             1mg                      



     tartrate 25      1-19                                              



     mg tablet      00:00:                                              



               00                                                

 

     metoclopram      2-0      No             1mg                      



     fito 10 mg      -19                                              



     tablet      00:00:                                              



               00                                                

 

     azithromyci      2-0      No             mg                       



     n 250 mg      -19                                              



     tablet      00:00:                                              



               00                                                

 

     Dose      2022-0      No                                      



     Unknown      1-19                                              



               00:00:                                              



               00                                                

 

     Dose      2022-0      No                                      



     Unknown      1-19                                              



               00:00:                                              



               00                                                

 

     Dose      2022-0      No                                      



     Unknown      1-19                                              



               00:00:                                              



               00                                                

 

     Dose      2022-0      No                                      



     Unknown      1-19                                              



               00:00:                                              



               00                                                

 

     levothyroxi      2022-0      No             1mcg                     



     ne 150 mcg      1-19                                              



     capsule      00:00:                                              



               00                                                

 

     Dose      2022-0      No                                      



     Unknown      1-19                                              



               00:00:                                              



               00                                                

 

     levothyroxi      2022-0      No             1mcg                     



     ne 150 mcg      1-19                                              



     capsule      00:00:                                              



               00                                                

 

     neomycin-po      2-0      No             4mg/mL-                     



     lymyxin-hyd      1-19                     unit/mL                     



     rocort 3.5      00:00:                     -%                       



     mg-10,000      00                                                



     unit/mL-1 %                                                        



     ear                                                         



     drops,susp                                                        

 

     Humulin      2-0      No             1unit/m                     



     70/30 U-100      1-19                     L                        



     Insulin 100      00:00:                     (70-30)                     



     unit/mL      00                                                



     subcutaneou                                                        



     s                                                           



     suspension                                                        

 

     lisinopril      2-0      No             1mg                      



     5 mg tablet                                                    



               00:00:                                              



               00                                                

 

     hydroxyzine      2022-0      No             1mg                      



     HCl 50 mg      1-19                                              



     tablet      00:00:                                              



               00                                                

 

     Myrbetriq      2022-0      No             1mg                      



     25 mg      1-19                                              



     tablet,exte      00:00:                                              



     nded      00                                                



     release                                                        

 

     Dose      2022-0      No                                      



     Unknown      1-19                                              



               00:00:                                              



               00                                                

 

     metformin      2022-0      No             1mg                      



     1,000 mg      1-19                                              



     tablet      00:00:                                              



               00                                                

 

     metformin      2022-0      No             1mg                      



     500 mg      1-19                                              



     tablet      00:00:                                              



               00                                                

 

     metoprolol      2022-0      No             1mg                      



     tartrate 25      1-19                                              



     mg tablet      00:00:                                              



               00                                                

 

     metoclopram      2022-0      No             1mg                      



     fito 10 mg      1-19                                              



     tablet      00:00:                                              



               00                                                

 

     azithromyci      2022-0      No             mg                       



     n 250 mg      1-19                                              



     tablet      00:00:                                              



               00                                                

 

     Dose      2022-0      No                                      



     Unknown      1-19                                              



               00:00:                                              



               00                                                

 

     Dose      2022-0      No                                      



     Unknown      1-19                                              



               00:00:                                              



               00                                                

 

     Dose      2022-0      No                                      



     Unknown      1-19                                              



               00:00:                                              



               00                                                

 

     levothyroxi      2022-0      No             1mcg                     



     ne 150 mcg      1-19                                              



     capsule      00:00:                                              



               00                                                

 

     Dose      2022-0      No                                      



     Unknown      1-06                                              



               00:00:                                              



               00                                                

 

     Dose      2022-0      No                                      



     Unknown      1-06                                              



               00:00:                                              



               00                                                

 

     Dose      2022-0      No             30                       



     Unknown      1-06                                              



               00:00:                                              



               00                                                

 

     lisinopril      2022-0      No             1mg                      



     5 mg tablet      1-06                                              



               00:00:                                              



               00                                                

 

     lisinopril      2022-0      No             1mg                      



     5 mg tablet      1-06                                              



               00:00:                                              



               00                                                

 

     lisinopril      2022-0      No             1mg                      



     5 mg tablet      1-06                                              



               00:00:                                              



               00                                                

 

     lisinopril      2022-0      No             1mg                      



     5 mg tablet      1-06                                              



               00:00:                                              



               00                                                

 

     hydroxyzine      2022-0      No             1mg                      



     HCl 50 mg      1-03                                              



     tablet      00:00:                                              



               00                                                

 

     hydroxyzine      2022-0      No             1mg                      



     HCl 50 mg      1-03                                              



     tablet      00:00:                                              



               00                                                

 

     metformin      2022-0      No             1mg                      



     1,000 mg      1-03                                              



     tablet      00:00:                                              



               00                                                

 

     hydroxyzine      2022-0      No             1mg                      



     HCl 50 mg      1-03                                              



     tablet      00:00:                                              



               00                                                

 

     hydroxyzine      2022-0      No             1mg                      



     HCl 50 mg      1-03                                              



     tablet      00:00:                                              



               00                                                

 

     metformin      2022-0      No             1mg                      



     1,000 mg      1-03                                              



     tablet      00:00:                                              



               00                                                

 

     hydroxyzine      2022-0      No             1mg                      



     HCl 50 mg      1-03                                              



     tablet      00:00:                                              



               00                                                

 

     metformin      2022-0      No             1mg                      



     1,000 mg      1-03                                              



     tablet      00:00:                                              



               00                                                

 

     Dose      2022-0      No             30                       



     Unknown      1-03                                              



               00:00:                                              



               00                                                

 

     Humulin      2022-0      No             1unit/m                     



     70/30 U-100      1-03                     L                        



     Insulin 100      00:00:                     (70-30)                     



     unit/mL      00                                                



     subcutaneou                                                        



     s                                                           



     suspension                                                        

 

     hydroxyzine      2022-0      No             1mg                      



     HCl 50 mg      1-03                                              



     tablet      00:00:                                              



               00                                                

 

     hydroxyzine      2022-0      No             1mg                      



     HCl 50 mg      1-03                                              



     tablet      00:00:                                              



               00                                                

 

     metformin      2022-0      No             1mg                      



     1,000 mg      1-03                                              



     tablet      00:00:                                              



               00                                                

 

     Humulin      2022-0      No             1unit/m                     



     70/30 U-100      1-03                     L                        



     Insulin 100      00:00:                     (70-30)                     



     unit/mL      00                                                



     subcutaneou                                                        



     s                                                           



     suspension                                                        

 

     hydroxyzine      2022-0      No             1mg                      



     HCl 50 mg      1-03                                              



     tablet      00:00:                                              



               00                                                

 

     hydroxyzine      2022-0      No             1mg                      



     HCl 50 mg      1-03                                              



     tablet      00:00:                                              



               00                                                

 

     metformin      2022-0      No             1mg                      



     1,000 mg      1-03                                              



     tablet      00:00:                                              



               00                                                

 

     Humulin      2022-0      No             1unit/m                     



     70/30 U-100      1-03                     L                        



     Insulin 100      00:00:                     (70-30)                     



     unit/mL      00                                                



     subcutaneou                                                        



     s                                                           



     suspension                                                        

 

     hydroxyzine      2022-0      No             1mg                      



     HCl 50 mg      1-03                                              



     tablet      00:00:                                              



               00                                                

 

     hydroxyzine      2022-0      No             1mg                      



     HCl 50 mg      1-03                                              



     tablet      00:00:                                              



               00                                                

 

     metformin      2022-0      No             1mg                      



     1,000 mg      1-03                                              



     tablet      00:00:                                              



               00                                                

 

     Humulin      2022-0      No             1unit/m                     



     70/30 U-100      1-03                     L                        



     Insulin 100      00:00:                     (70-30)                     



     unit/mL      00                                                



     subcutaneou                                                        



     s                                                           



     suspension                                                        

 

     hydroxyzine      2022-0      No             1mg                      



     HCl 50 mg      1-03                                              



     tablet      00:00:                                              



               00                                                

 

     hydroxyzine      2022-0      No             1mg                      



     HCl 50 mg      1-03                                              



     tablet      00:00:                                              



               00                                                

 

     metformin      2022-0      No             1mg                      



     1,000 mg      1-03                                              



     tablet      00:00:                                              



               00                                                

 

     Zofran 4 mg      -1      No             1mg                      



     tablet      2-16                                              



               00:00:                                              



               00                                                

 

     levothyroxi      1-1      No             1mcg                     



     ne 150 mcg      2-16                                              



     capsule      00:00:                                              



               00                                                

 

     Zofran 4 mg      -1      No             1mg                      



     tablet      2-16                                              



               00:00:                                              



               00                                                

 

     Dose      -1      No                                      



     Unknown      2-16                                              



               00:00:                                              



               00                                                

 

     Dose      -1      No             30                       



     Unknown      2-16                                              



               00:00:                                              



               00                                                

 

     Dose      -1      No             30                       



     Unknown      2-16                                              



               00:00:                                              



               00                                                

 

     Zofran 4 mg      -1      No             1mg                      



     tablet      2-16                                              



               00:00:                                              



               00                                                

 

     levothyroxi      -1      No             1mcg                     



     ne 150 mcg      2-16                                              



     capsule      00:00:                                              



               00                                                

 

     Zofran 4 mg      -1      No             1mg                      



     tablet      2-16                                              



               00:00:                                              



               00                                                

 

     levothyroxi      -1      No             1mcg                     



     ne 150 mcg      2-16                                              



     capsule      00:00:                                              



               00                                                

 

     Zofran 4 mg      -1      No             1mg                      



     tablet      2-16                                              



               00:00:                                              



               00                                                

 

     levothyroxi      -1      No             1mcg                     



     ne 150 mcg      2-16                                              



     capsule      00:00:                                              



               00                                                

 

     Zofran 4 mg      -1      No             1mg                      



     tablet      2-16                                              



               00:00:                                              



               00                                                

 

     levothyroxi      -1      No             1mcg                     



     ne 150 mcg      2-16                                              



     capsule      00:00:                                              



               00                                                

 

     metformin      -1      No             1mg                      



     500 mg      1-27                                              



     tablet      00:00:                                              



               00                                                

 

     metformin      -1      No             1mg                      



     500 mg      1-27                                              



     tablet      00:00:                                              



               00                                                

 

     metformin      -1      No             1mg                      



     500 mg      1-27                                              



     tablet      00:00:                                              



               00                                                

 

     metformin      -1      No             1mg                      



     500 mg      1-27                                              



     tablet      00:00:                                              



               00                                                

 

     metformin      -1      No             1mg                      



     500 mg      1-27                                              



     tablet      00:00:                                              



               00                                                

 

     metformin      -1      No             1mg                      



     500 mg      1-27                                              



     tablet      00:00:                                              



               00                                                

 

     metformin      -1      No             1mg                      



     500 mg      1-27                                              



     tablet      00:00:                                              



               00                                                

 

     metoprolol      -1      No             1mg                      



     tartrate 25      1-22                                              



     mg tablet      00:00:                                              



               00                                                

 

     metoprolol      -1      No             1mg                      



     tartrate 25      1-22                                              



     mg tablet      00:00:                                              



               00                                                

 

     metoprolol      -1      No             1mg                      



     tartrate 25      1-22                                              



     mg tablet      00:00:                                              



               00                                                

 

     metoprolol      -1      No             1mg                      



     tartrate 25      1-22                                              



     mg tablet      00:00:                                              



               00                                                

 

     metoprolol      -1      No             1mg                      



     tartrate 25      1-22                                              



     mg tablet      00:00:                                              



               00                                                

 

     metoprolol      -1      No             1mg                      



     tartrate 25      1-22                                              



     mg tablet      00:00:                                              



               00                                                

 

     metoprolol      -1      No             1mg                      



     tartrate 25      1-22                                              



     mg tablet      00:00:                                              



               00                                                

 

     Myrbetriq      -1      No             1mg                      



     25 mg      1-16                                              



     tablet,exte      00:00:                                              



     nded      00                                                



     release                                                        

 

     metoclopram      -1      No             1mg                      



     fito 10 mg      1-16                                              



     tablet      00:00:                                              



               00                                                

 

     Myrbetriq      -1      No             1mg                      



     25 mg      1-16                                              



     tablet,exte      00:00:                                              



     nded      00                                                



     release                                                        

 

     metoclopram      -1      No             1mg                      



     fito 10 mg      1-16                                              



     tablet      00:00:                                              



               00                                                

 

     Myrbetriq      -1      No             1mg                      



     25 mg      1-16                                              



     tablet,exte      00:00:                                              



     nded      00                                                



     release                                                        

 

     metoclopram      -1      No             1mg                      



     fito 10 mg      1-16                                              



     tablet      00:00:                                              



               00                                                

 

     Myrbetriq      -1      No             1mg                      



     25 mg      1-16                                              



     tablet,exte      00:00:                                              



     nded      00                                                



     release                                                        

 

     metoclopram      -1      No             1mg                      



     fito 10 mg      1-16                                              



     tablet      00:00:                                              



               00                                                

 

     Myrbetriq      -1      No             1mg                      



     25 mg      1-16                                              



     tablet,exte      00:00:                                              



     nded      00                                                



     release                                                        

 

     metoclopram      1-1      No             1mg                      



     fito 10 mg      1-16                                              



     tablet      00:00:                                              



               00                                                

 

     Myrbetriq      -1      No             1mg                      



     25 mg      1-16                                              



     tablet,exte      00:00:                                              



     nded      00                                                



     release                                                        

 

     metoclopram      1-1      No             1mg                      



     fito 10 mg      1-16                                              



     tablet      00:00:                                              



               00                                                

 

     Myrbetriq      -1      No             1mg                      



     25 mg      1-16                                              



     tablet,exte      00:00:                                              



     nded      00                                                



     release                                                        

 

     metoclopram      1-1      No             1mg                      



     fito 10 mg      1-16                                              



     tablet      00:00:                                              



               00                                                

 

     atorvastati      -1      No             1mg                      



     n 10 mg      1-14                                              



     tablet      00:00:                                              



               00                                                

 

     omeprazole      -1      No             1mg                      



     40 mg      1-14                                              



     capsule,del      00:00:                                              



     ayed      00                                                



     release                                                        

 

     atorvastati      -1      No             1mg                      



     n 10 mg      1-14                                              



     tablet      00:00:                                              



               00                                                

 

     omeprazole      -1      No             1mg                      



     40 mg      1-14                                              



     capsule,del      00:00:                                              



     ayed      00                                                



     release                                                        

 

     atorvastati      2021      No             1mg                      



     n 10 mg      1-14                                              



     tablet      00:00:                                              



               00                                                

 

     omeprazole      2021      No             1mg                      



     40 mg      1-14                                              



     capsule,del      00:00:                                              



     ayed      00                                                



     release                                                        

 

     atorvastati      2021      No             1mg                      



     n 10 mg      1-14                                              



     tablet      00:00:                                              



               00                                                

 

     omeprazole      2021      No             1mg                      



     40 mg      1-14                                              



     capsule,del      00:00:                                              



     ayed      00                                                



     release                                                        

 

     atorvastati      2021      No             1mg                      



     n 10 mg      1-14                                              



     tablet      00:00:                                              



               00                                                

 

     omeprazole      2021      No             1mg                      



     40 mg      1-14                                              



     capsule,del      00:00:                                              



     ayed      00                                                



     release                                                        

 

     atorvastati      2021      No             1mg                      



     n 10 mg      1-14                                              



     tablet      00:00:                                              



               00                                                

 

     omeprazole      2021      No             1mg                      



     40 mg      1-14                                              



     capsule,del      00:00:                                              



     ayed      00                                                



     release                                                        

 

     atorvastati      2021      No             1mg                      



     n 10 mg      1-14                                              



     tablet      00:00:                                              



               00                                                

 

     omeprazole      2021      No             1mg                      



     40 mg      1-14                                              



     capsule,del      00:00:                                              



     ayed      00                                                



     release                                                        

 

     levothyroxi      2021      No             1mcg                     



     ne 150 mcg      1-04                                              



     capsule      00:00:                                              



               00                                                

 

     levothyroxi      2021      No             1mcg                     



     ne 150 mcg      1-04                                              



     capsule      00:00:                                              



               00                                                

 

     levothyroxi      2021      No             1mcg                     



     ne 150 mcg      1-04                                              



     capsule      00:00:                                              



               00                                                

 

     levothyroxi      2021      No             1mcg                     



     ne 150 mcg      1-04                                              



     capsule      00:00:                                              



               00                                                

 

     levothyroxi      2021      No             1mcg                     



     ne 150 mcg      1-04                                              



     capsule      00:00:                                              



               00                                                

 

     levothyroxi      2021      No             1mcg                     



     ne 150 mcg      1-04                                              



     capsule      00:00:                                              



               00                                                

 

     levothyroxi      2021      No             1mcg                     



     ne 150 mcg      1-04                                              



     capsule      00:00:                                              



               00                                                

 

     ferrous      2021      No             1(65 mg                     



     sulfate 325      0-21                     iron)                     



     mg (65 mg      00:00:                                              



     iron)      00                                                



     tablet                                                        

 

     ferrous      2021      No             1(65 mg                     



     sulfate 325      0-21                     iron)                     



     mg (65 mg      00:00:                                              



     iron)      00                                                



     tablet                                                        

 

     ferrous      2021      No             1(65 mg                     



     sulfate 325      0-21                     iron)                     



     mg (65 mg      00:00:                                              



     iron)      00                                                



     tablet                                                        

 

     ferrous      2021      No             1(65 mg                     



     sulfate 325      0-21                     iron)                     



     mg (65 mg      00:00:                                              



     iron)      00                                                



     tablet                                                        

 

     ferrous      2021      No             1(65 mg                     



     sulfate 325      0-21                     iron)                     



     mg (65 mg      00:00:                                              



     iron)      00                                                



     tablet                                                        

 

     ferrous      2021      No             1(65 mg                     



     sulfate 325      0-21                     iron)                     



     mg (65 mg      00:00:                                              



     iron)      00                                                



     tablet                                                        

 

     ferrous      2021      No             1(65 mg                     



     sulfate 325      0-21                     iron)                     



     mg (65 mg      00:00:                                              



     iron)      00                                                



     tablet                                                        

 

     Myrbetriq      2021      No             1mg                      



     25 mg      0-13                                              



     tablet,exte      00:00:                                              



     nded      00                                                



     release                                                        

 

     levothyroxi      2021      No             1mcg                     



     ne 300 mcg      0-13                                              



     tablet      00:00:                                              



               00                                                

 

     Myrbetriq      2021      No             1mg                      



     25 mg      0-13                                              



     tablet,exte      00:00:                                              



     nded      00                                                



     release                                                        

 

     Dose      2021      No                                      



     Unknown      0-13                                              



               00:00:                                              



               00                                                

 

     Myrbetriq      2021      No             1mg                      



     25 mg      0-13                                              



     tablet,exte      00:00:                                              



     nded      00                                                



     release                                                        

 

     Dose      2021      No             30                       



     Unknown      0-13                                              



               00:00:                                              



               00                                                

 

     Myrbetriq      2021      No             1mg                      



     25 mg      0-13                                              



     tablet,exte      00:00:                                              



     nded      00                                                



     release                                                        

 

     levothyroxi      2021      No             1mcg                     



     ne 300 mcg      0-13                                              



     tablet      00:00:                                              



               00                                                

 

     Myrbetriq      2021      No             1mg                      



     25 mg      0-13                                              



     tablet,exte      00:00:                                              



     nded      00                                                



     release                                                        

 

     levothyroxi      -      No             1mcg                     



     ne 300 mcg      0-13                                              



     tablet      00:00:                                              



               00                                                

 

     Myrbetriq      2021      No             1mg                      



     25 mg      0-13                                              



     tablet,exte      00:00:                                              



     nded      00                                                



     release                                                        

 

     levothyroxi      2021      No             1mcg                     



     ne 300 mcg      0-13                                              



     tablet      00:00:                                              



               00                                                

 

     Myrbetriq      2021      No             1mg                      



     25 mg      0-13                                              



     tablet,exte      00:00:                                              



     nded      00                                                



     release                                                        

 

     levothyroxi      2021      No             1mcg                     



     ne 300 mcg      0-13                                              



     tablet      00:00:                                              



               00                                                

 

     nitrofurant      -      No             1mg                      



     oin       0-12                                              



     macrocrysta      00:00:                                              



     l 100 mg      00                                                



     capsule                                                        

 

     nitrofurant      2021      No             1mg                      



     oin       0-12                                              



     macrocrysta      00:00:                                              



     l 100 mg      00                                                



     capsule                                                        

 

     Dose      1-1      No                                      



     Unknown      0-12                                              



               00:00:                                              



               00                                                

 

     nitrofurant      1-1      No             1mg                      



     oin       0-12                                              



     macrocrysta      00:00:                                              



     l 100 mg      00                                                



     capsule                                                        

 

     nitrofurant      1-1      No             1mg                      



     oin       0-12                                              



     macrocrysta      00:00:                                              



     l 100 mg      00                                                



     capsule                                                        

 

     nitrofurant      1-1      No             1mg                      



     oin       0-12                                              



     macrocrysta      00:00:                                              



     l 100 mg      00                                                



     capsule                                                        

 

     nitrofurant      1-1      No             1mg                      



     oin       0-12                                              



     macrocrysta      00:00:                                              



     l 100 mg      00                                                



     capsule                                                        

 

     metoclopram      2021-0      No             1mg                      



     fito 10 mg      9-13                                              



     tablet      00:00:                                              



               00                                                

 

     metoclopram      2021-0      No             1mg                      



     fito 10 mg      9-13                                              



     tablet      00:00:                                              



               00                                                

 

     metoclopram      2021-0      No             1mg                      



     fito 10 mg      9-13                                              



     tablet      00:00:                                              



               00                                                

 

     metoclopram      2021-0      No             1mg                      



     fito 10 mg      9-13                                              



     tablet      00:00:                                              



               00                                                

 

     metoclopram      2021-0      No             1mg                      



     fito 10 mg      9-13                                              



     tablet      00:00:                                              



               00                                                

 

     metoclopram      2021-0      No             1mg                      



     fito 10 mg      9-13                                              



     tablet      00:00:                                              



               00                                                

 

     metoclopram      2021-0      No             1mg                      



     fito 10 mg      9-13                                              



     tablet      00:00:                                              



               00                                                

 

     Myrbetriq      2021-0      No             1mg                      



     25 mg      9-01                                              



     tablet,exte      00:00:                                              



     nded      00                                                



     release                                                        

 

     Myrbetriq      2021-0      No             1mg                      



     25 mg      9-01                                              



     tablet,exte      00:00:                                              



     nded      00                                                



     release                                                        

 

     Myrbetriq      2021-0      No             1mg                      



     25 mg      9-01                                              



     tablet,exte      00:00:                                              



     nded      00                                                



     release                                                        

 

     Myrbetriq      2021-0      No             1mg                      



     25 mg      9-01                                              



     tablet,exte      00:00:                                              



     nded      00                                                



     release                                                        

 

     Myrbetriq      2021-0      No             1mg                      



     25 mg      9-01                                              



     tablet,exte      00:00:                                              



     nded      00                                                



     release                                                        

 

     Myrbetriq      2021-0      No             1mg                      



     25 mg      9-01                                              



     tablet,exte      00:00:                                              



     nded      00                                                



     release                                                        

 

     Myrbetriq      2021-0      No             1mg                      



     25 mg      9-01                                              



     tablet,exte      00:00:                                              



     nded      00                                                



     release                                                        

 

     metoclopram      2021-0      No             1mg                      



     fito 10 mg      8-13                                              



     tablet      00:00:                                              



               00                                                

 

     metoclopram      2021-0      No             1mg                      



     fito 10 mg      8-13                                              



     tablet      00:00:                                              



               00                                                

 

     metoclopram      2021-0      No             1mg                      



     fito 10 mg      8-13                                              



     tablet      00:00:                                              



               00                                                

 

     metoclopram      2021-0      No             1mg                      



     fito 10 mg      8-13                                              



     tablet      00:00:                                              



               00                                                

 

     metoclopram      2021-0      No             1mg                      



     fito 10 mg      8-13                                              



     tablet      00:00:                                              



               00                                                

 

     metoclopram      2021-0      No             1mg                      



     fito 10 mg      8-13                                              



     tablet      00:00:                                              



               00                                                

 

     metoclopram      2021-0      No             1mg                      



     fito 10 mg      8-13                                              



     tablet      00:00:                                              



               00                                                

 

     metoclopram      2021-0      No             1mg                      



     fito 10 mg      7-28                                              



     tablet      00:00:                                              



               00                                                

 

     metoclopram      2021-0      No             1mg                      



     fito 10 mg      7-28                                              



     tablet      00:00:                                              



               00                                                

 

     metoclopram      2021-0      No             1mg                      



     fito 10 mg      7-28                                              



     tablet      00:00:                                              



               00                                                

 

     metoclopram      2021-0      No             1mg                      



     fito 10 mg      7-28                                              



     tablet      00:00:                                              



               00                                                

 

     metoclopram      2021-0      No             1mg                      



     fito 10 mg      7-28                                              



     tablet      00:00:                                              



               00                                                

 

     metoclopram      2021-0      No             1mg                      



     fito 10 mg      7-28                                              



     tablet      00:00:                                              



               00                                                

 

     metoclopram      2021-0      No             1mg                      



     fito 10 mg      7-28                                              



     tablet      00:00:                                              



               00                                                

 

     atorvastati      2021-0      No             1mg                      



     n 10 mg      7-22                                              



     tablet      00:00:                                              



               00                                                

 

     Myrbetriq      2021-0      No             1mg                      



     25 mg      7-22                                              



     tablet,exte      00:00:                                              



     nded      00                                                



     release                                                        

 

     Trintellix      2021-0      No             1mg                      



     20 mg      7-22                                              



     tablet      00:00:                                              



               00                                                

 

     metformin      2021-0      No             1mg                      



     500 mg      7-22                                              



     tablet      00:00:                                              



               00                                                

 

     metoprolol      2021-0      No             1mg                      



     tartrate 25      7-22                                              



     mg tablet      00:00:                                              



               00                                                

 

     mirtazapine      2021-0      No             1mg                      



     15 mg      7-22                                              



     tablet      00:00:                                              



               00                                                

 

     levothyroxi      2021-0      No             1mcg                     



     ne 300 mcg      7-22                                              



     tablet      00:00:                                              



               00                                                

 

     omeprazole      2021-0      No             1mg                      



     40 mg      7-22                                              



     capsule,del      00:00:                                              



     ayed      00                                                



     release                                                        

 

     atorvastati      2021-0      No             1mg                      



     n 10 mg      7-22                                              



     tablet      00:00:                                              



               00                                                

 

     Myrbetriq      2021-0      No             1mg                      



     25 mg      7-22                                              



     tablet,exte      00:00:                                              



     nded      00                                                



     release                                                        

 

     Trintellix      2021-0      No             1mg                      



     20 mg      7-22                                              



     tablet      00:00:                                              



               00                                                

 

     metformin      2021-0      No             1mg                      



     500 mg      7-22                                              



     tablet      00:00:                                              



               00                                                

 

     metoprolol      2021-0      No             1mg                      



     tartrate 25      7-22                                              



     mg tablet      00:00:                                              



               00                                                

 

     mirtazapine      2021-0      No             1mg                      



     15 mg      7-22                                              



     tablet      00:00:                                              



               00                                                

 

     levothyroxi      2021-0      No             1mcg                     



     ne 300 mcg      7-22                                              



     tablet      00:00:                                              



               00                                                

 

     omeprazole      2021-0      No             1mg                      



     40 mg      7-22                                              



     capsule,del      00:00:                                              



     ayed      00                                                



     release                                                        

 

     Humulin      1-0      No             1unit/m                     



     70/30 U-100      7-22                     L                        



     Insulin 100      00:00:                     (70-30)                     



     unit/mL      00                                                



     subcutaneou                                                        



     s                                                           



     suspension                                                        

 

     atorvastati      2021-0      No             1mg                      



     n 10 mg      7-22                                              



     tablet      00:00:                                              



               00                                                

 

     Myrbetriq      2021-0      No             1mg                      



     25 mg      7-22                                              



     tablet,exte      00:00:                                              



     nded      00                                                



     release                                                        

 

     atorvastati      2021-0      No             1mg                      



     n 10 mg      7-22                                              



     tablet      00:00:                                              



               00                                                

 

     Myrbetriq      2021-0      No             1mg                      



     25 mg      7-22                                              



     tablet,exte      00:00:                                              



     nded      00                                                



     release                                                        

 

     Trintellix      2021-0      No             1mg                      



     20 mg      7-22                                              



     tablet      00:00:                                              



               00                                                

 

     metformin      2021-0      No             1mg                      



     500 mg      7-22                                              



     tablet      00:00:                                              



               00                                                

 

     metoprolol      2021-0      No             1mg                      



     tartrate 25      7-22                                              



     mg tablet      00:00:                                              



               00                                                

 

     mirtazapine      2021-0      No             1mg                      



     15 mg      7-22                                              



     tablet      00:00:                                              



               00                                                

 

     levothyroxi      2021-0      No             1mcg                     



     ne 300 mcg      7-22                                              



     tablet      00:00:                                              



               00                                                

 

     omeprazole      2021-0      No             1mg                      



     40 mg      7-22                                              



     capsule,del      00:00:                                              



     ayed      00                                                



     release                                                        

 

     Trintellix      2021-0      No             1mg                      



     20 mg      7-22                                              



     tablet      00:00:                                              



               00                                                

 

     metformin      2021-0      No             1mg                      



     500 mg      7-22                                              



     tablet      00:00:                                              



               00                                                

 

     metoprolol      2021-0      No             1mg                      



     tartrate 25      7-22                                              



     mg tablet      00:00:                                              



               00                                                

 

     mirtazapine      2021-0      No             1mg                      



     15 mg      7-22                                              



     tablet      00:00:                                              



               00                                                

 

     levothyroxi      2021-0      No             1mcg                     



     ne 300 mcg      7-22                                              



     tablet      00:00:                                              



               00                                                

 

     omeprazole      2021-0      No             1mg                      



     40 mg      7-22                                              



     capsule,del      00:00:                                              



     ayed      00                                                



     release                                                        

 

     Humulin      2021-0      No             1unit/m                     



     70/30 U-100      7-22                     L                        



     Insulin 100      00:00:                     (70-30)                     



     unit/mL      00                                                



     subcutaneou                                                        



     s                                                           



     suspension                                                        

 

     atorvastati      2021-0      No             1mg                      



     n 10 mg      7-22                                              



     tablet      00:00:                                              



               00                                                

 

     Myrbetriq      2021-0      No             1mg                      



     25 mg      7-22                                              



     tablet,exte      00:00:                                              



     nded      00                                                



     release                                                        

 

     Trintellix      2021-0      No             1mg                      



     20 mg      7-22                                              



     tablet      00:00:                                              



               00                                                

 

     metformin      2021-0      No             1mg                      



     500 mg      7-22                                              



     tablet      00:00:                                              



               00                                                

 

     metoprolol      2021-0      No             1mg                      



     tartrate 25      7-22                                              



     mg tablet      00:00:                                              



               00                                                

 

     mirtazapine      2021-0      No             1mg                      



     15 mg      7-22                                              



     tablet      00:00:                                              



               00                                                

 

     levothyroxi      2021-0      No             1mcg                     



     ne 300 mcg      7-22                                              



     tablet      00:00:                                              



               00                                                

 

     omeprazole      2021-0      No             1mg                      



     40 mg      7-22                                              



     capsule,del      00:00:                                              



     ayed      00                                                



     release                                                        

 

     Humulin      2021-0      No             1unit/m                     



     70/30 U-100      7-22                     L                        



     Insulin 100      00:00:                     (70-30)                     



     unit/mL      00                                                



     subcutaneou                                                        



     s                                                           



     suspension                                                        

 

     atorvastati      2021-0      No             1mg                      



     n 10 mg      7-22                                              



     tablet      00:00:                                              



               00                                                

 

     Myrbetriq      2021-0      No             1mg                      



     25 mg      7-22                                              



     tablet,exte      00:00:                                              



     nded      00                                                



     release                                                        

 

     Trintellix      2021-0      No             1mg                      



     20 mg      7-22                                              



     tablet      00:00:                                              



               00                                                

 

     metformin      2021-0      No             1mg                      



     500 mg      7-22                                              



     tablet      00:00:                                              



               00                                                

 

     metoprolol      2021-0      No             1mg                      



     tartrate 25      7-22                                              



     mg tablet      00:00:                                              



               00                                                

 

     mirtazapine      2021-0      No             1mg                      



     15 mg      7-22                                              



     tablet      00:00:                                              



               00                                                

 

     levothyroxi      2021-0      No             1mcg                     



     ne 300 mcg      7-22                                              



     tablet      00:00:                                              



               00                                                

 

     omeprazole      2021-0      No             1mg                      



     40 mg      7-22                                              



     capsule,del      00:00:                                              



     ayed      00                                                



     release                                                        

 

     Humulin      1-0      No             1unit/m                     



     70/30 U-100      7-22                     L                        



     Insulin 100      00:00:                     (70-30)                     



     unit/mL      00                                                



     subcutaneou                                                        



     s                                                           



     suspension                                                        

 

     atorvastati      1-0      No             1mg                      



     n 10 mg      7-22                                              



     tablet      00:00:                                              



               00                                                

 

     Myrbetriq      2021-0      No             1mg                      



     25 mg      7-22                                              



     tablet,exte      00:00:                                              



     nded      00                                                



     release                                                        

 

     Trintellix      2021-0      No             1mg                      



     20 mg      7-22                                              



     tablet      00:00:                                              



               00                                                

 

     metformin      2021-0      No             1mg                      



     500 mg      7-22                                              



     tablet      00:00:                                              



               00                                                

 

     metoprolol      2021-0      No             1mg                      



     tartrate 25      7-22                                              



     mg tablet      00:00:                                              



               00                                                

 

     mirtazapine      2021-0      No             1mg                      



     15 mg      7-22                                              



     tablet      00:00:                                              



               00                                                

 

     levothyroxi      1-0      No             1mcg                     



     ne 300 mcg      7-22                                              



     tablet      00:00:                                              



               00                                                

 

     omeprazole      1-0      No             1mg                      



     40 mg      7-22                                              



     capsule,del      00:00:                                              



     ayed      00                                                



     release                                                        

 

     alprazolam      2021-0      No             1mg                      



     0.5 mg      7-13                                              



     tablet      00:00:                                              



               00                                                

 

     metoprolol      2021-0      No             1mg                      



     tartrate 25      7-13                                              



     mg tablet      00:00:                                              



               00                                                

 

     metoclopram      2021-0      No             1mg                      



     fito 10 mg      7-13                                              



     tablet      00:00:                                              



               00                                                

 

     Trintellix      2021-0      No             1mg                      



     20 mg      7-13                                              



     tablet      00:00:                                              



               00                                                

 

     Myrbetriq      2021-0      No             1mg                      



     25 mg      7-13                                              



     tablet,exte      00:00:                                              



     nded      00                                                



     release                                                        

 

     Dose      2021-0      No                                      



     Unknown      7-13                                              



               00:00:                                              



               00                                                

 

     Dose      2021-0      No                                      



     Unknown      7-13                                              



               00:00:                                              



               00                                                

 

     alprazolam      2021-0      No             1mg                      



     0.5 mg      7-13                                              



     tablet      00:00:                                              



               00                                                

 

     metoprolol      2021-0      No             1mg                      



     tartrate 25      7-13                                              



     mg tablet      00:00:                                              



               00                                                

 

     metoclopram      2021-0      No             1mg                      



     fito 10 mg      7-13                                              



     tablet      00:00:                                              



               00                                                

 

     mirtazapine      2021-0      No             1mg                      



     15 mg      7-13                                              



     tablet      00:00:                                              



               00                                                

 

     levothyroxi      2021-0      No             1mcg                     



     ne 300 mcg      7-13                                              



     tablet      00:00:                                              



               00                                                

 

     Dose      2021-0      No                                      



     Unknown      7-13                                              



               00:00:                                              



               00                                                

 

     Vitamin D2      2021-0      No             1(50,00                     



     1,250 mcg      7-13                     0 unit)                     



     (50,000      00:00:                                              



     unit)      00                                                



     capsule                                                        

 

     mirtazapine      2021-0      No             1mg                      



     15 mg      7-13                                              



     tablet      00:00:                                              



               00                                                

 

     levothyroxi      2021-0      No             1mcg                     



     ne 300 mcg      7-13                                              



     tablet      00:00:                                              



               00                                                

 

     omeprazole      2021-0      No             1mg                      



     40 mg      7-13                                              



     capsule,del      00:00:                                              



     ayed      00                                                



     release                                                        

 

     Trintellix      2021-0      No             1mg                      



     20 mg      7-13                                              



     tablet      00:00:                                              



               00                                                

 

     Myrbetriq      2021-0      No             1mg                      



     25 mg      7-13                                              



     tablet,exte      00:00:                                              



     nded      00                                                



     release                                                        

 

     Dose      2021-0      No                                      



     Unknown      7-13                                              



               00:00:                                              



               00                                                

 

     Dose      2021-0      No                                      



     Unknown      7-13                                              



               00:00:                                              



               00                                                

 

     alprazolam      2021-0      No             1mg                      



     0.5 mg      7-13                                              



     tablet      00:00:                                              



               00                                                

 

     metoprolol      2021-0      No             1mg                      



     tartrate 25      7-13                                              



     mg tablet      00:00:                                              



               00                                                

 

     metoclopram      2021-0      No             1mg                      



     fito 10 mg      7-13                                              



     tablet      00:00:                                              



               00                                                

 

     mirtazapine      2021-0      No             1mg                      



     15 mg      7-13                                              



     tablet      00:00:                                              



               00                                                

 

     Dose      2021-0      No                                      



     Unknown      7-13                                              



               00:00:                                              



               00                                                

 

     Dose      2021-0      No                                      



     Unknown      7-13                                              



               00:00:                                              



               00                                                

 

     Vitamin D2      2021-0      No             1(50,00                     



     1,250 mcg      7-13                     0 unit)                     



     (50,000      00:00:                                              



     unit)      00                                                



     capsule                                                        

 

     Vitamin D2      2021-0      No             1(50,00                     



     1,250 mcg      7-13                     0 unit)                     



     (50,000      00:00:                                              



     unit)      00                                                



     capsule                                                        

 

     Trintellix      2021-0      No             1mg                      



     20 mg      7-13                                              



     tablet      00:00:                                              



               00                                                

 

     Myrbetriq      2021-0      No             1mg                      



     25 mg      7-13                                              



     tablet,exte      00:00:                                              



     nded      00                                                



     release                                                        

 

     metoclopram      2021-0      No             1mg                      



     fito 10 mg      7-13                                              



     tablet      00:00:                                              



               00                                                

 

     mirtazapine      2021-0      No             1mg                      



     15 mg      7-13                                              



     tablet      00:00:                                              



               00                                                

 

     Dose      2021-0      No             30                       



     Unknown      7-                                              



               00:00:                                              



               00                                                

 

     Dose      2021-0      No             5                        



     Unknown      7-                                              



               00:00:                                              



               00                                                

 

     Dose      2021-0      No             30                       



     Unknown      7-                                              



               00:00:                                              



               00                                                

 

     Dose      2021-0      No                                      



     Unknown      7-                                              



               00:00:                                              



               00                                                

 

     Dose      2021-0      No             30                       



     Unknown      7-                                              



               00:00:                                              



               00                                                

 

     Dose      2021-0      No             30                       



     Unknown      7-                                              



               00:00:                                              



               00                                                

 

     Dose      2021-0      No             5                        



     Unknown      7-                                              



               00:00:                                              



               00                                                

 

     Humulin      2021-0      No             1unit/m                     



     70/30 U-100      7-13                     L                        



     Insulin 100      00:00:                     (70-30)                     



     unit/mL      00                                                



     subcutaneou                                                        



     s                                                           



     suspension                                                        

 

     Trintellix      1-0      No             1mg                      



     20 mg      7-13                                              



     tablet      00:00:                                              



               00                                                

 

     Myrbetriq      1-0      No             1mg                      



     25 mg      7-13                                              



     tablet,exte      00:00:                                              



     nded      00                                                



     release                                                        

 

     atorvastati      1-0      No             1mg                      



     n 10 mg      7-13                                              



     tablet      00:00:                                              



               00                                                

 

     metformin      1-0      No             1mg                      



     500 mg      7-13                                              



     tablet      00:00:                                              



               00                                                

 

     alprazolam      2021-0      No             1mg                      



     0.5 mg      7-13                                              



     tablet      00:00:                                              



               00                                                

 

     metoprolol      2021-0      No             1mg                      



     tartrate 25      7-13                                              



     mg tablet      00:00:                                              



               00                                                

 

     metoclopram      2021-0      No             1mg                      



     fito 10 mg      7-13                                              



     tablet      00:00:                                              



               00                                                

 

     mirtazapine      2021-0      No             1mg                      



     15 mg      7-13                                              



     tablet      00:00:                                              



               00                                                

 

     levothyroxi      1-0      No             1mcg                     



     ne 300 mcg      7-13                                              



     tablet      00:00:                                              



               00                                                

 

     omeprazole      1-0      No             1mg                      



     40 mg      7-13                                              



     capsule,del      00:00:                                              



     ayed      00                                                



     release                                                        

 

     Vitamin D2      1-0      No             1(50,00                     



     1,250 mcg      7-13                     0 unit)                     



     (50,000      00:00:                                              



     unit)      00                                                



     capsule                                                        

 

     Humulin      1-0      No             1unit/m                     



     70/30 U-100      7-13                     L                        



     Insulin 100      00:00:                     (70-30)                     



     unit/mL      00                                                



     subcutaneou                                                        



     s                                                           



     suspension                                                        

 

     Humulin      1-0      No             1unit/m                     



     70/30 U-100      7-13                     L                        



     Insulin 100      00:00:                     (70-30)                     



     unit/mL      00                                                



     subcutaneou                                                        



     s                                                           



     suspension                                                        

 

     Trintellix      2021-0      No             1mg                      



     20 mg      7-13                                              



     tablet      00:00:                                              



               00                                                

 

     Myrbetriq      2021-0      No             1mg                      



     25 mg      7-13                                              



     tablet,exte      00:00:                                              



     nded      00                                                



     release                                                        

 

     atorvastati      2021-0      No             1mg                      



     n 10 mg      7-13                                              



     tablet      00:00:                                              



               00                                                

 

     metformin      2021-0      No             1mg                      



     500 mg      7-13                                              



     tablet      00:00:                                              



               00                                                

 

     alprazolam      2021-0      No             1mg                      



     0.5 mg      7-13                                              



     tablet      00:00:                                              



               00                                                

 

     metoprolol      2021-0      No             1mg                      



     tartrate 25      7-13                                              



     mg tablet      00:00:                                              



               00                                                

 

     metoclopram      2021-0      No             1mg                      



     fito 10 mg      7-13                                              



     tablet      00:00:                                              



               00                                                

 

     mirtazapine      2021-0      No             1mg                      



     15 mg      7-13                                              



     tablet      00:00:                                              



               00                                                

 

     levothyroxi      1-0      No             1mcg                     



     ne 300 mcg      7-13                                              



     tablet      00:00:                                              



               00                                                

 

     omeprazole      1-0      No             1mg                      



     40 mg      7-13                                              



     capsule,del      00:00:                                              



     ayed      00                                                



     release                                                        

 

     Vitamin D2      1-0      No             1(50,00                     



     1,250 mcg      7-13                     0 unit)                     



     (50,000      00:00:                                              



     unit)      00                                                



     capsule                                                        

 

     Trintellix      1-0      No             1mg                      



     20 mg      7-13                                              



     tablet      00:00:                                              



               00                                                

 

     Myrbetriq      2021-0      No             1mg                      



     25 mg      7-13                                              



     tablet,exte      00:00:                                              



     nded      00                                                



     release                                                        

 

     atorvastati      2021-0      No             1mg                      



     n 10 mg      7-13                                              



     tablet      00:00:                                              



               00                                                

 

     Humulin      2021-0      No             1unit/m                     



     70/30 U-100      7-13                     L                        



     Insulin 100      00:00:                     (70-30)                     



     unit/mL      00                                                



     subcutaneou                                                        



     s                                                           



     suspension                                                        

 

     Trintellix      2021-0      No             1mg                      



     20 mg      7-13                                              



     tablet      00:00:                                              



               00                                                

 

     Myrbetriq      2021-0      No             1mg                      



     25 mg      7-13                                              



     tablet,exte      00:00:                                              



     nded      00                                                



     release                                                        

 

     atorvastati      2021-0      No             1mg                      



     n 10 mg      7-13                                              



     tablet      00:00:                                              



               00                                                

 

     metformin      2021-0      No             1mg                      



     500 mg      7-13                                              



     tablet      00:00:                                              



               00                                                

 

     alprazolam      2021-0      No             1mg                      



     0.5 mg      7-13                                              



     tablet      00:00:                                              



               00                                                

 

     metoprolol      -0      No             1mg                      



     tartrate 25      7-13                                              



     mg tablet      00:00:                                              



               00                                                

 

     metoclopram      -0      No             1mg                      



     fito 10 mg      7-13                                              



     tablet      00:00:                                              



               00                                                

 

     mirtazapine      -0      No             1mg                      



     15 mg      7-13                                              



     tablet      00:00:                                              



               00                                                

 

     levothyroxi      -0      No             1mcg                     



     ne 300 mcg      7-13                                              



     tablet      00:00:                                              



               00                                                

 

     omeprazole      -0      No             1mg                      



     40 mg      7-13                                              



     capsule,del      00:00:                                              



     ayed      00                                                



     release                                                        

 

     Vitamin D2      -0      No             1(50,00                     



     1,250 mcg      7-13                     0 unit)                     



     (50,000      00:00:                                              



     unit)      00                                                



     capsule                                                        

 

     metformin      -      No             1mg                      



     500 mg      7-13                                              



     tablet      00:00:                                              



               00                                                







Immunizations







           Ordered Immunization Filled Immunization Date       Status     Commen

ts   Source



           Name       Name                                        

 

           Influenza, injectable,            2022-11-15 Completed             



           Madin Nabila Canine            00:00:00                         



           Kidney,                                                



           preservative-free,                                             



           quadrivalent                                             

 

           Influenza, injectable,            2022-11-15 Completed             



           Madin Nabila Canine            00:00:00                         



           Kidney,                                                



           preservative-free,                                             



           quadrivalent                                             

 

           Influenza, injectable,            2022-11-15 Completed             



           Madin Nabila Canine            00:00:00                         



           Kidney,                                                



           preservative-free,                                             



           quadrivalent                                             

 

           Moderna COVID-19            2021 Completed             



           Vaccine               00:00:00                         

 

           Moderna COVID-19            2021 Completed             



           Vaccine               00:00:00                         

 

           Moderna COVID-19            2021 Completed             



           Vaccine               00:00:00                         

 

           Moderna COVID-19            2021 Completed             



           Vaccine               00:00:00                         

 

           Moderna COVID-19            2021 Completed             



           Vaccine               00:00:00                         

 

           Moderna COVID-19            2021 Completed             



           Vaccine               00:00:00                         

 

           Moderna COVID-19            2021 Completed             



           Vaccine               00:00:00                         

 

           Moderna COVID-19            2021-04-15 Completed             



           Vaccine               00:00:00                         

 

           Moderna COVID-19            2021-04-15 Completed             



           Vaccine               00:00:00                         

 

           Moderna COVID-19            2021-04-15 Completed             



           Vaccine               00:00:00                         

 

           Moderna COVID-19            2021 Completed             



           Vaccine               00:00:00                         

 

           Moderna COVID-19            2021 Completed             



           Vaccine               00:00:00                         

 

           Moderna COVID-19            2021 Completed             



           Vaccine               00:00:00                         







Vital Signs







             Vital Name   Observation Time Observation Value Comments     Source

 

             Systolic blood 2022 12:26:00 151 mm[Hg]                [facility]

[facility]



             pressure                                            [facility]



                                                                 [place]

 

             Diastolic blood 2022 12:26:00 76 mm[Hg]                 [facility]

[facility] of



             pressure                                            [facility]

 

             Heart rate   2022 12:26:00 102 /min                  [facility]

[facility]



                                                                 [facility]



                                                                 [facility]

 

             Body temperature 2022 12:26:00 37 Maricarmen                    [facility]iv

ers[facility] of



                                                                 [facility]



                                                                 [facility]

 

             Respiratory rate 2022 12:26:00 20 /min                   Univ

ersity of



                                                                 [facility]

 

             Body height  2022 12:26:00 157.5 cm                  [facility]

ty of



                                                                 [facility]



                                                                 [place]

 

             Body weight  2022 12:26:00 91.627 kg                 [facility]

ty of



                                                                 [facility]



                                                                 [facility]

 

             BMI          2022 12:26:00 36.95 kg/m2               [facility]

ty of



                                                                 [facility]



                                                                 [facility]

 

             Oxygen saturation in 2022 12:26:00 99 /min                   

[facility]



             Arterial blood by                                        [facility]

[facility]



             Pulse oximetry                                        [facility]

 

             Systolic blood 2022 04:49:00 147 mm[Hg]                [facility]

sity of



             pressure                                            [facility]



                                                                 [facility]

 

             Diastolic blood 2022 04:49:00 74 mm[Hg]                 [facility]

rsity of



             pressure                                            [facility]



                                                                 [facility]

 

             Heart rate   2022 04:49:00 97 /min                   [facility]

ty of



                                                                 [facility]



                                                                 [facility]

 

             Body temperature 2022 04:49:00 37 Maricarmen                    Univ

ersity of



                                                                 [facility]



                                                                 [facility]

 

             Respiratory rate 2022 04:49:00 18 /min                   Univ

ersity of



                                                                 [facility]



                                                                 [facility]

 

             Body weight  2022 04:49:00 88.905 kg                 [facility]

[facility] of



                                                                 [facility]



                                                                 [facility]

 

             BMI          2022 04:49:00 35.85 kg/m2               [facility]

ty of



                                                                 [facility]



                                                                 [facility]

 

             Oxygen saturation in 2022 04:49:00 100 /min                  

[facility]



             Arterial blood by                                        [facility]

[facility]



             Pulse oximetry                                        Branch

 

             Systolic blood 2022 08:10:00 123 mm[Hg]                Univer

sity of



             pressure                                            [facility]

 

             Diastolic blood 2022 08:10:00 87 mm[Hg]                 Unive

rsity of



             pressure                                            [facility]

 

             Heart rate   2022 08:10:00 82 /min                   [facility]

 

             Respiratory rate 2022 08:10:00 18 /min                   Univ

[facility]

 

             Oxygen saturation in 2022 08:10:00 98 /min                   

[facility]



             Arterial blood by                                        [facility]

marisa



             Pulse oximetry                                        Branch

 

             Body temperature 2022 04:15:00 36.83 Maricarmen                 [facility]

 

             Body height  2022 04:15:00 157.5 cm                  [facility]

 

             Body weight  2022 04:15:00 88.905 kg                 [facility]

 

             BMI          2022 04:15:00 35.85 kg/m2               [facility]

 

             BP Systolic  2022-11-15 11:05:00 97 mm[Hg]                 

 

             BP Diastolic 2022-11-15 11:05:00 67 mm[Hg]                 

 

             Weight Measured 2022-11-15 11:05:00 210.60 pounds              

 

             Height Measured 2022-11-15 11:05:00 61.81 inches              

 

             Body Temperature 2022-11-15 11:05:00 98.20 degrees              

 

             Heart Rate   2022-11-15 11:05:00 98.00 /min                

 

             Respiratory Rate 2022-11-15 11:05:00 18.00 /min                

 

             BP Systolic  2022-10-11 10:36:00 122 mm[Hg]                

 

             BP Diastolic 2022-10-11 10:36:00 78 mm[Hg]                 

 

             Weight Measured 2022-10-11 10:36:00 202.40 pounds              

 

             Height Measured 2022-10-11 10:36:00 61.81 inches              

 

             Body Temperature 2022-10-11 10:36:00 97.80 degrees              

 

             Heart Rate   2022-10-11 10:36:00 86.00 /min                

 

             Respiratory Rate 2022-10-11 10:36:00 17.00 /min                

 

             BP Systolic  2022 13:09:00 142 mm[Hg]                

 

             BP Diastolic 2022 13:09:00 81 mm[Hg]                 

 

             Weight Measured 2022 13:09:00 195.40 pounds              

 

             Height Measured 2022 13:09:00 61.81 inches              

 

             Body Temperature 2022 13:09:00 97.80 degrees              

 

             Heart Rate   2022 13:09:00 91.00 /min                

 

             Respiratory Rate 2022 13:09:00                           

 

             BP Systolic  2022 11:31:00 110 mm[Hg]                

 

             BP Diastolic 2022 11:31:00 49 mm[Hg]                 

 

             Weight Measured 2022 11:31:00 195.80 pounds              

 

             Height Measured 2022 11:31:00 61.81 inches              

 

             Body Temperature 2022 11:31:00 97.80 degrees              

 

             Heart Rate   2022 11:31:00 95.00 /min                

 

             Respiratory Rate 2022 11:31:00 18.00 /min                

 

             BP Systolic  2022 16:36:00 129 mm[Hg]                

 

             BP Diastolic 2022 16:36:00 68 mm[Hg]                 

 

             Weight Measured 2022 16:36:00 196.00 pounds              

 

             Height Measured 2022 16:36:00 61.81 inches              

 

             Body Temperature 2022 16:36:00 97.60 degrees              

 

             Heart Rate   2022 16:36:00 108.00 /min               

 

             Respiratory Rate 2022 16:36:00 18.00 /min                

 

             BP Systolic  2022 11:46:00 115 mm[Hg]                

 

             BP Diastolic 2022 11:46:00 75 mm[Hg]                 

 

             Weight Measured 2022 11:46:00 190.40 pounds              

 

             Height Measured 2022 11:46:00 61.81 inches              

 

             Body Temperature 2022 11:46:00 98.10 degrees              

 

             Heart Rate   2022 11:46:00 89.00 /min                

 

             Respiratory Rate 2022 11:46:00                           

 

             BP Systolic  2022 11:22:00 123 mm[Hg]                

 

             BP Diastolic 2022 11:22:00 57 mm[Hg]                 

 

             Weight Measured 2022 11:22:00 194.60 pounds              

 

             Height Measured 2022 11:22:00 61.81 inches              

 

             Body Temperature 2022 11:22:00 97.50 degrees              

 

             Heart Rate   2022 11:22:00 85.00 /min                

 

             Respiratory Rate 2022 11:22:00 16.00 /min                

 

             BP Systolic  2022 15:21:00 148 mm[Hg]                

 

             BP Diastolic 2022 15:21:00 82 mm[Hg]                 

 

             Weight Measured 2022 15:21:00 192.60 pounds              

 

             Height Measured 2022 15:21:00 61.81 inches              

 

             Body Temperature 2022 15:21:00 98.50 degrees              

 

             Heart Rate   2022 15:21:00 100.00 /min               

 

             Respiratory Rate 2022 15:21:00 25.00 /min                

 

             BP Systolic  2021 16:45:00 135 mm[Hg]                

 

             BP Diastolic 2021 16:45:00 82 mm[Hg]                 

 

             Weight Measured 2021 16:45:00 206.80 pounds              

 

             Height Measured 2021 16:45:00                           

 

             Body Temperature 2021 16:45:00 98.20 degrees              

 

             Heart Rate   2021 16:45:00 86.00 /min                

 

             Respiratory Rate 2021 16:45:00 25.00 /min                

 

             BP Systolic  2021 11:18:00 136 mm[Hg]                

 

             BP Diastolic 2021 11:18:00 84 mm[Hg]                 

 

             Weight Measured 2021 11:18:00 201.60 pounds              

 

             Height Measured 2021 11:18:00 61.81 inches              

 

             Body Temperature 2021 11:18:00 98.20 degrees              

 

             Heart Rate   2021 11:18:00 87.00 /min                

 

             Respiratory Rate 2021 11:18:00 17.00 /min                

 

             BP Systolic  2021-10-19 14:54:00 122 mm[Hg]                

 

             BP Diastolic 2021-10-19 14:54:00 76 mm[Hg]                 

 

             Weight Measured 2021-10-19 14:54:00 211.80 pounds              

 

             Height Measured 2021-10-19 14:54:00 61.81 inches              

 

             Body Temperature 2021-10-19 14:54:00 98.30 degrees              

 

             Heart Rate   2021-10-19 14:54:00 80.00 /min                

 

             Respiratory Rate 2021-10-19 14:54:00 16.00 /min                

 

             BP Systolic  2021-10-12 10:09:00 114 mm[Hg]                

 

             BP Diastolic 2021-10-12 10:09:00 72 mm[Hg]                 

 

             Weight Measured 2021-10-12 10:09:00 212.00 pounds              

 

             Height Measured 2021-10-12 10:09:00 61.81 inches              

 

             Body Temperature 2021-10-12 10:09:00 98.40 degrees              

 

             Heart Rate   2021-10-12 10:09:00 98.00 /min                

 

             Respiratory Rate 2021-10-12 10:09:00                           

 

             BP Systolic  2021-10-12 10:08:00 114 mm[Hg]                

 

             BP Diastolic 2021-10-12 10:08:00 72 mm[Hg]                 

 

             Weight Measured 2021-10-12 10:08:00 212.00 pounds              

 

             Height Measured 2021-10-12 10:08:00 61.81 inches              

 

             Body Temperature 2021-10-12 10:08:00 98.40 degrees              

 

             Heart Rate   2021-10-12 10:08:00 98.00 /min                

 

             Respiratory Rate 2021-10-12 10:08:00                           

 

             BP Systolic  2021-10-09 11:33:00 119 mm[Hg]                

 

             BP Diastolic 2021-10-09 11:33:00 78 mm[Hg]                 

 

             Weight Measured 2021-10-09 11:33:00 210.60 pounds              

 

             Height Measured 2021-10-09 11:33:00 61.81 inches              

 

             Body Temperature 2021-10-09 11:33:00 98.30 degrees              

 

             Heart Rate   2021-10-09 11:33:00 92.00 /min                

 

             Respiratory Rate 2021-10-09 11:33:00                           







Procedures







                Procedure       Date / Time Performed Performing Clinician Sourc

e

 

                XR KNEE 3 VW LEFT 2022 13:19:39 Ganesh Chauhan  [facility]

 

                CONSENT/REFUSAL FOR 2022 12:22:24 Doctor Unassigned, No [facility] 

[place]



                DIAGNOSIS AND                   Name            [facility]



                TREATMENT                                       

 

                NOTICE OF PRIVACY 2022 04:36:17 Doctor Unassigned, No [facility] [place]



                PRACTICES                       Name            [facility]

 

                CONSENT/REFUSAL FOR 2022 04:35:03 Doctor Unassigned, No [facility] 

[place]



                DIAGNOSIS AND                   Name            [facility]



                TREATMENT                                       

 

                XR HIPS 3 VW LEFT 2022 04:42:39 Rosie Jha [facility]



                                                                [facility]

 

                NOTICE OF PRIVACY 2022 04:04:38 Doctor Unassigned, No [facility]

[facility] [place]



                PRACTICES                       Name            [facility]

 

                CONSENT/REFUSAL FOR 2022 04:04:16 Doctor Unassigned, No [facility] 

[place]



                DIAGNOSIS AND                   Name            [facility]



                TREATMENT                                       







Plan of Care







             Planned Activity Planned Date Details      Comments     Source

 

             Goal                      Plan of Care Note [code = 60285-9]       

       

 

             Goal                      Plan of Care Note [code = 77351-2]       

       

 

             Goal                      Plan of Care Note [code = 47575-5]       

       

 

             Goal                      Plan of Care Note [code = 45036-4]       

       

 

             Goal                      Plan of Care Note [code = 69868-4]       

       

 

             Goal                      Plan of Care Note [code = 02633-5]       

       

 

             Goal                      Plan of Care Note [code = 97238-1]       

       

 

             Goal                      Plan of Care Note [code = 04627-2]       

       

 

             Goal                      Plan of Care Note [code = 26388-3]       

       

 

             Goal                      Plan of Care Note [code = 17623-9]       

       

 

             Goal                      Plan of Care Note [code = 17119-7]       

       

 

             Goal                      Plan of Care Note [code = 68831-7]       

       

 

             Goal                      Plan of Care Note [code = 46232-7]       

       

 

             Goal                      Plan of Care Note [code = 15748-1]       

       

 

             Goal                      Plan of Care Note [code = 56371-5]       

       

 

             Goal                      Plan of Care Note [code = 32641-8]       

       

 

             Goal                      Plan of Care Note [code = 79685-9]       

       

 

             Goal                      Plan of Care Note [code = 54685-6]       

       

 

             Goal                      Plan of Care Note [code = 49169-0]       

       

 

             Goal                      Plan of Care Note [code = 41455-6]       

       

 

             Goal                      Plan of Care Note [code = 01718-0]       

       

 

             Goal                      Plan of Care Note [code = 44233-5]       

       

 

             Goal                      Plan of Care Note [code = 39494-8]       

       

 

             Goal                      Plan of Care Note [code = 53351-9]       

       

 

             Goal                      Plan of Care Note [code = 85028-3]       

       

 

             Goal                      Plan of Care Note [code = 87851-9]       

       

 

             Goal                      Plan of Care Note [code = 38226-6]       

       

 

             Goal                      Plan of Care Note [code = 61609-8]       

       

 

             Goal                      Plan of Care Note [code = 92295-4]       

       

 

             Goal                      Plan of Care Note [code = 59736-2]       

       

 

             Goal                      Plan of Care Note [code = 69594-5]       

       

 

             Goal                      Plan of Care Note [code = 57587-0]       

       

 

             Goal                      Plan of Care Note [code = 08811-7]       

       

 

             Goal                      Plan of Care Note [code = 23336-9]       

       

 

             Goal                      Plan of Care Note [code = 42622-7]       

       

 

             Goal                      Plan of Care Note [code = 24686-3]       

       

 

             Goal                      Plan of Care Note [code = 42785-6]       

       

 

             Goal                      Plan of Care Note [code = 43450-8]       

       

 

             Goal                      Plan of Care Note [code = 59348-2]       

       

 

             Goal                      Plan of Care Note [code = 75598-5]       

       

 

             Goal                      Plan of Care Note [code = 44941-2]       

       

 

             Goal                      Plan of Care Note [code = 40439-9]       

       

 

             Goal                      Plan of Care Note [code = 69780-1]       

       

 

             Goal                      Plan of Care Note [code = 09958-2]       

       

 

             Goal                      Plan of Care Note [code = 68168-0]       

       

 

             Goal                      Plan of Care Note [code = 61825-7]       

       

 

             Goal                      Plan of Care Note [code = 41531-2]       

       

 

             Goal                      Plan of Care Note [code = 94816-5]       

       

 

             Goal                      Plan of Care Note [code = 68044-2]       

       

 

             Goal                      Plan of Care Note [code = 75812-9]       

       

 

             Goal                      Plan of Care Note [code = 95179-6]       

       

 

             Goal                      Plan of Care Note [code = 89087-3]       

       

 

             Goal                      Plan of Care Note [code = 96732-1]       

       

 

             Goal                      Plan of Care Note [code = 12368-3]       

       

 

             Goal                      Plan of Care Note [code = 71635-8]       

       

 

             Goal                      Plan of Care Note [code = 99843-9]       

       

 

             Goal                      Plan of Care Note [code = 26647-3]       

       

 

             Goal                      Plan of Care Note [code = 29943-9]       

       

 

             Goal                      Plan of Care Note [code = 52810-8]       

       

 

             Goal                      Plan of Care Note [code = 53350-1]       

       

 

             Goal                      Plan of Care Note [code = 67051-3]       

       

 

             Goal                      Plan of Care Note [code = 80545-9]       

       

 

             Goal                      Plan of Care Note [code = 58575-5]       

       

 

             Goal                      Plan of Care Note [code = 10587-9]       

       

 

             Goal                      Plan of Care Note [code = 48744-5]       

       

 

             Goal                      Plan of Care Note [code = 27452-9]       

       

 

             Goal                      Plan of Care Note [code = 42133-9]       

       

 

             Goal                      Plan of Care Note [code = 91462-5]       

       

 

             Goal                      Plan of Care Note [code = 31971-2]       

       

 

             Goal                      Plan of Care Note [code = 43437-3]       

       

 

             Goal                      Plan of Care Note [code = 18941-9]       

       

 

             Goal                      Plan of Care Note [code = 02500-4]       

       

 

             Goal                      Plan of Care Note [code = 11315-8]       

       

 

             Goal                      Plan of Care Note [code = 86491-8]       

       

 

             Goal                      Plan of Care Note [code = 19981-9]       

       

 

             Goal                      Plan of Care Note [code = 17041-6]       

       

 

             Goal                      Plan of Care Note [code = 96937-0]       

       

 

             Goal                      Plan of Care Note [code = 11744-4]       

       

 

             Goal                      Plan of Care Note [code = 45972-6]       

       

 

             Goal                      Plan of Care Note [code = 58249-1]       

       

 

             Goal                      Plan of Care Note [code = 26429-3]       

       

 

             Goal                      Plan of Care Note [code = 08687-4]       

       

 

             Goal                      Plan of Care Note [code = 50110-8]       

       

 

             Goal                      Plan of Care Note [code = 36860-6]       

       

 

             Goal                      Plan of Care Note [code = 12429-9]       

       

 

             Goal                      Plan of Care Note [code = 26319-4]       

       

 

             Goal                      Plan of Care Note [code = 36415-6]       

       

 

             Goal                      Plan of Care Note [code = 32993-3]       

       

 

             Goal                      Plan of Care Note [code = 50410-6]       

       

 

             Goal                      Plan of Care Note [code = 61243-2]       

       

 

             Goal                      Plan of Care Note [code = 00049-4]       

       

 

             Goal                      Plan of Care Note [code = 94462-5]       

       

 

             Goal                      Plan of Care Note [code = 07825-4]       

       

 

             Goal                      Plan of Care Note [code = 87310-0]       

       

 

             Goal                      Plan of Care Note [code = 52216-5]       

       

 

             Goal                      Plan of Care Note [code = 99462-3]       

       

 

             Goal                      Plan of Care Note [code = 08015-8]       

       

 

             Goal                      Plan of Care Note [code = 45895-0]       

       

 

             Goal                      Plan of Care Note [code = 86789-2]       

       

 

             Goal                      Plan of Care Note [code = 63227-8]       

       

 

             Goal                      Plan of Care Note [code = 15464-6]       

       

 

             Goal                      Plan of Care Note [code = 20473-8]       

       

 

             Goal                      Plan of Care Note [code = 71413-3]       

       

 

             Goal                      Plan of Care Note [code = 50232-1]       

       

 

             Goal                      Plan of Care Note [code = 96139-5]       

       

 

             Goal                      Plan of Care Note [code = 77747-8]       

       

 

             Goal                      Plan of Care Note [code = 22958-7]       

       

 

             Goal                      Plan of Care Note [code = 91036-1]       

       

 

             Goal                      Plan of Care Note [code = 96639-7]       

       

 

             Goal                      Plan of Care Note [code = 86620-0]       

       

 

             Goal                      Plan of Care Note [code = 72975-8]       

       

 

             Goal                      Plan of Care Note [code = 11544-0]       

       

 

             Goal                      Plan of Care Note [code = 93769-3]       

       

 

             Goal                      Plan of Care Note [code = 36834-1]       

       

 

             Goal                      Plan of Care Note [code = 99475-4]       

       

 

             Goal                      Plan of Care Note [code = 62704-0]       

       

 

             Goal                      Plan of Care Note [code = 68112-2]       

       

 

             Goal                      Plan of Care Note [code = 04770-4]       

       

 

             Goal                      Plan of Care Note [code = 00416-3]       

       

 

             Goal                      Plan of Care Note [code = 50913-5]       

       

 

             Goal                      Plan of Care Note [code = 63697-8]       

       

 

             Goal                      Plan of Care Note [code = 73246-0]       

       

 

             Goal                      Plan of Care Note [code = 37271-8]       

       

 

             Goal                      Plan of Care Note [code = 68149-1]       

       

 

             Goal                      Plan of Care Note [code = 73033-6]       

       

 

             Goal                      Plan of Care Note [code = 48428-2]       

       

 

             Goal                      Plan of Care Note [code = 80656-3]       

       

 

             Goal                      Plan of Care Note [code = 69867-1]       

       

 

             Goal                      Plan of Care Note [code = 94458-9]       

       

 

             Goal                      Plan of Care Note [code = 74418-4]       

       

 

             Goal                      Plan of Care Note [code = 89918-6]       

       

 

             Goal                      Plan of Care Note [code = 90633-6]       

       

 

             Goal                      Plan of Care Note [code = 46679-7]       

       

 

             Goal                      Plan of Care Note [code = 96868-6]       

       

 

             Goal                      Plan of Care Note [code = 15878-3]       

       

 

             Goal                      Plan of Care Note [code = 52574-7]       

       

 

             Goal                      Plan of Care Note [code = 35045-1]       

       

 

             Goal                      Plan of Care Note [code = 39910-8]       

       

 

             Goal                      Plan of Care Note [code = 10510-5]       

       

 

             Goal                      Plan of Care Note [code = 82839-0]       

       

 

             Goal                      Plan of Care Note [code = 16012-6]       

       

 

             Goal                      Plan of Care Note [code = 20308-0]       

       

 

             Goal                      Plan of Care Note [code = 62349-0]       

       

 

             Goal                      Plan of Care Note [code = 91802-4]       

       

 

             Goal                      Plan of Care Note [code = 77155-5]       

       

 

             Goal                      Plan of Care Note [code = 21296-7]       

       

 

             Goal                      Plan of Care Note [code = 69329-2]       

       

 

             Goal                      Plan of Care Note [code = 41327-3]       

       

 

             Goal                      Plan of Care Note [code = 44879-0]       

       

 

             Goal                      Plan of Care Note [code = 22783-5]       

       

 

             Goal                      Plan of Care Note [code = 41655-8]       

       

 

             Goal                      Plan of Care Note [code = 98986-9]       

       

 

             Goal                      Plan of Care Note [code = 46535-0]       

       

 

             Goal                      Plan of Care Note [code = 04319-4]       

       

 

             Goal                      Plan of Care Note [code = 72780-7]       

       

 

             Goal                      Plan of Care Note [code = 08076-5]       

       

 

             Goal                      Plan of Care Note [code = 86744-6]       

       

 

             Goal                      Plan of Care Note [code = 50350-3]       

       

 

             Goal                      Plan of Care Note [code = 88736-3]       

       

 

             Goal                      Plan of Care Note [code = 16034-2]       

       

 

             Goal                      Plan of Care Note [code = 87255-8]       

       

 

             Goal                      Plan of Care Note [code = 90572-9]       

       

 

             Goal                      Plan of Care Note [code = 89959-2]       

       

 

             Goal                      Plan of Care Note [code = 73502-5]       

       

 

             Goal                      Plan of Care Note [code = 06586-1]       

       

 

             Goal                      Plan of Care Note [code = 23721-6]       

       

 

             Goal                      Plan of Care Note [code = 99341-2]       

       

 

             Goal                      Plan of Care Note [code = 08245-9]       

       

 

             Goal                      Plan of Care Note [code = 56710-6]       

       

 

             Goal                      Plan of Care Note [code = 32304-4]       

       

 

             Goal                      Plan of Care Note [code = 74797-4]       

       

 

             Goal                      Plan of Care Note [code = 78745-7]       

       

 

             Goal                      Plan of Care Note [code = 73549-4]       

       

 

             Goal                      Plan of Care Note [code = 08223-0]       

       

 

             Goal                      Plan of Care Note [code = 69881-1]       

       

 

             Goal                      Plan of Care Note [code = 60075-2]       

       

 

             Goal                      Plan of Care Note [code = 33147-2]       

       

 

             Goal                      Plan of Care Note [code = 53580-8]       

       

 

             Goal                      Plan of Care Note [code = 26617-5]       

       

 

             Goal                      Plan of Care Note [code = 26130-8]       

       

 

             Goal                      Plan of Care Note [code = 39689-9]       

       

 

             Goal                      Plan of Care Note [code = 26980-7]       

       

 

             Goal                      Plan of Care Note [code = 27595-6]       

       

 

             Goal                      Plan of Care Note [code = 32652-5]       

       

 

             Goal                      Plan of Care Note [code = 28090-2]       

       

 

             Goal                      Plan of Care Note [code = 95260-1]       

       

 

             Goal                      Plan of Care Note [code = 53078-7]       

       

 

             Goal                      Plan of Care Note [code = 32074-9]       

       

 

             Goal                      Plan of Care Note [code = 54411-1]       

       

 

             Goal                      Plan of Care Note [code = 94940-4]       

       

 

             Goal                      Plan of Care Note [code = 04340-0]       

       

 

             Goal                      Plan of Care Note [code = 43476-5]       

       

 

             Goal                      Plan of Care Note [code = 93504-1]       

       

 

             Goal                      Plan of Care Note [code = 93620-3]       

       

 

             Goal                      Plan of Care Note [code = 94220-4]       

       

 

             Goal                      Plan of Care Note [code = 22696-7]       

       

 

             Goal                      Plan of Care Note [code = 95631-3]       

       

 

             Goal                      Plan of Care Note [code = 90554-3]       

       

 

             Goal                      Plan of Care Note [code = 46399-3]       

       

 

             Goal                      Plan of Care Note [code = 91825-4]       

       

 

             Goal                      Plan of Care Note [code = 19071-4]       

       

 

             Goal                      Plan of Care Note [code = 88731-1]       

       

 

             Goal                      Plan of Care Note [code = 60446-3]       

       

 

             Goal                      Plan of Care Note [code = 71037-6]       

       

 

             Goal                      Plan of Care Note [code = 73018-6]       

       

 

             Goal                      Plan of Care Note [code = 04730-9]       

       







Encounters







        Start   End     Encounter Admission Attending Care    Care    Encounter 

Source



        Date/Time Date/Time Type    Type    Clinicians Facility Department ID   

   

 

        2022-10-03         Outpatient                 [facility]     [id number]

 [place]



        15:02:53                                                 [id number] [facility]

 

        2022         Outpatient         DOMINGA  [facility]     [id number]

 [place]



        01:03:24                         BENNIE                 [id number] [facility]

 

        2022 Outpatient                 ROSALIE WIGGINS     [id number]

[address]



        09:47:25 09:47:25                                         [id number]   F



                                                                        [place]

 

        2022 Outpatient                 [id number]- [id number]

[id number] 



        00:00:00 00:00:00 Visit                   dbfc-4253         bfc-4253-8 



                                                -9q0i-s92         [id number]-[id number] 



                                                83184uia8         69ade7  

 

        2022 Outpatient                 SFA     [facility]     [id number]

[address]



        17:00:33 17:00:33                                         [id number]   F



                                                                        [place]

 

        2022 Outpatient                 [id number]- [id number]

[id number]-i 



        00:00:00 00:00:00 Visit                   tt8v-501e         y3d-663p-j 



                                                -f272-4p9         703-8a85ef 



                                                0bl1hs8x1         1bd6b3  

 

        2022-11-15 2022-11-15 Outpatient                 SFA     [facility]     [id number]-

 [address]



        10:55:27 10:55:27                                         [id number]   F



                                                                        [place]

 

        2022-11-15 2022-11-15 Outpatient                 [id number]- [id number]

[id number]-y 



        00:00:00 00:00:00 Visit                   jl95-78h8         h87-38t7-6 



                                                -8289-9be         289-9be0be 



                                                8crgb838b         wd983y  

 

        2022 Emergency         EBEN Chauhan    [phone] [id number]

[address]



        07:27:00 08:55:00                 Ganesh Seymour 350.1.13.10        

 [facility] 4.2.7.2.686         [facility]  [phone]         [address]

 

        2022 Emergency X       GANESH CHAUHAN [facility]    ERT     1

[id number] [facility]



        07:27:00 08:55:00                 GANESH CHAUHAN                        

 ity [facility]

 

        2022-10-11 2022-10-11 Outpatient                 SFA     [facility]     [id number]-

[address]



        10:28:11 10:28:11                                         [id number]   F



                                                                        [place]

 

        2022-10-11 2022-10-11 Outpatient                 [id number]- [id number]

kx0s40-y 



        00:00:00 00:00:00 Visit                   ga65-079o         f12-696d-5 



                                                -3r96-d58         l21-a306qa 



                                                9ze99ygln         51adba  

 

        2022 Outpatient                 SFA     [facility]     [id number]

[address]



        15:06:03 15:06:03                                         [id number]   MARYAN Presley

 

        2022 Emergency X       PRADEEP SPANN [facility]    ERT     1

[id number] [facility]



        23:50:00 01:28:00                 PRADEEP SPANN                        

 [facility]

 

        2022 [facility]         [facility]    [id number] [id number]

7676 [facility]



        23:50:00 01:28:00                 Pradeep LOPEZ 350.1.13.10         i

ty of



                                                [place] 4.2.7.2.686         [facility]  [phone]         [address]

 

        2022 Outpatient                 [id number]

[id number] 



        00:00:00 00:00:00 Visit                   3r03-5v25         h62-5x74-5 



                                                -0am4-l61         af5-b03481 



                                                [id number]         094486  

 

        2022 Outpatient                 [id number]- [id number]

mz22j7-1 



        00:00:00 00:00:00 Visit                   422b-482d         22b-482d-8 



                                                -8157-b70         157-b704e0 



                                                8y10o7cbt         0f6bdd  

 

        2022-07-15 2022 Emergency X       [facility]     [id number]

[id number]



        23:20:00 03:26:00                 ROSIE vaca [facility]

 

        2022-07-15 2022 Emergency         [facility]    [phone] [id number]

[id number]



        23:20:00 03:26:00                 Rosie LOPEZ 350.1.13.10         i

ty of



                                                [place] 4.2.7.2.686         [facility]  [phone]         [address]

 

        2022 Outpatient                 [id number]- [id number]

[id number] 



        00:00:00 00:00:00 Visit                   0o8p-6obp         n8f-6frk-j 



                                                -k9r0-vaf         6m0-qxwq74 



                                                p95898xw0         696ce1  

 

        2022 Outpatient         DOMINGA,  [facility]    MED     [address]   

 [facility]



        12:04:00 23:59:00                 BENNIE                         







Results







           Test Description Test Time  Test Comments Results    Result Comments 

Source









                    TSH, THIRD GENERATION 2022-10-12 06:52:55 









                      Test Item  Value      Reference Range Interpretation Comme

nts









             TSH, THIRD GENERATION (test code = 2821) 2.700 UIU/ML 0.400-4.100  

             



COMPREHENSIVE METABOLIC PANEL2022-10-12 04:09:28





             Test Item    Value        Reference Range Interpretation Comments

 

             GLUCOSE (test code = 169 MG/DL    70-99        H            



             )                                               

 

             BUN (test code = 17 MG/DL     8-23                      



             )                                               

 

             CREATININE (test 0.86 MG/DL   0.60-1.30                 



             code = 221)                                        

 

             eGFR ( CKD-EPI) 76 ML/MIN/1.73 >60                       



             (test code = 41931)                                        

 

             CALC BUN/CREAT (test 20 RATIO     6-28                      



             code = 2235)                                        

 

             SODIUM (test code = 141 MEQ/L    133-146                   



             )                                               

 

             POTASSIUM (test code 4.3 MEQ/L    3.5-5.4                   



             = )                                             

 

             CHLORIDE (test code 99 MEQ/L                         



             = )                                             

 

             CARBON DIOXIDE (test 25 MEQ/L     19-31                     



             code = 2206)                                        

 

             CALCIUM (test code = 9.6 MG/DL    8.5-10.5                  



             )                                               

 

             PROTEIN, TOTAL (test 7.6 G/DL     6.1-8.3                   



             code = 2229)                                        

 

             ALBUMIN (test code = 4.3 G/DL     3.5-5.2                   



             )                                               

 

             CALC GLOBULIN (test 3.3 G/DL     1.9-3.7                   



             code = 2240)                                        

 

             CALC A/G RATIO (test 1.3 RATIO    1.0-2.6                   



             code = 2234)                                        

 

             BILIRUBIN, TOTAL 0.5 MG/DL    See_Comment                [Automated

 message]



             (test code = 2207)                                        The syste

m which



                                                                 generated this



                                                                 result transmit

maximo



                                                                 reference range

:



                                                                 <=1.2. The refe

rence



                                                                 range was not u

sed



                                                                 to interpret th

is



                                                                 result as



                                                                 normal/abnormal

.

 

             ALKALINE PHOSPHATASE 92 U/L                           



             (test code = 2204)                                        

 

             AST (test code = 35 U/L       9-40                      



             )                                               

 

             ALT (test code = 34 U/L       2219)                                               



HEMOGLOBIN A1c2022-10-12 02:13:42





             Test Item    Value        Reference Range Interpretation Comments

 

             HEMOGLOBIN A1c (test 6.8 %        4.2-5.6      H              AMERI

CAN DIABETES



             code = 72384)                                        ASSOCIATION 

IDELINES FOR



                                                                 HGB A1C:



                                                                 PREDIABETES/INC

REASED RISK .



                                                                 . . . . . . 5.7

-6.4%



                                                                 DIAGNOSIS OF DI

ABETES . . .



                                                                 . . . . . . >=6

.5% WITH



                                                                 CONFIRMATION OR

 APPROPRIATE



                                                                 SYMPTOMS NOTE: 

ASSAY MAY BE



                                                                 AFFECTED BY



                                                                 HEMOGLOBINOPATH

IES (SICKLE



                                                                 CELL ANEMIA, S-

C DISEASE,



                                                                 OTHERS) OR ZENA

FICIALLY



                                                                 LOWERED BY DECR

EASED RED



                                                                 CELL SURVIVAL (

HEMOLYTIC



                                                                 ANEMIAS, BLOOD 

LOSS, ETC.).



                                                                 CONSIDER ALTERN

ATE TESTING



                                                                 OR LABORATORY C

ONSULTATION.



                                                                 UNLESS OTHERWIS

E INDICATED,



                                                                 ALL TESTING PER

FORMED



                                                                 ATCLINICAL PATH

[facility]. [address] [place] [address]



                                                                 LABORATORY DIRE

CTOR: CLIFTON DARBY M.D.

 CLIA NUMBER



                                                                 [id number] CAP 

[place]CR[place]



                                                                 [place]. [id number]



CBC W/AUTO DIFF WITH PLATELETS2022-10-12 02:02:15





             Test Item    Value        Reference Range Interpretation Comments

 

             WBC (test code = 6.8 K/UL     3.5-11.0                  



             1001)                                               

 

             RBC (test code = 3.76 M/UL    3.80-5.40    L            



             1002)                                               

 

             HEMOGLOBIN (test code 10.4 G/DL    11.5-15.5    L            



             = 1003)                                             

 

             HEMATOCRIT (test code 32.8 %       34.0-45.0    L            



             = 1004)                                             

 

             MCV (test code = 87.2 fL      80.0-99.0                 



             1005)                                               

 

             MCH (test code = 27.7 PG      25.0-33.0                 



             1006)                                               

 

             MCHC (test code = 31.7 G/DL    31.0-36.0                 



             1007)                                               

 

             RDW (test code = 13.6 %       11.5-15.0                 



             1038)                                               

 

             NEUTROPHILS (test 70.2 %                                 



             code = 1008)                                        

 

             LYMPHOCYTES (test 17.6 %                                 



             code = 1010)                                        

 

             MONOCYTES (test code 7.2 %                                  



             = 1011)                                             

 

             EOSINOPHILS (test 4.3 %                                  



             code = 1012)                                        

 

             BASOPHILS (test code 0.4 %                                  



             = 1013)                                             

 

             IMMATURE GRANULOCYTES 0.3 %                                  



             (test code = 1036)                                        

 

             NUCLEATED RBCS (test 0.0 /100 WBC'S See_Comment                [Aut

omated



             code = 1065)                                        message] The sy

stem



                                                                 which generated



                                                                 this result



                                                                 transmitted



                                                                 reference range

:



                                                                 0.0. The refere

nce



                                                                 range was not u

sed



                                                                 to interpret th

is



                                                                 result as



                                                                 normal/abnormal

.

 

             PLATELET COUNT (test 104 K/UL     130-400      L            



             code = 1015)                                        

 

             ABSOLUTE NEUTROPHILS 4.74 K/UL    1.50-7.50                 



             (test code = 1066)                                        

 

             ABSOLUTE LYMPHOCYTES 1.19 K/UL    1.00-4.00                 



             (test code = 1067)                                        

 

             ABSOLUTE MONOCYTES 0.49 K/UL    0.20-1.00                 



             (test code = 1068)                                        

 

             ABSOLUTE EOSINOPHILS 0.29 K/UL    0.00-0.50                 



             (test code = 1040)                                        

 

             ABSOLUTE BASOPHILS 0.03 K/UL    0.00-0.20                 



             (test code = 1069)                                        

 

             ABS IMMATURE 0.02 K/UL    0.00-0.10                 



             GRANULOCYTES (test                                        



             code = 1020)                                        

 

             ABS NUCLEATED RBCS 0.00 K/UL    0.00-0.11                 



             (test code = 65097)                                        



COMPREHENSIVE METABOLIC PANEL2022-10-12 00:00:00





             Test Item    Value        Reference Range Interpretation Comments

 

             GLUCOSE (test code = 2217) 169 MG/DL                              

 

             BUN (test code = 2208) 17 MG/DL                               

 

             CREATININE (test code = 2214) 0.86 MG/DL                           

  

 

             eGFR ( CKD-EPI) (test code 76 ML/MIN/1.73                      

     



             = 25351)                                            

 

             CALC BUN/CREAT (test code = 20 RATIO                               



             2235)                                               

 

             SODIUM (test code = 2231) 141 MEQ/L                              

 

             POTASSIUM (test code = 2228) 4.3 MEQ/L                             

 

 

             CHLORIDE (test code = 2215) 99 MEQ/L                               

 

             CARBON DIOXIDE (test code = 25 MEQ/L                               



             )                                               

 

             CALCIUM (test code = 2209) 9.6 MG/DL                              

 

             PROTEIN, TOTAL (test code = 7.6 G/DL                               



             )                                               

 

             ALBUMIN (test code = 2201) 4.3 G/DL                               

 

             CALC GLOBULIN (test code = 3.3 G/DL                               



             )                                               

 

             CALC A/G RATIO (test code = 1.3 RATIO                              



             223)                                               

 

             BILIRUBIN, TOTAL (test code = 0.5 MG/DL                            

  



             )                                               

 

             ALKALINE PHOSPHATASE (test 92 U/L                                 



             code = 2204)                                        

 

             AST (test code = 2218) 35 U/L                                 

 

             ALT (test code = 2219) 34 U/L                                 



COMPREHENSIVE METABOLIC PANEL2022-10-12 00:00:00





             Test Item    Value        Reference Range Interpretation Comments

 

             GLUCOSE (test code = 2217) 169 MG/DL                              

 

             BUN (test code = 2208) 17 MG/DL                               

 

             CREATININE (test code = 2214) 0.86 MG/DL                           

  

 

             eGFR ( CKD-EPI) (test code 76 ML/MIN/1.73                      

     



             = 30390)                                            

 

             CALC BUN/CREAT (test code = 20 RATIO                               



             2235)                                               

 

             SODIUM (test code = 2231) 141 MEQ/L                              

 

             POTASSIUM (test code = 2228) 4.3 MEQ/L                             

 

 

             CHLORIDE (test code = 2215) 99 MEQ/L                               

 

             CARBON DIOXIDE (test code = 25 MEQ/L                               



             )                                               

 

             CALCIUM (test code = 2209) 9.6 MG/DL                              

 

             PROTEIN, TOTAL (test code = 7.6 G/DL                               



             )                                               

 

             ALBUMIN (test code = 2201) 4.3 G/DL                               

 

             CALC GLOBULIN (test code = 3.3 G/DL                               



             )                                               

 

             CALC A/G RATIO (test code = 1.3 RATIO                              



             )                                               

 

             BILIRUBIN, TOTAL (test code = 0.5 MG/DL                            

  



             )                                               

 

             ALKALINE PHOSPHATASE (test 92 U/L                                 



             code = 2204)                                        

 

             AST (test code = 2218) 35 U/L                                 

 

             ALT (test code = 2219) 34 U/L                                 



CBC W/AUTO DIFF2022-10-12 00:00:00





             Test Item    Value        Reference Range Interpretation Comments

 

             WBC (test code = 1001) 6.8 K/UL                               

 

             RBC (test code = 1002) 3.76 M/UL                              

 

             HEMOGLOBIN (test code = 1003) 10.4 G/DL                            

  

 

             HEMATOCRIT (test code = 1004) 32.8 %                               

  

 

             MCV (test code = 1005) 87.2 fL                                

 

             MCH (test code = 1006) 27.7 PG                                

 

             MCHC (test code = 1007) 31.7 G/DL                              

 

             RDW (test code = 1038) 13.6 %                                 

 

             NEUTROPHILS (test code = 1008) 70.2 %                              

   

 

             LYMPHOCYTES (test code = 1010) 17.6 %                              

   

 

             MONOCYTES (test code = 1011) 7.2 %                                 

 

 

             EOSINOPHILS (test code = 1012) 4.3 %                               

   

 

             BASOPHILS (test code = 1013) 0.4 %                                 

 

 

             IMMATURE GRANULOCYTES (test 0.3 %                                  



             code = 1036)                                        

 

             NUCLEATED RBCS (test code = 0.0 /100WBC'S                          

 



             1065)                                               

 

             PLATELET COUNT (test code = 104 K/UL                               



             1015)                                               

 

             ABSOLUTE NEUTROPHILS (test code 4.74 K/UL                          

    



             = 1066)                                             

 

             ABSOLUTE LYMPHOCYTES (test code 1.19 K/UL                          

    



             = 1067)                                             

 

             ABSOLUTE MONOCYTES (test code = 0.49 K/UL                          

    



             1068)                                               

 

             ABSOLUTE EOSINOPHILS (test code 0.29 K/UL                          

    



             = 1040)                                             

 

             ABSOLUTE BASOPHILS (test code = 0.03 K/UL                          

    



             1069)                                               

 

             ABS IMMATURE GRANULOCYTES (test 0.02 K/UL                          

    



             code = 1020)                                        

 

             ABS NUCLEATED RBCS (test code = 0.00 K/UL                          

    



             62053)                                              



CBC W/AUTO DIFF2022-10-12 00:00:00





             Test Item    Value        Reference Range Interpretation Comments

 

             WBC (test code = 1001) 6.8 K/UL                               

 

             RBC (test code = 1002) 3.76 M/UL                              

 

             HEMOGLOBIN (test code = 1003) 10.4 G/DL                            

  

 

             HEMATOCRIT (test code = 1004) 32.8 %                               

  

 

             MCV (test code = 1005) 87.2 fL                                

 

             MCH (test code = 1006) 27.7 PG                                

 

             MCHC (test code = 1007) 31.7 G/DL                              

 

             RDW (test code = 1038) 13.6 %                                 

 

             NEUTROPHILS (test code = 1008) 70.2 %                              

   

 

             LYMPHOCYTES (test code = 1010) 17.6 %                              

   

 

             MONOCYTES (test code = 1011) 7.2 %                                 

 

 

             EOSINOPHILS (test code = 1012) 4.3 %                               

   

 

             BASOPHILS (test code = 1013) 0.4 %                                 

 

 

             IMMATURE GRANULOCYTES (test 0.3 %                                  



             code = 1036)                                        

 

             NUCLEATED RBCS (test code = 0.0 /100WBC'S                          

 



             1065)                                               

 

             PLATELET COUNT (test code = 104 K/UL                               



             1015)                                               

 

             ABSOLUTE NEUTROPHILS (test code 4.74 K/UL                          

    



             = 1066)                                             

 

             ABSOLUTE LYMPHOCYTES (test code 1.19 K/UL                          

    



             = 1067)                                             

 

             ABSOLUTE MONOCYTES (test code = 0.49 K/UL                          

    



             1068)                                               

 

             ABSOLUTE EOSINOPHILS (test code 0.29 K/UL                          

    



             = 1040)                                             

 

             ABSOLUTE BASOPHILS (test code = 0.03 K/UL                          

    



             1069)                                               

 

             ABS IMMATURE GRANULOCYTES (test 0.02 K/UL                          

    



             code = 1020)                                        

 

             ABS NUCLEATED RBCS (test code = 0.00 K/UL                          

    



             85788)                                              



CBC W/AUTO DIFF2022-10-12 00:00:00





             Test Item    Value        Reference Range Interpretation Comments

 

             WBC (test code = 1001) 6.8 K/UL                               

 

             RBC (test code = 1002) 3.76 M/UL                              

 

             HEMOGLOBIN (test code = 1003) 10.4 G/DL                            

  

 

             HEMATOCRIT (test code = 1004) 32.8 %                               

  

 

             MCV (test code = 1005) 87.2 fL                                

 

             MCH (test code = 1006) 27.7 PG                                

 

             MCHC (test code = 1007) 31.7 G/DL                              

 

             RDW (test code = 1038) 13.6 %                                 

 

             NEUTROPHILS (test code = 1008) 70.2 %                              

   

 

             LYMPHOCYTES (test code = 1010) 17.6 %                              

   

 

             MONOCYTES (test code = 1011) 7.2 %                                 

 

 

             EOSINOPHILS (test code = 1012) 4.3 %                               

   

 

             BASOPHILS (test code = 1013) 0.4 %                                 

 

 

             IMMATURE GRANULOCYTES (test 0.3 %                                  



             code = 1036)                                        

 

             NUCLEATED RBCS (test code = 0.0 /100WBC'S                          

 



             1065)                                               

 

             PLATELET COUNT (test code = 104 K/UL                               



             1015)                                               

 

             ABSOLUTE NEUTROPHILS (test code 4.74 K/UL                          

    



             = 1066)                                             

 

             ABSOLUTE LYMPHOCYTES (test code 1.19 K/UL                          

    



             = 1067)                                             

 

             ABSOLUTE MONOCYTES (test code = 0.49 K/UL                          

    



             1068)                                               

 

             ABSOLUTE EOSINOPHILS (test code 0.29 K/UL                          

    



             = 1040)                                             

 

             ABSOLUTE BASOPHILS (test code = 0.03 K/UL                          

    



             1069)                                               

 

             ABS IMMATURE GRANULOCYTES (test 0.02 K/UL                          

    



             code = 1020)                                        

 

             ABS NUCLEATED RBCS (test code = 0.00 K/UL                          

    



             49814)                                              



TSH, THIRD [id number] 00:00:00





             Test Item    Value        Reference Range Interpretation Comments

 

             TSH, THIRD GENERATION (test code 2.700 UIU/ML                      

     



             = 2821)                                             



TSH, THIRD [id number] 00:00:00





             Test Item    Value        Reference Range Interpretation Comments

 

             TSH, THIRD GENERATION (test code 2.700 UIU/ML                      

     



             = 2821)                                             



TSH, THIRD [id number] 00:00:00





             Test Item    Value        Reference Range Interpretation Comments

 

             TSH, THIRD GENERATION (test code 2.700 UIU/ML                      

     



             = 2821)                                             



HEMOGLOBIN A1c2022-10-12 00:00:00





             Test Item    Value        Reference Range Interpretation Comments

 

             HEMOGLOBIN A1c (test code = 36442) 6.8 %                           

       



HEMOGLOBIN A1c2022-10-12 00:00:00





             Test Item    Value        Reference Range Interpretation Comments

 

             HEMOGLOBIN A1c (test code = 28806) 6.8 %                           

       



HEMOGLOBIN A1c2022-10-12 00:00:00





             Test Item    Value        Reference Range Interpretation Comments

 

             HEMOGLOBIN A1c (test code = 40487) 6.8 %                           

       



COMPREHENSIVE METABOLIC PANEL2022-10-12 00:00:00





             Test Item    Value        Reference Range Interpretation Comments

 

             GLUCOSE (test code = 2217) 169 MG/DL                              

 

             BUN (test code = 2208) 17 MG/DL                               

 

             CREATININE (test code = 2214) 0.86 MG/DL                           

  

 

             eGFR ( CKD-EPI) (test code 76 ML/MIN/1.73                      

     



             = 12315)                                            

 

             CALC BUN/CREAT (test code = 20 RATIO                               



             2235)                                               

 

             SODIUM (test code = 2231) 141 MEQ/L                              

 

             POTASSIUM (test code = 2228) 4.3 MEQ/L                             

 

 

             CHLORIDE (test code = 2215) 99 MEQ/L                               

 

             CARBON DIOXIDE (test code = 25 MEQ/L                               



             )                                               

 

             CALCIUM (test code = 2209) 9.6 MG/DL                              

 

             PROTEIN, TOTAL (test code = 7.6 G/DL                               



             )                                               

 

             ALBUMIN (test code = 2201) 4.3 G/DL                               

 

             CALC GLOBULIN (test code = 3.3 G/DL                               



             2240)                                               

 

             CALC A/G RATIO (test code = 1.3 RATIO                              



             )                                               

 

             BILIRUBIN, TOTAL (test code = 0.5 MG/DL                            

  



             )                                               

 

             ALKALINE PHOSPHATASE (test 92 U/L                                 



             code = 2204)                                        

 

             AST (test code = 2218) 35 U/L                                 

 

             ALT (test code = 2219) 34 U/L                                 



COMPREHENSIVE METABOLIC PANEL2022-10-12 00:00:00





             Test Item    Value        Reference Range Interpretation Comments

 

             GLUCOSE (test code = 2217) 169 MG/DL                              

 

             BUN (test code = 2208) 17 MG/DL                               

 

             CREATININE (test code = 2214) 0.86 MG/DL                           

  

 

             eGFR ( CKD-EPI) (test code 76 ML/MIN/1.73                      

     



             = 98740)                                            

 

             CALC BUN/CREAT (test code = 20 RATIO                               



             2235)                                               

 

             SODIUM (test code = 2231) 141 MEQ/L                              

 

             POTASSIUM (test code = 2228) 4.3 MEQ/L                             

 

 

             CHLORIDE (test code = 2215) 99 MEQ/L                               

 

             CARBON DIOXIDE (test code = 25 MEQ/L                               



             )                                               

 

             CALCIUM (test code = 2209) 9.6 MG/DL                              

 

             PROTEIN, TOTAL (test code = 7.6 G/DL                               



             )                                               

 

             ALBUMIN (test code = 2201) 4.3 G/DL                               

 

             CALC GLOBULIN (test code = 3.3 G/DL                               



             2240)                                               

 

             CALC A/G RATIO (test code = 1.3 RATIO                              



             2234)                                               

 

             BILIRUBIN, TOTAL (test code = 0.5 MG/DL                            

  



             2207)                                               

 

             ALKALINE PHOSPHATASE (test 92 U/L                                 



             code = 2204)                                        

 

             AST (test code = 2218) 35 U/L                                 

 

             ALT (test code = 2219) 34 U/L                                 



CBC W/AUTO DIFF2022-10-12 00:00:00





             Test Item    Value        Reference Range Interpretation Comments

 

             WBC (test code = 1001) 6.8 K/UL                               

 

             RBC (test code = 1002) 3.76 M/UL                              

 

             HEMOGLOBIN (test code = 1003) 10.4 G/DL                            

  

 

             HEMATOCRIT (test code = 1004) 32.8 %                               

  

 

             MCV (test code = 1005) 87.2 fL                                

 

             MCH (test code = 1006) 27.7 PG                                

 

             MCHC (test code = 1007) 31.7 G/DL                              

 

             RDW (test code = 1038) 13.6 %                                 

 

             NEUTROPHILS (test code = 1008) 70.2 %                              

   

 

             LYMPHOCYTES (test code = 1010) 17.6 %                              

   

 

             MONOCYTES (test code = 1011) 7.2 %                                 

 

 

             EOSINOPHILS (test code = 1012) 4.3 %                               

   

 

             BASOPHILS (test code = 1013) 0.4 %                                 

 

 

             IMMATURE GRANULOCYTES (test 0.3 %                                  



             code = 1036)                                        

 

             NUCLEATED RBCS (test code = 0.0 /100WBC'S                          

 



             1065)                                               

 

             PLATELET COUNT (test code = 104 K/UL                               



             1015)                                               

 

             ABSOLUTE NEUTROPHILS (test code 4.74 K/UL                          

    



             = 1066)                                             

 

             ABSOLUTE LYMPHOCYTES (test code 1.19 K/UL                          

    



             = 1067)                                             

 

             ABSOLUTE MONOCYTES (test code = 0.49 K/UL                          

    



             1068)                                               

 

             ABSOLUTE EOSINOPHILS (test code 0.29 K/UL                          

    



             = 1040)                                             

 

             ABSOLUTE BASOPHILS (test code = 0.03 K/UL                          

    



             1069)                                               

 

             ABS IMMATURE GRANULOCYTES (test 0.02 K/UL                          

    



             code = 1020)                                        

 

             ABS NUCLEATED RBCS (test code = 0.00 K/UL                          

    



             81507)                                              



CBC W/AUTO DIFF2022-10-12 00:00:00





             Test Item    Value        Reference Range Interpretation Comments

 

             WBC (test code = 1001) 6.8 K/UL                               

 

             RBC (test code = 1002) 3.76 M/UL                              

 

             HEMOGLOBIN (test code = 1003) 10.4 G/DL                            

  

 

             HEMATOCRIT (test code = 1004) 32.8 %                               

  

 

             MCV (test code = 1005) 87.2 fL                                

 

             MCH (test code = 1006) 27.7 PG                                

 

             MCHC (test code = 1007) 31.7 G/DL                              

 

             RDW (test code = 1038) 13.6 %                                 

 

             NEUTROPHILS (test code = 1008) 70.2 %                              

   

 

             LYMPHOCYTES (test code = 1010) 17.6 %                              

   

 

             MONOCYTES (test code = 1011) 7.2 %                                 

 

 

             EOSINOPHILS (test code = 1012) 4.3 %                               

   

 

             BASOPHILS (test code = 1013) 0.4 %                                 

 

 

             IMMATURE GRANULOCYTES (test 0.3 %                                  



             code = 1036)                                        

 

             NUCLEATED RBCS (test code = 0.0 /100WBC'S                          

 



             1065)                                               

 

             PLATELET COUNT (test code = 104 K/UL                               



             1015)                                               

 

             ABSOLUTE NEUTROPHILS (test code 4.74 K/UL                          

    



             = 1066)                                             

 

             ABSOLUTE LYMPHOCYTES (test code 1.19 K/UL                          

    



             = 1067)                                             

 

             ABSOLUTE MONOCYTES (test code = 0.49 K/UL                          

    



             1068)                                               

 

             ABSOLUTE EOSINOPHILS (test code 0.29 K/UL                          

    



             = 1040)                                             

 

             ABSOLUTE BASOPHILS (test code = 0.03 K/UL                          

    



             1069)                                               

 

             ABS IMMATURE GRANULOCYTES (test 0.02 K/UL                          

    



             code = 1020)                                        

 

             ABS NUCLEATED RBCS (test code = 0.00 K/UL                          

    



             93528)                                              



CBC W/AUTO DIFF2022-10-12 00:00:00





             Test Item    Value        Reference Range Interpretation Comments

 

             WBC (test code = 1001) 6.8 K/UL                               

 

             RBC (test code = 1002) 3.76 M/UL                              

 

             HEMOGLOBIN (test code = 1003) 10.4 G/DL                            

  

 

             HEMATOCRIT (test code = 1004) 32.8 %                               

  

 

             MCV (test code = 1005) 87.2 fL                                

 

             MCH (test code = 1006) 27.7 PG                                

 

             MCHC (test code = 1007) 31.7 G/DL                              

 

             RDW (test code = 1038) 13.6 %                                 

 

             NEUTROPHILS (test code = 1008) 70.2 %                              

   

 

             LYMPHOCYTES (test code = 1010) 17.6 %                              

   

 

             MONOCYTES (test code = 1011) 7.2 %                                 

 

 

             EOSINOPHILS (test code = 1012) 4.3 %                               

   

 

             BASOPHILS (test code = 1013) 0.4 %                                 

 

 

             IMMATURE GRANULOCYTES (test 0.3 %                                  



             code = 1036)                                        

 

             NUCLEATED RBCS (test code = 0.0 /100WBC'S                          

 



             1065)                                               

 

             PLATELET COUNT (test code = 104 K/UL                               



             1015)                                               

 

             ABSOLUTE NEUTROPHILS (test code 4.74 K/UL                          

    



             = 1066)                                             

 

             ABSOLUTE LYMPHOCYTES (test code 1.19 K/UL                          

    



             = 1067)                                             

 

             ABSOLUTE MONOCYTES (test code = 0.49 K/UL                          

    



             1068)                                               

 

             ABSOLUTE EOSINOPHILS (test code 0.29 K/UL                          

    



             = 1040)                                             

 

             ABSOLUTE BASOPHILS (test code = 0.03 K/UL                          

    



             1069)                                               

 

             ABS IMMATURE GRANULOCYTES (test 0.02 K/UL                          

    



             code = 1020)                                        

 

             ABS NUCLEATED RBCS (test code = 0.00 K/UL                          

    



             70200)                                              



TSH, THIRD [id number] 00:00:00





             Test Item    Value        Reference Range Interpretation Comments

 

             TSH, THIRD GENERATION (test code 2.700 UIU/ML                      

     



             = 2821)                                             



TSH, THIRD [id number] 00:00:00





             Test Item    Value        Reference Range Interpretation Comments

 

             TSH, THIRD GENERATION (test code 2.700 UIU/ML                      

     



             = 2821)                                             



TSH, THIRD [id number] 00:00:00





             Test Item    Value        Reference Range Interpretation Comments

 

             TSH, THIRD GENERATION (test code 2.700 UIU/ML                      

     



             = 2821)                                             



HEMOGLOBIN A1c2022-10-12 00:00:00





             Test Item    Value        Reference Range Interpretation Comments

 

             HEMOGLOBIN A1c (test code = 40042) 6.8 %                           

       



HEMOGLOBIN A1c2022-10-12 00:00:00





             Test Item    Value        Reference Range Interpretation Comments

 

             HEMOGLOBIN A1c (test code = 28605) 6.8 %                           

       



HEMOGLOBIN A1c2022-10-12 00:00:00





             Test Item    Value        Reference Range Interpretation Comments

 

             HEMOGLOBIN A1c (test code = 22636) 6.8 %                           

       



COMPREHENSIVE METABOLIC PANEL2022-10-12 00:00:00





             Test Item    Value        Reference Range Interpretation Comments

 

             GLUCOSE (test code = 2217) 169 MG/DL                              

 

             BUN (test code = 2208) 17 MG/DL                               

 

             CREATININE (test code = 2214) 0.86 MG/DL                           

  

 

             eGFR ( CKD-EPI) (test code 76 ML/MIN/1.73                      

     



             = 47264)                                            

 

             CALC BUN/CREAT (test code = 20 RATIO                               



             2235)                                               

 

             SODIUM (test code = 2231) 141 MEQ/L                              

 

             POTASSIUM (test code = 2228) 4.3 MEQ/L                             

 

 

             CHLORIDE (test code = 2215) 99 MEQ/L                               

 

             CARBON DIOXIDE (test code = 25 MEQ/L                               



             )                                               

 

             CALCIUM (test code = 2209) 9.6 MG/DL                              

 

             PROTEIN, TOTAL (test code = 7.6 G/DL                               



             )                                               

 

             ALBUMIN (test code = 2201) 4.3 G/DL                               

 

             CALC GLOBULIN (test code = 3.3 G/DL                               



             2240)                                               

 

             CALC A/G RATIO (test code = 1.3 RATIO                              



             2234)                                               

 

             BILIRUBIN, TOTAL (test code = 0.5 MG/DL                            

  



             )                                               

 

             ALKALINE PHOSPHATASE (test 92 U/L                                 



             code = 2204)                                        

 

             AST (test code = 2218) 35 U/L                                 

 

             ALT (test code = 2219) 34 U/L                                 



COMPREHENSIVE METABOLIC PANEL2022-10-12 00:00:00





             Test Item    Value        Reference Range Interpretation Comments

 

             GLUCOSE (test code = 2217) 169 MG/DL                              

 

             BUN (test code = 2208) 17 MG/DL                               

 

             CREATININE (test code = 2214) 0.86 MG/DL                           

  

 

             eGFR ( CKD-EPI) (test code 76 ML/MIN/1.73                      

     



             = 73663)                                            

 

             CALC BUN/CREAT (test code = 20 RATIO                               



             2235)                                               

 

             SODIUM (test code = 2231) 141 MEQ/L                              

 

             POTASSIUM (test code = 2228) 4.3 MEQ/L                             

 

 

             CHLORIDE (test code = 2215) 99 MEQ/L                               

 

             CARBON DIOXIDE (test code = 25 MEQ/L                               



             )                                               

 

             CALCIUM (test code = 2209) 9.6 MG/DL                              

 

             PROTEIN, TOTAL (test code = 7.6 G/DL                               



             )                                               

 

             ALBUMIN (test code = 2201) 4.3 G/DL                               

 

             CALC GLOBULIN (test code = 3.3 G/DL                               



             2240)                                               

 

             CALC A/G RATIO (test code = 1.3 RATIO                              



             2234)                                               

 

             BILIRUBIN, TOTAL (test code = 0.5 MG/DL                            

  



             )                                               

 

             ALKALINE PHOSPHATASE (test 92 U/L                                 



             code = 2204)                                        

 

             AST (test code = 2218) 35 U/L                                 

 

             ALT (test code = 2219) 34 U/L                                 



CBC W/AUTO DIFF2022-10-12 00:00:00





             Test Item    Value        Reference Range Interpretation Comments

 

             WBC (test code = 1001) 6.8 K/UL                               

 

             RBC (test code = 1002) 3.76 M/UL                              

 

             HEMOGLOBIN (test code = 1003) 10.4 G/DL                            

  

 

             HEMATOCRIT (test code = 1004) 32.8 %                               

  

 

             MCV (test code = 1005) 87.2 fL                                

 

             MCH (test code = 1006) 27.7 PG                                

 

             MCHC (test code = 1007) 31.7 G/DL                              

 

             RDW (test code = 1038) 13.6 %                                 

 

             NEUTROPHILS (test code = 1008) 70.2 %                              

   

 

             LYMPHOCYTES (test code = 1010) 17.6 %                              

   

 

             MONOCYTES (test code = 1011) 7.2 %                                 

 

 

             EOSINOPHILS (test code = 1012) 4.3 %                               

   

 

             BASOPHILS (test code = 1013) 0.4 %                                 

 

 

             IMMATURE GRANULOCYTES (test 0.3 %                                  



             code = 1036)                                        

 

             NUCLEATED RBCS (test code = 0.0 /100WBC'S                          

 



             1065)                                               

 

             PLATELET COUNT (test code = 104 K/UL                               



             1015)                                               

 

             ABSOLUTE NEUTROPHILS (test code 4.74 K/UL                          

    



             = 1066)                                             

 

             ABSOLUTE LYMPHOCYTES (test code 1.19 K/UL                          

    



             = 1067)                                             

 

             ABSOLUTE MONOCYTES (test code = 0.49 K/UL                          

    



             1068)                                               

 

             ABSOLUTE EOSINOPHILS (test code 0.29 K/UL                          

    



             = 1040)                                             

 

             ABSOLUTE BASOPHILS (test code = 0.03 K/UL                          

    



             1069)                                               

 

             ABS IMMATURE GRANULOCYTES (test 0.02 K/UL                          

    



             code = 1020)                                        

 

             ABS NUCLEATED RBCS (test code = 0.00 K/UL                          

    



             18389)                                              



CBC W/AUTO DIFF2022-10-12 00:00:00





             Test Item    Value        Reference Range Interpretation Comments

 

             WBC (test code = 1001) 6.8 K/UL                               

 

             RBC (test code = 1002) 3.76 M/UL                              

 

             HEMOGLOBIN (test code = 1003) 10.4 G/DL                            

  

 

             HEMATOCRIT (test code = 1004) 32.8 %                               

  

 

             MCV (test code = 1005) 87.2 fL                                

 

             MCH (test code = 1006) 27.7 PG                                

 

             MCHC (test code = 1007) 31.7 G/DL                              

 

             RDW (test code = 1038) 13.6 %                                 

 

             NEUTROPHILS (test code = 1008) 70.2 %                              

   

 

             LYMPHOCYTES (test code = 1010) 17.6 %                              

   

 

             MONOCYTES (test code = 1011) 7.2 %                                 

 

 

             EOSINOPHILS (test code = 1012) 4.3 %                               

   

 

             BASOPHILS (test code = 1013) 0.4 %                                 

 

 

             IMMATURE GRANULOCYTES (test 0.3 %                                  



             code = 1036)                                        

 

             NUCLEATED RBCS (test code = 0.0 /100WBC'S                          

 



             1065)                                               

 

             PLATELET COUNT (test code = 104 K/UL                               



             1015)                                               

 

             ABSOLUTE NEUTROPHILS (test code 4.74 K/UL                          

    



             = 1066)                                             

 

             ABSOLUTE LYMPHOCYTES (test code 1.19 K/UL                          

    



             = 1067)                                             

 

             ABSOLUTE MONOCYTES (test code = 0.49 K/UL                          

    



             1068)                                               

 

             ABSOLUTE EOSINOPHILS (test code 0.29 K/UL                          

    



             = 1040)                                             

 

             ABSOLUTE BASOPHILS (test code = 0.03 K/UL                          

    



             1069)                                               

 

             ABS IMMATURE GRANULOCYTES (test 0.02 K/UL                          

    



             code = 1020)                                        

 

             ABS NUCLEATED RBCS (test code = 0.00 K/UL                          

    



             23922)                                              



CBC W/AUTO DIFF2022-10-12 00:00:00





             Test Item    Value        Reference Range Interpretation Comments

 

             WBC (test code = 1001) 6.8 K/UL                               

 

             RBC (test code = 1002) 3.76 M/UL                              

 

             HEMOGLOBIN (test code = 1003) 10.4 G/DL                            

  

 

             HEMATOCRIT (test code = 1004) 32.8 %                               

  

 

             MCV (test code = 1005) 87.2 fL                                

 

             MCH (test code = 1006) 27.7 PG                                

 

             MCHC (test code = 1007) 31.7 G/DL                              

 

             RDW (test code = 1038) 13.6 %                                 

 

             NEUTROPHILS (test code = 1008) 70.2 %                              

   

 

             LYMPHOCYTES (test code = 1010) 17.6 %                              

   

 

             MONOCYTES (test code = 1011) 7.2 %                                 

 

 

             EOSINOPHILS (test code = 1012) 4.3 %                               

   

 

             BASOPHILS (test code = 1013) 0.4 %                                 

 

 

             IMMATURE GRANULOCYTES (test 0.3 %                                  



             code = 1036)                                        

 

             NUCLEATED RBCS (test code = 0.0 /100WBC'S                          

 



             1065)                                               

 

             PLATELET COUNT (test code = 104 K/UL                               



             1015)                                               

 

             ABSOLUTE NEUTROPHILS (test code 4.74 K/UL                          

    



             = 1066)                                             

 

             ABSOLUTE LYMPHOCYTES (test code 1.19 K/UL                          

    



             = 1067)                                             

 

             ABSOLUTE MONOCYTES (test code = 0.49 K/UL                          

    



             1068)                                               

 

             ABSOLUTE EOSINOPHILS (test code 0.29 K/UL                          

    



             = 1040)                                             

 

             ABSOLUTE BASOPHILS (test code = 0.03 K/UL                          

    



             1069)                                               

 

             ABS IMMATURE GRANULOCYTES (test 0.02 K/UL                          

    



             code = 1020)                                        

 

             ABS NUCLEATED RBCS (test code = 0.00 K/UL                          

    



             05332)                                              



TSH, THIRD [id number] 00:00:00





             Test Item    Value        Reference Range Interpretation Comments

 

             TSH, THIRD GENERATION (test code 2.700 UIU/ML                      

     



             = 2821)                                             



TSH, THIRD [id number] 00:00:00





             Test Item    Value        Reference Range Interpretation Comments

 

             TSH, THIRD GENERATION (test code 2.700 UIU/ML                      

     



             = 2821)                                             



TSH, THIRD [id number] 00:00:00





             Test Item    Value        Reference Range Interpretation Comments

 

             TSH, THIRD GENERATION (test code 2.700 UIU/ML                      

     



             = 2821)                                             



HEMOGLOBIN [id number] 00:00:00





             Test Item    Value        Reference Range Interpretation Comments

 

             HEMOGLOBIN A1c (test code = 13589) 6.8 %                           

       



HEMOGLOBIN [id number] 00:00:00





             Test Item    Value        Reference Range Interpretation Comments

 

             HEMOGLOBIN A1c (test code = 65558) 6.8 %                           

       



HEMOGLOBIN [id number] 00:00:00





             Test Item    Value        Reference Range Interpretation Comments

 

             HEMOGLOBIN A1c (test code = 87506) 6.8 %                           

       



COMPREHENSIVE METABOLIC PANEL2022-10-12 00:00:00





             Test Item    Value        Reference Range Interpretation Comments

 

             GLUCOSE (test code = 2217) 169 MG/DL                              

 

             BUN (test code = 2208) 17 MG/DL                               

 

             CREATININE (test code = 2214) 0.86 MG/DL                           

  

 

             eGFR ( CKD-EPI) (test code 76 ML/MIN/1.73                      

     



             = 26709)                                            

 

             CALC BUN/CREAT (test code = 20 RATIO                               



             2235)                                               

 

             SODIUM (test code = 2231) 141 MEQ/L                              

 

             POTASSIUM (test code = 2228) 4.3 MEQ/L                             

 

 

             CHLORIDE (test code = 2215) 99 MEQ/L                               

 

             CARBON DIOXIDE (test code = 25 MEQ/L                               



             )                                               

 

             CALCIUM (test code = 2209) 9.6 MG/DL                              

 

             PROTEIN, TOTAL (test code = 7.6 G/DL                               



             )                                               

 

             ALBUMIN (test code = 2201) 4.3 G/DL                               

 

             CALC GLOBULIN (test code = 3.3 G/DL                               



             0)                                               

 

             CALC A/G RATIO (test code = 1.3 RATIO                              



             4)                                               

 

             BILIRUBIN, TOTAL (test code = 0.5 MG/DL                            

  



             )                                               

 

             ALKALINE PHOSPHATASE (test 92 U/L                                 



             code = 2204)                                        

 

             AST (test code = 2218) 35 U/L                                 

 

             ALT (test code = 2219) 34 U/L                                 



COMPREHENSIVE METABOLIC PANEL2022-10-12 00:00:00





             Test Item    Value        Reference Range Interpretation Comments

 

             GLUCOSE (test code = 2217) 169 MG/DL                              

 

             BUN (test code = 2208) 17 MG/DL                               

 

             CREATININE (test code = 2214) 0.86 MG/DL                           

  

 

             eGFR ( CKD-EPI) (test code 76 ML/MIN/1.73                      

     



             = 45037)                                            

 

             CALC BUN/CREAT (test code = 20 RATIO                               



             2235)                                               

 

             SODIUM (test code = 2231) 141 MEQ/L                              

 

             POTASSIUM (test code = 2228) 4.3 MEQ/L                             

 

 

             CHLORIDE (test code = 2215) 99 MEQ/L                               

 

             CARBON DIOXIDE (test code = 25 MEQ/L                               



             2206)                                               

 

             CALCIUM (test code = 2209) 9.6 MG/DL                              

 

             PROTEIN, TOTAL (test code = 7.6 G/DL                               



             2229)                                               

 

             ALBUMIN (test code = 2201) 4.3 G/DL                               

 

             CALC GLOBULIN (test code = 3.3 G/DL                               



             2240)                                               

 

             CALC A/G RATIO (test code = 1.3 RATIO                              



             2234)                                               

 

             BILIRUBIN, TOTAL (test code = 0.5 MG/DL                            

  



             2207)                                               

 

             ALKALINE PHOSPHATASE (test 92 U/L                                 



             code = 2204)                                        

 

             AST (test code = 2218) 35 U/L                                 

 

             ALT (test code = 2219) 34 U/L                                 



CBC W/AUTO DIFF2022-10-12 00:00:00





             Test Item    Value        Reference Range Interpretation Comments

 

             WBC (test code = 1001) 6.8 K/UL                               

 

             RBC (test code = 1002) 3.76 M/UL                              

 

             HEMOGLOBIN (test code = 1003) 10.4 G/DL                            

  

 

             HEMATOCRIT (test code = 1004) 32.8 %                               

  

 

             MCV (test code = 1005) 87.2 fL                                

 

             MCH (test code = 1006) 27.7 PG                                

 

             MCHC (test code = 1007) 31.7 G/DL                              

 

             RDW (test code = 1038) 13.6 %                                 

 

             NEUTROPHILS (test code = 1008) 70.2 %                              

   

 

             LYMPHOCYTES (test code = 1010) 17.6 %                              

   

 

             MONOCYTES (test code = 1011) 7.2 %                                 

 

 

             EOSINOPHILS (test code = 1012) 4.3 %                               

   

 

             BASOPHILS (test code = 1013) 0.4 %                                 

 

 

             IMMATURE GRANULOCYTES (test 0.3 %                                  



             code = 1036)                                        

 

             NUCLEATED RBCS (test code = 0.0 /100WBC'S                          

 



             1065)                                               

 

             PLATELET COUNT (test code = 104 K/UL                               



             1015)                                               

 

             ABSOLUTE NEUTROPHILS (test code 4.74 K/UL                          

    



             = 1066)                                             

 

             ABSOLUTE LYMPHOCYTES (test code 1.19 K/UL                          

    



             = 1067)                                             

 

             ABSOLUTE MONOCYTES (test code = 0.49 K/UL                          

    



             1068)                                               

 

             ABSOLUTE EOSINOPHILS (test code 0.29 K/UL                          

    



             = 1040)                                             

 

             ABSOLUTE BASOPHILS (test code = 0.03 K/UL                          

    



             1069)                                               

 

             ABS IMMATURE GRANULOCYTES (test 0.02 K/UL                          

    



             code = 1020)                                        

 

             ABS NUCLEATED RBCS (test code = 0.00 K/UL                          

    



             53477)                                              



CBC W/AUTO [id number] 00:00:00





             Test Item    Value        Reference Range Interpretation Comments

 

             WBC (test code = 1001) 6.8 K/UL                               

 

             RBC (test code = 1002) 3.76 M/UL                              

 

             HEMOGLOBIN (test code = 1003) 10.4 G/DL                            

  

 

             HEMATOCRIT (test code = 1004) 32.8 %                               

  

 

             MCV (test code = 1005) 87.2 fL                                

 

             MCH (test code = 1006) 27.7 PG                                

 

             MCHC (test code = 1007) 31.7 G/DL                              

 

             RDW (test code = 1038) 13.6 %                                 

 

             NEUTROPHILS (test code = 1008) 70.2 %                              

   

 

             LYMPHOCYTES (test code = 1010) 17.6 %                              

   

 

             MONOCYTES (test code = 1011) 7.2 %                                 

 

 

             EOSINOPHILS (test code = 1012) 4.3 %                               

   

 

             BASOPHILS (test code = 1013) 0.4 %                                 

 

 

             IMMATURE GRANULOCYTES (test 0.3 %                                  



             code = 1036)                                        

 

             NUCLEATED RBCS (test code = 0.0 /100WBC'S                          

 



             1065)                                               

 

             PLATELET COUNT (test code = 104 K/UL                               



             1015)                                               

 

             ABSOLUTE NEUTROPHILS (test code 4.74 K/UL                          

    



             = 1066)                                             

 

             ABSOLUTE LYMPHOCYTES (test code 1.19 K/UL                          

    



             = 1067)                                             

 

             ABSOLUTE MONOCYTES (test code = 0.49 K/UL                          

    



             1068)                                               

 

             ABSOLUTE EOSINOPHILS (test code 0.29 K/UL                          

    



             = 1040)                                             

 

             ABSOLUTE BASOPHILS (test code = 0.03 K/UL                          

    



             1069)                                               

 

             ABS IMMATURE GRANULOCYTES (test 0.02 K/UL                          

    



             code = 1020)                                        

 

             ABS NUCLEATED RBCS (test code = 0.00 K/UL                          

    



             50326)                                              



CBC W/AUTO [id number] 00:00:00





             Test Item    Value        Reference Range Interpretation Comments

 

             WBC (test code = 1001) 6.8 K/UL                               

 

             RBC (test code = 1002) 3.76 M/UL                              

 

             HEMOGLOBIN (test code = 1003) 10.4 G/DL                            

  

 

             HEMATOCRIT (test code = 1004) 32.8 %                               

  

 

             MCV (test code = 1005) 87.2 fL                                

 

             MCH (test code = 1006) 27.7 PG                                

 

             MCHC (test code = 1007) 31.7 G/DL                              

 

             RDW (test code = 1038) 13.6 %                                 

 

             NEUTROPHILS (test code = 1008) 70.2 %                              

   

 

             LYMPHOCYTES (test code = 1010) 17.6 %                              

   

 

             MONOCYTES (test code = 1011) 7.2 %                                 

 

 

             EOSINOPHILS (test code = 1012) 4.3 %                               

   

 

             BASOPHILS (test code = 1013) 0.4 %                                 

 

 

             IMMATURE GRANULOCYTES (test 0.3 %                                  



             code = 1036)                                        

 

             NUCLEATED RBCS (test code = 0.0 /100WBC'S                          

 



             1065)                                               

 

             PLATELET COUNT (test code = 104 K/UL                               



             1015)                                               

 

             ABSOLUTE NEUTROPHILS (test code 4.74 K/UL                          

    



             = 1066)                                             

 

             ABSOLUTE LYMPHOCYTES (test code 1.19 K/UL                          

    



             = 1067)                                             

 

             ABSOLUTE MONOCYTES (test code = 0.49 K/UL                          

    



             1068)                                               

 

             ABSOLUTE EOSINOPHILS (test code 0.29 K/UL                          

    



             = 1040)                                             

 

             ABSOLUTE BASOPHILS (test code = 0.03 K/UL                          

    



             1069)                                               

 

             ABS IMMATURE GRANULOCYTES (test 0.02 K/UL                          

    



             code = 1020)                                        

 

             ABS NUCLEATED RBCS (test code = 0.00 K/UL                          

    



             37075)                                              



TSH, THIRD [id number] 00:00:00





             Test Item    Value        Reference Range Interpretation Comments

 

             TSH, THIRD GENERATION (test code 2.700 UIU/ML                      

     



             = 2821)                                             



TSH, THIRD [id number] 00:00:00





             Test Item    Value        Reference Range Interpretation Comments

 

             TSH, THIRD GENERATION (test code 2.700 UIU/ML                      

     



             = 2821)                                             



TSH, THIRD [id number] 00:00:00





             Test Item    Value        Reference Range Interpretation Comments

 

             TSH, THIRD GENERATION (test code 2.700 UIU/ML                      

     



             = 2821)                                             



HEMOGLOBIN [id number] 00:00:00





             Test Item    Value        Reference Range Interpretation Comments

 

             HEMOGLOBIN A1c (test code = 87315) 6.8 %                           

       



HEMOGLOBIN [id number] 00:00:00





             Test Item    Value        Reference Range Interpretation Comments

 

             HEMOGLOBIN A1c (test code = 13537) 6.8 %                           

       



HEMOGLOBIN [id number] 00:00:00





             Test Item    Value        Reference Range Interpretation Comments

 

             HEMOGLOBIN A1c (test code = 31348) 6.8 %                           

       



VITAMIN D, 25 [id number] 06:58:12





             Test Item    Value        Reference Range Interpretation Comments

 

             VITAMIN D, 25 OH 18 NG/ML     SEE BELOW    L             NOTE: 25-H

YDROXYVITAMIN D



             (test code = 4958)                                        [facility]

LUDES



                                                                 25-HYDROXYVITAM

IN D2 AND



                                                                 D3. METHODOLOGY

 IS



                                                                 CHEMILUMINESCEN

T



                                                                 IMMUNOASSAY. **

***



                                                                 INTERPRETIVE RA

NGES



                                                                 *****PEDIATRIC 

(<17 YEARS)



                                                                 . . . . . . . .

 . . . NG/ML



                                                                 20-100ADULT: IN

SUFFICIENT .



                                                                 . . . . . . . .

 . . . . .



                                                                 NG/ML <20 SUBOP

TIMAL . . .



                                                                 . . . . . . . .

 . . . .



                                                                 NG/ML 20-29 OPT

IMAL . . . .



                                                                 . . . . . . . .

 . . . . .



                                                                 NG/ML  UN

LESS



                                                                 OTHERWISE INDIC

ATED, ALL



                                                                 TESTING PERFORM

ED



                                                                 ATCLINICAL PATH

OLOGY



                                                                 LABORATORIES, I

[address]



                                                                 [place] [address]



                                                                 LABORATORY DIRE

CTOR: GILBERTO WARD. CLIA



                                                                 NUMBER [id number]

03 CAP



                                                                 ACCREDITATION N

O. [id number]



TSH, THIRD [id number] 06:45:08





             Test Item    Value        Reference Range Interpretation Comments

 

             TSH, THIRD GENERATION (test code 1.290 UIU/ML 0.400-4.100          

     



             = 2821)                                             



VITAMIN [id number] 06:45:08





             Test Item    Value        Reference Range Interpretation Comments

 

             VITAMIN B-12 (test code = 2840) 647 PG/ML    200-950               

    



COMPREHENSIVE METABOLIC [id number] 05:39:52





             Test Item    Value        Reference Range Interpretation Comments

 

             GLUCOSE (test code = 119 MG/DL    70-99        H            



             )                                               

 

             BUN (test code = 19 MG/DL     8-23                      



             )                                               

 

             CREATININE (test 0.98 MG/DL   0.60-1.30                 



             code = 2214)                                        

 

             eGFR ( CKD-EPI) 65 ML/MIN/1.73 >60                       



             (test code = 53613)                                        

 

             CALC BUN/CREAT (test 19 RATIO     6-28                      



             code = 2235)                                        

 

             SODIUM (test code = 137 MEQ/L    133-146                   



             )                                               

 

             POTASSIUM (test code 4.9 MEQ/L    3.5-5.4                   



             = )                                             

 

             CHLORIDE (test code 97 MEQ/L                         



             = 2215)                                             

 

             CARBON DIOXIDE (test 23 MEQ/L     19-31                     



             code = 2206)                                        

 

             CALCIUM (test code = 9.7 MG/DL    8.5-10.5                  



             )                                               

 

             PROTEIN, TOTAL (test 7.9 G/DL     6.1-8.3                   



             code = 222)                                        

 

             ALBUMIN (test code = 4.6 G/DL     3.5-5.2                   



             )                                               

 

             CALC GLOBULIN (test 3.3 G/DL     1.9-3.7                   



             code = 2240)                                        

 

             CALC A/G RATIO (test 1.4 RATIO    1.0-2.6                   



             code = 2234)                                        

 

             BILIRUBIN, TOTAL 0.3 MG/DL    See_Comment                [Automated

 message]



             (test code = 2207)                                        The syste

m which



                                                                 generated this



                                                                 result transmit

maximo



                                                                 reference range

:



                                                                 <=1.2. The refe

rence



                                                                 range was not u

sed



                                                                 to interpret th

is



                                                                 result as



                                                                 normal/abnormal

.

 

             ALKALINE PHOSPHATASE 79 U/L                           



             (test code = 2204)                                        

 

             AST (test code = 48 U/L       9-40         H            



             2218)                                               

 

             ALT (test code = 48 U/L       5-40         H            



             221)                                               



CBC W/AUTO DIFF WITH [id number] 02:09:53





             Test Item    Value        Reference Range Interpretation Comments

 

             WBC (test code = 7.0 K/UL     3.5-11.0                  



             1001)                                               

 

             RBC (test code = 3.83 M/UL    3.80-5.40                 



             1002)                                               

 

             HEMOGLOBIN (test code 10.9 G/DL    11.5-15.5    L            



             = 1003)                                             

 

             HEMATOCRIT (test code 33.2 %       34.0-45.0    L            



             = 1004)                                             

 

             MCV (test code = 86.7 fL      80.0-99.0                 



             1005)                                               

 

             MCH (test code = 28.5 PG      25.0-33.0                 



             1006)                                               

 

             MCHC (test code = 32.8 G/DL    31.0-36.0                 



             1007)                                               

 

             RDW (test code = 15.2 %       11.5-15.0    H            



             1038)                                               

 

             NEUTROPHILS (test 63.1 %                                 



             code = 1008)                                        

 

             LYMPHOCYTES (test 28.0 %                                 



             code = 1010)                                        

 

             MONOCYTES (test code 6.4 %                                  



             = 1011)                                             

 

             EOSINOPHILS (test 1.6 %                                  



             code = 1012)                                        

 

             BASOPHILS (test code 0.6 %                                  



             = 1013)                                             

 

             IMMATURE GRANULOCYTES 0.3 %                                  



             (test code = 1036)                                        

 

             NUCLEATED RBCS (test 0.0 /100 WBC'S See_Comment                [Aut

omated



             code = 1065)                                        message] The sy

stem



                                                                 which generated



                                                                 this result



                                                                 transmitted



                                                                 reference range

:



                                                                 0.0. The refere

nce



                                                                 range was not u

sed



                                                                 to interpret th

is



                                                                 result as



                                                                 normal/abnormal

.

 

             PLATELET COUNT (test 115 K/UL     130-400      L            



             code = 1015)                                        

 

             ABSOLUTE NEUTROPHILS 4.41 K/UL    1.50-7.50                 



             (test code = 1066)                                        

 

             ABSOLUTE LYMPHOCYTES 1.96 K/UL    1.00-4.00                 



             (test code = 1067)                                        

 

             ABSOLUTE MONOCYTES 0.45 K/UL    0.20-1.00                 



             (test code = 1068)                                        

 

             ABSOLUTE EOSINOPHILS 0.11 K/UL    0.00-0.50                 



             (test code = 1040)                                        

 

             ABSOLUTE BASOPHILS 0.04 K/UL    0.00-0.20                 



             (test code = 1069)                                        

 

             ABS IMMATURE 0.02 K/UL    0.00-0.10                 



             GRANULOCYTES (test                                        



             code = 1020)                                        

 

             ABS NUCLEATED RBCS 0.00 K/UL    0.00-0.11                 



             (test code = 53178)                                        



TSH, THIRD [id number] 00:00:00





             Test Item    Value        Reference Range Interpretation Comments

 

             TSH, THIRD GENERATION (test code 1.290 UIU/ML                      

     



             = 2821)                                             



TSH, THIRD [id number] 00:00:00





             Test Item    Value        Reference Range Interpretation Comments

 

             TSH, THIRD GENERATION (test code 1.290 UIU/ML                      

     



             = 2821)                                             



TSH, THIRD [id number] 00:00:00





             Test Item    Value        Reference Range Interpretation Comments

 

             TSH, THIRD GENERATION (test code 1.290 UIU/ML                      

     



             = 2821)                                             



CBC W/AUTO [id number] 00:00:00





             Test Item    Value        Reference Range Interpretation Comments

 

             WBC (test code = 1001) 7.0 K/UL                               

 

             RBC (test code = 1002) 3.83 M/UL                              

 

             HEMOGLOBIN (test code = 1003) 10.9 G/DL                            

  

 

             HEMATOCRIT (test code = 1004) 33.2 %                               

  

 

             MCV (test code = 1005) 86.7 fL                                

 

             MCH (test code = 1006) 28.5 PG                                

 

             MCHC (test code = 1007) 32.8 G/DL                              

 

             RDW (test code = 1038) 15.2 %                                 

 

             NEUTROPHILS (test code = 1008) 63.1 %                              

   

 

             LYMPHOCYTES (test code = 1010) 28.0 %                              

   

 

             MONOCYTES (test code = 1011) 6.4 %                                 

 

 

             EOSINOPHILS (test code = 1012) 1.6 %                               

   

 

             BASOPHILS (test code = 1013) 0.6 %                                 

 

 

             IMMATURE GRANULOCYTES (test 0.3 %                                  



             code = 1036)                                        

 

             NUCLEATED RBCS (test code = 0.0 /100WBC'S                          

 



             1065)                                               

 

             PLATELET COUNT (test code = 115 K/UL                               



             1015)                                               

 

             ABSOLUTE NEUTROPHILS (test code 4.41 K/UL                          

    



             = 1066)                                             

 

             ABSOLUTE LYMPHOCYTES (test code 1.96 K/UL                          

    



             = 1067)                                             

 

             ABSOLUTE MONOCYTES (test code = 0.45 K/UL                          

    



             1068)                                               

 

             ABSOLUTE EOSINOPHILS (test code 0.11 K/UL                          

    



             = 1040)                                             

 

             ABSOLUTE BASOPHILS (test code = 0.04 K/UL                          

    



             1069)                                               

 

             ABS IMMATURE GRANULOCYTES (test 0.02 K/UL                          

    



             code = 1020)                                        

 

             ABS NUCLEATED RBCS (test code = 0.00 K/UL                          

    



             67490)                                              



CBC W/AUTO [id number] 00:00:00





             Test Item    Value        Reference Range Interpretation Comments

 

             WBC (test code = 1001) 7.0 K/UL                               

 

             RBC (test code = 1002) 3.83 M/UL                              

 

             HEMOGLOBIN (test code = 1003) 10.9 G/DL                            

  

 

             HEMATOCRIT (test code = 1004) 33.2 %                               

  

 

             MCV (test code = 1005) 86.7 fL                                

 

             MCH (test code = 1006) 28.5 PG                                

 

             MCHC (test code = 1007) 32.8 G/DL                              

 

             RDW (test code = 1038) 15.2 %                                 

 

             NEUTROPHILS (test code = 1008) 63.1 %                              

   

 

             LYMPHOCYTES (test code = 1010) 28.0 %                              

   

 

             MONOCYTES (test code = 1011) 6.4 %                                 

 

 

             EOSINOPHILS (test code = 1012) 1.6 %                               

   

 

             BASOPHILS (test code = 1013) 0.6 %                                 

 

 

             IMMATURE GRANULOCYTES (test 0.3 %                                  



             code = 1036)                                        

 

             NUCLEATED RBCS (test code = 0.0 /100WBC'S                          

 



             1065)                                               

 

             PLATELET COUNT (test code = 115 K/UL                               



             1015)                                               

 

             ABSOLUTE NEUTROPHILS (test code 4.41 K/UL                          

    



             = 1066)                                             

 

             ABSOLUTE LYMPHOCYTES (test code 1.96 K/UL                          

    



             = 1067)                                             

 

             ABSOLUTE MONOCYTES (test code = 0.45 K/UL                          

    



             1068)                                               

 

             ABSOLUTE EOSINOPHILS (test code 0.11 K/UL                          

    



             = 1040)                                             

 

             ABSOLUTE BASOPHILS (test code = 0.04 K/UL                          

    



             1069)                                               

 

             ABS IMMATURE GRANULOCYTES (test 0.02 K/UL                          

    



             code = 1020)                                        

 

             ABS NUCLEATED RBCS (test code = 0.00 K/UL                          

    



             53680)                                              



CBC W/AUTO [id number] 00:00:00





             Test Item    Value        Reference Range Interpretation Comments

 

             WBC (test code = 1001) 7.0 K/UL                               

 

             RBC (test code = 1002) 3.83 M/UL                              

 

             HEMOGLOBIN (test code = 1003) 10.9 G/DL                            

  

 

             HEMATOCRIT (test code = 1004) 33.2 %                               

  

 

             MCV (test code = 1005) 86.7 fL                                

 

             MCH (test code = 1006) 28.5 PG                                

 

             MCHC (test code = 1007) 32.8 G/DL                              

 

             RDW (test code = 1038) 15.2 %                                 

 

             NEUTROPHILS (test code = 1008) 63.1 %                              

   

 

             LYMPHOCYTES (test code = 1010) 28.0 %                              

   

 

             MONOCYTES (test code = 1011) 6.4 %                                 

 

 

             EOSINOPHILS (test code = 1012) 1.6 %                               

   

 

             BASOPHILS (test code = 1013) 0.6 %                                 

 

 

             IMMATURE GRANULOCYTES (test 0.3 %                                  



             code = 1036)                                        

 

             NUCLEATED RBCS (test code = 0.0 /100WBC'S                          

 



             1065)                                               

 

             PLATELET COUNT (test code = 115 K/UL                               



             1015)                                               

 

             ABSOLUTE NEUTROPHILS (test code 4.41 K/UL                          

    



             = 1066)                                             

 

             ABSOLUTE LYMPHOCYTES (test code 1.96 K/UL                          

    



             = 1067)                                             

 

             ABSOLUTE MONOCYTES (test code = 0.45 K/UL                          

    



             1068)                                               

 

             ABSOLUTE EOSINOPHILS (test code 0.11 K/UL                          

    



             = 1040)                                             

 

             ABSOLUTE BASOPHILS (test code = 0.04 K/UL                          

    



             1069)                                               

 

             ABS IMMATURE GRANULOCYTES (test 0.02 K/UL                          

    



             code = 1020)                                        

 

             ABS NUCLEATED RBCS (test code = 0.00 K/UL                          

    



             31143)                                              



COMPREHENSIVE METABOLIC [id number] 00:00:00





             Test Item    Value        Reference Range Interpretation Comments

 

             GLUCOSE (test code = 2217) 119 MG/DL                              

 

             BUN (test code = 2208) 19 MG/DL                               

 

             CREATININE (test code = 2214) 0.98 MG/DL                           

  

 

             eGFR ( CKD-EPI) (test code 65 ML/MIN/1.73                      

     



             = 12182)                                            

 

             CALC BUN/CREAT (test code = 19 RATIO                               



             2235)                                               

 

             SODIUM (test code = 2231) 137 MEQ/L                              

 

             POTASSIUM (test code = 2228) 4.9 MEQ/L                             

 

 

             CHLORIDE (test code = 2215) 97 MEQ/L                               

 

             CARBON DIOXIDE (test code = 23 MEQ/L                               



             2206)                                               

 

             CALCIUM (test code = 2209) 9.7 MG/DL                              

 

             PROTEIN, TOTAL (test code = 7.9 G/DL                               



             )                                               

 

             ALBUMIN (test code = 2201) 4.6 G/DL                               

 

             CALC GLOBULIN (test code = 3.3 G/DL                               



             2240)                                               

 

             CALC A/G RATIO (test code = 1.4 RATIO                              



             2234)                                               

 

             BILIRUBIN, TOTAL (test code = 0.3 MG/DL                            

  



             220)                                               

 

             ALKALINE PHOSPHATASE (test 79 U/L                                 



             code = 2204)                                        

 

             AST (test code = 2218) 48 U/L                                 

 

             ALT (test code = 2219) 48 U/L                                 



COMPREHENSIVE METABOLIC [id number] 00:00:00





             Test Item    Value        Reference Range Interpretation Comments

 

             GLUCOSE (test code = 2217) 119 MG/DL                              

 

             BUN (test code = 2208) 19 MG/DL                               

 

             CREATININE (test code = 2214) 0.98 MG/DL                           

  

 

             eGFR ( CKD-EPI) (test code 65 ML/MIN/1.73                      

     



             = 03843)                                            

 

             CALC BUN/CREAT (test code = 19 RATIO                               



             2235)                                               

 

             SODIUM (test code = 2231) 137 MEQ/L                              

 

             POTASSIUM (test code = 2228) 4.9 MEQ/L                             

 

 

             CHLORIDE (test code = 2215) 97 MEQ/L                               

 

             CARBON DIOXIDE (test code = 23 MEQ/L                               



             )                                               

 

             CALCIUM (test code = 220) 9.7 MG/DL                              

 

             PROTEIN, TOTAL (test code = 7.9 G/DL                               



             )                                               

 

             ALBUMIN (test code = 220) 4.6 G/DL                               

 

             CALC GLOBULIN (test code = 3.3 G/DL                               



             )                                               

 

             CALC A/G RATIO (test code = 1.4 RATIO                              



             )                                               

 

             BILIRUBIN, TOTAL (test code = 0.3 MG/DL                            

  



             )                                               

 

             ALKALINE PHOSPHATASE (test 79 U/L                                 



             code = 2204)                                        

 

             AST (test code = 2218) 48 U/L                                 

 

             ALT (test code = 2219) 48 U/L                                 



VITAMIN [id number] 00:00:00





             Test Item    Value        Reference Range Interpretation Comments

 

             VITAMIN B-12 (test code = 2840) 647 PG/ML                          

    



VITAMIN [id number] 00:00:00





             Test Item    Value        Reference Range Interpretation Comments

 

             VITAMIN B-12 (test code = 2840) 647 PG/ML                          

    



VITAMIN [id number] 00:00:00





             Test Item    Value        Reference Range Interpretation Comments

 

             VITAMIN B-12 (test code = 2840) 647 PG/ML                          

    



VITAMIN D, 25 [id number] 00:00:00





             Test Item    Value        Reference Range Interpretation Comments

 

             VITAMIN D, 25 OH (test code = 4958) 18 NG/ML                       

        



VITAMIN D, 25 [id number] 00:00:00





             Test Item    Value        Reference Range Interpretation Comments

 

             VITAMIN D, 25 OH (test code = 4958) 18 NG/ML                       

        



TSH, THIRD [id number] 00:00:00





             Test Item    Value        Reference Range Interpretation Comments

 

             TSH, THIRD GENERATION (test code 1.290 UIU/ML                      

     



             = 2821)                                             



TSH, THIRD [id number] 00:00:00





             Test Item    Value        Reference Range Interpretation Comments

 

             TSH, THIRD GENERATION (test code 1.290 UIU/ML                      

     



             = 2821)                                             



TSH, THIRD [id number] 00:00:00





             Test Item    Value        Reference Range Interpretation Comments

 

             TSH, THIRD GENERATION (test code 1.290 UIU/ML                      

     



             = 2821)                                             



CBC W/AUTO [id number] 00:00:00





             Test Item    Value        Reference Range Interpretation Comments

 

             WBC (test code = 1001) 7.0 K/UL                               

 

             RBC (test code = 1002) 3.83 M/UL                              

 

             HEMOGLOBIN (test code = 1003) 10.9 G/DL                            

  

 

             HEMATOCRIT (test code = 1004) 33.2 %                               

  

 

             MCV (test code = 1005) 86.7 fL                                

 

             MCH (test code = 1006) 28.5 PG                                

 

             MCHC (test code = 1007) 32.8 G/DL                              

 

             RDW (test code = 1038) 15.2 %                                 

 

             NEUTROPHILS (test code = 1008) 63.1 %                              

   

 

             LYMPHOCYTES (test code = 1010) 28.0 %                              

   

 

             MONOCYTES (test code = 1011) 6.4 %                                 

 

 

             EOSINOPHILS (test code = 1012) 1.6 %                               

   

 

             BASOPHILS (test code = 1013) 0.6 %                                 

 

 

             IMMATURE GRANULOCYTES (test 0.3 %                                  



             code = 1036)                                        

 

             NUCLEATED RBCS (test code = 0.0 /100WBC'S                          

 



             1065)                                               

 

             PLATELET COUNT (test code = 115 K/UL                               



             1015)                                               

 

             ABSOLUTE NEUTROPHILS (test code 4.41 K/UL                          

    



             = 1066)                                             

 

             ABSOLUTE LYMPHOCYTES (test code 1.96 K/UL                          

    



             = 1067)                                             

 

             ABSOLUTE MONOCYTES (test code = 0.45 K/UL                          

    



             1068)                                               

 

             ABSOLUTE EOSINOPHILS (test code 0.11 K/UL                          

    



             = 1040)                                             

 

             ABSOLUTE BASOPHILS (test code = 0.04 K/UL                          

    



             1069)                                               

 

             ABS IMMATURE GRANULOCYTES (test 0.02 K/UL                          

    



             code = 1020)                                        

 

             ABS NUCLEATED RBCS (test code = 0.00 K/UL                          

    



             44699)                                              



CBC W/AUTO [id number] 00:00:00





             Test Item    Value        Reference Range Interpretation Comments

 

             WBC (test code = 1001) 7.0 K/UL                               

 

             RBC (test code = 1002) 3.83 M/UL                              

 

             HEMOGLOBIN (test code = 1003) 10.9 G/DL                            

  

 

             HEMATOCRIT (test code = 1004) 33.2 %                               

  

 

             MCV (test code = 1005) 86.7 fL                                

 

             MCH (test code = 1006) 28.5 PG                                

 

             MCHC (test code = 1007) 32.8 G/DL                              

 

             RDW (test code = 1038) 15.2 %                                 

 

             NEUTROPHILS (test code = 1008) 63.1 %                              

   

 

             LYMPHOCYTES (test code = 1010) 28.0 %                              

   

 

             MONOCYTES (test code = 1011) 6.4 %                                 

 

 

             EOSINOPHILS (test code = 1012) 1.6 %                               

   

 

             BASOPHILS (test code = 1013) 0.6 %                                 

 

 

             IMMATURE GRANULOCYTES (test 0.3 %                                  



             code = 1036)                                        

 

             NUCLEATED RBCS (test code = 0.0 /100WBC'S                          

 



             1065)                                               

 

             PLATELET COUNT (test code = 115 K/UL                               



             1015)                                               

 

             ABSOLUTE NEUTROPHILS (test code 4.41 K/UL                          

    



             = 1066)                                             

 

             ABSOLUTE LYMPHOCYTES (test code 1.96 K/UL                          

    



             = 1067)                                             

 

             ABSOLUTE MONOCYTES (test code = 0.45 K/UL                          

    



             1068)                                               

 

             ABSOLUTE EOSINOPHILS (test code 0.11 K/UL                          

    



             = 1040)                                             

 

             ABSOLUTE BASOPHILS (test code = 0.04 K/UL                          

    



             1069)                                               

 

             ABS IMMATURE GRANULOCYTES (test 0.02 K/UL                          

    



             code = 1020)                                        

 

             ABS NUCLEATED RBCS (test code = 0.00 K/UL                          

    



             50360)                                              



CBC W/AUTO [id number] 00:00:00





             Test Item    Value        Reference Range Interpretation Comments

 

             WBC (test code = 1001) 7.0 K/UL                               

 

             RBC (test code = 1002) 3.83 M/UL                              

 

             HEMOGLOBIN (test code = 1003) 10.9 G/DL                            

  

 

             HEMATOCRIT (test code = 1004) 33.2 %                               

  

 

             MCV (test code = 1005) 86.7 fL                                

 

             MCH (test code = 1006) 28.5 PG                                

 

             MCHC (test code = 1007) 32.8 G/DL                              

 

             RDW (test code = 1038) 15.2 %                                 

 

             NEUTROPHILS (test code = 1008) 63.1 %                              

   

 

             LYMPHOCYTES (test code = 1010) 28.0 %                              

   

 

             MONOCYTES (test code = 1011) 6.4 %                                 

 

 

             EOSINOPHILS (test code = 1012) 1.6 %                               

   

 

             BASOPHILS (test code = 1013) 0.6 %                                 

 

 

             IMMATURE GRANULOCYTES (test 0.3 %                                  



             code = 1036)                                        

 

             NUCLEATED RBCS (test code = 0.0 /100WBC'S                          

 



             1065)                                               

 

             PLATELET COUNT (test code = 115 K/UL                               



             1015)                                               

 

             ABSOLUTE NEUTROPHILS (test code 4.41 K/UL                          

    



             = 1066)                                             

 

             ABSOLUTE LYMPHOCYTES (test code 1.96 K/UL                          

    



             = 1067)                                             

 

             ABSOLUTE MONOCYTES (test code = 0.45 K/UL                          

    



             1068)                                               

 

             ABSOLUTE EOSINOPHILS (test code 0.11 K/UL                          

    



             = 1040)                                             

 

             ABSOLUTE BASOPHILS (test code = 0.04 K/UL                          

    



             1069)                                               

 

             ABS IMMATURE GRANULOCYTES (test 0.02 K/UL                          

    



             code = 1020)                                        

 

             ABS NUCLEATED RBCS (test code = 0.00 K/UL                          

    



             08364)                                              



COMPREHENSIVE METABOLIC [id number] 00:00:00





             Test Item    Value        Reference Range Interpretation Comments

 

             GLUCOSE (test code = 2217) 119 MG/DL                              

 

             BUN (test code = 2208) 19 MG/DL                               

 

             CREATININE (test code = 2214) 0.98 MG/DL                           

  

 

             eGFR ( CKD-EPI) (test code 65 ML/MIN/1.73                      

     



             = 04456)                                            

 

             CALC BUN/CREAT (test code = 19 RATIO                               



             2235)                                               

 

             SODIUM (test code = 2231) 137 MEQ/L                              

 

             POTASSIUM (test code = 2228) 4.9 MEQ/L                             

 

 

             CHLORIDE (test code = 2215) 97 MEQ/L                               

 

             CARBON DIOXIDE (test code = 23 MEQ/L                               



             )                                               

 

             CALCIUM (test code = 2209) 9.7 MG/DL                              

 

             PROTEIN, TOTAL (test code = 7.9 G/DL                               



             )                                               

 

             ALBUMIN (test code = 2201) 4.6 G/DL                               

 

             CALC GLOBULIN (test code = 3.3 G/DL                               



             2240)                                               

 

             CALC A/G RATIO (test code = 1.4 RATIO                              



             2234)                                               

 

             BILIRUBIN, TOTAL (test code = 0.3 MG/DL                            

  



             )                                               

 

             ALKALINE PHOSPHATASE (test 79 U/L                                 



             code = 2204)                                        

 

             AST (test code = 2218) 48 U/L                                 

 

             ALT (test code = 2219) 48 U/L                                 



COMPREHENSIVE METABOLIC [id number] 00:00:00





             Test Item    Value        Reference Range Interpretation Comments

 

             GLUCOSE (test code = 2217) 119 MG/DL                              

 

             BUN (test code = 2208) 19 MG/DL                               

 

             CREATININE (test code = 2214) 0.98 MG/DL                           

  

 

             eGFR ( CKD-EPI) (test code 65 ML/MIN/1.73                      

     



             = 78767)                                            

 

             CALC BUN/CREAT (test code = 19 RATIO                               



             2235)                                               

 

             SODIUM (test code = 2231) 137 MEQ/L                              

 

             POTASSIUM (test code = 2228) 4.9 MEQ/L                             

 

 

             CHLORIDE (test code = 2215) 97 MEQ/L                               

 

             CARBON DIOXIDE (test code = 23 MEQ/L                               



             )                                               

 

             CALCIUM (test code = 2209) 9.7 MG/DL                              

 

             PROTEIN, TOTAL (test code = 7.9 G/DL                               



             )                                               

 

             ALBUMIN (test code = 2201) 4.6 G/DL                               

 

             CALC GLOBULIN (test code = 3.3 G/DL                               



             )                                               

 

             CALC A/G RATIO (test code = 1.4 RATIO                              



             )                                               

 

             BILIRUBIN, TOTAL (test code = 0.3 MG/DL                            

  



             )                                               

 

             ALKALINE PHOSPHATASE (test 79 U/L                                 



             code = 2204)                                        

 

             AST (test code = 2218) 48 U/L                                 

 

             ALT (test code = 2219) 48 U/L                                 



VITAMIN [id number] 00:00:00





             Test Item    Value        Reference Range Interpretation Comments

 

             VITAMIN B-12 (test code = 2840) 647 PG/ML                          

    



VITAMIN [id number] 00:00:00





             Test Item    Value        Reference Range Interpretation Comments

 

             VITAMIN B-12 (test code = 2840) 647 PG/ML                          

    



VITAMIN [id number] 00:00:00





             Test Item    Value        Reference Range Interpretation Comments

 

             VITAMIN B-12 (test code = 2840) 647 PG/ML                          

    



VITAMIN D, 25 [id number] 00:00:00





             Test Item    Value        Reference Range Interpretation Comments

 

             VITAMIN D, 25 OH (test code = 4958) 18 NG/ML                       

        



VITAMIN D, 25 [id number] 00:00:00





             Test Item    Value        Reference Range Interpretation Comments

 

             VITAMIN D, 25 OH (test code = 4958) 18 NG/ML                       

        



TSH, THIRD [id number] 00:00:00





             Test Item    Value        Reference Range Interpretation Comments

 

             TSH, THIRD GENERATION (test code 1.290 UIU/ML                      

     



             = 2821)                                             



TSH, THIRD [id number] 00:00:00





             Test Item    Value        Reference Range Interpretation Comments

 

             TSH, THIRD GENERATION (test code 1.290 UIU/ML                      

     



             = 2821)                                             



TSH, THIRD [id number] 00:00:00





             Test Item    Value        Reference Range Interpretation Comments

 

             TSH, THIRD GENERATION (test code 1.290 UIU/ML                      

     



             = 2821)                                             



CBC W/AUTO [id number] 00:00:00





             Test Item    Value        Reference Range Interpretation Comments

 

             WBC (test code = 1001) 7.0 K/UL                               

 

             RBC (test code = 1002) 3.83 M/UL                              

 

             HEMOGLOBIN (test code = 1003) 10.9 G/DL                            

  

 

             HEMATOCRIT (test code = 1004) 33.2 %                               

  

 

             MCV (test code = 1005) 86.7 fL                                

 

             MCH (test code = 1006) 28.5 PG                                

 

             MCHC (test code = 1007) 32.8 G/DL                              

 

             RDW (test code = 1038) 15.2 %                                 

 

             NEUTROPHILS (test code = 1008) 63.1 %                              

   

 

             LYMPHOCYTES (test code = 1010) 28.0 %                              

   

 

             MONOCYTES (test code = 1011) 6.4 %                                 

 

 

             EOSINOPHILS (test code = 1012) 1.6 %                               

   

 

             BASOPHILS (test code = 1013) 0.6 %                                 

 

 

             IMMATURE GRANULOCYTES (test 0.3 %                                  



             code = 1036)                                        

 

             NUCLEATED RBCS (test code = 0.0 /100WBC'S                          

 



             1065)                                               

 

             PLATELET COUNT (test code = 115 K/UL                               



             1015)                                               

 

             ABSOLUTE NEUTROPHILS (test code 4.41 K/UL                          

    



             = 1066)                                             

 

             ABSOLUTE LYMPHOCYTES (test code 1.96 K/UL                          

    



             = 1067)                                             

 

             ABSOLUTE MONOCYTES (test code = 0.45 K/UL                          

    



             1068)                                               

 

             ABSOLUTE EOSINOPHILS (test code 0.11 K/UL                          

    



             = 1040)                                             

 

             ABSOLUTE BASOPHILS (test code = 0.04 K/UL                          

    



             1069)                                               

 

             ABS IMMATURE GRANULOCYTES (test 0.02 K/UL                          

    



             code = 1020)                                        

 

             ABS NUCLEATED RBCS (test code = 0.00 K/UL                          

    



             17894)                                              



CBC W/AUTO [id number] 00:00:00





             Test Item    Value        Reference Range Interpretation Comments

 

             WBC (test code = 1001) 7.0 K/UL                               

 

             RBC (test code = 1002) 3.83 M/UL                              

 

             HEMOGLOBIN (test code = 1003) 10.9 G/DL                            

  

 

             HEMATOCRIT (test code = 1004) 33.2 %                               

  

 

             MCV (test code = 1005) 86.7 fL                                

 

             MCH (test code = 1006) 28.5 PG                                

 

             MCHC (test code = 1007) 32.8 G/DL                              

 

             RDW (test code = 1038) 15.2 %                                 

 

             NEUTROPHILS (test code = 1008) 63.1 %                              

   

 

             LYMPHOCYTES (test code = 1010) 28.0 %                              

   

 

             MONOCYTES (test code = 1011) 6.4 %                                 

 

 

             EOSINOPHILS (test code = 1012) 1.6 %                               

   

 

             BASOPHILS (test code = 1013) 0.6 %                                 

 

 

             IMMATURE GRANULOCYTES (test 0.3 %                                  



             code = 1036)                                        

 

             NUCLEATED RBCS (test code = 0.0 /100WBC'S                          

 



             1065)                                               

 

             PLATELET COUNT (test code = 115 K/UL                               



             1015)                                               

 

             ABSOLUTE NEUTROPHILS (test code 4.41 K/UL                          

    



             = 1066)                                             

 

             ABSOLUTE LYMPHOCYTES (test code 1.96 K/UL                          

    



             = 1067)                                             

 

             ABSOLUTE MONOCYTES (test code = 0.45 K/UL                          

    



             1068)                                               

 

             ABSOLUTE EOSINOPHILS (test code 0.11 K/UL                          

    



             = 1040)                                             

 

             ABSOLUTE BASOPHILS (test code = 0.04 K/UL                          

    



             1069)                                               

 

             ABS IMMATURE GRANULOCYTES (test 0.02 K/UL                          

    



             code = 1020)                                        

 

             ABS NUCLEATED RBCS (test code = 0.00 K/UL                          

    



             51101)                                              



CBC W/AUTO [id number] 00:00:00





             Test Item    Value        Reference Range Interpretation Comments

 

             WBC (test code = 1001) 7.0 K/UL                               

 

             RBC (test code = 1002) 3.83 M/UL                              

 

             HEMOGLOBIN (test code = 1003) 10.9 G/DL                            

  

 

             HEMATOCRIT (test code = 1004) 33.2 %                               

  

 

             MCV (test code = 1005) 86.7 fL                                

 

             MCH (test code = 1006) 28.5 PG                                

 

             MCHC (test code = 1007) 32.8 G/DL                              

 

             RDW (test code = 1038) 15.2 %                                 

 

             NEUTROPHILS (test code = 1008) 63.1 %                              

   

 

             LYMPHOCYTES (test code = 1010) 28.0 %                              

   

 

             MONOCYTES (test code = 1011) 6.4 %                                 

 

 

             EOSINOPHILS (test code = 1012) 1.6 %                               

   

 

             BASOPHILS (test code = 1013) 0.6 %                                 

 

 

             IMMATURE GRANULOCYTES (test 0.3 %                                  



             code = 1036)                                        

 

             NUCLEATED RBCS (test code = 0.0 /100WBC'S                          

 



             1065)                                               

 

             PLATELET COUNT (test code = 115 K/UL                               



             1015)                                               

 

             ABSOLUTE NEUTROPHILS (test code 4.41 K/UL                          

    



             = 1066)                                             

 

             ABSOLUTE LYMPHOCYTES (test code 1.96 K/UL                          

    



             = 1067)                                             

 

             ABSOLUTE MONOCYTES (test code = 0.45 K/UL                          

    



             1068)                                               

 

             ABSOLUTE EOSINOPHILS (test code 0.11 K/UL                          

    



             = 1040)                                             

 

             ABSOLUTE BASOPHILS (test code = 0.04 K/UL                          

    



             1069)                                               

 

             ABS IMMATURE GRANULOCYTES (test 0.02 K/UL                          

    



             code = 1020)                                        

 

             ABS NUCLEATED RBCS (test code = 0.00 K/UL                          

    



             47533)                                              



COMPREHENSIVE METABOLIC [id number] 00:00:00





             Test Item    Value        Reference Range Interpretation Comments

 

             GLUCOSE (test code = 2217) 119 MG/DL                              

 

             BUN (test code = 2208) 19 MG/DL                               

 

             CREATININE (test code = 2214) 0.98 MG/DL                           

  

 

             eGFR ( CKD-EPI) (test code 65 ML/MIN/1.73                      

     



             = 23202)                                            

 

             CALC BUN/CREAT (test code = 19 RATIO                               



             2235)                                               

 

             SODIUM (test code = 2231) 137 MEQ/L                              

 

             POTASSIUM (test code = 2228) 4.9 MEQ/L                             

 

 

             CHLORIDE (test code = 2215) 97 MEQ/L                               

 

             CARBON DIOXIDE (test code = 23 MEQ/L                               



             )                                               

 

             CALCIUM (test code = 2209) 9.7 MG/DL                              

 

             PROTEIN, TOTAL (test code = 7.9 G/DL                               



             )                                               

 

             ALBUMIN (test code = 2201) 4.6 G/DL                               

 

             CALC GLOBULIN (test code = 3.3 G/DL                               



             )                                               

 

             CALC A/G RATIO (test code = 1.4 RATIO                              



             2234)                                               

 

             BILIRUBIN, TOTAL (test code = 0.3 MG/DL                            

  



             )                                               

 

             ALKALINE PHOSPHATASE (test 79 U/L                                 



             code = 2204)                                        

 

             AST (test code = 2218) 48 U/L                                 

 

             ALT (test code = 2219) 48 U/L                                 



COMPREHENSIVE METABOLIC [id number] 00:00:00





             Test Item    Value        Reference Range Interpretation Comments

 

             GLUCOSE (test code = 2217) 119 MG/DL                              

 

             BUN (test code = 2208) 19 MG/DL                               

 

             CREATININE (test code = 2214) 0.98 MG/DL                           

  

 

             eGFR ( CKD-EPI) (test code 65 ML/MIN/1.73                      

     



             = 72127)                                            

 

             CALC BUN/CREAT (test code = 19 RATIO                               



             2235)                                               

 

             SODIUM (test code = 2231) 137 MEQ/L                              

 

             POTASSIUM (test code = 2228) 4.9 MEQ/L                             

 

 

             CHLORIDE (test code = 2215) 97 MEQ/L                               

 

             CARBON DIOXIDE (test code = 23 MEQ/L                               



             )                                               

 

             CALCIUM (test code = 2209) 9.7 MG/DL                              

 

             PROTEIN, TOTAL (test code = 7.9 G/DL                               



             )                                               

 

             ALBUMIN (test code = 2201) 4.6 G/DL                               

 

             CALC GLOBULIN (test code = 3.3 G/DL                               



             2240)                                               

 

             CALC A/G RATIO (test code = 1.4 RATIO                              



             2234)                                               

 

             BILIRUBIN, TOTAL (test code = 0.3 MG/DL                            

  



             )                                               

 

             ALKALINE PHOSPHATASE (test 79 U/L                                 



             code = 2204)                                        

 

             AST (test code = 2218) 48 U/L                                 

 

             ALT (test code = 2219) 48 U/L                                 



VITAMIN [id number] 00:00:00





             Test Item    Value        Reference Range Interpretation Comments

 

             VITAMIN B-12 (test code = 2840) 647 PG/ML                          

    



VITAMIN [id number] 00:00:00





             Test Item    Value        Reference Range Interpretation Comments

 

             VITAMIN B-12 (test code = 2840) 647 PG/ML                          

    



VITAMIN [id number] 00:00:00





             Test Item    Value        Reference Range Interpretation Comments

 

             VITAMIN B-12 (test code = 2840) 647 PG/ML                          

    



VITAMIN D, 25 [id number] 00:00:00





             Test Item    Value        Reference Range Interpretation Comments

 

             VITAMIN D, 25 OH (test code = 4958) 18 NG/ML                       

        



VITAMIN D, 25 [id number] 00:00:00





             Test Item    Value        Reference Range Interpretation Comments

 

             VITAMIN D, 25 OH (test code = 4958) 18 NG/ML                       

        



TSH, THIRD [id number] 00:00:00





             Test Item    Value        Reference Range Interpretation Comments

 

             TSH, THIRD GENERATION (test code 1.290 UIU/ML                      

     



             = 2821)                                             



TSH, THIRD [id number] 00:00:00





             Test Item    Value        Reference Range Interpretation Comments

 

             TSH, THIRD GENERATION (test code 1.290 UIU/ML                      

     



             = 2821)                                             



TSH, THIRD [id number] 00:00:00





             Test Item    Value        Reference Range Interpretation Comments

 

             TSH, THIRD GENERATION (test code 1.290 UIU/ML                      

     



             = 2821)                                             



CBC W/AUTO [id number] 00:00:00





             Test Item    Value        Reference Range Interpretation Comments

 

             WBC (test code = 1001) 7.0 K/UL                               

 

             RBC (test code = 1002) 3.83 M/UL                              

 

             HEMOGLOBIN (test code = 1003) 10.9 G/DL                            

  

 

             HEMATOCRIT (test code = 1004) 33.2 %                               

  

 

             MCV (test code = 1005) 86.7 fL                                

 

             MCH (test code = 1006) 28.5 PG                                

 

             MCHC (test code = 1007) 32.8 G/DL                              

 

             RDW (test code = 1038) 15.2 %                                 

 

             NEUTROPHILS (test code = 1008) 63.1 %                              

   

 

             LYMPHOCYTES (test code = 1010) 28.0 %                              

   

 

             MONOCYTES (test code = 1011) 6.4 %                                 

 

 

             EOSINOPHILS (test code = 1012) 1.6 %                               

   

 

             BASOPHILS (test code = 1013) 0.6 %                                 

 

 

             IMMATURE GRANULOCYTES (test 0.3 %                                  



             code = 1036)                                        

 

             NUCLEATED RBCS (test code = 0.0 /100WBC'S                          

 



             1065)                                               

 

             PLATELET COUNT (test code = 115 K/UL                               



             1015)                                               

 

             ABSOLUTE NEUTROPHILS (test code 4.41 K/UL                          

    



             = 1066)                                             

 

             ABSOLUTE LYMPHOCYTES (test code 1.96 K/UL                          

    



             = 1067)                                             

 

             ABSOLUTE MONOCYTES (test code = 0.45 K/UL                          

    



             1068)                                               

 

             ABSOLUTE EOSINOPHILS (test code 0.11 K/UL                          

    



             = 1040)                                             

 

             ABSOLUTE BASOPHILS (test code = 0.04 K/UL                          

    



             1069)                                               

 

             ABS IMMATURE GRANULOCYTES (test 0.02 K/UL                          

    



             code = 1020)                                        

 

             ABS NUCLEATED RBCS (test code = 0.00 K/UL                          

    



             43602)                                              



CBC W/AUTO [id number] 00:00:00





             Test Item    Value        Reference Range Interpretation Comments

 

             WBC (test code = 1001) 7.0 K/UL                               

 

             RBC (test code = 1002) 3.83 M/UL                              

 

             HEMOGLOBIN (test code = 1003) 10.9 G/DL                            

  

 

             HEMATOCRIT (test code = 1004) 33.2 %                               

  

 

             MCV (test code = 1005) 86.7 fL                                

 

             MCH (test code = 1006) 28.5 PG                                

 

             MCHC (test code = 1007) 32.8 G/DL                              

 

             RDW (test code = 1038) 15.2 %                                 

 

             NEUTROPHILS (test code = 1008) 63.1 %                              

   

 

             LYMPHOCYTES (test code = 1010) 28.0 %                              

   

 

             MONOCYTES (test code = 1011) 6.4 %                                 

 

 

             EOSINOPHILS (test code = 1012) 1.6 %                               

   

 

             BASOPHILS (test code = 1013) 0.6 %                                 

 

 

             IMMATURE GRANULOCYTES (test 0.3 %                                  



             code = 1036)                                        

 

             NUCLEATED RBCS (test code = 0.0 /100WBC'S                          

 



             1065)                                               

 

             PLATELET COUNT (test code = 115 K/UL                               



             1015)                                               

 

             ABSOLUTE NEUTROPHILS (test code 4.41 K/UL                          

    



             = 1066)                                             

 

             ABSOLUTE LYMPHOCYTES (test code 1.96 K/UL                          

    



             = 1067)                                             

 

             ABSOLUTE MONOCYTES (test code = 0.45 K/UL                          

    



             1068)                                               

 

             ABSOLUTE EOSINOPHILS (test code 0.11 K/UL                          

    



             = 1040)                                             

 

             ABSOLUTE BASOPHILS (test code = 0.04 K/UL                          

    



             1069)                                               

 

             ABS IMMATURE GRANULOCYTES (test 0.02 K/UL                          

    



             code = 1020)                                        

 

             ABS NUCLEATED RBCS (test code = 0.00 K/UL                          

    



             02243)                                              



CBC W/AUTO [id number] 00:00:00





             Test Item    Value        Reference Range Interpretation Comments

 

             WBC (test code = 1001) 7.0 K/UL                               

 

             RBC (test code = 1002) 3.83 M/UL                              

 

             HEMOGLOBIN (test code = 1003) 10.9 G/DL                            

  

 

             HEMATOCRIT (test code = 1004) 33.2 %                               

  

 

             MCV (test code = 1005) 86.7 fL                                

 

             MCH (test code = 1006) 28.5 PG                                

 

             MCHC (test code = 1007) 32.8 G/DL                              

 

             RDW (test code = 1038) 15.2 %                                 

 

             NEUTROPHILS (test code = 1008) 63.1 %                              

   

 

             LYMPHOCYTES (test code = 1010) 28.0 %                              

   

 

             MONOCYTES (test code = 1011) 6.4 %                                 

 

 

             EOSINOPHILS (test code = 1012) 1.6 %                               

   

 

             BASOPHILS (test code = 1013) 0.6 %                                 

 

 

             IMMATURE GRANULOCYTES (test 0.3 %                                  



             code = 1036)                                        

 

             NUCLEATED RBCS (test code = 0.0 /100WBC'S                          

 



             1065)                                               

 

             PLATELET COUNT (test code = 115 K/UL                               



             1015)                                               

 

             ABSOLUTE NEUTROPHILS (test code 4.41 K/UL                          

    



             = 1066)                                             

 

             ABSOLUTE LYMPHOCYTES (test code 1.96 K/UL                          

    



             = 1067)                                             

 

             ABSOLUTE MONOCYTES (test code = 0.45 K/UL                          

    



             1068)                                               

 

             ABSOLUTE EOSINOPHILS (test code 0.11 K/UL                          

    



             = 1040)                                             

 

             ABSOLUTE BASOPHILS (test code = 0.04 K/UL                          

    



             1069)                                               

 

             ABS IMMATURE GRANULOCYTES (test 0.02 K/UL                          

    



             code = 1020)                                        

 

             ABS NUCLEATED RBCS (test code = 0.00 K/UL                          

    



             52003)                                              



COMPREHENSIVE METABOLIC [id number] 00:00:00





             Test Item    Value        Reference Range Interpretation Comments

 

             GLUCOSE (test code = 2217) 119 MG/DL                              

 

             BUN (test code = 2208) 19 MG/DL                               

 

             CREATININE (test code = 2214) 0.98 MG/DL                           

  

 

             eGFR ( CKD-EPI) (test code 65 ML/MIN/1.73                      

     



             = 28404)                                            

 

             CALC BUN/CREAT (test code = 19 RATIO                               



             2235)                                               

 

             SODIUM (test code = 2231) 137 MEQ/L                              

 

             POTASSIUM (test code = 2228) 4.9 MEQ/L                             

 

 

             CHLORIDE (test code = 2215) 97 MEQ/L                               

 

             CARBON DIOXIDE (test code = 23 MEQ/L                               



             2206)                                               

 

             CALCIUM (test code = 2209) 9.7 MG/DL                              

 

             PROTEIN, TOTAL (test code = 7.9 G/DL                               



             )                                               

 

             ALBUMIN (test code = 2201) 4.6 G/DL                               

 

             CALC GLOBULIN (test code = 3.3 G/DL                               



             2240)                                               

 

             CALC A/G RATIO (test code = 1.4 RATIO                              



             2234)                                               

 

             BILIRUBIN, TOTAL (test code = 0.3 MG/DL                            

  



             )                                               

 

             ALKALINE PHOSPHATASE (test 79 U/L                                 



             code = 2204)                                        

 

             AST (test code = 2218) 48 U/L                                 

 

             ALT (test code = 2219) 48 U/L                                 



COMPREHENSIVE METABOLIC [id number] 00:00:00





             Test Item    Value        Reference Range Interpretation Comments

 

             GLUCOSE (test code = 2217) 119 MG/DL                              

 

             BUN (test code = 2208) 19 MG/DL                               

 

             CREATININE (test code = 2214) 0.98 MG/DL                           

  

 

             eGFR ( CKD-EPI) (test code 65 ML/MIN/1.73                      

     



             = 18875)                                            

 

             CALC BUN/CREAT (test code = 19 RATIO                               



             2235)                                               

 

             SODIUM (test code = 2231) 137 MEQ/L                              

 

             POTASSIUM (test code = 2228) 4.9 MEQ/L                             

 

 

             CHLORIDE (test code = 2215) 97 MEQ/L                               

 

             CARBON DIOXIDE (test code = 23 MEQ/L                               



             )                                               

 

             CALCIUM (test code = 2209) 9.7 MG/DL                              

 

             PROTEIN, TOTAL (test code = 7.9 G/DL                               



             )                                               

 

             ALBUMIN (test code = 2201) 4.6 G/DL                               

 

             CALC GLOBULIN (test code = 3.3 G/DL                               



             2240)                                               

 

             CALC A/G RATIO (test code = 1.4 RATIO                              



             2234)                                               

 

             BILIRUBIN, TOTAL (test code = 0.3 MG/DL                            

  



             )                                               

 

             ALKALINE PHOSPHATASE (test 79 U/L                                 



             code = 2204)                                        

 

             AST (test code = 2218) 48 U/L                                 

 

             ALT (test code = 2219) 48 U/L                                 



VITAMIN [id number] 00:00:00





             Test Item    Value        Reference Range Interpretation Comments

 

             VITAMIN B-12 (test code = 2840) 647 PG/ML                          

    



VITAMIN [id number] 00:00:00





             Test Item    Value        Reference Range Interpretation Comments

 

             VITAMIN B-12 (test code = 2840) 647 PG/ML                          

    



VITAMIN [id number] 00:00:00





             Test Item    Value        Reference Range Interpretation Comments

 

             VITAMIN B-12 (test code = 2840) 647 PG/ML                          

    



VITAMIN D, 25 [id number] 00:00:00





             Test Item    Value        Reference Range Interpretation Comments

 

             VITAMIN D, 25 OH (test code = 4958) 18 NG/ML                       

        



VITAMIN D, 25 [id number] 00:00:00





             Test Item    Value        Reference Range Interpretation Comments

 

             VITAMIN D, 25 OH (test code = 4958) 18 NG/ML                       

        



CULTURE, [id number] 12:01:26SPECIMEN NUMBER: [id number] CULTURE, URINE 
SPECIMEN NUMBER: [id number] SPECIMEN COMMENT: URINE SOURCE:URINE REPORT STATUS: 
FINAL FINAL REPORT: 2022 10-50,000 CFU/ML MIXED UROGENITAL ROWENA UNLESS OT
HERWISE INDICATED, ALL TESTING [facility]
[address] [place] [address] : CLIFTON DARBY M.D. CLIA 
NUMBER [id number] [facility] [place]. [id number], [id number] 
00:00:00





             Test Item    Value        Reference Range Interpretation Comments

 

             CULTURE, URINE (test SPECIMEN NUMBER:                           



             code = 02486) [id number]                              



CULTURE, [id number] 00:00:00





             Test Item    Value        Reference Range Interpretation Comments

 

             CULTURE, URINE (test SPECIMEN NUMBER:                           



             code = 93701) [id number]                              



CULTURE, [id number] 00:00:00





             Test Item    Value        Reference Range Interpretation Comments

 

             CULTURE, URINE (test SPECIMEN NUMBER:                           



             code = 30614) [id number]                              



CULTURE, [id number] 00:00:00





             Test Item    Value        Reference Range Interpretation Comments

 

             CULTURE, URINE (test SPECIMEN NUMBER:                           



             code = 32200) [id number]                              



CULTURE, [id number] 00:00:00





             Test Item    Value        Reference Range Interpretation Comments

 

             CULTURE, URINE (test SPECIMEN NUMBER:                           



             code = 28282) [id number]                              



CULTURE, [id number] 00:00:00





             Test Item    Value        Reference Range Interpretation Comments

 

             CULTURE, URINE (test SPECIMEN NUMBER:                           



             code = 18536) [id number]                              



CULTURE, [id number] 00:00:00





             Test Item    Value        Reference Range Interpretation Comments

 

             CULTURE, URINE (test SPECIMEN NUMBER:                           



             code = 98856) [id number]                              



CULTURE, [id number] 00:00:00





             Test Item    Value        Reference Range Interpretation Comments

 

             CULTURE, URINE (test SPECIMEN NUMBER:                           



             code = 69696) [id number]                              



CULTURE, [id number] 00:00:00





             Test Item    Value        Reference Range Interpretation Comments

 

             CULTURE, URINE (test SPECIMEN NUMBER:                           



             code = 33880) [id number]                              



CULTURE, [id number] 00:00:00





             Test Item    Value        Reference Range Interpretation Comments

 

             CULTURE, URINE (test SPECIMEN NUMBER:                           



             code = 11908) [id number]                              



CULTURE, [id number] 00:00:00





             Test Item    Value        Reference Range Interpretation Comments

 

             CULTURE, URINE (test SPECIMEN NUMBER:                           



             code = 80151) [id number]                              



TSH, THIRD [id number] 06:16:09





             Test Item    Value        Reference Range Interpretation Comments

 

             TSH, THIRD   <0.010 UIU/ML 0.400-4.100  L             UNLESS OTHERW

ISE



             GENERATION (test                                        INDICATED, 

ALL



             code = 2821)                                        TESTING PERFORM

ED



                                                                 ATCLINICAL PATH

[facility], [address]



                                                                 DIRECTOR: CLIFTON DARBY M.D.

 CLIA



                                                                 NUMBER [id number]

[id number]



                                                                 ACCREDITATION N

O.



                                                                 [id number]



HEMOGLOBIN [id number] 05:23:24





             Test Item    Value        Reference Range Interpretation Comments

 

             HEMOGLOBIN A1c (test 6.7 %        4.2-5.6      H             AMERIC

AN DIABETES



             code = 16209)                                        ASSOCIATION 

IDELINES FOR



                                                                 HGB A1C:



                                                                 PREDIABETES/INC

REASED RISK .



                                                                 . . . . . . 5.7

-6.4%



                                                                 DIAGNOSIS OF DI

ABETES . . .



                                                                 . . . . . . >=6

.5% WITH



                                                                 CONFIRMATION OR

 APPROPRIATE



                                                                 SYMPTOMS NOTE: 

ASSAY MAY BE



                                                                 AFFECTED BY



                                                                 HEMOGLOBINOPATH

IES (SICKLE



                                                                 CELL ANEMIA, S-

C DISEASE,



                                                                 OTHERS) OR ZENA

FICIALLY



                                                                 LOWERED BY DECR

EASED RED



                                                                 CELL SURVIVAL (

HEMOLYTIC



                                                                 ANEMIAS, BLOOD 

LOSS, ETC.).



                                                                 CONSIDER ALTERN

ATE TESTING



                                                                 OR LABORATORY C

ONSULTATION.



COMPREHENSIVE METABOLIC [id number] 04:45:06





             Test Item    Value        Reference Range Interpretation Comments

 

             GLUCOSE (test code = 266 MG/DL    70-99        H            



             )                                               

 

             BUN (test code = 17 MG/DL     )                                               

 

             CREATININE (test 0.92 MG/DL   0.60-1.30                 



             code = 2214)                                        

 

             eGFR ( CKD-EPI) 70 ML/MIN/1.73 >60                       



             (test code = 87555)                                        

 

             CALC BUN/CREAT (test 18 RATIO     6-28                      



             code = 2235)                                        

 

             SODIUM (test code = 136 MEQ/L    133-146                   



             )                                               

 

             POTASSIUM (test code 5.3 MEQ/L    3.5-5.4                   



             = )                                             

 

             CHLORIDE (test code 98 MEQ/L                         



             = 221)                                             

 

             CARBON DIOXIDE (test 22 MEQ/L     19-31                     



             code = 2206)                                        

 

             CALCIUM (test code = 8.8 MG/DL    8.5-10.5                  



             )                                               

 

             PROTEIN, TOTAL (test 7.4 G/DL     6.1-8.3                   



             code = 222)                                        

 

             ALBUMIN (test code = 4.1 G/DL     3.5-5.2                   



             )                                               

 

             CALC GLOBULIN (test 3.3 G/DL     1.9-3.7                   



             code = 2240)                                        

 

             CALC A/G RATIO (test 1.2 RATIO    1.0-2.6                   



             code = 2234)                                        

 

             BILIRUBIN, TOTAL 0.3 MG/DL    See_Comment                [Automated

 message]



             (test code = )                                        The syste

m which



                                                                 generated this



                                                                 result transmit

maximo



                                                                 reference range

:



                                                                 <=1.2. The refe

rence



                                                                 range was not u

sed



                                                                 to interpret th

is



                                                                 result as



                                                                 normal/abnormal

.

 

             ALKALINE PHOSPHATASE 88 U/L                           



             (test code = )                                        

 

             AST (test code = 32 U/L       9-40                      



             )                                               

 

             ALT (test code = 29 U/L       5-40                      



             )                                               



HEMOGLOBIN [id number] 00:00:00





             Test Item    Value        Reference Range Interpretation Comments

 

             HEMOGLOBIN A1c (test code = 89958) 6.7 %                           

       



HEMOGLOBIN [id number] 00:00:00





             Test Item    Value        Reference Range Interpretation Comments

 

             HEMOGLOBIN A1c (test code = 69188) 6.7 %                           

       



HEMOGLOBIN [id number] 00:00:00





             Test Item    Value        Reference Range Interpretation Comments

 

             HEMOGLOBIN A1c (test code = 80264) 6.7 %                           

       



COMPREHENSIVE METABOLIC [id number] 00:00:00





             Test Item    Value        Reference Range Interpretation Comments

 

             GLUCOSE (test code = 2217) 266 MG/DL                              

 

             BUN (test code = 2208) 17 MG/DL                               

 

             CREATININE (test code = 2214) 0.92 MG/DL                           

  

 

             eGFR ( CKD-EPI) (test code 70 ML/MIN/1.73                      

     



             = 16704)                                            

 

             CALC BUN/CREAT (test code = 18 RATIO                               



             2235)                                               

 

             SODIUM (test code = 2231) 136 MEQ/L                              

 

             POTASSIUM (test code = 2228) 5.3 MEQ/L                             

 

 

             CHLORIDE (test code = 2215) 98 MEQ/L                               

 

             CARBON DIOXIDE (test code = 22 MEQ/L                               



             2206)                                               

 

             CALCIUM (test code = 2209) 8.8 MG/DL                              

 

             PROTEIN, TOTAL (test code = 7.4 G/DL                               



             222)                                               

 

             ALBUMIN (test code = 2201) 4.1 G/DL                               

 

             CALC GLOBULIN (test code = 3.3 G/DL                               



             2240)                                               

 

             CALC A/G RATIO (test code = 1.2 RATIO                              



             2234)                                               

 

             BILIRUBIN, TOTAL (test code = 0.3 MG/DL                            

  



             )                                               

 

             ALKALINE PHOSPHATASE (test 88 U/L                                 



             code = 2204)                                        

 

             AST (test code = 2218) 32 U/L                                 

 

             ALT (test code = 2219) 29 U/L                                 



COMPREHENSIVE METABOLIC [id number] 00:00:00





             Test Item    Value        Reference Range Interpretation Comments

 

             GLUCOSE (test code = 2217) 266 MG/DL                              

 

             BUN (test code = 2208) 17 MG/DL                               

 

             CREATININE (test code = 2214) 0.92 MG/DL                           

  

 

             eGFR ( CKD-EPI) (test code 70 ML/MIN/1.73                      

     



             = 44679)                                            

 

             CALC BUN/CREAT (test code = 18 RATIO                               



             2235)                                               

 

             SODIUM (test code = 2231) 136 MEQ/L                              

 

             POTASSIUM (test code = 2228) 5.3 MEQ/L                             

 

 

             CHLORIDE (test code = 2215) 98 MEQ/L                               

 

             CARBON DIOXIDE (test code = 22 MEQ/L                               



             2206)                                               

 

             CALCIUM (test code = 2209) 8.8 MG/DL                              

 

             PROTEIN, TOTAL (test code = 7.4 G/DL                               



             )                                               

 

             ALBUMIN (test code = 2201) 4.1 G/DL                               

 

             CALC GLOBULIN (test code = 3.3 G/DL                               



             2240)                                               

 

             CALC A/G RATIO (test code = 1.2 RATIO                              



             2234)                                               

 

             BILIRUBIN, TOTAL (test code = 0.3 MG/DL                            

  



             )                                               

 

             ALKALINE PHOSPHATASE (test 88 U/L                                 



             code = 2204)                                        

 

             AST (test code = 2218) 32 U/L                                 

 

             ALT (test code = 2219) 29 U/L                                 



[id number] 00:00:00





             Test Item    Value        Reference Range Interpretation Comments

 

             TSH, THIRD GENERATION (test <0.010 UIU/ML                          

 



             code = 2821)                                        



[id number] 00:00:00





             Test Item    Value        Reference Range Interpretation Comments

 

             TSH, THIRD GENERATION (test <0.010 UIU/ML                          

 



             code = 2821)                                        



[id number] 00:00:00





             Test Item    Value        Reference Range Interpretation Comments

 

             TSH, THIRD GENERATION (test <0.010 UIU/ML                          

 



             code = 2821)                                        



HEMOGLOBIN [id number] 00:00:00





             Test Item    Value        Reference Range Interpretation Comments

 

             HEMOGLOBIN A1c (test code = 31130) 6.7 %                           

       



HEMOGLOBIN [id number] 00:00:00





             Test Item    Value        Reference Range Interpretation Comments

 

             HEMOGLOBIN A1c (test code = 04531) 6.7 %                           

       



HEMOGLOBIN [id number] 00:00:00





             Test Item    Value        Reference Range Interpretation Comments

 

             HEMOGLOBIN A1c (test code = 88320) 6.7 %                           

       



COMPREHENSIVE METABOLIC [id number] 00:00:00





             Test Item    Value        Reference Range Interpretation Comments

 

             GLUCOSE (test code = 2217) 266 MG/DL                              

 

             BUN (test code = 2208) 17 MG/DL                               

 

             CREATININE (test code = 2214) 0.92 MG/DL                           

  

 

             eGFR ( CKD-EPI) (test code 70 ML/MIN/1.73                      

     



             = 37852)                                            

 

             CALC BUN/CREAT (test code = 18 RATIO                               



             2235)                                               

 

             SODIUM (test code = 2231) 136 MEQ/L                              

 

             POTASSIUM (test code = 2228) 5.3 MEQ/L                             

 

 

             CHLORIDE (test code = 2215) 98 MEQ/L                               

 

             CARBON DIOXIDE (test code = 22 MEQ/L                               



             )                                               

 

             CALCIUM (test code = 2209) 8.8 MG/DL                              

 

             PROTEIN, TOTAL (test code = 7.4 G/DL                               



             )                                               

 

             ALBUMIN (test code = 2201) 4.1 G/DL                               

 

             CALC GLOBULIN (test code = 3.3 G/DL                               



             0)                                               

 

             CALC A/G RATIO (test code = 1.2 RATIO                              



             2234)                                               

 

             BILIRUBIN, TOTAL (test code = 0.3 MG/DL                            

  



             )                                               

 

             ALKALINE PHOSPHATASE (test 88 U/L                                 



             code = 2204)                                        

 

             AST (test code = 2218) 32 U/L                                 

 

             ALT (test code = 2219) 29 U/L                                 



COMPREHENSIVE METABOLIC [id number] 00:00:00





             Test Item    Value        Reference Range Interpretation Comments

 

             GLUCOSE (test code = 2217) 266 MG/DL                              

 

             BUN (test code = 2208) 17 MG/DL                               

 

             CREATININE (test code = 2214) 0.92 MG/DL                           

  

 

             eGFR ( CKD-EPI) (test code 70 ML/MIN/1.73                      

     



             = 17253)                                            

 

             CALC BUN/CREAT (test code = 18 RATIO                               



             )                                               

 

             SODIUM (test code = 2231) 136 MEQ/L                              

 

             POTASSIUM (test code = 2228) 5.3 MEQ/L                             

 

 

             CHLORIDE (test code = 2215) 98 MEQ/L                               

 

             CARBON DIOXIDE (test code = 22 MEQ/L                               



             )                                               

 

             CALCIUM (test code = 2209) 8.8 MG/DL                              

 

             PROTEIN, TOTAL (test code = 7.4 G/DL                               



             )                                               

 

             ALBUMIN (test code = 220) 4.1 G/DL                               

 

             CALC GLOBULIN (test code = 3.3 G/DL                               



             )                                               

 

             CALC A/G RATIO (test code = 1.2 RATIO                              



             )                                               

 

             BILIRUBIN, TOTAL (test code = 0.3 MG/DL                            

  



             )                                               

 

             ALKALINE PHOSPHATASE (test 88 U/L                                 



             code = 2204)                                        

 

             AST (test code = 2218) 32 U/L                                 

 

             ALT (test code = 2219) 29 U/L                                 



[id number] 00:00:00





             Test Item    Value        Reference Range Interpretation Comments

 

             TSH, THIRD GENERATION (test <0.010 UIU/ML                          

 



             code = 2821)                                        



[id number] 00:00:00





             Test Item    Value        Reference Range Interpretation Comments

 

             TSH, THIRD GENERATION (test <0.010 UIU/ML                          

 



             code = 2821)                                        



[id number] 00:00:00





             Test Item    Value        Reference Range Interpretation Comments

 

             TSH, THIRD GENERATION (test <0.010 UIU/ML                          

 



             code = 2821)                                        



HEMOGLOBIN [id number] 00:00:00





             Test Item    Value        Reference Range Interpretation Comments

 

             HEMOGLOBIN A1c (test code = 08445) 6.7 %                           

       



HEMOGLOBIN [id number] 00:00:00





             Test Item    Value        Reference Range Interpretation Comments

 

             HEMOGLOBIN A1c (test code = 46113) 6.7 %                           

       



HEMOGLOBIN [id number] 00:00:00





             Test Item    Value        Reference Range Interpretation Comments

 

             HEMOGLOBIN A1c (test code = 10086) 6.7 %                           

       



COMPREHENSIVE METABOLIC [id number] 00:00:00





             Test Item    Value        Reference Range Interpretation Comments

 

             GLUCOSE (test code = 2217) 266 MG/DL                              

 

             BUN (test code = 2208) 17 MG/DL                               

 

             CREATININE (test code = 2214) 0.92 MG/DL                           

  

 

             eGFR ( CKD-EPI) (test code 70 ML/MIN/1.73                      

     



             = 77685)                                            

 

             CALC BUN/CREAT (test code = 18 RATIO                               



             2235)                                               

 

             SODIUM (test code = 2231) 136 MEQ/L                              

 

             POTASSIUM (test code = 2228) 5.3 MEQ/L                             

 

 

             CHLORIDE (test code = 2215) 98 MEQ/L                               

 

             CARBON DIOXIDE (test code = 22 MEQ/L                               



             2206)                                               

 

             CALCIUM (test code = 2209) 8.8 MG/DL                              

 

             PROTEIN, TOTAL (test code = 7.4 G/DL                               



             )                                               

 

             ALBUMIN (test code = 2201) 4.1 G/DL                               

 

             CALC GLOBULIN (test code = 3.3 G/DL                               



             2240)                                               

 

             CALC A/G RATIO (test code = 1.2 RATIO                              



             2234)                                               

 

             BILIRUBIN, TOTAL (test code = 0.3 MG/DL                            

  



             )                                               

 

             ALKALINE PHOSPHATASE (test 88 U/L                                 



             code = 2204)                                        

 

             AST (test code = 2218) 32 U/L                                 

 

             ALT (test code = 2219) 29 U/L                                 



COMPREHENSIVE METABOLIC [id number] 00:00:00





             Test Item    Value        Reference Range Interpretation Comments

 

             GLUCOSE (test code = 2217) 266 MG/DL                              

 

             BUN (test code = 2208) 17 MG/DL                               

 

             CREATININE (test code = 2214) 0.92 MG/DL                           

  

 

             eGFR ( CKD-EPI) (test code 70 ML/MIN/1.73                      

     



             = 28195)                                            

 

             CALC BUN/CREAT (test code = 18 RATIO                               



             2235)                                               

 

             SODIUM (test code = 2231) 136 MEQ/L                              

 

             POTASSIUM (test code = 2228) 5.3 MEQ/L                             

 

 

             CHLORIDE (test code = 2215) 98 MEQ/L                               

 

             CARBON DIOXIDE (test code = 22 MEQ/L                               



             )                                               

 

             CALCIUM (test code = 2209) 8.8 MG/DL                              

 

             PROTEIN, TOTAL (test code = 7.4 G/DL                               



             9)                                               

 

             ALBUMIN (test code = 2201) 4.1 G/DL                               

 

             CALC GLOBULIN (test code = 3.3 G/DL                               



             2240)                                               

 

             CALC A/G RATIO (test code = 1.2 RATIO                              



             2234)                                               

 

             BILIRUBIN, TOTAL (test code = 0.3 MG/DL                            

  



             )                                               

 

             ALKALINE PHOSPHATASE (test 88 U/L                                 



             code = 2204)                                        

 

             AST (test code = 2218) 32 U/L                                 

 

             ALT (test code = 2219) 29 U/L                                 



[id number] 00:00:00





             Test Item    Value        Reference Range Interpretation Comments

 

             TSH, THIRD GENERATION (test <0.010 UIU/ML                          

 



             code = 2821)                                        



[id number] 00:00:00





             Test Item    Value        Reference Range Interpretation Comments

 

             TSH, THIRD GENERATION (test <0.010 UIU/ML                          

 



             code = 2821)                                        



[id number] 00:00:00





             Test Item    Value        Reference Range Interpretation Comments

 

             TSH, THIRD GENERATION (test <0.010 UIU/ML                          

 



             code = 2821)                                        



HEMOGLOBIN [id number] 00:00:00





             Test Item    Value        Reference Range Interpretation Comments

 

             HEMOGLOBIN A1c (test code = 28663) 6.7 %                           

       



HEMOGLOBIN [id number] 00:00:00





             Test Item    Value        Reference Range Interpretation Comments

 

             HEMOGLOBIN A1c (test code = 47866) 6.7 %                           

       



HEMOGLOBIN [id number] 00:00:00





             Test Item    Value        Reference Range Interpretation Comments

 

             HEMOGLOBIN A1c (test code = 63065) 6.7 %                           

       



COMPREHENSIVE METABOLIC [id number] 00:00:00





             Test Item    Value        Reference Range Interpretation Comments

 

             GLUCOSE (test code = 2217) 266 MG/DL                              

 

             BUN (test code = 2208) 17 MG/DL                               

 

             CREATININE (test code = 2214) 0.92 MG/DL                           

  

 

             eGFR ( CKD-EPI) (test code 70 ML/MIN/1.73                      

     



             = 84446)                                            

 

             CALC BUN/CREAT (test code = 18 RATIO                               



             5)                                               

 

             SODIUM (test code = 2231) 136 MEQ/L                              

 

             POTASSIUM (test code = 2228) 5.3 MEQ/L                             

 

 

             CHLORIDE (test code = 2215) 98 MEQ/L                               

 

             CARBON DIOXIDE (test code = 22 MEQ/L                               



             )                                               

 

             CALCIUM (test code = 2209) 8.8 MG/DL                              

 

             PROTEIN, TOTAL (test code = 7.4 G/DL                               



             )                                               

 

             ALBUMIN (test code = 2201) 4.1 G/DL                               

 

             CALC GLOBULIN (test code = 3.3 G/DL                               



             0)                                               

 

             CALC A/G RATIO (test code = 1.2 RATIO                              



             )                                               

 

             BILIRUBIN, TOTAL (test code = 0.3 MG/DL                            

  



             )                                               

 

             ALKALINE PHOSPHATASE (test 88 U/L                                 



             code = 2204)                                        

 

             AST (test code = 2218) 32 U/L                                 

 

             ALT (test code = 2219) 29 U/L                                 



COMPREHENSIVE METABOLIC [id number] 00:00:00





             Test Item    Value        Reference Range Interpretation Comments

 

             GLUCOSE (test code = 2217) 266 MG/DL                              

 

             BUN (test code = 2208) 17 MG/DL                               

 

             CREATININE (test code = 2214) 0.92 MG/DL                           

  

 

             eGFR ( CKD-EPI) (test code 70 ML/MIN/1.73                      

     



             = 15106)                                            

 

             CALC BUN/CREAT (test code = 18 RATIO                               



             2235)                                               

 

             SODIUM (test code = 2231) 136 MEQ/L                              

 

             POTASSIUM (test code = 2228) 5.3 MEQ/L                             

 

 

             CHLORIDE (test code = 2215) 98 MEQ/L                               

 

             CARBON DIOXIDE (test code = 22 MEQ/L                               



             )                                               

 

             CALCIUM (test code = 2209) 8.8 MG/DL                              

 

             PROTEIN, TOTAL (test code = 7.4 G/DL                               



             )                                               

 

             ALBUMIN (test code = 220) 4.1 G/DL                               

 

             CALC GLOBULIN (test code = 3.3 G/DL                               



             )                                               

 

             CALC A/G RATIO (test code = 1.2 RATIO                              



             )                                               

 

             BILIRUBIN, TOTAL (test code = 0.3 MG/DL                            

  



             )                                               

 

             ALKALINE PHOSPHATASE (test 88 U/L                                 



             code = 2204)                                        

 

             AST (test code = 2218) 32 U/L                                 

 

             ALT (test code = 2219) 29 U/L                                 



[id number] 00:00:00





             Test Item    Value        Reference Range Interpretation Comments

 

             TSH, THIRD GENERATION (test <0.010 UIU/ML                          

 



             code = 2821)                                        



[id number] 00:00:00





             Test Item    Value        Reference Range Interpretation Comments

 

             TSH, THIRD GENERATION (test <0.010 UIU/ML                          

 



             code = 2821)                                        



[id number] 00:00:00





             Test Item    Value        Reference Range Interpretation Comments

 

             TSH, THIRD GENERATION (test <0.010 UIU/ML                          

 



             code = 2821)                                        



HEMOGLOBIN [id number] 00:00:00





             Test Item    Value        Reference Range Interpretation Comments

 

             HEMOGLOBIN A1c (test code = 94057) 6.7 %                           

       



HEMOGLOBIN [id number] 00:00:00





             Test Item    Value        Reference Range Interpretation Comments

 

             HEMOGLOBIN A1c (test code = 78020) 6.7 %                           

       



COMPREHENSIVE METABOLIC [id number] 00:00:00





             Test Item    Value        Reference Range Interpretation Comments

 

             GLUCOSE (test code = 2217) 266 MG/DL                              

 

             BUN (test code = 2208) 17 MG/DL                               

 

             CREATININE (test code = 2214) 0.92 MG/DL                           

  

 

             eGFR ( CKD-EPI) (test code 70 ML/MIN/1.73                      

     



             = 06899)                                            

 

             CALC BUN/CREAT (test code = 18 RATIO                               



             2235)                                               

 

             SODIUM (test code = 2231) 136 MEQ/L                              

 

             POTASSIUM (test code = 2228) 5.3 MEQ/L                             

 

 

             CHLORIDE (test code = 2215) 98 MEQ/L                               

 

             CARBON DIOXIDE (test code = 22 MEQ/L                               



             )                                               

 

             CALCIUM (test code = 2209) 8.8 MG/DL                              

 

             PROTEIN, TOTAL (test code = 7.4 G/DL                               



             )                                               

 

             ALBUMIN (test code = 2201) 4.1 G/DL                               

 

             CALC GLOBULIN (test code = 3.3 G/DL                               



             )                                               

 

             CALC A/G RATIO (test code = 1.2 RATIO                              



             )                                               

 

             BILIRUBIN, TOTAL (test code = 0.3 MG/DL                            

  



             )                                               

 

             ALKALINE PHOSPHATASE (test 88 U/L                                 



             code = 2204)                                        

 

             AST (test code = 2218) 32 U/L                                 

 

             ALT (test code = 2219) 29 U/L                                 



[id number] 00:00:00





             Test Item    Value        Reference Range Interpretation Comments

 

             TSH, THIRD GENERATION (test <0.010 UIU/ML                          

 



             code = 2821)                                        



[id number] 00:00:00





             Test Item    Value        Reference Range Interpretation Comments

 

             TSH, THIRD GENERATION (test <0.010 UIU/ML                          

 



             code = 2821)                                        



HEMOGLOBIN [id number] 00:00:00





             Test Item    Value        Reference Range Interpretation Comments

 

             HEMOGLOBIN A1c (test code = 23136) 6.7 %                           

       



HEMOGLOBIN [id number] 00:00:00





             Test Item    Value        Reference Range Interpretation Comments

 

             HEMOGLOBIN A1c (test code = 75169) 6.7 %                           

       



HEMOGLOBIN [id number] 00:00:00





             Test Item    Value        Reference Range Interpretation Comments

 

             HEMOGLOBIN A1c (test code = 91850) 6.7 %                           

       



COMPREHENSIVE METABOLIC [id number] 00:00:00





             Test Item    Value        Reference Range Interpretation Comments

 

             GLUCOSE (test code = 2217) 266 MG/DL                              

 

             BUN (test code = 2208) 17 MG/DL                               

 

             CREATININE (test code = 2214) 0.92 MG/DL                           

  

 

             eGFR ( CKD-EPI) (test code 70 ML/MIN/1.73                      

     



             = 05270)                                            

 

             CALC BUN/CREAT (test code = 18 RATIO                               



             2235)                                               

 

             SODIUM (test code = 2231) 136 MEQ/L                              

 

             POTASSIUM (test code = 2228) 5.3 MEQ/L                             

 

 

             CHLORIDE (test code = 2215) 98 MEQ/L                               

 

             CARBON DIOXIDE (test code = 22 MEQ/L                               



             2206)                                               

 

             CALCIUM (test code = 2209) 8.8 MG/DL                              

 

             PROTEIN, TOTAL (test code = 7.4 G/DL                               



             222)                                               

 

             ALBUMIN (test code = 2201) 4.1 G/DL                               

 

             CALC GLOBULIN (test code = 3.3 G/DL                               



             2240)                                               

 

             CALC A/G RATIO (test code = 1.2 RATIO                              



             2234)                                               

 

             BILIRUBIN, TOTAL (test code = 0.3 MG/DL                            

  



             )                                               

 

             ALKALINE PHOSPHATASE (test 88 U/L                                 



             code = 2204)                                        

 

             AST (test code = 2218) 32 U/L                                 

 

             ALT (test code = 2219) 29 U/L                                 



COMPREHENSIVE METABOLIC [id number] 00:00:00





             Test Item    Value        Reference Range Interpretation Comments

 

             GLUCOSE (test code = 2217) 266 MG/DL                              

 

             BUN (test code = 2208) 17 MG/DL                               

 

             CREATININE (test code = 2214) 0.92 MG/DL                           

  

 

             eGFR ( CKD-EPI) (test code 70 ML/MIN/1.73                      

     



             = 36454)                                            

 

             CALC BUN/CREAT (test code = 18 RATIO                               



             2235)                                               

 

             SODIUM (test code = 2231) 136 MEQ/L                              

 

             POTASSIUM (test code = 2228) 5.3 MEQ/L                             

 

 

             CHLORIDE (test code = 2215) 98 MEQ/L                               

 

             CARBON DIOXIDE (test code = 22 MEQ/L                               



             )                                               

 

             CALCIUM (test code = 2209) 8.8 MG/DL                              

 

             PROTEIN, TOTAL (test code = 7.4 G/DL                               



             )                                               

 

             ALBUMIN (test code = 2201) 4.1 G/DL                               

 

             CALC GLOBULIN (test code = 3.3 G/DL                               



             2240)                                               

 

             CALC A/G RATIO (test code = 1.2 RATIO                              



             2234)                                               

 

             BILIRUBIN, TOTAL (test code = 0.3 MG/DL                            

  



             )                                               

 

             ALKALINE PHOSPHATASE (test 88 U/L                                 



             code = 2204)                                        

 

             AST (test code = 2218) 32 U/L                                 

 

             ALT (test code = 2219) 29 U/L                                 



[id number] 00:00:00





             Test Item    Value        Reference Range Interpretation Comments

 

             TSH, THIRD GENERATION (test <0.010 UIU/ML                          

 



             code = 2821)                                        



[id number] 00:00:00





             Test Item    Value        Reference Range Interpretation Comments

 

             TSH, THIRD GENERATION (test <0.010 UIU/ML                          

 



             code = 2821)                                        



[id number] 00:00:00





             Test Item    Value        Reference Range Interpretation Comments

 

             TSH, THIRD GENERATION (test <0.010 UIU/ML                          

 



             code = 2821)                                        



TSH, THIRD [id number] 06:18:54





             Test Item    Value        Reference Range Interpretation Comments

 

             TSH, THIRD GENERATION (test code 0.190 UIU/ML 0.400-4.100  L       

     



             = 2821)                                             



HEMOGLOBIN [id number] 03:11:56





             Test Item    Value        Reference Range Interpretation Comments

 

             HEMOGLOBIN A1c (test 8.4 %        4.2-5.6      H             AMERIC

AN DIABETES



             code = 04241)                                        ASSOCIATION 

IDELINES FOR



                                                                 HGB A1C:



                                                                 PREDIABETES/INC

REASED RISK .



                                                                 . . . . . . 5.7

-6.4%



                                                                 DIAGNOSIS OF DI

ABETES . . .



                                                                 . . . . . . >=6

.5% WITH



                                                                 CONFIRMATION OR

 APPROPRIATE



                                                                 SYMPTOMS NOTE: 

ASSAY MAY BE



                                                                 AFFECTED BY



                                                                 HEMOGLOBINOPATH

IES (SICKLE



                                                                 CELL ANEMIA, S-

C DISEASE,



                                                                 OTHERS) OR ZENA

FICIALLY



                                                                 LOWERED BY DECR

EASED RED



                                                                 CELL SURVIVAL (

HEMOLYTIC



                                                                 ANEMIAS, BLOOD 

LOSS, ETC.).



                                                                 CONSIDER ALTERN

ATE TESTING



                                                                 OR LABORATORY C

ONSULTATION.



COMPREHENSIVE METABOLIC [id number] 03:11:06





             Test Item    Value        Reference Range Interpretation Comments

 

             GLUCOSE (test code = 141 MG/DL    70-99        H            



             )                                               

 

             BUN (test code = 31 MG/DL     8-23         H            



             )                                               

 

             CREATININE (test 1.07 MG/DL   0.60-1.30                 



             code = 2214)                                        

 

             eGFR ( CKD-EPI) 59 ML/MIN/1.73 >60          L            



             (test code = 86449)                                        

 

             CALC BUN/CREAT (test 29 RATIO     6-28         H            



             code = 2235)                                        

 

             SODIUM (test code = 141 MEQ/L    133-146                   



             )                                               

 

             POTASSIUM (test code 4.7 MEQ/L    3.5-5.4                   



             = )                                             

 

             CHLORIDE (test code 99 MEQ/L                         



             = 221)                                             

 

             CARBON DIOXIDE (test 26 MEQ/L     19-31                     



             code = 2206)                                        

 

             CALCIUM (test code = 8.8 MG/DL    8.5-10.5                  



             )                                               

 

             PROTEIN, TOTAL (test 8.0 G/DL     6.1-8.3                   



             code = 2229)                                        

 

             ALBUMIN (test code = 4.5 G/DL     3.5-5.2                   



             )                                               

 

             CALC GLOBULIN (test 3.5 G/DL     1.9-3.7                   



             code = 2240)                                        

 

             CALC A/G RATIO (test 1.3 RATIO    1.0-2.6                   



             code = 2234)                                        

 

             BILIRUBIN, TOTAL 0.4 MG/DL    See_Comment                [Automated

 message]



             (test code = 2207)                                        The syste

m which



                                                                 generated this



                                                                 result transmit

maximo



                                                                 reference range

:



                                                                 <=1.2. The refe

rence



                                                                 range was not u

sed



                                                                 to interpret th

is



                                                                 result as



                                                                 normal/abnormal

.

 

             ALKALINE PHOSPHATASE 67 U/L                           



             (test code = 2204)                                        

 

             AST (test code = 49 U/L       9-40         H            



             )                                               

 

             ALT (test code = 41 U/L       5-40         H            



             )                                               



LIPID [id number] 03:11:06





             Test Item    Value        Reference Range Interpretation Comments

 

             CHOLESTEROL (test 113 MG/DL    <200                      



             code = 2210)                                        

 

             TRIGLYCERIDES (test 165 MG/DL    <150         H            



             code = 2232)                                        

 

             HDL CHOLESTEROL (test 39 MG/DL     >39          L            



             code = 2220)                                        

 

             CALC LDL CHOL (test 50 MG/DL     <100                       NOTE: C

ALCULATED LDL



             code = 2237)                                        IS BASED ON



                                                                 CAROLANN-FUNES 

METHOD



                                                                 WHICHINCLUDES



                                                                 ADJUSTABLE



                                                                 TRIGLYCERIDE:VL

DL



                                                                 CHOLESTEROL RAT

IO.THIS



                                                                 FACTOR VARIES B

Y



                                                                 MEASURED TRIGLY

CERIDE



                                                                 AND NON-HDLCHOL

ESTEROL



                                                                 CONCENTRATIONS 

WITH



                                                                 INCREASED CALCU

LATED



                                                                 LDL SEENIN HIGH

ER



                                                                 TRIGLYCERIDE OR

 LOWER



                                                                 NON-HDL SPECIME

NS. FOR



                                                                 MOREINFORMATION

, SEE



                                                                 CLIENT ANNOUNCE

MENT AT



                                                                 [email or web address]



                                                                 [email or web address]

 

             RISK RATIO LDL/HDL 1.28 RATIO   <3.22                     



             (test code = 2238)                                        



IRON, [id number] 03:11:06





             Test Item    Value        Reference Range Interpretation Comments

 

             IRON, SERUM (test 59 UG/DL                          UNLESS OT

HERWISE



             code = 2222)                                        INDICATED, ALL 

TESTING



                                                                 PERFORMED ATCLI

NICAL



                                                                 PATHOLOGY LABOR

[facility]



                                                                 [facility] [address] [place] [address],



                                                                 [place] [address] [address]



                                                                 DIRECTOR: LOUIS WARD



                                                                 NUMBER [id number]

[phone]



                                                                 ACCREDITATION N

O. [id number]



HEMOGLOBIN [id number] 00:00:00





             Test Item    Value        Reference Range Interpretation Comments

 

             HEMOGLOBIN A1c (test code = 13698) 8.4 %                           

       



HEMOGLOBIN [id number] 00:00:00





             Test Item    Value        Reference Range Interpretation Comments

 

             HEMOGLOBIN A1c (test code = 95802) 8.4 %                           

       



HEMOGLOBIN [id number] 00:00:00





             Test Item    Value        Reference Range Interpretation Comments

 

             HEMOGLOBIN A1c (test code = 58800) 8.4 %                           

       



COMPREHENSIVE METABOLIC [id number] 00:00:00





             Test Item    Value        Reference Range Interpretation Comments

 

             GLUCOSE (test code = 2217) 141 MG/DL                              

 

             BUN (test code = 2208) 31 MG/DL                               

 

             CREATININE (test code = 2214) 1.07 MG/DL                           

  

 

             eGFR ( CKD-EPI) (test code 59 ML/MIN/1.73                      

     



             = 64280)                                            

 

             CALC BUN/CREAT (test code = 29 RATIO                               



             2235)                                               

 

             SODIUM (test code = 2231) 141 MEQ/L                              

 

             POTASSIUM (test code = 2228) 4.7 MEQ/L                             

 

 

             CHLORIDE (test code = 2215) 99 MEQ/L                               

 

             CARBON DIOXIDE (test code = 26 MEQ/L                               



             )                                               

 

             CALCIUM (test code = 2209) 8.8 MG/DL                              

 

             PROTEIN, TOTAL (test code = 8.0 G/DL                               



             )                                               

 

             ALBUMIN (test code = 2201) 4.5 G/DL                               

 

             CALC GLOBULIN (test code = 3.5 G/DL                               



             )                                               

 

             CALC A/G RATIO (test code = 1.3 RATIO                              



             )                                               

 

             BILIRUBIN, TOTAL (test code = 0.4 MG/DL                            

  



             )                                               

 

             ALKALINE PHOSPHATASE (test 67 U/L                                 



             code = 2204)                                        

 

             AST (test code = 2218) 49 U/L                                 

 

             ALT (test code = 2219) 41 U/L                                 



COMPREHENSIVE METABOLIC [id number] 00:00:00





             Test Item    Value        Reference Range Interpretation Comments

 

             GLUCOSE (test code = 2217) 141 MG/DL                              

 

             BUN (test code = 2208) 31 MG/DL                               

 

             CREATININE (test code = 2214) 1.07 MG/DL                           

  

 

             eGFR ( CKD-EPI) (test code 59 ML/MIN/1.73                      

     



             = 48311)                                            

 

             CALC BUN/CREAT (test code = 29 RATIO                               



             2235)                                               

 

             SODIUM (test code = 2231) 141 MEQ/L                              

 

             POTASSIUM (test code = 2228) 4.7 MEQ/L                             

 

 

             CHLORIDE (test code = 2215) 99 MEQ/L                               

 

             CARBON DIOXIDE (test code = 26 MEQ/L                               



             220)                                               

 

             CALCIUM (test code = 2209) 8.8 MG/DL                              

 

             PROTEIN, TOTAL (test code = 8.0 G/DL                               



             )                                               

 

             ALBUMIN (test code = 2201) 4.5 G/DL                               

 

             CALC GLOBULIN (test code = 3.5 G/DL                               



             )                                               

 

             CALC A/G RATIO (test code = 1.3 RATIO                              



             4)                                               

 

             BILIRUBIN, TOTAL (test code = 0.4 MG/DL                            

  



             )                                               

 

             ALKALINE PHOSPHATASE (test 67 U/L                                 



             code = 2204)                                        

 

             AST (test code = 2218) 49 U/L                                 

 

             ALT (test code = 2219) 41 U/L                                 



LIPID [id number] 00:00:00





             Test Item    Value        Reference Range Interpretation Comments

 

             CHOLESTEROL (test code = 2210) 113 MG/DL                           

   

 

             TRIGLYCERIDES (test code = 2232) 165 MG/DL                         

     

 

             HDL CHOLESTEROL (test code = 2220) 39 MG/DL                        

       

 

             CALC LDL CHOL (test code = 2237) 50 MG/DL                          

     

 

             RISK RATIO LDL/HDL (test code = 1.28 RATIO                         

    



             2238)                                               



LIPID [id number] 00:00:00





             Test Item    Value        Reference Range Interpretation Comments

 

             CHOLESTEROL (test code = 2210) 113 MG/DL                           

   

 

             TRIGLYCERIDES (test code = 2232) 165 MG/DL                         

     

 

             HDL CHOLESTEROL (test code = 2220) 39 MG/DL                        

       

 

             CALC LDL CHOL (test code = 2237) 50 MG/DL                          

     

 

             RISK RATIO LDL/HDL (test code = 1.28 RATIO                         

    



             2238)                                               



[id number] 00:00:00





             Test Item    Value        Reference Range Interpretation Comments

 

             TSH, THIRD GENERATION (test code 0.190 UIU/ML                      

     



             = 2821)                                             



[id number] 00:00:00





             Test Item    Value        Reference Range Interpretation Comments

 

             TSH, THIRD GENERATION (test code 0.190 UIU/ML                      

     



             = 2821)                                             



[id number] 00:00:00





             Test Item    Value        Reference Range Interpretation Comments

 

             TSH, THIRD GENERATION (test code 0.190 UIU/ML                      

     



             = 2821)                                             



IRON, [id number] 00:00:00





             Test Item    Value        Reference Range Interpretation Comments

 

             IRON, SERUM (test code = 2222) 59 UG/DL                            

   



IRON, [id number] 00:00:00





             Test Item    Value        Reference Range Interpretation Comments

 

             IRON, SERUM (test code = 2222) 59 UG/DL                            

   



HEMOGLOBIN [id number] 00:00:00





             Test Item    Value        Reference Range Interpretation Comments

 

             HEMOGLOBIN A1c (test code = 11634) 8.4 %                           

       



HEMOGLOBIN [id number] 00:00:00





             Test Item    Value        Reference Range Interpretation Comments

 

             HEMOGLOBIN A1c (test code = 11197) 8.4 %                           

       



HEMOGLOBIN [id number] 00:00:00





             Test Item    Value        Reference Range Interpretation Comments

 

             HEMOGLOBIN A1c (test code = 84733) 8.4 %                           

       



HEMOGLOBIN [id number] 00:00:00





             Test Item    Value        Reference Range Interpretation Comments

 

             HEMOGLOBIN A1c (test code = 03481) 8.4 %                           

       



COMPREHENSIVE METABOLIC [id number] 00:00:00





             Test Item    Value        Reference Range Interpretation Comments

 

             GLUCOSE (test code = 2217) 141 MG/DL                              

 

             BUN (test code = 2208) 31 MG/DL                               

 

             CREATININE (test code = 2214) 1.07 MG/DL                           

  

 

             eGFR ( CKD-EPI) (test code 59 ML/MIN/1.73                      

     



             = 14324)                                            

 

             CALC BUN/CREAT (test code = 29 RATIO                               



             2235)                                               

 

             SODIUM (test code = 2231) 141 MEQ/L                              

 

             POTASSIUM (test code = 2228) 4.7 MEQ/L                             

 

 

             CHLORIDE (test code = 2215) 99 MEQ/L                               

 

             CARBON DIOXIDE (test code = 26 MEQ/L                               



             )                                               

 

             CALCIUM (test code = 2209) 8.8 MG/DL                              

 

             PROTEIN, TOTAL (test code = 8.0 G/DL                               



             )                                               

 

             ALBUMIN (test code = 2201) 4.5 G/DL                               

 

             CALC GLOBULIN (test code = 3.5 G/DL                               



             0)                                               

 

             CALC A/G RATIO (test code = 1.3 RATIO                              



             4)                                               

 

             BILIRUBIN, TOTAL (test code = 0.4 MG/DL                            

  



             )                                               

 

             ALKALINE PHOSPHATASE (test 67 U/L                                 



             code = 2204)                                        

 

             AST (test code = 2218) 49 U/L                                 

 

             ALT (test code = 2219) 41 U/L                                 



COMPREHENSIVE METABOLIC [id number] 00:00:00





             Test Item    Value        Reference Range Interpretation Comments

 

             GLUCOSE (test code = 2217) 141 MG/DL                              

 

             BUN (test code = 2208) 31 MG/DL                               

 

             CREATININE (test code = 2214) 1.07 MG/DL                           

  

 

             eGFR ( CKD-EPI) (test code 59 ML/MIN/1.73                      

     



             = 60279)                                            

 

             CALC BUN/CREAT (test code = 29 RATIO                               



             2235)                                               

 

             SODIUM (test code = 2231) 141 MEQ/L                              

 

             POTASSIUM (test code = 2228) 4.7 MEQ/L                             

 

 

             CHLORIDE (test code = 2215) 99 MEQ/L                               

 

             CARBON DIOXIDE (test code = 26 MEQ/L                               



             )                                               

 

             CALCIUM (test code = 220) 8.8 MG/DL                              

 

             PROTEIN, TOTAL (test code = 8.0 G/DL                               



             )                                               

 

             ALBUMIN (test code = 220) 4.5 G/DL                               

 

             CALC GLOBULIN (test code = 3.5 G/DL                               



             )                                               

 

             CALC A/G RATIO (test code = 1.3 RATIO                              



             2234)                                               

 

             BILIRUBIN, TOTAL (test code = 0.4 MG/DL                            

  



             )                                               

 

             ALKALINE PHOSPHATASE (test 67 U/L                                 



             code = 2204)                                        

 

             AST (test code = 2218) 49 U/L                                 

 

             ALT (test code = 2219) 41 U/L                                 



HEMOGLOBIN [id number] 00:00:00





             Test Item    Value        Reference Range Interpretation Comments

 

             HEMOGLOBIN A1c (test code = 27443) 8.4 %                           

       



LIPID [id number] 00:00:00





             Test Item    Value        Reference Range Interpretation Comments

 

             CHOLESTEROL (test code = 2210) 113 MG/DL                           

   

 

             TRIGLYCERIDES (test code = 2232) 165 MG/DL                         

     

 

             HDL CHOLESTEROL (test code = 2220) 39 MG/DL                        

       

 

             CALC LDL CHOL (test code = 2237) 50 MG/DL                          

     

 

             RISK RATIO LDL/HDL (test code = 1.28 RATIO                         

    



             2238)                                               



LIPID [id number] 00:00:00





             Test Item    Value        Reference Range Interpretation Comments

 

             CHOLESTEROL (test code = 2210) 113 MG/DL                           

   

 

             TRIGLYCERIDES (test code = 2232) 165 MG/DL                         

     

 

             HDL CHOLESTEROL (test code = 2220) 39 MG/DL                        

       

 

             CALC LDL CHOL (test code = 2237) 50 MG/DL                          

     

 

             RISK RATIO LDL/HDL (test code = 1.28 RATIO                         

    



             2238)                                               



COMPREHENSIVE METABOLIC [id number] 00:00:00





             Test Item    Value        Reference Range Interpretation Comments

 

             GLUCOSE (test code = 2217) 141 MG/DL                              

 

             BUN (test code = 2208) 31 MG/DL                               

 

             CREATININE (test code = 2214) 1.07 MG/DL                           

  

 

             eGFR ( CKD-EPI) (test code 59 ML/MIN/1.73                      

     



             = 26869)                                            

 

             CALC BUN/CREAT (test code = 29 RATIO                               



             2235)                                               

 

             SODIUM (test code = 2231) 141 MEQ/L                              

 

             POTASSIUM (test code = 2228) 4.7 MEQ/L                             

 

 

             CHLORIDE (test code = 2215) 99 MEQ/L                               

 

             CARBON DIOXIDE (test code = 26 MEQ/L                               



             )                                               

 

             CALCIUM (test code = 2209) 8.8 MG/DL                              

 

             PROTEIN, TOTAL (test code = 8.0 G/DL                               



             )                                               

 

             ALBUMIN (test code = 2201) 4.5 G/DL                               

 

             CALC GLOBULIN (test code = 3.5 G/DL                               



             2240)                                               

 

             CALC A/G RATIO (test code = 1.3 RATIO                              



             2234)                                               

 

             BILIRUBIN, TOTAL (test code = 0.4 MG/DL                            

  



             )                                               

 

             ALKALINE PHOSPHATASE (test 67 U/L                                 



             code = 2204)                                        

 

             AST (test code = 2218) 49 U/L                                 

 

             ALT (test code = 2219) 41 U/L                                 



[id number] 00:00:00





             Test Item    Value        Reference Range Interpretation Comments

 

             TSH, THIRD GENERATION (test code 0.190 UIU/ML                      

     



             = 2821)                                             



[id number] 00:00:00





             Test Item    Value        Reference Range Interpretation Comments

 

             TSH, THIRD GENERATION (test code 0.190 UIU/ML                      

     



             = 2821)                                             



 00:00:00





             Test Item    Value        Reference Range Interpretation Comments

 

             TSH, THIRD GENERATION (test code 0.190 UIU/ML                      

     



             = 2821)                                             



IRON, [id number] 00:00:00





             Test Item    Value        Reference Range Interpretation Comments

 

             IRON, SERUM (test code = 2222) 59 UG/DL                            

   



IRON, [id number] 00:00:00





             Test Item    Value        Reference Range Interpretation Comments

 

             IRON, SERUM (test code = 2222) 59 UG/DL                            

   



LIPID [id number] 00:00:00





             Test Item    Value        Reference Range Interpretation Comments

 

             CHOLESTEROL (test code = 2210) 113 MG/DL                           

   

 

             TRIGLYCERIDES (test code = 2232) 165 MG/DL                         

     

 

             HDL CHOLESTEROL (test code = 2220) 39 MG/DL                        

       

 

             CALC LDL CHOL (test code = 2237) 50 MG/DL                          

     

 

             RISK RATIO LDL/HDL (test code = 1.28 RATIO                         

    



             2238)                                               



[id number] 00:00:00





             Test Item    Value        Reference Range Interpretation Comments

 

             TSH, THIRD GENERATION (test code 0.190 UIU/ML                      

     



             = 2821)                                             



[id number] 00:00:00





             Test Item    Value        Reference Range Interpretation Comments

 

             TSH, THIRD GENERATION (test code 0.190 UIU/ML                      

     



             = 2821)                                             



IRON, [id number] 00:00:00





             Test Item    Value        Reference Range Interpretation Comments

 

             IRON, SERUM (test code = 2222) 59 UG/DL                            

   



HEMOGLOBIN [id number] 00:00:00





             Test Item    Value        Reference Range Interpretation Comments

 

             HEMOGLOBIN A1c (test code = 56775) 8.4 %                           

       



HEMOGLOBIN [id number] 00:00:00





             Test Item    Value        Reference Range Interpretation Comments

 

             HEMOGLOBIN A1c (test code = 83340) 8.4 %                           

       



HEMOGLOBIN [id number] 00:00:00





             Test Item    Value        Reference Range Interpretation Comments

 

             HEMOGLOBIN A1c (test code = 78572) 8.4 %                           

       



COMPREHENSIVE METABOLIC [id number] 00:00:00





             Test Item    Value        Reference Range Interpretation Comments

 

             GLUCOSE (test code = 2217) 141 MG/DL                              

 

             BUN (test code = 2208) 31 MG/DL                               

 

             CREATININE (test code = 2214) 1.07 MG/DL                           

  

 

             eGFR ( CKD-EPI) (test code 59 ML/MIN/1.73                      

     



             = 51968)                                            

 

             CALC BUN/CREAT (test code = 29 RATIO                               



             2235)                                               

 

             SODIUM (test code = 2231) 141 MEQ/L                              

 

             POTASSIUM (test code = 2228) 4.7 MEQ/L                             

 

 

             CHLORIDE (test code = 2215) 99 MEQ/L                               

 

             CARBON DIOXIDE (test code = 26 MEQ/L                               



             )                                               

 

             CALCIUM (test code = 2209) 8.8 MG/DL                              

 

             PROTEIN, TOTAL (test code = 8.0 G/DL                               



             )                                               

 

             ALBUMIN (test code = 2201) 4.5 G/DL                               

 

             CALC GLOBULIN (test code = 3.5 G/DL                               



             0)                                               

 

             CALC A/G RATIO (test code = 1.3 RATIO                              



             2234)                                               

 

             BILIRUBIN, TOTAL (test code = 0.4 MG/DL                            

  



             )                                               

 

             ALKALINE PHOSPHATASE (test 67 U/L                                 



             code = 2204)                                        

 

             AST (test code = 2218) 49 U/L                                 

 

             ALT (test code = 2219) 41 U/L                                 



COMPREHENSIVE METABOLIC [id number] 00:00:00





             Test Item    Value        Reference Range Interpretation Comments

 

             GLUCOSE (test code = 2217) 141 MG/DL                              

 

             BUN (test code = 2208) 31 MG/DL                               

 

             CREATININE (test code = 2214) 1.07 MG/DL                           

  

 

             eGFR ( CKD-EPI) (test code 59 ML/MIN/1.73                      

     



             = 08763)                                            

 

             CALC BUN/CREAT (test code = 29 RATIO                               



             2235)                                               

 

             SODIUM (test code = 2231) 141 MEQ/L                              

 

             POTASSIUM (test code = 2228) 4.7 MEQ/L                             

 

 

             CHLORIDE (test code = 2215) 99 MEQ/L                               

 

             CARBON DIOXIDE (test code = 26 MEQ/L                               



             )                                               

 

             CALCIUM (test code = 2209) 8.8 MG/DL                              

 

             PROTEIN, TOTAL (test code = 8.0 G/DL                               



             )                                               

 

             ALBUMIN (test code = 2201) 4.5 G/DL                               

 

             CALC GLOBULIN (test code = 3.5 G/DL                               



             )                                               

 

             CALC A/G RATIO (test code = 1.3 RATIO                              



             4)                                               

 

             BILIRUBIN, TOTAL (test code = 0.4 MG/DL                            

  



             )                                               

 

             ALKALINE PHOSPHATASE (test 67 U/L                                 



             code = 2204)                                        

 

             AST (test code = 2218) 49 U/L                                 

 

             ALT (test code = 2219) 41 U/L                                 



LIPID [id number] 00:00:00





             Test Item    Value        Reference Range Interpretation Comments

 

             CHOLESTEROL (test code = 2210) 113 MG/DL                           

   

 

             TRIGLYCERIDES (test code = 2232) 165 MG/DL                         

     

 

             HDL CHOLESTEROL (test code = 2220) 39 MG/DL                        

       

 

             CALC LDL CHOL (test code = 2237) 50 MG/DL                          

     

 

             RISK RATIO LDL/HDL (test code = 1.28 RATIO                         

    



             2238)                                               



LIPID [id number] 00:00:00





             Test Item    Value        Reference Range Interpretation Comments

 

             CHOLESTEROL (test code = 2210) 113 MG/DL                           

   

 

             TRIGLYCERIDES (test code = 2232) 165 MG/DL                         

     

 

             HDL CHOLESTEROL (test code = 2220) 39 MG/DL                        

       

 

             CALC LDL CHOL (test code = 2237) 50 MG/DL                          

     

 

             RISK RATIO LDL/HDL (test code = 1.28 RATIO                         

    



             2238)                                               



[id number] 00:00:00





             Test Item    Value        Reference Range Interpretation Comments

 

             TSH, THIRD GENERATION (test code 0.190 UIU/ML                      

     



             = 2821)                                             



[id number] 00:00:00





             Test Item    Value        Reference Range Interpretation Comments

 

             TSH, THIRD GENERATION (test code 0.190 UIU/ML                      

     



             = 2821)                                             



[id number] 00:00:00





             Test Item    Value        Reference Range Interpretation Comments

 

             TSH, THIRD GENERATION (test code 0.190 UIU/ML                      

     



             = 2821)                                             



IRON, [id number] 00:00:00





             Test Item    Value        Reference Range Interpretation Comments

 

             IRON, SERUM (test code = 2222) 59 UG/DL                            

   



IRON, [id number] 00:00:00





             Test Item    Value        Reference Range Interpretation Comments

 

             IRON, SERUM (test code = 2222) 59 UG/DL                            

   



HEMOGLOBIN [id number] 00:00:00





             Test Item    Value        Reference Range Interpretation Comments

 

             HEMOGLOBIN A1c (test code = 03632) 8.4 %                           

       



HEMOGLOBIN [id number] 00:00:00





             Test Item    Value        Reference Range Interpretation Comments

 

             HEMOGLOBIN A1c (test code = 72571) 8.4 %                           

       



HEMOGLOBIN [id number] 00:00:00





             Test Item    Value        Reference Range Interpretation Comments

 

             HEMOGLOBIN A1c (test code = 50185) 8.4 %                           

       



COMPREHENSIVE METABOLIC [id number] 00:00:00





             Test Item    Value        Reference Range Interpretation Comments

 

             GLUCOSE (test code = 2217) 141 MG/DL                              

 

             BUN (test code = 2208) 31 MG/DL                               

 

             CREATININE (test code = 2214) 1.07 MG/DL                           

  

 

             eGFR ( CKD-EPI) (test code 59 ML/MIN/1.73                      

     



             = 36811)                                            

 

             CALC BUN/CREAT (test code = 29 RATIO                               



             2235)                                               

 

             SODIUM (test code = 2231) 141 MEQ/L                              

 

             POTASSIUM (test code = 2228) 4.7 MEQ/L                             

 

 

             CHLORIDE (test code = 2215) 99 MEQ/L                               

 

             CARBON DIOXIDE (test code = 26 MEQ/L                               



             )                                               

 

             CALCIUM (test code = 2209) 8.8 MG/DL                              

 

             PROTEIN, TOTAL (test code = 8.0 G/DL                               



             )                                               

 

             ALBUMIN (test code = 2201) 4.5 G/DL                               

 

             CALC GLOBULIN (test code = 3.5 G/DL                               



             2240)                                               

 

             CALC A/G RATIO (test code = 1.3 RATIO                              



             2234)                                               

 

             BILIRUBIN, TOTAL (test code = 0.4 MG/DL                            

  



             )                                               

 

             ALKALINE PHOSPHATASE (test 67 U/L                                 



             code = 2204)                                        

 

             AST (test code = 2218) 49 U/L                                 

 

             ALT (test code = 2219) 41 U/L                                 



COMPREHENSIVE METABOLIC [id number] 00:00:00





             Test Item    Value        Reference Range Interpretation Comments

 

             GLUCOSE (test code = 2217) 141 MG/DL                              

 

             BUN (test code = 2208) 31 MG/DL                               

 

             CREATININE (test code = 2214) 1.07 MG/DL                           

  

 

             eGFR ( CKD-EPI) (test code 59 ML/MIN/1.73                      

     



             = 90046)                                            

 

             CALC BUN/CREAT (test code = 29 RATIO                               



             2235)                                               

 

             SODIUM (test code = 2231) 141 MEQ/L                              

 

             POTASSIUM (test code = 2228) 4.7 MEQ/L                             

 

 

             CHLORIDE (test code = 2215) 99 MEQ/L                               

 

             CARBON DIOXIDE (test code = 26 MEQ/L                               



             )                                               

 

             CALCIUM (test code = 2209) 8.8 MG/DL                              

 

             PROTEIN, TOTAL (test code = 8.0 G/DL                               



             )                                               

 

             ALBUMIN (test code = 2201) 4.5 G/DL                               

 

             CALC GLOBULIN (test code = 3.5 G/DL                               



             )                                               

 

             CALC A/G RATIO (test code = 1.3 RATIO                              



             4)                                               

 

             BILIRUBIN, TOTAL (test code = 0.4 MG/DL                            

  



             )                                               

 

             ALKALINE PHOSPHATASE (test 67 U/L                                 



             code = 2204)                                        

 

             AST (test code = 2218) 49 U/L                                 

 

             ALT (test code = 2219) 41 U/L                                 



LIPID [id number] 00:00:00





             Test Item    Value        Reference Range Interpretation Comments

 

             CHOLESTEROL (test code = 2210) 113 MG/DL                           

   

 

             TRIGLYCERIDES (test code = 2232) 165 MG/DL                         

     

 

             HDL CHOLESTEROL (test code = 2220) 39 MG/DL                        

       

 

             CALC LDL CHOL (test code = 2237) 50 MG/DL                          

     

 

             RISK RATIO LDL/HDL (test code = 1.28 RATIO                         

    



             2238)                                               



LIPID [id number] 00:00:00





             Test Item    Value        Reference Range Interpretation Comments

 

             CHOLESTEROL (test code = 2210) 113 MG/DL                           

   

 

             TRIGLYCERIDES (test code = 2232) 165 MG/DL                         

     

 

             HDL CHOLESTEROL (test code = 2220) 39 MG/DL                        

       

 

             CALC LDL CHOL (test code = 2237) 50 MG/DL                          

     

 

             RISK RATIO LDL/HDL (test code = 1.28 RATIO                         

    



             2238)                                               



[id number] 00:00:00





             Test Item    Value        Reference Range Interpretation Comments

 

             TSH, THIRD GENERATION (test code 0.190 UIU/ML                      

     



             = 2821)                                             



[id number] 00:00:00





             Test Item    Value        Reference Range Interpretation Comments

 

             TSH, THIRD GENERATION (test code 0.190 UIU/ML                      

     



             = 2821)                                             



[id number] 00:00:00





             Test Item    Value        Reference Range Interpretation Comments

 

             TSH, THIRD GENERATION (test code 0.190 UIU/ML                      

     



             = 2821)                                             



IRON, [id number] 00:00:00





             Test Item    Value        Reference Range Interpretation Comments

 

             IRON, SERUM (test code = 2222) 59 UG/DL                            

   



IRON, [id number] 00:00:00





             Test Item    Value        Reference Range Interpretation Comments

 

             IRON, SERUM (test code = 2222) 59 UG/DL                            

   



HEMOGLOBIN [id number] 00:00:00





             Test Item    Value        Reference Range Interpretation Comments

 

             HEMOGLOBIN A1c (test code = 61160) 8.4 %                           

       



HEMOGLOBIN [id number] 00:00:00





             Test Item    Value        Reference Range Interpretation Comments

 

             HEMOGLOBIN A1c (test code = 41076) 8.4 %                           

       



COMPREHENSIVE METABOLIC [id number] 00:00:00





             Test Item    Value        Reference Range Interpretation Comments

 

             GLUCOSE (test code = 2217) 141 MG/DL                              

 

             BUN (test code = 2208) 31 MG/DL                               

 

             CREATININE (test code = 2214) 1.07 MG/DL                           

  

 

             eGFR ( CKD-EPI) (test code 59 ML/MIN/1.73                      

     



             = 55400)                                            

 

             CALC BUN/CREAT (test code = 29 RATIO                               



             2235)                                               

 

             SODIUM (test code = 2231) 141 MEQ/L                              

 

             POTASSIUM (test code = 2228) 4.7 MEQ/L                             

 

 

             CHLORIDE (test code = 2215) 99 MEQ/L                               

 

             CARBON DIOXIDE (test code = 26 MEQ/L                               



             )                                               

 

             CALCIUM (test code = 2209) 8.8 MG/DL                              

 

             PROTEIN, TOTAL (test code = 8.0 G/DL                               



             )                                               

 

             ALBUMIN (test code = 2201) 4.5 G/DL                               

 

             CALC GLOBULIN (test code = 3.5 G/DL                               



             )                                               

 

             CALC A/G RATIO (test code = 1.3 RATIO                              



             4)                                               

 

             BILIRUBIN, TOTAL (test code = 0.4 MG/DL                            

  



             )                                               

 

             ALKALINE PHOSPHATASE (test 67 U/L                                 



             code = 2204)                                        

 

             AST (test code = 2218) 49 U/L                                 

 

             ALT (test code = 2219) 41 U/L                                 



LIPID [id number] 00:00:00





             Test Item    Value        Reference Range Interpretation Comments

 

             CHOLESTEROL (test code = 2210) 113 MG/DL                           

   

 

             TRIGLYCERIDES (test code = 2232) 165 MG/DL                         

     

 

             HDL CHOLESTEROL (test code = 2220) 39 MG/DL                        

       

 

             CALC LDL CHOL (test code = 2237) 50 MG/DL                          

     

 

             RISK RATIO LDL/HDL (test code = 1.28 RATIO                         

    



             2238)                                               



[id number] 00:00:00





             Test Item    Value        Reference Range Interpretation Comments

 

             TSH, THIRD GENERATION (test code 0.190 UIU/ML                      

     



             = 2821)                                             



[id number] 00:00:00





             Test Item    Value        Reference Range Interpretation Comments

 

             TSH, THIRD GENERATION (test code 0.190 UIU/ML                      

     



             = 2821)                                             



IRON, [id number] 00:00:00





             Test Item    Value        Reference Range Interpretation Comments

 

             IRON, SERUM (test code = 2222) 59 UG/DL                            

   



HEMOGLOBIN [id number] 00:00:00





             Test Item    Value        Reference Range Interpretation Comments

 

             HEMOGLOBIN A1c (test code = 94377) 8.4 %                           

       



HEMOGLOBIN [id number] 00:00:00





             Test Item    Value        Reference Range Interpretation Comments

 

             HEMOGLOBIN A1c (test code = 82787) 8.4 %                           

       



HEMOGLOBIN [id number] 00:00:00





             Test Item    Value        Reference Range Interpretation Comments

 

             HEMOGLOBIN A1c (test code = 46303) 8.4 %                           

       



COMPREHENSIVE METABOLIC [id number] 00:00:00





             Test Item    Value        Reference Range Interpretation Comments

 

             GLUCOSE (test code = 2217) 141 MG/DL                              

 

             BUN (test code = 2208) 31 MG/DL                               

 

             CREATININE (test code = 2214) 1.07 MG/DL                           

  

 

             eGFR ( CKD-EPI) (test code 59 ML/MIN/1.73                      

     



             = 73573)                                            

 

             CALC BUN/CREAT (test code = 29 RATIO                               



             5)                                               

 

             SODIUM (test code = 2231) 141 MEQ/L                              

 

             POTASSIUM (test code = 2228) 4.7 MEQ/L                             

 

 

             CHLORIDE (test code = 2215) 99 MEQ/L                               

 

             CARBON DIOXIDE (test code = 26 MEQ/L                               



             )                                               

 

             CALCIUM (test code = 2209) 8.8 MG/DL                              

 

             PROTEIN, TOTAL (test code = 8.0 G/DL                               



             )                                               

 

             ALBUMIN (test code = 2201) 4.5 G/DL                               

 

             CALC GLOBULIN (test code = 3.5 G/DL                               



             0)                                               

 

             CALC A/G RATIO (test code = 1.3 RATIO                              



             )                                               

 

             BILIRUBIN, TOTAL (test code = 0.4 MG/DL                            

  



             )                                               

 

             ALKALINE PHOSPHATASE (test 67 U/L                                 



             code = 2204)                                        

 

             AST (test code = 2218) 49 U/L                                 

 

             ALT (test code = 2219) 41 U/L                                 



COMPREHENSIVE METABOLIC [id number] 00:00:00





             Test Item    Value        Reference Range Interpretation Comments

 

             GLUCOSE (test code = 2217) 141 MG/DL                              

 

             BUN (test code = 2208) 31 MG/DL                               

 

             CREATININE (test code = 2214) 1.07 MG/DL                           

  

 

             eGFR ( CKD-EPI) (test code 59 ML/MIN/1.73                      

     



             = 41596)                                            

 

             CALC BUN/CREAT (test code = 29 RATIO                               



             2235)                                               

 

             SODIUM (test code = 2231) 141 MEQ/L                              

 

             POTASSIUM (test code = 2228) 4.7 MEQ/L                             

 

 

             CHLORIDE (test code = 2215) 99 MEQ/L                               

 

             CARBON DIOXIDE (test code = 26 MEQ/L                               



             )                                               

 

             CALCIUM (test code = 2209) 8.8 MG/DL                              

 

             PROTEIN, TOTAL (test code = 8.0 G/DL                               



             )                                               

 

             ALBUMIN (test code = 2201) 4.5 G/DL                               

 

             CALC GLOBULIN (test code = 3.5 G/DL                               



             224)                                               

 

             CALC A/G RATIO (test code = 1.3 RATIO                              



             2234)                                               

 

             BILIRUBIN, TOTAL (test code = 0.4 MG/DL                            

  



             )                                               

 

             ALKALINE PHOSPHATASE (test 67 U/L                                 



             code = 2204)                                        

 

             AST (test code = 2218) 49 U/L                                 

 

             ALT (test code = 2219) 41 U/L                                 



LIPID [id number] 00:00:00





             Test Item    Value        Reference Range Interpretation Comments

 

             CHOLESTEROL (test code = 2210) 113 MG/DL                           

   

 

             TRIGLYCERIDES (test code = 2232) 165 MG/DL                         

     

 

             HDL CHOLESTEROL (test code = 2220) 39 MG/DL                        

       

 

             CALC LDL CHOL (test code = 2237) 50 MG/DL                          

     

 

             RISK RATIO LDL/HDL (test code = 1.28 RATIO                         

    



             2238)                                               



LIPID [id number] 00:00:00





             Test Item    Value        Reference Range Interpretation Comments

 

             CHOLESTEROL (test code = 2210) 113 MG/DL                           

   

 

             TRIGLYCERIDES (test code = 2232) 165 MG/DL                         

     

 

             HDL CHOLESTEROL (test code = 2220) 39 MG/DL                        

       

 

             CALC LDL CHOL (test code = 2237) 50 MG/DL                          

     

 

             RISK RATIO LDL/HDL (test code = 1.28 RATIO                         

    



             2238)                                               



[id number] 00:00:00





             Test Item    Value        Reference Range Interpretation Comments

 

             TSH, THIRD GENERATION (test code 0.190 UIU/ML                      

     



             = 2821)                                             



[id number] 00:00:00





             Test Item    Value        Reference Range Interpretation Comments

 

             TSH, THIRD GENERATION (test code 0.190 UIU/ML                      

     



             = 2821)                                             



[id number] 00:00:00





             Test Item    Value        Reference Range Interpretation Comments

 

             TSH, THIRD GENERATION (test code 0.190 UIU/ML                      

     



             = 2821)                                             



IRON, [id number] 00:00:00





             Test Item    Value        Reference Range Interpretation Comments

 

             IRON, SERUM (test code = 2222) 59 UG/DL                            

   



IRON, [id number] 00:00:00





             Test Item    Value        Reference Range Interpretation Comments

 

             IRON, SERUM (test code = 2222) 59 UG/DL                            

   



ALBUMIN/CREATININE RATIO, URINE, [id number] 05:57:19





             Test Item    Value        Reference Range Interpretation Comments

 

             CREATININE, URINE, 212.0 MG/DL  NOT ESTAB                 



             CONC. (test code =                                        



             2072)                                               

 

             ALBUMIN, URINE, 16.0 MG/DL                              Note: Test 

name is



             RANDOM (test code                                        changed to

 Urine Albumin



             = [id number])                                            from Microalbum

in in



                                                                 accordance with

 ADA and



                                                                 NKF Guidelines,

 and



                                                                 Albumin/Creatin

ine ratio



                                                                 reference inter

mariah



                                                                 reflects those



                                                                 Guidelines. Violet

lytic



                                                                 methodology is



                                                                 unchanged. No r

eference



                                                                 interval for Ra

ndom



                                                                 Urine Albumin i

s



                                                                 available.

 

             CALC         75 MG/G      <30          H             UNLESS OTHERWI

SE



             ALBUMIN/CREAT, RND                                        INDICATED

, ALL TESTING



             (test code =                                        PERFORMED ATCLI

NICAL



             60631)                                              PATHOLOGY LABOR

[employer],



                                                                 [facility]. [address] [place] [address]

4



                                                                 LABORATORY DIRE

CTOR:



                                                                 CLIFTON THOMSON M.D.



                                                                 CLIA NUMBER [id number]

[id number]



                                                                 CAP ACCREDITATI

ON NO.



                                                                 [id number]



HEMOGLOBIN [id number] 04:12:15





             Test Item    Value        Reference Range Interpretation Comments

 

             HEMOGLOBIN A1c (test 9.1 %        4.2-5.6      H             AMERIC

AN DIABETES



             code = 66851)                                        ASSOCIATION 

IDELINES FOR



                                                                 HGB A1C:



                                                                 PREDIABETES/INC

REASED RISK .



                                                                 . . . . . . 5.7

-6.4%



                                                                 DIAGNOSIS OF DI

ABETES . . .



                                                                 . . . . . . >=6

.5% WITH



                                                                 CONFIRMATION OR

 APPROPRIATE



                                                                 SYMPTOMS NOTE: 

ASSAY MAY BE



                                                                 AFFECTED BY



                                                                 HEMOGLOBINOPATH

IES (SICKLE



                                                                 CELL ANEMIA, S-

C DISEASE,



                                                                 OTHERS) OR ZENA

FICIALLY



                                                                 LOWERED BY DECR

EASED RED



                                                                 CELL SURVIVAL (

HEMOLYTIC



                                                                 ANEMIAS, BLOOD 

LOSS, ETC.).



                                                                 CONSIDER ALTERN

ATE TESTING



                                                                 OR LABORATORY C

ONSULTATION.



HEMOGLOBIN [id number] 00:00:00





             Test Item    Value        Reference Range Interpretation Comments

 

             HEMOGLOBIN A1c (test code = 23125) 9.1 %                           

       



HEMOGLOBIN [id number] 00:00:00





             Test Item    Value        Reference Range Interpretation Comments

 

             HEMOGLOBIN A1c (test code = 77286) 9.1 %                           

       



HEMOGLOBIN [id number] 00:00:00





             Test Item    Value        Reference Range Interpretation Comments

 

             HEMOGLOBIN A1c (test code = 93640) 9.1 %                           

       



MICROALBUMIN/CREATININE, RANDOM AND [id number] 00:00:00





             Test Item    Value        Reference Range Interpretation Comments

 

             CREATININE, URINE, CONC. (test 212.0 MG/DL                         

   



             code = 2072)                                        

 

             ALBUMIN, URINE, RANDOM (test code 16.0 MG/DL                       

      



             = 06543)                                            

 

             CALC ALBUMIN/CREAT, RND (test 75 MG/G                              

  



             code = 13233)                                        



MICROALBUMIN/CREATININE, RANDOM AND [id number] 00:00:00





             Test Item    Value        Reference Range Interpretation Comments

 

             CREATININE, URINE, CONC. (test 212.0 MG/DL                         

   



             code = 2072)                                        

 

             ALBUMIN, URINE, RANDOM (test code 16.0 MG/DL                       

      



             = 05193)                                            

 

             CALC ALBUMIN/CREAT, RND (test 75 MG/G                              

  



             code = 79530)                                        



HEMOGLOBIN [id number] 00:00:00





             Test Item    Value        Reference Range Interpretation Comments

 

             HEMOGLOBIN A1c (test code = 51374) 9.1 %                           

       



HEMOGLOBIN [id number] 00:00:00





             Test Item    Value        Reference Range Interpretation Comments

 

             HEMOGLOBIN A1c (test code = 83610) 9.1 %                           

       



HEMOGLOBIN [id number] 00:00:00





             Test Item    Value        Reference Range Interpretation Comments

 

             HEMOGLOBIN A1c (test code = 23525) 9.1 %                           

       



HEMOGLOBIN [id number] 00:00:00





             Test Item    Value        Reference Range Interpretation Comments

 

             HEMOGLOBIN A1c (test code = 46982) 9.1 %                           

       



HEMOGLOBIN [id number] 00:00:00





             Test Item    Value        Reference Range Interpretation Comments

 

             HEMOGLOBIN A1c (test code = 12625) 9.1 %                           

       



MICROALBUMIN/CREATININE, RANDOM AND [id number] 00:00:00





             Test Item    Value        Reference Range Interpretation Comments

 

             CREATININE, URINE, CONC. (test 212.0 MG/DL                         

   



             code = 2072)                                        

 

             ALBUMIN, URINE, RANDOM (test code 16.0 MG/DL                       

      



             = 61176)                                            

 

             CALC ALBUMIN/CREAT, RND (test 75 MG/G                              

  



             code = 28205)                                        



MICROALBUMIN/CREATININE, RANDOM AND [id number] 00:00:00





             Test Item    Value        Reference Range Interpretation Comments

 

             CREATININE, URINE, CONC. (test 212.0 MG/DL                         

   



             code = 2072)                                        

 

             ALBUMIN, URINE, RANDOM (test code 16.0 MG/DL                       

      



             = 01519)                                            

 

             CALC ALBUMIN/CREAT, RND (test 75 MG/G                              

  



             code = 42888)                                        



MICROALBUMIN/CREATININE, RANDOM AND [id number] 00:00:00





             Test Item    Value        Reference Range Interpretation Comments

 

             CREATININE, URINE, CONC. (test 212.0 MG/DL                         

   



             code = 2072)                                        

 

             ALBUMIN, URINE, RANDOM (test code 16.0 MG/DL                       

      



             = 92603)                                            

 

             CALC ALBUMIN/CREAT, RND (test 75 MG/G                              

  



             code = 91811)                                        



HEMOGLOBIN [id number] 00:00:00





             Test Item    Value        Reference Range Interpretation Comments

 

             HEMOGLOBIN A1c (test code = 35294) 9.1 %                           

       



HEMOGLOBIN [id number] 00:00:00





             Test Item    Value        Reference Range Interpretation Comments

 

             HEMOGLOBIN A1c (test code = 10886) 9.1 %                           

       



HEMOGLOBIN [id number] 00:00:00





             Test Item    Value        Reference Range Interpretation Comments

 

             HEMOGLOBIN A1c (test code = 58916) 9.1 %                           

       



MICROALBUMIN/CREATININE, RANDOM AND [id number] 00:00:00





             Test Item    Value        Reference Range Interpretation Comments

 

             CREATININE, URINE, CONC. (test 212.0 MG/DL                         

   



             code = 2072)                                        

 

             ALBUMIN, URINE, RANDOM (test code 16.0 MG/DL                       

      



             = 84284)                                            

 

             CALC ALBUMIN/CREAT, RND (test 75 MG/G                              

  



             code = 84387)                                        



MICROALBUMIN/CREATININE, RANDOM AND [id number] 00:00:00





             Test Item    Value        Reference Range Interpretation Comments

 

             CREATININE, URINE, CONC. (test 212.0 MG/DL                         

   



             code = 2072)                                        

 

             ALBUMIN, URINE, RANDOM (test code 16.0 MG/DL                       

      



             = 32680)                                            

 

             CALC ALBUMIN/CREAT, RND (test 75 MG/G                              

  



             code = 48316)                                        



HEMOGLOBIN [id number] 00:00:00





             Test Item    Value        Reference Range Interpretation Comments

 

             HEMOGLOBIN A1c (test code = 80074) 9.1 %                           

       



HEMOGLOBIN [id number] 00:00:00





             Test Item    Value        Reference Range Interpretation Comments

 

             HEMOGLOBIN A1c (test code = 83534) 9.1 %                           

       



HEMOGLOBIN [id number] 00:00:00





             Test Item    Value        Reference Range Interpretation Comments

 

             HEMOGLOBIN A1c (test code = 39762) 9.1 %                           

       



MICROALBUMIN/CREATININE, RANDOM AND [id number] 00:00:00





             Test Item    Value        Reference Range Interpretation Comments

 

             CREATININE, URINE, CONC. (test 212.0 MG/DL                         

   



             code = 2072)                                        

 

             ALBUMIN, URINE, RANDOM (test code 16.0 MG/DL                       

      



             = 48838)                                            

 

             CALC ALBUMIN/CREAT, RND (test 75 MG/G                              

  



             code = 59275)                                        



MICROALBUMIN/CREATININE, RANDOM AND [id number] 00:00:00





             Test Item    Value        Reference Range Interpretation Comments

 

             CREATININE, URINE, CONC. (test 212.0 MG/DL                         

   



             code = 2072)                                        

 

             ALBUMIN, URINE, RANDOM (test code 16.0 MG/DL                       

      



             = 39477)                                            

 

             CALC ALBUMIN/CREAT, RND (test 75 MG/G                              

  



             code = 01561)                                        



HEMOGLOBIN [id number] 00:00:00





             Test Item    Value        Reference Range Interpretation Comments

 

             HEMOGLOBIN A1c (test code = 17267) 9.1 %                           

       



HEMOGLOBIN [id number] 00:00:00





             Test Item    Value        Reference Range Interpretation Comments

 

             HEMOGLOBIN A1c (test code = 60438) 9.1 %                           

       



MICROALBUMIN/CREATININE, RANDOM AND [id number] 00:00:00





             Test Item    Value        Reference Range Interpretation Comments

 

             CREATININE, URINE, CONC. (test 212.0 MG/DL                         

   



             code = 2072)                                        

 

             ALBUMIN, URINE, RANDOM (test code 16.0 MG/DL                       

      



             = 82355)                                            

 

             CALC ALBUMIN/CREAT, RND (test 75 MG/G                              

  



             code = 95210)                                        



HEMOGLOBIN [id number] 00:00:00





             Test Item    Value        Reference Range Interpretation Comments

 

             HEMOGLOBIN A1c (test code = 93510) 9.1 %                           

       



HEMOGLOBIN [id number] 00:00:00





             Test Item    Value        Reference Range Interpretation Comments

 

             HEMOGLOBIN A1c (test code = 29705) 9.1 %                           

       



HEMOGLOBIN [id number] 00:00:00





             Test Item    Value        Reference Range Interpretation Comments

 

             HEMOGLOBIN A1c (test code = 29715) 9.1 %                           

       



MICROALBUMIN/CREATININE, RANDOM AND [id number] 00:00:00





             Test Item    Value        Reference Range Interpretation Comments

 

             CREATININE, URINE, CONC. (test 212.0 MG/DL                         

   



             code = 2072)                                        

 

             ALBUMIN, URINE, RANDOM (test code 16.0 MG/DL                       

      



             = 84486)                                            

 

             CALC ALBUMIN/CREAT, RND (test 75 MG/G                              

  



             code = 81880)                                        



MICROALBUMIN/CREATININE, RANDOM AND [id number] 00:00:00





             Test Item    Value        Reference Range Interpretation Comments

 

             CREATININE, URINE, CONC. (test 212.0 MG/DL                         

   



             code = 2072)                                        

 

             ALBUMIN, URINE, RANDOM (test code 16.0 MG/DL                       

      



             = 04137)                                            

 

             CALC ALBUMIN/CREAT, RND (test 75 MG/G                              

  



             code = 70392)                                        



PATHOLOGIST SMEAR [id number] 00:00:00





             Test Item    Value        Reference Range Interpretation Comments

 

             DIAGNOSIS: (test code = 8200) (NOTE)                               

  

 

             COMMENTS: (test code = 8205) (NOTE)                                

 

 

             MICROSCOPIC DESCRIPTION: (test (NOTE)                              

   



             code = 8210)                                        

 

             PATHOLOGIST: (test code = 8250) (NOTE)                             

    

 

             CPT: (test code = 8400) 03296                                  

 

             WBC (test code = 1001) 2.7 K/UL                               

 

             RBC (test code = 1002) 3.09 M/UL                              

 

             HEMOGLOBIN (test code = 1003) 8.2 G/DL                             

  

 

             HEMATOCRIT (test code = 1004) 25.6 %                               

  

 

             MCV (test code = 1005) 82.8 fL                                

 

             MCH (test code = 1006) 26.5 PG                                

 

             MCHC (test code = 1007) 32.0 G/DL                              

 

             RDW (test code = 1038) 14.1 %                                 

 

             NEUTROPHILS (test code = 1008) 52.0 %                              

   

 

             LYMPHOCYTES (test code = 1010) 35.8 %                              

   

 

             MONOCYTES (test code = 1011) 7.2 %                                 

 

 

             EOSINOPHILS (test code = 1012) 4.2 %                               

   

 

             BASOPHILS (test code = 1013) 0.4 %                                 

 

 

             NUCLEATED RBCS (test code = 0.0 /100WBC'S                          

 



             1065)                                               

 

             PLATELET COUNT (test code = 99 K/UL                                



             1015)                                               

 

             ABSOLUTE NEUTROPHILS (test code 1.38 K/UL                          

    



             = 1066)                                             

 

             ABSOLUTE LYMPHOCYTES (test code 0.95 K/UL                          

    



             = 1067)                                             

 

             ABSOLUTE MONOCYTES (test code = 0.19 K/UL                          

    



             1068)                                               

 

             ABSOLUTE EOSINOPHILS (test code 0.11 K/UL                          

    



             = 1040)                                             

 

             ABSOLUTE BASOPHILS (test code = 0.01 K/UL                          

    



             1069)                                               

 

             ABS NUCLEATED RBCS (test code = 0.00 K/UL                          

    



             44230)                                              

 

             COMMENTS (test code = 1016) (NOTE)                                 



PATHOLOGIST SMEAR [id number] 00:00:00





             Test Item    Value        Reference Range Interpretation Comments

 

             DIAGNOSIS: (test code = 8200) (NOTE)                               

  

 

             COMMENTS: (test code = 8205) (NOTE)                                

 

 

             MICROSCOPIC DESCRIPTION: (test (NOTE)                              

   



             code = 8210)                                        

 

             PATHOLOGIST: (test code = 8250) (NOTE)                             

    

 

             CPT: (test code = 8400) 54272                                  

 

             WBC (test code = 1001) 2.7 K/UL                               

 

             RBC (test code = 1002) 3.09 M/UL                              

 

             HEMOGLOBIN (test code = 1003) 8.2 G/DL                             

  

 

             HEMATOCRIT (test code = 1004) 25.6 %                               

  

 

             MCV (test code = 1005) 82.8 fL                                

 

             MCH (test code = 1006) 26.5 PG                                

 

             MCHC (test code = 1007) 32.0 G/DL                              

 

             RDW (test code = 1038) 14.1 %                                 

 

             NEUTROPHILS (test code = 1008) 52.0 %                              

   

 

             LYMPHOCYTES (test code = 1010) 35.8 %                              

   

 

             MONOCYTES (test code = 1011) 7.2 %                                 

 

 

             EOSINOPHILS (test code = 1012) 4.2 %                               

   

 

             BASOPHILS (test code = 1013) 0.4 %                                 

 

 

             NUCLEATED RBCS (test code = 0.0 /100WBC'S                          

 



             1065)                                               

 

             PLATELET COUNT (test code = 99 K/UL                                



             1015)                                               

 

             ABSOLUTE NEUTROPHILS (test code 1.38 K/UL                          

    



             = 1066)                                             

 

             ABSOLUTE LYMPHOCYTES (test code 0.95 K/UL                          

    



             = 1067)                                             

 

             ABSOLUTE MONOCYTES (test code = 0.19 K/UL                          

    



             1068)                                               

 

             ABSOLUTE EOSINOPHILS (test code 0.11 K/UL                          

    



             = 1040)                                             

 

             ABSOLUTE BASOPHILS (test code = 0.01 K/UL                          

    



             1069)                                               

 

             ABS NUCLEATED RBCS (test code = 0.00 K/UL                          

    



             46635)                                              

 

             COMMENTS (test code = 1016) (NOTE)                                 



PATHOLOGIST SMEAR [id number] 00:00:00





             Test Item    Value        Reference Range Interpretation Comments

 

             DIAGNOSIS: (test code = 8200) (NOTE)                               

  

 

             COMMENTS: (test code = 8205) (NOTE)                                

 

 

             MICROSCOPIC DESCRIPTION: (test (NOTE)                              

   



             code = 8210)                                        

 

             PATHOLOGIST: (test code = 8250) (NOTE)                             

    

 

             CPT: (test code = 8400) 85094                                  

 

             WBC (test code = 1001) 2.7 K/UL                               

 

             RBC (test code = 1002) 3.09 M/UL                              

 

             HEMOGLOBIN (test code = 1003) 8.2 G/DL                             

  

 

             HEMATOCRIT (test code = 1004) 25.6 %                               

  

 

             MCV (test code = 1005) 82.8 fL                                

 

             MCH (test code = 1006) 26.5 PG                                

 

             MCHC (test code = 1007) 32.0 G/DL                              

 

             RDW (test code = 1038) 14.1 %                                 

 

             NEUTROPHILS (test code = 1008) 52.0 %                              

   

 

             LYMPHOCYTES (test code = 1010) 35.8 %                              

   

 

             MONOCYTES (test code = 1011) 7.2 %                                 

 

 

             EOSINOPHILS (test code = 1012) 4.2 %                               

   

 

             BASOPHILS (test code = 1013) 0.4 %                                 

 

 

             NUCLEATED RBCS (test code = 0.0 /100WBC'S                          

 



             1065)                                               

 

             PLATELET COUNT (test code = 99 K/UL                                



             1015)                                               

 

             ABSOLUTE NEUTROPHILS (test code 1.38 K/UL                          

    



             = 1066)                                             

 

             ABSOLUTE LYMPHOCYTES (test code 0.95 K/UL                          

    



             = 1067)                                             

 

             ABSOLUTE MONOCYTES (test code = 0.19 K/UL                          

    



             1068)                                               

 

             ABSOLUTE EOSINOPHILS (test code 0.11 K/UL                          

    



             = 1040)                                             

 

             ABSOLUTE BASOPHILS (test code = 0.01 K/UL                          

    



             1069)                                               

 

             ABS NUCLEATED RBCS (test code = 0.00 K/UL                          

    



             05351)                                              

 

             COMMENTS (test code = 1016) (NOTE)                                 



PATHOLOGIST SMEAR [id number] 00:00:00





             Test Item    Value        Reference Range Interpretation Comments

 

             DIAGNOSIS: (test code = 8200) (NOTE)                               

  

 

             COMMENTS: (test code = 8205) (NOTE)                                

 

 

             MICROSCOPIC DESCRIPTION: (test (NOTE)                              

   



             code = 8210)                                        

 

             PATHOLOGIST: (test code = 8250) (NOTE)                             

    

 

             CPT: (test code = 8400) 93900                                  

 

             WBC (test code = 1001) 2.7 K/UL                               

 

             RBC (test code = 1002) 3.09 M/UL                              

 

             HEMOGLOBIN (test code = 1003) 8.2 G/DL                             

  

 

             HEMATOCRIT (test code = 1004) 25.6 %                               

  

 

             MCV (test code = 1005) 82.8 fL                                

 

             MCH (test code = 1006) 26.5 PG                                

 

             MCHC (test code = 1007) 32.0 G/DL                              

 

             RDW (test code = 1038) 14.1 %                                 

 

             NEUTROPHILS (test code = 1008) 52.0 %                              

   

 

             LYMPHOCYTES (test code = 1010) 35.8 %                              

   

 

             MONOCYTES (test code = 1011) 7.2 %                                 

 

 

             EOSINOPHILS (test code = 1012) 4.2 %                               

   

 

             BASOPHILS (test code = 1013) 0.4 %                                 

 

 

             NUCLEATED RBCS (test code = 0.0 /100WBC'S                          

 



             1065)                                               

 

             PLATELET COUNT (test code = 99 K/UL                                



             1015)                                               

 

             ABSOLUTE NEUTROPHILS (test code 1.38 K/UL                          

    



             = 1066)                                             

 

             ABSOLUTE LYMPHOCYTES (test code 0.95 K/UL                          

    



             = 1067)                                             

 

             ABSOLUTE MONOCYTES (test code = 0.19 K/UL                          

    



             1068)                                               

 

             ABSOLUTE EOSINOPHILS (test code 0.11 K/UL                          

    



             = 1040)                                             

 

             ABSOLUTE BASOPHILS (test code = 0.01 K/UL                          

    



             1069)                                               

 

             ABS NUCLEATED RBCS (test code = 0.00 K/UL                          

    



             45122)                                              

 

             COMMENTS (test code = 1016) (NOTE)                                 



PATHOLOGIST SMEAR [id number] 00:00:00





             Test Item    Value        Reference Range Interpretation Comments

 

             DIAGNOSIS: (test code = 8200) (NOTE)                               

  

 

             COMMENTS: (test code = 8205) (NOTE)                                

 

 

             MICROSCOPIC DESCRIPTION: (test (NOTE)                              

   



             code = 8210)                                        

 

             PATHOLOGIST: (test code = 8250) (NOTE)                             

    

 

             CPT: (test code = 8400) 85143                                  

 

             WBC (test code = 1001) 2.7 K/UL                               

 

             RBC (test code = 1002) 3.09 M/UL                              

 

             HEMOGLOBIN (test code = 1003) 8.2 G/DL                             

  

 

             HEMATOCRIT (test code = 1004) 25.6 %                               

  

 

             MCV (test code = 1005) 82.8 fL                                

 

             MCH (test code = 1006) 26.5 PG                                

 

             MCHC (test code = 1007) 32.0 G/DL                              

 

             RDW (test code = 1038) 14.1 %                                 

 

             NEUTROPHILS (test code = 1008) 52.0 %                              

   

 

             LYMPHOCYTES (test code = 1010) 35.8 %                              

   

 

             MONOCYTES (test code = 1011) 7.2 %                                 

 

 

             EOSINOPHILS (test code = 1012) 4.2 %                               

   

 

             BASOPHILS (test code = 1013) 0.4 %                                 

 

 

             NUCLEATED RBCS (test code = 0.0 /100WBC'S                          

 



             1065)                                               

 

             PLATELET COUNT (test code = 99 K/UL                                



             1015)                                               

 

             ABSOLUTE NEUTROPHILS (test code 1.38 K/UL                          

    



             = 1066)                                             

 

             ABSOLUTE LYMPHOCYTES (test code 0.95 K/UL                          

    



             = 1067)                                             

 

             ABSOLUTE MONOCYTES (test code = 0.19 K/UL                          

    



             1068)                                               

 

             ABSOLUTE EOSINOPHILS (test code 0.11 K/UL                          

    



             = 1040)                                             

 

             ABSOLUTE BASOPHILS (test code = 0.01 K/UL                          

    



             1069)                                               

 

             ABS NUCLEATED RBCS (test code = 0.00 K/UL                          

    



             85533)                                              

 

             COMMENTS (test code = 1016) (NOTE)                                 



PATHOLOGIST SMEAR [id number] 00:00:00





             Test Item    Value        Reference Range Interpretation Comments

 

             DIAGNOSIS: (test code = 8200) (NOTE)                               

  

 

             COMMENTS: (test code = 8205) (NOTE)                                

 

 

             MICROSCOPIC DESCRIPTION: (test (NOTE)                              

   



             code = 8210)                                        

 

             PATHOLOGIST: (test code = 8250) (NOTE)                             

    

 

             CPT: (test code = 8400) 74983                                  

 

             WBC (test code = 1001) 2.7 K/UL                               

 

             RBC (test code = 1002) 3.09 M/UL                              

 

             HEMOGLOBIN (test code = 1003) 8.2 G/DL                             

  

 

             HEMATOCRIT (test code = 1004) 25.6 %                               

  

 

             MCV (test code = 1005) 82.8 fL                                

 

             MCH (test code = 1006) 26.5 PG                                

 

             MCHC (test code = 1007) 32.0 G/DL                              

 

             RDW (test code = 1038) 14.1 %                                 

 

             NEUTROPHILS (test code = 1008) 52.0 %                              

   

 

             LYMPHOCYTES (test code = 1010) 35.8 %                              

   

 

             MONOCYTES (test code = 1011) 7.2 %                                 

 

 

             EOSINOPHILS (test code = 1012) 4.2 %                               

   

 

             BASOPHILS (test code = 1013) 0.4 %                                 

 

 

             NUCLEATED RBCS (test code = 0.0 /100WBC'S                          

 



             1065)                                               

 

             PLATELET COUNT (test code = 99 K/UL                                



             1015)                                               

 

             ABSOLUTE NEUTROPHILS (test code 1.38 K/UL                          

    



             = 1066)                                             

 

             ABSOLUTE LYMPHOCYTES (test code 0.95 K/UL                          

    



             = 1067)                                             

 

             ABSOLUTE MONOCYTES (test code = 0.19 K/UL                          

    



             1068)                                               

 

             ABSOLUTE EOSINOPHILS (test code 0.11 K/UL                          

    



             = 1040)                                             

 

             ABSOLUTE BASOPHILS (test code = 0.01 K/UL                          

    



             1069)                                               

 

             ABS NUCLEATED RBCS (test code = 0.00 K/UL                          

    



             87934)                                              

 

             COMMENTS (test code = 1016) (NOTE)                                 



PATHOLOGIST SMEAR [id number] 00:00:00





             Test Item    Value        Reference Range Interpretation Comments

 

             DIAGNOSIS: (test code = 8200) (NOTE)                               

  

 

             COMMENTS: (test code = 8205) (NOTE)                                

 

 

             MICROSCOPIC DESCRIPTION: (test (NOTE)                              

   



             code = 8210)                                        

 

             PATHOLOGIST: (test code = 8250) (NOTE)                             

    

 

             CPT: (test code = 8400) 51419                                  

 

             WBC (test code = 1001) 2.7 K/UL                               

 

             RBC (test code = 1002) 3.09 M/UL                              

 

             HEMOGLOBIN (test code = 1003) 8.2 G/DL                             

  

 

             HEMATOCRIT (test code = 1004) 25.6 %                               

  

 

             MCV (test code = 1005) 82.8 fL                                

 

             MCH (test code = 1006) 26.5 PG                                

 

             MCHC (test code = 1007) 32.0 G/DL                              

 

             RDW (test code = 1038) 14.1 %                                 

 

             NEUTROPHILS (test code = 1008) 52.0 %                              

   

 

             LYMPHOCYTES (test code = 1010) 35.8 %                              

   

 

             MONOCYTES (test code = 1011) 7.2 %                                 

 

 

             EOSINOPHILS (test code = 1012) 4.2 %                               

   

 

             BASOPHILS (test code = 1013) 0.4 %                                 

 

 

             NUCLEATED RBCS (test code = 0.0 /100WBC'S                          

 



             1065)                                               

 

             PLATELET COUNT (test code = 99 K/UL                                



             1015)                                               

 

             ABSOLUTE NEUTROPHILS (test code 1.38 K/UL                          

    



             = 1066)                                             

 

             ABSOLUTE LYMPHOCYTES (test code 0.95 K/UL                          

    



             = 1067)                                             

 

             ABSOLUTE MONOCYTES (test code = 0.19 K/UL                          

    



             1068)                                               

 

             ABSOLUTE EOSINOPHILS (test code 0.11 K/UL                          

    



             = 1040)                                             

 

             ABSOLUTE BASOPHILS (test code = 0.01 K/UL                          

    



             1069)                                               

 

             ABS NUCLEATED RBCS (test code = 0.00 K/UL                          

    



             47917)                                              

 

             COMMENTS (test code = 1016) (NOTE)                                 



PATHOLOGIST SMEAR [id number] 00:00:00





             Test Item    Value        Reference Range Interpretation Comments

 

             DIAGNOSIS: (test code = 8200) (NOTE)                               

  

 

             COMMENTS: (test code = 8205) (NOTE)                                

 

 

             MICROSCOPIC DESCRIPTION: (test (NOTE)                              

   



             code = 8210)                                        

 

             PATHOLOGIST: (test code = 8250) (NOTE)                             

    

 

             CPT: (test code = 8400) 34749                                  

 

             WBC (test code = 1001) 2.7 K/UL                               

 

             RBC (test code = 1002) 3.09 M/UL                              

 

             HEMOGLOBIN (test code = 1003) 8.2 G/DL                             

  

 

             HEMATOCRIT (test code = 1004) 25.6 %                               

  

 

             MCV (test code = 1005) 82.8 fL                                

 

             MCH (test code = 1006) 26.5 PG                                

 

             MCHC (test code = 1007) 32.0 G/DL                              

 

             RDW (test code = 1038) 14.1 %                                 

 

             NEUTROPHILS (test code = 1008) 52.0 %                              

   

 

             LYMPHOCYTES (test code = 1010) 35.8 %                              

   

 

             MONOCYTES (test code = 1011) 7.2 %                                 

 

 

             EOSINOPHILS (test code = 1012) 4.2 %                               

   

 

             BASOPHILS (test code = 1013) 0.4 %                                 

 

 

             NUCLEATED RBCS (test code = 0.0 /100WBC'S                          

 



             1065)                                               

 

             PLATELET COUNT (test code = 99 K/UL                                



             1015)                                               

 

             ABSOLUTE NEUTROPHILS (test code 1.38 K/UL                          

    



             = 1066)                                             

 

             ABSOLUTE LYMPHOCYTES (test code 0.95 K/UL                          

    



             = 1067)                                             

 

             ABSOLUTE MONOCYTES (test code = 0.19 K/UL                          

    



             1068)                                               

 

             ABSOLUTE EOSINOPHILS (test code 0.11 K/UL                          

    



             = 1040)                                             

 

             ABSOLUTE BASOPHILS (test code = 0.01 K/UL                          

    



             1069)                                               

 

             ABS NUCLEATED RBCS (test code = 0.00 K/UL                          

    



             13342)                                              

 

             COMMENTS (test code = 1016) (NOTE)                                 



PATHOLOGIST SMEAR [id number] 00:00:00





             Test Item    Value        Reference Range Interpretation Comments

 

             DIAGNOSIS: (test code = 8200) (NOTE)                               

  

 

             COMMENTS: (test code = 8205) (NOTE)                                

 

 

             MICROSCOPIC DESCRIPTION: (test (NOTE)                              

   



             code = 8210)                                        

 

             PATHOLOGIST: (test code = 8250) (NOTE)                             

    

 

             CPT: (test code = 8400) 30982                                  

 

             WBC (test code = 1001) 2.7 K/UL                               

 

             RBC (test code = 1002) 3.09 M/UL                              

 

             HEMOGLOBIN (test code = 1003) 8.2 G/DL                             

  

 

             HEMATOCRIT (test code = 1004) 25.6 %                               

  

 

             MCV (test code = 1005) 82.8 fL                                

 

             MCH (test code = 1006) 26.5 PG                                

 

             MCHC (test code = 1007) 32.0 G/DL                              

 

             RDW (test code = 1038) 14.1 %                                 

 

             NEUTROPHILS (test code = 1008) 52.0 %                              

   

 

             LYMPHOCYTES (test code = 1010) 35.8 %                              

   

 

             MONOCYTES (test code = 1011) 7.2 %                                 

 

 

             EOSINOPHILS (test code = 1012) 4.2 %                               

   

 

             BASOPHILS (test code = 1013) 0.4 %                                 

 

 

             NUCLEATED RBCS (test code = 0.0 /100WBC'S                          

 



             1065)                                               

 

             PLATELET COUNT (test code = 99 K/UL                                



             1015)                                               

 

             ABSOLUTE NEUTROPHILS (test code 1.38 K/UL                          

    



             = 1066)                                             

 

             ABSOLUTE LYMPHOCYTES (test code 0.95 K/UL                          

    



             = 1067)                                             

 

             ABSOLUTE MONOCYTES (test code = 0.19 K/UL                          

    



             1068)                                               

 

             ABSOLUTE EOSINOPHILS (test code 0.11 K/UL                          

    



             = 1040)                                             

 

             ABSOLUTE BASOPHILS (test code = 0.01 K/UL                          

    



             1069)                                               

 

             ABS NUCLEATED RBCS (test code = 0.00 K/UL                          

    



             34631)                                              

 

             COMMENTS (test code = 1016) (NOTE)                                 



PATHOLOGIST SMEAR [id number] 00:00:00





             Test Item    Value        Reference Range Interpretation Comments

 

             DIAGNOSIS: (test code = 8200) (NOTE)                               

  

 

             COMMENTS: (test code = 8205) (NOTE)                                

 

 

             MICROSCOPIC DESCRIPTION: (test (NOTE)                              

   



             code = 8210)                                        

 

             PATHOLOGIST: (test code = 8250) (NOTE)                             

    

 

             CPT: (test code = 8400) 66979                                  

 

             WBC (test code = 1001) 2.7 K/UL                               

 

             RBC (test code = 1002) 3.09 M/UL                              

 

             HEMOGLOBIN (test code = 1003) 8.2 G/DL                             

  

 

             HEMATOCRIT (test code = 1004) 25.6 %                               

  

 

             MCV (test code = 1005) 82.8 fL                                

 

             MCH (test code = 1006) 26.5 PG                                

 

             MCHC (test code = 1007) 32.0 G/DL                              

 

             RDW (test code = 1038) 14.1 %                                 

 

             NEUTROPHILS (test code = 1008) 52.0 %                              

   

 

             LYMPHOCYTES (test code = 1010) 35.8 %                              

   

 

             MONOCYTES (test code = 1011) 7.2 %                                 

 

 

             EOSINOPHILS (test code = 1012) 4.2 %                               

   

 

             BASOPHILS (test code = 1013) 0.4 %                                 

 

 

             NUCLEATED RBCS (test code = 0.0 /100WBC'S                          

 



             1065)                                               

 

             PLATELET COUNT (test code = 99 K/UL                                



             1015)                                               

 

             ABSOLUTE NEUTROPHILS (test code 1.38 K/UL                          

    



             = 1066)                                             

 

             ABSOLUTE LYMPHOCYTES (test code 0.95 K/UL                          

    



             = 1067)                                             

 

             ABSOLUTE MONOCYTES (test code = 0.19 K/UL                          

    



             1068)                                               

 

             ABSOLUTE EOSINOPHILS (test code 0.11 K/UL                          

    



             = 1040)                                             

 

             ABSOLUTE BASOPHILS (test code = 0.01 K/UL                          

    



             1069)                                               

 

             ABS NUCLEATED RBCS (test code = 0.00 K/UL                          

    



             74190)                                              

 

             COMMENTS (test code = 1016) (NOTE)                                 



PATHOLOGIST SMEAR [id number] 00:00:00





             Test Item    Value        Reference Range Interpretation Comments

 

             DIAGNOSIS: (test code = 8200) (NOTE)                               

  

 

             COMMENTS: (test code = 8205) (NOTE)                                

 

 

             MICROSCOPIC DESCRIPTION: (test (NOTE)                              

   



             code = 8210)                                        

 

             PATHOLOGIST: (test code = 8250) (NOTE)                             

    

 

             CPT: (test code = 8400) 22830                                  

 

             WBC (test code = 1001) 2.7 K/UL                               

 

             RBC (test code = 1002) 3.09 M/UL                              

 

             HEMOGLOBIN (test code = 1003) 8.2 G/DL                             

  

 

             HEMATOCRIT (test code = 1004) 25.6 %                               

  

 

             MCV (test code = 1005) 82.8 fL                                

 

             MCH (test code = 1006) 26.5 PG                                

 

             MCHC (test code = 1007) 32.0 G/DL                              

 

             RDW (test code = 1038) 14.1 %                                 

 

             NEUTROPHILS (test code = 1008) 52.0 %                              

   

 

             LYMPHOCYTES (test code = 1010) 35.8 %                              

   

 

             MONOCYTES (test code = 1011) 7.2 %                                 

 

 

             EOSINOPHILS (test code = 1012) 4.2 %                               

   

 

             BASOPHILS (test code = 1013) 0.4 %                                 

 

 

             NUCLEATED RBCS (test code = 0.0 /100WBC'S                          

 



             1065)                                               

 

             PLATELET COUNT (test code = 99 K/UL                                



             1015)                                               

 

             ABSOLUTE NEUTROPHILS (test code 1.38 K/UL                          

    



             = 1066)                                             

 

             ABSOLUTE LYMPHOCYTES (test code 0.95 K/UL                          

    



             = 1067)                                             

 

             ABSOLUTE MONOCYTES (test code = 0.19 K/UL                          

    



             1068)                                               

 

             ABSOLUTE EOSINOPHILS (test code 0.11 K/UL                          

    



             = 1040)                                             

 

             ABSOLUTE BASOPHILS (test code = 0.01 K/UL                          

    



             1069)                                               

 

             ABS NUCLEATED RBCS (test code = 0.00 K/UL                          

    



             50890)                                              

 

             COMMENTS (test code = 1016) (NOTE)                                 



PATHOLOGIST SMEAR [id number] 00:00:00





             Test Item    Value        Reference Range Interpretation Comments

 

             DIAGNOSIS: (test code = 8200) (NOTE)                               

  

 

             COMMENTS: (test code = 8205) (NOTE)                                

 

 

             MICROSCOPIC DESCRIPTION: (test (NOTE)                              

   



             code = 8210)                                        

 

             PATHOLOGIST: (test code = 8250) (NOTE)                             

    

 

             CPT: (test code = 8400) 38179                                  

 

             WBC (test code = 1001) 2.7 K/UL                               

 

             RBC (test code = 1002) 3.09 M/UL                              

 

             HEMOGLOBIN (test code = 1003) 8.2 G/DL                             

  

 

             HEMATOCRIT (test code = 1004) 25.6 %                               

  

 

             MCV (test code = 1005) 82.8 fL                                

 

             MCH (test code = 1006) 26.5 PG                                

 

             MCHC (test code = 1007) 32.0 G/DL                              

 

             RDW (test code = 1038) 14.1 %                                 

 

             NEUTROPHILS (test code = 1008) 52.0 %                              

   

 

             LYMPHOCYTES (test code = 1010) 35.8 %                              

   

 

             MONOCYTES (test code = 1011) 7.2 %                                 

 

 

             EOSINOPHILS (test code = 1012) 4.2 %                               

   

 

             BASOPHILS (test code = 1013) 0.4 %                                 

 

 

             NUCLEATED RBCS (test code = 0.0 /100WBC'S                          

 



             1065)                                               

 

             PLATELET COUNT (test code = 99 K/UL                                



             1015)                                               

 

             ABSOLUTE NEUTROPHILS (test code 1.38 K/UL                          

    



             = 1066)                                             

 

             ABSOLUTE LYMPHOCYTES (test code 0.95 K/UL                          

    



             = 1067)                                             

 

             ABSOLUTE MONOCYTES (test code = 0.19 K/UL                          

    



             1068)                                               

 

             ABSOLUTE EOSINOPHILS (test code 0.11 K/UL                          

    



             = 1040)                                             

 

             ABSOLUTE BASOPHILS (test code = 0.01 K/UL                          

    



             1069)                                               

 

             ABS NUCLEATED RBCS (test code = 0.00 K/UL                          

    



             99115)                                              

 

             COMMENTS (test code = 1016) (NOTE)                                 



FERRITIN2021-10-20 00:00:00





             Test Item    Value        Reference Range Interpretation Comments

 

             FERRITIN (test code = ) 42 NG/ML                               



FERRITIN2021-10-20 00:00:00





             Test Item    Value        Reference Range Interpretation Comments

 

             FERRITIN (test code = ) 42 NG/ML                               



[id number] 00:00:00





             Test Item    Value        Reference Range Interpretation Comments

 

             TRANSFERRIN (test code = 6) 281 MG/DL                           

   



[id number] 00:00:00





             Test Item    Value        Reference Range Interpretation Comments

 

             TRANSFERRIN (test code = 6) 281 MG/DL                           

   



IRON BINDING CAPACITY AND IRON AND % SATURATION2021-10-20 00:00:00





             Test Item    Value        Reference Range Interpretation Comments

 

             IRON, SERUM (test code = ) 28 UG/DL                            

   

 

             UNSATURATED IBC (test code = ) 315 UG/DL                      

        

 

             CALC TOTAL IBC (test code = ) 343 UG/DL                        

      

 

             CALC % IRON SAT (test code = 9) 8 %                             

       



IRON BINDING CAPACITY AND IRON AND % SATURATION2021-10-20 00:00:00





             Test Item    Value        Reference Range Interpretation Comments

 

             IRON, SERUM (test code = 2) 28 UG/DL                            

   

 

             UNSATURATED IBC (test code = 14346) 315 UG/DL                      

        

 

             CALC TOTAL IBC (test code = ) 343 UG/DL                        

      

 

             CALC % IRON SAT (test code = ) 8 %                             

       



PROTIME AND PTT2021-10-20 00:00:00





             Test Item    Value        Reference Range Interpretation Comments

 

             PROTHROMBIN TIME (PT) (test code 14.8 SECONDS                      

     



             = 1402)                                             

 

             INR (test code = 01202) 1.1                                    

 

             PTT (test code = 1403) 35.8 SECONDS                           



PROTIME AND PTT2021-10-20 00:00:00





             Test Item    Value        Reference Range Interpretation Comments

 

             PROTHROMBIN TIME (PT) (test code 14.8 SECONDS                      

     



             = 1402)                                             

 

             INR (test code = 56573) 1.1                                    

 

             PTT (test code = 1403) 35.8 SECONDS                           



RHEUMATOID FACTOR, [id number] 00:00:00





             Test Item    Value        Reference Range Interpretation Comments

 

             RHEUMATOID FACTOR, QUANT (test code <10 IU/ML                      

        



             = 3502)                                             



RHEUMATOID FACTOR, [id number] 00:00:00





             Test Item    Value        Reference Range Interpretation Comments

 

             RHEUMATOID FACTOR, QUANT (test code <10 IU/ML                      

        



             = 3502)                                             



RHEUMATOID FACTOR, [id number] 00:00:00





             Test Item    Value        Reference Range Interpretation Comments

 

             RHEUMATOID FACTOR, QUANT (test code <10 IU/ML                      

        



             = 3502)                                             



CCP IgG2021-10-20 00:00:00





             Test Item    Value        Reference Range Interpretation Comments

 

             CCP IgG (test code = 60521) <0.5 U/ML                              



CCP IgG2021-10-20 00:00:00





             Test Item    Value        Reference Range Interpretation Comments

 

             CCP IgG (test code = 34081) <0.5 U/ML                              



CCP IgG2021-10-20 00:00:00





             Test Item    Value        Reference Range Interpretation Comments

 

             CCP IgG (test code = 15727) <0.5 U/ML                              



SMITH (SM) ANTIBODY2021-10-20 00:00:00





             Test Item    Value        Reference Range Interpretation Comments

 

             MCGUIRE (Sm) ANTIBODY (test code = <0.2 AI                           

     



             68203)                                              



SMITH (SM) ANTIBODY2021-10-20 00:00:00





             Test Item    Value        Reference Range Interpretation Comments

 

             MCGUIRE (Sm) ANTIBODY (test code = <0.2 AI                           

     



             70737)                                              



DNA DS ANTIBODY2021-10-20 00:00:00





             Test Item    Value        Reference Range Interpretation Comments

 

             dsDNA ANTIBODY (test code = 4287) 1.0 IU/ML                        

      



DNA DS ANTIBODY2021-10-20 00:00:00





             Test Item    Value        Reference Range Interpretation Comments

 

             dsDNA ANTIBODY (test code = 4287) 1.0 IU/ML                        

      



FERRITIN2021-10-20 00:00:00





             Test Item    Value        Reference Range Interpretation Comments

 

             FERRITIN (test code = 207) 42 NG/ML                               



FERRITIN2021-10-20 00:00:00





             Test Item    Value        Reference Range Interpretation Comments

 

             FERRITIN (test code = 2075) 42 NG/ML                               



FERRITIN2021-10-20 00:00:00





             Test Item    Value        Reference Range Interpretation Comments

 

             FERRITIN (test code = 2075) 42 NG/ML                               



[id number] 00:00:00





             Test Item    Value        Reference Range Interpretation Comments

 

             TRANSFERRIN (test code = 4936) 281 MG/DL                           

   



[id number] 00:00:00





             Test Item    Value        Reference Range Interpretation Comments

 

             TRANSFERRIN (test code = 4936) 281 MG/DL                           

   



IRON BINDING CAPACITY AND IRON AND % SATURATION2021-10-20 00:00:00





             Test Item    Value        Reference Range Interpretation Comments

 

             IRON, SERUM (test code = 2222) 28 UG/DL                            

   

 

             UNSATURATED IBC (test code = 34475) 315 UG/DL                      

        

 

             CALC TOTAL IBC (test code = 7) 343 UG/DL                        

      

 

             CALC % IRON SAT (test code = 9) 8 %                             

       



IRON BINDING CAPACITY AND IRON AND % SATURATION2021-10-20 00:00:00





             Test Item    Value        Reference Range Interpretation Comments

 

             IRON, SERUM (test code = 2222) 28 UG/DL                            

   

 

             UNSATURATED IBC (test code = 63419) 315 UG/DL                      

        

 

             CALC TOTAL IBC (test code = 7) 343 UG/DL                        

      

 

             CALC % IRON SAT (test code = 9) 8 %                             

       



PROTIME AND PTT2021-10-20 00:00:00





             Test Item    Value        Reference Range Interpretation Comments

 

             PROTHROMBIN TIME (PT) (test code 14.8 SECONDS                      

     



             = 1402)                                             

 

             INR (test code = 01100) 1.1                                    

 

             PTT (test code = 1403) 35.8 SECONDS                           



PROTIME AND PTT2021-10-20 00:00:00





             Test Item    Value        Reference Range Interpretation Comments

 

             PROTHROMBIN TIME (PT) (test code 14.8 SECONDS                      

     



             = 1402)                                             

 

             INR (test code = 46270) 1.1                                    

 

             PTT (test code = 1403) 35.8 SECONDS                           



RHEUMATOID FACTOR, [id number] 00:00:00





             Test Item    Value        Reference Range Interpretation Comments

 

             RHEUMATOID FACTOR, QUANT (test code <10 IU/ML                      

        



             = 3502)                                             



RHEUMATOID FACTOR, [id number] 00:00:00





             Test Item    Value        Reference Range Interpretation Comments

 

             RHEUMATOID FACTOR, QUANT (test code <10 IU/ML                      

        



             = 3502)                                             



RHEUMATOID FACTOR, [id number] 00:00:00





             Test Item    Value        Reference Range Interpretation Comments

 

             RHEUMATOID FACTOR, QUANT (test code <10 IU/ML                      

        



             = 3502)                                             



CCP IgG2021-10-20 00:00:00





             Test Item    Value        Reference Range Interpretation Comments

 

             CCP IgG (test code = 74352) <0.5 U/ML                              



[id number] 00:00:00





             Test Item    Value        Reference Range Interpretation Comments

 

             TRANSFERRIN (test code = 4936) 281 MG/DL                           

   



CCP IgG2021-10-20 00:00:00





             Test Item    Value        Reference Range Interpretation Comments

 

             CCP IgG (test code = 77822) <0.5 U/ML                              



CCP IgG2021-10-20 00:00:00





             Test Item    Value        Reference Range Interpretation Comments

 

             CCP IgG (test code = 55238) <0.5 U/ML                              



SMITH (SM) ANTIBODY2021-10-20 00:00:00





             Test Item    Value        Reference Range Interpretation Comments

 

             MCGUIRE (Sm) ANTIBODY (test code = <0.2 AI                           

     



             76364)                                              



SMITH (SM) ANTIBODY2021-10-20 00:00:00





             Test Item    Value        Reference Range Interpretation Comments

 

             MCGUIRE (Sm) ANTIBODY (test code = <0.2 AI                           

     



             30719)                                              



DNA DS ANTIBODY2021-10-20 00:00:00





             Test Item    Value        Reference Range Interpretation Comments

 

             dsDNA ANTIBODY (test code = 4287) 1.0 IU/ML                        

      



DNA DS ANTIBODY2021-10-20 00:00:00





             Test Item    Value        Reference Range Interpretation Comments

 

             dsDNA ANTIBODY (test code = 4287) 1.0 IU/ML                        

      



IRON BINDING CAPACITY AND IRON AND % SATURATION2021-10-20 00:00:00





             Test Item    Value        Reference Range Interpretation Comments

 

             IRON, SERUM (test code = 2222) 28 UG/DL                            

   

 

             UNSATURATED IBC (test code = 51157) 315 UG/DL                      

        

 

             CALC TOTAL IBC (test code = 7) 343 UG/DL                        

      

 

             CALC % IRON SAT (test code = ) 8 %                             

       



PROTIME AND PTT2021-10-20 00:00:00





             Test Item    Value        Reference Range Interpretation Comments

 

             PROTHROMBIN TIME (PT) (test code 14.8 SECONDS                      

     



             = 1402)                                             

 

             INR (test code = 48313) 1.1                                    

 

             PTT (test code = 1403) 35.8 SECONDS                           



RHEUMATOID FACTOR, [id number] 00:00:00





             Test Item    Value        Reference Range Interpretation Comments

 

             RHEUMATOID FACTOR, QUANT (test code <10 IU/ML                      

        



             = 3502)                                             



RHEUMATOID FACTOR, [id number] 00:00:00





             Test Item    Value        Reference Range Interpretation Comments

 

             RHEUMATOID FACTOR, QUANT (test code <10 IU/ML                      

        



             = 3502)                                             



CCP IgG2021-10-20 00:00:00





             Test Item    Value        Reference Range Interpretation Comments

 

             CCP IgG (test code = 53982) <0.5 U/ML                              



CCP IgG2021-10-20 00:00:00





             Test Item    Value        Reference Range Interpretation Comments

 

             CCP IgG (test code = 32722) <0.5 U/ML                              



SMITH (SM) ANTIBODY2021-10-20 00:00:00





             Test Item    Value        Reference Range Interpretation Comments

 

             MCGUIRE (Sm) ANTIBODY (test code = <0.2 AI                           

     



             91474)                                              



DNA DS ANTIBODY2021-10-20 00:00:00





             Test Item    Value        Reference Range Interpretation Comments

 

             dsDNA ANTIBODY (test code = 4287) 1.0 IU/ML                        

      



FERRITIN2021-10-20 00:00:00





             Test Item    Value        Reference Range Interpretation Comments

 

             FERRITIN (test code = ) 42 NG/ML                               



FERRITIN2021-10-20 00:00:00





             Test Item    Value        Reference Range Interpretation Comments

 

             FERRITIN (test code = ) 42 NG/ML                               



[id number] 00:00:00





             Test Item    Value        Reference Range Interpretation Comments

 

             TRANSFERRIN (test code = 4936) 281 MG/DL                           

   



[id number] 00:00:00





             Test Item    Value        Reference Range Interpretation Comments

 

             TRANSFERRIN (test code = 4936) 281 MG/DL                           

   



IRON BINDING CAPACITY AND IRON AND % SATURATION2021-10-20 00:00:00





             Test Item    Value        Reference Range Interpretation Comments

 

             IRON, SERUM (test code = 2) 28 UG/DL                            

   

 

             UNSATURATED IBC (test code = ) 315 UG/DL                      

        

 

             CALC TOTAL IBC (test code = ) 343 UG/DL                        

      

 

             CALC % IRON SAT (test code = ) 8 %                             

       



IRON BINDING CAPACITY AND IRON AND % SATURATION2021-10-20 00:00:00





             Test Item    Value        Reference Range Interpretation Comments

 

             IRON, SERUM (test code = 2) 28 UG/DL                            

   

 

             UNSATURATED IBC (test code = 59359) 315 UG/DL                      

        

 

             CALC TOTAL IBC (test code = ) 343 UG/DL                        

      

 

             CALC % IRON SAT (test code = ) 8 %                             

       



PROTIME AND PTT2021-10-20 00:00:00





             Test Item    Value        Reference Range Interpretation Comments

 

             PROTHROMBIN TIME (PT) (test code 14.8 SECONDS                      

     



             = 1402)                                             

 

             INR (test code = 72396) 1.1                                    

 

             PTT (test code = 1403) 35.8 SECONDS                           



PROTIME AND PTT2021-10-20 00:00:00





             Test Item    Value        Reference Range Interpretation Comments

 

             PROTHROMBIN TIME (PT) (test code 14.8 SECONDS                      

     



             = 1402)                                             

 

             INR (test code = 09076) 1.1                                    

 

             PTT (test code = 1403) 35.8 SECONDS                           



RHEUMATOID FACTOR, [id number] 00:00:00





             Test Item    Value        Reference Range Interpretation Comments

 

             RHEUMATOID FACTOR, QUANT (test code <10 IU/ML                      

        



             = 3502)                                             



RHEUMATOID FACTOR, [id number] 00:00:00





             Test Item    Value        Reference Range Interpretation Comments

 

             RHEUMATOID FACTOR, QUANT (test code <10 IU/ML                      

        



             = 3502)                                             



RHEUMATOID FACTOR, [id number] 00:00:00





             Test Item    Value        Reference Range Interpretation Comments

 

             RHEUMATOID FACTOR, QUANT (test code <10 IU/ML                      

        



             = 3502)                                             



CCP IgG2021-10-20 00:00:00





             Test Item    Value        Reference Range Interpretation Comments

 

             CCP IgG (test code = 48904) <0.5 U/ML                              



CCP IgG2021-10-20 00:00:00





             Test Item    Value        Reference Range Interpretation Comments

 

             CCP IgG (test code = 36613) <0.5 U/ML                              



CCP IgG2021-10-20 00:00:00





             Test Item    Value        Reference Range Interpretation Comments

 

             CCP IgG (test code = 95975) <0.5 U/ML                              



SMITH (SM) ANTIBODY2021-10-20 00:00:00





             Test Item    Value        Reference Range Interpretation Comments

 

             MCGUIRE (Sm) ANTIBODY (test code = <0.2 AI                           

     



             68015)                                              



SMITH (SM) ANTIBODY2021-10-20 00:00:00





             Test Item    Value        Reference Range Interpretation Comments

 

             MCGUIRE (Sm) ANTIBODY (test code = <0.2 AI                           

     



             97342)                                              



DNA DS ANTIBODY2021-10-20 00:00:00





             Test Item    Value        Reference Range Interpretation Comments

 

             dsDNA ANTIBODY (test code = 4287) 1.0 IU/ML                        

      



DNA DS ANTIBODY2021-10-20 00:00:00





             Test Item    Value        Reference Range Interpretation Comments

 

             dsDNA ANTIBODY (test code = 4287) 1.0 IU/ML                        

      



FERRITIN2021-10-20 00:00:00





             Test Item    Value        Reference Range Interpretation Comments

 

             FERRITIN (test code = 2075) 42 NG/ML                               



FERRITIN2021-10-20 00:00:00





             Test Item    Value        Reference Range Interpretation Comments

 

             FERRITIN (test code = 2075) 42 NG/ML                               



[id number] 00:00:00





             Test Item    Value        Reference Range Interpretation Comments

 

             TRANSFERRIN (test code = 4936) 281 MG/DL                           

   



[id number] 00:00:00





             Test Item    Value        Reference Range Interpretation Comments

 

             TRANSFERRIN (test code = 4936) 281 MG/DL                           

   



IRON BINDING CAPACITY AND IRON AND % SATURATION2021-10-20 00:00:00





             Test Item    Value        Reference Range Interpretation Comments

 

             IRON, SERUM (test code = 2) 28 UG/DL                            

   

 

             UNSATURATED IBC (test code = ) 315 UG/DL                      

        

 

             CALC TOTAL IBC (test code = ) 343 UG/DL                        

      

 

             CALC % IRON SAT (test code = ) 8 %                             

       



IRON BINDING CAPACITY AND IRON AND % SATURATION2021-10-20 00:00:00





             Test Item    Value        Reference Range Interpretation Comments

 

             IRON, SERUM (test code = 2) 28 UG/DL                            

   

 

             UNSATURATED IBC (test code = 95071) 315 UG/DL                      

        

 

             CALC TOTAL IBC (test code = ) 343 UG/DL                        

      

 

             CALC % IRON SAT (test code = ) 8 %                             

       



PROTIME AND PTT2021-10-20 00:00:00





             Test Item    Value        Reference Range Interpretation Comments

 

             PROTHROMBIN TIME (PT) (test code 14.8 SECONDS                      

     



             = 1402)                                             

 

             INR (test code = 96628) 1.1                                    

 

             PTT (test code = 1403) 35.8 SECONDS                           



PROTIME AND PTT2021-10-20 00:00:00





             Test Item    Value        Reference Range Interpretation Comments

 

             PROTHROMBIN TIME (PT) (test code 14.8 SECONDS                      

     



             = 1402)                                             

 

             INR (test code = 73497) 1.1                                    

 

             PTT (test code = 1403) 35.8 SECONDS                           



RHEUMATOID FACTOR, [id number] 00:00:00





             Test Item    Value        Reference Range Interpretation Comments

 

             RHEUMATOID FACTOR, QUANT (test code <10 IU/ML                      

        



             = 3502)                                             



RHEUMATOID FACTOR, [id number] 00:00:00





             Test Item    Value        Reference Range Interpretation Comments

 

             RHEUMATOID FACTOR, QUANT (test code <10 IU/ML                      

        



             = 3502)                                             



RHEUMATOID FACTOR, [id number] 00:00:00





             Test Item    Value        Reference Range Interpretation Comments

 

             RHEUMATOID FACTOR, QUANT (test code <10 IU/ML                      

        



             = 3502)                                             



CCP IgG2021-10-20 00:00:00





             Test Item    Value        Reference Range Interpretation Comments

 

             CCP IgG (test code = 96145) <0.5 U/ML                              



CCP IgG2021-10-20 00:00:00





             Test Item    Value        Reference Range Interpretation Comments

 

             CCP IgG (test code = 08404) <0.5 U/ML                              



CCP IgG2021-10-20 00:00:00





             Test Item    Value        Reference Range Interpretation Comments

 

             CCP IgG (test code = 33464) <0.5 U/ML                              



SMITH (SM) ANTIBODY2021-10-20 00:00:00





             Test Item    Value        Reference Range Interpretation Comments

 

             MCGUIRE (Sm) ANTIBODY (test code = <0.2 AI                           

     



             23655)                                              



SMITH (SM) ANTIBODY2021-10-20 00:00:00





             Test Item    Value        Reference Range Interpretation Comments

 

             MCGUIRE (Sm) ANTIBODY (test code = <0.2 AI                           

     



             31511)                                              



DNA DS ANTIBODY2021-10-20 00:00:00





             Test Item    Value        Reference Range Interpretation Comments

 

             dsDNA ANTIBODY (test code = 4287) 1.0 IU/ML                        

      



DNA DS ANTIBODY2021-10-20 00:00:00





             Test Item    Value        Reference Range Interpretation Comments

 

             dsDNA ANTIBODY (test code = 4287) 1.0 IU/ML                        

      



FERRITIN2021-10-20 00:00:00





             Test Item    Value        Reference Range Interpretation Comments

 

             FERRITIN (test code = 2075) 42 NG/ML                               



[id number] 00:00:00





             Test Item    Value        Reference Range Interpretation Comments

 

             TRANSFERRIN (test code = 4936) 281 MG/DL                           

   



IRON BINDING CAPACITY AND IRON AND % SATURATION2021-10-20 00:00:00





             Test Item    Value        Reference Range Interpretation Comments

 

             IRON, SERUM (test code = 2222) 28 UG/DL                            

   

 

             UNSATURATED IBC (test code = 23045) 315 UG/DL                      

        

 

             CALC TOTAL IBC (test code = 2077) 343 UG/DL                        

      

 

             CALC % IRON SAT (test code = 2079) 8 %                             

       



PROTIME AND PTT2021-10-20 00:00:00





             Test Item    Value        Reference Range Interpretation Comments

 

             PROTHROMBIN TIME (PT) (test code 14.8 SECONDS                      

     



             = 1402)                                             

 

             INR (test code = 34196) 1.1                                    

 

             PTT (test code = 1403) 35.8 SECONDS                           



RHEUMATOID FACTOR, [id number] 00:00:00





             Test Item    Value        Reference Range Interpretation Comments

 

             RHEUMATOID FACTOR, QUANT (test code <10 IU/ML                      

        



             = 3502)                                             



RHEUMATOID FACTOR, [id number] 00:00:00





             Test Item    Value        Reference Range Interpretation Comments

 

             RHEUMATOID FACTOR, QUANT (test code <10 IU/ML                      

        



             = 3502)                                             



CCP IgG2021-10-20 00:00:00





             Test Item    Value        Reference Range Interpretation Comments

 

             CCP IgG (test code = 56479) <0.5 U/ML                              



CCP IgG2021-10-20 00:00:00





             Test Item    Value        Reference Range Interpretation Comments

 

             CCP IgG (test code = 50832) <0.5 U/ML                              



SMITH (SM) ANTIBODY2021-10-20 00:00:00





             Test Item    Value        Reference Range Interpretation Comments

 

             MCGUIRE (Sm) ANTIBODY (test code = <0.2 AI                           

     



             43571)                                              



DNA DS ANTIBODY2021-10-20 00:00:00





             Test Item    Value        Reference Range Interpretation Comments

 

             dsDNA ANTIBODY (test code = 4287) 1.0 IU/ML                        

      



FERRITIN2021-10-20 00:00:00





             Test Item    Value        Reference Range Interpretation Comments

 

             FERRITIN (test code = ) 42 NG/ML                               



FERRITIN2021-10-20 00:00:00





             Test Item    Value        Reference Range Interpretation Comments

 

             FERRITIN (test code = ) 42 NG/ML                               



[id number] 00:00:00





             Test Item    Value        Reference Range Interpretation Comments

 

             TRANSFERRIN (test code = 4936) 281 MG/DL                           

   



[id number] 00:00:00





             Test Item    Value        Reference Range Interpretation Comments

 

             TRANSFERRIN (test code = 4936) 281 MG/DL                           

   



IRON BINDING CAPACITY AND IRON AND % SATURATION2021-10-20 00:00:00





             Test Item    Value        Reference Range Interpretation Comments

 

             IRON, SERUM (test code = 2) 28 UG/DL                            

   

 

             UNSATURATED IBC (test code = ) 315 UG/DL                      

        

 

             CALC TOTAL IBC (test code = ) 343 UG/DL                        

      

 

             CALC % IRON SAT (test code = ) 8 %                             

       



IRON BINDING CAPACITY AND IRON AND % SATURATION2021-10-20 00:00:00





             Test Item    Value        Reference Range Interpretation Comments

 

             IRON, SERUM (test code = 2) 28 UG/DL                            

   

 

             UNSATURATED IBC (test code = 94755) 315 UG/DL                      

        

 

             CALC TOTAL IBC (test code = ) 343 UG/DL                        

      

 

             CALC % IRON SAT (test code = ) 8 %                             

       



PROTIME AND PTT2021-10-20 00:00:00





             Test Item    Value        Reference Range Interpretation Comments

 

             PROTHROMBIN TIME (PT) (test code 14.8 SECONDS                      

     



             = 1402)                                             

 

             INR (test code = 38653) 1.1                                    

 

             PTT (test code = 1403) 35.8 SECONDS                           



PROTIME AND PTT2021-10-20 00:00:00





             Test Item    Value        Reference Range Interpretation Comments

 

             PROTHROMBIN TIME (PT) (test code 14.8 SECONDS                      

     



             = 1402)                                             

 

             INR (test code = 22745) 1.1                                    

 

             PTT (test code = 1403) 35.8 SECONDS                           



RHEUMATOID FACTOR, [id number] 00:00:00





             Test Item    Value        Reference Range Interpretation Comments

 

             RHEUMATOID FACTOR, QUANT (test code <10 IU/ML                      

        



             = 3502)                                             



RHEUMATOID FACTOR, [id number] 00:00:00





             Test Item    Value        Reference Range Interpretation Comments

 

             RHEUMATOID FACTOR, QUANT (test code <10 IU/ML                      

        



             = 3502)                                             



RHEUMATOID FACTOR, [id number] 00:00:00





             Test Item    Value        Reference Range Interpretation Comments

 

             RHEUMATOID FACTOR, QUANT (test code <10 IU/ML                      

        



             = 3502)                                             



CCP IgG2021-10-20 00:00:00





             Test Item    Value        Reference Range Interpretation Comments

 

             CCP IgG (test code = 94572) <0.5 U/ML                              



CCP IgG2021-10-20 00:00:00





             Test Item    Value        Reference Range Interpretation Comments

 

             CCP IgG (test code = 44935) <0.5 U/ML                              



CCP IgG2021-10-20 00:00:00





             Test Item    Value        Reference Range Interpretation Comments

 

             CCP IgG (test code = 19050) <0.5 U/ML                              



SMITH (SM) ANTIBODY2021-10-20 00:00:00





             Test Item    Value        Reference Range Interpretation Comments

 

             MCGUIRE (Sm) ANTIBODY (test code = <0.2 AI                           

     



             06229)                                              



SMITH (SM) ANTIBODY2021-10-20 00:00:00





             Test Item    Value        Reference Range Interpretation Comments

 

             MCGUIRE (Sm) ANTIBODY (test code = <0.2 AI                           

     



             08885)                                              



DNA DS ANTIBODY2021-10-20 00:00:00





             Test Item    Value        Reference Range Interpretation Comments

 

             dsDNA ANTIBODY (test code = 4287) 1.0 IU/ML                        

      



DNA DS ANTIBODY2021-10-20 00:00:00





             Test Item    Value        Reference Range Interpretation Comments

 

             dsDNA ANTIBODY (test code = 4287) 1.0 IU/ML                        

      



CULTURE, URINE2021-10-16 00:00:00





             Test Item    Value        Reference Range Interpretation Comments

 

             CULTURE, URINE (test SPECIMEN NUMBER:                           



             code = 15333) [id number]                              



CULTURE, URINE2021-10-16 00:00:00





             Test Item    Value        Reference Range Interpretation Comments

 

             CULTURE, URINE (test SPECIMEN NUMBER:                           



             code = 44719) [id number]                              



CULTURE, URINE2021-10-16 00:00:00





             Test Item    Value        Reference Range Interpretation Comments

 

             CULTURE, URINE (test SPECIMEN NUMBER:                           



             code = 09834) [id number]                              



CULTURE, URINE2021-10-16 00:00:00





             Test Item    Value        Reference Range Interpretation Comments

 

             CULTURE, URINE (test SPECIMEN NUMBER:                           



             code = 27977) [id number]                              



CULTURE, URINE2021-10-16 00:00:00





             Test Item    Value        Reference Range Interpretation Comments

 

             CULTURE, URINE (test SPECIMEN NUMBER:                           



             code = 33006) [id number]                              



CULTURE, URINE2021-10-16 00:00:00





             Test Item    Value        Reference Range Interpretation Comments

 

             CULTURE, URINE (test SPECIMEN NUMBER:                           



             code = 91483) [id number]                              



CULTURE, URINE2021-10-16 00:00:00





             Test Item    Value        Reference Range Interpretation Comments

 

             CULTURE, URINE (test SPECIMEN NUMBER:                           



             code = 74317) [id number]                              



CULTURE, URINE2021-10-16 00:00:00





             Test Item    Value        Reference Range Interpretation Comments

 

             CULTURE, URINE (test SPECIMEN NUMBER:                           



             code = 91748) [id number]                              



CULTURE, URINE2021-10-16 00:00:00





             Test Item    Value        Reference Range Interpretation Comments

 

             CULTURE, URINE (test SPECIMEN NUMBER:                           



             code = 11150) [id number]                              



CULTURE, URINE2021-10-16 00:00:00





             Test Item    Value        Reference Range Interpretation Comments

 

             CULTURE, URINE (test SPECIMEN NUMBER:                           



             code = 25786) [id number]                              



CULTURE, URINE2021-10-16 00:00:00





             Test Item    Value        Reference Range Interpretation Comments

 

             CULTURE, URINE (test SPECIMEN NUMBER:                           



             code = 39055) [id number]                              



CULTURE, URINE2021-10-16 00:00:00





             Test Item    Value        Reference Range Interpretation Comments

 

             CULTURE, URINE (test SPECIMEN NUMBER:                           



             code = 74334) [id number]                              



VIOLET TITER AND PATTERN [REFLEX]2021-10-14 00:00:00





             Test Item    Value        Reference Range Interpretation Comments

 

             PATTERN (test code = SPECKLED                               



             29055)                                              

 

             VIOLET TITER (test code = 1:640 TITER                            



             3550)                                               

 

             PATTERN 2 (test code = NOT DETECTED                           



             56931)                                              

 

             VIOLET TITER 2 (test code = NOT DETECTED TITER                        

   



             33009)                                              

 

             PATTERN 3 (test code = NOT DETECTED                           



             06759)                                              

 

             VIOLET TITER 3 (test code = NOT DETECTED TITER                        

   



             30163)                                              

 

             METHOD (test code = 07739) (NOTE)                                 



VIOLET TITER AND PATTERN [REFLEX]2021-10-14 00:00:00





             Test Item    Value        Reference Range Interpretation Comments

 

             PATTERN (test code = SPECKLED                               



             33696)                                              

 

             VIOLET TITER (test code = 1:640 TITER                            



             3550)                                               

 

             PATTERN 2 (test code = NOT DETECTED                           



             02465)                                              

 

             VIOLET TITER 2 (test code = NOT DETECTED TITER                        

   



             48764)                                              

 

             PATTERN 3 (test code = NOT DETECTED                           



             64394)                                              

 

             VIOLET TITER 3 (test code = NOT DETECTED TITER                        

   



             05600)                                              

 

             METHOD (test code = 62976) (NOTE)                                 



VIOLET TITER AND PATTERN [REFLEX]2021-10-14 00:00:00





             Test Item    Value        Reference Range Interpretation Comments

 

             PATTERN (test code = SPECKLED                               



             37760)                                              

 

             VIOLET TITER (test code = 1:640 TITER                            



             3550)                                               

 

             PATTERN 2 (test code = NOT DETECTED                           



             46997)                                              

 

             VIOLET TITER 2 (test code = NOT DETECTED TITER                        

   



             10820)                                              

 

             PATTERN 3 (test code = NOT DETECTED                           



             62884)                                              

 

             VIOLET TITER 3 (test code = NOT DETECTED TITER                        

   



             54621)                                              

 

             METHOD (test code = 76071) (NOTE)                                 



VIOLET TITER AND PATTERN [REFLEX]2021-10-14 00:00:00





             Test Item    Value        Reference Range Interpretation Comments

 

             PATTERN (test code = SPECKLED                               



             39634)                                              

 

             VIOLET TITER (test code = 1:640 TITER                            



             3550)                                               

 

             PATTERN 2 (test code = NOT DETECTED                           



             98544)                                              

 

             VIOLET TITER 2 (test code = NOT DETECTED TITER                        

   



             55079)                                              

 

             PATTERN 3 (test code = NOT DETECTED                           



             27741)                                              

 

             VIOLET TITER 3 (test code = NOT DETECTED TITER                        

   



             73347)                                              

 

             METHOD (test code = 56615) (NOTE)                                 



VIOLET TITER AND PATTERN [REFLEX]2021-10-14 00:00:00





             Test Item    Value        Reference Range Interpretation Comments

 

             PATTERN (test code = SPECKLED                               



             77145)                                              

 

             VIOLET TITER (test code = 1:640 TITER                            



             3550)                                               

 

             PATTERN 2 (test code = NOT DETECTED                           



             86103)                                              

 

             VIOLET TITER 2 (test code = NOT DETECTED TITER                        

   



             78720)                                              

 

             PATTERN 3 (test code = NOT DETECTED                           



             63902)                                              

 

             VIOLET TITER 3 (test code = NOT DETECTED TITER                        

   



             26940)                                              

 

             METHOD (test code = 01743) (NOTE)                                 



VIOLET TITER AND PATTERN [REFLEX]2021-10-14 00:00:00





             Test Item    Value        Reference Range Interpretation Comments

 

             PATTERN (test code = SPECKLED                               



             59962)                                              

 

             VIOLET TITER (test code = 1:640 TITER                            



             3550)                                               

 

             PATTERN 2 (test code = NOT DETECTED                           



             67267)                                              

 

             VIOLET TITER 2 (test code = NOT DETECTED TITER                        

   



             57565)                                              

 

             PATTERN 3 (test code = NOT DETECTED                           



             42710)                                              

 

             VIOLET TITER 3 (test code = NOT DETECTED TITER                        

   



             29830)                                              

 

             METHOD (test code = 80992) (NOTE)                                 



VIOLET TITER AND PATTERN [REFLEX]2021-10-14 00:00:00





             Test Item    Value        Reference Range Interpretation Comments

 

             PATTERN (test code = SPECKLED                               



             36710)                                              

 

             VIOLET TITER (test code = 1:640 TITER                            



             3550)                                               

 

             PATTERN 2 (test code = NOT DETECTED                           



             47185)                                              

 

             VIOLET TITER 2 (test code = NOT DETECTED TITER                        

   



             61399)                                              

 

             PATTERN 3 (test code = NOT DETECTED                           



             24512)                                              

 

             VIOLET TITER 3 (test code = NOT DETECTED TITER                        

   



             20877)                                              

 

             METHOD (test code = 72864) (NOTE)                                 



CBC W/AUTO DIFF2021-10-13 00:00:00





             Test Item    Value        Reference Range Interpretation Comments

 

             WBC (test code = 1001) 3.1 K/UL                               

 

             RBC (test code = 1002) 3.14 M/UL                              

 

             HEMOGLOBIN (test code = 1003) 8.4 G/DL                             

  

 

             HEMATOCRIT (test code = 1004) 25.9 %                               

  

 

             MCV (test code = 1005) 82.5 fL                                

 

             MCH (test code = 1006) 26.8 PG                                

 

             MCHC (test code = 1007) 32.4 G/DL                              

 

             RDW (test code = 1038) 14.5 %                                 

 

             NEUTROPHILS (test code = 1008) 61.2 %                              

   

 

             LYMPHOCYTES (test code = 1010) 25.5 %                              

   

 

             MONOCYTES (test code = 1011) 8.8 %                                 

 

 

             EOSINOPHILS (test code = 1012) 3.9 %                               

   

 

             BASOPHILS (test code = 1013) 0.3 %                                 

 

 

             IMMATURE GRANULOCYTES (test 0.3 %                                  



             code = 1036)                                        

 

             NUCLEATED RBCS (test code = 0.0 /100WBC'S                          

 



             1065)                                               

 

             PLATELET COUNT (test code = 85 K/UL                                



             1015)                                               

 

             ABSOLUTE NEUTROPHILS (test code 1.87 K/UL                          

    



             = 1066)                                             

 

             ABSOLUTE LYMPHOCYTES (test code 0.78 K/UL                          

    



             = 1067)                                             

 

             ABSOLUTE MONOCYTES (test code = 0.27 K/UL                          

    



             1068)                                               

 

             ABSOLUTE EOSINOPHILS (test code 0.12 K/UL                          

    



             = 1040)                                             

 

             ABSOLUTE BASOPHILS (test code = 0.01 K/UL                          

    



             1069)                                               

 

             ABS IMMATURE GRANULOCYTES (test 0.01 K/UL                          

    



             code = 1020)                                        

 

             ABS NUCLEATED RBCS (test code = 0.00 K/UL                          

    



             57034)                                              



CBC W/AUTO DIFF2021-10-13 00:00:00





             Test Item    Value        Reference Range Interpretation Comments

 

             WBC (test code = 1001) 3.1 K/UL                               

 

             RBC (test code = 1002) 3.14 M/UL                              

 

             HEMOGLOBIN (test code = 1003) 8.4 G/DL                             

  

 

             HEMATOCRIT (test code = 1004) 25.9 %                               

  

 

             MCV (test code = 1005) 82.5 fL                                

 

             MCH (test code = 1006) 26.8 PG                                

 

             MCHC (test code = 1007) 32.4 G/DL                              

 

             RDW (test code = 1038) 14.5 %                                 

 

             NEUTROPHILS (test code = 1008) 61.2 %                              

   

 

             LYMPHOCYTES (test code = 1010) 25.5 %                              

   

 

             MONOCYTES (test code = 1011) 8.8 %                                 

 

 

             EOSINOPHILS (test code = 1012) 3.9 %                               

   

 

             BASOPHILS (test code = 1013) 0.3 %                                 

 

 

             IMMATURE GRANULOCYTES (test 0.3 %                                  



             code = 1036)                                        

 

             NUCLEATED RBCS (test code = 0.0 /100WBC'S                          

 



             1065)                                               

 

             PLATELET COUNT (test code = 85 K/UL                                



             1015)                                               

 

             ABSOLUTE NEUTROPHILS (test code 1.87 K/UL                          

    



             = 1066)                                             

 

             ABSOLUTE LYMPHOCYTES (test code 0.78 K/UL                          

    



             = 1067)                                             

 

             ABSOLUTE MONOCYTES (test code = 0.27 K/UL                          

    



             1068)                                               

 

             ABSOLUTE EOSINOPHILS (test code 0.12 K/UL                          

    



             = 1040)                                             

 

             ABSOLUTE BASOPHILS (test code = 0.01 K/UL                          

    



             1069)                                               

 

             ABS IMMATURE GRANULOCYTES (test 0.01 K/UL                          

    



             code = 1020)                                        

 

             ABS NUCLEATED RBCS (test code = 0.00 K/UL                          

    



             74620)                                              



CBC W/AUTO DIFF2021-10-13 00:00:00





             Test Item    Value        Reference Range Interpretation Comments

 

             WBC (test code = 1001) 3.1 K/UL                               

 

             RBC (test code = 1002) 3.14 M/UL                              

 

             HEMOGLOBIN (test code = 1003) 8.4 G/DL                             

  

 

             HEMATOCRIT (test code = 1004) 25.9 %                               

  

 

             MCV (test code = 1005) 82.5 fL                                

 

             MCH (test code = 1006) 26.8 PG                                

 

             MCHC (test code = 1007) 32.4 G/DL                              

 

             RDW (test code = 1038) 14.5 %                                 

 

             NEUTROPHILS (test code = 1008) 61.2 %                              

   

 

             LYMPHOCYTES (test code = 1010) 25.5 %                              

   

 

             MONOCYTES (test code = 1011) 8.8 %                                 

 

 

             EOSINOPHILS (test code = 1012) 3.9 %                               

   

 

             BASOPHILS (test code = 1013) 0.3 %                                 

 

 

             IMMATURE GRANULOCYTES (test 0.3 %                                  



             code = 1036)                                        

 

             NUCLEATED RBCS (test code = 0.0 /100WBC'S                          

 



             1065)                                               

 

             PLATELET COUNT (test code = 85 K/UL                                



             1015)                                               

 

             ABSOLUTE NEUTROPHILS (test code 1.87 K/UL                          

    



             = 1066)                                             

 

             ABSOLUTE LYMPHOCYTES (test code 0.78 K/UL                          

    



             = 1067)                                             

 

             ABSOLUTE MONOCYTES (test code = 0.27 K/UL                          

    



             1068)                                               

 

             ABSOLUTE EOSINOPHILS (test code 0.12 K/UL                          

    



             = 1040)                                             

 

             ABSOLUTE BASOPHILS (test code = 0.01 K/UL                          

    



             1069)                                               

 

             ABS IMMATURE GRANULOCYTES (test 0.01 K/UL                          

    



             code = 1020)                                        

 

             ABS NUCLEATED RBCS (test code = 0.00 K/UL                          

    



             41337)                                              



COMPREHENSIVE METABOLIC PANEL2021-10-13 00:00:00





             Test Item    Value        Reference Range Interpretation Comments

 

             GLUCOSE (test code = 2217) 139 MG/DL                              

 

             BUN (test code = 2208) 21 MG/DL                               

 

             CREATININE (test code = 2214) 0.83 MG/DL                           

  

 

             eGFR  AMER. (test code 88 ML/MIN/1.73                       

    



             = 30896)                                            

 

             eGFR NON- AMER. (test 76 ML/MIN/1.73                        

   



             code = 27568)                                        

 

             CALC BUN/CREAT (test code = 25 RATIO                               



             2235)                                               

 

             SODIUM (test code = 2231) 140 MEQ/L                              

 

             POTASSIUM (test code = 2228) 4.8 MEQ/L                             

 

 

             CHLORIDE (test code = 2215) 102 MEQ/L                              

 

             CARBON DIOXIDE (test code = 25 MEQ/L                               



             2206)                                               

 

             CALCIUM (test code = 2209) 9.2 MG/DL                              

 

             PROTEIN, TOTAL (test code = 7.2 G/DL                               



             )                                               

 

             ALBUMIN (test code = 2201) 3.6 G/DL                               

 

             CALC GLOBULIN (test code = 3.6 G/DL                               



             2240)                                               

 

             CALC A/G RATIO (test code = 1.0 RATIO                              



             2234)                                               

 

             BILIRUBIN, TOTAL (test code = 0.6 MG/DL                            

  



             )                                               

 

             ALKALINE PHOSPHATASE (test 101 U/L                                



             code = 2204)                                        

 

             AST (test code = 2218) 30 U/L                                 

 

             ALT (test code = 2219) 23 U/L                                 



COMPREHENSIVE METABOLIC PANEL2021-10-13 00:00:00





             Test Item    Value        Reference Range Interpretation Comments

 

             GLUCOSE (test code = 2217) 139 MG/DL                              

 

             BUN (test code = 2208) 21 MG/DL                               

 

             CREATININE (test code = 2214) 0.83 MG/DL                           

  

 

             eGFR  AMER. (test code 88 ML/MIN/1.73                       

    



             = 70515)                                            

 

             eGFR NON- AMER. (test 76 ML/MIN/1.73                        

   



             code = 38269)                                        

 

             CALC BUN/CREAT (test code = 25 RATIO                               



             2235)                                               

 

             SODIUM (test code = 2231) 140 MEQ/L                              

 

             POTASSIUM (test code = 2228) 4.8 MEQ/L                             

 

 

             CHLORIDE (test code = 2215) 102 MEQ/L                              

 

             CARBON DIOXIDE (test code = 25 MEQ/L                               



             )                                               

 

             CALCIUM (test code = 2209) 9.2 MG/DL                              

 

             PROTEIN, TOTAL (test code = 7.2 G/DL                               



             )                                               

 

             ALBUMIN (test code = 2201) 3.6 G/DL                               

 

             CALC GLOBULIN (test code = 3.6 G/DL                               



             )                                               

 

             CALC A/G RATIO (test code = 1.0 RATIO                              



             )                                               

 

             BILIRUBIN, TOTAL (test code = 0.6 MG/DL                            

  



             )                                               

 

             ALKALINE PHOSPHATASE (test 101 U/L                                



             code = 220)                                        

 

             AST (test code = 2218) 30 U/L                                 

 

             ALT (test code = 2219) 23 U/L                                 



PROBNP2021-10-13 00:00:00





             Test Item    Value        Reference Range Interpretation Comments

 

             NT-proBNP (test code = 74496) 247 PG/ML                            

  



PROBNP2021-10-13 00:00:00





             Test Item    Value        Reference Range Interpretation Comments

 

             NT-proBNP (test code = 59260) 247 PG/ML                            

  



PROBNP2021-10-13 00:00:00





             Test Item    Value        Reference Range Interpretation Comments

 

             NT-proBNP (test code = 49413) 247 PG/ML                            

  



VIOLET (ANTI-NUCLEAR AB) WITH REFLEX TITER2021-10-13 00:00:00





             Test Item    Value        Reference Range Interpretation Comments

 

             ANTI-NUCLEAR ANTIBODIES (test code = POSITIVE                      

         



             3506)                                               



VIOLET (ANTI-NUCLEAR AB) WITH REFLEX TITER2021-10-13 00:00:00





             Test Item    Value        Reference Range Interpretation Comments

 

             ANTI-NUCLEAR ANTIBODIES (test code = POSITIVE                      

         



             3506)                                               



C-REACTIVE PROTEIN2021-10-13 00:00:00





             Test Item    Value        Reference Range Interpretation Comments

 

             C-REACTIVE PROTEIN (test code = 1.1 MG/DL                          

    



             3513)                                               



C-REACTIVE PROTEIN2021-10-13 00:00:00





             Test Item    Value        Reference Range Interpretation Comments

 

             C-REACTIVE PROTEIN (test code = 1.1 MG/DL                          

    



             3513)                                               



SEDIMENTATION RATE2021-10-13 00:00:00





             Test Item    Value        Reference Range Interpretation Comments

 

             SEDIMENTATION RATE (test code = 88 MM/HOUR                         

    



             1017)                                               



SEDIMENTATION RATE2021-10-13 00:00:00





             Test Item    Value        Reference Range Interpretation Comments

 

             SEDIMENTATION RATE (test code = 88 MM/HOUR                         

    



             1017)                                               



[id number] 00:00:00





             Test Item    Value        Reference Range Interpretation Comments

 

             D-DIMER (test code = 1405) 0.77 UG/MLFEU                           



[id number] 00:00:00





             Test Item    Value        Reference Range Interpretation Comments

 

             D-DIMER (test code = 1405) 0.77 UG/MLFEU                           



CBC W/AUTO DIFF2021-10-13 00:00:00





             Test Item    Value        Reference Range Interpretation Comments

 

             WBC (test code = 1001) 3.1 K/UL                               

 

             RBC (test code = 1002) 3.14 M/UL                              

 

             HEMOGLOBIN (test code = 1003) 8.4 G/DL                             

  

 

             HEMATOCRIT (test code = 1004) 25.9 %                               

  

 

             MCV (test code = 1005) 82.5 fL                                

 

             MCH (test code = 1006) 26.8 PG                                

 

             MCHC (test code = 1007) 32.4 G/DL                              

 

             RDW (test code = 1038) 14.5 %                                 

 

             NEUTROPHILS (test code = 1008) 61.2 %                              

   

 

             LYMPHOCYTES (test code = 1010) 25.5 %                              

   

 

             MONOCYTES (test code = 1011) 8.8 %                                 

 

 

             EOSINOPHILS (test code = 1012) 3.9 %                               

   

 

             BASOPHILS (test code = 1013) 0.3 %                                 

 

 

             IMMATURE GRANULOCYTES (test 0.3 %                                  



             code = 1036)                                        

 

             NUCLEATED RBCS (test code = 0.0 /100WBC'S                          

 



             1065)                                               

 

             PLATELET COUNT (test code = 85 K/UL                                



             1015)                                               

 

             ABSOLUTE NEUTROPHILS (test code 1.87 K/UL                          

    



             = 1066)                                             

 

             ABSOLUTE LYMPHOCYTES (test code 0.78 K/UL                          

    



             = 1067)                                             

 

             ABSOLUTE MONOCYTES (test code = 0.27 K/UL                          

    



             1068)                                               

 

             ABSOLUTE EOSINOPHILS (test code 0.12 K/UL                          

    



             = 1040)                                             

 

             ABSOLUTE BASOPHILS (test code = 0.01 K/UL                          

    



             1069)                                               

 

             ABS IMMATURE GRANULOCYTES (test 0.01 K/UL                          

    



             code = 1020)                                        

 

             ABS NUCLEATED RBCS (test code = 0.00 K/UL                          

    



             88261)                                              



CBC W/AUTO DIFF2021-10-13 00:00:00





             Test Item    Value        Reference Range Interpretation Comments

 

             WBC (test code = 1001) 3.1 K/UL                               

 

             RBC (test code = 1002) 3.14 M/UL                              

 

             HEMOGLOBIN (test code = 1003) 8.4 G/DL                             

  

 

             HEMATOCRIT (test code = 1004) 25.9 %                               

  

 

             MCV (test code = 1005) 82.5 fL                                

 

             MCH (test code = 1006) 26.8 PG                                

 

             MCHC (test code = 1007) 32.4 G/DL                              

 

             RDW (test code = 1038) 14.5 %                                 

 

             NEUTROPHILS (test code = 1008) 61.2 %                              

   

 

             LYMPHOCYTES (test code = 1010) 25.5 %                              

   

 

             MONOCYTES (test code = 1011) 8.8 %                                 

 

 

             EOSINOPHILS (test code = 1012) 3.9 %                               

   

 

             BASOPHILS (test code = 1013) 0.3 %                                 

 

 

             IMMATURE GRANULOCYTES (test 0.3 %                                  



             code = 1036)                                        

 

             NUCLEATED RBCS (test code = 0.0 /100WBC'S                          

 



             1065)                                               

 

             PLATELET COUNT (test code = 85 K/UL                                



             1015)                                               

 

             ABSOLUTE NEUTROPHILS (test code 1.87 K/UL                          

    



             = 1066)                                             

 

             ABSOLUTE LYMPHOCYTES (test code 0.78 K/UL                          

    



             = 1067)                                             

 

             ABSOLUTE MONOCYTES (test code = 0.27 K/UL                          

    



             1068)                                               

 

             ABSOLUTE EOSINOPHILS (test code 0.12 K/UL                          

    



             = 1040)                                             

 

             ABSOLUTE BASOPHILS (test code = 0.01 K/UL                          

    



             1069)                                               

 

             ABS IMMATURE GRANULOCYTES (test 0.01 K/UL                          

    



             code = 1020)                                        

 

             ABS NUCLEATED RBCS (test code = 0.00 K/UL                          

    



             63168)                                              



CBC W/AUTO DIFF2021-10-13 00:00:00





             Test Item    Value        Reference Range Interpretation Comments

 

             WBC (test code = 1001) 3.1 K/UL                               

 

             RBC (test code = 1002) 3.14 M/UL                              

 

             HEMOGLOBIN (test code = 1003) 8.4 G/DL                             

  

 

             HEMATOCRIT (test code = 1004) 25.9 %                               

  

 

             MCV (test code = 1005) 82.5 fL                                

 

             MCH (test code = 1006) 26.8 PG                                

 

             MCHC (test code = 1007) 32.4 G/DL                              

 

             RDW (test code = 1038) 14.5 %                                 

 

             NEUTROPHILS (test code = 1008) 61.2 %                              

   

 

             LYMPHOCYTES (test code = 1010) 25.5 %                              

   

 

             MONOCYTES (test code = 1011) 8.8 %                                 

 

 

             EOSINOPHILS (test code = 1012) 3.9 %                               

   

 

             BASOPHILS (test code = 1013) 0.3 %                                 

 

 

             IMMATURE GRANULOCYTES (test 0.3 %                                  



             code = 1036)                                        

 

             NUCLEATED RBCS (test code = 0.0 /100WBC'S                          

 



             1065)                                               

 

             PLATELET COUNT (test code = 85 K/UL                                



             1015)                                               

 

             ABSOLUTE NEUTROPHILS (test code 1.87 K/UL                          

    



             = 1066)                                             

 

             ABSOLUTE LYMPHOCYTES (test code 0.78 K/UL                          

    



             = 1067)                                             

 

             ABSOLUTE MONOCYTES (test code = 0.27 K/UL                          

    



             1068)                                               

 

             ABSOLUTE EOSINOPHILS (test code 0.12 K/UL                          

    



             = 1040)                                             

 

             ABSOLUTE BASOPHILS (test code = 0.01 K/UL                          

    



             1069)                                               

 

             ABS IMMATURE GRANULOCYTES (test 0.01 K/UL                          

    



             code = 1020)                                        

 

             ABS NUCLEATED RBCS (test code = 0.00 K/UL                          

    



             49477)                                              



CBC W/AUTO DIFF2021-10-13 00:00:00





             Test Item    Value        Reference Range Interpretation Comments

 

             WBC (test code = 1001) 3.1 K/UL                               

 

             RBC (test code = 1002) 3.14 M/UL                              

 

             HEMOGLOBIN (test code = 1003) 8.4 G/DL                             

  

 

             HEMATOCRIT (test code = 1004) 25.9 %                               

  

 

             MCV (test code = 1005) 82.5 fL                                

 

             MCH (test code = 1006) 26.8 PG                                

 

             MCHC (test code = 1007) 32.4 G/DL                              

 

             RDW (test code = 1038) 14.5 %                                 

 

             NEUTROPHILS (test code = 1008) 61.2 %                              

   

 

             LYMPHOCYTES (test code = 1010) 25.5 %                              

   

 

             MONOCYTES (test code = 1011) 8.8 %                                 

 

 

             EOSINOPHILS (test code = 1012) 3.9 %                               

   

 

             BASOPHILS (test code = 1013) 0.3 %                                 

 

 

             IMMATURE GRANULOCYTES (test 0.3 %                                  



             code = 1036)                                        

 

             NUCLEATED RBCS (test code = 0.0 /100WBC'S                          

 



             1065)                                               

 

             PLATELET COUNT (test code = 85 K/UL                                



             1015)                                               

 

             ABSOLUTE NEUTROPHILS (test code 1.87 K/UL                          

    



             = 1066)                                             

 

             ABSOLUTE LYMPHOCYTES (test code 0.78 K/UL                          

    



             = 1067)                                             

 

             ABSOLUTE MONOCYTES (test code = 0.27 K/UL                          

    



             1068)                                               

 

             ABSOLUTE EOSINOPHILS (test code 0.12 K/UL                          

    



             = 1040)                                             

 

             ABSOLUTE BASOPHILS (test code = 0.01 K/UL                          

    



             1069)                                               

 

             ABS IMMATURE GRANULOCYTES (test 0.01 K/UL                          

    



             code = 1020)                                        

 

             ABS NUCLEATED RBCS (test code = 0.00 K/UL                          

    



             77162)                                              



CBC W/AUTO DIFF2021-10-13 00:00:00





             Test Item    Value        Reference Range Interpretation Comments

 

             WBC (test code = 1001) 3.1 K/UL                               

 

             RBC (test code = 1002) 3.14 M/UL                              

 

             HEMOGLOBIN (test code = 1003) 8.4 G/DL                             

  

 

             HEMATOCRIT (test code = 1004) 25.9 %                               

  

 

             MCV (test code = 1005) 82.5 fL                                

 

             MCH (test code = 1006) 26.8 PG                                

 

             MCHC (test code = 1007) 32.4 G/DL                              

 

             RDW (test code = 1038) 14.5 %                                 

 

             NEUTROPHILS (test code = 1008) 61.2 %                              

   

 

             LYMPHOCYTES (test code = 1010) 25.5 %                              

   

 

             MONOCYTES (test code = 1011) 8.8 %                                 

 

 

             EOSINOPHILS (test code = 1012) 3.9 %                               

   

 

             BASOPHILS (test code = 1013) 0.3 %                                 

 

 

             IMMATURE GRANULOCYTES (test 0.3 %                                  



             code = 1036)                                        

 

             NUCLEATED RBCS (test code = 0.0 /100WBC'S                          

 



             1065)                                               

 

             PLATELET COUNT (test code = 85 K/UL                                



             1015)                                               

 

             ABSOLUTE NEUTROPHILS (test code 1.87 K/UL                          

    



             = 1066)                                             

 

             ABSOLUTE LYMPHOCYTES (test code 0.78 K/UL                          

    



             = 1067)                                             

 

             ABSOLUTE MONOCYTES (test code = 0.27 K/UL                          

    



             1068)                                               

 

             ABSOLUTE EOSINOPHILS (test code 0.12 K/UL                          

    



             = 1040)                                             

 

             ABSOLUTE BASOPHILS (test code = 0.01 K/UL                          

    



             1069)                                               

 

             ABS IMMATURE GRANULOCYTES (test 0.01 K/UL                          

    



             code = 1020)                                        

 

             ABS NUCLEATED RBCS (test code = 0.00 K/UL                          

    



             85411)                                              



COMPREHENSIVE METABOLIC PANEL202-10-13 00:00:00





             Test Item    Value        Reference Range Interpretation Comments

 

             GLUCOSE (test code = 2217) 139 MG/DL                              

 

             BUN (test code = 2208) 21 MG/DL                               

 

             CREATININE (test code = 2214) 0.83 MG/DL                           

  

 

             eGFR  AMER. (test code 88 ML/MIN/1.73                       

    



             = 52608)                                            

 

             eGFR NON- AMER. (test 76 ML/MIN/1.73                        

   



             code = 13007)                                        

 

             CALC BUN/CREAT (test code = 25 RATIO                               



             2235)                                               

 

             SODIUM (test code = 2231) 140 MEQ/L                              

 

             POTASSIUM (test code = 2228) 4.8 MEQ/L                             

 

 

             CHLORIDE (test code = 2215) 102 MEQ/L                              

 

             CARBON DIOXIDE (test code = 25 MEQ/L                               



             220)                                               

 

             CALCIUM (test code = 2209) 9.2 MG/DL                              

 

             PROTEIN, TOTAL (test code = 7.2 G/DL                               



             )                                               

 

             ALBUMIN (test code = 2201) 3.6 G/DL                               

 

             CALC GLOBULIN (test code = 3.6 G/DL                               



             2240)                                               

 

             CALC A/G RATIO (test code = 1.0 RATIO                              



             2234)                                               

 

             BILIRUBIN, TOTAL (test code = 0.6 MG/DL                            

  



             )                                               

 

             ALKALINE PHOSPHATASE (test 101 U/L                                



             code = 2204)                                        

 

             AST (test code = 2218) 30 U/L                                 

 

             ALT (test code = 2219) 23 U/L                                 



COMPREHENSIVE METABOLIC PANEL2021-10-13 00:00:00





             Test Item    Value        Reference Range Interpretation Comments

 

             GLUCOSE (test code = 2217) 139 MG/DL                              

 

             BUN (test code = 2208) 21 MG/DL                               

 

             CREATININE (test code = 2214) 0.83 MG/DL                           

  

 

             eGFR  AMER. (test code 88 ML/MIN/1.73                       

    



             = 68279)                                            

 

             eGFR NON- AMER. (test 76 ML/MIN/1.73                        

   



             code = 03083)                                        

 

             CALC BUN/CREAT (test code = 25 RATIO                               



             2235)                                               

 

             SODIUM (test code = 2231) 140 MEQ/L                              

 

             POTASSIUM (test code = 2228) 4.8 MEQ/L                             

 

 

             CHLORIDE (test code = 2215) 102 MEQ/L                              

 

             CARBON DIOXIDE (test code = 25 MEQ/L                               



             6)                                               

 

             CALCIUM (test code = 2209) 9.2 MG/DL                              

 

             PROTEIN, TOTAL (test code = 7.2 G/DL                               



             )                                               

 

             ALBUMIN (test code = 2201) 3.6 G/DL                               

 

             CALC GLOBULIN (test code = 3.6 G/DL                               



             2240)                                               

 

             CALC A/G RATIO (test code = 1.0 RATIO                              



             4)                                               

 

             BILIRUBIN, TOTAL (test code = 0.6 MG/DL                            

  



             )                                               

 

             ALKALINE PHOSPHATASE (test 101 U/L                                



             code = 2204)                                        

 

             AST (test code = 2218) 30 U/L                                 

 

             ALT (test code = 2219) 23 U/L                                 



PROBNP2021-10-13 00:00:00





             Test Item    Value        Reference Range Interpretation Comments

 

             NT-proBNP (test code = 30290) 247 PG/ML                            

  



PROBNP2021-10-13 00:00:00





             Test Item    Value        Reference Range Interpretation Comments

 

             NT-proBNP (test code = 66778) 247 PG/ML                            

  



PROBNP2021-10-13 00:00:00





             Test Item    Value        Reference Range Interpretation Comments

 

             NT-proBNP (test code = 35661) 247 PG/ML                            

  



VIOLET (ANTI-NUCLEAR AB) WITH REFLEX TITER2021-10-13 00:00:00





             Test Item    Value        Reference Range Interpretation Comments

 

             ANTI-NUCLEAR ANTIBODIES (test code = POSITIVE                      

         



             3506)                                               



VIOLET (ANTI-NUCLEAR AB) WITH REFLEX TITER2021-10-13 00:00:00





             Test Item    Value        Reference Range Interpretation Comments

 

             ANTI-NUCLEAR ANTIBODIES (test code = POSITIVE                      

         



             3506)                                               



C-REACTIVE PROTEIN2021-10-13 00:00:00





             Test Item    Value        Reference Range Interpretation Comments

 

             C-REACTIVE PROTEIN (test code = 1.1 MG/DL                          

    



             3513)                                               



C-REACTIVE PROTEIN2021-10-13 00:00:00





             Test Item    Value        Reference Range Interpretation Comments

 

             C-REACTIVE PROTEIN (test code = 1.1 MG/DL                          

    



             3513)                                               



COMPREHENSIVE METABOLIC PANEL2021-10-13 00:00:00





             Test Item    Value        Reference Range Interpretation Comments

 

             GLUCOSE (test code = 2217) 139 MG/DL                              

 

             BUN (test code = 2208) 21 MG/DL                               

 

             CREATININE (test code = 2214) 0.83 MG/DL                           

  

 

             eGFR  AMER. (test code 88 ML/MIN/1.73                       

    



             = 97640)                                            

 

             eGFR NON- AMER. (test 76 ML/MIN/1.73                        

   



             code = 58747)                                        

 

             CALC BUN/CREAT (test code = 25 RATIO                               



             2235)                                               

 

             SODIUM (test code = 2231) 140 MEQ/L                              

 

             POTASSIUM (test code = 2228) 4.8 MEQ/L                             

 

 

             CHLORIDE (test code = 2215) 102 MEQ/L                              

 

             CARBON DIOXIDE (test code = 25 MEQ/L                               



             2206)                                               

 

             CALCIUM (test code = 2209) 9.2 MG/DL                              

 

             PROTEIN, TOTAL (test code = 7.2 G/DL                               



             222)                                               

 

             ALBUMIN (test code = 2201) 3.6 G/DL                               

 

             CALC GLOBULIN (test code = 3.6 G/DL                               



             2240)                                               

 

             CALC A/G RATIO (test code = 1.0 RATIO                              



             2234)                                               

 

             BILIRUBIN, TOTAL (test code = 0.6 MG/DL                            

  



             )                                               

 

             ALKALINE PHOSPHATASE (test 101 U/L                                



             code = 2204)                                        

 

             AST (test code = 2218) 30 U/L                                 

 

             ALT (test code = 2219) 23 U/L                                 



SEDIMENTATION RATE2021-10-13 00:00:00





             Test Item    Value        Reference Range Interpretation Comments

 

             SEDIMENTATION RATE (test code = 88 MM/HOUR                         

    



             1017)                                               



SEDIMENTATION RATE2021-10-13 00:00:00





             Test Item    Value        Reference Range Interpretation Comments

 

             SEDIMENTATION RATE (test code = 88 MM/HOUR                         

    



             1017)                                               



[id number] 00:00:00





             Test Item    Value        Reference Range Interpretation Comments

 

             D-DIMER (test code = 1405) 0.77 UG/MLFEU                           



[id number] 00:00:00





             Test Item    Value        Reference Range Interpretation Comments

 

             D-DIMER (test code = 1405) 0.77 UG/MLFEU                           



PROBNP2021-10-13 00:00:00





             Test Item    Value        Reference Range Interpretation Comments

 

             NT-proBNP (test code = 73640) 247 PG/ML                            

  



PROBNP2021-10-13 00:00:00





             Test Item    Value        Reference Range Interpretation Comments

 

             NT-proBNP (test code = 19776) 247 PG/ML                            

  



VIOLET (ANTI-NUCLEAR AB) WITH REFLEX TITER2021-10-13 00:00:00





             Test Item    Value        Reference Range Interpretation Comments

 

             ANTI-NUCLEAR ANTIBODIES (test code = POSITIVE                      

         



             3506)                                               



C-REACTIVE PROTEIN2021-10-13 00:00:00





             Test Item    Value        Reference Range Interpretation Comments

 

             C-REACTIVE PROTEIN (test code = 1.1 MG/DL                          

    



             3513)                                               



SEDIMENTATION RATE2021-10-13 00:00:00





             Test Item    Value        Reference Range Interpretation Comments

 

             SEDIMENTATION RATE (test code = 88 MM/HOUR                         

    



             1017)                                               



[id number] 00:00:00





             Test Item    Value        Reference Range Interpretation Comments

 

             D-DIMER (test code = 1405) 0.77 UG/MLFEU                           



CBC W/AUTO DIFF2021-10-13 00:00:00





             Test Item    Value        Reference Range Interpretation Comments

 

             WBC (test code = 1001) 3.1 K/UL                               

 

             RBC (test code = 1002) 3.14 M/UL                              

 

             HEMOGLOBIN (test code = 1003) 8.4 G/DL                             

  

 

             HEMATOCRIT (test code = 1004) 25.9 %                               

  

 

             MCV (test code = 1005) 82.5 fL                                

 

             MCH (test code = 1006) 26.8 PG                                

 

             MCHC (test code = 1007) 32.4 G/DL                              

 

             RDW (test code = 1038) 14.5 %                                 

 

             NEUTROPHILS (test code = 1008) 61.2 %                              

   

 

             LYMPHOCYTES (test code = 1010) 25.5 %                              

   

 

             MONOCYTES (test code = 1011) 8.8 %                                 

 

 

             EOSINOPHILS (test code = 1012) 3.9 %                               

   

 

             BASOPHILS (test code = 1013) 0.3 %                                 

 

 

             IMMATURE GRANULOCYTES (test 0.3 %                                  



             code = 1036)                                        

 

             NUCLEATED RBCS (test code = 0.0 /100WBC'S                          

 



             1065)                                               

 

             PLATELET COUNT (test code = 85 K/UL                                



             1015)                                               

 

             ABSOLUTE NEUTROPHILS (test code 1.87 K/UL                          

    



             = 1066)                                             

 

             ABSOLUTE LYMPHOCYTES (test code 0.78 K/UL                          

    



             = 1067)                                             

 

             ABSOLUTE MONOCYTES (test code = 0.27 K/UL                          

    



             1068)                                               

 

             ABSOLUTE EOSINOPHILS (test code 0.12 K/UL                          

    



             = 1040)                                             

 

             ABSOLUTE BASOPHILS (test code = 0.01 K/UL                          

    



             1069)                                               

 

             ABS IMMATURE GRANULOCYTES (test 0.01 K/UL                          

    



             code = 1020)                                        

 

             ABS NUCLEATED RBCS (test code = 0.00 K/UL                          

    



             32543)                                              



CBC W/AUTO DIFF2021-10-13 00:00:00





             Test Item    Value        Reference Range Interpretation Comments

 

             WBC (test code = 1001) 3.1 K/UL                               

 

             RBC (test code = 1002) 3.14 M/UL                              

 

             HEMOGLOBIN (test code = 1003) 8.4 G/DL                             

  

 

             HEMATOCRIT (test code = 1004) 25.9 %                               

  

 

             MCV (test code = 1005) 82.5 fL                                

 

             MCH (test code = 1006) 26.8 PG                                

 

             MCHC (test code = 1007) 32.4 G/DL                              

 

             RDW (test code = 1038) 14.5 %                                 

 

             NEUTROPHILS (test code = 1008) 61.2 %                              

   

 

             LYMPHOCYTES (test code = 1010) 25.5 %                              

   

 

             MONOCYTES (test code = 1011) 8.8 %                                 

 

 

             EOSINOPHILS (test code = 1012) 3.9 %                               

   

 

             BASOPHILS (test code = 1013) 0.3 %                                 

 

 

             IMMATURE GRANULOCYTES (test 0.3 %                                  



             code = 1036)                                        

 

             NUCLEATED RBCS (test code = 0.0 /100WBC'S                          

 



             1065)                                               

 

             PLATELET COUNT (test code = 85 K/UL                                



             1015)                                               

 

             ABSOLUTE NEUTROPHILS (test code 1.87 K/UL                          

    



             = 1066)                                             

 

             ABSOLUTE LYMPHOCYTES (test code 0.78 K/UL                          

    



             = 1067)                                             

 

             ABSOLUTE MONOCYTES (test code = 0.27 K/UL                          

    



             1068)                                               

 

             ABSOLUTE EOSINOPHILS (test code 0.12 K/UL                          

    



             = 1040)                                             

 

             ABSOLUTE BASOPHILS (test code = 0.01 K/UL                          

    



             1069)                                               

 

             ABS IMMATURE GRANULOCYTES (test 0.01 K/UL                          

    



             code = 1020)                                        

 

             ABS NUCLEATED RBCS (test code = 0.00 K/UL                          

    



             43338)                                              



CBC W/AUTO DIFF2021-10-13 00:00:00





             Test Item    Value        Reference Range Interpretation Comments

 

             WBC (test code = 1001) 3.1 K/UL                               

 

             RBC (test code = 1002) 3.14 M/UL                              

 

             HEMOGLOBIN (test code = 1003) 8.4 G/DL                             

  

 

             HEMATOCRIT (test code = 1004) 25.9 %                               

  

 

             MCV (test code = 1005) 82.5 fL                                

 

             MCH (test code = 1006) 26.8 PG                                

 

             MCHC (test code = 1007) 32.4 G/DL                              

 

             RDW (test code = 1038) 14.5 %                                 

 

             NEUTROPHILS (test code = 1008) 61.2 %                              

   

 

             LYMPHOCYTES (test code = 1010) 25.5 %                              

   

 

             MONOCYTES (test code = 1011) 8.8 %                                 

 

 

             EOSINOPHILS (test code = 1012) 3.9 %                               

   

 

             BASOPHILS (test code = 1013) 0.3 %                                 

 

 

             IMMATURE GRANULOCYTES (test 0.3 %                                  



             code = 1036)                                        

 

             NUCLEATED RBCS (test code = 0.0 /100WBC'S                          

 



             1065)                                               

 

             PLATELET COUNT (test code = 85 K/UL                                



             1015)                                               

 

             ABSOLUTE NEUTROPHILS (test code 1.87 K/UL                          

    



             = 1066)                                             

 

             ABSOLUTE LYMPHOCYTES (test code 0.78 K/UL                          

    



             = 1067)                                             

 

             ABSOLUTE MONOCYTES (test code = 0.27 K/UL                          

    



             1068)                                               

 

             ABSOLUTE EOSINOPHILS (test code 0.12 K/UL                          

    



             = 1040)                                             

 

             ABSOLUTE BASOPHILS (test code = 0.01 K/UL                          

    



             1069)                                               

 

             ABS IMMATURE GRANULOCYTES (test 0.01 K/UL                          

    



             code = 1020)                                        

 

             ABS NUCLEATED RBCS (test code = 0.00 K/UL                          

    



             65887)                                              



COMPREHENSIVE METABOLIC PANEL202-10- 00:00:00





             Test Item    Value        Reference Range Interpretation Comments

 

             GLUCOSE (test code = 2217) 139 MG/DL                              

 

             BUN (test code = 2208) 21 MG/DL                               

 

             CREATININE (test code = 2214) 0.83 MG/DL                           

  

 

             eGFR  AMER. (test code 88 ML/MIN/1.73                       

    



             = 57072)                                            

 

             eGFR NON- AMER. (test 76 ML/MIN/1.73                        

   



             code = 28561)                                        

 

             CALC BUN/CREAT (test code = 25 RATIO                               



             2235)                                               

 

             SODIUM (test code = 2231) 140 MEQ/L                              

 

             POTASSIUM (test code = 2228) 4.8 MEQ/L                             

 

 

             CHLORIDE (test code = 2215) 102 MEQ/L                              

 

             CARBON DIOXIDE (test code = 25 MEQ/L                               



             2206)                                               

 

             CALCIUM (test code = 2209) 9.2 MG/DL                              

 

             PROTEIN, TOTAL (test code = 7.2 G/DL                               



             )                                               

 

             ALBUMIN (test code = 2201) 3.6 G/DL                               

 

             CALC GLOBULIN (test code = 3.6 G/DL                               



             2240)                                               

 

             CALC A/G RATIO (test code = 1.0 RATIO                              



             2234)                                               

 

             BILIRUBIN, TOTAL (test code = 0.6 MG/DL                            

  



             2207)                                               

 

             ALKALINE PHOSPHATASE (test 101 U/L                                



             code = 2204)                                        

 

             AST (test code = 2218) 30 U/L                                 

 

             ALT (test code = 2219) 23 U/L                                 



COMPREHENSIVE METABOLIC PANEL2021-10-13 00:00:00





             Test Item    Value        Reference Range Interpretation Comments

 

             GLUCOSE (test code = 2217) 139 MG/DL                              

 

             BUN (test code = 2208) 21 MG/DL                               

 

             CREATININE (test code = 2214) 0.83 MG/DL                           

  

 

             eGFR  AMER. (test code 88 ML/MIN/1.73                       

    



             = 63992)                                            

 

             eGFR NON- AMER. (test 76 ML/MIN/1.73                        

   



             code = 73713)                                        

 

             CALC BUN/CREAT (test code = 25 RATIO                               



             2235)                                               

 

             SODIUM (test code = 2231) 140 MEQ/L                              

 

             POTASSIUM (test code = 2228) 4.8 MEQ/L                             

 

 

             CHLORIDE (test code = 2215) 102 MEQ/L                              

 

             CARBON DIOXIDE (test code = 25 MEQ/L                               



             )                                               

 

             CALCIUM (test code = 2209) 9.2 MG/DL                              

 

             PROTEIN, TOTAL (test code = 7.2 G/DL                               



             )                                               

 

             ALBUMIN (test code = 2201) 3.6 G/DL                               

 

             CALC GLOBULIN (test code = 3.6 G/DL                               



             2240)                                               

 

             CALC A/G RATIO (test code = 1.0 RATIO                              



             2234)                                               

 

             BILIRUBIN, TOTAL (test code = 0.6 MG/DL                            

  



             2207)                                               

 

             ALKALINE PHOSPHATASE (test 101 U/L                                



             code = 2204)                                        

 

             AST (test code = 2218) 30 U/L                                 

 

             ALT (test code = 2219) 23 U/L                                 



PROBNP2021-10-13 00:00:00





             Test Item    Value        Reference Range Interpretation Comments

 

             NT-proBNP (test code = 13946) 247 PG/ML                            

  



PROBNP2021-10-13 00:00:00





             Test Item    Value        Reference Range Interpretation Comments

 

             NT-proBNP (test code = 70366) 247 PG/ML                            

  



PROBNP2021-10-13 00:00:00





             Test Item    Value        Reference Range Interpretation Comments

 

             NT-proBNP (test code = 31430) 247 PG/ML                            

  



VIOLET (ANTI-NUCLEAR AB) WITH REFLEX TITER2021-10-13 00:00:00





             Test Item    Value        Reference Range Interpretation Comments

 

             ANTI-NUCLEAR ANTIBODIES (test code = POSITIVE                      

         



             2069)                                               



VIOLET (ANTI-NUCLEAR AB) WITH REFLEX TITER2021-10-13 00:00:00





             Test Item    Value        Reference Range Interpretation Comments

 

             ANTI-NUCLEAR ANTIBODIES (test code = POSITIVE                      

         



             3506)                                               



C-REACTIVE PROTEIN2021-10-13 00:00:00





             Test Item    Value        Reference Range Interpretation Comments

 

             C-REACTIVE PROTEIN (test code = 1.1 MG/DL                          

    



             3513)                                               



C-REACTIVE PROTEIN2021-10-13 00:00:00





             Test Item    Value        Reference Range Interpretation Comments

 

             C-REACTIVE PROTEIN (test code = 1.1 MG/DL                          

    



             3513)                                               



SEDIMENTATION RATE2021-10-13 00:00:00





             Test Item    Value        Reference Range Interpretation Comments

 

             SEDIMENTATION RATE (test code = 88 MM/HOUR                         

    



             1017)                                               



SEDIMENTATION RATE2021-10-13 00:00:00





             Test Item    Value        Reference Range Interpretation Comments

 

             SEDIMENTATION RATE (test code = 88 MM/HOUR                         

    



             1017)                                               



[id number] 00:00:00





             Test Item    Value        Reference Range Interpretation Comments

 

             D-DIMER (test code = 1405) 0.77 UG/MLFEU                           



[id number] 00:00:00





             Test Item    Value        Reference Range Interpretation Comments

 

             D-DIMER (test code = 1405) 0.77 UG/MLFEU                           



CBC W/AUTO DIFF2021-10-13 00:00:00





             Test Item    Value        Reference Range Interpretation Comments

 

             WBC (test code = 1001) 3.1 K/UL                               

 

             RBC (test code = 1002) 3.14 M/UL                              

 

             HEMOGLOBIN (test code = 1003) 8.4 G/DL                             

  

 

             HEMATOCRIT (test code = 1004) 25.9 %                               

  

 

             MCV (test code = 1005) 82.5 fL                                

 

             MCH (test code = 1006) 26.8 PG                                

 

             MCHC (test code = 1007) 32.4 G/DL                              

 

             RDW (test code = 1038) 14.5 %                                 

 

             NEUTROPHILS (test code = 1008) 61.2 %                              

   

 

             LYMPHOCYTES (test code = 1010) 25.5 %                              

   

 

             MONOCYTES (test code = 1011) 8.8 %                                 

 

 

             EOSINOPHILS (test code = 1012) 3.9 %                               

   

 

             BASOPHILS (test code = 1013) 0.3 %                                 

 

 

             IMMATURE GRANULOCYTES (test 0.3 %                                  



             code = 1036)                                        

 

             NUCLEATED RBCS (test code = 0.0 /100WBC'S                          

 



             1065)                                               

 

             PLATELET COUNT (test code = 85 K/UL                                



             1015)                                               

 

             ABSOLUTE NEUTROPHILS (test code 1.87 K/UL                          

    



             = 1066)                                             

 

             ABSOLUTE LYMPHOCYTES (test code 0.78 K/UL                          

    



             = 1067)                                             

 

             ABSOLUTE MONOCYTES (test code = 0.27 K/UL                          

    



             1068)                                               

 

             ABSOLUTE EOSINOPHILS (test code 0.12 K/UL                          

    



             = 1040)                                             

 

             ABSOLUTE BASOPHILS (test code = 0.01 K/UL                          

    



             1069)                                               

 

             ABS IMMATURE GRANULOCYTES (test 0.01 K/UL                          

    



             code = 1020)                                        

 

             ABS NUCLEATED RBCS (test code = 0.00 K/UL                          

    



             88023)                                              



CBC W/AUTO DIFF2021-10-13 00:00:00





             Test Item    Value        Reference Range Interpretation Comments

 

             WBC (test code = 1001) 3.1 K/UL                               

 

             RBC (test code = 1002) 3.14 M/UL                              

 

             HEMOGLOBIN (test code = 1003) 8.4 G/DL                             

  

 

             HEMATOCRIT (test code = 1004) 25.9 %                               

  

 

             MCV (test code = 1005) 82.5 fL                                

 

             MCH (test code = 1006) 26.8 PG                                

 

             MCHC (test code = 1007) 32.4 G/DL                              

 

             RDW (test code = 1038) 14.5 %                                 

 

             NEUTROPHILS (test code = 1008) 61.2 %                              

   

 

             LYMPHOCYTES (test code = 1010) 25.5 %                              

   

 

             MONOCYTES (test code = 1011) 8.8 %                                 

 

 

             EOSINOPHILS (test code = 1012) 3.9 %                               

   

 

             BASOPHILS (test code = 1013) 0.3 %                                 

 

 

             IMMATURE GRANULOCYTES (test 0.3 %                                  



             code = 1036)                                        

 

             NUCLEATED RBCS (test code = 0.0 /100WBC'S                          

 



             1065)                                               

 

             PLATELET COUNT (test code = 85 K/UL                                



             1015)                                               

 

             ABSOLUTE NEUTROPHILS (test code 1.87 K/UL                          

    



             = 1066)                                             

 

             ABSOLUTE LYMPHOCYTES (test code 0.78 K/UL                          

    



             = 1067)                                             

 

             ABSOLUTE MONOCYTES (test code = 0.27 K/UL                          

    



             1068)                                               

 

             ABSOLUTE EOSINOPHILS (test code 0.12 K/UL                          

    



             = 1040)                                             

 

             ABSOLUTE BASOPHILS (test code = 0.01 K/UL                          

    



             1069)                                               

 

             ABS IMMATURE GRANULOCYTES (test 0.01 K/UL                          

    



             code = 1020)                                        

 

             ABS NUCLEATED RBCS (test code = 0.00 K/UL                          

    



             45371)                                              



CBC W/AUTO DIFF2021-10-13 00:00:00





             Test Item    Value        Reference Range Interpretation Comments

 

             WBC (test code = 1001) 3.1 K/UL                               

 

             RBC (test code = 1002) 3.14 M/UL                              

 

             HEMOGLOBIN (test code = 1003) 8.4 G/DL                             

  

 

             HEMATOCRIT (test code = 1004) 25.9 %                               

  

 

             MCV (test code = 1005) 82.5 fL                                

 

             MCH (test code = 1006) 26.8 PG                                

 

             MCHC (test code = 1007) 32.4 G/DL                              

 

             RDW (test code = 1038) 14.5 %                                 

 

             NEUTROPHILS (test code = 1008) 61.2 %                              

   

 

             LYMPHOCYTES (test code = 1010) 25.5 %                              

   

 

             MONOCYTES (test code = 1011) 8.8 %                                 

 

 

             EOSINOPHILS (test code = 1012) 3.9 %                               

   

 

             BASOPHILS (test code = 1013) 0.3 %                                 

 

 

             IMMATURE GRANULOCYTES (test 0.3 %                                  



             code = 1036)                                        

 

             NUCLEATED RBCS (test code = 0.0 /100WBC'S                          

 



             1065)                                               

 

             PLATELET COUNT (test code = 85 K/UL                                



             1015)                                               

 

             ABSOLUTE NEUTROPHILS (test code 1.87 K/UL                          

    



             = 1066)                                             

 

             ABSOLUTE LYMPHOCYTES (test code 0.78 K/UL                          

    



             = 1067)                                             

 

             ABSOLUTE MONOCYTES (test code = 0.27 K/UL                          

    



             1068)                                               

 

             ABSOLUTE EOSINOPHILS (test code 0.12 K/UL                          

    



             = 1040)                                             

 

             ABSOLUTE BASOPHILS (test code = 0.01 K/UL                          

    



             1069)                                               

 

             ABS IMMATURE GRANULOCYTES (test 0.01 K/UL                          

    



             code = 1020)                                        

 

             ABS NUCLEATED RBCS (test code = 0.00 K/UL                          

    



             29106)                                              



COMPREHENSIVE METABOLIC PANEL202-10- 00:00:00





             Test Item    Value        Reference Range Interpretation Comments

 

             GLUCOSE (test code = 2217) 139 MG/DL                              

 

             BUN (test code = 2208) 21 MG/DL                               

 

             CREATININE (test code = 2214) 0.83 MG/DL                           

  

 

             eGFR  AMER. (test code 88 ML/MIN/1.73                       

    



             = 18109)                                            

 

             eGFR NON- AMER. (test 76 ML/MIN/1.73                        

   



             code = 33294)                                        

 

             CALC BUN/CREAT (test code = 25 RATIO                               



             2235)                                               

 

             SODIUM (test code = 2231) 140 MEQ/L                              

 

             POTASSIUM (test code = 2228) 4.8 MEQ/L                             

 

 

             CHLORIDE (test code = 2215) 102 MEQ/L                              

 

             CARBON DIOXIDE (test code = 25 MEQ/L                               



             2206)                                               

 

             CALCIUM (test code = 2209) 9.2 MG/DL                              

 

             PROTEIN, TOTAL (test code = 7.2 G/DL                               



             )                                               

 

             ALBUMIN (test code = 2201) 3.6 G/DL                               

 

             CALC GLOBULIN (test code = 3.6 G/DL                               



             2240)                                               

 

             CALC A/G RATIO (test code = 1.0 RATIO                              



             2234)                                               

 

             BILIRUBIN, TOTAL (test code = 0.6 MG/DL                            

  



             2207)                                               

 

             ALKALINE PHOSPHATASE (test 101 U/L                                



             code = 2204)                                        

 

             AST (test code = 2218) 30 U/L                                 

 

             ALT (test code = 2219) 23 U/L                                 



COMPREHENSIVE METABOLIC PANEL2021-10-13 00:00:00





             Test Item    Value        Reference Range Interpretation Comments

 

             GLUCOSE (test code = 2217) 139 MG/DL                              

 

             BUN (test code = 2208) 21 MG/DL                               

 

             CREATININE (test code = 2214) 0.83 MG/DL                           

  

 

             eGFR  AMER. (test code 88 ML/MIN/1.73                       

    



             = 32456)                                            

 

             eGFR NON- AMER. (test 76 ML/MIN/1.73                        

   



             code = 54735)                                        

 

             CALC BUN/CREAT (test code = 25 RATIO                               



             2235)                                               

 

             SODIUM (test code = 2231) 140 MEQ/L                              

 

             POTASSIUM (test code = 2228) 4.8 MEQ/L                             

 

 

             CHLORIDE (test code = 2215) 102 MEQ/L                              

 

             CARBON DIOXIDE (test code = 25 MEQ/L                               



             )                                               

 

             CALCIUM (test code = 2209) 9.2 MG/DL                              

 

             PROTEIN, TOTAL (test code = 7.2 G/DL                               



             )                                               

 

             ALBUMIN (test code = 2201) 3.6 G/DL                               

 

             CALC GLOBULIN (test code = 3.6 G/DL                               



             2240)                                               

 

             CALC A/G RATIO (test code = 1.0 RATIO                              



             2234)                                               

 

             BILIRUBIN, TOTAL (test code = 0.6 MG/DL                            

  



             2207)                                               

 

             ALKALINE PHOSPHATASE (test 101 U/L                                



             code = 2204)                                        

 

             AST (test code = 2218) 30 U/L                                 

 

             ALT (test code = 2219) 23 U/L                                 



PROBNP2021-10-13 00:00:00





             Test Item    Value        Reference Range Interpretation Comments

 

             NT-proBNP (test code = 33474) 247 PG/ML                            

  



PROBNP2021-10-13 00:00:00





             Test Item    Value        Reference Range Interpretation Comments

 

             NT-proBNP (test code = 39985) 247 PG/ML                            

  



PROBNP2021-10-13 00:00:00





             Test Item    Value        Reference Range Interpretation Comments

 

             NT-proBNP (test code = 72310) 247 PG/ML                            

  



VIOLET (ANTI-NUCLEAR AB) WITH REFLEX TITER2021-10-13 00:00:00





             Test Item    Value        Reference Range Interpretation Comments

 

             ANTI-NUCLEAR ANTIBODIES (test code = POSITIVE                      

         



             3840)                                               



VIOLET (ANTI-NUCLEAR AB) WITH REFLEX TITER2021-10-13 00:00:00





             Test Item    Value        Reference Range Interpretation Comments

 

             ANTI-NUCLEAR ANTIBODIES (test code = POSITIVE                      

         



             3506)                                               



C-REACTIVE PROTEIN2021-10-13 00:00:00





             Test Item    Value        Reference Range Interpretation Comments

 

             C-REACTIVE PROTEIN (test code = 1.1 MG/DL                          

    



             3513)                                               



C-REACTIVE PROTEIN2021-10-13 00:00:00





             Test Item    Value        Reference Range Interpretation Comments

 

             C-REACTIVE PROTEIN (test code = 1.1 MG/DL                          

    



             3513)                                               



SEDIMENTATION RATE2021-10-13 00:00:00





             Test Item    Value        Reference Range Interpretation Comments

 

             SEDIMENTATION RATE (test code = 88 MM/HOUR                         

    



             1017)                                               



SEDIMENTATION RATE2021-10-13 00:00:00





             Test Item    Value        Reference Range Interpretation Comments

 

             SEDIMENTATION RATE (test code = 88 MM/HOUR                         

    



             1017)                                               



[id number] 00:00:00





             Test Item    Value        Reference Range Interpretation Comments

 

             D-DIMER (test code = 1405) 0.77 UG/MLFEU                           



[id number] 00:00:00





             Test Item    Value        Reference Range Interpretation Comments

 

             D-DIMER (test code = 1405) 0.77 UG/MLFEU                           



CBC W/AUTO DIFF2021-10-13 00:00:00





             Test Item    Value        Reference Range Interpretation Comments

 

             WBC (test code = 1001) 3.1 K/UL                               

 

             RBC (test code = 1002) 3.14 M/UL                              

 

             HEMOGLOBIN (test code = 1003) 8.4 G/DL                             

  

 

             HEMATOCRIT (test code = 1004) 25.9 %                               

  

 

             MCV (test code = 1005) 82.5 fL                                

 

             MCH (test code = 1006) 26.8 PG                                

 

             MCHC (test code = 1007) 32.4 G/DL                              

 

             RDW (test code = 1038) 14.5 %                                 

 

             NEUTROPHILS (test code = 1008) 61.2 %                              

   

 

             LYMPHOCYTES (test code = 1010) 25.5 %                              

   

 

             MONOCYTES (test code = 1011) 8.8 %                                 

 

 

             EOSINOPHILS (test code = 1012) 3.9 %                               

   

 

             BASOPHILS (test code = 1013) 0.3 %                                 

 

 

             IMMATURE GRANULOCYTES (test 0.3 %                                  



             code = 1036)                                        

 

             NUCLEATED RBCS (test code = 0.0 /100WBC'S                          

 



             1065)                                               

 

             PLATELET COUNT (test code = 85 K/UL                                



             1015)                                               

 

             ABSOLUTE NEUTROPHILS (test code 1.87 K/UL                          

    



             = 1066)                                             

 

             ABSOLUTE LYMPHOCYTES (test code 0.78 K/UL                          

    



             = 1067)                                             

 

             ABSOLUTE MONOCYTES (test code = 0.27 K/UL                          

    



             1068)                                               

 

             ABSOLUTE EOSINOPHILS (test code 0.12 K/UL                          

    



             = 1040)                                             

 

             ABSOLUTE BASOPHILS (test code = 0.01 K/UL                          

    



             1069)                                               

 

             ABS IMMATURE GRANULOCYTES (test 0.01 K/UL                          

    



             code = 1020)                                        

 

             ABS NUCLEATED RBCS (test code = 0.00 K/UL                          

    



             19131)                                              



CBC W/AUTO DIFF2021-10-13 00:00:00





             Test Item    Value        Reference Range Interpretation Comments

 

             WBC (test code = 1001) 3.1 K/UL                               

 

             RBC (test code = 1002) 3.14 M/UL                              

 

             HEMOGLOBIN (test code = 1003) 8.4 G/DL                             

  

 

             HEMATOCRIT (test code = 1004) 25.9 %                               

  

 

             MCV (test code = 1005) 82.5 fL                                

 

             MCH (test code = 1006) 26.8 PG                                

 

             MCHC (test code = 1007) 32.4 G/DL                              

 

             RDW (test code = 1038) 14.5 %                                 

 

             NEUTROPHILS (test code = 1008) 61.2 %                              

   

 

             LYMPHOCYTES (test code = 1010) 25.5 %                              

   

 

             MONOCYTES (test code = 1011) 8.8 %                                 

 

 

             EOSINOPHILS (test code = 1012) 3.9 %                               

   

 

             BASOPHILS (test code = 1013) 0.3 %                                 

 

 

             IMMATURE GRANULOCYTES (test 0.3 %                                  



             code = 1036)                                        

 

             NUCLEATED RBCS (test code = 0.0 /100WBC'S                          

 



             1065)                                               

 

             PLATELET COUNT (test code = 85 K/UL                                



             1015)                                               

 

             ABSOLUTE NEUTROPHILS (test code 1.87 K/UL                          

    



             = 1066)                                             

 

             ABSOLUTE LYMPHOCYTES (test code 0.78 K/UL                          

    



             = 1067)                                             

 

             ABSOLUTE MONOCYTES (test code = 0.27 K/UL                          

    



             1068)                                               

 

             ABSOLUTE EOSINOPHILS (test code 0.12 K/UL                          

    



             = 1040)                                             

 

             ABSOLUTE BASOPHILS (test code = 0.01 K/UL                          

    



             1069)                                               

 

             ABS IMMATURE GRANULOCYTES (test 0.01 K/UL                          

    



             code = 1020)                                        

 

             ABS NUCLEATED RBCS (test code = 0.00 K/UL                          

    



             27552)                                              



COMPREHENSIVE METABOLIC PANEL2021-10-13 00:00:00





             Test Item    Value        Reference Range Interpretation Comments

 

             GLUCOSE (test code = 2217) 139 MG/DL                              

 

             BUN (test code = 2208) 21 MG/DL                               

 

             CREATININE (test code = 2214) 0.83 MG/DL                           

  

 

             eGFR  AMER. (test code 88 ML/MIN/1.73                       

    



             = 83217)                                            

 

             eGFR NON- AMER. (test 76 ML/MIN/1.73                        

   



             code = 39013)                                        

 

             CALC BUN/CREAT (test code = 25 RATIO                               



             2235)                                               

 

             SODIUM (test code = 2231) 140 MEQ/L                              

 

             POTASSIUM (test code = 2228) 4.8 MEQ/L                             

 

 

             CHLORIDE (test code = 2215) 102 MEQ/L                              

 

             CARBON DIOXIDE (test code = 25 MEQ/L                               



             )                                               

 

             CALCIUM (test code = 2209) 9.2 MG/DL                              

 

             PROTEIN, TOTAL (test code = 7.2 G/DL                               



             )                                               

 

             ALBUMIN (test code = 2201) 3.6 G/DL                               

 

             CALC GLOBULIN (test code = 3.6 G/DL                               



             2240)                                               

 

             CALC A/G RATIO (test code = 1.0 RATIO                              



             )                                               

 

             BILIRUBIN, TOTAL (test code = 0.6 MG/DL                            

  



             )                                               

 

             ALKALINE PHOSPHATASE (test 101 U/L                                



             code = 2204)                                        

 

             AST (test code = 2218) 30 U/L                                 

 

             ALT (test code = 2219) 23 U/L                                 



PROBNP2021-10-13 00:00:00





             Test Item    Value        Reference Range Interpretation Comments

 

             NT-proBNP (test code = 65527) 247 PG/ML                            

  



PROBNP2021-10-13 00:00:00





             Test Item    Value        Reference Range Interpretation Comments

 

             NT-proBNP (test code = 07365) 247 PG/ML                            

  



VIOLET (ANTI-NUCLEAR AB) WITH REFLEX TITER2021-10-13 00:00:00





             Test Item    Value        Reference Range Interpretation Comments

 

             ANTI-NUCLEAR ANTIBODIES (test code = POSITIVE                      

         



             3506)                                               



C-REACTIVE PROTEIN2021-10-13 00:00:00





             Test Item    Value        Reference Range Interpretation Comments

 

             C-REACTIVE PROTEIN (test code = 1.1 MG/DL                          

    



             3513)                                               



SEDIMENTATION RATE2021-10-13 00:00:00





             Test Item    Value        Reference Range Interpretation Comments

 

             SEDIMENTATION RATE (test code = 88 MM/HOUR                         

    



             1017)                                               



[id number] 00:00:00





             Test Item    Value        Reference Range Interpretation Comments

 

             D-DIMER (test code = 1405) 0.77 UG/MLFEU                           



CBC W/AUTO DIFF2021-10-13 00:00:00





             Test Item    Value        Reference Range Interpretation Comments

 

             WBC (test code = 1001) 3.1 K/UL                               

 

             RBC (test code = 1002) 3.14 M/UL                              

 

             HEMOGLOBIN (test code = 1003) 8.4 G/DL                             

  

 

             HEMATOCRIT (test code = 1004) 25.9 %                               

  

 

             MCV (test code = 1005) 82.5 fL                                

 

             MCH (test code = 1006) 26.8 PG                                

 

             MCHC (test code = 1007) 32.4 G/DL                              

 

             RDW (test code = 1038) 14.5 %                                 

 

             NEUTROPHILS (test code = 1008) 61.2 %                              

   

 

             LYMPHOCYTES (test code = 1010) 25.5 %                              

   

 

             MONOCYTES (test code = 1011) 8.8 %                                 

 

 

             EOSINOPHILS (test code = 1012) 3.9 %                               

   

 

             BASOPHILS (test code = 1013) 0.3 %                                 

 

 

             IMMATURE GRANULOCYTES (test 0.3 %                                  



             code = 1036)                                        

 

             NUCLEATED RBCS (test code = 0.0 /100WBC'S                          

 



             1065)                                               

 

             PLATELET COUNT (test code = 85 K/UL                                



             1015)                                               

 

             ABSOLUTE NEUTROPHILS (test code 1.87 K/UL                          

    



             = 1066)                                             

 

             ABSOLUTE LYMPHOCYTES (test code 0.78 K/UL                          

    



             = 1067)                                             

 

             ABSOLUTE MONOCYTES (test code = 0.27 K/UL                          

    



             1068)                                               

 

             ABSOLUTE EOSINOPHILS (test code 0.12 K/UL                          

    



             = 1040)                                             

 

             ABSOLUTE BASOPHILS (test code = 0.01 K/UL                          

    



             1069)                                               

 

             ABS IMMATURE GRANULOCYTES (test 0.01 K/UL                          

    



             code = 1020)                                        

 

             ABS NUCLEATED RBCS (test code = 0.00 K/UL                          

    



             38131)                                              



CBC W/AUTO DIFF2021-10- 00:00:00





             Test Item    Value        Reference Range Interpretation Comments

 

             WBC (test code = 1001) 3.1 K/UL                               

 

             RBC (test code = 1002) 3.14 M/UL                              

 

             HEMOGLOBIN (test code = 1003) 8.4 G/DL                             

  

 

             HEMATOCRIT (test code = 1004) 25.9 %                               

  

 

             MCV (test code = 1005) 82.5 fL                                

 

             MCH (test code = 1006) 26.8 PG                                

 

             MCHC (test code = 1007) 32.4 G/DL                              

 

             RDW (test code = 1038) 14.5 %                                 

 

             NEUTROPHILS (test code = 1008) 61.2 %                              

   

 

             LYMPHOCYTES (test code = 1010) 25.5 %                              

   

 

             MONOCYTES (test code = 1011) 8.8 %                                 

 

 

             EOSINOPHILS (test code = 1012) 3.9 %                               

   

 

             BASOPHILS (test code = 1013) 0.3 %                                 

 

 

             IMMATURE GRANULOCYTES (test 0.3 %                                  



             code = 1036)                                        

 

             NUCLEATED RBCS (test code = 0.0 /100WBC'S                          

 



             1065)                                               

 

             PLATELET COUNT (test code = 85 K/UL                                



             1015)                                               

 

             ABSOLUTE NEUTROPHILS (test code 1.87 K/UL                          

    



             = 1066)                                             

 

             ABSOLUTE LYMPHOCYTES (test code 0.78 K/UL                          

    



             = 1067)                                             

 

             ABSOLUTE MONOCYTES (test code = 0.27 K/UL                          

    



             1068)                                               

 

             ABSOLUTE EOSINOPHILS (test code 0.12 K/UL                          

    



             = 1040)                                             

 

             ABSOLUTE BASOPHILS (test code = 0.01 K/UL                          

    



             1069)                                               

 

             ABS IMMATURE GRANULOCYTES (test 0.01 K/UL                          

    



             code = 1020)                                        

 

             ABS NUCLEATED RBCS (test code = 0.00 K/UL                          

    



             83133)                                              



CBC W/AUTO DIFF2021-10-13 00:00:00





             Test Item    Value        Reference Range Interpretation Comments

 

             WBC (test code = 1001) 3.1 K/UL                               

 

             RBC (test code = 1002) 3.14 M/UL                              

 

             HEMOGLOBIN (test code = 1003) 8.4 G/DL                             

  

 

             HEMATOCRIT (test code = 1004) 25.9 %                               

  

 

             MCV (test code = 1005) 82.5 fL                                

 

             MCH (test code = 1006) 26.8 PG                                

 

             MCHC (test code = 1007) 32.4 G/DL                              

 

             RDW (test code = 1038) 14.5 %                                 

 

             NEUTROPHILS (test code = 1008) 61.2 %                              

   

 

             LYMPHOCYTES (test code = 1010) 25.5 %                              

   

 

             MONOCYTES (test code = 1011) 8.8 %                                 

 

 

             EOSINOPHILS (test code = 1012) 3.9 %                               

   

 

             BASOPHILS (test code = 1013) 0.3 %                                 

 

 

             IMMATURE GRANULOCYTES (test 0.3 %                                  



             code = 1036)                                        

 

             NUCLEATED RBCS (test code = 0.0 /100WBC'S                          

 



             1065)                                               

 

             PLATELET COUNT (test code = 85 K/UL                                



             1015)                                               

 

             ABSOLUTE NEUTROPHILS (test code 1.87 K/UL                          

    



             = 1066)                                             

 

             ABSOLUTE LYMPHOCYTES (test code 0.78 K/UL                          

    



             = 1067)                                             

 

             ABSOLUTE MONOCYTES (test code = 0.27 K/UL                          

    



             1068)                                               

 

             ABSOLUTE EOSINOPHILS (test code 0.12 K/UL                          

    



             = 1040)                                             

 

             ABSOLUTE BASOPHILS (test code = 0.01 K/UL                          

    



             1069)                                               

 

             ABS IMMATURE GRANULOCYTES (test 0.01 K/UL                          

    



             code = 1020)                                        

 

             ABS NUCLEATED RBCS (test code = 0.00 K/UL                          

    



             01557)                                              



COMPREHENSIVE METABOLIC PANEL2021-10-13 00:00:00





             Test Item    Value        Reference Range Interpretation Comments

 

             GLUCOSE (test code = 2217) 139 MG/DL                              

 

             BUN (test code = 2208) 21 MG/DL                               

 

             CREATININE (test code = 2214) 0.83 MG/DL                           

  

 

             eGFR  AMER. (test code 88 ML/MIN/1.73                       

    



             = 08413)                                            

 

             eGFR NON- AMER. (test 76 ML/MIN/1.73                        

   



             code = 18917)                                        

 

             CALC BUN/CREAT (test code = 25 RATIO                               



             2235)                                               

 

             SODIUM (test code = 2231) 140 MEQ/L                              

 

             POTASSIUM (test code = 2228) 4.8 MEQ/L                             

 

 

             CHLORIDE (test code = 2215) 102 MEQ/L                              

 

             CARBON DIOXIDE (test code = 25 MEQ/L                               



             220)                                               

 

             CALCIUM (test code = 2209) 9.2 MG/DL                              

 

             PROTEIN, TOTAL (test code = 7.2 G/DL                               



             )                                               

 

             ALBUMIN (test code = 2201) 3.6 G/DL                               

 

             CALC GLOBULIN (test code = 3.6 G/DL                               



             2240)                                               

 

             CALC A/G RATIO (test code = 1.0 RATIO                              



             2234)                                               

 

             BILIRUBIN, TOTAL (test code = 0.6 MG/DL                            

  



             )                                               

 

             ALKALINE PHOSPHATASE (test 101 U/L                                



             code = 2204)                                        

 

             AST (test code = 2218) 30 U/L                                 

 

             ALT (test code = 2219) 23 U/L                                 



COMPREHENSIVE METABOLIC PANEL2021-10-13 00:00:00





             Test Item    Value        Reference Range Interpretation Comments

 

             GLUCOSE (test code = 2217) 139 MG/DL                              

 

             BUN (test code = 2208) 21 MG/DL                               

 

             CREATININE (test code = 2214) 0.83 MG/DL                           

  

 

             eGFR  AMER. (test code 88 ML/MIN/1.73                       

    



             = 66694)                                            

 

             eGFR NON- AMER. (test 76 ML/MIN/1.73                        

   



             code = 37920)                                        

 

             CALC BUN/CREAT (test code = 25 RATIO                               



             2235)                                               

 

             SODIUM (test code = 2231) 140 MEQ/L                              

 

             POTASSIUM (test code = 2228) 4.8 MEQ/L                             

 

 

             CHLORIDE (test code = 2215) 102 MEQ/L                              

 

             CARBON DIOXIDE (test code = 25 MEQ/L                               



             220)                                               

 

             CALCIUM (test code = 2209) 9.2 MG/DL                              

 

             PROTEIN, TOTAL (test code = 7.2 G/DL                               



             )                                               

 

             ALBUMIN (test code = 2201) 3.6 G/DL                               

 

             CALC GLOBULIN (test code = 3.6 G/DL                               



             2240)                                               

 

             CALC A/G RATIO (test code = 1.0 RATIO                              



             2234)                                               

 

             BILIRUBIN, TOTAL (test code = 0.6 MG/DL                            

  



             2207)                                               

 

             ALKALINE PHOSPHATASE (test 101 U/L                                



             code = 2204)                                        

 

             AST (test code = 2218) 30 U/L                                 

 

             ALT (test code = 2219) 23 U/L                                 



PROBNP2021-10-13 00:00:00





             Test Item    Value        Reference Range Interpretation Comments

 

             NT-proBNP (test code = 64681) 247 PG/ML                            

  



PROBNP2021-10-13 00:00:00





             Test Item    Value        Reference Range Interpretation Comments

 

             NT-proBNP (test code = 47116) 247 PG/ML                            

  



PROBNP2021-10-13 00:00:00





             Test Item    Value        Reference Range Interpretation Comments

 

             NT-proBNP (test code = 47596) 247 PG/ML                            

  



VIOLET (ANTI-NUCLEAR AB) WITH REFLEX TITER2021-10-13 00:00:00





             Test Item    Value        Reference Range Interpretation Comments

 

             ANTI-NUCLEAR ANTIBODIES (test code = POSITIVE                      

         



             3506)                                               



VIOLET (ANTI-NUCLEAR AB) WITH REFLEX TITER2021-10-13 00:00:00





             Test Item    Value        Reference Range Interpretation Comments

 

             ANTI-NUCLEAR ANTIBODIES (test code = POSITIVE                      

         



             3506)                                               



C-REACTIVE PROTEIN2021-10-13 00:00:00





             Test Item    Value        Reference Range Interpretation Comments

 

             C-REACTIVE PROTEIN (test code = 1.1 MG/DL                          

    



             3513)                                               



C-REACTIVE PROTEIN2021-10-13 00:00:00





             Test Item    Value        Reference Range Interpretation Comments

 

             C-REACTIVE PROTEIN (test code = 1.1 MG/DL                          

    



             3513)                                               



SEDIMENTATION RATE2021-10-13 00:00:00





             Test Item    Value        Reference Range Interpretation Comments

 

             SEDIMENTATION RATE (test code = 88 MM/HOUR                         

    



             1017)                                               



SEDIMENTATION RATE2021-10-13 00:00:00





             Test Item    Value        Reference Range Interpretation Comments

 

             SEDIMENTATION RATE (test code = 88 MM/HOUR                         

    



             1017)                                               



[id number] 00:00:00





             Test Item    Value        Reference Range Interpretation Comments

 

             D-DIMER (test code = 1405) 0.77 UG/MLFEU                           



[id number] 00:00:00





             Test Item    Value        Reference Range Interpretation Comments

 

             D-DIMER (test code = 1405) 0.77 UG/MLFEU                           



HEMOGLOBIN A1c2021-10-10 00:00:00





             Test Item    Value        Reference Range Interpretation Comments

 

             HEMOGLOBIN A1c (test code = 83970) 7.5 %                           

       



HEMOGLOBIN A1c2021-10-10 00:00:00





             Test Item    Value        Reference Range Interpretation Comments

 

             HEMOGLOBIN A1c (test code = 07702) 7.5 %                           

       



HEMOGLOBIN A1c2021-10-10 00:00:00





             Test Item    Value        Reference Range Interpretation Comments

 

             HEMOGLOBIN A1c (test code = 68002) 7.5 %                           

       



HEMOGLOBIN [id number] 00:00:00





             Test Item    Value        Reference Range Interpretation Comments

 

             HEMOGLOBIN A1c (test code = 90046) 7.5 %                           

       



HEMOGLOBIN [id number] 00:00:00





             Test Item    Value        Reference Range Interpretation Comments

 

             HEMOGLOBIN A1c (test code = 71020) 7.5 %                           

       



HEMOGLOBIN [id number] 00:00:00





             Test Item    Value        Reference Range Interpretation Comments

 

             HEMOGLOBIN A1c (test code = 66907) 7.5 %                           

       



HEMOGLOBIN [id number] 00:00:00





             Test Item    Value        Reference Range Interpretation Comments

 

             HEMOGLOBIN A1c (test code = 68363) 7.5 %                           

       



HEMOGLOBIN [id number] 00:00:00





             Test Item    Value        Reference Range Interpretation Comments

 

             HEMOGLOBIN A1c (test code = 38973) 7.5 %                           

       



HEMOGLOBIN [id number] 00:00:00





             Test Item    Value        Reference Range Interpretation Comments

 

             HEMOGLOBIN A1c (test code = 66221) 7.5 %                           

       



HEMOGLOBIN [id number] 00:00:00





             Test Item    Value        Reference Range Interpretation Comments

 

             HEMOGLOBIN A1c (test code = 85895) 7.5 %                           

       



HEMOGLOBIN [id number] 00:00:00





             Test Item    Value        Reference Range Interpretation Comments

 

             HEMOGLOBIN A1c (test code = 74511) 7.5 %                           

       



HEMOGLOBIN [id number] 00:00:00





             Test Item    Value        Reference Range Interpretation Comments

 

             HEMOGLOBIN A1c (test code = 85367) 7.5 %                           

       



HEMOGLOBIN [id number] 00:00:00





             Test Item    Value        Reference Range Interpretation Comments

 

             HEMOGLOBIN A1c (test code = 13528) 7.5 %                           

       



HEMOGLOBIN [id number] 00:00:00





             Test Item    Value        Reference Range Interpretation Comments

 

             HEMOGLOBIN A1c (test code = 66018) 7.5 %                           

       



HEMOGLOBIN [id number] 00:00:00





             Test Item    Value        Reference Range Interpretation Comments

 

             HEMOGLOBIN A1c (test code = 40851) 7.5 %                           

       



HEMOGLOBIN [id number] 00:00:00





             Test Item    Value        Reference Range Interpretation Comments

 

             HEMOGLOBIN A1c (test code = 67145) 7.5 %                           

       



HEMOGLOBIN [id number] 00:00:00





             Test Item    Value        Reference Range Interpretation Comments

 

             HEMOGLOBIN A1c (test code = 29121) 7.5 %                           

       



HEMOGLOBIN [id number] 00:00:00





             Test Item    Value        Reference Range Interpretation Comments

 

             HEMOGLOBIN A1c (test code = 93393) 7.5 %                           

       



HEMOGLOBIN [id number] 00:00:00





             Test Item    Value        Reference Range Interpretation Comments

 

             HEMOGLOBIN A1c (test code = 02249) 7.5 %                           

       



CULTURE, URINE2021-07-15 00:00:00





             Test Item    Value        Reference Range Interpretation Comments

 

             CULTURE, URINE (test SPECIMEN NUMBER:                           



             code = 73891) [id number]                              



CULTURE, URINE2021-07-15 00:00:00





             Test Item    Value        Reference Range Interpretation Comments

 

             CULTURE, URINE (test SPECIMEN NUMBER:                           



             code = 61933) [id number]                              



CULTURE, URINE2021-07-15 00:00:00





             Test Item    Value        Reference Range Interpretation Comments

 

             CULTURE, URINE (test SPECIMEN NUMBER:                           



             code = 43760) [id number]                              



CULTURE, URINE2021-07-15 00:00:00





             Test Item    Value        Reference Range Interpretation Comments

 

             CULTURE, URINE (test SPECIMEN NUMBER:                           



             code = 02413) [id number]                              



CULTURE, URINE2021-07-15 00:00:00





             Test Item    Value        Reference Range Interpretation Comments

 

             CULTURE, URINE (test SPECIMEN NUMBER:                           



             code = 93550) [id number]                              



CULTURE, URINE2021-07-15 00:00:00





             Test Item    Value        Reference Range Interpretation Comments

 

             CULTURE, URINE (test SPECIMEN NUMBER:                           



             code = 04706) [id number]                              



CULTURE, URINE2021-07-15 00:00:00





             Test Item    Value        Reference Range Interpretation Comments

 

             CULTURE, URINE (test SPECIMEN NUMBER:                           



             code = 47844) [id number]                              



CULTURE, URINE2021-07-15 00:00:00





             Test Item    Value        Reference Range Interpretation Comments

 

             CULTURE, URINE (test SPECIMEN NUMBER:                           



             code = 15261) [id number]                              



CULTURE, URINE2021-07-15 00:00:00





             Test Item    Value        Reference Range Interpretation Comments

 

             CULTURE, URINE (test SPECIMEN NUMBER:                           



             code = 84469) [id number]                              



CULTURE, URINE2021-07-15 00:00:00





             Test Item    Value        Reference Range Interpretation Comments

 

             CULTURE, URINE (test SPECIMEN NUMBER:                           



             code = 86996) [id number]                              



CULTURE, URINE2021-07-15 00:00:00





             Test Item    Value        Reference Range Interpretation Comments

 

             CULTURE, URINE (test SPECIMEN NUMBER:                           



             code = 46222) [id number]                              



CULTURE, URINE2021-07-15 00:00:00





             Test Item    Value        Reference Range Interpretation Comments

 

             CULTURE, URINE (test SPECIMEN NUMBER:                           



             code = 69687) [id number]                              



HEMOGLOBIN [id number] 00:00:00





             Test Item    Value        Reference Range Interpretation Comments

 

             HEMOGLOBIN A1c (test code = 67221) 8.6 %                           

       



HEMOGLOBIN [id number] 00:00:00





             Test Item    Value        Reference Range Interpretation Comments

 

             HEMOGLOBIN A1c (test code = 59239) 8.6 %                           

       



HEMOGLOBIN [id number] 00:00:00





             Test Item    Value        Reference Range Interpretation Comments

 

             HEMOGLOBIN A1c (test code = 23150) 8.6 %                           

       



LIPID [id number] 00:00:00





             Test Item    Value        Reference Range Interpretation Comments

 

             CHOLESTEROL (test code = 2210) 104 MG/DL                           

   

 

             TRIGLYCERIDES (test code = 2232) 164 MG/DL                         

     

 

             HDL CHOLESTEROL (test code = 2220) 39 MG/DL                        

       

 

             CALC LDL CHOL (test code = 2237) 41 MG/DL                          

     

 

             RISK RATIO LDL/HDL (test code = 1.05 RATIO                         

    



             2238)                                               



LIPID [id number] 00:00:00





             Test Item    Value        Reference Range Interpretation Comments

 

             CHOLESTEROL (test code = 2210) 104 MG/DL                           

   

 

             TRIGLYCERIDES (test code = 2232) 164 MG/DL                         

     

 

             HDL CHOLESTEROL (test code = 2220) 39 MG/DL                        

       

 

             CALC LDL CHOL (test code = 2237) 41 MG/DL                          

     

 

             RISK RATIO LDL/HDL (test code = 1.05 RATIO                         

    



             2238)                                               



COMPREHENSIVE METABOLIC [id number] 00:00:00





             Test Item    Value        Reference Range Interpretation Comments

 

             GLUCOSE (test code = 2217) 330 MG/DL                              

 

             BUN (test code = 2208) 11 MG/DL                               

 

             CREATININE (test code = 2214) 0.84 MG/DL                           

  

 

             eGFR  AMER. (test code 87 ML/MIN/1.73                       

    



             = 00359)                                            

 

             eGFR NON- AMER. (test 75 ML/MIN/1.73                        

   



             code = 12229)                                        

 

             CALC BUN/CREAT (test code = 13 RATIO                               



             2235)                                               

 

             SODIUM (test code = 2231) 136 MEQ/L                              

 

             POTASSIUM (test code = 2228) 4.5 MEQ/L                             

 

 

             CHLORIDE (test code = 2215) 97 MEQ/L                               

 

             CARBON DIOXIDE (test code = 27 MEQ/L                               



             )                                               

 

             CALCIUM (test code = 2209) 9.1 MG/DL                              

 

             PROTEIN, TOTAL (test code = 7.7 G/DL                               



             )                                               

 

             ALBUMIN (test code = 2201) 3.8 G/DL                               

 

             CALC GLOBULIN (test code = 3.9 G/DL                               



             2240)                                               

 

             CALC A/G RATIO (test code = 1.0 RATIO                              



             2234)                                               

 

             BILIRUBIN, TOTAL (test code = 0.4 MG/DL                            

  



             )                                               

 

             ALKALINE PHOSPHATASE (test 106 U/L                                



             code = 2204)                                        

 

             AST (test code = 2218) 44 U/L                                 

 

             ALT (test code = 2219) 29 U/L                                 



COMPREHENSIVE METABOLIC [id number] 00:00:00





             Test Item    Value        Reference Range Interpretation Comments

 

             GLUCOSE (test code = 2217) 330 MG/DL                              

 

             BUN (test code = 2208) 11 MG/DL                               

 

             CREATININE (test code = 2214) 0.84 MG/DL                           

  

 

             eGFR  AMER. (test code 87 ML/MIN/1.73                       

    



             = 90077)                                            

 

             eGFR NON- AMER. (test 75 ML/MIN/1.73                        

   



             code = 83280)                                        

 

             CALC BUN/CREAT (test code = 13 RATIO                               



             2235)                                               

 

             SODIUM (test code = 2231) 136 MEQ/L                              

 

             POTASSIUM (test code = 2228) 4.5 MEQ/L                             

 

 

             CHLORIDE (test code = 2215) 97 MEQ/L                               

 

             CARBON DIOXIDE (test code = 27 MEQ/L                               



             6)                                               

 

             CALCIUM (test code = 2209) 9.1 MG/DL                              

 

             PROTEIN, TOTAL (test code = 7.7 G/DL                               



             )                                               

 

             ALBUMIN (test code = 2201) 3.8 G/DL                               

 

             CALC GLOBULIN (test code = 3.9 G/DL                               



             2240)                                               

 

             CALC A/G RATIO (test code = 1.0 RATIO                              



             2234)                                               

 

             BILIRUBIN, TOTAL (test code = 0.4 MG/DL                            

  



             )                                               

 

             ALKALINE PHOSPHATASE (test 106 U/L                                



             code = 2204)                                        

 

             AST (test code = 2218) 44 U/L                                 

 

             ALT (test code = 2219) 29 U/L                                 



MICROALBUMIN/CREATININE, RANDOM AND [id number] 00:00:00





             Test Item    Value        Reference Range Interpretation Comments

 

             CREATININE, URINE, CONC. (test 131.3 MG/DL                         

   



             code = 2072)                                        

 

             ALBUMIN, URINE, RANDOM (test code 0.4 MG/DL                        

      



             = 55697)                                            

 

             CALC ALBUMIN/CREAT, RND (test 3 MG/G                               

  



             code = 11530)                                        



MICROALBUMIN/CREATININE, RANDOM AND [id number] 00:00:00





             Test Item    Value        Reference Range Interpretation Comments

 

             CREATININE, URINE, CONC. (test 131.3 MG/DL                         

   



             code = 2072)                                        

 

             ALBUMIN, URINE, RANDOM (test code 0.4 MG/DL                        

      



             = 74951)                                            

 

             CALC ALBUMIN/CREAT, RND (test 3 MG/G                               

  



             code = 53732)                                        



HEMOGLOBIN [id number] 00:00:00





             Test Item    Value        Reference Range Interpretation Comments

 

             HEMOGLOBIN A1c (test code = 30353) 8.6 %                           

       



HEMOGLOBIN [id number] 00:00:00





             Test Item    Value        Reference Range Interpretation Comments

 

             HEMOGLOBIN A1c (test code = 70137) 8.6 %                           

       



HEMOGLOBIN [id number] 00:00:00





             Test Item    Value        Reference Range Interpretation Comments

 

             HEMOGLOBIN A1c (test code = 95070) 8.6 %                           

       



HEMOGLOBIN [id number] 00:00:00





             Test Item    Value        Reference Range Interpretation Comments

 

             HEMOGLOBIN A1c (test code = 46881) 8.6 %                           

       



HEMOGLOBIN [id number] 00:00:00





             Test Item    Value        Reference Range Interpretation Comments

 

             HEMOGLOBIN A1c (test code = 42640) 8.6 %                           

       



LIPID [id number] 00:00:00





             Test Item    Value        Reference Range Interpretation Comments

 

             CHOLESTEROL (test code = 2210) 104 MG/DL                           

   

 

             TRIGLYCERIDES (test code = 2232) 164 MG/DL                         

     

 

             HDL CHOLESTEROL (test code = 2220) 39 MG/DL                        

       

 

             CALC LDL CHOL (test code = 2237) 41 MG/DL                          

     

 

             RISK RATIO LDL/HDL (test code = 1.05 RATIO                         

    



             2238)                                               



LIPID [id number] 00:00:00





             Test Item    Value        Reference Range Interpretation Comments

 

             CHOLESTEROL (test code = 2210) 104 MG/DL                           

   

 

             TRIGLYCERIDES (test code = 2232) 164 MG/DL                         

     

 

             HDL CHOLESTEROL (test code = 2220) 39 MG/DL                        

       

 

             CALC LDL CHOL (test code = 2237) 41 MG/DL                          

     

 

             RISK RATIO LDL/HDL (test code = 1.05 RATIO                         

    



             2238)                                               



COMPREHENSIVE METABOLIC [id number] 00:00:00





             Test Item    Value        Reference Range Interpretation Comments

 

             GLUCOSE (test code = 2217) 330 MG/DL                              

 

             BUN (test code = 2208) 11 MG/DL                               

 

             CREATININE (test code = 2214) 0.84 MG/DL                           

  

 

             eGFR  AMER. (test code 87 ML/MIN/1.73                       

    



             = 51580)                                            

 

             eGFR NON- AMER. (test 75 ML/MIN/1.73                        

   



             code = 27094)                                        

 

             CALC BUN/CREAT (test code = 13 RATIO                               



             2235)                                               

 

             SODIUM (test code = 2231) 136 MEQ/L                              

 

             POTASSIUM (test code = 2228) 4.5 MEQ/L                             

 

 

             CHLORIDE (test code = 2215) 97 MEQ/L                               

 

             CARBON DIOXIDE (test code = 27 MEQ/L                               



             2206)                                               

 

             CALCIUM (test code = 2209) 9.1 MG/DL                              

 

             PROTEIN, TOTAL (test code = 7.7 G/DL                               



             )                                               

 

             ALBUMIN (test code = 2201) 3.8 G/DL                               

 

             CALC GLOBULIN (test code = 3.9 G/DL                               



             2240)                                               

 

             CALC A/G RATIO (test code = 1.0 RATIO                              



             2234)                                               

 

             BILIRUBIN, TOTAL (test code = 0.4 MG/DL                            

  



             )                                               

 

             ALKALINE PHOSPHATASE (test 106 U/L                                



             code = 2204)                                        

 

             AST (test code = 2218) 44 U/L                                 

 

             ALT (test code = 2219) 29 U/L                                 



COMPREHENSIVE METABOLIC [id number] 00:00:00





             Test Item    Value        Reference Range Interpretation Comments

 

             GLUCOSE (test code = 2217) 330 MG/DL                              

 

             BUN (test code = 2208) 11 MG/DL                               

 

             CREATININE (test code = 2214) 0.84 MG/DL                           

  

 

             eGFR  AMER. (test code 87 ML/MIN/1.73                       

    



             = 61388)                                            

 

             eGFR NON- AMER. (test 75 ML/MIN/1.73                        

   



             code = 86456)                                        

 

             CALC BUN/CREAT (test code = 13 RATIO                               



             2235)                                               

 

             SODIUM (test code = 2231) 136 MEQ/L                              

 

             POTASSIUM (test code = 2228) 4.5 MEQ/L                             

 

 

             CHLORIDE (test code = 2215) 97 MEQ/L                               

 

             CARBON DIOXIDE (test code = 27 MEQ/L                               



             220)                                               

 

             CALCIUM (test code = 2209) 9.1 MG/DL                              

 

             PROTEIN, TOTAL (test code = 7.7 G/DL                               



             )                                               

 

             ALBUMIN (test code = 2201) 3.8 G/DL                               

 

             CALC GLOBULIN (test code = 3.9 G/DL                               



             2240)                                               

 

             CALC A/G RATIO (test code = 1.0 RATIO                              



             2234)                                               

 

             BILIRUBIN, TOTAL (test code = 0.4 MG/DL                            

  



             )                                               

 

             ALKALINE PHOSPHATASE (test 106 U/L                                



             code = 2204)                                        

 

             AST (test code = 2218) 44 U/L                                 

 

             ALT (test code = 2219) 29 U/L                                 



MICROALBUMIN/CREATININE, RANDOM AND [id number] 00:00:00





             Test Item    Value        Reference Range Interpretation Comments

 

             CREATININE, URINE, CONC. (test 131.3 MG/DL                         

   



             code = 2072)                                        

 

             ALBUMIN, URINE, RANDOM (test code 0.4 MG/DL                        

      



             = 67239)                                            

 

             CALC ALBUMIN/CREAT, RND (test 3 MG/G                               

  



             code = 62188)                                        



MICROALBUMIN/CREATININE, RANDOM AND [id number] 00:00:00





             Test Item    Value        Reference Range Interpretation Comments

 

             CREATININE, URINE, CONC. (test 131.3 MG/DL                         

   



             code = 2072)                                        

 

             ALBUMIN, URINE, RANDOM (test code 0.4 MG/DL                        

      



             = 92890)                                            

 

             CALC ALBUMIN/CREAT, RND (test 3 MG/G                               

  



             code = 66814)                                        



LIPID [id number] 00:00:00





             Test Item    Value        Reference Range Interpretation Comments

 

             CHOLESTEROL (test code = 2210) 104 MG/DL                           

   

 

             TRIGLYCERIDES (test code = 2232) 164 MG/DL                         

     

 

             HDL CHOLESTEROL (test code = 2220) 39 MG/DL                        

       

 

             CALC LDL CHOL (test code = 2237) 41 MG/DL                          

     

 

             RISK RATIO LDL/HDL (test code = 1.05 RATIO                         

    



             2238)                                               



COMPREHENSIVE METABOLIC [id number] 00:00:00





             Test Item    Value        Reference Range Interpretation Comments

 

             GLUCOSE (test code = 2217) 330 MG/DL                              

 

             BUN (test code = 2208) 11 MG/DL                               

 

             CREATININE (test code = 2214) 0.84 MG/DL                           

  

 

             eGFR  AMER. (test code 87 ML/MIN/1.73                       

    



             = 84590)                                            

 

             eGFR NON- AMER. (test 75 ML/MIN/1.73                        

   



             code = 38842)                                        

 

             CALC BUN/CREAT (test code = 13 RATIO                               



             2235)                                               

 

             SODIUM (test code = 2231) 136 MEQ/L                              

 

             POTASSIUM (test code = 2228) 4.5 MEQ/L                             

 

 

             CHLORIDE (test code = 2215) 97 MEQ/L                               

 

             CARBON DIOXIDE (test code = 27 MEQ/L                               



             )                                               

 

             CALCIUM (test code = 2209) 9.1 MG/DL                              

 

             PROTEIN, TOTAL (test code = 7.7 G/DL                               



             )                                               

 

             ALBUMIN (test code = 2201) 3.8 G/DL                               

 

             CALC GLOBULIN (test code = 3.9 G/DL                               



             2240)                                               

 

             CALC A/G RATIO (test code = 1.0 RATIO                              



             2234)                                               

 

             BILIRUBIN, TOTAL (test code = 0.4 MG/DL                            

  



             )                                               

 

             ALKALINE PHOSPHATASE (test 106 U/L                                



             code = 2204)                                        

 

             AST (test code = 2218) 44 U/L                                 

 

             ALT (test code = 2219) 29 U/L                                 



MICROALBUMIN/CREATININE, RANDOM AND [id number] 00:00:00





             Test Item    Value        Reference Range Interpretation Comments

 

             CREATININE, URINE, CONC. (test 131.3 MG/DL                         

   



             code = 2072)                                        

 

             ALBUMIN, URINE, RANDOM (test code 0.4 MG/DL                        

      



             = 30136)                                            

 

             CALC ALBUMIN/CREAT, RND (test 3 MG/G                               

  



             code = 58884)                                        



HEMOGLOBIN [id number] 00:00:00





             Test Item    Value        Reference Range Interpretation Comments

 

             HEMOGLOBIN A1c (test code = 97551) 8.6 %                           

       



HEMOGLOBIN [id number] 00:00:00





             Test Item    Value        Reference Range Interpretation Comments

 

             HEMOGLOBIN A1c (test code = 69012) 8.6 %                           

       



HEMOGLOBIN [id number] 00:00:00





             Test Item    Value        Reference Range Interpretation Comments

 

             HEMOGLOBIN A1c (test code = 48896) 8.6 %                           

       



LIPID [id number] 00:00:00





             Test Item    Value        Reference Range Interpretation Comments

 

             CHOLESTEROL (test code = 2210) 104 MG/DL                           

   

 

             TRIGLYCERIDES (test code = 2232) 164 MG/DL                         

     

 

             HDL CHOLESTEROL (test code = 2220) 39 MG/DL                        

       

 

             CALC LDL CHOL (test code = 2237) 41 MG/DL                          

     

 

             RISK RATIO LDL/HDL (test code = 1.05 RATIO                         

    



             2238)                                               



LIPID [id number] 00:00:00





             Test Item    Value        Reference Range Interpretation Comments

 

             CHOLESTEROL (test code = 2210) 104 MG/DL                           

   

 

             TRIGLYCERIDES (test code = 2232) 164 MG/DL                         

     

 

             HDL CHOLESTEROL (test code = 2220) 39 MG/DL                        

       

 

             CALC LDL CHOL (test code = 2237) 41 MG/DL                          

     

 

             RISK RATIO LDL/HDL (test code = 1.05 RATIO                         

    



             2238)                                               



COMPREHENSIVE METABOLIC [id number] 00:00:00





             Test Item    Value        Reference Range Interpretation Comments

 

             GLUCOSE (test code = 2217) 330 MG/DL                              

 

             BUN (test code = 2208) 11 MG/DL                               

 

             CREATININE (test code = 2214) 0.84 MG/DL                           

  

 

             eGFR  AMER. (test code 87 ML/MIN/1.73                       

    



             = 89667)                                            

 

             eGFR NON- AMER. (test 75 ML/MIN/1.73                        

   



             code = 67341)                                        

 

             CALC BUN/CREAT (test code = 13 RATIO                               



             2235)                                               

 

             SODIUM (test code = 2231) 136 MEQ/L                              

 

             POTASSIUM (test code = 2228) 4.5 MEQ/L                             

 

 

             CHLORIDE (test code = 2215) 97 MEQ/L                               

 

             CARBON DIOXIDE (test code = 27 MEQ/L                               



             2206)                                               

 

             CALCIUM (test code = 2209) 9.1 MG/DL                              

 

             PROTEIN, TOTAL (test code = 7.7 G/DL                               



             222)                                               

 

             ALBUMIN (test code = 2201) 3.8 G/DL                               

 

             CALC GLOBULIN (test code = 3.9 G/DL                               



             2240)                                               

 

             CALC A/G RATIO (test code = 1.0 RATIO                              



             2234)                                               

 

             BILIRUBIN, TOTAL (test code = 0.4 MG/DL                            

  



             )                                               

 

             ALKALINE PHOSPHATASE (test 106 U/L                                



             code = 2204)                                        

 

             AST (test code = 2218) 44 U/L                                 

 

             ALT (test code = 2219) 29 U/L                                 



COMPREHENSIVE METABOLIC [id number] 00:00:00





             Test Item    Value        Reference Range Interpretation Comments

 

             GLUCOSE (test code = 2217) 330 MG/DL                              

 

             BUN (test code = 2208) 11 MG/DL                               

 

             CREATININE (test code = 2214) 0.84 MG/DL                           

  

 

             eGFR  AMER. (test code 87 ML/MIN/1.73                       

    



             = 41285)                                            

 

             eGFR NON- AMER. (test 75 ML/MIN/1.73                        

   



             code = 65805)                                        

 

             CALC BUN/CREAT (test code = 13 RATIO                               



             2235)                                               

 

             SODIUM (test code = 2231) 136 MEQ/L                              

 

             POTASSIUM (test code = 2228) 4.5 MEQ/L                             

 

 

             CHLORIDE (test code = 2215) 97 MEQ/L                               

 

             CARBON DIOXIDE (test code = 27 MEQ/L                               



             220)                                               

 

             CALCIUM (test code = 2209) 9.1 MG/DL                              

 

             PROTEIN, TOTAL (test code = 7.7 G/DL                               



             )                                               

 

             ALBUMIN (test code = 2201) 3.8 G/DL                               

 

             CALC GLOBULIN (test code = 3.9 G/DL                               



             2240)                                               

 

             CALC A/G RATIO (test code = 1.0 RATIO                              



             2234)                                               

 

             BILIRUBIN, TOTAL (test code = 0.4 MG/DL                            

  



             )                                               

 

             ALKALINE PHOSPHATASE (test 106 U/L                                



             code = 2204)                                        

 

             AST (test code = 2218) 44 U/L                                 

 

             ALT (test code = 2219) 29 U/L                                 



MICROALBUMIN/CREATININE, RANDOM AND [id number] 00:00:00





             Test Item    Value        Reference Range Interpretation Comments

 

             CREATININE, URINE, CONC. (test 131.3 MG/DL                         

   



             code = 2072)                                        

 

             ALBUMIN, URINE, RANDOM (test code 0.4 MG/DL                        

      



             = 12161)                                            

 

             CALC ALBUMIN/CREAT, RND (test 3 MG/G                               

  



             code = 55802)                                        



MICROALBUMIN/CREATININE, RANDOM AND [id number] 00:00:00





             Test Item    Value        Reference Range Interpretation Comments

 

             CREATININE, URINE, CONC. (test 131.3 MG/DL                         

   



             code = 2072)                                        

 

             ALBUMIN, URINE, RANDOM (test code 0.4 MG/DL                        

      



             = 83255)                                            

 

             CALC ALBUMIN/CREAT, RND (test 3 MG/G                               

  



             code = 97498)                                        



HEMOGLOBIN [id number] 00:00:00





             Test Item    Value        Reference Range Interpretation Comments

 

             HEMOGLOBIN A1c (test code = 26483) 8.6 %                           

       



HEMOGLOBIN [id number] 00:00:00





             Test Item    Value        Reference Range Interpretation Comments

 

             HEMOGLOBIN A1c (test code = 08057) 8.6 %                           

       



HEMOGLOBIN [id number] 00:00:00





             Test Item    Value        Reference Range Interpretation Comments

 

             HEMOGLOBIN A1c (test code = 33706) 8.6 %                           

       



LIPID [id number] 00:00:00





             Test Item    Value        Reference Range Interpretation Comments

 

             CHOLESTEROL (test code = 2210) 104 MG/DL                           

   

 

             TRIGLYCERIDES (test code = 2232) 164 MG/DL                         

     

 

             HDL CHOLESTEROL (test code = 2220) 39 MG/DL                        

       

 

             CALC LDL CHOL (test code = 2237) 41 MG/DL                          

     

 

             RISK RATIO LDL/HDL (test code = 1.05 RATIO                         

    



             2238)                                               



LIPID [id number] 00:00:00





             Test Item    Value        Reference Range Interpretation Comments

 

             CHOLESTEROL (test code = 2210) 104 MG/DL                           

   

 

             TRIGLYCERIDES (test code = 2232) 164 MG/DL                         

     

 

             HDL CHOLESTEROL (test code = 2220) 39 MG/DL                        

       

 

             CALC LDL CHOL (test code = 2237) 41 MG/DL                          

     

 

             RISK RATIO LDL/HDL (test code = 1.05 RATIO                         

    



             2238)                                               



COMPREHENSIVE METABOLIC [id number] 00:00:00





             Test Item    Value        Reference Range Interpretation Comments

 

             GLUCOSE (test code = 2217) 330 MG/DL                              

 

             BUN (test code = 2208) 11 MG/DL                               

 

             CREATININE (test code = 2214) 0.84 MG/DL                           

  

 

             eGFR  AMER. (test code 87 ML/MIN/1.73                       

    



             = 99728)                                            

 

             eGFR NON- AMER. (test 75 ML/MIN/1.73                        

   



             code = 87586)                                        

 

             CALC BUN/CREAT (test code = 13 RATIO                               



             2235)                                               

 

             SODIUM (test code = 2231) 136 MEQ/L                              

 

             POTASSIUM (test code = 2228) 4.5 MEQ/L                             

 

 

             CHLORIDE (test code = 2215) 97 MEQ/L                               

 

             CARBON DIOXIDE (test code = 27 MEQ/L                               



             2206)                                               

 

             CALCIUM (test code = 2209) 9.1 MG/DL                              

 

             PROTEIN, TOTAL (test code = 7.7 G/DL                               



             )                                               

 

             ALBUMIN (test code = 2201) 3.8 G/DL                               

 

             CALC GLOBULIN (test code = 3.9 G/DL                               



             2240)                                               

 

             CALC A/G RATIO (test code = 1.0 RATIO                              



             2234)                                               

 

             BILIRUBIN, TOTAL (test code = 0.4 MG/DL                            

  



             )                                               

 

             ALKALINE PHOSPHATASE (test 106 U/L                                



             code = 2204)                                        

 

             AST (test code = 2218) 44 U/L                                 

 

             ALT (test code = 2219) 29 U/L                                 



COMPREHENSIVE METABOLIC [id number] 00:00:00





             Test Item    Value        Reference Range Interpretation Comments

 

             GLUCOSE (test code = 2217) 330 MG/DL                              

 

             BUN (test code = 2208) 11 MG/DL                               

 

             CREATININE (test code = 2214) 0.84 MG/DL                           

  

 

             eGFR  AMER. (test code 87 ML/MIN/1.73                       

    



             = 00192)                                            

 

             eGFR NON- AMER. (test 75 ML/MIN/1.73                        

   



             code = 03956)                                        

 

             CALC BUN/CREAT (test code = 13 RATIO                               



             2235)                                               

 

             SODIUM (test code = 2231) 136 MEQ/L                              

 

             POTASSIUM (test code = 2228) 4.5 MEQ/L                             

 

 

             CHLORIDE (test code = 2215) 97 MEQ/L                               

 

             CARBON DIOXIDE (test code = 27 MEQ/L                               



             2206)                                               

 

             CALCIUM (test code = 2209) 9.1 MG/DL                              

 

             PROTEIN, TOTAL (test code = 7.7 G/DL                               



             )                                               

 

             ALBUMIN (test code = 2201) 3.8 G/DL                               

 

             CALC GLOBULIN (test code = 3.9 G/DL                               



             2240)                                               

 

             CALC A/G RATIO (test code = 1.0 RATIO                              



             2234)                                               

 

             BILIRUBIN, TOTAL (test code = 0.4 MG/DL                            

  



             )                                               

 

             ALKALINE PHOSPHATASE (test 106 U/L                                



             code = 2204)                                        

 

             AST (test code = 2218) 44 U/L                                 

 

             ALT (test code = 2219) 29 U/L                                 



MICROALBUMIN/CREATININE, RANDOM AND [id number] 00:00:00





             Test Item    Value        Reference Range Interpretation Comments

 

             CREATININE, URINE, CONC. (test 131.3 MG/DL                         

   



             code = 2072)                                        

 

             ALBUMIN, URINE, RANDOM (test code 0.4 MG/DL                        

      



             = 80093)                                            

 

             CALC ALBUMIN/CREAT, RND (test 3 MG/G                               

  



             code = 45532)                                        



MICROALBUMIN/CREATININE, RANDOM AND [id number] 00:00:00





             Test Item    Value        Reference Range Interpretation Comments

 

             CREATININE, URINE, CONC. (test 131.3 MG/DL                         

   



             code = 2072)                                        

 

             ALBUMIN, URINE, RANDOM (test code 0.4 MG/DL                        

      



             = 55417)                                            

 

             CALC ALBUMIN/CREAT, RND (test 3 MG/G                               

  



             code = 74289)                                        



HEMOGLOBIN [id number] 00:00:00





             Test Item    Value        Reference Range Interpretation Comments

 

             HEMOGLOBIN A1c (test code = 48107) 8.6 %                           

       



HEMOGLOBIN [id number] 00:00:00





             Test Item    Value        Reference Range Interpretation Comments

 

             HEMOGLOBIN A1c (test code = 62030) 8.6 %                           

       



LIPID [id number] 00:00:00





             Test Item    Value        Reference Range Interpretation Comments

 

             CHOLESTEROL (test code = 2210) 104 MG/DL                           

   

 

             TRIGLYCERIDES (test code = 2232) 164 MG/DL                         

     

 

             HDL CHOLESTEROL (test code = 2220) 39 MG/DL                        

       

 

             CALC LDL CHOL (test code = 2237) 41 MG/DL                          

     

 

             RISK RATIO LDL/HDL (test code = 1.05 RATIO                         

    



             2238)                                               



COMPREHENSIVE METABOLIC [id number] 00:00:00





             Test Item    Value        Reference Range Interpretation Comments

 

             GLUCOSE (test code = 2217) 330 MG/DL                              

 

             BUN (test code = 2208) 11 MG/DL                               

 

             CREATININE (test code = 2214) 0.84 MG/DL                           

  

 

             eGFR  AMER. (test code 87 ML/MIN/1.73                       

    



             = 82802)                                            

 

             eGFR NON- AMER. (test 75 ML/MIN/1.73                        

   



             code = 78262)                                        

 

             CALC BUN/CREAT (test code = 13 RATIO                               



             2235)                                               

 

             SODIUM (test code = 2231) 136 MEQ/L                              

 

             POTASSIUM (test code = 2228) 4.5 MEQ/L                             

 

 

             CHLORIDE (test code = 2215) 97 MEQ/L                               

 

             CARBON DIOXIDE (test code = 27 MEQ/L                               



             2206)                                               

 

             CALCIUM (test code = 2209) 9.1 MG/DL                              

 

             PROTEIN, TOTAL (test code = 7.7 G/DL                               



             )                                               

 

             ALBUMIN (test code = 2201) 3.8 G/DL                               

 

             CALC GLOBULIN (test code = 3.9 G/DL                               



             2240)                                               

 

             CALC A/G RATIO (test code = 1.0 RATIO                              



             2234)                                               

 

             BILIRUBIN, TOTAL (test code = 0.4 MG/DL                            

  



             )                                               

 

             ALKALINE PHOSPHATASE (test 106 U/L                                



             code = 2204)                                        

 

             AST (test code = 2218) 44 U/L                                 

 

             ALT (test code = 2219) 29 U/L                                 



MICROALBUMIN/CREATININE, RANDOM AND [id number] 00:00:00





             Test Item    Value        Reference Range Interpretation Comments

 

             CREATININE, URINE, CONC. (test 131.3 MG/DL                         

   



             code = 207)                                        

 

             ALBUMIN, URINE, RANDOM (test code 0.4 MG/DL                        

      



             = 48907)                                            

 

             CALC ALBUMIN/CREAT, RND (test 3 MG/G                               

  



             code = 41859)                                        



HEMOGLOBIN [id number] 00:00:00





             Test Item    Value        Reference Range Interpretation Comments

 

             HEMOGLOBIN A1c (test code = 16879) 8.6 %                           

       



HEMOGLOBIN [id number] 00:00:00





             Test Item    Value        Reference Range Interpretation Comments

 

             HEMOGLOBIN A1c (test code = 67845) 8.6 %                           

       



HEMOGLOBIN [id number] 00:00:00





             Test Item    Value        Reference Range Interpretation Comments

 

             HEMOGLOBIN A1c (test code = 72879) 8.6 %                           

       



LIPID [id number] 00:00:00





             Test Item    Value        Reference Range Interpretation Comments

 

             CHOLESTEROL (test code = 2210) 104 MG/DL                           

   

 

             TRIGLYCERIDES (test code = 2232) 164 MG/DL                         

     

 

             HDL CHOLESTEROL (test code = 2220) 39 MG/DL                        

       

 

             CALC LDL CHOL (test code = 2237) 41 MG/DL                          

     

 

             RISK RATIO LDL/HDL (test code = 1.05 RATIO                         

    



             2238)                                               



LIPID [id number] 00:00:00





             Test Item    Value        Reference Range Interpretation Comments

 

             CHOLESTEROL (test code = 2210) 104 MG/DL                           

   

 

             TRIGLYCERIDES (test code = 2232) 164 MG/DL                         

     

 

             HDL CHOLESTEROL (test code = 2220) 39 MG/DL                        

       

 

             CALC LDL CHOL (test code = 2237) 41 MG/DL                          

     

 

             RISK RATIO LDL/HDL (test code = 1.05 RATIO                         

    



             2238)                                               



COMPREHENSIVE METABOLIC [id number] 00:00:00





             Test Item    Value        Reference Range Interpretation Comments

 

             GLUCOSE (test code = 2217) 330 MG/DL                              

 

             BUN (test code = 2208) 11 MG/DL                               

 

             CREATININE (test code = 2214) 0.84 MG/DL                           

  

 

             eGFR  AMER. (test code 87 ML/MIN/1.73                       

    



             = 41880)                                            

 

             eGFR NON- AMER. (test 75 ML/MIN/1.73                        

   



             code = 20000)                                        

 

             CALC BUN/CREAT (test code = 13 RATIO                               



             2235)                                               

 

             SODIUM (test code = 2231) 136 MEQ/L                              

 

             POTASSIUM (test code = 2228) 4.5 MEQ/L                             

 

 

             CHLORIDE (test code = 2215) 97 MEQ/L                               

 

             CARBON DIOXIDE (test code = 27 MEQ/L                               



             220)                                               

 

             CALCIUM (test code = 2209) 9.1 MG/DL                              

 

             PROTEIN, TOTAL (test code = 7.7 G/DL                               



             )                                               

 

             ALBUMIN (test code = 2201) 3.8 G/DL                               

 

             CALC GLOBULIN (test code = 3.9 G/DL                               



             2240)                                               

 

             CALC A/G RATIO (test code = 1.0 RATIO                              



             )                                               

 

             BILIRUBIN, TOTAL (test code = 0.4 MG/DL                            

  



             )                                               

 

             ALKALINE PHOSPHATASE (test 106 U/L                                



             code = 2204)                                        

 

             AST (test code = 2218) 44 U/L                                 

 

             ALT (test code = 2219) 29 U/L                                 



COMPREHENSIVE METABOLIC [id number] 00:00:00





             Test Item    Value        Reference Range Interpretation Comments

 

             GLUCOSE (test code = 2217) 330 MG/DL                              

 

             BUN (test code = 2208) 11 MG/DL                               

 

             CREATININE (test code = 2214) 0.84 MG/DL                           

  

 

             eGFR  AMER. (test code 87 ML/MIN/1.73                       

    



             = 57318)                                            

 

             eGFR NON- AMER. (test 75 ML/MIN/1.73                        

   



             code = 52469)                                        

 

             CALC BUN/CREAT (test code = 13 RATIO                               



             2235)                                               

 

             SODIUM (test code = 2231) 136 MEQ/L                              

 

             POTASSIUM (test code = 2228) 4.5 MEQ/L                             

 

 

             CHLORIDE (test code = 2215) 97 MEQ/L                               

 

             CARBON DIOXIDE (test code = 27 MEQ/L                               



             2206)                                               

 

             CALCIUM (test code = 2209) 9.1 MG/DL                              

 

             PROTEIN, TOTAL (test code = 7.7 G/DL                               



             )                                               

 

             ALBUMIN (test code = 2201) 3.8 G/DL                               

 

             CALC GLOBULIN (test code = 3.9 G/DL                               



             0)                                               

 

             CALC A/G RATIO (test code = 1.0 RATIO                              



             )                                               

 

             BILIRUBIN, TOTAL (test code = 0.4 MG/DL                            

  



             )                                               

 

             ALKALINE PHOSPHATASE (test 106 U/L                                



             code = 2204)                                        

 

             AST (test code = 2218) 44 U/L                                 

 

             ALT (test code = 2219) 29 U/L                                 



MICROALBUMIN/CREATININE, RANDOM AND [id number] 00:00:00





             Test Item    Value        Reference Range Interpretation Comments

 

             CREATININE, URINE, CONC. (test 131.3 MG/DL                         

   



             code = 2072)                                        

 

             ALBUMIN, URINE, RANDOM (test code 0.4 MG/DL                        

      



             = 38444)                                            

 

             CALC ALBUMIN/CREAT, RND (test 3 MG/G                               

  



             code = 30732)                                        



MICROALBUMIN/CREATININE, RANDOM AND [id number] 00:00:00





             Test Item    Value        Reference Range Interpretation Comments

 

             CREATININE, URINE, CONC. (test 131.3 MG/DL                         

   



             code = 2072)                                        

 

             ALBUMIN, URINE, RANDOM (test code 0.4 MG/DL                        

      



             = 61559)                                            

 

             CALC ALBUMIN/CREAT, RND (test 3 MG/G                               

  



             code = 62890)

## 2023-01-07 NOTE — EDPHYS
Physician Documentation                                                                           

 [facility]                                                                 

Name: Meredith Stark                                                                                 

Age: 62 yrs                                                                                       

Sex: Female                                                                                       

: 1960                                                                                   

MRN: [id number]                                                                                   

Arrival Date: 2023                                                                          

Time: 17:35                                                                                       

Account#: [id number]                                                                            

Bed 5                                                                                             

Private MD:                                                                                       

ED Physician Enrrique Bundy                                                                         

HPI:                                                                                              

                                                                                             

00:04 This 62 yrs old  Female presents to ER via Ambulatory with complaints of        kb  

      Abdominal Pain, Diarrhea.                                                                   

00:04 The patient presents with abdominal pain that is diffuse. Onset: The symptoms/episode   kb  

      began/occurred 2 month(s) ago. The symptoms do not radiate. Associated signs and            

      symptoms: Pertinent positives: diarrhea. The symptoms are described as constant.            

      Modifying factors: The symptoms are alleviated by nothing, the symptoms are aggravated      

      by nothing. Severity of pain: At its worst the pain was mild moderate in the emergency      

      department the pain is unchanged. The patient has not experienced similar symptoms in       

      the past. The patient has not recently seen a physician. Patient reports abdominal pain     

      and diarrhea for 2 months. .                                                                

                                                                                                  

Historical:                                                                                       

- Allergies:                                                                                      

                                                                                             

18:25 Darvocet-N 100;                                                                         vg1 

18:25 Ketorolac;                                                                              vg1 

18:25 Nubain;                                                                                 vg1 

18:25 PENICILLINS;                                                                            vg1 

- PMHx:                                                                                           

18:25 chronic back pain; cirrhosis of liver; diabetes mellitus; Hypercholesterolemia;         vg1 

      Hypertensive disorder; Thyroid problem;                                                     

- PSHx:                                                                                           

18:25 Appendectomy; cheryl knee reconstructive sx; carpal tunnel repair; Cholecystectomy;        vg1 

      hysterectomy;                                                                               

                                                                                                  

- Immunization history:: Client reports receiving the 2nd dose of the Covid vaccine.              

- Social history:: Smoking status: Patient denies any tobacco usage or history of.                

                                                                                                  

                                                                                                  

ROS:                                                                                              

                                                                                             

00:04 Constitutional: Negative for fever, chills, and weight loss.                            kb  

      Abdomen/GI: Positive for abdominal pain, diarrhea.                                          

      All other systems are negative.                                                             

                                                                                                  

Exam:                                                                                             

00:04 Constitutional:  This is a well developed, well nourished patient who is awake, alert,  kb  

      and in no acute distress. Head/Face:  Normocephalic, atraumatic. ENT:  Moist Mucous         

      membranes Cardiovascular:  Regular rate and rhythm with a normal S1 and S2.  No             

      gallops, murmurs, or rubs.  No pulse deficits. Respiratory:  Respirations even and          

      unlabored. No increased work of breathing. Talking in full sentences Skin:  Warm, dry       

      with normal turgor.  Normal color. MS/ Extremity:  Pulses equal, no cyanosis.               

      Neurovascular intact.  Full, normal range of motion. Neuro:  Awake and alert, GCS 15,       

      oriented to person, place, time, and situation. Moves all extremities. Normal gait.         

      Psych:  Awake, alert, with orientation to person, place and time.  Behavior, mood, and      

      affect are within normal limits.                                                            

00:04 Abdomen/GI: Inspection: abdomen appears normal, Bowel sounds: normal, Palpation: soft,      

      in all quadrants, mild abdominal tenderness, in all quadrants.                              

                                                                                                  

Vital Signs:                                                                                      

                                                                                             

18:22  / 91; Pulse 82; Resp 18; Temp 98.2; Pulse Ox 97% on R/A; Weight 91.17 kg; Height vg1 

      5 ft. 2 in. (157.48 cm); Pain 8/10;                                                         

19:15  / 53; Pulse 76; Resp 16 S; Pulse Ox 100% on R/A;                                 ha1 

19:49  / 64; Pulse 76; Resp 19 S; Pulse Ox 98% on R/A;                                  as6 

18:22 Body Mass Index 36.76 (91.17 kg, 157.48 cm)                                             vg1 

                                                                                                  

MDM:                                                                                              

18:31 Patient medically screened.                                                             kb  

                                                                                             

00:05 Differential diagnosis: gastritis, non-specific abd pain, C. difficile. Data reviewed:  kb  

      vital signs, nurses notes. Data interpreted: Pulse oximetry: on room air is 98 %.           

      Interpretation: normal. Counseling: I had a detailed discussion with the patient and/or     

      guardian regarding: the historical points, exam findings, and any diagnostic results        

      supporting the discharge/admit diagnosis, lab results, the need for outpatient follow       

      up, a family practitioner, to return to the emergency department if symptoms worsen or      

      persist or if there are any questions or concerns that arise at home. ED course:            

      Diagnostic test considered but not performed: CT scan considered but patient was seen       

      here previously for same pain and CTs scan was normal. Pain has not changed since that      

      ER visit. Review of external records: Last ER visit notes reviewed; History obtained        

      from: Patient.                                                                              

                                                                                                  

                                                                                             

18:32 Order name: CBC with Diff; Complete Time: 20:06                                         kb  

                                                                                             

18:32 Order name: CMP; Complete Time: 19:53                                                   kb  

                                                                                             

18:32 Order name: Lipase; Complete Time: 19:53                                                kb  

                                                                                             

18:32 Order name: C.difficile                                                                 kb  

                                                                                             

18:32 Order name: Stool Culture                                                               kb  

                                                                                             

19:36 Order name: CBC Smear Scan; Complete Time: 20:06                                        EDMS

                                                                                             

18:32 Order name: IV Saline Lock; Complete Time: 19:26                                        kb  

                                                                                             

18:32 Order name: Labs collected and sent; Complete Time: 19:26                               kb  

                                                                                                  

Administered Medications:                                                                         

No medications were administered                                                                  

                                                                                                  

                                                                                                  

Disposition:                                                                                      

15:08 Co-signature as Attending Physician, Enrrique Bundy MD.                                    rn  

                                                                                                  

Disposition Summary:                                                                              

23 19:59                                                                                    

Discharge Ordered                                                                                 

      Location: Home                                                                          kb  

      Condition: Stable                                                                       kb  

      Diagnosis                                                                                   

        - Diarrhea, unspecified                                                               kb  

      Followup:                                                                               kb  

        - With: Emergency Department                                                               

        - When: As needed                                                                          

        - Reason: Worsening of condition                                                           

      Followup:                                                                               kb  

        - With: Private Physician                                                                  

        - When: 2 - 3 days                                                                         

        - Reason: Recheck today's complaints, Continuance of care, Re-evaluation by your           

      physician                                                                                   

      Discharge Instructions:                                                                     

        - Discharge Summary Sheet                                                             kb  

        - Diarrhea, Adult, Easy-to-Read                                                       kb  

      Forms:                                                                                      

        - Medication Reconciliation Form                                                      kb  

        - Thank You Letter                                                                    kb  

        - Antibiotic Education                                                                kb  

        - Prescription Opioid Use                                                             kb  

Signatures:                                                                                       

Dispatcher [employer]                           EDMS                                                 

Lindsey Stephenson, FNP-C                 FNP-[facility]                                                   

Enrrique Bundy MD MD rn Garcia, Victoria RN                    RN   vg1                                                  

                                                                                                  

**************************************************************************************************

## 2023-01-07 NOTE — ER
Nurse's Notes                                                                                     

 [facility]                                                                 

Name: Meredith Stark                                                                                 

Age: 62 yrs                                                                                       

Sex: Female                                                                                       

: 1960                                                                                   

MRN: [id number]                                                                                   

Arrival Date: 2023                                                                          

Time: 17:35                                                                                       

Account#: [id number]                                                                            

Bed 5                                                                                             

Private MD:                                                                                       

Diagnosis: Diarrhea, unspecified                                                                  

                                                                                                  

Presentation:                                                                                     

                                                                                             

18:22 Chief complaint: Patient states: ABD pain x 2 months, states diarrhea in the mornings   vg1 

      upon wakening. Denies vomiting, states nausea. Coronavirus screen: Vaccine status:          

      Patient reports receiving the 2nd dose of the covid vaccine. Client denies travel out       

      of the [place] in the last 14 days. Ebola Screen: Patient negative for fever greater than      

      or equal to 101.5 degrees Fahrenheit, and additional compatible Ebola Virus Disease         

      symptoms. Initial Sepsis Screen: Does the patient meet any 2 criteria? No. Patient's        

      initial sepsis screen is negative. Does the patient have a suspected source of              

      infection? No. Patient's initial sepsis screen is negative. Risk Assessment: Do you         

      want to hurt yourself or someone else? Patient reports no desire to harm self or            

      others. Onset of symptoms was 2022.                                                

18:22 Method Of Arrival: Ambulatory                                                           vg1 

18:22 Acuity: SP 3                                                                           vg1 

                                                                                                  

Triage Assessment:                                                                                

18:25 General: Appears in no apparent distress. uncomfortable, Behavior is calm, cooperative. vg1 

      Pain: Complains of pain in abdomen Pain currently is 8 out of 10 on a pain scale.           

      Quality of pain is described as crampy, pressure, Pain began x 2 months Noted to be         

      grimacing, guarding. GI: Abdomen is round non-distended, obese, Reports diarrhea,           

      nausea.                                                                                     

                                                                                                  

Historical:                                                                                       

- Allergies:                                                                                      

18:25 Darvocet-N 100;                                                                         vg1 

18:25 Ketorolac;                                                                              vg1 

18:25 Nubain;                                                                                 vg1 

18:25 PENICILLINS;                                                                            vg1 

- PMHx:                                                                                           

18:25 chronic back pain; cirrhosis of liver; diabetes mellitus; Hypercholesterolemia;         vg1 

      Hypertensive disorder; Thyroid problem;                                                     

- PSHx:                                                                                           

18:25 Appendectomy; cheryl knee reconstructive sx; carpal tunnel repair; Cholecystectomy;        vg1 

      hysterectomy;                                                                               

                                                                                                  

- Immunization history:: Client reports receiving the 2nd dose of the Covid vaccine.              

- Social history:: Smoking status: Patient denies any tobacco usage or history of.                

                                                                                                  

                                                                                                  

Screenin:28 Abuse screen: Denies threats or abuse. Denies injuries from another. Nutritional        ha1 

      screening: No deficits noted. Tuberculosis screening: No symptoms or risk factors           

      identified.                                                                                 

                                                                                                  

Assessment:                                                                                       

19:20 General: Appears comfortable, Behavior is calm, cooperative. Pain: Complains of pain in ha1 

      abdomen. Neuro: Level of Consciousness is awake, alert, obeys commands, Oriented to         

      person, place, time, situation. Cardiovascular: Capillary refill < 3 seconds Patient's      

      skin is warm and dry. Respiratory: Airway is patent Trachea midline Respiratory effort      

      is even, unlabored, Respiratory pattern is regular, symmetrical. GI: Abdomen is             

      non-distended, obese, Bowel sounds present X 4 quads. Abd is soft and non tender X 4        

      quads. Reports lower abdominal pain, diarrhea. : No signs and/or symptoms were            

      reported regarding the genitourinary system. EENT: No deficits noted. No signs and/or       

      symptoms were reported regarding the EENT system. Derm: Skin is pink, warm \T\ dry.         

      Musculoskeletal: Circulation, motion, and sensation intact. Range of motion: intact in      

      all extremities.                                                                            

20:16 Reassessment: Patient and/or family updated on plan of care and expected duration. Pain ha1 

      level reassessed. Patient is alert, oriented x 3, equal unlabored respirations, skin        

      warm/dry/pink. awaiting on lab results.                                                     

                                                                                                  

Vital Signs:                                                                                      

18:22  / 91; Pulse 82; Resp 18; Temp 98.2; Pulse Ox 97% on R/A; Weight 91.17 kg; Height vg1 

      5 ft. 2 in. (157.48 cm); Pain 8/10;                                                         

19:15  / 53; Pulse 76; Resp 16 S; Pulse Ox 100% on R/A;                                 ha1 

19:49  / 64; Pulse 76; Resp 19 S; Pulse Ox 98% on R/A;                                  as6 

18:22 Body Mass Index 36.76 (91.17 kg, 157.48 cm)                                             vg1 

                                                                                                  

ED Course:                                                                                        

17:35 Patient arrived in ED.                                                                  am2 

17:54 [facility] Stephen[facility]                                                        kb  

17:54 Enrrique Bundy MD is Attending Physician.                                                kb  

18:25 Triage completed.                                                                       vg1 

18:25 Arm band placed on.                                                                     vg1 

19:06 Jasiel Falcon, RN is Primary Nurse.                                                    as6 

19:20 Patient has correct armband on for positive identification. Placed in gown. Bed in low  ha1 

      position. Call light in reach. Side rails up X 1. Adult w/ patient.                         

19:20 Inserted saline lock: 20 gauge in right antecubital area, using aseptic technique.      ha1 

      Blood collected.                                                                            

19: CBC with Diff Sent.                                                                     ha1 

19:26 CMP Sent.                                                                               ha1 

19:26 Lipase Sent.                                                                            ha1 

20:31 No provider procedures requiring assistance completed. IV discontinued, intact,         ha1 

      bleeding controlled, No redness/swelling at site. Pressure dressing applied.                

                                                                                                  

Administered Medications:                                                                         

No medications were administered                                                                  

                                                                                                  

                                                                                                  

Medication:                                                                                       

20:32 VIS not applicable for this client.                                                     ha1 

                                                                                                  

Outcome:                                                                                          

19:59 Discharge ordered by MD.                                                                kb  

20:31 Discharged to home ambulatory, with family.                                             ha1 

20:31 Condition: stable                                                                           

20:31 Discharge instructions given to patient, family, Instructed on discharge instructions,      

      follow up and referral plans. Demonstrated understanding of instructions, follow-up         

      care.                                                                                       

20:32 Patient left the ED.                                                                    ha1 

                                                                                                  

Signatures:                                                                                       

Lindsey Stephenson, SERAFINC                 REE-Shena Harrison Victoria, RN                    RN   vg1                                                  

Jasiel Falcon, RN                      RN   as6                                                  

Helen Bryan, RN                        RN   ha1                                                  

                                                                                                  

**************************************************************************************************

## 2023-01-09 LAB — C DIFF GDH + TOXINS A+B STL QL IA.RAPID: (no result)

## 2023-02-10 ENCOUNTER — HOSPITAL ENCOUNTER (EMERGENCY)
Dept: HOSPITAL 97 - ER | Age: 63
Discharge: HOME | End: 2023-02-10
Payer: COMMERCIAL

## 2023-02-10 VITALS — TEMPERATURE: 98.8 F

## 2023-02-10 VITALS — SYSTOLIC BLOOD PRESSURE: 128 MMHG | DIASTOLIC BLOOD PRESSURE: 77 MMHG | OXYGEN SATURATION: 98 %

## 2023-02-10 DIAGNOSIS — Z88.8: ICD-10-CM

## 2023-02-10 DIAGNOSIS — I10: ICD-10-CM

## 2023-02-10 DIAGNOSIS — Z88.0: ICD-10-CM

## 2023-02-10 DIAGNOSIS — N39.0: Primary | ICD-10-CM

## 2023-02-10 DIAGNOSIS — Z88.5: ICD-10-CM

## 2023-02-10 LAB
ALBUMIN SERPL BCP-MCNC: 3.5 G/DL (ref 3.4–5)
ALP SERPL-CCNC: 86 U/L (ref 45–117)
ALT SERPL W P-5'-P-CCNC: 46 U/L (ref 13–56)
AST SERPL W P-5'-P-CCNC: 47 U/L (ref 15–37)
BUN BLD-MCNC: 18 MG/DL (ref 7–18)
GLUCOSE SERPLBLD-MCNC: 154 MG/DL (ref 74–106)
HCT VFR BLD CALC: 29.5 % (ref 36–45)
LIPASE SERPL-CCNC: 235 U/L (ref 73–393)
LYMPHOCYTES # SPEC AUTO: 0.8 K/UL (ref 0.7–4.9)
MCV RBC: 85.6 FL (ref 80–100)
PMV BLD: 7.2 FL (ref 7.6–11.3)
POTASSIUM SERPL-SCNC: 4.8 MMOL/L (ref 3.5–5.1)
RBC # BLD: 3.45 M/UL (ref 3.86–4.86)

## 2023-02-10 PROCEDURE — 96374 THER/PROPH/DIAG INJ IV PUSH: CPT

## 2023-02-10 PROCEDURE — 85025 COMPLETE CBC W/AUTO DIFF WBC: CPT

## 2023-02-10 PROCEDURE — 80053 COMPREHEN METABOLIC PANEL: CPT

## 2023-02-10 PROCEDURE — 36415 COLL VENOUS BLD VENIPUNCTURE: CPT

## 2023-02-10 PROCEDURE — 81015 MICROSCOPIC EXAM OF URINE: CPT

## 2023-02-10 PROCEDURE — 87077 CULTURE AEROBIC IDENTIFY: CPT

## 2023-02-10 PROCEDURE — 81003 URINALYSIS AUTO W/O SCOPE: CPT

## 2023-02-10 PROCEDURE — 82565 ASSAY OF CREATININE: CPT

## 2023-02-10 PROCEDURE — 87086 URINE CULTURE/COLONY COUNT: CPT

## 2023-02-10 PROCEDURE — 96375 TX/PRO/DX INJ NEW DRUG ADDON: CPT

## 2023-02-10 PROCEDURE — 74177 CT ABD & PELVIS W/CONTRAST: CPT

## 2023-02-10 PROCEDURE — 99284 EMERGENCY DEPT VISIT MOD MDM: CPT

## 2023-02-10 PROCEDURE — 87088 URINE BACTERIA CULTURE: CPT

## 2023-02-10 PROCEDURE — 83690 ASSAY OF LIPASE: CPT

## 2023-02-10 PROCEDURE — 96361 HYDRATE IV INFUSION ADD-ON: CPT

## 2023-02-10 PROCEDURE — 87186 SC STD MICRODIL/AGAR DIL: CPT

## 2023-02-10 NOTE — EDPHYS
Physician Documentation                                                                           

 [facility]                                                                 

Name: Meredith Stark                                                                                 

Age: 62 yrs                                                                                       

Sex: Female                                                                                       

: 1960                                                                                   

MRN: [id number]                                                                                   

Arrival Date: 02/10/2023                                                                          

Time: 19:17                                                                                       

Account#: [id number]                                                                            

Bed 10                                                                                            

Private MD:                                                                                       

ED Physician Enrrique Bundy                                                                         

HPI:                                                                                              

02/10                                                                                             

20:00 This 62 yrs old  Female presents to ER via Ambulatory with complaints of        cp  

      Urinary Problem.                                                                            

20:00 The patient presents with urinary symptoms, dysuria, hematuria. Onset: The              cp  

      symptoms/episode began/occurred 10 day(s) ago. Associated signs and symptoms: Pertinent     

      positives: low back pain, Pertinent negatives: constipation, diarrhea, fever, vaginal       

      bleeding, vomiting. Severity of symptoms: in the emergency department the symptoms are      

      unchanged, despite home interventions, has been taking OTC cranberry.                       

                                                                                                  

Historical:                                                                                       

- Allergies:                                                                                      

19:49 Darvocet-N 100;                                                                         kr3 

19:49 Ketorolac;                                                                              kr3 

19:49 Nubain;                                                                                 kr3 

19:49 PENICILLINS;                                                                            kr3 

- PMHx:                                                                                           

19:49 diabetes mellitus; Hypertensive disorder; cirrhosis of liver; chronic back pain;        kr3 

22:46 Hypercholesterolemia; Thyroid problem;                                                  kl  

- PSHx:                                                                                           

19:49 Appendectomy; cheryl knee reconstructive sx; carpal tunnel repair; Cholecystectomy;        kr3 

      hysterectomy;                                                                               

                                                                                                  

- Immunization history:: Adult Immunizations up to date.                                          

- Social history:: Smoking status: Patient/guardian denies using tobacco, the patient             

  reports quitting approximately 15 years ago.                                                    

                                                                                                  

                                                                                                  

ROS:                                                                                              

20:05 Constitutional: Negative for body aches, fever, poor PO intake.                         cp  

20:05 Eyes: Negative for injury, pain, redness, and discharge.                                cp  

20:05 ENT: Negative for drainage from ear(s), ear pain, sore throat, difficulty swallowing,       

      difficulty handling secretions.                                                             

20:05 Cardiovascular: Negative for chest pain, palpitations.                                      

20:05 Respiratory: Negative for cough, shortness of breath, wheezing.                             

20:05 Abdomen/GI: Positive for abdominal pain, of the suprapubic area, Negative for vomiting,     

      diarrhea, constipation.                                                                     

20:05 Back: Positive for pain at rest, of the low back area, Negative for injury or acute         

      deformity, decreased range of motion.                                                       

20:05 Neuro: Negative for altered mental status, dizziness, headache, weakness.                   

20:05 All other systems are negative.                                                             

                                                                                                  

Exam:                                                                                             

20:10 Constitutional: The patient appears in no acute distress, alert, awake, non-toxic, well cp  

      developed, well nourished, overweight                                                       

20:10 Head/Face:  Normocephalic, atraumatic.                                                  cp  

20:10 Eyes: Periorbital structures: appear normal, Conjunctiva: normal, no exudate, no            

      injection, Sclera: no appreciated abnormality, Lids and lashes: appear normal,              

      bilaterally.                                                                                

20:10 ENT: External ear(s): are unremarkable, Nose: is normal, Mouth: Lips: moist, Oral           

      mucosa: moist, Posterior pharynx: is normal, airway is patent, no erythema, no exudate.     

20:10 Neck: ROM/movement: is normal, is supple, without pain, no range of motions                 

      limitations, no nuchal rigidity.                                                            

20:10 Chest/axilla: Inspection: normal.                                                           

20:10 Cardiovascular: Rate: normal, Rhythm: regular.                                              

20:10 Respiratory: the patient does not display signs of respiratory distress,  Respirations:     

      normal, no use of accessory muscles, no retractions, labored breathing, is not present,     

      Breath sounds: are clear throughout, no decreased breath sounds, no stridor, no             

      wheezing.                                                                                   

20:10 Abdomen/GI: Inspection: abdomen appears normal, Bowel sounds: active, all quadrants,        

      Palpation: soft, in all quadrants, moderate abdominal tenderness, in the suprapubic         

      area, rebound tenderness, is not appreciated, involuntary guarding, is not appreciated.     

20:10 Back: pain, that is mild, of the  low back area, ROM is normal.                             

20:10 Neuro: Orientation: to person, place \T\ time. Mentation: is normal, Motor: moves all       

      fours, strength is normal, Sensation: is normal.                                            

                                                                                                  

Vital Signs:                                                                                      

19:46  / 71; Pulse 93; Resp 18; Temp 98.8(O); Pulse Ox 95% ; Weight 91.63 kg; Height 5  kr3 

      ft. 2 in. (157.48 cm); Pain 10/10;                                                          

22:45  / 77; Pulse 85; Resp 17; Pulse Ox 98% on R/A; Pain 0/10;                         kl  

19:46 Body Mass Index 36.95 (91.63 kg, 157.48 cm)                                             kr3 

                                                                                                  

MDM:                                                                                              

19:52 Patient medically screened.                                                             cp  

21:00 Differential diagnosis: kidney stone, urinary tract infection, pyelonephritis, sepsis.  cp  

22:03 Data reviewed: vital signs, nurses notes, lab test result(s), radiologic studies, CT    cp  

      scan.                                                                                       

22:03 Consideration of Admission/Observation Escalation of care including                     cp  

      admission/observation considered. I considered the following discharge prescriptions or     

      medication management in the emergency department Medications were administered in the      

      Emergency Department. See MAR. Test considered but Not performed: Labs: blood cultures,     

      lactate. Care significantly affected by the following chronic conditions: Diabetes,         

      Hypertension, Liver Disease, chronic back pain. Counseling: I had a detailed discussion     

      with the patient and/or guardian regarding: the historical points, exam findings, and       

      any diagnostic results supporting the discharge/admit diagnosis, lab results, radiology     

      results, the need for outpatient follow up, a family practitioner, to return to the         

      emergency department if symptoms worsen or persist or if there are any questions or         

      concerns that arise at home. Response to treatment: the patient's symptoms have             

      markedly improved after treatment, and as a result, I will discharge patient.               

                                                                                                  

02/10                                                                                             

19:56 Order name: Urine Microscopic Only; Complete Time: 21:47                                cp  

02/10                                                                                             

20:06 Order name: Urine Dipstick-Ancillary; Complete Time: 20:17                              EDMS

02/10                                                                                             

20:17 Order name: CBC with Diff; Complete Time: 21:47                                         cp  

02/10                                                                                             

20:17 Order name: CMP; Complete Time: 21:47                                                   cp  

02/10                                                                                             

20:17 Order name: Lipase; Complete Time: 21:47                                                cp  

02/10                                                                                             

20:24 Order name: Urine Culture                                                               EDMS

02/10                                                                                             

19:56 Order name: Urine Dipstick-Ancillary (obtain specimen); Complete Time: 20:06            cp  

02/10                                                                                             

20:17 Order name: IV Saline Lock; Complete Time: 20:53                                        cp  

02/10                                                                                             

20:18 Order name: CT Abd/Pelvis - IV Contrast Only; Complete Time: 21:52                      cp  

02/10                                                                                             

21:52 Interpretation: Report reviewed.                                                        cp  

02/10                                                                                             

20:17 Order name: Labs collected and sent; Complete Time: 20:54                               cp  

                                                                                                  

Administered Medications:                                                                         

20:19 CANCELLED (Physician Discretion): Rocephin (cefTRIAXone) 1 grams IV at calculated rate  cp  

      once; Given slow IV push per pharmacy instructions                                          

20:45 Drug: Zofran (Ondansetron) 4 mg Route: IVP; Site: right antecubital;                    kl  

20:50 Drug: fentaNYL (PF) 25 mcg Route: IVP; Site: right antecubital;                         kl  

22:45 Follow up: Response: No adverse reaction; Marked relief of symptoms                     kl  

20:53 Drug: NS 0.9% 500 ml Route: IV; Rate: bolus; Site: right antecubital;                   kl  

22:00 Follow up: IV Status: Completed infusion; IV Intake: 500ml                              kl  

22:01 Drug: Rocephin (cefTRIAXone) 1 grams Route: IV; Rate: calculated rate; Site: right      kl  

      antecubital;                                                                                

22:44 Follow up: Response: No adverse reaction                                                kl  

                                                                                                  

                                                                                                  

Disposition Summary:                                                                              

02/10/23 22:04                                                                                    

Discharge Ordered                                                                                 

      Location: Home                                                                          cp  

      Problem: new                                                                            cp  

      Symptoms: have improved                                                                 cp  

      Condition: Stable                                                                       cp  

      Diagnosis                                                                                   

        - UTI/ Urinary tract infection, site not specified                                    cp  

      Followup:                                                                               cp  

        - With: Private Physician                                                                  

        - When: 2 - 3 days                                                                         

        - Reason: Recheck today's complaints                                                       

      Discharge Instructions:                                                                     

        - Discharge Summary Sheet                                                             cp  

        - Urinary Tract Infection, Adult                                                      cp  

      Forms:                                                                                      

        - Medication Reconciliation Form                                                      cp  

        - Thank You Letter                                                                    cp  

        - Antibiotic Education                                                                cp  

        - Prescription Opioid Use                                                             cp  

      Prescriptions:                                                                              

        - Pyridium 200 mg Oral Tablet                                                              

            - take 1 tablet by ORAL route every 8 hours for 2 days; 6 tablet; Refills: 0,     cp  

      Product Selection Permitted                                                                 

        - cefpodoxime 200 mg Oral Tablet                                                           

            - take 1 tablet by ORAL route every 12 hours for 7 days with food; 14 tablet;     cp  

      Refills: 0, Product Selection Permitted                                                     

        - Zofran 4 mg Oral Tablet                                                                  

            - take 1 tablet by ORAL route every 12 hours As needed; 20 tablet; Refills: 0,    cp  

      Product Selection Permitted                                                                 

Addendum:                                                                                         

2023                                                                                        

     09:13 Co-signature as Attending Physician, Enrrique Bundy MD I reviewed the patient's care       r
n

           provided by the Advanced Practice Provider and agree with the diagnosis and treatment  

           plan.                                                                                  

                                                                                                  

Signatures:                                                                                       

Dispatcher [employer]                           Lori Patel RN RN kl Nieto, Roman, MD MD rn Page, Corey, PA                         PA   Michelle Willingham RN                        RN   kr3                                                  

                                                                                                  

Corrections: (The following items were deleted from the chart)                                    

02/10                                                                                             

20:19 20:18 Rocephin (cefTRIAXone) 1 grams IV at calculated rate once; Given slow IV push per cp  

      pharmacy instructions ordered. cp                                                           

                                                                                                  

**************************************************************************************************

## 2023-02-10 NOTE — RAD REPORT
EXAM DESCRIPTION:  CTAbdomen   Pelvis W Contrast - 2/10/2023 9:33 pm

 

CLINICAL HISTORY:

lower abdomen pain

 

COMPARISON:  Abdomen   Pelvis W Contrast dated 11/27/2022; Abdomen   Pelvis W Contrast dated 6/11/202
2; Abdomen   Pelvis W Contrast dated 12/19/2021

 

TECHNIQUE:  CT of the abdomen and pelvis was performed with IV contrast.

 

All CT scans are performed using dose optimization technique as appropriate and may include automated
 exposure control or mA/KV adjustment according to patient size.

 

FINDINGS:  Lower chest: No acute abnormality.

Liver: Cirrhotic liver morphology.

Biliary: Cholecystectomy

Stomach: No significant focal abnormality.

Duodenum: No significant focal abnormality.

Pancreas: No significant abnormality.

Spleen: Mild splenomegaly .

Adrenal: No suspicious lesions.

Kidney/ureter: No hydronephrosis. No renal calculi.

Retroperitoneum: No retroperitoneal adenopathy.

Vascular: No aneurysm.

Bowel: No significant focal abnormality. Diverticulosis without diverticulitis.

Peritoneum: No ascites or free air.

Bladder: Grossly unremarkable.

Reproductive: No adnexal masses. Hysterectomy

Bones: No acute fracture. Remote L1 compression fracture .

Other: n/a

 

IMPRESSION:  No acute intra-abdominal or pelvic finding. Cirrhosis.

## 2023-02-10 NOTE — XMS REPORT
Continuity of Care Document

                          Created on:February 10, 2023



Patient:SUDHA BABIN

Sex:Female

:1960

External Reference #:[id number]





Demographics







                          Address                   [address]



                                                    [place] [address]

 

                          Home Phone                [phone]

 

                          Work Phone                [phone]

 

                          Mobile Phone              [phone] )

 

                          Email Address             NONE

 

                          Preferred Language        English

 

                          Marital Status            Unknown

 

                          Anabaptist Affiliation     Unknown

 

                          Race                      Unknown

 

                          Additional Race(s)        Unavailable



                                                    Unavailable



                                                    White

 

                          Ethnic Group              Unknown









Author







                          Organization              [facility]

t

 

                          Address                   [address]



                                                    [place] [address]

 

                          Phone                     [phone]









Support







                Name            Relationship    Address         Phone

 

                JUNIE BABIN  Spouse          [phone]





                                                [place] [address] 

 

                REGINA BABIN  X               [address] [phone]





                                                         



                                                [place] [address] 









Care Team Providers







                    Name                Role                Phone

 

                    PCP, PATIENT DOES NOT HAVE A Primary Care Physician Unavaila

BENNIE Ramirez      Attending Clinician Unavailable

 

                    GANESH JAQUEZ      Attending Clinician Unavailable

 

                    GANESH JAQUEZ      Attending Clinician Unavailable

 

                    Ganesh Jaquez DO   Attending Clinician [phone]

 

                    PRADEEP SPANN      Attending Clinician Unavailable

 

                    PRADEEP SPANN      Attending Clinician Unavailable

 

                    ROSIE JHA     Attending Clinician Unavailable

 

                    Rosie Jha DO  Attending Clinician [phone]

 

                    BENNIE ORR Attending Clinician Unavailable

 

                    GANESH JAQUEZ      Admitting Clinician Unavailable

 

                    ROSIE JHA     Admitting Clinician Unavailable









Payers







           Payer Name Policy Type Policy Number Effective Date Expiration Date S

ource

 

           [facility] [employer]            [id number]  2022            



                                            00:00:00              

 

           [employer]/[facility]            [id number]  2022            



           MEDICARE GOLD PPO                       00:00:00              



           CSNP                                                   







Problems







       Condition Condition Condition Status Onset  Resolution Last   Treating Co

mments 

Source



       Name   Details Category        Date   Date   Treatment Clinician        



                                                 Date                 

 

       No known No known Disease                                           Unive

rs



       active active                                                  ity of



       problems problems                                                  [facility]







Allergies, Adverse Reactions, Alerts







       Allergy Allergy Status Severity Reaction(s) Onset  Inactive Treating Comm

ents 

Source



       Name   Type                        Date   Date   Clinician        

 

       [facility]   Active        Hives                        Univers



       C      INGREDI                      9-12                        ity of



                                          00:00:                      [place]



                                          00                          Medical



                                                                      Branch

 

       [facility] Propensi Active        Hives                        Univer

s



       c      ty to                       9-12                        ity of



              adverse                      00:00:                      [place]



              reaction                      00                          Medical



              s                                                       Branch

 

       [facility] Propensi Active        Other - See                       U

nivers



       in     ty to                comments 7-15                        ity of



              adverse                      00:00:                      [place]



              reaction                      00                          Medical



              s                                                       Branch

 

       [facility]   Active        Other-Cmnt                       Univ

ers



       IN     INGREDI                      7-15                        ity of



                                          00:00:                      [place]



                                          00                          Medical



                                                                      Branch

 

       n      Propensi Active                                     



              ty to                       6-23                        



              adverse                      00:00:                      



              reaction                      00                          



              to drug                                                  

 

       Bactrim Propensi Active                                     



       - Oral ty to                       4-                        



              adverse                      00:00:                      



              reaction                      00                          



              to drug                                                  

 

       NO KNOWN Drug   Active                                           Univers



       ALLERGIE Class                                                   ity of



       [facility]







Social History







           Social Habit Start Date Stop Date  Quantity   Comments   Source

 

           ASSERTION                        Pregnant              [facility]

 

           Exposure to 2022-10-26 2022 Not sure              [facility]

 [place]



           SARS-CoV-2 (event) 00:00:00   07:25:00                         Medica

l Branch

 

           Sex Assigned At [birth year]                       [facility]



           Birth      00:00:00   00:00:00                         Medical Branch









                Smoking Status  Start Date      Stop Date       Source

 

                Tobacco smoking consumption                                 [facility]                                         Branch







Medications







       Ordered Filled Start  Stop   Current Ordering Indication Dosage Frequency

 Signature

                    Comments            Components          Source



     Medication Medication Date Date Medication? Clinician                (SIG) 

          



     Name Name                                                   

 

     TAKE 2022      No             30unit                     



     TABLET      2-16                                              



     DAILY AS      00:00:                                              



     DIRECTED.      00                                                

 

     TAKE 2022      No                                      



     TABLET BY      2-16                                              



     MOUTH EVERY      00:00:                                              



     8 HOURS AS      00                                                



     NEEDED FOR                                                        



     ACUTE PAIN                                                        

 

     HYDROXYZINE      2022      No             30                       



     HYDROCHLORI      2-16                                              



     DE 25 MG      00:00:                                              



     TABS      00                                                

 

     Dose      2022      No             30                       



     Unknown      2-16                                              



               00:00:                                              



               00                                                

 

     Dose      2022      No             30                       



     Unknown      2-16                                              



               00:00:                                              



               00                                                

 

     TAKE 2022      No             30                       



     TABLET BY      2-16                                              



     MOUTH ONCE      00:00:                                              



     A DAY WITH      00                                                



     MEALS                                                        

 

     TAKE 2022      No                                      



     TABLET BY      2-16                                              



     MOUTH EVERY      00:00:                                              



     6 HOURS AS      00                                                



     NEEDED FOR                                                        



     PAIN DUE                                                        



                                                             

 

     TAKE 2022      No             30                       



     CAPSULE BY      2-16                                              



     MOUTH DAILY      00:00:                                              



     30 MINUTES      00                                                



     BEFORE                                                        



     BREAKFAST                                                        

 

     TAKE 2022      No                                      



     TABLET      2-14                                              



     DAILY.      00:00:                                              



               00                                                

 

     TAKE 2022      No                                      



     TABLET      2-14                                              



     DAILY WITH      00:00:                                              



     BREAKFAST.      00                                                

 

     TAKE 2022      No             50                       



     TABLET BY      2-14                                              



     MOUTH EVERY      00:00:                                              



     4 TO 6      00                                                



     HOURS AS                                                        



     NEEDED                                                        

 

     Dose      2022      No                                      



     Unknown      2-14                                              



               00:00:                                              



               00                                                

 

     Dose      2022      No                                      



     Unknown      2-14                                              



               00:00:                                              



               00                                                

 

     TAKE 2022      No                                      



     TABLET BY      2-14                                              



     MOUTH EVERY      00:00:                                              



     6 HOURS AS      00                                                



     NEEDED FOR                                                        



     PAIN                                                        

 

     AZITHROMYCI      2022      No             30                       



     N 250 MG      2-14                                              



     TABS      00:00:                                              



               00                                                

 

     Dose      2022      No                                      



     Unknown      2-14                                              



               00:00:                                              



               00                                                

 

     Dose      2022      No             30                       



     Unknown      2-14                                              



               00:00:                                              



               00                                                

 

     Dose      2022      No                                      



     Unknown      2-14                                              



               00:00:                                              



               00                                                

 

     Dose      2022      No                                      



     Unknown      2-14                                              



               00:00:                                              



               00                                                

 

     CYCLOBENZAP      2022      No             30                       



     RINE      2-14                                              



     HYDROCHLORI      00:00:                                              



     DE 10 MG      00                                                



     TABS                                                        

 

     Dose      2022      No             5                        



     Unknown      2-14                                              



               00:00:                                              



               00                                                

 

     TAKE 2022      No             30                       



     TABLET BY      2-14                                              



     MOUTH 2      00:00:                                              



     TIMES A DAY      00                                                



     AS NEEDED                                                        

 

     ONDANSETRON      2022      No             30                       



     HYDROCHLORI      2-14                                              



     DE 4 MG      00:00:                                              



     TABS      00                                                

 

     TAKE 2022      No             20                       



     TABLET BY      2-14                                              



     MOUTH EVERY      00:00:                                              



     DAY AS      00                                                



     DIRECTED                                                        

 

     Dose      2022      No                                      



     Unknown      2-14                                              



               00:00:                                              



               00                                                

 

     Dose      2022      No             30                       



     Unknown      2-14                                              



               00:00:                                              



               00                                                

 

     Dose      2022      No             30                       



     Unknown      2-14                                              



               00:00:                                              



               00                                                

 

     Dose      2022      No                                      



     Unknown      2-14                                              



               00:00:                                              



               00                                                

 

     OMEPRAZOLE      2022      No                                      



     40 MG CPDR      2-14                                              



               00:00:                                              



               00                                                

 

     Dose      2022      No             30                       



     Unknown      2-14                                              



               00:00:                                              



               00                                                

 

     Dose      2022      No             30                       



     Unknown      2-14                                              



               00:00:                                              



               00                                                

 

     Dose      2022      No             30                       



     Unknown      2-14                                              



               00:00:                                              



               00                                                

 

     Dose      2022      No                                      



     Unknown      2-14                                              



               00:00:                                              



               00                                                

 

     Dose      2022      No             30                       



     Unknown      2-14                                              



               00:00:                                              



               00                                                

 

     NITROFURANT      2022      No             5                        



     OIN       2-14                                              



     MONOHYDRATE      00:00:                                              



     /MACROCRY      00                                                



     STALS 100                                                        



     MG CAPS                                                        

 

     Dose      2022      No                                      



     Unknown      2-14                                              



               00:00:                                              



               00                                                

 

     LANSOPRAZOL      2022      No             2                        



     E 30 MG      2-14                                              



     CPDR      00:00:                                              



               00                                                

 

     IBUPROFEN      2022      No             30                       



     600 MG TABS      2-14                                              



               00:00:                                              



               00                                                

 

     TAKE 2022      No             30                       



     TABLET BY      2-14                                              



     MOUTH EVERY      00:00:                                              



     12 HOURS      00                                                



     FOR 10 DAYS                                                        

 

     UNITHROID      2022      No             30                       



     150 MCG      2-14                                              



     TABS      00:00:                                              



               00                                                

 

     TAKE 2022      No             30                       



     TABLET BY      2-14                                              



     MOUTH AS      00:00:                                              



     DIRECTED 1      00                                                



     UNIT AS                                                        



     DIRECTED                                                        



     USED AS                                                        



     DIRECTED BY                                                        



     YOUR                                                        



     COLONOSCOPY                                                        



     PACKET                                                        



     INSTRUCTION                                                        



     S                                                           

 

     LEVOTHYROXI      2022      No             30                       



     NE SODIUM      2-14                                              



     300 MCG      00:00:                                              



     TABS      00                                                

 

     TAKE 2022      No             30                       



     TABLET BY      2-14                                              



     MOUTH EVERY      00:00:                                              



     DAY AT      00                                                



     BEDTIME AS                                                        



     NEEDED FOR                                                        



     INSOMNIA                                                        

 

     OXYCODONE/A      2022      No                                      



     CETAMINOPHE      2-14                                              



     N  MG      00:00:                                              



     TABS      00                                                

 

     GLIMEPIRIDE      2022      No             30                       



     1 MG TABS      2-14                                              



               00:00:                                              



               00                                                

 

     IBUPROFEN      2022      No             30                       



     800 MG TABS      2-14                                              



               00:00:                                              



               00                                                

 

     Dose      2022      No             30                       



     Unknown      2-14                                              



               00:00:                                              



               00                                                

 

     CITALOPRAM      2022      No             30                       



     HYDROBROMID      2-14                                              



     E 20 MG      00:00:                                              



     TABS      00                                                

 

     TAKE 2022      No             30                       



     TABLET BY      2-14                                              



     MOUTH EVERY      00:00:                                              



     DAY       00                                                

 

     CEFPODOXIME      2022      No             30                       



     PROXETIL      2-14                                              



     200 MG TABS      00:00:                                              



               00                                                

 

     KETOCONAZOL      2022      No             30                       



     E 2 % CREA      2-14                                              



               00:00:                                              



               00                                                

 

     TAKE 2022      No                                      



     CAPSULE      1-21                                              



     WEEKLY.      00:00:                                              



               00                                                

 

     TAKE 2022      No             30unit                     



     CAPSULE      1-21                                              



     WEEKLY.      00:00:                                              



               00                                                

 

     HYDROXYZINE      2022      No                                      



     HCL 50 MG      1-15                                              



     TABS      00:00:                                              



               00                                                

 

     HYDROXYZINE      2022      No                                      



     HCL 50 MG      1-15                                              



     TABS      00:00:                                              



               00                                                

 

     Dose      2022      No             5                        



     Unknown      1-15                                              



               00:00:                                              



               00                                                

 

     HYDROcodone      2022- No             1{tbl}      1 tablet,         

  [facility]



     -acetaminop      1-05 11-05                          Oral,           ity of



     hen ([facility]      12:45: 12:48                          ONCE, 1           [facility]

as



     5) 5-325 mg      00   :00                           dose, On           Medi

marisa



     tablet 1                                         Sat            [facility]



     tablet                                         22 at           



                                                  0745, ASAP           

 

     HYDROcodone      2022- No        4647 1{tbl}      Take 1           [facility]

nivers



     -acetaminop      -                          tablet by           it

y of



     hen 5-325      00:00: 05:59                          mouth           [place]



     mg tablet      00   :00                           every 4           Medical



                                                  (four)           [facility]



                                                  hours as           



                                                  needed for           



                                                  Pain           



                                                  (scale           



                                                  1-3) for           



                                                  up to 7           



                                                  days.           



                                                  Indication           



                                                  s: acute           



                                                  pain           

 

     METFORMIN      2022      No                                      



     HYDROCHLORI      0-31                                              



     DE 1000 MG      00:00:                                              



     TABS      00                                                

 

     METFORMIN      2022      No                                      



     HYDROCHLORI      0-31                                              



     DE 1000 MG      00:00:                                              



     TABS      00                                                

 

     METFORMIN      2022      No                                      



     HYDROCHLORI      0-31                                              



     DE 1000 MG      00:00:                                              



     TABS      00                                                

 

     MORPHINE      2022      No             30                       



     SUL 30MG ER      0-24                                              



     Tablets      00:00:                                              



               00                                                

 

     MORPHINE      2022      No             30                       



     SUL 30MG ER      0-24                                              



     Tablets      00:00:                                              



               00                                                

 

     TAKE 1      2022      No                                      



     TABLET BY      0-24                                              



     MOUTH DAILY      00:00:                                              



     AT NIGHT      00                                                

 

     LISINOPRIL      2022      No                                      



     5 MG TABS      0-11                                              



               00:00:                                              



               00                                                

 

     LISINOPRIL      2022      No                                      



     5 MG TABS      0-11                                              



               00:00:                                              



               00                                                

 

     LISINOPRIL      1      No                                      



     5 MG TABS      0-11                                              



               00:00:                                              



               00                                                

 

     LISINOPRIL      1      No                                      



     5 MG TABS      0-11                                              



               00:00:                                              



               00                                                

 

     UNITHROID      -0      No                                      



     75 MCG TABS      9-26                                              



               00:00:                                              



               00                                                

 

     UNITHROID      -0      No                                      



     75 MCG TABS      9-                                              



               00:00:                                              



               00                                                

 

     UNITHROID      -0      No                                      



     75 MCG TABS      9-                                              



               00:00:                                              



               00                                                

 

     Dose      -0      No             30                       



     Unknown                                                    



               00:00:                                              



               00                                                

 

     HUMULIN      2-0      No                                      



     70/30      -                                              



     KWIKPEN      00:00:                                              



     (70-30) 100      00                                                



     UNIT/ML                                                        



     SUPN                                                        

 

     Dose      -0      No                                      



     Unknown                                                    



               00:00:                                              



               00                                                

 

     Dose      -0      No                                      



     Unknown                                                    



               00:00:                                              



               00                                                

 

     HUMULIN      2-0      No             30                       



     70/30      -                                              



     KWIKPEN      00:00:                                              



     (70-30) 100      00                                                



     UNIT/ML                                                        



     SUPN                                                        

 

     HYDROcodone      2-0 2- No             1{tbl}      1 tablet,         

  [facility]



     -acetaminop                                Oral,           [facility]



     hen ([facility])      05:52: 05:58                          ONCE, 1           [facility]

[place]



      mg      00   :00                           dose, On           [facility]

l



     tablet 1                                         Tue            [facility]



     tablet                                         22 at           



                                                  0100, ASAP           

 

     MORPHINE      2-0      No             30                       



     SULFATE ER      8-22                                              



     30 MG TBCR      00:00:                                              



               00                                                

 

     MORPHINE      2-0      No             30                       



     SULFATE ER      8-22                                              



     30 MG TBCR      00:00:                                              



               00                                                

 

     MORPHINE      2-0      No             30                       



     SULFATE ER      8-22                                              



     30 MG TBCR      00:00:                                              



               00                                                

 

     MORPHINE      2-0      No                                      



     SULFATE ER      8-22                                              



     30 MG TBCR      00:00:                                              



               00                                                

 

     MORPHINE      2-0      No             30                       



     SULFATE ER      8-22                                              



     30 MG TBCR      00:00:                                              



               00                                                

 

     TRINTELLIX      2022-0      No                                      



     20 MG TABS      8-18                                              



               00:00:                                              



               00                                                

 

     TRINTELLIX      2022-0      No                                      



     20 MG TABS      8-18                                              



               00:00:                                              



               00                                                

 

     TRINTELLIX      2022-0      No                                      



     20 MG TABS      8-18                                              



               00:00:                                              



               00                                                

 

     TRINTELLIX      2022-0      No                                      



     20 MG TABS      8-18                                              



               00:00:                                              



               00                                                

 

     TRINTELLIX      2022-0      No             20                       



     20 MG TABS      8-18                                              



               00:00:                                              



               00                                                

 

     Dose      2022-0      No                                      



     Unknown      8-10                                              



               00:00:                                              



               00                                                

 

     Dose      2022-0      No                                      



     Unknown      8-10                                              



               00:00:                                              



               00                                                

 

     Dose      2022-0      No             5                        



     Unknown      8-10                                              



               00:00:                                              



               00                                                

 

     Dose      2022-0      No                                      



     Unknown      8-10                                              



               00:00:                                              



               00                                                

 

     &lt       2022-0      No             10                       



               8-10                                              



               00:00:                                              



               00                                                

 

     Dose      2022-0      No                                      



     Unknown      8-10                                              



               00:00:                                              



               00                                                

 

     Dose      2022-0      No                                      



     Unknown      8-10                                              



               00:00:                                              



               00                                                

 

     Dose      2022-0      No             5                        



     Unknown      8-10                                              



               00:00:                                              



               00                                                

 

     Dose      2022-0      No                                      



     Unknown      8-10                                              



               00:00:                                              



               00                                                

 

     &lt       2022-0      No             10                       



               8-10                                              



               00:00:                                              



               00                                                

 

     Dose      2022-0      No                                      



     Unknown      8-10                                              



               00:00:                                              



               00                                                

 

     Dose      2022-0      No                                      



     Unknown      8-10                                              



               00:00:                                              



               00                                                

 

     Dose      2022-0      No             5                        



     Unknown      8-10                                              



               00:00:                                              



               00                                                

 

     Dose      2022-0      No             30                       



     Unknown      8-10                                              



               00:00:                                              



               00                                                

 

     METOCLOPRAM      2-0      No                                      



     FITO       8-10                                              



     HYDROCHLORI      00:00:                                              



     DE 10 MG      00                                                



     TABS                                                        

 

     Dose      2-0      No             30                       



     Unknown      8-10                                              



               00:00:                                              



               00                                                

 

     MIRTAZAPINE      2022-0      No                                      



     15 MG TABS      8-10                                              



               00:00:                                              



               00                                                

 

     Dose      2022-0      No             30                       



     Unknown      8-10                                              



               00:00:                                              



               00                                                

 

     Dose      2022-0      No             25                       



     Unknown      8-10                                              



               00:00:                                              



               00                                                

 

     &lt       2022-0      No             10                       



               8-10                                              



               00:00:                                              



               00                                                

 

     Dose      2022-0      No                                      



     Unknown      8-10                                              



               00:00:                                              



               00                                                

 

     Dose      2022-0      No             15                       



     Unknown      8-10                                              



               00:00:                                              



               00                                                

 

     Dose      2022-0      No             5                        



     Unknown      8-10                                              



               00:00:                                              



               00                                                

 

     Dose      2022-0      No                                      



     Unknown      8-10                                              



               00:00:                                              



               00                                                

 

     &lt       2022-0      No             10                       



               8-10                                              



               00:00:                                              



               00                                                

 

     Dose      2022-0      No             4                        



     Unknown      7-29                                              



               00:00:                                              



               00                                                

 

     Dose      2022-0      No             10                       



     Unknown      7                                              



               00:00:                                              



               00                                                

 

     Dose      2022-0      No             4                        



     Unknown      7                                              



               00:00:                                              



               00                                                

 

     Dose      2022-0      No                                      



     Unknown      7-                                              



               00:00:                                              



               00                                                

 

     Dose      2022-0      No             4                        



     Unknown      7                                              



               00:00:                                              



               00                                                

 

     Dose      2022-0      No                                      



     Unknown      7                                              



               00:00:                                              



               00                                                

 

     Dose      2022-0      No             30                       



     Unknown      7                                              



               00:00:                                              



               00                                                

 

     ATORVASTATI      2022-0      No                                      



     N CALCIUM      7-29                                              



     10 MG TABS      00:00:                                              



               00                                                

 

     Dose      2022-0      No             4                        



     Unknown      7                                              



               00:00:                                              



               00                                                

 

     Dose      2022-0      No             10                       



     Unknown      7                                              



               00:00:                                              



               00                                                

 

     Dose      2022-0      No                                      



     Unknown      7                                              



               00:00:                                              



               00                                                

 

     Dose      2022-0      No                                      



     Unknown      7-                                              



               00:00:                                              



               00                                                

 

     Dose      2022-0      No                                      



     Unknown      7-                                              



               00:00:                                              



               00                                                

 

     Dose      2022-0      No                                      



     Unknown      7-                                              



               00:00:                                              



               00                                                

 

     &lt       2022-0      No                                      



               7-                                              



               00:00:                                              



               00                                                

 

     lisinopril      2022-0      No             1mg                      



     5 mg tablet      7-21                                              



               00:00:                                              



               00                                                

 

     Myrbetriq      2022-0      No             1mg                      



     25 mg      7-21                                              



     tablet,exte      00:00:                                              



     nded      00                                                



     release                                                        

 

     citalopram      2-0      No             1mg                      



     20 mg      7-21                                              



     tablet      00:00:                                              



               00                                                

 

     atorvastati      2-0      No             1mg                      



     n 10 mg      7-21                                              



     tablet      00:00:                                              



               00                                                

 

     metformin      2-0      No             1mg                      



     1,000 mg      7-21                                              



     tablet      00:00:                                              



               00                                                

 

     Dose      2022-0      No                                      



     Unknown      7-                                              



               00:00:                                              



               00                                                

 

     metoclopram      2-0      No             1mg                      



     fito 10 mg      7-21                                              



     tablet      00:00:                                              



               00                                                

 

     ferrous      2-0      No             1(65 mg                     



     sulfate 325                           iron)                     



     mg (65 mg      00:00:                                              



     iron)      00                                                



     tablet                                                        

 

     omeprazole      -0      No             1mg                      



     40 mg      -                                              



     capsule,del      00:00:                                              



     ayed      00                                                



     release                                                        

 

     TAKE 1      -0      No             1000                     



     TABLET      -                                              



     TWICE      00:00:                                              



     DAILY.      00                                                

 

     TAKE 1      -0      No             500                      



     TABLET BY      7                                              



     MOUTH EVERY      00:00:                                              



     8 HOURS FOR      00                                                



     10 DAYS                                                        

 

     &lt       2022-0      No             250                      



               7-                                              



               00:00:                                              



               00                                                

 

     &lt       2022-0      No             10                       



               7-                                              



               00:00:                                              



               00                                                

 

     &lt       2022-0      No             25                       



               7-                                              



               00:00:                                              



               00                                                

 

     &lt       2022-0      No                                      



               7-21                                              



               00:00:                                              



               00                                                

 

     &lt       2022-0      No                                      



               7-21                                              



               00:00:                                              



               00                                                

 

     TAKE 1      -0      No             1000                     



     TABLET                                                    



     TWICE      00:00:                                              



     DAILY.      00                                                

 

     Dose      2-0      No                                      



     Unknown      7                                              



               00:00:                                              



               00                                                

 

     Dose      2-0      No                                      



     Unknown      7                                              



               00:00:                                              



               00                                                

 

     citalopram      2-0      No             1mg                      



     20 mg      7-21                                              



     tablet      00:00:                                              



               00                                                

 

     Dose      2022-0      No                                      



     Unknown      7-                                              



               00:00:                                              



               00                                                

 

     Dose      2022-0      No                                      



     Unknown      7-                                              



               00:00:                                              



               00                                                

 

     Dose      2022-0      No                                      



     Unknown      7-                                              



               00:00:                                              



               00                                                

 

     metoclopram      2-0      No             1mg                      



     fito 10 mg      7-                                              



     tablet      00:00:                                              



               00                                                

 

     Dose      2022-0      No                                      



     Unknown      7                                              



               00:00:                                              



               00                                                

 

     Dose      2022-0      No                                      



     Unknown      7-                                              



               00:00:                                              



               00                                                

 

     TAKE 1      2-0      No             1000                     



     TABLET      -                                              



     TWICE      00:00:                                              



     DAILY.      00                                                

 

     TAKE 1      -0      No             500                      



     TABLET BY      7-                                              



     MOUTH EVERY      00:00:                                              



     8 HOURS FOR      00                                                



     10 DAYS                                                        

 

     &lt       2022-0      No             250                      



               7-21                                              



               00:00:                                              



               00                                                

 

     &lt       2022-0      No             10                       



               7-21                                              



               00:00:                                              



               00                                                

 

     &lt       2022-0      No             25                       



               7-21                                              



               00:00:                                              



               00                                                

 

     &lt       2022-0      No                                      



               7-21                                              



               00:00:                                              



               00                                                

 

     &lt       2022-0      No                                      



               7-21                                              



               00:00:                                              



               00                                                

 

     TAKE 1      2-0      No             1000                     



     TABLET      7-                                              



     TWICE      00:00:                                              



     DAILY.      00                                                

 

     Dose      2-0      No                                      



     Unknown      7-                                              



               00:00:                                              



               00                                                

 

     Dose      2022-0      No                                      



     Unknown      7-                                              



               00:00:                                              



               00                                                

 

     citalopram      2-0      No             1mg                      



     20 mg      7-21                                              



     tablet      00:00:                                              



               00                                                

 

     Dose      2-0      No                                      



     Unknown      7                                              



               00:00:                                              



               00                                                

 

     Dose      2022-0      No                                      



     Unknown      7                                              



               00:00:                                              



               00                                                

 

     Dose      2-0      No                                      



     Unknown      7                                              



               00:00:                                              



               00                                                

 

     metoclopram      2-0      No             1mg                      



     fito 10 mg      7-21                                              



     tablet      00:00:                                              



               00                                                

 

     Dose      2-0      No                                      



     Unknown      7                                              



               00:00:                                              



               00                                                

 

     Dose      2022-0      No                                      



     Unknown                                                    



               00:00:                                              



               00                                                

 

     TAKE 1      2-0      No             1000                     



     TABLET      7-                                              



     TWICE      00:00:                                              



     DAILY.      00                                                

 

     TAKE 1      -0      No             500                      



     TABLET BY      7-                                              



     MOUTH EVERY      00:00:                                              



     8 HOURS FOR      00                                                



     10 DAYS                                                        

 

     &lt       2022-0      No             250                      



               7-                                              



               00:00:                                              



               00                                                

 

     &lt       2022-0      No             10                       



               7-                                              



               00:00:                                              



               00                                                

 

     &lt       2022-0      No             25                       



               7-                                              



               00:00:                                              



               00                                                

 

     &lt       2022-0      No                                      



               7-21                                              



               00:00:                                              



               00                                                

 

     &lt       2022-0      No                                      



               7-                                              



               00:00:                                              



               00                                                

 

     TAKE 1      2-0      No             1000                     



     TABLET      7-                                              



     TWICE      00:00:                                              



     DAILY.      00                                                

 

     TAKE 1      2-0      No             30unit                     



     TABLET      7-                                              



     DAILY AS      00:00:                                              



     DIRECTED.      00                                                

 

     TAKE 1      2-0      No                                      



     TABLET      7-21                                              



     DAILY WITH      00:00:                                              



     BREAKFAST.      00                                                

 

     citalopram      2-0      No             1mg                      



     20 mg      7-21                                              



     tablet      00:00:                                              



               00                                                

 

     Dose      2-0      No             30                       



     Unknown      7                                              



               00:00:                                              



               00                                                

 

     Dose      2022-0      No                                      



     Unknown      7                                              



               00:00:                                              



               00                                                

 

     Dose      2022-0      No             5                        



     Unknown                                                    



               00:00:                                              



               00                                                

 

     Dose      2022-0      No             30                       



     Unknown                                                    



               00:00:                                              



               00                                                

 

     Dose      2022-0      No             30                       



     Unknown                                                    



               00:00:                                              



               00                                                

 

     Dose      2022-0      No             30                       



     Unknown                                                    



               00:00:                                              



               00                                                

 

     TAKE 1      2-0      No             1000                     



     TABLET                                                    



     TWICE      00:00:                                              



     DAILY.      00                                                

 

     TAKE 1      -0      No             500                      



     TABLET BY      7-                                              



     MOUTH EVERY      00:00:                                              



     8 HOURS FOR      00                                                



     10 DAYS                                                        

 

     &lt       2022-0      No             250                      



               7-                                              



               00:00:                                              



               00                                                

 

     &lt       2022-0      No             10                       



               7-                                              



               00:00:                                              



               00                                                

 

     &lt       2022-0      No             25                       



               7                                              



               00:00:                                              



               00                                                

 

     &lt       2022-0      No                                      



               7-                                              



               00:00:                                              



               00                                                

 

     &lt       2022-0      No                                      



               7-                                              



               00:00:                                              



               00                                                

 

     TAKE 1      -0      No             1000                     



     TABLET                                                    



     TWICE      00:00:                                              



     DAILY.      00                                                

 

     lisinopril      -0      No             1mg                      



     5 mg tablet                                                    



               00:00:                                              



               00                                                

 

     Myrbetriq      2-0      No             1mg                      



     25 mg      -                                              



     tablet,exte      00:00:                                              



     nded      00                                                



     release                                                        

 

     citalopram      2-0      No             1mg                      



     20 mg      -                                              



     tablet      00:00:                                              



               00                                                

 

     atorvastati      2-0      No             1mg                      



     n 10 mg      -                                              



     tablet      00:00:                                              



               00                                                

 

     metformin      2-0      No             1mg                      



     1,000 mg      -                                              



     tablet      00:00:                                              



               00                                                

 

     glimepiride      2-0      No             1mg                      



     1 mg tablet                                                    



               00:00:                                              



               00                                                

 

     metoclopram      2-0      No             1mg                      



     fito 10 mg      -                                              



     tablet      00:00:                                              



               00                                                

 

     ferrous      2-0      No             1(65 mg                     



     sulfate 325                           iron)                     



     mg (65 mg      00:00:                                              



     iron)      00                                                



     tablet                                                        

 

     omeprazole      -0      No             1mg                      



     40 mg      -                                              



     capsule,del      00:00:                                              



     ayed      00                                                



     release                                                        

 

     TAKE 1      -0      No             1000                     



     TABLET                                                    



     TWICE      00:00:                                              



     DAILY.      00                                                

 

     TAKE 1      -0      No             500                      



     TABLET BY      7-                                              



     MOUTH EVERY      00:00:                                              



     8 HOURS FOR      00                                                



     10 DAYS                                                        

 

     &lt       2022-0      No             250                      



               7-                                              



               00:00:                                              



               00                                                

 

     &lt       2022-0      No             10                       



               7-                                              



               00:00:                                              



               00                                                

 

     &lt       2022-0      No             25                       



               7-                                              



               00:00:                                              



               00                                                

 

     &lt       2022-0      No                                      



               7-21                                              



               00:00:                                              



               00                                                

 

     &lt       2022-0      No                                      



               7-21                                              



               00:00:                                              



               00                                                

 

     TAKE 1      2-0      No             1000                     



     TABLET      7-21                                              



     TWICE      00:00:                                              



     DAILY.      00                                                

 

     lisinopril      2-0      No             1mg                      



     5 mg tablet      7-                                              



               00:00:                                              



               00                                                

 

     Myrbetriq      2022-0      No             1mg                      



     25 mg      7-21                                              



     tablet,exte      00:00:                                              



     nded      00                                                



     release                                                        

 

     citalopram      2022-0      No             1mg                      



     20 mg      7-21                                              



     tablet      00:00:                                              



               00                                                

 

     atorvastati      2022-0      No             1mg                      



     n 10 mg      7-21                                              



     tablet      00:00:                                              



               00                                                

 

     metformin      2022-0      No             1mg                      



     1,000 mg      7-21                                              



     tablet      00:00:                                              



               00                                                

 

     glimepiride      2-0      No             1mg                      



     1 mg tablet                                                    



               00:00:                                              



               00                                                

 

     metoclopram      2-0      No             1mg                      



     fito 10 mg      7-21                                              



     tablet      00:00:                                              



               00                                                

 

     ferrous      2-0      No             1(65 mg                     



     sulfate 325      -                     iron)                     



     mg (65 mg      00:00:                                              



     iron)      00                                                



     tablet                                                        

 

     omeprazole      -0      No             1mg                      



     40 mg      -                                              



     capsule,del      00:00:                                              



     ayed      00                                                



     release                                                        

 

     TAKE 1      -0      No             1000                     



     TABLET      -                                              



     TWICE      00:00:                                              



     DAILY.      00                                                

 

     TAKE 1      2-0      No             500                      



     TABLET BY      7-                                              



     MOUTH EVERY      00:00:                                              



     8 HOURS FOR      00                                                



     10 DAYS                                                        

 

     &lt       2022-0      No             250                      



               7-21                                              



               00:00:                                              



               00                                                

 

     &lt       2022-0      No             10                       



               7-21                                              



               00:00:                                              



               00                                                

 

     &lt       2022-0      No             25                       



               7-21                                              



               00:00:                                              



               00                                                

 

     &lt       2022-0      No                                      



               7-21                                              



               00:00:                                              



               00                                                

 

     &lt       2022-0      No                                      



               7-21                                              



               00:00:                                              



               00                                                

 

     TAKE 1      2-0      No             1000                     



     TABLET      7-                                              



     TWICE      00:00:                                              



     DAILY.      00                                                

 

     Dose      2022-0      No             5                        



     Unknown      7-20                                              



               00:00:                                              



               00                                                

 

     Dose      2022-0      No             5                        



     Unknown      7-20                                              



               00:00:                                              



               00                                                

 

     Dose      2022-0      No             5                        



     Unknown      7-20                                              



               00:00:                                              



               00                                                

 

     Dose      2022-0      No             5                        



     Unknown      7-20                                              



               00:00:                                              



               00                                                

 

     Dose      2022-0      No             5                        



     Unknown      7-20                                              



               00:00:                                              



               00                                                

 

     Dose      2022-0      No             5                        



     Unknown      7-20                                              



               00:00:                                              



               00                                                

 

     HYDROcodone      2022-0 2022- No             1{tbl}      1 tablet,         

  [facility]



     -acetaminop       07-16                          Oral,           [facility] of



     hen (NORCO)      06:30: 05:28                          ONCE, 1           [facility]

[facility]



      mg      00   :00                           dose, On           [facility]

l



     tablet 1                                         Sat            [facility]



     tablet                                         22 at           



                                                  0130,           



                                                  Routine           

 

     No known      202-0      No                       No known           [facility]

rs



     medications                                     medication           it

y of



               04:06:                               s              [address]

 

     TAKE 1      -0      No             500                      



     TABLET BY      7-13                                              



     MOUTH EVERY      00:00:                                              



     12 HOURS      00                                                



     FOR 10 DAYS                                                        

 

     &lt       2022-0      No                                      



                                                             



               00:00:                                              



               00                                                

 

     TAKE 1      2-0      No                                      



     TABLET BY      7-13                                              



     MOUTH DAILY      00:00:                                              



               00                                                

 

     &lt       2022-0      No                                      



                                                             



               00:00:                                              



               00                                                

 

     &lt       2022-0      No             20                       



                                                             



               00:00:                                              



               00                                                

 

     Dose      2022-0      No             50                       



     Unknown                                                    



               00:00:                                              



               00                                                

 

     TAKE 1      -0      No             500                      



     TABLET BY      7-13                                              



     MOUTH EVERY      00:00:                                              



     8 HOURS FOR      00                                                



     10 DAYS                                                        

 

     Dose      2022-0      No             20                       



     Unknown                                                    



               00:00:                                              



               00                                                

 

     Dose      2022-0      No             10                       



     Unknown                                                    



               00:00:                                              



               00                                                

 

     &lt       2022-0      No             25                       



                                                             



               00:00:                                              



               00                                                

 

     DISSOLVE 1      2022-0      No             4                        



     TABLET BY      7-13                                              



     MOUTH EVERY      00:00:                                              



     8 HOURS AS      00                                                



     NEEDED                                                        

 

     &lt       2022-0      No                                      



                                                             



               00:00:                                              



               00                                                

 

     TAKE 1      -0      No             1000                     



     TABLET                                                    



     TWICE      00:00:                                              



     DAILY.      00                                                

 

     &lt       2022-0      No             10                       



                                                             



               00:00:                                              



               00                                                

 

     &lt       2022-0      No             15                       



                                                             



               00:00:                                              



               00                                                

 

     &lt       2022-0      No             5                        



                                                             



               00:00:                                              



               00                                                

 

     TAKE 1      2-0      No             5                        



     TABLET BY      7-13                                              



     MOUTH TWICE      00:00:                                              



     A DAY AS      00                                                



     NEEDED FOR                                                        



     ANXIETY                                                        

 

     Dose      2022-0      No                                      



     Unknown                                                    



               00:00:                                              



               00                                                

 

     &lt       2022-0      No                                      



                                                             



               00:00:                                              



               00                                                

 

     TAKE 1      -0      No             500                      



     TABLET BY      7-13                                              



     MOUTH EVERY      00:00:                                              



     12 HOURS      00                                                



     FOR 10 DAYS                                                        

 

     &lt       2022-0      No                                      



                                                             



               00:00:                                              



               00                                                

 

     TAKE 1      2022-0      No                                      



     TABLET BY      7-13                                              



     MOUTH DAILY      00:00:                                              



               00                                                

 

     &lt       2022-0      No                                      



               7-13                                              



               00:00:                                              



               00                                                

 

     &lt       2022-0      No             20                       



               7-                                              



               00:00:                                              



               00                                                

 

     Dose      2022-0      No             50                       



     Unknown      7                                              



               00:00:                                              



               00                                                

 

     TAKE 1      2022-0      No             500                      



     TABLET BY      7-13                                              



     MOUTH EVERY      00:00:                                              



     8 HOURS FOR      00                                                



     10 DAYS                                                        

 

     Dose      2022-0      No             20                       



     Unknown      7                                              



               00:00:                                              



               00                                                

 

     Dose      2022-0      No             10                       



     Unknown      7                                              



               00:00:                                              



               00                                                

 

     &lt       2022-0      No             25                       



               7-                                              



               00:00:                                              



               00                                                

 

     DISSOLVE 1      2022-0      No             4                        



     TABLET BY      7-13                                              



     MOUTH EVERY      00:00:                                              



     8 HOURS AS      00                                                



     NEEDED                                                        

 

     &lt       2022-0      No                                      



               7-                                              



               00:00:                                              



               00                                                

 

     TAKE 1      2022-0      No             1000                     



     TABLET      7-                                              



     TWICE      00:00:                                              



     DAILY.      00                                                

 

     &lt       2022-0      No             10                       



               7-                                              



               00:00:                                              



               00                                                

 

     &lt       2022-0      No             15                       



               7-                                              



               00:00:                                              



               00                                                

 

     &lt       2022-0      No             5                        



               7-                                              



               00:00:                                              



               00                                                

 

     TAKE 1      2022-0      No             5                        



     TABLET BY      7-13                                              



     MOUTH TWICE      00:00:                                              



     A DAY AS      00                                                



     NEEDED FOR                                                        



     ANXIETY                                                        

 

     Dose      2022-0      No                                      



     Unknown      7                                              



               00:00:                                              



               00                                                

 

     &lt       2022-0      No                                      



               7-                                              



               00:00:                                              



               00                                                

 

     TAKE 1      2022-0      No             500                      



     TABLET BY      7-13                                              



     MOUTH EVERY      00:00:                                              



     12 HOURS      00                                                



     FOR 10 DAYS                                                        

 

     &lt       2022-0      No                                      



               7-                                              



               00:00:                                              



               00                                                

 

     TAKE 1      2022-0      No                                      



     TABLET BY      7-13                                              



     MOUTH DAILY      00:00:                                              



               00                                                

 

     &lt       2022-0      No                                      



               7-13                                              



               00:00:                                              



               00                                                

 

     &lt       2022-0      No             20                       



               7-                                              



               00:00:                                              



               00                                                

 

     Dose      2022-0      No             50                       



     Unknown                                                    



               00:00:                                              



               00                                                

 

     TAKE 1      2022-0      No             500                      



     TABLET BY      7-13                                              



     MOUTH EVERY      00:00:                                              



     8 HOURS FOR      00                                                



     10 DAYS                                                        

 

     Dose      2022-0      No             20                       



     Unknown                                                    



               00:00:                                              



               00                                                

 

     Dose      2022-0      No             10                       



     Unknown                                                    



               00:00:                                              



               00                                                

 

     &lt       2022-0      No             25                       



               7-                                              



               00:00:                                              



               00                                                

 

     DISSOLVE 1      2022-0      No             4                        



     TABLET BY      7-13                                              



     MOUTH EVERY      00:00:                                              



     8 HOURS AS      00                                                



     NEEDED                                                        

 

     &lt       2022-0      No                                      



               7-13                                              



               00:00:                                              



               00                                                

 

     TAKE 1      2022-0      No             1000                     



     TABLET      7-13                                              



     TWICE      00:00:                                              



     DAILY.      00                                                

 

     &lt       2022-0      No             10                       



               7-13                                              



               00:00:                                              



               00                                                

 

     &lt       2022-0      No             15                       



               7-13                                              



               00:00:                                              



               00                                                

 

     &lt       2022-0      No             5                        



               7-                                              



               00:00:                                              



               00                                                

 

     TAKE 1      2022-0      No             5                        



     TABLET BY      7-13                                              



     MOUTH TWICE      00:00:                                              



     A DAY AS      00                                                



     NEEDED FOR                                                        



     ANXIETY                                                        

 

     Dose      2022-0      No                                      



     Unknown      7-                                              



               00:00:                                              



               00                                                

 

     &lt       2022-0      No                                      



               7-13                                              



               00:00:                                              



               00                                                

 

     TAKE 1      2022-0      No             500                      



     TABLET BY      7-13                                              



     MOUTH EVERY      00:00:                                              



     12 HOURS      00                                                



     FOR 10 DAYS                                                        

 

     &lt       2022-0      No                                      



               7-                                              



               00:00:                                              



               00                                                

 

     TAKE 1      2022-0      No                                      



     TABLET BY      7-13                                              



     MOUTH DAILY      00:00:                                              



               00                                                

 

     &lt       2022-0      No                                      



               7-13                                              



               00:00:                                              



               00                                                

 

     &lt       2022-0      No             20                       



               7-                                              



               00:00:                                              



               00                                                

 

     Dose      2022-0      No             50                       



     Unknown      7                                              



               00:00:                                              



               00                                                

 

     TAKE 1      2022-0      No             500                      



     TABLET BY      7-13                                              



     MOUTH EVERY      00:00:                                              



     8 HOURS FOR      00                                                



     10 DAYS                                                        

 

     Dose      2022-0      No             20                       



     Unknown      7                                              



               00:00:                                              



               00                                                

 

     Dose      2022-0      No             10                       



     Unknown      7                                              



               00:00:                                              



               00                                                

 

     &lt       2022-0      No             25                       



               7-                                              



               00:00:                                              



               00                                                

 

     DISSOLVE 1      2022-0      No             4                        



     TABLET BY      7-13                                              



     MOUTH EVERY      00:00:                                              



     8 HOURS AS      00                                                



     NEEDED                                                        

 

     &lt       2022-0      No                                      



               7-13                                              



               00:00:                                              



               00                                                

 

     TAKE 1      2022-0      No             1000                     



     TABLET      7-13                                              



     TWICE      00:00:                                              



     DAILY.      00                                                

 

     &lt       2022-0      No             10                       



               7-                                              



               00:00:                                              



               00                                                

 

     &lt       2022-0      No             15                       



               7-                                              



               00:00:                                              



               00                                                

 

     &lt       2022-0      No             5                        



               7-                                              



               00:00:                                              



               00                                                

 

     TAKE 1      2022-0      No             5                        



     TABLET BY      7-13                                              



     MOUTH TWICE      00:00:                                              



     A DAY AS      00                                                



     NEEDED FOR                                                        



     ANXIETY                                                        

 

     Dose      2022-0      No                                      



     Unknown      7-                                              



               00:00:                                              



               00                                                

 

     &lt       2022-0      No                                      



               7-13                                              



               00:00:                                              



               00                                                

 

     TAKE 1      2022-0      No             500                      



     TABLET BY      7-13                                              



     MOUTH EVERY      00:00:                                              



     12 HOURS      00                                                



     FOR 10 DAYS                                                        

 

     &lt       2022-0      No                                      



               7-13                                              



               00:00:                                              



               00                                                

 

     TAKE 1      2022-0      No                                      



     TABLET BY      7-13                                              



     MOUTH DAILY      00:00:                                              



               00                                                

 

     &lt       2022-0      No                                      



               7-13                                              



               00:00:                                              



               00                                                

 

     &lt       2022-0      No             20                       



               7-13                                              



               00:00:                                              



               00                                                

 

     Dose      2022-0      No             50                       



     Unknown      7-                                              



               00:00:                                              



               00                                                

 

     TAKE 1      2022-0      No             500                      



     TABLET BY      7-13                                              



     MOUTH EVERY      00:00:                                              



     8 HOURS FOR      00                                                



     10 DAYS                                                        

 

     Dose      2022-0      No             20                       



     Unknown      7-                                              



               00:00:                                              



               00                                                

 

     Dose      2022-0      No             10                       



     Unknown      7-                                              



               00:00:                                              



               00                                                

 

     &lt       2022-0      No             25                       



               7-                                              



               00:00:                                              



               00                                                

 

     DISSOLVE 1      2022-0      No             4                        



     TABLET BY      7-13                                              



     MOUTH EVERY      00:00:                                              



     8 HOURS AS      00                                                



     NEEDED                                                        

 

     &lt       2022-0      No                                      



               7-                                              



               00:00:                                              



               00                                                

 

     TAKE 1      2022-0      No             1000                     



     TABLET      7-13                                              



     TWICE      00:00:                                              



     DAILY.      00                                                

 

     &lt       2022-0      No             10                       



               7-13                                              



               00:00:                                              



               00                                                

 

     &lt       2022-0      No             15                       



               7-                                              



               00:00:                                              



               00                                                

 

     &lt       2022-0      No             5                        



               7-                                              



               00:00:                                              



               00                                                

 

     TAKE 1      2022-0      No             5                        



     TABLET BY      7-13                                              



     MOUTH TWICE      00:00:                                              



     A DAY AS      00                                                



     NEEDED FOR                                                        



     ANXIETY                                                        

 

     Dose      2022-0      No                                      



     Unknown      7-                                              



               00:00:                                              



               00                                                

 

     &lt       2022-0      No                                      



               7-13                                              



               00:00:                                              



               00                                                

 

     TAKE 1      2022-0      No             500                      



     TABLET BY      7-13                                              



     MOUTH EVERY      00:00:                                              



     12 HOURS      00                                                



     FOR 10 DAYS                                                        

 

     &lt       2022-0      No                                      



               7-13                                              



               00:00:                                              



               00                                                

 

     TAKE 1      2022-0      No                                      



     TABLET BY      7-13                                              



     MOUTH DAILY      00:00:                                              



               00                                                

 

     &lt       2022-0      No                                      



               7-13                                              



               00:00:                                              



               00                                                

 

     &lt       2022-0      No             20                       



               7-                                              



               00:00:                                              



               00                                                

 

     Dose      2022-0      No             50                       



     Unknown      7                                              



               00:00:                                              



               00                                                

 

     TAKE 1      2022-0      No             500                      



     TABLET BY      7-13                                              



     MOUTH EVERY      00:00:                                              



     8 HOURS FOR      00                                                



     10 DAYS                                                        

 

     Dose      2022-0      No             20                       



     Unknown      7-                                              



               00:00:                                              



               00                                                

 

     Dose      2022-0      No             10                       



     Unknown      7-                                              



               00:00:                                              



               00                                                

 

     &lt       2022-0      No             25                       



               7-                                              



               00:00:                                              



               00                                                

 

     DISSOLVE 1      2022-0      No             4                        



     TABLET BY      7-13                                              



     MOUTH EVERY      00:00:                                              



     8 HOURS AS      00                                                



     NEEDED                                                        

 

     &lt       2022-0      No                                      



               7-13                                              



               00:00:                                              



               00                                                

 

     TAKE 1      2022-0      No             1000                     



     TABLET      7-13                                              



     TWICE      00:00:                                              



     DAILY.      00                                                

 

     &lt       2022-0      No             10                       



               7-13                                              



               00:00:                                              



               00                                                

 

     &lt       2022-0      No             15                       



               7-13                                              



               00:00:                                              



               00                                                

 

     &lt       2022-0      No             5                        



               7-13                                              



               00:00:                                              



               00                                                

 

     TAKE 1      2022-0      No             5                        



     TABLET BY      7-13                                              



     MOUTH TWICE      00:00:                                              



     A DAY AS      00                                                



     NEEDED FOR                                                        



     ANXIETY                                                        

 

     Dose      2022-0      No                                      



     Unknown      7                                              



               00:00:                                              



               00                                                

 

     &lt       2022-0      No                                      



               7-13                                              



               00:00:                                              



               00                                                

 

     TAKE 1      2022-0      No             5                        



     TABLET BY      7-12                                              



     MOUTH TWICE      00:00:                                              



     A DAY AS      00                                                



     NEEDED FOR                                                        



     ANXIETY                                                        

 

     TAKE 1      2022-0      No             5                        



     TABLET BY      7-12                                              



     MOUTH TWICE      00:00:                                              



     A DAY AS      00                                                



     NEEDED FOR                                                        



     ANXIETY                                                        

 

     TAKE 1      2022-0      No             5                        



     TABLET BY      7-12                                              



     MOUTH TWICE      00:00:                                              



     A DAY AS      00                                                



     NEEDED FOR                                                        



     ANXIETY                                                        

 

     TAKE 1      2022-0      No             5                        



     TABLET BY      7-12                                              



     MOUTH TWICE      00:00:                                              



     A DAY AS      00                                                



     NEEDED FOR                                                        



     ANXIETY                                                        

 

     TAKE 1      2022-0      No             5                        



     TABLET BY      7-12                                              



     MOUTH TWICE      00:00:                                              



     A DAY AS      00                                                



     NEEDED FOR                                                        



     ANXIETY                                                        

 

     TAKE 1      2022-0      No             5                        



     TABLET BY      7-12                                              



     MOUTH TWICE      00:00:                                              



     A DAY AS      00                                                



     NEEDED FOR                                                        



     ANXIETY                                                        

 

     DISSOLVE 1      2022-0      No             4                        



     TABLET BY      7-11                                              



     MOUTH EVERY      00:00:                                              



     8 HOURS AS      00                                                



     NEEDED                                                        

 

     DISSOLVE 1      2022-0      No             4                        



     TABLET BY      7-11                                              



     MOUTH EVERY      00:00:                                              



     8 HOURS AS      00                                                



     NEEDED                                                        

 

     DISSOLVE 1      2022-0      No             4                        



     TABLET BY      7-11                                              



     MOUTH EVERY      00:00:                                              



     8 HOURS AS      00                                                



     NEEDED                                                        

 

     DISSOLVE 1      2022-0      No             4                        



     TABLET BY      7-11                                              



     MOUTH EVERY      00:00:                                              



     8 HOURS AS      00                                                



     NEEDED                                                        

 

     DISSOLVE 1      2022-0      No             4                        



     TABLET BY      7-11                                              



     MOUTH EVERY      00:00:                                              



     8 HOURS AS      00                                                



     NEEDED                                                        

 

     DISSOLVE 1      2022-0      No             4                        



     TABLET BY      7-11                                              



     MOUTH EVERY      00:00:                                              



     8 HOURS AS      00                                                



     NEEDED                                                        

 

     &lt       2022-0      No                                      



               7-06                                              



               00:00:                                              



               00                                                

 

     &lt       2022-0      No             30                       



               7-06                                              



               00:00:                                              



               00                                                

 

     TAKE 1      2022-0      No             30                       



     TABLET BY      7-                                              



     MOUTH AT      00:00:                                              



     BEDTIME      00                                                

 

     &lt       2022-0      No                                      



                                                             



               00:00:                                              



               00                                                

 

     Dose      2022-0      No             50                       



     Unknown                                                    



               00:00:                                              



               00                                                

 

     &lt       2022-0      No             20                       



                                                             



               00:00:                                              



               00                                                

 

     &lt       2022-0      No                                      



                                                             



               00:00:                                              



               00                                                

 

     TAKE 1      2022-0      No             [id number]                     



     TABLET                                                    



     TWICE      00:00:                                              



     DAILY.      00                                                

 

     &lt       2022-0      No             25                       



                                                             



               00:00:                                              



               00                                                

 

     TAKE 1      2022-0      No             500                      



     TABLET BY                                                    



     MOUTH EVERY      00:00:                                              



     8 HOURS FOR      00                                                



     10 DAYS                                                        

 

     &lt       2022-0      No                                      



                                                             



               00:00:                                              



               00                                                

 

     &lt       2022-0      No             30                       



                                                             



               00:00:                                              



               00                                                

 

     TAKE 1      2022-0      No             30                       



     TABLET BY                                                    



     MOUTH AT      00:00:                                              



     BEDTIME      00                                                

 

     &lt       2022-0      No                                      



                                                             



               00:00:                                              



               00                                                

 

     Dose      2022-0      No             50                       



     Unknown                                                    



               00:00:                                              



               00                                                

 

     &lt       2022-0      No             20                       



                                                             



               00:00:                                              



               00                                                

 

     &lt       2022-0      No                                      



                                                             



               00:00:                                              



               00                                                

 

     TAKE 1      2022-0      No             [id number]                     



     TABLET                                                    



     TWICE      00:00:                                              



     DAILY.      00                                                

 

     &lt       2022-0      No             25                       



                                                             



               00:00:                                              



               00                                                

 

     TAKE 1      2022-0      No             500                      



     TABLET BY                                                    



     MOUTH EVERY      00:00:                                              



     8 HOURS FOR      00                                                



     10 DAYS                                                        

 

     &lt       2022-0      No                                      



                                                             



               00:00:                                              



               00                                                

 

     &lt       2022-0      No             30                       



                                                             



               00:00:                                              



               00                                                

 

     TAKE 1      2022-0      No             30                       



     TABLET BY                                                    



     MOUTH AT      00:00:                                              



     BEDTIME      00                                                

 

     &lt       2022-0      No                                      



                                                             



               00:00:                                              



               00                                                

 

     Dose      2022-0      No             50                       



     Unknown                                                    



               00:00:                                              



               00                                                

 

     &lt       2022-0      No             20                       



                                                             



               00:00:                                              



               00                                                

 

     &lt       2022-0      No                                      



                                                             



               00:00:                                              



               00                                                

 

     TAKE 1      2022-0      No             [id number]                     



     TABLET                                                    



     TWICE      00:00:                                              



     DAILY.      00                                                

 

     &lt       2022-0      No             25                       



                                                             



               00:00:                                              



               00                                                

 

     TAKE 1      2022-0      No             500                      



     TABLET BY      -                                              



     MOUTH EVERY      00:00:                                              



     8 HOURS FOR      00                                                



     10 DAYS                                                        

 

     &lt       2022-0      No                                      



                                                             



               00:00:                                              



               00                                                

 

     &lt       2022-0      No             30                       



                                                             



               00:00:                                              



               00                                                

 

     TAKE 1      2022-0      No             30                       



     TABLET BY                                                    



     MOUTH AT      00:00:                                              



     BEDTIME      00                                                

 

     &lt       2022-0      No                                      



                                                             



               00:00:                                              



               00                                                

 

     Dose      2022-0      No             50                       



     Unknown                                                    



               00:00:                                              



               00                                                

 

     &lt       2022-0      No             20                       



                                                             



               00:00:                                              



               00                                                

 

     &lt       2022-0      No                                      



                                                             



               00:00:                                              



               00                                                

 

     TAKE 1      2022-0      No             [id number]                     



     TABLET                                                    



     TWICE      00:00:                                              



     DAILY.      00                                                

 

     &lt       2022-0      No             25                       



                                                             



               00:00:                                              



               00                                                

 

     TAKE 1      2022-0      No             500                      



     TABLET BY                                                    



     MOUTH EVERY      00:00:                                              



     8 HOURS FOR      00                                                



     10 DAYS                                                        

 

     &lt       2022-0      No                                      



                                                             



               00:00:                                              



               00                                                

 

     &lt       2022-0      No             30                       



                                                             



               00:00:                                              



               00                                                

 

     TAKE 1      2022-0      No             30                       



     TABLET BY                                                    



     MOUTH AT      00:00:                                              



     BEDTIME      00                                                

 

     &lt       2022-0      No                                      



                                                             



               00:00:                                              



               00                                                

 

     Dose      2022-0      No             50                       



     Unknown                                                    



               00:00:                                              



               00                                                

 

     &lt       2022-0      No             20                       



                                                             



               00:00:                                              



               00                                                

 

     &lt       2022-0      No                                      



                                                             



               00:00:                                              



               00                                                

 

     TAKE 1      2022-0      No             [id number]                     



     TABLET                                                    



     TWICE      00:00:                                              



     DAILY.      00                                                

 

     &lt       2022-0      No             25                       



                                                             



               00:00:                                              



               00                                                

 

     TAKE 1      2022-0      No             500                      



     TABLET BY                                                    



     MOUTH EVERY      00:00:                                              



     8 HOURS FOR      00                                                



     10 DAYS                                                        

 

     &lt       2022-0      No                                      



                                                             



               00:00:                                              



               00                                                

 

     &lt       2022-0      No             30                       



                                                             



               00:00:                                              



               00                                                

 

     TAKE 1      2022-0      No             30                       



     TABLET BY                                                    



     MOUTH AT      00:00:                                              



     BEDTIME      00                                                

 

     &lt       2022-0      No                                      



                                                             



               00:00:                                              



               00                                                

 

     Dose      2022-0      No             50                       



     Unknown                                                    



               00:00:                                              



               00                                                

 

     &lt       2022-0      No             20                       



                                                             



               00:00:                                              



               00                                                

 

     &lt       2022-0      No                                      



                                                             



               00:00:                                              



               00                                                

 

     TAKE 1      2022-0      No             [id number]                     



     TABLET                                                    



     TWICE      00:00:                                              



     DAILY.      00                                                

 

     &lt       2022-0      No             25                       



                                                             



               00:00:                                              



               00                                                

 

     TAKE 1      2022-0      No             500                      



     TABLET BY      7-06                                              



     MOUTH EVERY      00:00:                                              



     8 HOURS FOR      00                                                



     10 DAYS                                                        

 

     TAKE 1      2022-0      No             20                       



     TABLET BY      7-05                                              



     MOUTH ONCE      00:00:                                              



     A DAY WITH      00                                                



     MEALS                                                        

 

     TAKE 1      2022-0      No             20                       



     TABLET BY      7-05                                              



     MOUTH ONCE      00:00:                                              



     A DAY WITH      00                                                



     MEALS                                                        

 

     TAKE 1      2022-0      No             20                       



     TABLET BY      7-05                                              



     MOUTH ONCE      00:00:                                              



     A DAY WITH      00                                                



     MEALS                                                        

 

     TAKE 1      2022-0      No             20                       



     TABLET BY      7-05                                              



     MOUTH ONCE      00:00:                                              



     A DAY WITH      00                                                



     MEALS                                                        

 

     TAKE 1      2022-0      No             20                       



     TABLET BY      7-05                                              



     MOUTH ONCE      00:00:                                              



     A DAY WITH      00                                                



     MEALS                                                        

 

     TAKE 1      2022-0      No             20                       



     TABLET BY      7-05                                              



     MOUTH ONCE      00:00:                                              



     A DAY WITH      00                                                



     MEALS                                                        

 

     &lt       2022-0      No                                      



               6-30                                              



               00:00:                                              



               00                                                

 

     TAKE 1      2022-0      No             200                      



     TABLET      6-30                                              



     DAILY.      00:00:                                              



               00                                                

 

     &lt       2022-0      No                                      



               6-30                                              



               00:00:                                              



               00                                                

 

     TAKE 1      2022-0      No             200                      



     TABLET      6-30                                              



     DAILY.      00:00:                                              



               00                                                

 

     &lt       2022-0      No                                      



               6-30                                              



               00:00:                                              



               00                                                

 

     TAKE 1      2022-0      No             200                      



     TABLET      6-30                                              



     DAILY.      00:00:                                              



               00                                                

 

     &lt       2022-0      No                                      



               6-30                                              



               00:00:                                              



               00                                                

 

     TAKE 1      2022-0      No             200                      



     TABLET      6-30                                              



     DAILY.      00:00:                                              



               00                                                

 

     &lt       2022-0      No                                      



               6-30                                              



               00:00:                                              



               00                                                

 

     TAKE 1      2022-0      No             200                      



     TABLET      6-30                                              



     DAILY.      00:00:                                              



               00                                                

 

     &lt       2022-0      No                                      



               6-30                                              



               00:00:                                              



               00                                                

 

     TAKE 1      2022-0      No             200                      



     TABLET      6-30                                              



     DAILY.      00:00:                                              



               00                                                

 

     TAKE 1      2022-0      No                                      



     TABLET BY      6-29                                              



     MOUTH EVERY      00:00:                                              



     8 HOURS FOR      00                                                



     10 DAYS                                                        

 

     TAKE 1      2022-0      No                                      



     TABLET BY      6-29                                              



     MOUTH EVERY      00:00:                                              



     8 HOURS FOR                                                      



     10 DAYS                                                        

 

     &lt       2022-0      No                                      



               6-29                                              



               00:00:                                              



               00                                                

 

     &lt       2022-0      No                                      



               6-29                                              



               00:00:                                              



               00                                                

 

     &lt       2022-0      No                                      



               6-29                                              



               00:00:                                              



               00                                                

 

     &lt       2022-0      No                                      



               6-29                                              



               00:00:                                              



               00                                                

 

     TAKE 1      2022-0      No                                      



     TABLET BY      6-29                                              



     MOUTH EVERY      00:00:                                              



     8 HOURS FOR      00                                                



     10 DAYS                                                        

 

     TAKE 1      2022-0      No                                      



     TABLET BY      6-29                                              



     MOUTH EVERY      00:00:                                              



     8 HOURS FOR                                                      



     10 DAYS                                                        

 

     &lt       2022-0      No                                      



               6-29                                              



               00:00:                                              



               00                                                

 

     &lt       2022-0      No                                      



               6-29                                              



               00:00:                                              



               00                                                

 

     &lt       2022-0      No                                      



               6-29                                              



               00:00:                                              



               00                                                

 

     &lt       2022-0      No                                      



               6-29                                              



               00:00:                                              



               00                                                

 

     TAKE 1      2022-0      No                                      



     TABLET BY      6-29                                              



     MOUTH EVERY      00:00:                                              



     8 HOURS FOR                                                      



     10 DAYS                                                        

 

     TAKE 1      2022-0      No                                      



     TABLET BY      6-29                                              



     MOUTH EVERY      00:00:                                              



     8 HOURS FOR                                                      



     10 DAYS                                                        

 

     &lt       2022-0      No                                      



               6-29                                              



               00:00:                                              



               00                                                

 

     &lt       2022-0      No                                      



               6-29                                              



               00:00:                                              



               00                                                

 

     &lt       2022-0      No                                      



               6-29                                              



               00:00:                                              



               00                                                

 

     &lt       2022-0      No                                      



               6-29                                              



               00:00:                                              



               00                                                

 

     TAKE 1      2022-0      No                                      



     TABLET BY      6-29                                              



     MOUTH EVERY      00:00:                                              



     8 HOURS FOR                                                      



     10 DAYS                                                        

 

     TAKE 1      2022-0      No                                      



     TABLET BY      6-29                                              



     MOUTH EVERY      00:00:                                              



     8 HOURS FOR                                                      



     10 DAYS                                                        

 

     &lt       2022-0      No                                      



               6-29                                              



               00:00:                                              



               00                                                

 

     &lt       2022-0      No                                      



               6-29                                              



               00:00:                                              



               00                                                

 

     &lt       2022-0      No                                      



               6-29                                              



               00:00:                                              



               00                                                

 

     &lt       2022-0      No                                      



               6-29                                              



               00:00:                                              



               00                                                

 

     TAKE 1      2022-0      No                                      



     TABLET BY      6-29                                              



     MOUTH EVERY      00:00:                                              



     8 HOURS FOR                                                      



     10 DAYS                                                        

 

     TAKE 1      2022-0      No                                      



     TABLET BY      6-29                                              



     MOUTH EVERY      00:00:                                              



     8 HOURS FOR                                                      



     10 DAYS                                                        

 

     &lt       2022-0      No                                      



               6-29                                              



               00:00:                                              



               00                                                

 

     &lt       2022-0      No                                      



               6-29                                              



               00:00:                                              



               00                                                

 

     &lt       2022-0      No                                      



               6-29                                              



               00:00:                                              



               00                                                

 

     &lt       2022-0      No                                      



               6-29                                              



               00:00:                                              



               00                                                

 

     TAKE 1      2022-0      No                                      



     TABLET BY      6-29                                              



     MOUTH EVERY      00:00:                                              



     8 HOURS FOR                                                      



     10 DAYS                                                        

 

     TAKE 1      2022-0      No                                      



     TABLET BY      6-29                                              



     MOUTH EVERY      00:00:                                              



     8 HOURS FOR                                                      



     10 DAYS                                                        

 

     &lt       2022-0      No                                      



               6-29                                              



               00:00:                                              



               00                                                

 

     &lt       2022-0      No                                      



               6-29                                              



               00:00:                                              



               00                                                

 

     &lt       2022-0      No                                      



               6-29                                              



               00:00:                                              



               00                                                

 

     &lt       2022-0      No                                      



               6                                              



               00:00:                                              



               00                                                

 

     TAKE ONE      2-0      No                                      



     (1) TO TWO                                                    



     (2)       00:00:                                              



     TABLET(S)      00                                                



     BY MOUTH                                                        



     EVERY 6                                                        



     HOURS AS                                                        



     NEEDED FOR                                                        



     PAIN , NO                                                        



     MORE THAN 8                                                        



     TABLETS IN                                                        



     24 HOURS.                                                        

 

     &lt       2022-0      No                                      



               6-                                              



               00:00:                                              



               00                                                

 

     &lt       2022-0      No                                      



               6-27                                              



               00:00:                                              



               00                                                

 

     &lt       2022-0      No                                      



               6                                              



               00:00:                                              



               00                                                

 

     &lt       2022-0      No                                      



               6                                              



               00:00:                                              



               00                                                

 

     TAKE ONE      2-0      No                                      



     (1) TO TWO                                                    



     (2)       00:00:                                              



     TABLET(S)      00                                                



     BY MOUTH                                                        



     EVERY 6                                                        



     HOURS AS                                                        



     NEEDED FOR                                                        



     PAIN , NO                                                        



     MORE THAN 8                                                        



     TABLETS IN                                                        



     24 HOURS.                                                        

 

     &lt       2022-0      No                                      



               6                                              



               00:00:                                              



               00                                                

 

     &lt       2022-0      No                                      



               6                                              



               00:00:                                              



               00                                                

 

     &lt       2022-0      No                                      



               6                                              



               00:00:                                              



               00                                                

 

     &lt       2022-0      No                                      



               6                                              



               00:00:                                              



               00                                                

 

     &lt       2022-0      No                                      



               6                                              



               00:00:                                              



               00                                                

 

     &lt       2022-0      No                                      



               6                                              



               00:00:                                              



               00                                                

 

     TAKE ONE      2-0      No                                      



     (1) TO TWO                                                    



     (2)       00:00:                                              



     TABLET(S)      00                                                



     BY MOUTH                                                        



     EVERY 6                                                        



     HOURS AS                                                        



     NEEDED FOR                                                        



     PAIN , NO                                                        



     MORE THAN 8                                                        



     TABLETS IN                                                        



     24 HOURS.                                                        

 

     &lt       2022-0      No                                      



               6                                              



               00:00:                                              



               00                                                

 

     &lt       2022-0      No                                      



               6                                              



               00:00:                                              



               00                                                

 

     &lt       2022-0      No                                      



               6                                              



               00:00:                                              



               00                                                

 

     &lt       2022-0      No                                      



               6                                              



               00:00:                                              



               00                                                

 

     &lt       2022-0      No                                      



               6                                              



               00:00:                                              



               00                                                

 

     TAKE ONE      2-0      No                                      



     (1) TO TWO                                                    



     (2)       00:00:                                              



     TABLET(S)      00                                                



     BY MOUTH                                                        



     EVERY 6                                                        



     HOURS AS                                                        



     NEEDED FOR                                                        



     PAIN , NO                                                        



     MORE THAN 8                                                        



     TABLETS IN                                                        



     24 HOURS.                                                        

 

     &lt       2022-0      No                                      



               6-27                                              



               00:00:                                              



               00                                                

 

     &lt       2022-0      No                                      



               627                                              



               00:00:                                              



               00                                                

 

     &lt       2022-0      No                                      



               6-27                                              



               00:00:                                              



               00                                                

 

     &lt       2022-0      No                                      



               627                                              



               00:00:                                              



               00                                                

 

     &lt       2022-0      No                                      



               627                                              



               00:00:                                              



               00                                                

 

     TAKE ONE      2022-0      No                                      



     (1) TO TWO      6-27                                              



     (2)       00:00:                                              



     TABLET(S)      00                                                



     BY MOUTH                                                        



     EVERY 6                                                        



     HOURS AS                                                        



     NEEDED FOR                                                        



     PAIN , NO                                                        



     MORE THAN 8                                                        



     TABLETS IN                                                        



     24 HOURS.                                                        

 

     &lt       2022-0      No                                      



               6-27                                              



               00:00:                                              



               00                                                

 

     &lt       2022-0      No                                      



               627                                              



               00:00:                                              



               00                                                

 

     &lt       2022-0      No                                      



               6-27                                              



               00:00:                                              



               00                                                

 

     &lt       2022-0      No                                      



               627                                              



               00:00:                                              



               00                                                

 

     &lt       2022-0      No                                      



               627                                              



               00:00:                                              



               00                                                

 

     TAKE ONE      2022-0      No                                      



     (1) TO TWO      -27                                              



     (2)       00:00:                                              



     TABLET(S)      00                                                



     BY MOUTH                                                        



     EVERY 6                                                        



     HOURS AS                                                        



     NEEDED FOR                                                        



     PAIN , NO                                                        



     MORE THAN 8                                                        



     TABLETS IN                                                        



     24 HOURS.                                                        

 

     &lt       2022-0      No                                      



               627                                              



               00:00:                                              



               00                                                

 

     &lt       2022-0      No                                      



               627                                              



               00:00:                                              



               00                                                

 

     &lt       2022-0      No                                      



               627                                              



               00:00:                                              



               00                                                

 

     &lt       2022-0      No                                      



               627                                              



               00:00:                                              



               00                                                

 

     &lt       2022-0      No                                      



               627                                              



               00:00:                                              



               00                                                

 

     TAKE ONE      2022-0      No                                      



     (1) TO TWO      27                                              



     (2)       00:00:                                              



     TABLET(S)      00                                                



     BY MOUTH                                                        



     EVERY 6                                                        



     HOURS AS                                                        



     NEEDED FOR                                                        



     PAIN , NO                                                        



     MORE THAN 8                                                        



     TABLETS IN                                                        



     24 HOURS.                                                        

 

     &lt       2022-0      No                                      



               627                                              



               00:00:                                              



               00                                                

 

     &lt       2022-0      No                                      



               627                                              



               00:00:                                              



               00                                                

 

     &lt       2022-0      No                                      



               627                                              



               00:00:                                              



               00                                                

 

     &lt       2022-0      No                                      



               627                                              



               00:00:                                              



               00                                                

 

     &lt       2022-0      No                                      



               627                                              



               00:00:                                              



               00                                                

 

     TAKE 1      2022-0      No                                      



     TABLET BY      6-24                                              



     MOUTH AT      00:00:                                              



     BEDTIME      00                                                

 

     DISSOLVE 1      2022-0      No                                      



     TABLET BY      6-24                                              



     MOUTH EVERY      00:00:                                              



     8 HOURS AS      00                                                



     NEEDED                                                        

 

     TAKE 1      2022-0      No                                      



     TABLET BY      6-24                                              



     MOUTH AT      00:00:                                              



     BEDTIME      00                                                

 

     DISSOLVE 1      2022-0      No                                      



     TABLET BY      6-24                                              



     MOUTH EVERY      00:00:                                              



     8 HOURS AS      00                                                



     NEEDED                                                        

 

     TAKE 1      2022-0      No                                      



     TABLET BY      6-24                                              



     MOUTH AT      00:00:                                              



     BEDTIME      00                                                

 

     DISSOLVE 1      2022-0      No                                      



     TABLET BY      6-24                                              



     MOUTH EVERY      00:00:                                              



     8 HOURS AS      00                                                



     NEEDED                                                        

 

     TAKE 1      2022-0      No                                      



     TABLET BY      6-24                                              



     MOUTH AT      00:00:                                              



     BEDTIME      00                                                

 

     DISSOLVE 1      2022-0      No                                      



     TABLET BY      6-24                                              



     MOUTH EVERY      00:00:                                              



     8 HOURS AS      00                                                



     NEEDED                                                        

 

     TAKE 1      2022-0      No                                      



     TABLET BY      6-24                                              



     MOUTH AT      00:00:                                              



     BEDTIME      00                                                

 

     DISSOLVE 1      2022-0      No                                      



     TABLET BY      6-24                                              



     MOUTH EVERY      00:00:                                              



     8 HOURS AS      00                                                



     NEEDED                                                        

 

     TAKE 1      2022-0      No                                      



     TABLET BY      6-24                                              



     MOUTH AT      00:00:                                              



     BEDTIME      00                                                

 

     DISSOLVE 1      2022-0      No                                      



     TABLET BY      6-24                                              



     MOUTH EVERY      00:00:                                              



     8 HOURS AS      00                                                



     NEEDED                                                        

 

     TAKE 1      2022-0      No                                      



     TABLET BY      6-24                                              



     MOUTH AT      00:00:                                              



     BEDTIME      00                                                

 

     DISSOLVE 1      2022-0      No                                      



     TABLET BY      6-24                                              



     MOUTH EVERY      00:00:                                              



     8 HOURS AS      00                                                



     NEEDED                                                        

 

     metformin      2022-0      No             1mg                      



     1,000 mg      6-23                                              



     tablet      00:00:                                              



               00                                                

 

     levothyroxi      2022-0      No             1mcg                     



     ne 75 mcg      6-23                                              



     tablet      00:00:                                              



               00                                                

 

     levothyroxi      2022-0      No             1mcg                     



     ne 200 mcg      6-23                                              



     tablet      00:00:                                              



               00                                                

 

     Dose      2022-0      No                                      



     Unknown      6-23                                              



               00:00:                                              



               00                                                

 

     levothyroxi      2022-0      No             1mcg                     



     ne 75 mcg      6-23                                              



     tablet      00:00:                                              



               00                                                

 

     levothyroxi      2022-0      No             1mcg                     



     ne 200 mcg      6-23                                              



     tablet      00:00:                                              



               00                                                

 

     Dose      2022-0      No                                      



     Unknown      6-23                                              



               00:00:                                              



               00                                                

 

     Dose      2022-0      No                                      



     Unknown      6-23                                              



               00:00:                                              



               00                                                

 

     Dose      2022-0      No                                      



     Unknown      6-23                                              



               00:00:                                              



               00                                                

 

     Dose      2022-0      No             30                       



     Unknown      6-23                                              



               00:00:                                              



               00                                                

 

     Dose      2022-0      No             30                       



     Unknown      6-23                                              



               00:00:                                              



               00                                                

 

     Dose      2022-0      No             30                       



     Unknown      6-23                                              



               00:00:                                              



               00                                                

 

     metformin      2022-0      No             1mg                      



     1,000 mg      6-23                                              



     tablet      00:00:                                              



               00                                                

 

     levothyroxi      2022-0      No             1mcg                     



     ne 75 mcg      6-23                                              



     tablet      00:00:                                              



               00                                                

 

     levothyroxi      2022-0      No             1mcg                     



     ne 200 mcg      6-23                                              



     tablet      00:00:                                              



               00                                                

 

     metformin      2022-0      No             1mg                      



     1,000 mg      6-23                                              



     tablet      00:00:                                              



               00                                                

 

     levothyroxi      2022-0      No             1mcg                     



     ne 75 mcg      6-23                                              



     tablet      00:00:                                              



               00                                                

 

     levothyroxi      2022-0      No             1mcg                     



     ne 200 mcg      6-23                                              



     tablet      00:00:                                              



               00                                                

 

     metformin      2022-0      No             1mg                      



     1,000 mg      6-23                                              



     tablet      00:00:                                              



               00                                                

 

     levothyroxi      2022-0      No             1mcg                     



     ne 75 mcg      6-23                                              



     tablet      00:00:                                              



               00                                                

 

     levothyroxi      2022-0      No             1mcg                     



     ne 200 mcg      6-23                                              



     tablet      00:00:                                              



               00                                                

 

     Myrbetriq      2022-0      No             1mg                      



     25 mg      4-29                                              



     tablet,exte      00:00:                                              



     nded      00                                                



     release                                                        

 

     hydroxyzine      2022-0      No             1mg                      



     HCl 50 mg      4-29                                              



     tablet      00:00:                                              



               00                                                

 

     lisinopril      2022-0      No             1mg                      



     5 mg tablet                                                    



               00:00:                                              



               00                                                

 

     metformin      2022-0      No             1mg                      



     1,000 mg      4-29                                              



     tablet      00:00:                                              



               00                                                

 

     metoclopram      2022-0      No             1mg                      



     fito 10 mg      4-29                                              



     tablet      00:00:                                              



               00                                                

 

     Myrbetriq      2022-0      No             1mg                      



     25 mg      4-29                                              



     tablet,exte      00:00:                                              



     nded      00                                                



     release                                                        

 

     hydroxyzine      2022-0      No             1mg                      



     HCl 50 mg      4-29                                              



     tablet      00:00:                                              



               00                                                

 

     lisinopril      2022-0      No             1mg                      



     5 mg tablet                                                    



               00:00:                                              



               00                                                

 

     Dose      2022-0      No                                      



     Unknown      4-29                                              



               00:00:                                              



               00                                                

 

     metoclopram      2022-0      No             1mg                      



     fito 10 mg      4-29                                              



     tablet      00:00:                                              



               00                                                

 

     Myrbetriq      2022-0      No             1mg                      



     25 mg      4-29                                              



     tablet,exte      00:00:                                              



     nded      00                                                



     release                                                        

 

     hydroxyzine      2022-0      No             1mg                      



     HCl 50 mg      4-29                                              



     tablet      00:00:                                              



               00                                                

 

     lisinopril      2022-0      No             1mg                      



     5 mg tablet      4                                              



               00:00:                                              



               00                                                

 

     Dose      2022-0      No                                      



     Unknown      4-29                                              



               00:00:                                              



               00                                                

 

     metoclopram      2022-0      No             1mg                      



     fito 10 mg      4-29                                              



     tablet      00:00:                                              



               00                                                

 

     Myrbetriq      2022-0      No             1mg                      



     25 mg      4-29                                              



     tablet,exte      00:00:                                              



     nded      00                                                



     release                                                        

 

     lisinopril      2022-0      No             1mg                      



     5 mg tablet      4                                              



               00:00:                                              



               00                                                

 

     metoclopram      2022-0      No             1mg                      



     fito 10 mg      4-29                                              



     tablet      00:00:                                              



               00                                                

 

     Dose      2022-0      No             30                       



     Unknown      4-29                                              



               00:00:                                              



               00                                                

 

     Dose      2022-0      No             30                       



     Unknown      4-29                                              



               00:00:                                              



               00                                                

 

     Myrbetriq      2022-0      No             1mg                      



     25 mg      4-29                                              



     tablet,exte      00:00:                                              



     nded      00                                                



     release                                                        

 

     hydroxyzine      2022-0      No             1mg                      



     HCl 50 mg      4-29                                              



     tablet      00:00:                                              



               00                                                

 

     lisinopril      2022-0      No             1mg                      



     5 mg tablet      4-29                                              



               00:00:                                              



               00                                                

 

     metformin      2022-0      No             1mg                      



     1,000 mg      4-29                                              



     tablet      00:00:                                              



               00                                                

 

     metoclopram      2022-0      No             1mg                      



     fito 10 mg      4-29                                              



     tablet      00:00:                                              



               00                                                

 

     Myrbetriq      2022-0      No             1mg                      



     25 mg      4-29                                              



     tablet,exte      00:00:                                              



     nded      00                                                



     release                                                        

 

     hydroxyzine      2022-0      No             1mg                      



     HCl 50 mg      4-29                                              



     tablet      00:00:                                              



               00                                                

 

     lisinopril      2022-0      No             1mg                      



     5 mg tablet      4-29                                              



               00:00:                                              



               00                                                

 

     metformin      2022-0      No             1mg                      



     1,000 mg      4-29                                              



     tablet      00:00:                                              



               00                                                

 

     metoclopram      2022-0      No             1mg                      



     fito 10 mg      4-29                                              



     tablet      00:00:                                              



               00                                                

 

     Myrbetriq      2022-0      No             1mg                      



     25 mg      4-29                                              



     tablet,exte      00:00:                                              



     nded      00                                                



     release                                                        

 

     hydroxyzine      2022-0      No             1mg                      



     HCl 50 mg      4-29                                              



     tablet      00:00:                                              



               00                                                

 

     lisinopril      2022-0      No             1mg                      



     5 mg tablet      4-29                                              



               00:00:                                              



               00                                                

 

     metformin      2022-0      No             1mg                      



     1,000 mg      4-29                                              



     tablet      00:00:                                              



               00                                                

 

     metoclopram      2022-0      No             1mg                      



     fito 10 mg      4-29                                              



     tablet      00:00:                                              



               00                                                

 

     citalopram      2022-0      No             1mg                      



     20 mg      4-27                                              



     tablet      00:00:                                              



               00                                                

 

     atorvastati      2022-0      No             1mg                      



     n 10 mg      4-27                                              



     tablet      00:00:                                              



               00                                                

 

     glimepiride      2022-0      No             1mg                      



     1 mg tablet      4-27                                              



               00:00:                                              



               00                                                

 

     levothyroxi      2022-0      No             1mcg                     



     ne 75 mcg      4-27                                              



     tablet      00:00:                                              



               00                                                

 

     levothyroxi      2022-0      No             1mcg                     



     ne 200 mcg      4-27                                              



     tablet      00:00:                                              



               00                                                

 

     levothyroxi      2022-0      No             1mcg                     



     ne 300 mcg      4-27                                              



     tablet      00:00:                                              



               00                                                

 

     ferrous      2022-0      No             1(65 mg                     



     sulfate 325      4-27                     iron)                     



     mg (65 mg      00:00:                                              



     iron)      00                                                



     tablet                                                        

 

     omeprazole      2-0      No             1mg                      



     40 mg      4-27                                              



     capsule,del      00:00:                                              



     ayed      00                                                



     release                                                        

 

     citalopram      2022-0      No             1mg                      



     20 mg      4-27                                              



     tablet      00:00:                                              



               00                                                

 

     atorvastati      2022-0      No             1mg                      



     n 10 mg      4-27                                              



     tablet      00:00:                                              



               00                                                

 

     Dose      2022-0      No                                      



     Unknown      4-27                                              



               00:00:                                              



               00                                                

 

     levothyroxi      2022-0      No             1mcg                     



     ne 75 mcg      4-27                                              



     tablet      00:00:                                              



               00                                                

 

     levothyroxi      2022-0      No             1mcg                     



     ne 200 mcg      4-27                                              



     tablet      00:00:                                              



               00                                                

 

     levothyroxi      2022-0      No             1mcg                     



     ne 300 mcg      4-27                                              



     tablet      00:00:                                              



               00                                                

 

     ferrous      2022-0      No             1(65 mg                     



     sulfate 325      4-27                     iron)                     



     mg (65 mg      00:00:                                              



     iron)      00                                                



     tablet                                                        

 

     omeprazole      2-0      No             1mg                      



     40 mg      4-27                                              



     capsule,del      00:00:                                              



     ayed      00                                                



     release                                                        

 

     citalopram      2-0      No             1mg                      



     20 mg      4-27                                              



     tablet      00:00:                                              



               00                                                

 

     atorvastati      2-0      No             1mg                      



     n 10 mg      4-27                                              



     tablet      00:00:                                              



               00                                                

 

     Dose      2022-0      No                                      



     Unknown      4-27                                              



               00:00:                                              



               00                                                

 

     Dose      2022-0      No                                      



     Unknown      4-27                                              



               00:00:                                              



               00                                                

 

     Dose      2022-0      No                                      



     Unknown      4-27                                              



               00:00:                                              



               00                                                

 

     Dose      2022-0      No                                      



     Unknown      4-27                                              



               00:00:                                              



               00                                                

 

     ferrous      2022-0      No             1(65 mg                     



     sulfate 325      4-27                     iron)                     



     mg (65 mg      00:00:                                              



     iron)      00                                                



     tablet                                                        

 

     omeprazole      2-0      No             1mg                      



     40 mg      4-27                                              



     capsule,del      00:00:                                              



     ayed      00                                                



     release                                                        

 

     TAKE 1      2-0      No                                      



     TABLET      4-27                                              



     DAILY WITH      00:00:                                              



     BREAKFAST.      00                                                

 

     citalopram      2022-0      No             1mg                      



     20 mg      4-27                                              



     tablet      00:00:                                              



               00                                                

 

     atorvastati      2022-0      No             1mg                      



     n 10 mg      4-27                                              



     tablet      00:00:                                              



               00                                                

 

     ferrous      2022-0      No             1(65 mg                     



     sulfate 325      4-27                     iron)                     



     mg (65 mg      00:00:                                              



     iron)      00                                                



     tablet                                                        

 

     Dose      2022-0      No             30                       



     Unknown      -27                                              



               00:00:                                              



               00                                                

 

     Dose      2022-0      No             30                       



     Unknown      4-27                                              



               00:00:                                              



               00                                                

 

     Dose      2022-0      No             30                       



     Unknown      4-27                                              



               00:00:                                              



               00                                                

 

     omeprazole      2-0      No             1mg                      



     40 mg      4-27                                              



     capsule,del      00:00:                                              



     ayed      00                                                



     release                                                        

 

     citalopram      2022-0      No             1mg                      



     20 mg      4-27                                              



     tablet      00:00:                                              



               00                                                

 

     atorvastati      2022-0      No             1mg                      



     n 10 mg      4-27                                              



     tablet      00:00:                                              



               00                                                

 

     glimepiride      2-0      No             1mg                      



     1 mg tablet                                                    



               00:00:                                              



               00                                                

 

     levothyroxi      2022-0      No             1mcg                     



     ne 75 mcg      4-27                                              



     tablet      00:00:                                              



               00                                                

 

     levothyroxi      2022-0      No             1mcg                     



     ne 200 mcg      4-27                                              



     tablet      00:00:                                              



               00                                                

 

     levothyroxi      2-0      No             1mcg                     



     ne 300 mcg      4-27                                              



     tablet      00:00:                                              



               00                                                

 

     ferrous      2022-0      No             1(65 mg                     



     sulfate 325      4-27                     iron)                     



     mg (65 mg      00:00:                                              



     iron)      00                                                



     tablet                                                        

 

     omeprazole      2-0      No             1mg                      



     40 mg      4-27                                              



     capsule,del      00:00:                                              



     ayed      00                                                



     release                                                        

 

     citalopram      2-0      No             1mg                      



     20 mg      4-27                                              



     tablet      00:00:                                              



               00                                                

 

     atorvastati      2-0      No             1mg                      



     n 10 mg      4-27                                              



     tablet      00:00:                                              



               00                                                

 

     glimepiride      2-0      No             1mg                      



     1 mg tablet      27                                              



               00:00:                                              



               00                                                

 

     levothyroxi      2022-0      No             1mcg                     



     ne 75 mcg      4-27                                              



     tablet      00:00:                                              



               00                                                

 

     levothyroxi      2022-0      No             1mcg                     



     ne 200 mcg      4-27                                              



     tablet      00:00:                                              



               00                                                

 

     levothyroxi      2-0      No             1mcg                     



     ne 300 mcg      4-27                                              



     tablet      00:00:                                              



               00                                                

 

     ferrous      2022-0      No             1(65 mg                     



     sulfate 325      4-27                     iron)                     



     mg (65 mg      00:00:                                              



     iron)      00                                                



     tablet                                                        

 

     omeprazole      2-0      No             1mg                      



     40 mg      4-27                                              



     capsule,del      00:00:                                              



     ayed      00                                                



     release                                                        

 

     citalopram      2-0      No             1mg                      



     20 mg      4-27                                              



     tablet      00:00:                                              



               00                                                

 

     atorvastati      -0      No             1mg                      



     n 10 mg      4-27                                              



     tablet      00:00:                                              



               00                                                

 

     Dose      2-0      No                                      



     Unknown      4-27                                              



               00:00:                                              



               00                                                

 

     levothyroxi      2-0      No             1mcg                     



     ne 75 mcg      4-27                                              



     tablet      00:00:                                              



               00                                                

 

     levothyroxi      -0      No             1mcg                     



     ne 200 mcg      4-27                                              



     tablet      00:00:                                              



               00                                                

 

     levothyroxi      -0      No             1mcg                     



     ne 300 mcg      4-27                                              



     tablet      00:00:                                              



               00                                                

 

     ferrous      2-0      No             1(65 mg                     



     sulfate 325      -27                     iron)                     



     mg (65 mg      00:00:                                              



     iron)      00                                                



     tablet                                                        

 

     omeprazole      -0      No             1mg                      



     40 mg      -                                              



     capsule,del      00:00:                                              



     ayed      00                                                



     release                                                        

 

     Dose      2-0      No                                      



     Unknown      4-01                                              



               00:00:                                              



               00                                                

 

     Dose      2-0      No                                      



     Unknown      4-01                                              



               00:00:                                              



               00                                                

 

     Dose      2-0      No                                      



     Unknown      4-01                                              



               00:00:                                              



               00                                                

 

     Dose      2-0      No                                      



     Unknown      4-01                                              



               00:00:                                              



               00                                                

 

     Dose      2-0      No                                      



     Unknown      4-01                                              



               00:00:                                              



               00                                                

 

     Dose      2-0      No                                      



     Unknown      4-01                                              



               00:00:                                              



               00                                                

 

     Dose      2-0      No                                      



     Unknown      4-01                                              



               00:00:                                              



               00                                                

 

     Dose      2-0      No                                      



     Unknown      4-01                                              



               00:00:                                              



               00                                                

 

     Dose      2-0      No                                      



     Unknown      4-01                                              



               00:00:                                              



               00                                                

 

     Dose      2-0      No                                      



     Unknown      4-01                                              



               00:00:                                              



               00                                                

 

     Dose      2-0      No                                      



     Unknown      4-01                                              



               00:00:                                              



               00                                                

 

     levothyroxi      2-0      No             1mcg                     



     ne 75 mcg      4-01                                              



     tablet      00:00:                                              



               00                                                

 

     levothyroxi      -0      No             1mcg                     



     ne 200 mcg      4-01                                              



     tablet      00:00:                                              



               00                                                

 

     hydrocortis      2-0      No             1mg                      



     one acetate      4-01                                              



     25 mg      00:00:                                              



     rectal      00                                                



     suppository                                                        

 

     nitrofurant      2-0      No             1mg                      



     oin       4-01                                              



     monohydrate      00:00:                                              



     /macrocryst      00                                                



     als 100 mg                                                        



     capsule                                                        

 

     Dose      -0      No                                      



     Unknown      4-01                                              



               00:00:                                              



               00                                                

 

     Dose      2-0      No                                      



     Unknown      4-01                                              



               00:00:                                              



               00                                                

 

     Dose      2-0      No                                      



     Unknown      4-01                                              



               00:00:                                              



               00                                                

 

     Dose      2-0      No                                      



     Unknown      4-01                                              



               00:00:                                              



               00                                                

 

     Dose      2022-0      No                                      



     Unknown      4-01                                              



               00:00:                                              



               00                                                

 

     Dose      2022-0      No                                      



     Unknown      4-01                                              



               00:00:                                              



               00                                                

 

     Dose      2022-0      No                                      



     Unknown      4-01                                              



               00:00:                                              



               00                                                

 

     Dose      2022-0      No                                      



     Unknown      4-01                                              



               00:00:                                              



               00                                                

 

     Dose      2022-0      No                                      



     Unknown      4-01                                              



               00:00:                                              



               00                                                

 

     Dose      2022-0      No                                      



     Unknown      4-01                                              



               00:00:                                              



               00                                                

 

     Dose      2022-0      No                                      



     Unknown      4-01                                              



               00:00:                                              



               00                                                

 

     Dose      2022-0      No                                      



     Unknown      4-01                                              



               00:00:                                              



               00                                                

 

     Dose      2022-0      No                                      



     Unknown      4-01                                              



               00:00:                                              



               00                                                

 

     Dose      2022-0      No                                      



     Unknown      4-01                                              



               00:00:                                              



               00                                                

 

     Dose      2022-0      No                                      



     Unknown      4-01                                              



               00:00:                                              



               00                                                

 

     Dose      2022-0      No                                      



     Unknown      4-01                                              



               00:00:                                              



               00                                                

 

     Dose      2022-0      No                                      



     Unknown      4-01                                              



               00:00:                                              



               00                                                

 

     Dose      2022-0      No                                      



     Unknown      4-01                                              



               00:00:                                              



               00                                                

 

     Dose      2022-0      No                                      



     Unknown      4-01                                              



               00:00:                                              



               00                                                

 

     Dose      2022-0      No                                      



     Unknown      4-01                                              



               00:00:                                              



               00                                                

 

     Dose      2022-0      No                                      



     Unknown      4-01                                              



               00:00:                                              



               00                                                

 

     Dose      2022-0      No                                      



     Unknown      4-01                                              



               00:00:                                              



               00                                                

 

     Dose      2022-0      No                                      



     Unknown      4-01                                              



               00:00:                                              



               00                                                

 

     Dose      2022-0      No                                      



     Unknown      4-01                                              



               00:00:                                              



               00                                                

 

     Dose      2022-0      No                                      



     Unknown      4-01                                              



               00:00:                                              



               00                                                

 

     Dose      2022-0      No                                      



     Unknown      4-01                                              



               00:00:                                              



               00                                                

 

     Dose      2022-0      No                                      



     Unknown      4-01                                              



               00:00:                                              



               00                                                

 

     Dose      2022-0      No                                      



     Unknown      4-01                                              



               00:00:                                              



               00                                                

 

     Dose      2022-0      No                                      



     Unknown      4-01                                              



               00:00:                                              



               00                                                

 

     Dose      2022-0      No                                      



     Unknown      4-01                                              



               00:00:                                              



               00                                                

 

     Dose      2022-0      No                                      



     Unknown      4-01                                              



               00:00:                                              



               00                                                

 

     Dose      2022-0      No                                      



     Unknown      4-01                                              



               00:00:                                              



               00                                                

 

     hydrocortis      2-0      No             1mg                      



     one acetate      4-01                                              



     25 mg      00:00:                                              



     rectal      00                                                



     suppository                                                        

 

     nitrofurant      2-0      No             1mg                      



     oin       4-01                                              



     monohydrate      00:00:                                              



     /macrocryst      00                                                



     als 100 mg                                                        



     capsule                                                        

 

     Dose      2-0      No                                      



     Unknown      4-01                                              



               00:00:                                              



               00                                                

 

     Dose      2022-0      No                                      



     Unknown      4-01                                              



               00:00:                                              



               00                                                

 

     Dose      2022-0      No                                      



     Unknown      4-01                                              



               00:00:                                              



               00                                                

 

     Dose      2022-0      No                                      



     Unknown      4-01                                              



               00:00:                                              



               00                                                

 

     Dose      2022-0      No                                      



     Unknown      4-01                                              



               00:00:                                              



               00                                                

 

     Dose      2022-0      No                                      



     Unknown      4-01                                              



               00:00:                                              



               00                                                

 

     Dose      2022-0      No                                      



     Unknown      4-01                                              



               00:00:                                              



               00                                                

 

     Dose      2022-0      No                                      



     Unknown      4-                                              



               00:00:                                              



               00                                                

 

     Dose      2022-0      No                                      



     Unknown      4-01                                              



               00:00:                                              



               00                                                

 

     Dose      2022-0      No                                      



     Unknown      4-01                                              



               00:00:                                              



               00                                                

 

     Dose      2022-0      No                                      



     Unknown      4-01                                              



               00:00:                                              



               00                                                

 

     Dose      2022-0      No                                      



     Unknown      4-01                                              



               00:00:                                              



               00                                                

 

     Dose      2022-0      No                                      



     Unknown      4-01                                              



               00:00:                                              



               00                                                

 

     Dose      2022-0      No                                      



     Unknown      4-01                                              



               00:00:                                              



               00                                                

 

     Dose      2022-0      No                                      



     Unknown      4-                                              



               00:00:                                              



               00                                                

 

     Dose      2022-0      No                                      



     Unknown      4-01                                              



               00:00:                                              



               00                                                

 

     Dose      2022-0      No                                      



     Unknown      4-01                                              



               00:00:                                              



               00                                                

 

     Dose      2022-0      No                                      



     Unknown      4-01                                              



               00:00:                                              



               00                                                

 

     Dose      2022-0      No                                      



     Unknown      4-01                                              



               00:00:                                              



               00                                                

 

     Dose      2022-0      No                                      



     Unknown      4-01                                              



               00:00:                                              



               00                                                

 

     Dose      2022-0      No                                      



     Unknown      4-01                                              



               00:00:                                              



               00                                                

 

     Dose      2022-0      No                                      



     Unknown      4-01                                              



               00:00:                                              



               00                                                

 

     Dose      2022-0      No                                      



     Unknown      4-                                              



               00:00:                                              



               00                                                

 

     Dose      2022-0      No                                      



     Unknown      4-                                              



               00:00:                                              



               00                                                

 

     Dose      2022-0      No                                      



     Unknown      4-01                                              



               00:00:                                              



               00                                                

 

     Dose      2022-0      No                                      



     Unknown      4-01                                              



               00:00:                                              



               00                                                

 

     Dose      2022-0      No                                      



     Unknown      4-                                              



               00:00:                                              



               00                                                

 

     Dose      2022-0      No                                      



     Unknown      4-                                              



               00:00:                                              



               00                                                

 

     Dose      2022-0      No                                      



     Unknown      4-01                                              



               00:00:                                              



               00                                                

 

     Dose      2022-0      No             30                       



     Unknown      4-01                                              



               00:00:                                              



               00                                                

 

     Dose      2022-0      No             30                       



     Unknown      4-                                              



               00:00:                                              



               00                                                

 

     Dose      2022-0      No             30                       



     Unknown      4-                                              



               00:00:                                              



               00                                                

 

     Dose      2022-0      No             30                       



     Unknown      4-                                              



               00:00:                                              



               00                                                

 

     Dose      2022-0      No             30                       



     Unknown      4-01                                              



               00:00:                                              



               00                                                

 

     Dose      2022-0      No                                      



     Unknown      4-01                                              



               00:00:                                              



               00                                                

 

     Dose      2022-0      No                                      



     Unknown      4-01                                              



               00:00:                                              



               00                                                

 

     Dose      2022-0      No                                      



     Unknown      4-01                                              



               00:00:                                              



               00                                                

 

     Dose      2022-0      No                                      



     Unknown      4-01                                              



               00:00:                                              



               00                                                

 

     Dose      2022-0      No                                      



     Unknown      4-01                                              



               00:00:                                              



               00                                                

 

     Dose      2022-0      No                                      



     Unknown      4-01                                              



               00:00:                                              



               00                                                

 

     Dose      2022-0      No                                      



     Unknown      4-01                                              



               00:00:                                              



               00                                                

 

     Dose      2022-0      No                                      



     Unknown      4-01                                              



               00:00:                                              



               00                                                

 

     Dose      2022-0      No                                      



     Unknown      4-01                                              



               00:00:                                              



               00                                                

 

     Dose      2022-0      No                                      



     Unknown      4-01                                              



               00:00:                                              



               00                                                

 

     Dose      2022-0      No                                      



     Unknown      4-01                                              



               00:00:                                              



               00                                                

 

     Dose      2022-0      No                                      



     Unknown      4-01                                              



               00:00:                                              



               00                                                

 

     Dose      2022-0      No                                      



     Unknown      4-01                                              



               00:00:                                              



               00                                                

 

     Dose      2022-0      No                                      



     Unknown      4-01                                              



               00:00:                                              



               00                                                

 

     Dose      2022-0      No                                      



     Unknown      4-01                                              



               00:00:                                              



               00                                                

 

     Dose      2022-0      No                                      



     Unknown      4-01                                              



               00:00:                                              



               00                                                

 

     Dose      2022-0      No                                      



     Unknown      4-01                                              



               00:00:                                              



               00                                                

 

     Dose      2022-0      No                                      



     Unknown      4-01                                              



               00:00:                                              



               00                                                

 

     Dose      2022-0      No                                      



     Unknown      4-01                                              



               00:00:                                              



               00                                                

 

     Dose      2022-0      No                                      



     Unknown      4-01                                              



               00:00:                                              



               00                                                

 

     Dose      2022-0      No                                      



     Unknown      4-01                                              



               00:00:                                              



               00                                                

 

     Dose      2022-0      No                                      



     Unknown      4-01                                              



               00:00:                                              



               00                                                

 

     Dose      2022-0      No                                      



     Unknown      4-01                                              



               00:00:                                              



               00                                                

 

     Dose      2022-0      No                                      



     Unknown      4-01                                              



               00:00:                                              



               00                                                

 

     Dose      2022-0      No                                      



     Unknown      4-01                                              



               00:00:                                              



               00                                                

 

     Dose      2022-0      No                                      



     Unknown      4-01                                              



               00:00:                                              



               00                                                

 

     Dose      2022-0      No                                      



     Unknown      4-01                                              



               00:00:                                              



               00                                                

 

     Dose      2022-0      No                                      



     Unknown      4-01                                              



               00:00:                                              



               00                                                

 

     Dose      2022-0      No                                      



     Unknown      4-01                                              



               00:00:                                              



               00                                                

 

     metformin      2022-0      No             1mg                      



     1,000 mg      4-                                              



     tablet      00:00:                                              



               00                                                

 

     levothyroxi      2022-0      No             1mcg                     



     ne 75 mcg      4-                                              



     tablet      00:00:                                              



               00                                                

 

     levothyroxi      2022-0      No             1mcg                     



     ne 200 mcg      4-                                              



     tablet      00:00:                                              



               00                                                

 

     hydrocortis      2022-0      No             1mg                      



     one acetate      4-01                                              



     25 mg      00:00:                                              



     rectal      00                                                



     suppository                                                        

 

     nitrofurant      2-0      No             1mg                      



     oin       4-01                                              



     monohydrate      00:00:                                              



     /macrocryst      00                                                



     als 100 mg                                                        



     capsule                                                        

 

     Dose      -0      No                                      



     Unknown      4-01                                              



               00:00:                                              



               00                                                

 

     Dose      2022-0      No                                      



     Unknown      4-01                                              



               00:00:                                              



               00                                                

 

     Dose      2022-0      No                                      



     Unknown      4-01                                              



               00:00:                                              



               00                                                

 

     Dose      2022-0      No                                      



     Unknown      4-01                                              



               00:00:                                              



               00                                                

 

     Dose      2022-0      No                                      



     Unknown      4-01                                              



               00:00:                                              



               00                                                

 

     Dose      2022-0      No                                      



     Unknown      4-01                                              



               00:00:                                              



               00                                                

 

     Dose      2022-0      No                                      



     Unknown      4-01                                              



               00:00:                                              



               00                                                

 

     Dose      2022-0      No                                      



     Unknown      4-01                                              



               00:00:                                              



               00                                                

 

     Dose      2022-0      No                                      



     Unknown      4-01                                              



               00:00:                                              



               00                                                

 

     Dose      2022-0      No                                      



     Unknown      4-01                                              



               00:00:                                              



               00                                                

 

     Dose      2022-0      No                                      



     Unknown      4-01                                              



               00:00:                                              



               00                                                

 

     Dose      2022-0      No                                      



     Unknown      4-01                                              



               00:00:                                              



               00                                                

 

     Dose      2022-0      No                                      



     Unknown      4-01                                              



               00:00:                                              



               00                                                

 

     Dose      2022-0      No                                      



     Unknown      4-01                                              



               00:00:                                              



               00                                                

 

     Dose      2022-0      No                                      



     Unknown      4-01                                              



               00:00:                                              



               00                                                

 

     Dose      2022-0      No                                      



     Unknown      4-01                                              



               00:00:                                              



               00                                                

 

     Dose      2022-0      No                                      



     Unknown      4-01                                              



               00:00:                                              



               00                                                

 

     Dose      2022-0      No                                      



     Unknown      4-01                                              



               00:00:                                              



               00                                                

 

     Dose      2022-0      No                                      



     Unknown      4-01                                              



               00:00:                                              



               00                                                

 

     Dose      2022-0      No                                      



     Unknown      4-01                                              



               00:00:                                              



               00                                                

 

     Dose      2022-0      No                                      



     Unknown      4-01                                              



               00:00:                                              



               00                                                

 

     Dose      2022-0      No                                      



     Unknown      4-01                                              



               00:00:                                              



               00                                                

 

     Dose      2022-0      No                                      



     Unknown      4-01                                              



               00:00:                                              



               00                                                

 

     Dose      2022-0      No                                      



     Unknown      4-01                                              



               00:00:                                              



               00                                                

 

     Dose      2022-0      No                                      



     Unknown      4-01                                              



               00:00:                                              



               00                                                

 

     Dose      2022-0      No                                      



     Unknown      4-01                                              



               00:00:                                              



               00                                                

 

     Dose      2022-0      No                                      



     Unknown      4-01                                              



               00:00:                                              



               00                                                

 

     Dose      2022-0      No                                      



     Unknown      4-01                                              



               00:00:                                              



               00                                                

 

     Dose      2022-0      No                                      



     Unknown      4-01                                              



               00:00:                                              



               00                                                

 

     metformin      2-0      No             1mg                      



     1,000 mg      4-01                                              



     tablet      00:00:                                              



               00                                                

 

     levothyroxi      -0      No             1mcg                     



     ne 75 mcg      4-01                                              



     tablet      00:00:                                              



               00                                                

 

     levothyroxi      2-0      No             1mcg                     



     ne 200 mcg      4-01                                              



     tablet      00:00:                                              



               00                                                

 

     hydrocortis      2-0      No             1mg                      



     one acetate      4-01                                              



     25 mg      00:00:                                              



     rectal      00                                                



     suppository                                                        

 

     nitrofurant      -0      No             1mg                      



     oin       4-01                                              



     monohydrate      00:00:                                              



     /macrocryst      00                                                



     als 100 mg                                                        



     capsule                                                        

 

     Dose      -0      No                                      



     Unknown      4-01                                              



               00:00:                                              



               00                                                

 

     Dose      2022-0      No                                      



     Unknown      4-01                                              



               00:00:                                              



               00                                                

 

     Dose      2-0      No                                      



     Unknown      4-01                                              



               00:00:                                              



               00                                                

 

     Dose      2-0      No                                      



     Unknown      4-01                                              



               00:00:                                              



               00                                                

 

     Dose      2-0      No                                      



     Unknown      4-01                                              



               00:00:                                              



               00                                                

 

     Dose      2-0      No                                      



     Unknown      4-01                                              



               00:00:                                              



               00                                                

 

     Dose      2022-0      No                                      



     Unknown      4-01                                              



               00:00:                                              



               00                                                

 

     Dose      2022-0      No                                      



     Unknown      4-01                                              



               00:00:                                              



               00                                                

 

     Dose      2022-0      No                                      



     Unknown      4-01                                              



               00:00:                                              



               00                                                

 

     Dose      2022-0      No                                      



     Unknown      4-01                                              



               00:00:                                              



               00                                                

 

     Dose      2022-0      No                                      



     Unknown      4-01                                              



               00:00:                                              



               00                                                

 

     Dose      2022-0      No                                      



     Unknown      4-01                                              



               00:00:                                              



               00                                                

 

     Dose      2022-0      No                                      



     Unknown      4-01                                              



               00:00:                                              



               00                                                

 

     Dose      2022-0      No                                      



     Unknown      4-01                                              



               00:00:                                              



               00                                                

 

     Dose      2022-0      No                                      



     Unknown      4-01                                              



               00:00:                                              



               00                                                

 

     Dose      2022-0      No                                      



     Unknown      4-01                                              



               00:00:                                              



               00                                                

 

     Dose      2022-0      No                                      



     Unknown      4-01                                              



               00:00:                                              



               00                                                

 

     Dose      2022-0      No                                      



     Unknown      4-01                                              



               00:00:                                              



               00                                                

 

     Dose      2022-0      No                                      



     Unknown      4-01                                              



               00:00:                                              



               00                                                

 

     Dose      2022-0      No                                      



     Unknown      4-01                                              



               00:00:                                              



               00                                                

 

     Dose      2022-0      No                                      



     Unknown      4-01                                              



               00:00:                                              



               00                                                

 

     Dose      2022-0      No                                      



     Unknown      4-01                                              



               00:00:                                              



               00                                                

 

     Dose      2022-0      No                                      



     Unknown      4-01                                              



               00:00:                                              



               00                                                

 

     Dose      2022-0      No                                      



     Unknown      4-01                                              



               00:00:                                              



               00                                                

 

     Dose      2022-0      No                                      



     Unknown      4-01                                              



               00:00:                                              



               00                                                

 

     Dose      2022-0      No                                      



     Unknown      4-01                                              



               00:00:                                              



               00                                                

 

     Dose      2022-0      No                                      



     Unknown      4-01                                              



               00:00:                                              



               00                                                

 

     Dose      2022-0      No                                      



     Unknown      4-01                                              



               00:00:                                              



               00                                                

 

     Dose      2022-0      No                                      



     Unknown      4-01                                              



               00:00:                                              



               00                                                

 

     metformin      2022-0      No             1mg                      



     1,000 mg      4-01                                              



     tablet      00:00:                                              



               00                                                

 

     levothyroxi      2-0      No             1mcg                     



     ne 75 mcg      4-01                                              



     tablet      00:00:                                              



               00                                                

 

     levothyroxi      2-0      No             1mcg                     



     ne 200 mcg      4-01                                              



     tablet      00:00:                                              



               00                                                

 

     hydrocortis      2-0      No             1mg                      



     one acetate      4-01                                              



     25 mg      00:00:                                              



     rectal      00                                                



     suppository                                                        

 

     nitrofurant      2-0      No             1mg                      



     oin       4-01                                              



     monohydrate      00:00:                                              



     /macrocryst      00                                                



     als 100 mg                                                        



     capsule                                                        

 

     Dose      2-0      No                                      



     Unknown      4-01                                              



               00:00:                                              



               00                                                

 

     Dose      2022-0      No                                      



     Unknown      4-01                                              



               00:00:                                              



               00                                                

 

     Dose      2022-0      No                                      



     Unknown      4-01                                              



               00:00:                                              



               00                                                

 

     Dose      2022-0      No                                      



     Unknown      4-01                                              



               00:00:                                              



               00                                                

 

     Dose      2022-0      No                                      



     Unknown      4-01                                              



               00:00:                                              



               00                                                

 

     Dose      2022-0      No                                      



     Unknown      4-01                                              



               00:00:                                              



               00                                                

 

     Dose      2022-0      No                                      



     Unknown      4-01                                              



               00:00:                                              



               00                                                

 

     Dose      2022-0      No                                      



     Unknown      4-01                                              



               00:00:                                              



               00                                                

 

     Dose      2022-0      No                                      



     Unknown      4-01                                              



               00:00:                                              



               00                                                

 

     Dose      2022-0      No                                      



     Unknown      4-01                                              



               00:00:                                              



               00                                                

 

     Dose      2022-0      No                                      



     Unknown      4-01                                              



               00:00:                                              



               00                                                

 

     Dose      2022-0      No                                      



     Unknown      4-01                                              



               00:00:                                              



               00                                                

 

     Dose      2022-0      No                                      



     Unknown      4-01                                              



               00:00:                                              



               00                                                

 

     metformin      2-0      No             1mg                      



     1,000 mg      4-01                                              



     tablet      00:00:                                              



               00                                                

 

     levothyroxi      2-0      No             1mcg                     



     ne 75 mcg      4-01                                              



     tablet      00:00:                                              



               00                                                

 

     levothyroxi      2-0      No             1mcg                     



     ne 200 mcg      4-01                                              



     tablet      00:00:                                              



               00                                                

 

     hydrocortis      2-0      No             1mg                      



     one acetate      4-01                                              



     25 mg      00:00:                                              



     rectal      00                                                



     suppository                                                        

 

     nitrofurant      2-0      No             1mg                      



     oin       4-01                                              



     monohydrate      00:00:                                              



     /macrocryst      00                                                



     als 100 mg                                                        



     capsule                                                        

 

     Dose      2022-0      No                                      



     Unknown      4-01                                              



               00:00:                                              



               00                                                

 

     Dose      2022-0      No                                      



     Unknown      4-01                                              



               00:00:                                              



               00                                                

 

     Dose      2022-0      No                                      



     Unknown      4-01                                              



               00:00:                                              



               00                                                

 

     Dose      2022-0      No                                      



     Unknown      4-01                                              



               00:00:                                              



               00                                                

 

     Dose      2022-0      No                                      



     Unknown      4-01                                              



               00:00:                                              



               00                                                

 

     Dose      2022-0      No                                      



     Unknown      4-01                                              



               00:00:                                              



               00                                                

 

     Dose      2022-0      No                                      



     Unknown      4-01                                              



               00:00:                                              



               00                                                

 

     Dose      2022-0      No                                      



     Unknown      4-01                                              



               00:00:                                              



               00                                                

 

     Dose      2022-0      No                                      



     Unknown      4-01                                              



               00:00:                                              



               00                                                

 

     Dose      2022-0      No                                      



     Unknown      4-01                                              



               00:00:                                              



               00                                                

 

     Dose      2022-0      No                                      



     Unknown      4-01                                              



               00:00:                                              



               00                                                

 

     Dose      2022-0      No                                      



     Unknown      4-01                                              



               00:00:                                              



               00                                                

 

     Dose      2022-0      No                                      



     Unknown      4-01                                              



               00:00:                                              



               00                                                

 

     Dose      2022-0      No                                      



     Unknown      4-01                                              



               00:00:                                              



               00                                                

 

     Dose      2022-0      No                                      



     Unknown      4-01                                              



               00:00:                                              



               00                                                

 

     Dose      2022-0      No                                      



     Unknown      4-01                                              



               00:00:                                              



               00                                                

 

     Dose      2022-0      No                                      



     Unknown      4-01                                              



               00:00:                                              



               00                                                

 

     Dose      2022-0      No                                      



     Unknown      4-01                                              



               00:00:                                              



               00                                                

 

     Dose      2022-0      No                                      



     Unknown      4-01                                              



               00:00:                                              



               00                                                

 

     Dose      2022-0      No                                      



     Unknown      4-01                                              



               00:00:                                              



               00                                                

 

     Dose      2022-0      No                                      



     Unknown      4-01                                              



               00:00:                                              



               00                                                

 

     Dose      2022-0      No                                      



     Unknown      4-01                                              



               00:00:                                              



               00                                                

 

     Dose      2022-0      No                                      



     Unknown      4-01                                              



               00:00:                                              



               00                                                

 

     Dose      2022-0      No                                      



     Unknown      4-01                                              



               00:00:                                              



               00                                                

 

     Dose      2022-0      No                                      



     Unknown      4-01                                              



               00:00:                                              



               00                                                

 

     Dose      2022-0      No                                      



     Unknown      4-01                                              



               00:00:                                              



               00                                                

 

     Dose      2022-0      No                                      



     Unknown      4-01                                              



               00:00:                                              



               00                                                

 

     Dose      2022-0      No                                      



     Unknown      4-01                                              



               00:00:                                              



               00                                                

 

     Dose      2022-0      No                                      



     Unknown      4-01                                              



               00:00:                                              



               00                                                

 

     Dose      2022-0      No                                      



     Unknown      4-01                                              



               00:00:                                              



               00                                                

 

     Dose      2022-0      No                                      



     Unknown      4-                                              



               00:00:                                              



               00                                                

 

     Dose      2022-0      No                                      



     Unknown      4-                                              



               00:00:                                              



               00                                                

 

     Dose      2022-0      No                                      



     Unknown      4-01                                              



               00:00:                                              



               00                                                

 

     Dose      2022-0      No                                      



     Unknown      4-01                                              



               00:00:                                              



               00                                                

 

     Dose      2022-0      No                                      



     Unknown      4-01                                              



               00:00:                                              



               00                                                

 

     Dose      2022-0      No                                      



     Unknown      3-31                                              



               00:00:                                              



               00                                                

 

     Dose      2022-0      No                                      



     Unknown      3-31                                              



               00:00:                                              



               00                                                

 

     Dose      2022-0      No                                      



     Unknown      3-31                                              



               00:00:                                              



               00                                                

 

     Dose      2022-0      No                                      



     Unknown      3-31                                              



               00:00:                                              



               00                                                

 

     Dose      2022-0      No                                      



     Unknown      3-31                                              



               00:00:                                              



               00                                                

 

     Dose      2022-0      No                                      



     Unknown      3-31                                              



               00:00:                                              



               00                                                

 

     Dose      2022-0      No                                      



     Unknown      3-31                                              



               00:00:                                              



               00                                                

 

     Dose      2022-0      No                                      



     Unknown      3-31                                              



               00:00:                                              



               00                                                

 

     Dose      2022-0      No                                      



     Unknown      3-31                                              



               00:00:                                              



               00                                                

 

     Dose      2022-0      No                                      



     Unknown      3-31                                              



               00:00:                                              



               00                                                

 

     Dose      2022-0      No                                      



     Unknown      3-31                                              



               00:00:                                              



               00                                                

 

     Dose      2022-0      No                                      



     Unknown      3-31                                              



               00:00:                                              



               00                                                

 

     Dose      2022-0      No                                      



     Unknown      3-31                                              



               00:00:                                              



               00                                                

 

     Dose      2022-0      No                                      



     Unknown      3-31                                              



               00:00:                                              



               00                                                

 

     Dose      2022-0      No                                      



     Unknown      3-31                                              



               00:00:                                              



               00                                                

 

     Dose      2022-0      No                                      



     Unknown      3-31                                              



               00:00:                                              



               00                                                

 

     Dose      2022-0      No                                      



     Unknown      3-31                                              



               00:00:                                              



               00                                                

 

     Dose      2022-0      No                                      



     Unknown      3-31                                              



               00:00:                                              



               00                                                

 

     Dose      2022-0      No                                      



     Unknown      3-31                                              



               00:00:                                              



               00                                                

 

     Dose      2022-0      No                                      



     Unknown      3-31                                              



               00:00:                                              



               00                                                

 

     Dose      2022-0      No                                      



     Unknown      3-31                                              



               00:00:                                              



               00                                                

 

     Dose      2022-0      No                                      



     Unknown      3-31                                              



               00:00:                                              



               00                                                

 

     Dose      2022-0      No                                      



     Unknown      3-31                                              



               00:00:                                              



               00                                                

 

     Dose      2022-0      No                                      



     Unknown      3-31                                              



               00:00:                                              



               00                                                

 

     Dose      2022-0      No                                      



     Unknown      3-31                                              



               00:00:                                              



               00                                                

 

     Dose      2022-0      No                                      



     Unknown      3-31                                              



               00:00:                                              



               00                                                

 

     Dose      2022-0      No                                      



     Unknown      3-31                                              



               00:00:                                              



               00                                                

 

     Dose      2022-0      No                                      



     Unknown      3-31                                              



               00:00:                                              



               00                                                

 

     Dose      2022-0      No                                      



     Unknown      3-31                                              



               00:00:                                              



               00                                                

 

     Dose      2022-0      No                                      



     Unknown      3-31                                              



               00:00:                                              



               00                                                

 

     Dose      2022-0      No                                      



     Unknown      3-31                                              



               00:00:                                              



               00                                                

 

     Dose      2022-0      No                                      



     Unknown      3-31                                              



               00:00:                                              



               00                                                

 

     Dose      2022-0      No                                      



     Unknown      3-31                                              



               00:00:                                              



               00                                                

 

     Dose      2022-0      No                                      



     Unknown      3-31                                              



               00:00:                                              



               00                                                

 

     Dose      2022-0      No                                      



     Unknown      3-31                                              



               00:00:                                              



               00                                                

 

     Dose      2022-0      No                                      



     Unknown      3-31                                              



               00:00:                                              



               00                                                

 

     Dose      2022-0      No                                      



     Unknown      3-31                                              



               00:00:                                              



               00                                                

 

     Dose      2022-0      No                                      



     Unknown      3-31                                              



               00:00:                                              



               00                                                

 

     Dose      2022-0      No                                      



     Unknown      3-31                                              



               00:00:                                              



               00                                                

 

     Dose      2022-0      No                                      



     Unknown      3-31                                              



               00:00:                                              



               00                                                

 

     Dose      2022-0      No                                      



     Unknown      3-31                                              



               00:00:                                              



               00                                                

 

     Dose      2022-0      No                                      



     Unknown      3-31                                              



               00:00:                                              



               00                                                

 

     Dose      2022-0      No                                      



     Unknown      3-31                                              



               00:00:                                              



               00                                                

 

     Dose      2022-0      No                                      



     Unknown      3-31                                              



               00:00:                                              



               00                                                

 

     Dose      2022-0      No                                      



     Unknown      3-31                                              



               00:00:                                              



               00                                                

 

     Dose      2022-0      No                                      



     Unknown      3-31                                              



               00:00:                                              



               00                                                

 

     Dose      2022-0      No                                      



     Unknown      3-31                                              



               00:00:                                              



               00                                                

 

     Dose      2022-0      No                                      



     Unknown      3-31                                              



               00:00:                                              



               00                                                

 

     Dose      2022-0      No                                      



     Unknown      3-31                                              



               00:00:                                              



               00                                                

 

     Dose      2022-0      No                                      



     Unknown      3-31                                              



               00:00:                                              



               00                                                

 

     Dose      2022-0      No                                      



     Unknown      3-31                                              



               00:00:                                              



               00                                                

 

     Dose      2022-0      No                                      



     Unknown      3-31                                              



               00:00:                                              



               00                                                

 

     Dose      2022-0      No                                      



     Unknown      3-31                                              



               00:00:                                              



               00                                                

 

     Dose      2022-0      No                                      



     Unknown      3-31                                              



               00:00:                                              



               00                                                

 

     Dose      2022-0      No                                      



     Unknown      3-31                                              



               00:00:                                              



               00                                                

 

     Dose      2022-0      No                                      



     Unknown      3-31                                              



               00:00:                                              



               00                                                

 

     Dose      2022-0      No                                      



     Unknown      3-31                                              



               00:00:                                              



               00                                                

 

     Dose      2022-0      No                                      



     Unknown      3-31                                              



               00:00:                                              



               00                                                

 

     Dose      2022-0      No                                      



     Unknown      3-31                                              



               00:00:                                              



               00                                                

 

     Dose      2022-0      No                                      



     Unknown      3-31                                              



               00:00:                                              



               00                                                

 

     Dose      2022-0      No                                      



     Unknown      3-31                                              



               00:00:                                              



               00                                                

 

     Dose      2022-0      No                                      



     Unknown      3-31                                              



               00:00:                                              



               00                                                

 

     Dose      2022-0      No                                      



     Unknown      3-31                                              



               00:00:                                              



               00                                                

 

     Dose      2022-0      No                                      



     Unknown      1-25                                              



               00:00:                                              



               00                                                

 

     Dose      2022-0      No                                      



     Unknown      1-25                                              



               00:00:                                              



               00                                                

 

     Dose      2022-0      No                                      



     Unknown      1-25                                              



               00:00:                                              



               00                                                

 

     Dose      2022-0      No                                      



     Unknown      1-25                                              



               00:00:                                              



               00                                                

 

     Dose      2022-0      No                                      



     Unknown      1-25                                              



               00:00:                                              



               00                                                

 

     Dose      2022-0      No                                      



     Unknown      1-25                                              



               00:00:                                              



               00                                                

 

     Dose      2022-0      No                                      



     Unknown      1-25                                              



               00:00:                                              



               00                                                

 

     Dose      2022-0      No                                      



     Unknown      1-25                                              



               00:00:                                              



               00                                                

 

     Dose      2022-0      No                                      



     Unknown      1-25                                              



               00:00:                                              



               00                                                

 

     Dose      2022-0      No                                      



     Unknown      1-25                                              



               00:00:                                              



               00                                                

 

     Dose      2022-0      No                                      



     Unknown      1-25                                              



               00:00:                                              



               00                                                

 

     Dose      2022-0      No                                      



     Unknown      1-25                                              



               00:00:                                              



               00                                                

 

     Dose      2022-0      No                                      



     Unknown      1-25                                              



               00:00:                                              



               00                                                

 

     Dose      2022-0      No                                      



     Unknown      1-25                                              



               00:00:                                              



               00                                                

 

     Dose      2022-0      No                                      



     Unknown      1-25                                              



               00:00:                                              



               00                                                

 

     Dose      2022-0      No                                      



     Unknown      1-25                                              



               00:00:                                              



               00                                                

 

     Dose      2022-0      No                                      



     Unknown      1-25                                              



               00:00:                                              



               00                                                

 

     Dose      2022-0      No                                      



     Unknown      1-25                                              



               00:00:                                              



               00                                                

 

     Dose      2022-0      No                                      



     Unknown      1-25                                              



               00:00:                                              



               00                                                

 

     Dose      2022-0      No                                      



     Unknown      1-25                                              



               00:00:                                              



               00                                                

 

     Dose      2022-0      No                                      



     Unknown      1-25                                              



               00:00:                                              



               00                                                

 

     Dose      2022-0      No                                      



     Unknown      1-20                                              



               00:00:                                              



               00                                                

 

     Dose      2022-0      No                                      



     Unknown      1-20                                              



               00:00:                                              



               00                                                

 

     Dose      2022-0      No                                      



     Unknown      1-20                                              



               00:00:                                              



               00                                                

 

     Dose      2022-0      No                                      



     Unknown      1-20                                              



               00:00:                                              



               00                                                

 

     Dose      2022-0      No                                      



     Unknown      1-20                                              



               00:00:                                              



               00                                                

 

     Dose      2022-0      No                                      



     Unknown      1-20                                              



               00:00:                                              



               00                                                

 

     Dose      2022-0      No                                      



     Unknown      1-20                                              



               00:00:                                              



               00                                                

 

     neomycin-po      2-0      No             4mg/mL-                     



     lymyxin-hyd      1-19                     unit/mL                     



     rocort 3.5      00:00:                     -%                       



     mg-10,000      00                                                



     unit/mL-1 %                                                        



     ear                                                         



     drops,susp                                                        

 

     lisinopril      2-0      No             1mg                      



     5 mg tablet      1-19                                              



               00:00:                                              



               00                                                

 

     hydroxyzine      2022-0      No             1mg                      



     HCl 50 mg      1-19                                              



     tablet      00:00:                                              



               00                                                

 

     Myrbetriq      2022-0      No             1mg                      



     25 mg      1-19                                              



     tablet,exte      00:00:                                              



     nded      00                                                



     release                                                        

 

     Dose      2022-0      No                                      



     Unknown      1-19                                              



               00:00:                                              



               00                                                

 

     metformin      2022-0      No             1mg                      



     1,000 mg      1-19                                              



     tablet      00:00:                                              



               00                                                

 

     Dose      2022-0      No                                      



     Unknown      1-19                                              



               00:00:                                              



               00                                                

 

     metoprolol      2022-0      No             1mg                      



     tartrate 25      1-19                                              



     mg tablet      00:00:                                              



               00                                                

 

     metoclopram      2022-0      No             1mg                      



     fito 10 mg      1-19                                              



     tablet      00:00:                                              



               00                                                

 

     azithromyci      2022-0      No             mg                       



     n 250 mg      1-19                                              



     tablet      00:00:                                              



               00                                                

 

     Dose      2022-0      No                                      



     Unknown      1-                                              



               00:00:                                              



               00                                                

 

     Dose      2022-0      No                                      



     Unknown      -                                              



               00:00:                                              



               00                                                

 

     Dose      2022-0      No                                      



     Unknown      -                                              



               00:00:                                              



               00                                                

 

     levothyroxi      2-0      No             1mcg                     



     ne 150 mcg      -19                                              



     capsule      00:00:                                              



               00                                                

 

     neomycin-po      2-0      No             4mg/mL-                     



     lymyxin-hyd      1-19                     unit/mL                     



     rocort 3.5      00:00:                     -%                       



     mg-10,000      00                                                



     unit/mL-1 %                                                        



     ear                                                         



     drops,susp                                                        

 

     lisinopril      2-0      No             1mg                      



     5 mg tablet                                                    



               00:00:                                              



               00                                                

 

     hydroxyzine      2-0      No             1mg                      



     HCl 50 mg      1-19                                              



     tablet      00:00:                                              



               00                                                

 

     Myrbetriq      2-0      No             1mg                      



     25 mg      1-19                                              



     tablet,exte      00:00:                                              



     nded      00                                                



     release                                                        

 

     Dose      2-0      No                                      



     Unknown      -                                              



               00:00:                                              



               00                                                

 

     metformin      2022-0      No             1mg                      



     1,000 mg      1-19                                              



     tablet      00:00:                                              



               00                                                

 

     Dose      2022-0      No                                      



     Unknown      -19                                              



               00:00:                                              



               00                                                

 

     metoprolol      2022-0      No             1mg                      



     tartrate 25      1-19                                              



     mg tablet      00:00:                                              



               00                                                

 

     metoclopram      2022-0      No             1mg                      



     fito 10 mg      1-19                                              



     tablet      00:00:                                              



               00                                                

 

     azithromyci      2022-0      No             mg                       



     n 250 mg      1-19                                              



     tablet      00:00:                                              



               00                                                

 

     Dose      2022-0      No                                      



     Unknown      -                                              



               00:00:                                              



               00                                                

 

     Dose      2022-0      No                                      



     Unknown      -                                              



               00:00:                                              



               00                                                

 

     Dose      2022-0      No                                      



     Unknown      -                                              



               00:00:                                              



               00                                                

 

     Dose      2022-0      No                                      



     Unknown      -                                              



               00:00:                                              



               00                                                

 

     neomycin-po      2022-0      No             4mg/mL-                     



     lymyxin-hyd      1-19                     unit/mL                     



     rocort 3.5      00:00:                     -%                       



     mg-10,000      00                                                



     unit/mL-1 %                                                        



     ear                                                         



     drops,susp                                                        

 

     lisinopril      2-0      No             1mg                      



     5 mg tablet      19                                              



               00:00:                                              



               00                                                

 

     hydroxyzine      2022-0      No             1mg                      



     HCl 50 mg      1-19                                              



     tablet      00:00:                                              



               00                                                

 

     Myrbetriq      2022-0      No             1mg                      



     25 mg      1-19                                              



     tablet,exte      00:00:                                              



     nded      00                                                



     release                                                        

 

     Dose      2022-0      No                                      



     Unknown      1-19                                              



               00:00:                                              



               00                                                

 

     metformin      2022-0      No             1mg                      



     1,000 mg      1-19                                              



     tablet      00:00:                                              



               00                                                

 

     metoclopram      2022-0      No             1mg                      



     fito 10 mg      -19                                              



     tablet      00:00:                                              



               00                                                

 

     azithromyci      2022-0      No             mg                       



     n 250 mg      -19                                              



     tablet      00:00:                                              



               00                                                

 

     Dose      2022-0      No                                      



     Unknown      1-19                                              



               00:00:                                              



               00                                                

 

     Dose      2022-0      No                                      



     Unknown      1-19                                              



               00:00:                                              



               00                                                

 

     Dose      2022-0      No             30                       



     Unknown      1-19                                              



               00:00:                                              



               00                                                

 

     Dose      2022-0      No             30                       



     Unknown      1-19                                              



               00:00:                                              



               00                                                

 

     Dose      2022-0      No                                      



     Unknown      1-19                                              



               00:00:                                              



               00                                                

 

     Dose      2022-0      No             30                       



     Unknown      1-19                                              



               00:00:                                              



               00                                                

 

     neomycin-po      2-0      No             4mg/mL-                     



     lymyxin-hyd      1-19                     unit/mL                     



     rocort 3.5      00:00:                     -%                       



     mg-10,000      00                                                



     unit/mL-1 %                                                        



     ear                                                         



     drops,susp                                                        

 

     Humulin      2-0      No             1unit/m                     



     70/30 U-100      1-19                     L                        



     Insulin 100      00:00:                     (70-30)                     



     unit/mL      00                                                



     subcutaneou                                                        



     s                                                           



     suspension                                                        

 

     lisinopril      2-0      No             1mg                      



     5 mg tablet                                                    



               00:00:                                              



               00                                                

 

     hydroxyzine      2022-0      No             1mg                      



     HCl 50 mg      1-19                                              



     tablet      00:00:                                              



               00                                                

 

     Myrbetriq      2022-0      No             1mg                      



     25 mg      1-19                                              



     tablet,exte      00:00:                                              



     nded      00                                                



     release                                                        

 

     Dose      2022-0      No                                      



     Unknown      1-19                                              



               00:00:                                              



               00                                                

 

     metformin      2022-0      No             1mg                      



     1,000 mg      1-19                                              



     tablet      00:00:                                              



               00                                                

 

     metformin      2022-0      No             1mg                      



     500 mg      1-19                                              



     tablet      00:00:                                              



               00                                                

 

     metoprolol      2022-0      No             1mg                      



     tartrate 25      1-19                                              



     mg tablet      00:00:                                              



               00                                                

 

     metoclopram      2022-0      No             1mg                      



     fiot 10 mg      1-19                                              



     tablet      00:00:                                              



               00                                                

 

     azithromyci      2022-0      No             mg                       



     n 250 mg      1-19                                              



     tablet      00:00:                                              



               00                                                

 

     Dose      2022-0      No                                      



     Unknown      1-19                                              



               00:00:                                              



               00                                                

 

     Dose      2022-0      No                                      



     Unknown      1-19                                              



               00:00:                                              



               00                                                

 

     Dose      2022-0      No                                      



     Unknown      1-19                                              



               00:00:                                              



               00                                                

 

     levothyroxi      2022-0      No             1mcg                     



     ne 150 mcg      1-19                                              



     capsule      00:00:                                              



               00                                                

 

     neomycin-po      2022-0      No             4mg/mL-                     



     lymyxin-hyd      1-19                     unit/mL                     



     rocort 3.5      00:00:                     -%                       



     mg-10,000      00                                                



     unit/mL-1 %                                                        



     ear                                                         



     drops,susp                                                        

 

     Humulin      2-0      No             1unit/m                     



     70/30 U-100      1-19                     L                        



     Insulin 100      00:00:                     (70-30)                     



     unit/mL      00                                                



     subcutaneou                                                        



     s                                                           



     suspension                                                        

 

     lisinopril      2-0      No             1mg                      



     5 mg tablet      -19                                              



               00:00:                                              



               00                                                

 

     hydroxyzine      2022-0      No             1mg                      



     HCl 50 mg      1-19                                              



     tablet      00:00:                                              



               00                                                

 

     Myrbetriq      2022-0      No             1mg                      



     25 mg      1-19                                              



     tablet,exte      00:00:                                              



     nded      00                                                



     release                                                        

 

     Dose      2-0      No                                      



     Unknown      -19                                              



               00:00:                                              



               00                                                

 

     metformin      2022-0      No             1mg                      



     1,000 mg      1-19                                              



     tablet      00:00:                                              



               00                                                

 

     metformin      2022-0      No             1mg                      



     500 mg      1-19                                              



     tablet      00:00:                                              



               00                                                

 

     metoprolol      2022-0      No             1mg                      



     tartrate 25      1-19                                              



     mg tablet      00:00:                                              



               00                                                

 

     metoclopram      2022-0      No             1mg                      



     fito 10 mg      1-19                                              



     tablet      00:00:                                              



               00                                                

 

     azithromyci      2022-0      No             mg                       



     n 250 mg      1-19                                              



     tablet      00:00:                                              



               00                                                

 

     neomycin-po      2022-0      No             4mg/mL-                     



     lymyxin-hyd      1-19                     unit/mL                     



     rocort 3.5      00:00:                     -%                       



     mg-10,000      00                                                



     unit/mL-1 %                                                        



     ear                                                         



     drops,susp                                                        

 

     Humulin      2-0      No             1unit/m                     



     70/30 U-100      1-19                     L                        



     Insulin 100      00:00:                     (70-30)                     



     unit/mL      00                                                



     subcutaneou                                                        



     s                                                           



     suspension                                                        

 

     lisinopril      2-0      No             1mg                      



     5 mg tablet      1-19                                              



               00:00:                                              



               00                                                

 

     Dose      2022-0      No                                      



     Unknown      1-19                                              



               00:00:                                              



               00                                                

 

     hydroxyzine      2-0      No             1mg                      



     HCl 50 mg      1-19                                              



     tablet      00:00:                                              



               00                                                

 

     Myrbetriq      2022-0      No             1mg                      



     25 mg      1-19                                              



     tablet,exte      00:00:                                              



     nded      00                                                



     release                                                        

 

     Dose      2022-0      No                                      



     Unknown      1-19                                              



               00:00:                                              



               00                                                

 

     metformin      2022-0      No             1mg                      



     1,000 mg      1-19                                              



     tablet      00:00:                                              



               00                                                

 

     metformin      2022-0      No             1mg                      



     500 mg      1-19                                              



     tablet      00:00:                                              



               00                                                

 

     metoprolol      2-0      No             1mg                      



     tartrate 25      1-19                                              



     mg tablet      00:00:                                              



               00                                                

 

     metoclopram      2-0      No             1mg                      



     fito 10 mg      1-19                                              



     tablet      00:00:                                              



               00                                                

 

     azithromyci      2-0      No             mg                       



     n 250 mg      -19                                              



     tablet      00:00:                                              



               00                                                

 

     Dose      2022-0      No                                      



     Unknown      1-19                                              



               00:00:                                              



               00                                                

 

     Dose      2022-0      No                                      



     Unknown      1-19                                              



               00:00:                                              



               00                                                

 

     Dose      2022-0      No                                      



     Unknown      1-19                                              



               00:00:                                              



               00                                                

 

     Dose      2022-0      No                                      



     Unknown      1-19                                              



               00:00:                                              



               00                                                

 

     levothyroxi      2022-0      No             1mcg                     



     ne 150 mcg      1-19                                              



     capsule      00:00:                                              



               00                                                

 

     Dose      2022-0      No                                      



     Unknown      1-19                                              



               00:00:                                              



               00                                                

 

     levothyroxi      2022-0      No             1mcg                     



     ne 150 mcg      1-19                                              



     capsule      00:00:                                              



               00                                                

 

     neomycin-po      2-0      No             4mg/mL-                     



     lymyxin-hyd      1-19                     unit/mL                     



     rocort 3.5      00:00:                     -%                       



     mg-10,000      00                                                



     unit/mL-1 %                                                        



     ear                                                         



     drops,susp                                                        

 

     Humulin      2-0      No             1unit/m                     



     70/30 U-100      1-19                     L                        



     Insulin 100      00:00:                     (70-30)                     



     unit/mL      00                                                



     subcutaneou                                                        



     s                                                           



     suspension                                                        

 

     lisinopril      2-0      No             1mg                      



     5 mg tablet      19                                              



               00:00:                                              



               00                                                

 

     hydroxyzine      2022-0      No             1mg                      



     HCl 50 mg      1-19                                              



     tablet      00:00:                                              



               00                                                

 

     Myrbetriq      2022-0      No             1mg                      



     25 mg      1-19                                              



     tablet,exte      00:00:                                              



     nded      00                                                



     release                                                        

 

     Dose      2022-0      No                                      



     Unknown      1-19                                              



               00:00:                                              



               00                                                

 

     metformin      2022-0      No             1mg                      



     1,000 mg      1-19                                              



     tablet      00:00:                                              



               00                                                

 

     metformin      2022-0      No             1mg                      



     500 mg      1-19                                              



     tablet      00:00:                                              



               00                                                

 

     metoprolol      2022-0      No             1mg                      



     tartrate 25      1-19                                              



     mg tablet      00:00:                                              



               00                                                

 

     metoclopram      2022-0      No             1mg                      



     fito 10 mg      1-19                                              



     tablet      00:00:                                              



               00                                                

 

     azithromyci      2022-0      No             mg                       



     n 250 mg      1-19                                              



     tablet      00:00:                                              



               00                                                

 

     Dose      2022-0      No                                      



     Unknown      1-19                                              



               00:00:                                              



               00                                                

 

     Dose      2022-0      No                                      



     Unknown      -                                              



               00:00:                                              



               00                                                

 

     Dose      2022-0      No                                      



     Unknown      -                                              



               00:00:                                              



               00                                                

 

     levothyroxi      2022-0      No             1mcg                     



     ne 150 mcg      1-19                                              



     capsule      00:00:                                              



               00                                                

 

     Dose      2022-0      No                                      



     Unknown      1-06                                              



               00:00:                                              



               00                                                

 

     Dose      2022-0      No                                      



     Unknown      1-06                                              



               00:00:                                              



               00                                                

 

     Dose      2022-0      No             30                       



     Unknown      1-06                                              



               00:00:                                              



               00                                                

 

     lisinopril      2022-0      No             1mg                      



     5 mg tablet      -                                              



               00:00:                                              



               00                                                

 

     lisinopril      2022-0      No             1mg                      



     5 mg tablet                                                    



               00:00:                                              



               00                                                

 

     lisinopril      2022-0      No             1mg                      



     5 mg tablet                                                    



               00:00:                                              



               00                                                

 

     lisinopril      2022-0      No             1mg                      



     5 mg tablet      1-                                              



               00:00:                                              



               00                                                

 

     hydroxyzine      2022-0      No             1mg                      



     HCl 50 mg      1-03                                              



     tablet      00:00:                                              



               00                                                

 

     hydroxyzine      2022-0      No             1mg                      



     HCl 50 mg      1-03                                              



     tablet      00:00:                                              



               00                                                

 

     metformin      2022-0      No             1mg                      



     1,000 mg      1-03                                              



     tablet      00:00:                                              



               00                                                

 

     hydroxyzine      2022-0      No             1mg                      



     HCl 50 mg      1-03                                              



     tablet      00:00:                                              



               00                                                

 

     hydroxyzine      2022-0      No             1mg                      



     HCl 50 mg      1-03                                              



     tablet      00:00:                                              



               00                                                

 

     metformin      2022-0      No             1mg                      



     1,000 mg      1-03                                              



     tablet      00:00:                                              



               00                                                

 

     hydroxyzine      2022-0      No             1mg                      



     HCl 50 mg      1-03                                              



     tablet      00:00:                                              



               00                                                

 

     metformin      2022-0      No             1mg                      



     1,000 mg      1-03                                              



     tablet      00:00:                                              



               00                                                

 

     Dose      2022-0      No             30                       



     Unknown      1-03                                              



               00:00:                                              



               00                                                

 

     Humulin      2022-0      No             1unit/m                     



     70/30 U-100      1-03                     L                        



     Insulin 100      00:00:                     (70-30)                     



     unit/mL      00                                                



     subcutaneou                                                        



     s                                                           



     suspension                                                        

 

     hydroxyzine      2022-0      No             1mg                      



     HCl 50 mg      1-03                                              



     tablet      00:00:                                              



               00                                                

 

     hydroxyzine      2022-0      No             1mg                      



     HCl 50 mg      1-03                                              



     tablet      00:00:                                              



               00                                                

 

     metformin      2022-0      No             1mg                      



     1,000 mg      1-03                                              



     tablet      00:00:                                              



               00                                                

 

     Humulin      2022-0      No             1unit/m                     



     70/30 U-100      1-03                     L                        



     Insulin 100      00:00:                     (70-30)                     



     unit/mL      00                                                



     subcutaneou                                                        



     s                                                           



     suspension                                                        

 

     hydroxyzine      2022-0      No             1mg                      



     HCl 50 mg      1-03                                              



     tablet      00:00:                                              



               00                                                

 

     hydroxyzine      2022-0      No             1mg                      



     HCl 50 mg      1-03                                              



     tablet      00:00:                                              



               00                                                

 

     metformin      2022-0      No             1mg                      



     1,000 mg      1-03                                              



     tablet      00:00:                                              



               00                                                

 

     Humulin      2022-0      No             1unit/m                     



     70/30 U-100      1-03                     L                        



     Insulin 100      00:00:                     (70-30)                     



     unit/mL      00                                                



     subcutaneou                                                        



     s                                                           



     suspension                                                        

 

     hydroxyzine      2022-0      No             1mg                      



     HCl 50 mg      1-03                                              



     tablet      00:00:                                              



               00                                                

 

     hydroxyzine      2022-0      No             1mg                      



     HCl 50 mg      1-03                                              



     tablet      00:00:                                              



               00                                                

 

     metformin      2022-0      No             1mg                      



     1,000 mg      1-03                                              



     tablet      00:00:                                              



               00                                                

 

     Humulin      2022-0      No             1unit/m                     



     70/30 U-100      1-03                     L                        



     Insulin 100      00:00:                     (70-30)                     



     unit/mL      00                                                



     subcutaneou                                                        



     s                                                           



     suspension                                                        

 

     hydroxyzine      2022-0      No             1mg                      



     HCl 50 mg      1-03                                              



     tablet      00:00:                                              



               00                                                

 

     hydroxyzine      2022-0      No             1mg                      



     HCl 50 mg      1-03                                              



     tablet      00:00:                                              



               00                                                

 

     metformin      2022-0      No             1mg                      



     1,000 mg      1-03                                              



     tablet      00:00:                                              



               00                                                

 

     Zofran 4 mg      -1      No             1mg                      



     tablet      2-16                                              



               00:00:                                              



               00                                                

 

     levothyroxi      2021-1      No             1mcg                     



     ne 150 mcg      2-16                                              



     capsule      00:00:                                              



               00                                                

 

     Zofran 4 mg      -1      No             1mg                      



     tablet      2-16                                              



               00:00:                                              



               00                                                

 

     Dose      -1      No                                      



     Unknown      2-16                                              



               00:00:                                              



               00                                                

 

     Dose      -1      No             30                       



     Unknown      2-16                                              



               00:00:                                              



               00                                                

 

     Dose      -1      No             30                       



     Unknown      2-16                                              



               00:00:                                              



               00                                                

 

     Zofran 4 mg      -1      No             1mg                      



     tablet      2-16                                              



               00:00:                                              



               00                                                

 

     levothyroxi      -      No             1mcg                     



     ne 150 mcg      2-16                                              



     capsule      00:00:                                              



               00                                                

 

     Zofran 4 mg      -1      No             1mg                      



     tablet      2-16                                              



               00:00:                                              



               00                                                

 

     levothyroxi      2021      No             1mcg                     



     ne 150 mcg      2-16                                              



     capsule      00:00:                                              



               00                                                

 

     Zofran 4 mg      -1      No             1mg                      



     tablet      2-16                                              



               00:00:                                              



               00                                                

 

     levothyroxi      2021      No             1mcg                     



     ne 150 mcg      2-16                                              



     capsule      00:00:                                              



               00                                                

 

     Zofran 4 mg      2021      No             1mg                      



     tablet      2-16                                              



               00:00:                                              



               00                                                

 

     levothyroxi      2021      No             1mcg                     



     ne 150 mcg      2-16                                              



     capsule      00:00:                                              



               00                                                

 

     metformin      -1      No             1mg                      



     500 mg      1-27                                              



     tablet      00:00:                                              



               00                                                

 

     metformin      -1      No             1mg                      



     500 mg      1-27                                              



     tablet      00:00:                                              



               00                                                

 

     metformin      -1      No             1mg                      



     500 mg      1-27                                              



     tablet      00:00:                                              



               00                                                

 

     metformin      -1      No             1mg                      



     500 mg      1-27                                              



     tablet      00:00:                                              



               00                                                

 

     metformin      -1      No             1mg                      



     500 mg      1-27                                              



     tablet      00:00:                                              



               00                                                

 

     metformin      -1      No             1mg                      



     500 mg      1-27                                              



     tablet      00:00:                                              



               00                                                

 

     metformin      -1      No             1mg                      



     500 mg      1-27                                              



     tablet      00:00:                                              



               00                                                

 

     metoprolol      -1      No             1mg                      



     tartrate 25      1-22                                              



     mg tablet      00:00:                                              



               00                                                

 

     metoprolol      -1      No             1mg                      



     tartrate 25      1-22                                              



     mg tablet      00:00:                                              



               00                                                

 

     metoprolol      -1      No             1mg                      



     tartrate 25      1-22                                              



     mg tablet      00:00:                                              



               00                                                

 

     metoprolol      -1      No             1mg                      



     tartrate 25      1-22                                              



     mg tablet      00:00:                                              



               00                                                

 

     metoprolol      -1      No             1mg                      



     tartrate 25      1-22                                              



     mg tablet      00:00:                                              



               00                                                

 

     metoprolol      -1      No             1mg                      



     tartrate 25      1-22                                              



     mg tablet      00:00:                                              



               00                                                

 

     metoprolol      -1      No             1mg                      



     tartrate 25      1-22                                              



     mg tablet      00:00:                                              



               00                                                

 

     Myrbetriq      -1      No             1mg                      



     25 mg      1-16                                              



     tablet,exte      00:00:                                              



     nded      00                                                



     release                                                        

 

     metoclopram      -1      No             1mg                      



     fito 10 mg      1-16                                              



     tablet      00:00:                                              



               00                                                

 

     Myrbetriq      -1      No             1mg                      



     25 mg      1-16                                              



     tablet,exte      00:00:                                              



     nded      00                                                



     release                                                        

 

     metoclopram      -1      No             1mg                      



     fito 10 mg      1-16                                              



     tablet      00:00:                                              



               00                                                

 

     Myrbetriq      -      No             1mg                      



     25 mg      1-16                                              



     tablet,exte      00:00:                                              



     nded      00                                                



     release                                                        

 

     metoclopram      -1      No             1mg                      



     fito 10 mg      1-16                                              



     tablet      00:00:                                              



               00                                                

 

     Myrbetriq      -1      No             1mg                      



     25 mg      1-16                                              



     tablet,exte      00:00:                                              



     nded      00                                                



     release                                                        

 

     metoclopram      -1      No             1mg                      



     fito 10 mg      1-16                                              



     tablet      00:00:                                              



               00                                                

 

     Myrbetriq      -1      No             1mg                      



     25 mg      1-16                                              



     tablet,exte      00:00:                                              



     nded      00                                                



     release                                                        

 

     metoclopram      -1      No             1mg                      



     fito 10 mg      1-16                                              



     tablet      00:00:                                              



               00                                                

 

     Myrbetriq      -1      No             1mg                      



     25 mg      1-16                                              



     tablet,exte      00:00:                                              



     nded      00                                                



     release                                                        

 

     metoclopram      -1      No             1mg                      



     fito 10 mg      1-16                                              



     tablet      00:00:                                              



               00                                                

 

     Myrbetriq      -1      No             1mg                      



     25 mg      1-16                                              



     tablet,exte      00:00:                                              



     nded      00                                                



     release                                                        

 

     metoclopram      -1      No             1mg                      



     fito 10 mg      1-16                                              



     tablet      00:00:                                              



               00                                                

 

     atorvastati      -1      No             1mg                      



     n 10 mg      1-14                                              



     tablet      00:00:                                              



               00                                                

 

     omeprazole      -1      No             1mg                      



     40 mg      1-14                                              



     capsule,del      00:00:                                              



     ayed      00                                                



     release                                                        

 

     atorvastati      -1      No             1mg                      



     n 10 mg      1-14                                              



     tablet      00:00:                                              



               00                                                

 

     omeprazole      -1      No             1mg                      



     40 mg      1-14                                              



     capsule,del      00:00:                                              



     ayed      00                                                



     release                                                        

 

     atorvastati      2021      No             1mg                      



     n 10 mg      1-14                                              



     tablet      00:00:                                              



               00                                                

 

     omeprazole      -      No             1mg                      



     40 mg      1-14                                              



     capsule,del      00:00:                                              



     ayed      00                                                



     release                                                        

 

     atorvastati      -      No             1mg                      



     n 10 mg      1-14                                              



     tablet      00:00:                                              



               00                                                

 

     omeprazole      -      No             1mg                      



     40 mg      1-14                                              



     capsule,del      00:00:                                              



     ayed      00                                                



     release                                                        

 

     atorvastati      -      No             1mg                      



     n 10 mg      1-14                                              



     tablet      00:00:                                              



               00                                                

 

     omeprazole      2021      No             1mg                      



     40 mg      1-14                                              



     capsule,del      00:00:                                              



     ayed      00                                                



     release                                                        

 

     atorvastati      2021      No             1mg                      



     n 10 mg      1-14                                              



     tablet      00:00:                                              



               00                                                

 

     omeprazole      -      No             1mg                      



     40 mg      1-14                                              



     capsule,del      00:00:                                              



     ayed      00                                                



     release                                                        

 

     atorvastati      -      No             1mg                      



     n 10 mg      1-14                                              



     tablet      00:00:                                              



               00                                                

 

     omeprazole      2021      No             1mg                      



     40 mg      1-14                                              



     capsule,del      00:00:                                              



     ayed      00                                                



     release                                                        

 

     levothyroxi      2021      No             1mcg                     



     ne 150 mcg      1-04                                              



     capsule      00:00:                                              



               00                                                

 

     levothyroxi      2021      No             1mcg                     



     ne 150 mcg      1-04                                              



     capsule      00:00:                                              



               00                                                

 

     levothyroxi      -      No             1mcg                     



     ne 150 mcg      1-04                                              



     capsule      00:00:                                              



               00                                                

 

     levothyroxi      -      No             1mcg                     



     ne 150 mcg      1-04                                              



     capsule      00:00:                                              



               00                                                

 

     levothyroxi      -      No             1mcg                     



     ne 150 mcg      1-04                                              



     capsule      00:00:                                              



               00                                                

 

     levothyroxi      -      No             1mcg                     



     ne 150 mcg      1-04                                              



     capsule      00:00:                                              



               00                                                

 

     levothyroxi      2021      No             1mcg                     



     ne 150 mcg      1-04                                              



     capsule      00:00:                                              



               00                                                

 

     ferrous      2021      No             1(65 mg                     



     sulfate 325      0-21                     iron)                     



     mg (65 mg      00:00:                                              



     iron)      00                                                



     tablet                                                        

 

     ferrous      2021      No             1(65 mg                     



     sulfate 325      0-21                     iron)                     



     mg (65 mg      00:00:                                              



     iron)      00                                                



     tablet                                                        

 

     ferrous      2021      No             1(65 mg                     



     sulfate 325      0-21                     iron)                     



     mg (65 mg      00:00:                                              



     iron)      00                                                



     tablet                                                        

 

     ferrous      2021      No             1(65 mg                     



     sulfate 325      0-21                     iron)                     



     mg (65 mg      00:00:                                              



     iron)      00                                                



     tablet                                                        

 

     ferrous      2021      No             1(65 mg                     



     sulfate 325      0-21                     iron)                     



     mg (65 mg      00:00:                                              



     iron)      00                                                



     tablet                                                        

 

     ferrous      2021      No             1(65 mg                     



     sulfate 325      0-21                     iron)                     



     mg (65 mg      00:00:                                              



     iron)      00                                                



     tablet                                                        

 

     ferrous      2021      No             1(65 mg                     



     sulfate 325      0-21                     iron)                     



     mg (65 mg      00:00:                                              



     iron)      00                                                



     tablet                                                        

 

     Myrbetriq      2021      No             1mg                      



     25 mg      0-13                                              



     tablet,exte      00:00:                                              



     nded      00                                                



     release                                                        

 

     levothyroxi      2021      No             1mcg                     



     ne 300 mcg      0-13                                              



     tablet      00:00:                                              



               00                                                

 

     Myrbetriq      2021      No             1mg                      



     25 mg      0-13                                              



     tablet,exte      00:00:                                              



     nded      00                                                



     release                                                        

 

     Dose      2021      No                                      



     Unknown      0-13                                              



               00:00:                                              



               00                                                

 

     Myrbetriq      2021      No             1mg                      



     25 mg      0-13                                              



     tablet,exte      00:00:                                              



     nded      00                                                



     release                                                        

 

     Dose      2021      No             30                       



     Unknown      0-13                                              



               00:00:                                              



               00                                                

 

     Myrbetriq      2021      No             1mg                      



     25 mg      0-13                                              



     tablet,exte      00:00:                                              



     nded      00                                                



     release                                                        

 

     levothyroxi      2021      No             1mcg                     



     ne 300 mcg      0-13                                              



     tablet      00:00:                                              



               00                                                

 

     Myrbetriq      2021      No             1mg                      



     25 mg      0-13                                              



     tablet,exte      00:00:                                              



     nded      00                                                



     release                                                        

 

     levothyroxi      2021      No             1mcg                     



     ne 300 mcg      0-13                                              



     tablet      00:00:                                              



               00                                                

 

     Myrbetriq      2021      No             1mg                      



     25 mg      0-13                                              



     tablet,exte      00:00:                                              



     nded      00                                                



     release                                                        

 

     levothyroxi      -      No             1mcg                     



     ne 300 mcg      0-13                                              



     tablet      00:00:                                              



               00                                                

 

     Myrbetriq      2021      No             1mg                      



     25 mg      0-13                                              



     tablet,exte      00:00:                                              



     nded      00                                                



     release                                                        

 

     levothyroxi      2021      No             1mcg                     



     ne 300 mcg      0-13                                              



     tablet      00:00:                                              



               00                                                

 

     nitrofurant      -      No             1mg                      



     oin       0-12                                              



     macrocrysta      00:00:                                              



     l 100 mg      00                                                



     capsule                                                        

 

     nitrofurant      2021-1      No             1mg                      



     oin       0-12                                              



     macrocrysta      00:00:                                              



     l 100 mg      00                                                



     capsule                                                        

 

     Dose      1-1      No                                      



     Unknown      0-12                                              



               00:00:                                              



               00                                                

 

     nitrofurant      1-1      No             1mg                      



     oin       0-12                                              



     macrocrysta      00:00:                                              



     l 100 mg      00                                                



     capsule                                                        

 

     nitrofurant      1-1      No             1mg                      



     oin       0-12                                              



     macrocrysta      00:00:                                              



     l 100 mg      00                                                



     capsule                                                        

 

     nitrofurant      1-1      No             1mg                      



     oin       0-12                                              



     macrocrysta      00:00:                                              



     l 100 mg      00                                                



     capsule                                                        

 

     nitrofurant      1-1      No             1mg                      



     oin       0-12                                              



     macrocrysta      00:00:                                              



     l 100 mg      00                                                



     capsule                                                        

 

     metoclopram      1-0      No             1mg                      



     fito 10 mg      9-13                                              



     tablet      00:00:                                              



               00                                                

 

     metoclopram      2021-0      No             1mg                      



     fito 10 mg      9-13                                              



     tablet      00:00:                                              



               00                                                

 

     metoclopram      2021-0      No             1mg                      



     fito 10 mg      9-13                                              



     tablet      00:00:                                              



               00                                                

 

     metoclopram      2021-0      No             1mg                      



     fito 10 mg      9-13                                              



     tablet      00:00:                                              



               00                                                

 

     metoclopram      2021-0      No             1mg                      



     fito 10 mg      9-13                                              



     tablet      00:00:                                              



               00                                                

 

     metoclopram      2021-0      No             1mg                      



     fito 10 mg      9-13                                              



     tablet      00:00:                                              



               00                                                

 

     metoclopram      2021-0      No             1mg                      



     fito 10 mg      9-13                                              



     tablet      00:00:                                              



               00                                                

 

     Myrbetriq      2021-0      No             1mg                      



     25 mg      9-01                                              



     tablet,exte      00:00:                                              



     nded      00                                                



     release                                                        

 

     Myrbetriq      2021-0      No             1mg                      



     25 mg      9-01                                              



     tablet,exte      00:00:                                              



     nded      00                                                



     release                                                        

 

     Myrbetriq      2021-0      No             1mg                      



     25 mg      9-01                                              



     tablet,exte      00:00:                                              



     nded      00                                                



     release                                                        

 

     Myrbetriq      2021-0      No             1mg                      



     25 mg      9-01                                              



     tablet,exte      00:00:                                              



     nded      00                                                



     release                                                        

 

     Myrbetriq      2021-0      No             1mg                      



     25 mg      9-01                                              



     tablet,exte      00:00:                                              



     nded      00                                                



     release                                                        

 

     Myrbetriq      2021-0      No             1mg                      



     25 mg      9-01                                              



     tablet,exte      00:00:                                              



     nded      00                                                



     release                                                        

 

     Myrbetriq      2021-0      No             1mg                      



     25 mg      9-01                                              



     tablet,exte      00:00:                                              



     nded      00                                                



     release                                                        

 

     metoclopram      2021-0      No             1mg                      



     fito 10 mg      8-13                                              



     tablet      00:00:                                              



               00                                                

 

     metoclopram      2021-0      No             1mg                      



     fito 10 mg      8-13                                              



     tablet      00:00:                                              



               00                                                

 

     metoclopram      2021-0      No             1mg                      



     fito 10 mg      8-13                                              



     tablet      00:00:                                              



               00                                                

 

     metoclopram      2021-0      No             1mg                      



     fito 10 mg      8-13                                              



     tablet      00:00:                                              



               00                                                

 

     metoclopram      2021-0      No             1mg                      



     fito 10 mg      8-13                                              



     tablet      00:00:                                              



               00                                                

 

     metoclopram      2021-0      No             1mg                      



     fito 10 mg      8-13                                              



     tablet      00:00:                                              



               00                                                

 

     metoclopram      2021-0      No             1mg                      



     fito 10 mg      8-13                                              



     tablet      00:00:                                              



               00                                                

 

     metoclopram      2021-0      No             1mg                      



     fito 10 mg      7-28                                              



     tablet      00:00:                                              



               00                                                

 

     metoclopram      2021-0      No             1mg                      



     fito 10 mg      7-28                                              



     tablet      00:00:                                              



               00                                                

 

     metoclopram      2021-0      No             1mg                      



     fito 10 mg      7-28                                              



     tablet      00:00:                                              



               00                                                

 

     metoclopram      2021-0      No             1mg                      



     fito 10 mg      7-28                                              



     tablet      00:00:                                              



               00                                                

 

     metoclopram      2021-0      No             1mg                      



     fito 10 mg      7-28                                              



     tablet      00:00:                                              



               00                                                

 

     metoclopram      2021-0      No             1mg                      



     fito 10 mg      7-28                                              



     tablet      00:00:                                              



               00                                                

 

     metoclopram      2021-0      No             1mg                      



     fito 10 mg      7-28                                              



     tablet      00:00:                                              



               00                                                

 

     atorvastati      2021-0      No             1mg                      



     n 10 mg      7-22                                              



     tablet      00:00:                                              



               00                                                

 

     Myrbetriq      2021-0      No             1mg                      



     25 mg      7-22                                              



     tablet,exte      00:00:                                              



     nded      00                                                



     release                                                        

 

     Trintellix      2021-0      No             1mg                      



     20 mg      7-22                                              



     tablet      00:00:                                              



               00                                                

 

     metformin      2021-0      No             1mg                      



     500 mg      7-22                                              



     tablet      00:00:                                              



               00                                                

 

     metoprolol      2021-0      No             1mg                      



     tartrate 25      7-22                                              



     mg tablet      00:00:                                              



               00                                                

 

     mirtazapine      2021-0      No             1mg                      



     15 mg      7-22                                              



     tablet      00:00:                                              



               00                                                

 

     levothyroxi      2021-0      No             1mcg                     



     ne 300 mcg      7-22                                              



     tablet      00:00:                                              



               00                                                

 

     omeprazole      2021-0      No             1mg                      



     40 mg      7-22                                              



     capsule,del      00:00:                                              



     ayed      00                                                



     release                                                        

 

     atorvastati      2021-0      No             1mg                      



     n 10 mg      7-22                                              



     tablet      00:00:                                              



               00                                                

 

     Myrbetriq      2021-0      No             1mg                      



     25 mg      7-22                                              



     tablet,exte      00:00:                                              



     nded      00                                                



     release                                                        

 

     Trintellix      2021-0      No             1mg                      



     20 mg      7-22                                              



     tablet      00:00:                                              



               00                                                

 

     metformin      2021-0      No             1mg                      



     500 mg      7-22                                              



     tablet      00:00:                                              



               00                                                

 

     metoprolol      2021-0      No             1mg                      



     tartrate 25      7-22                                              



     mg tablet      00:00:                                              



               00                                                

 

     mirtazapine      2021-0      No             1mg                      



     15 mg      7-22                                              



     tablet      00:00:                                              



               00                                                

 

     levothyroxi      2021-0      No             1mcg                     



     ne 300 mcg      7-22                                              



     tablet      00:00:                                              



               00                                                

 

     omeprazole      2021-0      No             1mg                      



     40 mg      7-22                                              



     capsule,del      00:00:                                              



     ayed      00                                                



     release                                                        

 

     Humulin      2021-0      No             1unit/m                     



     70/30 U-100      7-22                     L                        



     Insulin 100      00:00:                     (70-30)                     



     unit/mL      00                                                



     subcutaneou                                                        



     s                                                           



     suspension                                                        

 

     atorvastati      1-0      No             1mg                      



     n 10 mg      7-22                                              



     tablet      00:00:                                              



               00                                                

 

     Myrbetriq      2021-0      No             1mg                      



     25 mg      7-22                                              



     tablet,exte      00:00:                                              



     nded      00                                                



     release                                                        

 

     atorvastati      2021-0      No             1mg                      



     n 10 mg      7-22                                              



     tablet      00:00:                                              



               00                                                

 

     Myrbetriq      2021-0      No             1mg                      



     25 mg      7-22                                              



     tablet,exte      00:00:                                              



     nded      00                                                



     release                                                        

 

     Trintellix      2021-0      No             1mg                      



     20 mg      7-22                                              



     tablet      00:00:                                              



               00                                                

 

     metformin      2021-0      No             1mg                      



     500 mg      7-22                                              



     tablet      00:00:                                              



               00                                                

 

     metoprolol      2021-0      No             1mg                      



     tartrate 25      7-22                                              



     mg tablet      00:00:                                              



               00                                                

 

     mirtazapine      2021-0      No             1mg                      



     15 mg      7-22                                              



     tablet      00:00:                                              



               00                                                

 

     levothyroxi      2021-0      No             1mcg                     



     ne 300 mcg      7-22                                              



     tablet      00:00:                                              



               00                                                

 

     omeprazole      2021-0      No             1mg                      



     40 mg      7-22                                              



     capsule,del      00:00:                                              



     ayed      00                                                



     release                                                        

 

     Trintellix      2021-0      No             1mg                      



     20 mg      7-22                                              



     tablet      00:00:                                              



               00                                                

 

     metformin      2021-0      No             1mg                      



     500 mg      7-22                                              



     tablet      00:00:                                              



               00                                                

 

     metoprolol      2021-0      No             1mg                      



     tartrate 25      7-22                                              



     mg tablet      00:00:                                              



               00                                                

 

     mirtazapine      2021-0      No             1mg                      



     15 mg      7-22                                              



     tablet      00:00:                                              



               00                                                

 

     levothyroxi      2021-0      No             1mcg                     



     ne 300 mcg      7-22                                              



     tablet      00:00:                                              



               00                                                

 

     omeprazole      2021-0      No             1mg                      



     40 mg      7-22                                              



     capsule,del      00:00:                                              



     ayed      00                                                



     release                                                        

 

     Humulin      2021-0      No             1unit/m                     



     70/30 U-100      7-22                     L                        



     Insulin 100      00:00:                     (70-30)                     



     unit/mL      00                                                



     subcutaneou                                                        



     s                                                           



     suspension                                                        

 

     atorvastati      2021-0      No             1mg                      



     n 10 mg      7-22                                              



     tablet      00:00:                                              



               00                                                

 

     Myrbetriq      2021-0      No             1mg                      



     25 mg      7-22                                              



     tablet,exte      00:00:                                              



     nded      00                                                



     release                                                        

 

     Trintellix      2021-0      No             1mg                      



     20 mg      7-22                                              



     tablet      00:00:                                              



               00                                                

 

     metformin      2021-0      No             1mg                      



     500 mg      7-22                                              



     tablet      00:00:                                              



               00                                                

 

     metoprolol      2021-0      No             1mg                      



     tartrate 25      7-22                                              



     mg tablet      00:00:                                              



               00                                                

 

     mirtazapine      2021-0      No             1mg                      



     15 mg      7-22                                              



     tablet      00:00:                                              



               00                                                

 

     levothyroxi      2021-0      No             1mcg                     



     ne 300 mcg      7-22                                              



     tablet      00:00:                                              



               00                                                

 

     omeprazole      2021-0      No             1mg                      



     40 mg      7-22                                              



     capsule,del      00:00:                                              



     ayed      00                                                



     release                                                        

 

     Humulin      2021-0      No             1unit/m                     



     70/30 U-100      7-22                     L                        



     Insulin 100      00:00:                     (70-30)                     



     unit/mL      00                                                



     subcutaneou                                                        



     s                                                           



     suspension                                                        

 

     atorvastati      2021-0      No             1mg                      



     n 10 mg      7-22                                              



     tablet      00:00:                                              



               00                                                

 

     Myrbetriq      2021-0      No             1mg                      



     25 mg      7-22                                              



     tablet,exte      00:00:                                              



     nded      00                                                



     release                                                        

 

     Trintellix      2021-0      No             1mg                      



     20 mg      7-22                                              



     tablet      00:00:                                              



               00                                                

 

     metformin      2021-0      No             1mg                      



     500 mg      7-22                                              



     tablet      00:00:                                              



               00                                                

 

     metoprolol      2021-0      No             1mg                      



     tartrate 25      7-22                                              



     mg tablet      00:00:                                              



               00                                                

 

     mirtazapine      2021-0      No             1mg                      



     15 mg      7-22                                              



     tablet      00:00:                                              



               00                                                

 

     levothyroxi      2021-0      No             1mcg                     



     ne 300 mcg      7-22                                              



     tablet      00:00:                                              



               00                                                

 

     omeprazole      2021-0      No             1mg                      



     40 mg      7-22                                              



     capsule,del      00:00:                                              



     ayed      00                                                



     release                                                        

 

     Humulin      2021-0      No             1unit/m                     



     70/30 U-100      7-22                     L                        



     Insulin 100      00:00:                     (70-30)                     



     unit/mL      00                                                



     subcutaneou                                                        



     s                                                           



     suspension                                                        

 

     atorvastati      1-0      No             1mg                      



     n 10 mg      7-22                                              



     tablet      00:00:                                              



               00                                                

 

     Myrbetriq      2021-0      No             1mg                      



     25 mg      7-22                                              



     tablet,exte      00:00:                                              



     nded      00                                                



     release                                                        

 

     Trintellix      2021-0      No             1mg                      



     20 mg      7-22                                              



     tablet      00:00:                                              



               00                                                

 

     metformin      2021-0      No             1mg                      



     500 mg      7-22                                              



     tablet      00:00:                                              



               00                                                

 

     metoprolol      2021-0      No             1mg                      



     tartrate 25      7-22                                              



     mg tablet      00:00:                                              



               00                                                

 

     mirtazapine      2021-0      No             1mg                      



     15 mg      7-22                                              



     tablet      00:00:                                              



               00                                                

 

     levothyroxi      2021-0      No             1mcg                     



     ne 300 mcg      7-22                                              



     tablet      00:00:                                              



               00                                                

 

     omeprazole      1-0      No             1mg                      



     40 mg      7-22                                              



     capsule,del      00:00:                                              



     ayed      00                                                



     release                                                        

 

     alprazolam      2021-0      No             1mg                      



     0.5 mg      7-13                                              



     tablet      00:00:                                              



               00                                                

 

     metoprolol      2021-0      No             1mg                      



     tartrate 25      7-13                                              



     mg tablet      00:00:                                              



               00                                                

 

     metoclopram      2021-0      No             1mg                      



     fito 10 mg      7-13                                              



     tablet      00:00:                                              



               00                                                

 

     Tr[employer]ellix      2021-0      No             1mg                      



     20 mg      7-13                                              



     tablet      00:00:                                              



               00                                                

 

     Myrbetriq      2021-0      No             1mg                      



     25 mg      7-13                                              



     tablet,exte      00:00:                                              



     nded      00                                                



     release                                                        

 

     Dose      2021-0      No                                      



     Unknown      7-13                                              



               00:00:                                              



               00                                                

 

     Dose      2021-0      No                                      



     Unknown      7-13                                              



               00:00:                                              



               00                                                

 

     alprazolam      2021-0      No             1mg                      



     0.5 mg      7-13                                              



     tablet      00:00:                                              



               00                                                

 

     metoprolol      2021-0      No             1mg                      



     tartrate 25      7-13                                              



     mg tablet      00:00:                                              



               00                                                

 

     metoclopram      2021-0      No             1mg                      



     fito 10 mg      7-13                                              



     tablet      00:00:                                              



               00                                                

 

     mirtazapine      2021-0      No             1mg                      



     15 mg      7-13                                              



     tablet      00:00:                                              



               00                                                

 

     levothyroxi      2021-0      No             1mcg                     



     ne 300 mcg      7-13                                              



     tablet      00:00:                                              



               00                                                

 

     Dose      2021-0      No                                      



     Unknown      7-13                                              



               00:00:                                              



               00                                                

 

     Vitamin D2      2021-0      No             1(50,00                     



     1,250 mcg      7-13                     0 unit)                     



     (50,000      00:00:                                              



     unit)      00                                                



     capsule                                                        

 

     mirtazapine      2021-0      No             1mg                      



     15 mg      7-13                                              



     tablet      00:00:                                              



               00                                                

 

     levothyroxi      2021-0      No             1mcg                     



     ne 300 mcg      7-13                                              



     tablet      00:00:                                              



               00                                                

 

     omeprazole      2021-0      No             1mg                      



     40 mg      7-13                                              



     capsule,del      00:00:                                              



     ayed      00                                                



     release                                                        

 

     Trintellix      2021-0      No             1mg                      



     20 mg      7-13                                              



     tablet      00:00:                                              



               00                                                

 

     Myrbetriq      2021-0      No             1mg                      



     25 mg      7-13                                              



     tablet,exte      00:00:                                              



     nded      00                                                



     release                                                        

 

     Dose      2021-0      No                                      



     Unknown      7-13                                              



               00:00:                                              



               00                                                

 

     Dose      2021-0      No                                      



     Unknown      7-13                                              



               00:00:                                              



               00                                                

 

     alprazolam      2021-0      No             1mg                      



     0.5 mg      7-13                                              



     tablet      00:00:                                              



               00                                                

 

     metoprolol      2021-0      No             1mg                      



     tartrate 25      7-13                                              



     mg tablet      00:00:                                              



               00                                                

 

     metoclopram      2021-0      No             1mg                      



     fito 10 mg      7-13                                              



     tablet      00:00:                                              



               00                                                

 

     mirtazapine      2021-0      No             1mg                      



     15 mg      7-13                                              



     tablet      00:00:                                              



               00                                                

 

     Dose      2021-0      No                                      



     Unknown      7-13                                              



               00:00:                                              



               00                                                

 

     Dose      2021-0      No                                      



     Unknown      7-13                                              



               00:00:                                              



               00                                                

 

     Vitamin D2      2021-0      No             1(50,00                     



     1,250 mcg      7-13                     0 unit)                     



     (50,000      00:00:                                              



     unit)      00                                                



     capsule                                                        

 

     Vitamin D2      2021-0      No             1(50,00                     



     1,250 mcg      7-13                     0 unit)                     



     (50,000      00:00:                                              



     unit)      00                                                



     capsule                                                        

 

     Trintellix      2021-0      No             1mg                      



     20 mg      7-13                                              



     tablet      00:00:                                              



               00                                                

 

     Myrbetriq      2021-0      No             1mg                      



     25 mg      7-13                                              



     tablet,exte      00:00:                                              



     nded      00                                                



     release                                                        

 

     metoclopram      2021-0      No             1mg                      



     fito 10 mg      7-13                                              



     tablet      00:00:                                              



               00                                                

 

     mirtazapine      2021-0      No             1mg                      



     15 mg      7-13                                              



     tablet      00:00:                                              



               00                                                

 

     Dose      2021-0      No             30                       



     Unknown      7-                                              



               00:00:                                              



               00                                                

 

     Dose      2021-0      No             5                        



     Unknown      7-                                              



               00:00:                                              



               00                                                

 

     Dose      2021-0      No             30                       



     Unknown      7-                                              



               00:00:                                              



               00                                                

 

     Dose      2021-0      No                                      



     Unknown      7-                                              



               00:00:                                              



               00                                                

 

     Dose      2021-0      No             30                       



     Unknown      7-                                              



               00:00:                                              



               00                                                

 

     Dose      2021-0      No             30                       



     Unknown      7-                                              



               00:00:                                              



               00                                                

 

     Dose      2021-0      No             5                        



     Unknown      7-                                              



               00:00:                                              



               00                                                

 

     Humulin      2021-0      No             1unit/m                     



     70/30 U-100      7-13                     L                        



     Insulin 100      00:00:                     (70-30)                     



     unit/mL      00                                                



     subcutaneou                                                        



     s                                                           



     suspension                                                        

 

     Trintellix      1-0      No             1mg                      



     20 mg      7-13                                              



     tablet      00:00:                                              



               00                                                

 

     Myrbetriq      2021-0      No             1mg                      



     25 mg      7-13                                              



     tablet,exte      00:00:                                              



     nded      00                                                



     release                                                        

 

     atorvastati      1-0      No             1mg                      



     n 10 mg      7-13                                              



     tablet      00:00:                                              



               00                                                

 

     metformin      2021-0      No             1mg                      



     500 mg      7-13                                              



     tablet      00:00:                                              



               00                                                

 

     alprazolam      2021-0      No             1mg                      



     0.5 mg      7-13                                              



     tablet      00:00:                                              



               00                                                

 

     metoprolol      2021-0      No             1mg                      



     tartrate 25      7-13                                              



     mg tablet      00:00:                                              



               00                                                

 

     metoclopram      2021-0      No             1mg                      



     fito 10 mg      7-13                                              



     tablet      00:00:                                              



               00                                                

 

     mirtazapine      2021-0      No             1mg                      



     15 mg      7-13                                              



     tablet      00:00:                                              



               00                                                

 

     levothyroxi      2021-0      No             1mcg                     



     ne 300 mcg      7-13                                              



     tablet      00:00:                                              



               00                                                

 

     omeprazole      2021-0      No             1mg                      



     40 mg      7-13                                              



     capsule,del      00:00:                                              



     ayed      00                                                



     release                                                        

 

     Vitamin D2      1-0      No             1(50,00                     



     1,250 mcg      7-13                     0 unit)                     



     (50,000      00:00:                                              



     unit)      00                                                



     capsule                                                        

 

     Humulin      1-0      No             1unit/m                     



     70/30 U-100      7-13                     L                        



     Insulin 100      00:00:                     (70-30)                     



     unit/mL      00                                                



     subcutaneou                                                        



     s                                                           



     suspension                                                        

 

     Humulin      1-0      No             1unit/m                     



     70/30 U-100      7-13                     L                        



     Insulin 100      00:00:                     (70-30)                     



     unit/mL      00                                                



     subcutaneou                                                        



     s                                                           



     suspension                                                        

 

     Trintellix      2021-0      No             1mg                      



     20 mg      7-13                                              



     tablet      00:00:                                              



               00                                                

 

     Myrbetriq      2021-0      No             1mg                      



     25 mg      7-13                                              



     tablet,exte      00:00:                                              



     nded      00                                                



     release                                                        

 

     atorvastati      2021-0      No             1mg                      



     n 10 mg      7-13                                              



     tablet      00:00:                                              



               00                                                

 

     metformin      2021-0      No             1mg                      



     500 mg      7-13                                              



     tablet      00:00:                                              



               00                                                

 

     alprazolam      2021-0      No             1mg                      



     0.5 mg      7-13                                              



     tablet      00:00:                                              



               00                                                

 

     metoprolol      2021-0      No             1mg                      



     tartrate 25      7-13                                              



     mg tablet      00:00:                                              



               00                                                

 

     metoclopram      2021-0      No             1mg                      



     fito 10 mg      7-13                                              



     tablet      00:00:                                              



               00                                                

 

     mirtazapine      2021-0      No             1mg                      



     15 mg      7-13                                              



     tablet      00:00:                                              



               00                                                

 

     levothyroxi      1-0      No             1mcg                     



     ne 300 mcg      7-13                                              



     tablet      00:00:                                              



               00                                                

 

     omeprazole      1-0      No             1mg                      



     40 mg      7-13                                              



     capsule,del      00:00:                                              



     ayed      00                                                



     release                                                        

 

     Vitamin D2      1-0      No             1(50,00                     



     1,250 mcg      7-13                     0 unit)                     



     (50,000      00:00:                                              



     unit)      00                                                



     capsule                                                        

 

     Trintellix      2021-0      No             1mg                      



     20 mg      7-13                                              



     tablet      00:00:                                              



               00                                                

 

     Myrbetriq      2021-0      No             1mg                      



     25 mg      7-13                                              



     tablet,exte      00:00:                                              



     nded      00                                                



     release                                                        

 

     atorvastati      2021-0      No             1mg                      



     n 10 mg      7-13                                              



     tablet      00:00:                                              



               00                                                

 

     Humulin      2021-0      No             1unit/m                     



     70/30 U-100      7-13                     L                        



     Insulin 100      00:00:                     (70-30)                     



     unit/mL      00                                                



     subcutaneou                                                        



     s                                                           



     suspension                                                        

 

     Trintellix      2021-0      No             1mg                      



     20 mg      7-13                                              



     tablet      00:00:                                              



               00                                                

 

     Myrbetriq      2021-0      No             1mg                      



     25 mg      7-13                                              



     tablet,exte      00:00:                                              



     nded      00                                                



     release                                                        

 

     atorvastati      2021-0      No             1mg                      



     n 10 mg      7-13                                              



     tablet      00:00:                                              



               00                                                

 

     metformin      2021-0      No             1mg                      



     500 mg      7-13                                              



     tablet      00:00:                                              



               00                                                

 

     alprazolam      -0      No             1mg                      



     0.5 mg      7-13                                              



     tablet      00:00:                                              



               00                                                

 

     metoprolol      -0      No             1mg                      



     tartrate 25      7-13                                              



     mg tablet      00:00:                                              



               00                                                

 

     metoclopram      -0      No             1mg                      



     fito 10 mg      7-13                                              



     tablet      00:00:                                              



               00                                                

 

     mirtazapine      -0      No             1mg                      



     15 mg      7-13                                              



     tablet      00:00:                                              



               00                                                

 

     levothyroxi      -0      No             1mcg                     



     ne 300 mcg      7-13                                              



     tablet      00:00:                                              



               00                                                

 

     omeprazole      -0      No             1mg                      



     40 mg      7-13                                              



     capsule,del      00:00:                                              



     ayed      00                                                



     release                                                        

 

     Vitamin D2      -0      No             1(50,00                     



     1,250 mcg      7-13                     0 unit)                     



     (50,000      00:00:                                              



     unit)      00                                                



     capsule                                                        

 

     metformin      -      No             1mg                      



     500 mg      7-13                                              



     tablet      00:00:                                              



               00                                                







Immunizations







           Ordered Immunization Filled Immunization Date       Status     Commen

ts   Source



           Name       Name                                        

 

           Influenza, injectable,            2022-11-15 Completed             



           [facility]in Nabila [facility]            00:00:00                         



           Kidney,                                                



           preservative-free,                                             



           quadrivalent                                             

 

           Influenza, injectable,            2022-11-15 Completed             



           Madin Shelton Canine            00:00:00                         



           Kidney,                                                



           preservative-free,                                             



           quadrivalent                                             

 

           Influenza, injectable,            2022-11-15 Completed             



           Madin Shelton Canine            00:00:00                         



           Kidney,                                                



           preservative-free,                                             



           quadrivalent                                             

 

           Moderna COVID-19            2021 Completed             



           Vaccine               00:00:00                         

 

           Moderna COVID-19            2021 Completed             



           Vaccine               00:00:00                         

 

           Moderna COVID-19            2021 Completed             



           Vaccine               00:00:00                         

 

           Moderna COVID-19            2021 Completed             



           Vaccine               00:00:00                         

 

           Moderna COVID-19            2021 Completed             



           Vaccine               00:00:00                         

 

           Moderna COVID-19            2021 Completed             



           Vaccine               00:00:00                         

 

           Moderna COVID-19            2021 Completed             



           Vaccine               00:00:00                         

 

           Moderna COVID-19            2021-04-15 Completed             



           Vaccine               00:00:00                         

 

           Moderna COVID-19            2021-04-15 Completed             



           Vaccine               00:00:00                         

 

           Moderna COVID-19            2021-04-15 Completed             



           Vaccine               00:00:00                         

 

           Moderna COVID-19            2021 Completed             



           Vaccine               00:00:00                         

 

           Moderna COVID-19            2021 Completed             



           Vaccine               00:00:00                         

 

           Moderna COVID-19            2021 Completed             



           Vaccine               00:00:00                         







Vital Signs







             Vital Name   Observation Time Observation Value Comments     Source

 

             Systolic blood 2022 12:26:00 151 mm[Hg]                [facility]

[facility]



             pressure                                            [facility]



                                                                 [facility]

 

             Diastolic blood 2022 12:26:00 76 mm[Hg]                 [facility]

[facility]



             pressure                                            [facility]



                                                                 [facility]

 

             Heart rate   2022 12:26:00 102 /min                  [facility]

[facility]



                                                                 [facility]



                                                                 [facility]

 

             Body temperature 2022 12:26:00 37 Maricarmen                    [facility]

[facility]



                                                                 [facility]



                                                                 [facility]

 

             Respiratory rate 2022 12:26:00 20 /min                   [facility]

[facility]



                                                                 [facility]



                                                                 [facility]

 

             Body height  2022 12:26:00 157.5 cm                  [facility]

[facility]



                                                                 [facility]



                                                                 [facility]

 

             Body weight  2022 12:26:00 91.627 kg                 [facility]

[facility]



                                                                 [facility]



                                                                 [facility]

 

             BMI          2022 12:26:00 36.95 kg/m2               [facility]

[facility]



                                                                 [facility]



                                                                 [facility]

 

             Oxygen saturation in 2022 12:26:00 99 /min                   

[facility]



             Arterial blood by                                        [facility]

[facility]



             Pulse oximetry                                        [facility]

 

             Systolic blood 2022 04:49:00 147 mm[Hg]                [facility]

[facility] of



             pressure                                            [facility]



                                                                 [facility]

 

             Diastolic blood 2022 04:49:00 74 mm[Hg]                 [facility]

[facility] of



             pressure                                            [facility]



                                                                 [facility]

 

             Heart rate   2022 04:49:00 97 /min                   [facility]

[facility]



                                                                 [facility]



                                                                 [facility]

 

             Body temperature 2022 04:49:00 37 Maricarmen                    [facility]

ers[facility] of



                                                                 [facility]



                                                                 [facility]

 

             Respiratory rate 2022 04:49:00 18 /min                   [facility]

[facility] of



                                                                 [facility]



                                                                 [facility]

 

             Body weight  2022 04:49:00 88.905 kg                 [facility]

[facility]



                                                                 [facility]



                                                                 [facility]

 

             BMI          2022 04:49:00 35.85 kg/m2               [facility]

[facility]



                                                                 [facility]



                                                                 [facility]

 

             Oxygen saturation in 2022 04:49:00 100 /min                  

[facility]



             Arterial blood by                                        [facility]

marisa



             Pulse oximetry                                        [facility]anch

 

             Systolic blood 2022 08:10:00 123 mm[Hg]                [facility]

[facility] of



             pressure                                            [facility]

 

             Diastolic blood 2022 08:10:00 87 mm[Hg]                 Un[facility]

rsity of



             pressure                                            [facility]

 

             Heart rate   2022 08:10:00 82 /min                   [facility]

 

             Respiratory rate 2022 08:10:00 18 /min                   [facility]

 

             Oxygen saturation in 2022 08:10:00 98 /min                   

[facility]



             Arterial blood by                                        [facility]

marisa



             Pulse oximetry                                        Branch

 

             Body temperature 2022 04:15:00 36.83 Maricarmen                 [facility]

 

             Body height  2022 04:15:00 157.5 cm                  [facility]

 

             Body weight  2022 04:15:00 88.905 kg                 [facility]

 

             BMI          2022 04:15:00 35.85 kg/m2               [facility]

 

             BP Systolic  2022-11-15 11:05:00 97 mm[Hg]                 

 

             BP Diastolic 2022-11-15 11:05:00 67 mm[Hg]                 

 

             Weight Measured 2022-11-15 11:05:00 210.60 pounds              

 

             Height Measured 2022-11-15 11:05:00 61.81 inches              

 

             Body Temperature 2022-11-15 11:05:00 98.20 degrees              

 

             Heart Rate   2022-11-15 11:05:00 98.00 /min                

 

             Respiratory Rate 2022-11-15 11:05:00 18.00 /min                

 

             BP Systolic  2022-10-11 10:36:00 122 mm[Hg]                

 

             BP Diastolic 2022-10-11 10:36:00 78 mm[Hg]                 

 

             Weight Measured 2022-10-11 10:36:00 202.40 pounds              

 

             Height Measured 2022-10-11 10:36:00 61.81 inches              

 

             Body Temperature 2022-10-11 10:36:00 97.80 degrees              

 

             Heart Rate   2022-10-11 10:36:00 86.00 /min                

 

             Respiratory Rate 2022-10-11 10:36:00 17.00 /min                

 

             BP Systolic  2022 13:09:00 142 mm[Hg]                

 

             BP Diastolic 2022 13:09:00 81 mm[Hg]                 

 

             Weight Measured 2022 13:09:00 195.40 pounds              

 

             Height Measured 2022 13:09:00 61.81 inches              

 

             Body Temperature 2022 13:09:00 97.80 degrees              

 

             Heart Rate   2022 13:09:00 91.00 /min                

 

             Respiratory Rate 2022 13:09:00                           

 

             BP Systolic  2022 11:31:00 110 mm[Hg]                

 

             BP Diastolic 2022 11:31:00 49 mm[Hg]                 

 

             Weight Measured 2022 11:31:00 195.80 pounds              

 

             Height Measured 2022 11:31:00 61.81 inches              

 

             Body Temperature 2022 11:31:00 97.80 degrees              

 

             Heart Rate   2022 11:31:00 95.00 /min                

 

             Respiratory Rate 2022 11:31:00 18.00 /min                

 

             BP Systolic  2022 16:36:00 129 mm[Hg]                

 

             BP Diastolic 2022 16:36:00 68 mm[Hg]                 

 

             Weight Measured 2022 16:36:00 196.00 pounds              

 

             Height Measured 2022 16:36:00 61.81 inches              

 

             Body Temperature 2022 16:36:00 97.60 degrees              

 

             Heart Rate   2022 16:36:00 108.00 /min               

 

             Respiratory Rate 2022 16:36:00 18.00 /min                

 

             BP Systolic  2022 11:46:00 115 mm[Hg]                

 

             BP Diastolic 2022 11:46:00 75 mm[Hg]                 

 

             Weight Measured 2022 11:46:00 190.40 pounds              

 

             Height Measured 2022 11:46:00 61.81 inches              

 

             Body Temperature 2022 11:46:00 98.10 degrees              

 

             Heart Rate   2022 11:46:00 89.00 /min                

 

             Respiratory Rate 2022 11:46:00                           

 

             BP Systolic  2022 11:22:00 123 mm[Hg]                

 

             BP Diastolic 2022 11:22:00 57 mm[Hg]                 

 

             Weight Measured 2022 11:22:00 194.60 pounds              

 

             Height Measured 2022 11:22:00 61.81 inches              

 

             Body Temperature 2022 11:22:00 97.50 degrees              

 

             Heart Rate   2022 11:22:00 85.00 /min                

 

             Respiratory Rate 2022 11:22:00 16.00 /min                

 

             BP Systolic  2022 15:21:00 148 mm[Hg]                

 

             BP Diastolic 2022 15:21:00 82 mm[Hg]                 

 

             Weight Measured 2022 15:21:00 192.60 pounds              

 

             Height Measured 2022 15:21:00 61.81 inches              

 

             Body Temperature 2022 15:21:00 98.50 degrees              

 

             Heart Rate   2022 15:21:00 100.00 /min               

 

             Respiratory Rate 2022 15:21:00 25.00 /min                

 

             BP Systolic  2021 16:45:00 135 mm[Hg]                

 

             BP Diastolic 2021 16:45:00 82 mm[Hg]                 

 

             Weight Measured 2021 16:45:00 206.80 pounds              

 

             Height Measured 2021 16:45:00                           

 

             Body Temperature 2021 16:45:00 98.20 degrees              

 

             Heart Rate   2021 16:45:00 86.00 /min                

 

             Respiratory Rate 2021 16:45:00 25.00 /min                

 

             BP Systolic  2021 11:18:00 136 mm[Hg]                

 

             BP Diastolic 2021 11:18:00 84 mm[Hg]                 

 

             Weight Measured 2021 11:18:00 201.60 pounds              

 

             Height Measured 2021 11:18:00 61.81 inches              

 

             Body Temperature 2021 11:18:00 98.20 degrees              

 

             Heart Rate   2021 11:18:00 87.00 /min                

 

             Respiratory Rate 2021 11:18:00 17.00 /min                

 

             BP Systolic  2021-10-19 14:54:00 122 mm[Hg]                

 

             BP Diastolic 2021-10-19 14:54:00 76 mm[Hg]                 

 

             Weight Measured 2021-10-19 14:54:00 211.80 pounds              

 

             Height Measured 2021-10-19 14:54:00 61.81 inches              

 

             Body Temperature 2021-10-19 14:54:00 98.30 degrees              

 

             Heart Rate   2021-10-19 14:54:00 80.00 /min                

 

             Respiratory Rate 2021-10-19 14:54:00 16.00 /min                

 

             BP Systolic  2021-10-12 10:09:00 114 mm[Hg]                

 

             BP Diastolic 2021-10-12 10:09:00 72 mm[Hg]                 

 

             Weight Measured 2021-10-12 10:09:00 212.00 pounds              

 

             Height Measured 2021-10-12 10:09:00 61.81 inches              

 

             Body Temperature 2021-10-12 10:09:00 98.40 degrees              

 

             Heart Rate   2021-10-12 10:09:00 98.00 /min                

 

             Respiratory Rate 2021-10-12 10:09:00                           

 

             BP Systolic  2021-10-12 10:08:00 114 mm[Hg]                

 

             BP Diastolic 2021-10-12 10:08:00 72 mm[Hg]                 

 

             Weight Measured 2021-10-12 10:08:00 212.00 pounds              

 

             Height Measured 2021-10-12 10:08:00 61.81 inches              

 

             Body Temperature 2021-10-12 10:08:00 98.40 degrees              

 

             Heart Rate   2021-10-12 10:08:00 98.00 /min                

 

             Respiratory Rate 2021-10-12 10:08:00                           

 

             BP Systolic  2021-10-09 11:33:00 119 mm[Hg]                

 

             BP Diastolic 2021-10-09 11:33:00 78 mm[Hg]                 

 

             Weight Measured 2021-10-09 11:33:00 210.60 pounds              

 

             Height Measured 2021-10-09 11:33:00 61.81 inches              

 

             Body Temperature 2021-10-09 11:33:00 98.30 degrees              

 

             Heart Rate   2021-10-09 11:33:00 92.00 /min                

 

             Respiratory Rate 2021-10-09 11:33:00                           







Procedures







                Procedure       Date / Time Performed Performing Clinician Sourc

e

 

                XR KNEE 3 VW LEFT 2022 13:19:39 Ganesh Jaquez  [facility]

 

                CONSENT/REFUSAL FOR 2022 12:22:24 Doctor Unassigned, No [facility] 

[place]



                DIAGNOSIS AND                   Name            Medical Branch



                TREATMENT                                       

 

                NOTICE OF PRIVACY 2022 04:36:17 Doctor Unassigned, No [facility]

[facility]                       Name            Medical Branch

 

                CONSENT/REFUSAL FOR 2022 04:35:03 Doctor Unassigned, No [facility] 

[place]



                DIAGNOSIS AND                   Name            Medical Branch



                TREATMENT                                       

 

                XR HIPS 3 VW LEFT 2022 04:42:39 Rosie Jha [facility]



                                                                Medical Branch

 

                NOTICE OF PRIVACY 2022 04:04:38 Doctor Unassigned, No Univ

[facility]                       Name            Medical Branch

 

                CONSENT/REFUSAL FOR 2022 04:04:16 Doctor Unassigned, No [facility] 

[place]



                DIAGNOSIS AND                   Name            Medical Branch



                TREATMENT                                       







Plan of Care







             Planned Activity Planned Date Details      Comments     Source

 

             Goal                      Plan of Care Note [code = 11476-4]       

       

 

             Goal                      Plan of Care Note [code = 05619-3]       

       

 

             Goal                      Plan of Care Note [code = 00081-1]       

       

 

             Goal                      Plan of Care Note [code = 25092-8]       

       

 

             Goal                      Plan of Care Note [code = 84278-2]       

       

 

             Goal                      Plan of Care Note [code = 05869-2]       

       

 

             Goal                      Plan of Care Note [code = 96825-8]       

       

 

             Goal                      Plan of Care Note [code = 36714-5]       

       

 

             Goal                      Plan of Care Note [code = 43860-1]       

       

 

             Goal                      Plan of Care Note [code = 37511-8]       

       

 

             Goal                      Plan of Care Note [code = 29654-0]       

       

 

             Goal                      Plan of Care Note [code = 16096-8]       

       

 

             Goal                      Plan of Care Note [code = 98260-1]       

       

 

             Goal                      Plan of Care Note [code = 28658-6]       

       

 

             Goal                      Plan of Care Note [code = 95836-2]       

       

 

             Goal                      Plan of Care Note [code = 04941-8]       

       

 

             Goal                      Plan of Care Note [code = 96144-8]       

       

 

             Goal                      Plan of Care Note [code = 07418-5]       

       

 

             Goal                      Plan of Care Note [code = 83254-0]       

       

 

             Goal                      Plan of Care Note [code = 94224-1]       

       

 

             Goal                      Plan of Care Note [code = 66222-2]       

       

 

             Goal                      Plan of Care Note [code = 83326-0]       

       

 

             Goal                      Plan of Care Note [code = 40146-0]       

       

 

             Goal                      Plan of Care Note [code = 60350-3]       

       

 

             Goal                      Plan of Care Note [code = 41973-2]       

       

 

             Goal                      Plan of Care Note [code = 71112-6]       

       

 

             Goal                      Plan of Care Note [code = 81867-7]       

       

 

             Goal                      Plan of Care Note [code = 88384-1]       

       

 

             Goal                      Plan of Care Note [code = 58058-0]       

       

 

             Goal                      Plan of Care Note [code = 94462-0]       

       

 

             Goal                      Plan of Care Note [code = 72856-2]       

       

 

             Goal                      Plan of Care Note [code = 28845-5]       

       

 

             Goal                      Plan of Care Note [code = 02375-5]       

       

 

             Goal                      Plan of Care Note [code = 94762-3]       

       

 

             Goal                      Plan of Care Note [code = 30753-2]       

       

 

             Goal                      Plan of Care Note [code = 04621-4]       

       

 

             Goal                      Plan of Care Note [code = 49642-2]       

       

 

             Goal                      Plan of Care Note [code = 03586-5]       

       

 

             Goal                      Plan of Care Note [code = 75246-3]       

       

 

             Goal                      Plan of Care Note [code = 14142-6]       

       

 

             Goal                      Plan of Care Note [code = 47374-9]       

       

 

             Goal                      Plan of Care Note [code = 51560-7]       

       

 

             Goal                      Plan of Care Note [code = 64941-1]       

       

 

             Goal                      Plan of Care Note [code = 08606-6]       

       

 

             Goal                      Plan of Care Note [code = 27953-0]       

       

 

             Goal                      Plan of Care Note [code = 24706-9]       

       

 

             Goal                      Plan of Care Note [code = 29899-5]       

       

 

             Goal                      Plan of Care Note [code = 45524-3]       

       

 

             Goal                      Plan of Care Note [code = 42387-5]       

       

 

             Goal                      Plan of Care Note [code = 73705-7]       

       

 

             Goal                      Plan of Care Note [code = 18785-3]       

       

 

             Goal                      Plan of Care Note [code = 58370-7]       

       

 

             Goal                      Plan of Care Note [code = 07890-2]       

       

 

             Goal                      Plan of Care Note [code = 77606-3]       

       

 

             Goal                      Plan of Care Note [code = 62851-3]       

       

 

             Goal                      Plan of Care Note [code = 71612-0]       

       

 

             Goal                      Plan of Care Note [code = 49826-3]       

       

 

             Goal                      Plan of Care Note [code = 38876-5]       

       

 

             Goal                      Plan of Care Note [code = 04652-5]       

       

 

             Goal                      Plan of Care Note [code = 06303-3]       

       

 

             Goal                      Plan of Care Note [code = 58433-9]       

       

 

             Goal                      Plan of Care Note [code = 98763-3]       

       

 

             Goal                      Plan of Care Note [code = 34803-0]       

       

 

             Goal                      Plan of Care Note [code = 50748-5]       

       

 

             Goal                      Plan of Care Note [code = 58318-4]       

       

 

             Goal                      Plan of Care Note [code = 35947-5]       

       

 

             Goal                      Plan of Care Note [code = 07756-0]       

       

 

             Goal                      Plan of Care Note [code = 37842-2]       

       

 

             Goal                      Plan of Care Note [code = 94682-7]       

       

 

             Goal                      Plan of Care Note [code = 45857-5]       

       

 

             Goal                      Plan of Care Note [code = 81639-9]       

       

 

             Goal                      Plan of Care Note [code = 63656-1]       

       

 

             Goal                      Plan of Care Note [code = 14308-5]       

       

 

             Goal                      Plan of Care Note [code = 53241-8]       

       

 

             Goal                      Plan of Care Note [code = 67714-7]       

       

 

             Goal                      Plan of Care Note [code = 08693-3]       

       

 

             Goal                      Plan of Care Note [code = 80474-4]       

       

 

             Goal                      Plan of Care Note [code = 60975-7]       

       

 

             Goal                      Plan of Care Note [code = 88091-9]       

       

 

             Goal                      Plan of Care Note [code = 53078-1]       

       

 

             Goal                      Plan of Care Note [code = 02315-8]       

       

 

             Goal                      Plan of Care Note [code = 18798-2]       

       

 

             Goal                      Plan of Care Note [code = 98009-8]       

       

 

             Goal                      Plan of Care Note [code = 66030-1]       

       

 

             Goal                      Plan of Care Note [code = 36125-6]       

       

 

             Goal                      Plan of Care Note [code = 13568-0]       

       

 

             Goal                      Plan of Care Note [code = 85685-6]       

       

 

             Goal                      Plan of Care Note [code = 03565-5]       

       

 

             Goal                      Plan of Care Note [code = 20929-9]       

       

 

             Goal                      Plan of Care Note [code = 33295-2]       

       

 

             Goal                      Plan of Care Note [code = 61093-7]       

       

 

             Goal                      Plan of Care Note [code = 22982-8]       

       

 

             Goal                      Plan of Care Note [code = 95046-5]       

       

 

             Goal                      Plan of Care Note [code = 39424-4]       

       

 

             Goal                      Plan of Care Note [code = 93437-6]       

       

 

             Goal                      Plan of Care Note [code = 07556-9]       

       

 

             Goal                      Plan of Care Note [code = 99925-5]       

       

 

             Goal                      Plan of Care Note [code = 47485-5]       

       

 

             Goal                      Plan of Care Note [code = 11458-0]       

       

 

             Goal                      Plan of Care Note [code = 75301-8]       

       

 

             Goal                      Plan of Care Note [code = 63643-9]       

       

 

             Goal                      Plan of Care Note [code = 79723-6]       

       

 

             Goal                      Plan of Care Note [code = 64117-5]       

       

 

             Goal                      Plan of Care Note [code = 62976-7]       

       

 

             Goal                      Plan of Care Note [code = 54446-1]       

       

 

             Goal                      Plan of Care Note [code = 04601-1]       

       

 

             Goal                      Plan of Care Note [code = 96995-4]       

       

 

             Goal                      Plan of Care Note [code = 02685-6]       

       

 

             Goal                      Plan of Care Note [code = 67060-1]       

       

 

             Goal                      Plan of Care Note [code = 04141-3]       

       

 

             Goal                      Plan of Care Note [code = 18439-2]       

       

 

             Goal                      Plan of Care Note [code = 38773-4]       

       

 

             Goal                      Plan of Care Note [code = 95932-4]       

       

 

             Goal                      Plan of Care Note [code = 23955-7]       

       

 

             Goal                      Plan of Care Note [code = 23545-8]       

       

 

             Goal                      Plan of Care Note [code = 46408-7]       

       

 

             Goal                      Plan of Care Note [code = 24638-0]       

       

 

             Goal                      Plan of Care Note [code = 80478-8]       

       

 

             Goal                      Plan of Care Note [code = 23517-7]       

       

 

             Goal                      Plan of Care Note [code = 25063-4]       

       

 

             Goal                      Plan of Care Note [code = 04784-0]       

       

 

             Goal                      Plan of Care Note [code = 83164-9]       

       

 

             Goal                      Plan of Care Note [code = 94268-7]       

       

 

             Goal                      Plan of Care Note [code = 08382-8]       

       

 

             Goal                      Plan of Care Note [code = 94846-4]       

       

 

             Goal                      Plan of Care Note [code = 92169-3]       

       

 

             Goal                      Plan of Care Note [code = 97542-7]       

       

 

             Goal                      Plan of Care Note [code = 01450-4]       

       

 

             Goal                      Plan of Care Note [code = 18630-6]       

       

 

             Goal                      Plan of Care Note [code = 19549-1]       

       

 

             Goal                      Plan of Care Note [code = 53237-7]       

       

 

             Goal                      Plan of Care Note [code = 34639-4]       

       

 

             Goal                      Plan of Care Note [code = 22138-9]       

       

 

             Goal                      Plan of Care Note [code = 59372-8]       

       

 

             Goal                      Plan of Care Note [code = 73168-9]       

       

 

             Goal                      Plan of Care Note [code = 55271-1]       

       

 

             Goal                      Plan of Care Note [code = 95285-3]       

       

 

             Goal                      Plan of Care Note [code = 49990-6]       

       

 

             Goal                      Plan of Care Note [code = 33464-1]       

       

 

             Goal                      Plan of Care Note [code = 68429-0]       

       

 

             Goal                      Plan of Care Note [code = 41915-9]       

       

 

             Goal                      Plan of Care Note [code = 14148-4]       

       

 

             Goal                      Plan of Care Note [code = 04454-7]       

       

 

             Goal                      Plan of Care Note [code = 84829-6]       

       

 

             Goal                      Plan of Care Note [code = 26537-2]       

       

 

             Goal                      Plan of Care Note [code = 73279-9]       

       

 

             Goal                      Plan of Care Note [code = 81694-2]       

       

 

             Goal                      Plan of Care Note [code = 31981-7]       

       

 

             Goal                      Plan of Care Note [code = 64088-0]       

       

 

             Goal                      Plan of Care Note [code = 29911-8]       

       

 

             Goal                      Plan of Care Note [code = 10676-7]       

       

 

             Goal                      Plan of Care Note [code = 21630-4]       

       

 

             Goal                      Plan of Care Note [code = 79044-2]       

       

 

             Goal                      Plan of Care Note [code = 37813-8]       

       

 

             Goal                      Plan of Care Note [code = 13582-0]       

       

 

             Goal                      Plan of Care Note [code = 18320-2]       

       

 

             Goal                      Plan of Care Note [code = 33560-6]       

       

 

             Goal                      Plan of Care Note [code = 58887-0]       

       

 

             Goal                      Plan of Care Note [code = 04207-3]       

       

 

             Goal                      Plan of Care Note [code = 00122-8]       

       

 

             Goal                      Plan of Care Note [code = 82413-3]       

       

 

             Goal                      Plan of Care Note [code = 20281-0]       

       

 

             Goal                      Plan of Care Note [code = 02412-0]       

       

 

             Goal                      Plan of Care Note [code = 80689-5]       

       

 

             Goal                      Plan of Care Note [code = 11316-6]       

       

 

             Goal                      Plan of Care Note [code = 76652-4]       

       

 

             Goal                      Plan of Care Note [code = 17409-5]       

       

 

             Goal                      Plan of Care Note [code = 10569-0]       

       

 

             Goal                      Plan of Care Note [code = 68289-9]       

       

 

             Goal                      Plan of Care Note [code = 40384-6]       

       

 

             Goal                      Plan of Care Note [code = 70597-4]       

       

 

             Goal                      Plan of Care Note [code = 25665-4]       

       

 

             Goal                      Plan of Care Note [code = 77406-2]       

       

 

             Goal                      Plan of Care Note [code = 01369-0]       

       

 

             Goal                      Plan of Care Note [code = 75822-3]       

       

 

             Goal                      Plan of Care Note [code = 01513-1]       

       

 

             Goal                      Plan of Care Note [code = 96012-9]       

       

 

             Goal                      Plan of Care Note [code = 23778-2]       

       

 

             Goal                      Plan of Care Note [code = 39022-1]       

       

 

             Goal                      Plan of Care Note [code = 48740-1]       

       

 

             Goal                      Plan of Care Note [code = 05778-3]       

       

 

             Goal                      Plan of Care Note [code = 59121-1]       

       

 

             Goal                      Plan of Care Note [code = 94584-3]       

       

 

             Goal                      Plan of Care Note [code = 87453-2]       

       

 

             Goal                      Plan of Care Note [code = 04913-0]       

       

 

             Goal                      Plan of Care Note [code = 96479-5]       

       

 

             Goal                      Plan of Care Note [code = 41828-3]       

       

 

             Goal                      Plan of Care Note [code = 35171-0]       

       

 

             Goal                      Plan of Care Note [code = 22095-4]       

       

 

             Goal                      Plan of Care Note [code = 91315-8]       

       

 

             Goal                      Plan of Care Note [code = 98631-9]       

       

 

             Goal                      Plan of Care Note [code = 50562-7]       

       

 

             Goal                      Plan of Care Note [code = 17173-6]       

       

 

             Goal                      Plan of Care Note [code = 24259-5]       

       

 

             Goal                      Plan of Care Note [code = 87779-2]       

       

 

             Goal                      Plan of Care Note [code = 47525-8]       

       

 

             Goal                      Plan of Care Note [code = 71675-3]       

       

 

             Goal                      Plan of Care Note [code = 50635-7]       

       

 

             Goal                      Plan of Care Note [code = 17388-2]       

       

 

             Goal                      Plan of Care Note [code = 31655-4]       

       

 

             Goal                      Plan of Care Note [code = 56045-4]       

       

 

             Goal                      Plan of Care Note [code = 61470-5]       

       

 

             Goal                      Plan of Care Note [code = 40858-4]       

       

 

             Goal                      Plan of Care Note [code = 87858-9]       

       

 

             Goal                      Plan of Care Note [code = 64126-9]       

       

 

             Goal                      Plan of Care Note [code = 09199-2]       

       







Encounters







        Start   End     Encounter Admission Attending Care    Care    Encounter 

Source



        Date/Time Date/Time Type    Type    Clinicians Facility Department ID   

   

 

        2022-10-03         Outpatient                 [facility]     [id number]

 [place]



        15:02:53                                                 [id number] [facility]

 

        2022         Outpatient         DOMINGA  [facility]     [id number]

 [place]



        01:03:24                         BENNIE                 [id number] [facility]

 

        2022 Outpatient                 ROSALIE WIGGINS     [id number]-

[address]



        09:47:25 09:47:25                                         [id number]   F



                                                                        Fernandez

 

        2022 Outpatient                 [id number]- [id number]

[id number] 



        00:00:00 00:00:00 Visit                   dbfc-4253         bfc-4253-8 



                                                -4h0x-a13         [id number] 



                                                [id number]         69ade7  

 

        2022 Outpatient                 SFA     [facility]     [id number]-

 [place]



        17:00:33 17:00:33                                         [id number]   F



                                                                        Fernandez

 

        2022 Outpatient                 [id number]- [id number]

[id number]-e 



        00:00:00 00:00:00 Visit                   co3l-888d         h9v-776c-y 



                                                -b907-7l2         703-8a85ef 



                                                2tx2hj6u7         1bd6b3  

 

        2022-11-15 2022-11-15 Outpatient                 SFA     [facility]     [id number] [address]



        10:55:27 10:55:27                                         [id number]   F



                                                                        Fernandez

 

        2022-11-15 2022-11-15 Outpatient                 [id number]- [id number]

[id number]-q 



        00:00:00 00:00:00 Visit                   on71-52u5         m18-01z3-1 



                                                -8289-9be         289-9be0be 



                                                5ukzj809i         hc441w  

 

        2022 Emergency X       GANESH JAQUEZ [facility]    ERT     1

[id number] [facility]



        07:27:00 08:55:00                 GANESH JAQUEZ                        

 [facility]

 

        2022 Emergency         Leonid    [facility]    [id number]

[id number] [facility]



        07:27:00 08:55:00                 Ganesh Chesnee 350.1.13.10        

 ity [facility] 4.2.7.2.686         [facility]  [phone]         [address]

 

        2022-10-11 2022-10-11 Outpatient                 SFA     [facility]     [id number]-

 [place]



        10:28:11 10:28:11                                         [id number]   F



                                                                        Fernandez

 

        2022-10-11 2022-10-11 Outpatient                 [id number]- [id number]

[id number]-s 



        00:00:00 00:00:00 Visit                   hs80-502c         j17-167l-7 



                                                -1k86-m88         b67-d303nq 



                                                8sk67zhtb         51adba  

 

        2022 Outpatient                 [facility]     [id number]-

[address]



        15:06:03 15:06:03                                         [id number]   F



                                                                        Fernandez

 

        2022 Emergency X       PRADEEP SPANN [facility]    ERT     1

[id number] [facility]



        23:50:00 01:28:00                 PRADEEP SPANN [facility]

 

        2022 [facility]         [facility]    [id number]

7676 [facility]



        23:50:00 01:28:00                 Pradeep LOPEZ 350.1.13.10         i

ty of



                                                [place] 4.2.7.2.686         [facility]  [phone]         [address]

 

        2022 Outpatient                 [id number]- [id number]

[id number] 



        00:00:00 00:00:00 Visit                   9a67-7b15         d92-2r12-7 



                                                -7ej9-f22         af5-e82494 



                                                [id number]         [id number]  

 

        2022 Outpatient                 [id number]- [id number]

mk91n7-1 



        00:00:00 00:00:00 Visit                   422b-482d         22b-482d-8 



                                                -8157-b70         157-b704e0 



                                                7i26m6vtn         0f6bdd  

 

        2022-07-15 2022 Emergency [facility]       [facility]     [id number]

[id number] [facility]



        23:20:00 03:26:00                 ROSIE vaca [facility]

 

        2022-07-15 2022 Emergency         [facility]    [id number]

4113 [facility]



        23:20:00 03:26:00                 Rosie LOPEZ 350.1.13.10         i

ty of



                                                [place] 4.2.7.2.686         [facility]  [phone]         [address]

 

        2022 Outpatient                 [id number]- [id number]

j13w15-3 



        00:00:00 00:00:00 Visit                   0w8r-7pqk         b9z-1oni-x 



                                                -j7i0-bbb         0j7-rckf27 



                                                [id number]         696ce1  

 

        2022 Outpatient         DOMINGA,  [facility]    MED     7501   

 [facility]



        12:04:00 23:59:00                 BENNIE                         







Results







           Test Description Test Time  Test Comments Results    Result Comments 

Source









                    TSH, THIRD GENERATION 2022-10-12 06:52:55 









                      Test Item  Value      Reference Range Interpretation Comme

nts









             TSH, THIRD GENERATION (test code = 2821) 2.700 UIU/ML 0.400-4.100  

             



COMPREHENSIVE METABOLIC PANEL2022-10-12 04:09:28





             Test Item    Value        Reference Range Interpretation Comments

 

             GLUCOSE (test code = 169 MG/DL    70-99        H            



             )                                               

 

             BUN (test code = 17 MG/DL     8-23                      



             )                                               

 

             CREATININE (test 0.86 MG/DL   0.60-1.30                 



             code = 2214)                                        

 

             eGFR ( CKD-EPI) 76 ML/MIN/1.73 >60                       



             (test code = 01259)                                        

 

             CALC BUN/CREAT (test 20 RATIO     6-28                      



             code = 2235)                                        

 

             SODIUM (test code = 141 MEQ/L    133-146                   



             )                                               

 

             POTASSIUM (test code 4.3 MEQ/L    3.5-5.4                   



             = )                                             

 

             CHLORIDE (test code 99 MEQ/L                         



             = 221)                                             

 

             CARBON DIOXIDE (test 25 MEQ/L     19-31                     



             code = 2206)                                        

 

             CALCIUM (test code = 9.6 MG/DL    8.5-10.5                  



             )                                               

 

             PROTEIN, TOTAL (test 7.6 G/DL     6.1-8.3                   



             code = 2229)                                        

 

             ALBUMIN (test code = 4.3 G/DL     3.5-5.2                   



             )                                               

 

             CALC GLOBULIN (test 3.3 G/DL     1.9-3.7                   



             code = 2240)                                        

 

             CALC A/G RATIO (test 1.3 RATIO    1.0-2.6                   



             code = 2234)                                        

 

             BILIRUBIN, TOTAL 0.5 MG/DL    See_Comment                [Automated

 message]



             (test code = 2207)                                        The syste

m which



                                                                 generated this



                                                                 result transmit

maximo



                                                                 reference range

:



                                                                 <=1.2. The refe

rence



                                                                 range was not u

sed



                                                                 to interpret th

is



                                                                 result as



                                                                 normal/abnormal

.

 

             ALKALINE PHOSPHATASE 92 U/L                           



             (test code = 2204)                                        

 

             AST (test code = 35 U/L       -40                      



             8)                                               

 

             ALT (test code = 34 U/L       5-40                      



             2219)                                               



HEMOGLOBIN A1c2022-10-12 02:13:42





             Test Item    Value        Reference Range Interpretation Comments

 

             HEMOGLOBIN A1c (test 6.8 %        4.2-5.6      H             AMERIC

AN DIABETES



             code = 70467)                                        ASSOCIATION 

IDELINES FOR



                                                                 HGB A1C:



                                                                 PREDIABETES/INC

REASED RISK .



                                                                 . . . . . . 5.7

-6.4%



                                                                 DIAGNOSIS OF DI

ABETES . . .



                                                                 . . . . . . >=6

.5% WITH



                                                                 CONFIRMATION OR

 APPROPRIATE



                                                                 SYMPTOMS NOTE: 

ASSAY MAY BE



                                                                 AFFECTED BY



                                                                 HEMOGLOBINOPATH

IES (SICKLE



                                                                 CELL ANEMIA, S-

C DISEASE,



                                                                 OTHERS) OR ZENA

FICIALLY



                                                                 LOWERED BY DECR

EASED RED



                                                                 CELL SURVIVAL (

HEMOLYTIC



                                                                 ANEMIAS, BLOOD 

LOSS, ETC.).



                                                                 CONSIDER ALTERN

ATE TESTING



                                                                 OR LABORATORY C

ONSULTATION.



                                                                 UNLESS OTHERWIS

E INDICATED,



                                                                 ALL TESTING PER

FORMED



                                                                 ATCLINICAL PATH

[facility], [facility]. [address] [place] [address]

67



                                                                 LABORATORY DIRE

CTOR: CLIFTON DARBY M.D.

  CLIA NUMBER



                                                                 [id number] CAP 

ACCREDITATION



                                                                 [place]. [id number]



CBC W/AUTO DIFF WITH PLATELETS2022-10-12 02:02:15





             Test Item    Value        Reference Range Interpretation Comments

 

             WBC (test code = 6.8 K/UL     3.5-11.0                  



             1001)                                               

 

             RBC (test code = 3.76 M/UL    3.80-5.40    L            



             1002)                                               

 

             HEMOGLOBIN (test code 10.4 G/DL    11.5-15.5    L            



             = 1003)                                             

 

             HEMATOCRIT (test code 32.8 %       34.0-45.0    L            



             = 1004)                                             

 

             MCV (test code = 87.2 fL      80.0-99.0                 



             1005)                                               

 

             MCH (test code = 27.7 PG      25.0-33.0                 



             1006)                                               

 

             MCHC (test code = 31.7 G/DL    31.0-36.0                 



             1007)                                               

 

             RDW (test code = 13.6 %       11.5-15.0                 



             1038)                                               

 

             NEUTROPHILS (test 70.2 %                                 



             code = 1008)                                        

 

             LYMPHOCYTES (test 17.6 %                                 



             code = 1010)                                        

 

             MONOCYTES (test code 7.2 %                                  



             = 1011)                                             

 

             EOSINOPHILS (test 4.3 %                                  



             code = 1012)                                        

 

             BASOPHILS (test code 0.4 %                                  



             = 1013)                                             

 

             IMMATURE GRANULOCYTES 0.3 %                                  



             (test code = 1036)                                        

 

             NUCLEATED RBCS (test 0.0 /100 WBC'S See_Comment                [Aut

omated



             code = 1065)                                        message] The sy

stem



                                                                 which generated



                                                                 this result



                                                                 transmitted



                                                                 reference range

:



                                                                 0.0. The refere

nce



                                                                 range was not u

sed



                                                                 to interpret th

is



                                                                 result as



                                                                 normal/abnormal

.

 

             PLATELET COUNT (test 104 K/UL     130-400      L            



             code = 1015)                                        

 

             ABSOLUTE NEUTROPHILS 4.74 K/UL    1.50-7.50                 



             (test code = 1066)                                        

 

             ABSOLUTE LYMPHOCYTES 1.19 K/UL    1.00-4.00                 



             (test code = 1067)                                        

 

             ABSOLUTE MONOCYTES 0.49 K/UL    0.20-1.00                 



             (test code = 1068)                                        

 

             ABSOLUTE EOSINOPHILS 0.29 K/UL    0.00-0.50                 



             (test code = 1040)                                        

 

             ABSOLUTE BASOPHILS 0.03 K/UL    0.00-0.20                 



             (test code = 1069)                                        

 

             ABS IMMATURE 0.02 K/UL    0.00-0.10                 



             GRANULOCYTES (test                                        



             code = 1020)                                        

 

             ABS NUCLEATED RBCS 0.00 K/UL    0.00-0.11                 



             (test code = 14532)                                        



COMPREHENSIVE METABOLIC PANEL2-10- 00:00:00





             Test Item    Value        Reference Range Interpretation Comments

 

             GLUCOSE (test code = 2217) 169 MG/DL                              

 

             BUN (test code = 2208) 17 MG/DL                               

 

             CREATININE (test code = 2214) 0.86 MG/DL                           

  

 

             eGFR ( CKD-EPI) (test code 76 ML/MIN/1.73                      

     



             = 69927)                                            

 

             CALC BUN/CREAT (test code = 20 RATIO                               



             )                                               

 

             SODIUM (test code = 2231) 141 MEQ/L                              

 

             POTASSIUM (test code = 2228) 4.3 MEQ/L                             

 

 

             CHLORIDE (test code = 2215) 99 MEQ/L                               

 

             CARBON DIOXIDE (test code = 25 MEQ/L                               



             )                                               

 

             CALCIUM (test code = 2209) 9.6 MG/DL                              

 

             PROTEIN, TOTAL (test code = 7.6 G/DL                               



             )                                               

 

             ALBUMIN (test code = 220) 4.3 G/DL                               

 

             CALC GLOBULIN (test code = 3.3 G/DL                               



             )                                               

 

             CALC A/G RATIO (test code = 1.3 RATIO                              



             )                                               

 

             BILIRUBIN, TOTAL (test code = 0.5 MG/DL                            

  



             )                                               

 

             ALKALINE PHOSPHATASE (test 92 U/L                                 



             code = 2204)                                        

 

             AST (test code = 2218) 35 U/L                                 

 

             ALT (test code = 2219) 34 U/L                                 



COMPREHENSIVE METABOLIC PANEL2022-10-12 00:00:00





             Test Item    Value        Reference Range Interpretation Comments

 

             GLUCOSE (test code = 2217) 169 MG/DL                              

 

             BUN (test code = 2208) 17 MG/DL                               

 

             CREATININE (test code = 2214) 0.86 MG/DL                           

  

 

             eGFR ( CKD-EPI) (test code 76 ML/MIN/1.73                      

     



             = 17975)                                            

 

             CALC BUN/CREAT (test code = 20 RATIO                               



             2235)                                               

 

             SODIUM (test code = 2231) 141 MEQ/L                              

 

             POTASSIUM (test code = 2228) 4.3 MEQ/L                             

 

 

             CHLORIDE (test code = 2215) 99 MEQ/L                               

 

             CARBON DIOXIDE (test code = 25 MEQ/L                               



             )                                               

 

             CALCIUM (test code = 2209) 9.6 MG/DL                              

 

             PROTEIN, TOTAL (test code = 7.6 G/DL                               



             )                                               

 

             ALBUMIN (test code = 2201) 4.3 G/DL                               

 

             CALC GLOBULIN (test code = 3.3 G/DL                               



             )                                               

 

             CALC A/G RATIO (test code = 1.3 RATIO                              



             )                                               

 

             BILIRUBIN, TOTAL (test code = 0.5 MG/DL                            

  



             )                                               

 

             ALKALINE PHOSPHATASE (test 92 U/L                                 



             code = 2204)                                        

 

             AST (test code = 2218) 35 U/L                                 

 

             ALT (test code = 2219) 34 U/L                                 



CBC W/AUTO DIFF2022-10-12 00:00:00





             Test Item    Value        Reference Range Interpretation Comments

 

             WBC (test code = 1001) 6.8 K/UL                               

 

             RBC (test code = 1002) 3.76 M/UL                              

 

             HEMOGLOBIN (test code = 1003) 10.4 G/DL                            

  

 

             HEMATOCRIT (test code = 1004) 32.8 %                               

  

 

             MCV (test code = 1005) 87.2 fL                                

 

             MCH (test code = 1006) 27.7 PG                                

 

             MCHC (test code = 1007) 31.7 G/DL                              

 

             RDW (test code = 1038) 13.6 %                                 

 

             NEUTROPHILS (test code = 1008) 70.2 %                              

   

 

             LYMPHOCYTES (test code = 1010) 17.6 %                              

   

 

             MONOCYTES (test code = 1011) 7.2 %                                 

 

 

             EOSINOPHILS (test code = 1012) 4.3 %                               

   

 

             BASOPHILS (test code = 1013) 0.4 %                                 

 

 

             IMMATURE GRANULOCYTES (test 0.3 %                                  



             code = 1036)                                        

 

             NUCLEATED RBCS (test code = 0.0 /100WBC'S                          

 



             1065)                                               

 

             PLATELET COUNT (test code = 104 K/UL                               



             1015)                                               

 

             ABSOLUTE NEUTROPHILS (test code 4.74 K/UL                          

    



             = 1066)                                             

 

             ABSOLUTE LYMPHOCYTES (test code 1.19 K/UL                          

    



             = 1067)                                             

 

             ABSOLUTE MONOCYTES (test code = 0.49 K/UL                          

    



             1068)                                               

 

             ABSOLUTE EOSINOPHILS (test code 0.29 K/UL                          

    



             = 1040)                                             

 

             ABSOLUTE BASOPHILS (test code = 0.03 K/UL                          

    



             1069)                                               

 

             ABS IMMATURE GRANULOCYTES (test 0.02 K/UL                          

    



             code = 1020)                                        

 

             ABS NUCLEATED RBCS (test code = 0.00 K/UL                          

    



             43684)                                              



CBC W/AUTO [id number] 00:00:00





             Test Item    Value        Reference Range Interpretation Comments

 

             WBC (test code = 1001) 6.8 K/UL                               

 

             RBC (test code = 1002) 3.76 M/UL                              

 

             HEMOGLOBIN (test code = 1003) 10.4 G/DL                            

  

 

             HEMATOCRIT (test code = 1004) 32.8 %                               

  

 

             MCV (test code = 1005) 87.2 fL                                

 

             MCH (test code = 1006) 27.7 PG                                

 

             MCHC (test code = 1007) 31.7 G/DL                              

 

             RDW (test code = 1038) 13.6 %                                 

 

             NEUTROPHILS (test code = 1008) 70.2 %                              

   

 

             LYMPHOCYTES (test code = 1010) 17.6 %                              

   

 

             MONOCYTES (test code = 1011) 7.2 %                                 

 

 

             EOSINOPHILS (test code = 1012) 4.3 %                               

   

 

             BASOPHILS (test code = 1013) 0.4 %                                 

 

 

             IMMATURE GRANULOCYTES (test 0.3 %                                  



             code = 1036)                                        

 

             NUCLEATED RBCS (test code = 0.0 /100WBC'S                          

 



             1065)                                               

 

             PLATELET COUNT (test code = 104 K/UL                               



             1015)                                               

 

             ABSOLUTE NEUTROPHILS (test code 4.74 K/UL                          

    



             = 1066)                                             

 

             ABSOLUTE LYMPHOCYTES (test code 1.19 K/UL                          

    



             = 1067)                                             

 

             ABSOLUTE MONOCYTES (test code = 0.49 K/UL                          

    



             1068)                                               

 

             ABSOLUTE EOSINOPHILS (test code 0.29 K/UL                          

    



             = 1040)                                             

 

             ABSOLUTE BASOPHILS (test code = 0.03 K/UL                          

    



             1069)                                               

 

             ABS IMMATURE GRANULOCYTES (test 0.02 K/UL                          

    



             code = 1020)                                        

 

             ABS NUCLEATED RBCS (test code = 0.00 K/UL                          

    



             84347)                                              



CBC W/AUTO [id number] 00:00:00





             Test Item    Value        Reference Range Interpretation Comments

 

             WBC (test code = 1001) 6.8 K/UL                               

 

             RBC (test code = 1002) 3.76 M/UL                              

 

             HEMOGLOBIN (test code = 1003) 10.4 G/DL                            

  

 

             HEMATOCRIT (test code = 1004) 32.8 %                               

  

 

             MCV (test code = 1005) 87.2 fL                                

 

             MCH (test code = 1006) 27.7 PG                                

 

             MCHC (test code = 1007) 31.7 G/DL                              

 

             RDW (test code = 1038) 13.6 %                                 

 

             NEUTROPHILS (test code = 1008) 70.2 %                              

   

 

             LYMPHOCYTES (test code = 1010) 17.6 %                              

   

 

             MONOCYTES (test code = 1011) 7.2 %                                 

 

 

             EOSINOPHILS (test code = 1012) 4.3 %                               

   

 

             BASOPHILS (test code = 1013) 0.4 %                                 

 

 

             IMMATURE GRANULOCYTES (test 0.3 %                                  



             code = 1036)                                        

 

             NUCLEATED RBCS (test code = 0.0 /100WBC'S                          

 



             1065)                                               

 

             PLATELET COUNT (test code = 104 K/UL                               



             1015)                                               

 

             ABSOLUTE NEUTROPHILS (test code 4.74 K/UL                          

    



             = 1066)                                             

 

             ABSOLUTE LYMPHOCYTES (test code 1.19 K/UL                          

    



             = 1067)                                             

 

             ABSOLUTE MONOCYTES (test code = 0.49 K/UL                          

    



             1068)                                               

 

             ABSOLUTE EOSINOPHILS (test code 0.29 K/UL                          

    



             = 1040)                                             

 

             ABSOLUTE BASOPHILS (test code = 0.03 K/UL                          

    



             1069)                                               

 

             ABS IMMATURE GRANULOCYTES (test 0.02 K/UL                          

    



             code = 1020)                                        

 

             ABS NUCLEATED RBCS (test code = 0.00 K/UL                          

    



             77468)                                              



TSH, THIRD [id number] 00:00:00





             Test Item    Value        Reference Range Interpretation Comments

 

             TSH, THIRD GENERATION (test code 2.700 UIU/ML                      

     



             = 2821)                                             



TSH, THIRD [id number] 00:00:00





             Test Item    Value        Reference Range Interpretation Comments

 

             TSH, THIRD GENERATION (test code 2.700 UIU/ML                      

     



             = 2821)                                             



TSH, THIRD [id number] 00:00:00





             Test Item    Value        Reference Range Interpretation Comments

 

             TSH, THIRD GENERATION (test code 2.700 UIU/ML                      

     



             = 2821)                                             



HEMOGLOBIN [id number] 00:00:00





             Test Item    Value        Reference Range Interpretation Comments

 

             HEMOGLOBIN A1c (test code = 48933) 6.8 %                           

       



HEMOGLOBIN A1c2022-10-12 00:00:00





             Test Item    Value        Reference Range Interpretation Comments

 

             HEMOGLOBIN A1c (test code = 13926) 6.8 %                           

       



HEMOGLOBIN A1c2022-10-12 00:00:00





             Test Item    Value        Reference Range Interpretation Comments

 

             HEMOGLOBIN A1c (test code = 58492) 6.8 %                           

       



COMPREHENSIVE METABOLIC PANEL2022-10-12 00:00:00





             Test Item    Value        Reference Range Interpretation Comments

 

             GLUCOSE (test code = 2217) 169 MG/DL                              

 

             BUN (test code = 2208) 17 MG/DL                               

 

             CREATININE (test code = 2214) 0.86 MG/DL                           

  

 

             eGFR ( CKD-EPI) (test code 76 ML/MIN/1.73                      

     



             = 66585)                                            

 

             CALC BUN/CREAT (test code = 20 RATIO                               



             2235)                                               

 

             SODIUM (test code = 2231) 141 MEQ/L                              

 

             POTASSIUM (test code = 2228) 4.3 MEQ/L                             

 

 

             CHLORIDE (test code = 2215) 99 MEQ/L                               

 

             CARBON DIOXIDE (test code = 25 MEQ/L                               



             )                                               

 

             CALCIUM (test code = 2209) 9.6 MG/DL                              

 

             PROTEIN, TOTAL (test code = 7.6 G/DL                               



             )                                               

 

             ALBUMIN (test code = 2201) 4.3 G/DL                               

 

             CALC GLOBULIN (test code = 3.3 G/DL                               



             )                                               

 

             CALC A/G RATIO (test code = 1.3 RATIO                              



             )                                               

 

             BILIRUBIN, TOTAL (test code = 0.5 MG/DL                            

  



             )                                               

 

             ALKALINE PHOSPHATASE (test 92 U/L                                 



             code = 2204)                                        

 

             AST (test code = 2218) 35 U/L                                 

 

             ALT (test code = 2219) 34 U/L                                 



COMPREHENSIVE METABOLIC PANEL2022-10-12 00:00:00





             Test Item    Value        Reference Range Interpretation Comments

 

             GLUCOSE (test code = 2217) 169 MG/DL                              

 

             BUN (test code = 2208) 17 MG/DL                               

 

             CREATININE (test code = 2214) 0.86 MG/DL                           

  

 

             eGFR ( CKD-EPI) (test code 76 ML/MIN/1.73                      

     



             = 33084)                                            

 

             CALC BUN/CREAT (test code = 20 RATIO                               



             2235)                                               

 

             SODIUM (test code = 2231) 141 MEQ/L                              

 

             POTASSIUM (test code = 2228) 4.3 MEQ/L                             

 

 

             CHLORIDE (test code = 2215) 99 MEQ/L                               

 

             CARBON DIOXIDE (test code = 25 MEQ/L                               



             220)                                               

 

             CALCIUM (test code = 2209) 9.6 MG/DL                              

 

             PROTEIN, TOTAL (test code = 7.6 G/DL                               



             )                                               

 

             ALBUMIN (test code = 2201) 4.3 G/DL                               

 

             CALC GLOBULIN (test code = 3.3 G/DL                               



             2240)                                               

 

             CALC A/G RATIO (test code = 1.3 RATIO                              



             2234)                                               

 

             BILIRUBIN, TOTAL (test code = 0.5 MG/DL                            

  



             2207)                                               

 

             ALKALINE PHOSPHATASE (test 92 U/L                                 



             code = 2204)                                        

 

             AST (test code = 2218) 35 U/L                                 

 

             ALT (test code = 2219) 34 U/L                                 



CBC W/AUTO DIFF2022-10-12 00:00:00





             Test Item    Value        Reference Range Interpretation Comments

 

             WBC (test code = 1001) 6.8 K/UL                               

 

             RBC (test code = 1002) 3.76 M/UL                              

 

             HEMOGLOBIN (test code = 1003) 10.4 G/DL                            

  

 

             HEMATOCRIT (test code = 1004) 32.8 %                               

  

 

             MCV (test code = 1005) 87.2 fL                                

 

             MCH (test code = 1006) 27.7 PG                                

 

             MCHC (test code = 1007) 31.7 G/DL                              

 

             RDW (test code = 1038) 13.6 %                                 

 

             NEUTROPHILS (test code = 1008) 70.2 %                              

   

 

             LYMPHOCYTES (test code = 1010) 17.6 %                              

   

 

             MONOCYTES (test code = 1011) 7.2 %                                 

 

 

             EOSINOPHILS (test code = 1012) 4.3 %                               

   

 

             BASOPHILS (test code = 1013) 0.4 %                                 

 

 

             IMMATURE GRANULOCYTES (test 0.3 %                                  



             code = 1036)                                        

 

             NUCLEATED RBCS (test code = 0.0 /100WBC'S                          

 



             1065)                                               

 

             PLATELET COUNT (test code = 104 K/UL                               



             1015)                                               

 

             ABSOLUTE NEUTROPHILS (test code 4.74 K/UL                          

    



             = 1066)                                             

 

             ABSOLUTE LYMPHOCYTES (test code 1.19 K/UL                          

    



             = 1067)                                             

 

             ABSOLUTE MONOCYTES (test code = 0.49 K/UL                          

    



             1068)                                               

 

             ABSOLUTE EOSINOPHILS (test code 0.29 K/UL                          

    



             = 1040)                                             

 

             ABSOLUTE BASOPHILS (test code = 0.03 K/UL                          

    



             1069)                                               

 

             ABS IMMATURE GRANULOCYTES (test 0.02 K/UL                          

    



             code = 1020)                                        

 

             ABS NUCLEATED RBCS (test code = 0.00 K/UL                          

    



             65472)                                              



CBC W/AUTO [id number] 00:00:00





             Test Item    Value        Reference Range Interpretation Comments

 

             WBC (test code = 1001) 6.8 K/UL                               

 

             RBC (test code = 1002) 3.76 M/UL                              

 

             HEMOGLOBIN (test code = 1003) 10.4 G/DL                            

  

 

             HEMATOCRIT (test code = 1004) 32.8 %                               

  

 

             MCV (test code = 1005) 87.2 fL                                

 

             MCH (test code = 1006) 27.7 PG                                

 

             MCHC (test code = 1007) 31.7 G/DL                              

 

             RDW (test code = 1038) 13.6 %                                 

 

             NEUTROPHILS (test code = 1008) 70.2 %                              

   

 

             LYMPHOCYTES (test code = 1010) 17.6 %                              

   

 

             MONOCYTES (test code = 1011) 7.2 %                                 

 

 

             EOSINOPHILS (test code = 1012) 4.3 %                               

   

 

             BASOPHILS (test code = 1013) 0.4 %                                 

 

 

             IMMATURE GRANULOCYTES (test 0.3 %                                  



             code = 1036)                                        

 

             NUCLEATED RBCS (test code = 0.0 /100WBC'S                          

 



             1065)                                               

 

             PLATELET COUNT (test code = 104 K/UL                               



             1015)                                               

 

             ABSOLUTE NEUTROPHILS (test code 4.74 K/UL                          

    



             = 1066)                                             

 

             ABSOLUTE LYMPHOCYTES (test code 1.19 K/UL                          

    



             = 1067)                                             

 

             ABSOLUTE MONOCYTES (test code = 0.49 K/UL                          

    



             1068)                                               

 

             ABSOLUTE EOSINOPHILS (test code 0.29 K/UL                          

    



             = 1040)                                             

 

             ABSOLUTE BASOPHILS (test code = 0.03 K/UL                          

    



             1069)                                               

 

             ABS IMMATURE GRANULOCYTES (test 0.02 K/UL                          

    



             code = 1020)                                        

 

             ABS NUCLEATED RBCS (test code = 0.00 K/UL                          

    



             89014)                                              



CBC W/AUTO [id number] 00:00:00





             Test Item    Value        Reference Range Interpretation Comments

 

             WBC (test code = 1001) 6.8 K/UL                               

 

             RBC (test code = 1002) 3.76 M/UL                              

 

             HEMOGLOBIN (test code = 1003) 10.4 G/DL                            

  

 

             HEMATOCRIT (test code = 1004) 32.8 %                               

  

 

             MCV (test code = 1005) 87.2 fL                                

 

             MCH (test code = 1006) 27.7 PG                                

 

             MCHC (test code = 1007) 31.7 G/DL                              

 

             RDW (test code = 1038) 13.6 %                                 

 

             NEUTROPHILS (test code = 1008) 70.2 %                              

   

 

             LYMPHOCYTES (test code = 1010) 17.6 %                              

   

 

             MONOCYTES (test code = 1011) 7.2 %                                 

 

 

             EOSINOPHILS (test code = 1012) 4.3 %                               

   

 

             BASOPHILS (test code = 1013) 0.4 %                                 

 

 

             IMMATURE GRANULOCYTES (test 0.3 %                                  



             code = 1036)                                        

 

             NUCLEATED RBCS (test code = 0.0 /100WBC'S                          

 



             1065)                                               

 

             PLATELET COUNT (test code = 104 K/UL                               



             1015)                                               

 

             ABSOLUTE NEUTROPHILS (test code 4.74 K/UL                          

    



             = 1066)                                             

 

             ABSOLUTE LYMPHOCYTES (test code 1.19 K/UL                          

    



             = 1067)                                             

 

             ABSOLUTE MONOCYTES (test code = 0.49 K/UL                          

    



             1068)                                               

 

             ABSOLUTE EOSINOPHILS (test code 0.29 K/UL                          

    



             = 1040)                                             

 

             ABSOLUTE BASOPHILS (test code = 0.03 K/UL                          

    



             1069)                                               

 

             ABS IMMATURE GRANULOCYTES (test 0.02 K/UL                          

    



             code = 1020)                                        

 

             ABS NUCLEATED RBCS (test code = 0.00 K/UL                          

    



             50832)                                              



TSH, THIRD [id number] 00:00:00





             Test Item    Value        Reference Range Interpretation Comments

 

             TSH, THIRD GENERATION (test code 2.700 UIU/ML                      

     



             = 2821)                                             



TSH, THIRD [id number] 00:00:00





             Test Item    Value        Reference Range Interpretation Comments

 

             TSH, THIRD GENERATION (test code 2.700 UIU/ML                      

     



             = 2821)                                             



TSH, THIRD [id number] 00:00:00





             Test Item    Value        Reference Range Interpretation Comments

 

             TSH, THIRD GENERATION (test code 2.700 UIU/ML                      

     



             = 2821)                                             



HEMOGLOBIN A1c2022-10-12 00:00:00





             Test Item    Value        Reference Range Interpretation Comments

 

             HEMOGLOBIN A1c (test code = 03769) 6.8 %                           

       



HEMOGLOBIN A1c2022-10-12 00:00:00





             Test Item    Value        Reference Range Interpretation Comments

 

             HEMOGLOBIN A1c (test code = 04889) 6.8 %                           

       



HEMOGLOBIN A1c2022-10-12 00:00:00





             Test Item    Value        Reference Range Interpretation Comments

 

             HEMOGLOBIN A1c (test code = 72503) 6.8 %                           

       



COMPREHENSIVE METABOLIC PANEL2022-10-12 00:00:00





             Test Item    Value        Reference Range Interpretation Comments

 

             GLUCOSE (test code = 2217) 169 MG/DL                              

 

             BUN (test code = 2208) 17 MG/DL                               

 

             CREATININE (test code = 2214) 0.86 MG/DL                           

  

 

             eGFR ( CKD-EPI) (test code 76 ML/MIN/1.73                      

     



             = 12443)                                            

 

             CALC BUN/CREAT (test code = 20 RATIO                               



             2235)                                               

 

             SODIUM (test code = 2231) 141 MEQ/L                              

 

             POTASSIUM (test code = 2228) 4.3 MEQ/L                             

 

 

             CHLORIDE (test code = 2215) 99 MEQ/L                               

 

             CARBON DIOXIDE (test code = 25 MEQ/L                               



             )                                               

 

             CALCIUM (test code = 2209) 9.6 MG/DL                              

 

             PROTEIN, TOTAL (test code = 7.6 G/DL                               



             )                                               

 

             ALBUMIN (test code = 2201) 4.3 G/DL                               

 

             CALC GLOBULIN (test code = 3.3 G/DL                               



             2240)                                               

 

             CALC A/G RATIO (test code = 1.3 RATIO                              



             2234)                                               

 

             BILIRUBIN, TOTAL (test code = 0.5 MG/DL                            

  



             )                                               

 

             ALKALINE PHOSPHATASE (test 92 U/L                                 



             code = 2204)                                        

 

             AST (test code = 2218) 35 U/L                                 

 

             ALT (test code = 2219) 34 U/L                                 



COMPREHENSIVE METABOLIC PANEL2022-10-12 00:00:00





             Test Item    Value        Reference Range Interpretation Comments

 

             GLUCOSE (test code = 2217) 169 MG/DL                              

 

             BUN (test code = 2208) 17 MG/DL                               

 

             CREATININE (test code = 2214) 0.86 MG/DL                           

  

 

             eGFR ( CKD-EPI) (test code 76 ML/MIN/1.73                      

     



             = 13188)                                            

 

             CALC BUN/CREAT (test code = 20 RATIO                               



             2235)                                               

 

             SODIUM (test code = 2231) 141 MEQ/L                              

 

             POTASSIUM (test code = 2228) 4.3 MEQ/L                             

 

 

             CHLORIDE (test code = 2215) 99 MEQ/L                               

 

             CARBON DIOXIDE (test code = 25 MEQ/L                               



             )                                               

 

             CALCIUM (test code = 2209) 9.6 MG/DL                              

 

             PROTEIN, TOTAL (test code = 7.6 G/DL                               



             )                                               

 

             ALBUMIN (test code = 2201) 4.3 G/DL                               

 

             CALC GLOBULIN (test code = 3.3 G/DL                               



             2240)                                               

 

             CALC A/G RATIO (test code = 1.3 RATIO                              



             2234)                                               

 

             BILIRUBIN, TOTAL (test code = 0.5 MG/DL                            

  



             )                                               

 

             ALKALINE PHOSPHATASE (test 92 U/L                                 



             code = 2204)                                        

 

             AST (test code = 2218) 35 U/L                                 

 

             ALT (test code = 2219) 34 U/L                                 



CBC W/AUTO DIFF2022-10-12 00:00:00





             Test Item    Value        Reference Range Interpretation Comments

 

             WBC (test code = 1001) 6.8 K/UL                               

 

             RBC (test code = 1002) 3.76 M/UL                              

 

             HEMOGLOBIN (test code = 1003) 10.4 G/DL                            

  

 

             HEMATOCRIT (test code = 1004) 32.8 %                               

  

 

             MCV (test code = 1005) 87.2 fL                                

 

             MCH (test code = 1006) 27.7 PG                                

 

             MCHC (test code = 1007) 31.7 G/DL                              

 

             RDW (test code = 1038) 13.6 %                                 

 

             NEUTROPHILS (test code = 1008) 70.2 %                              

   

 

             LYMPHOCYTES (test code = 1010) 17.6 %                              

   

 

             MONOCYTES (test code = 1011) 7.2 %                                 

 

 

             EOSINOPHILS (test code = 1012) 4.3 %                               

   

 

             BASOPHILS (test code = 1013) 0.4 %                                 

 

 

             IMMATURE GRANULOCYTES (test 0.3 %                                  



             code = 1036)                                        

 

             NUCLEATED RBCS (test code = 0.0 /100WBC'S                          

 



             1065)                                               

 

             PLATELET COUNT (test code = 104 K/UL                               



             1015)                                               

 

             ABSOLUTE NEUTROPHILS (test code 4.74 K/UL                          

    



             = 1066)                                             

 

             ABSOLUTE LYMPHOCYTES (test code 1.19 K/UL                          

    



             = 1067)                                             

 

             ABSOLUTE MONOCYTES (test code = 0.49 K/UL                          

    



             1068)                                               

 

             ABSOLUTE EOSINOPHILS (test code 0.29 K/UL                          

    



             = 1040)                                             

 

             ABSOLUTE BASOPHILS (test code = 0.03 K/UL                          

    



             1069)                                               

 

             ABS IMMATURE GRANULOCYTES (test 0.02 K/UL                          

    



             code = 1020)                                        

 

             ABS NUCLEATED RBCS (test code = 0.00 K/UL                          

    



             20943)                                              



CBC W/AUTO DIFF2022-10-12 00:00:00





             Test Item    Value        Reference Range Interpretation Comments

 

             WBC (test code = 1001) 6.8 K/UL                               

 

             RBC (test code = 1002) 3.76 M/UL                              

 

             HEMOGLOBIN (test code = 1003) 10.4 G/DL                            

  

 

             HEMATOCRIT (test code = 1004) 32.8 %                               

  

 

             MCV (test code = 1005) 87.2 fL                                

 

             MCH (test code = 1006) 27.7 PG                                

 

             MCHC (test code = 1007) 31.7 G/DL                              

 

             RDW (test code = 1038) 13.6 %                                 

 

             NEUTROPHILS (test code = 1008) 70.2 %                              

   

 

             LYMPHOCYTES (test code = 1010) 17.6 %                              

   

 

             MONOCYTES (test code = 1011) 7.2 %                                 

 

 

             EOSINOPHILS (test code = 1012) 4.3 %                               

   

 

             BASOPHILS (test code = 1013) 0.4 %                                 

 

 

             IMMATURE GRANULOCYTES (test 0.3 %                                  



             code = 1036)                                        

 

             NUCLEATED RBCS (test code = 0.0 /100WBC'S                          

 



             1065)                                               

 

             PLATELET COUNT (test code = 104 K/UL                               



             1015)                                               

 

             ABSOLUTE NEUTROPHILS (test code 4.74 K/UL                          

    



             = 1066)                                             

 

             ABSOLUTE LYMPHOCYTES (test code 1.19 K/UL                          

    



             = 1067)                                             

 

             ABSOLUTE MONOCYTES (test code = 0.49 K/UL                          

    



             1068)                                               

 

             ABSOLUTE EOSINOPHILS (test code 0.29 K/UL                          

    



             = 1040)                                             

 

             ABSOLUTE BASOPHILS (test code = 0.03 K/UL                          

    



             1069)                                               

 

             ABS IMMATURE GRANULOCYTES (test 0.02 K/UL                          

    



             code = 1020)                                        

 

             ABS NUCLEATED RBCS (test code = 0.00 K/UL                          

    



             71436)                                              



CBC W/AUTO DIFF2022-10-12 00:00:00





             Test Item    Value        Reference Range Interpretation Comments

 

             WBC (test code = 1001) 6.8 K/UL                               

 

             RBC (test code = 1002) 3.76 M/UL                              

 

             HEMOGLOBIN (test code = 1003) 10.4 G/DL                            

  

 

             HEMATOCRIT (test code = 1004) 32.8 %                               

  

 

             MCV (test code = 1005) 87.2 fL                                

 

             MCH (test code = 1006) 27.7 PG                                

 

             MCHC (test code = 1007) 31.7 G/DL                              

 

             RDW (test code = 1038) 13.6 %                                 

 

             NEUTROPHILS (test code = 1008) 70.2 %                              

   

 

             LYMPHOCYTES (test code = 1010) 17.6 %                              

   

 

             MONOCYTES (test code = 1011) 7.2 %                                 

 

 

             EOSINOPHILS (test code = 1012) 4.3 %                               

   

 

             BASOPHILS (test code = 1013) 0.4 %                                 

 

 

             IMMATURE GRANULOCYTES (test 0.3 %                                  



             code = 1036)                                        

 

             NUCLEATED RBCS (test code = 0.0 /100WBC'S                          

 



             1065)                                               

 

             PLATELET COUNT (test code = 104 K/UL                               



             1015)                                               

 

             ABSOLUTE NEUTROPHILS (test code 4.74 K/UL                          

    



             = 1066)                                             

 

             ABSOLUTE LYMPHOCYTES (test code 1.19 K/UL                          

    



             = 1067)                                             

 

             ABSOLUTE MONOCYTES (test code = 0.49 K/UL                          

    



             1068)                                               

 

             ABSOLUTE EOSINOPHILS (test code 0.29 K/UL                          

    



             = 1040)                                             

 

             ABSOLUTE BASOPHILS (test code = 0.03 K/UL                          

    



             1069)                                               

 

             ABS IMMATURE GRANULOCYTES (test 0.02 K/UL                          

    



             code = 1020)                                        

 

             ABS NUCLEATED RBCS (test code = 0.00 K/UL                          

    



             86106)                                              



TSH, THIRD [id number] 00:00:00





             Test Item    Value        Reference Range Interpretation Comments

 

             TSH, THIRD GENERATION (test code 2.700 UIU/ML                      

     



             = 2821)                                             



TSH, THIRD [id number] 00:00:00





             Test Item    Value        Reference Range Interpretation Comments

 

             TSH, THIRD GENERATION (test code 2.700 UIU/ML                      

     



             = 2821)                                             



TSH, THIRD [id number] 00:00:00





             Test Item    Value        Reference Range Interpretation Comments

 

             TSH, THIRD GENERATION (test code 2.700 UIU/ML                      

     



             = 2821)                                             



HEMOGLOBIN [id number] 00:00:00





             Test Item    Value        Reference Range Interpretation Comments

 

             HEMOGLOBIN A1c (test code = 46414) 6.8 %                           

       



HEMOGLOBIN A1c2022-10-12 00:00:00





             Test Item    Value        Reference Range Interpretation Comments

 

             HEMOGLOBIN A1c (test code = 34497) 6.8 %                           

       



HEMOGLOBIN A1c2022-10-12 00:00:00





             Test Item    Value        Reference Range Interpretation Comments

 

             HEMOGLOBIN A1c (test code = 34160) 6.8 %                           

       



COMPREHENSIVE METABOLIC PANEL2022-10-12 00:00:00





             Test Item    Value        Reference Range Interpretation Comments

 

             GLUCOSE (test code = 2217) 169 MG/DL                              

 

             BUN (test code = 2208) 17 MG/DL                               

 

             CREATININE (test code = 2214) 0.86 MG/DL                           

  

 

             eGFR ( CKD-EPI) (test code 76 ML/MIN/1.73                      

     



             = 86181)                                            

 

             CALC BUN/CREAT (test code = 20 RATIO                               



             2235)                                               

 

             SODIUM (test code = 2231) 141 MEQ/L                              

 

             POTASSIUM (test code = 2228) 4.3 MEQ/L                             

 

 

             CHLORIDE (test code = 2215) 99 MEQ/L                               

 

             CARBON DIOXIDE (test code = 25 MEQ/L                               



             )                                               

 

             CALCIUM (test code = 2209) 9.6 MG/DL                              

 

             PROTEIN, TOTAL (test code = 7.6 G/DL                               



             )                                               

 

             ALBUMIN (test code = 2201) 4.3 G/DL                               

 

             CALC GLOBULIN (test code = 3.3 G/DL                               



             0)                                               

 

             CALC A/G RATIO (test code = 1.3 RATIO                              



             4)                                               

 

             BILIRUBIN, TOTAL (test code = 0.5 MG/DL                            

  



             )                                               

 

             ALKALINE PHOSPHATASE (test 92 U/L                                 



             code = 2204)                                        

 

             AST (test code = 2218) 35 U/L                                 

 

             ALT (test code = 2219) 34 U/L                                 



COMPREHENSIVE METABOLIC PANEL2022-10-12 00:00:00





             Test Item    Value        Reference Range Interpretation Comments

 

             GLUCOSE (test code = 2217) 169 MG/DL                              

 

             BUN (test code = 2208) 17 MG/DL                               

 

             CREATININE (test code = 2214) 0.86 MG/DL                           

  

 

             eGFR ( CKD-EPI) (test code 76 ML/MIN/1.73                      

     



             = 84825)                                            

 

             CALC BUN/CREAT (test code = 20 RATIO                               



             2235)                                               

 

             SODIUM (test code = 2231) 141 MEQ/L                              

 

             POTASSIUM (test code = 2228) 4.3 MEQ/L                             

 

 

             CHLORIDE (test code = 2215) 99 MEQ/L                               

 

             CARBON DIOXIDE (test code = 25 MEQ/L                               



             )                                               

 

             CALCIUM (test code = 2209) 9.6 MG/DL                              

 

             PROTEIN, TOTAL (test code = 7.6 G/DL                               



             )                                               

 

             ALBUMIN (test code = 2201) 4.3 G/DL                               

 

             CALC GLOBULIN (test code = 3.3 G/DL                               



             0)                                               

 

             CALC A/G RATIO (test code = 1.3 RATIO                              



             4)                                               

 

             BILIRUBIN, TOTAL (test code = 0.5 MG/DL                            

  



             )                                               

 

             ALKALINE PHOSPHATASE (test 92 U/L                                 



             code = 2204)                                        

 

             AST (test code = 2218) 35 U/L                                 

 

             ALT (test code = 2219) 34 U/L                                 



CBC W/AUTO DIFF2022-10-12 00:00:00





             Test Item    Value        Reference Range Interpretation Comments

 

             WBC (test code = 1001) 6.8 K/UL                               

 

             RBC (test code = 1002) 3.76 M/UL                              

 

             HEMOGLOBIN (test code = 1003) 10.4 G/DL                            

  

 

             HEMATOCRIT (test code = 1004) 32.8 %                               

  

 

             MCV (test code = 1005) 87.2 fL                                

 

             MCH (test code = 1006) 27.7 PG                                

 

             MCHC (test code = 1007) 31.7 G/DL                              

 

             RDW (test code = 1038) 13.6 %                                 

 

             NEUTROPHILS (test code = 1008) 70.2 %                              

   

 

             LYMPHOCYTES (test code = 1010) 17.6 %                              

   

 

             MONOCYTES (test code = 1011) 7.2 %                                 

 

 

             EOSINOPHILS (test code = 1012) 4.3 %                               

   

 

             BASOPHILS (test code = 1013) 0.4 %                                 

 

 

             IMMATURE GRANULOCYTES (test 0.3 %                                  



             code = 1036)                                        

 

             NUCLEATED RBCS (test code = 0.0 /100WBC'S                          

 



             1065)                                               

 

             PLATELET COUNT (test code = 104 K/UL                               



             1015)                                               

 

             ABSOLUTE NEUTROPHILS (test code 4.74 K/UL                          

    



             = 1066)                                             

 

             ABSOLUTE LYMPHOCYTES (test code 1.19 K/UL                          

    



             = 1067)                                             

 

             ABSOLUTE MONOCYTES (test code = 0.49 K/UL                          

    



             1068)                                               

 

             ABSOLUTE EOSINOPHILS (test code 0.29 K/UL                          

    



             = 1040)                                             

 

             ABSOLUTE BASOPHILS (test code = 0.03 K/UL                          

    



             1069)                                               

 

             ABS IMMATURE GRANULOCYTES (test 0.02 K/UL                          

    



             code = 1020)                                        

 

             ABS NUCLEATED RBCS (test code = 0.00 K/UL                          

    



             06519)                                              



CBC W/AUTO [id number] 00:00:00





             Test Item    Value        Reference Range Interpretation Comments

 

             WBC (test code = 1001) 6.8 K/UL                               

 

             RBC (test code = 1002) 3.76 M/UL                              

 

             HEMOGLOBIN (test code = 1003) 10.4 G/DL                            

  

 

             HEMATOCRIT (test code = 1004) 32.8 %                               

  

 

             MCV (test code = 1005) 87.2 fL                                

 

             MCH (test code = 1006) 27.7 PG                                

 

             MCHC (test code = 1007) 31.7 G/DL                              

 

             RDW (test code = 1038) 13.6 %                                 

 

             NEUTROPHILS (test code = 1008) 70.2 %                              

   

 

             LYMPHOCYTES (test code = 1010) 17.6 %                              

   

 

             MONOCYTES (test code = 1011) 7.2 %                                 

 

 

             EOSINOPHILS (test code = 1012) 4.3 %                               

   

 

             BASOPHILS (test code = 1013) 0.4 %                                 

 

 

             IMMATURE GRANULOCYTES (test 0.3 %                                  



             code = 1036)                                        

 

             NUCLEATED RBCS (test code = 0.0 /100WBC'S                          

 



             1065)                                               

 

             PLATELET COUNT (test code = 104 K/UL                               



             1015)                                               

 

             ABSOLUTE NEUTROPHILS (test code 4.74 K/UL                          

    



             = 1066)                                             

 

             ABSOLUTE LYMPHOCYTES (test code 1.19 K/UL                          

    



             = 1067)                                             

 

             ABSOLUTE MONOCYTES (test code = 0.49 K/UL                          

    



             1068)                                               

 

             ABSOLUTE EOSINOPHILS (test code 0.29 K/UL                          

    



             = 1040)                                             

 

             ABSOLUTE BASOPHILS (test code = 0.03 K/UL                          

    



             1069)                                               

 

             ABS IMMATURE GRANULOCYTES (test 0.02 K/UL                          

    



             code = 1020)                                        

 

             ABS NUCLEATED RBCS (test code = 0.00 K/UL                          

    



             83554)                                              



CBC W/AUTO DIFF2022-10-12 00:00:00





             Test Item    Value        Reference Range Interpretation Comments

 

             WBC (test code = 1001) 6.8 K/UL                               

 

             RBC (test code = 1002) 3.76 M/UL                              

 

             HEMOGLOBIN (test code = 1003) 10.4 G/DL                            

  

 

             HEMATOCRIT (test code = 1004) 32.8 %                               

  

 

             MCV (test code = 1005) 87.2 fL                                

 

             MCH (test code = 1006) 27.7 PG                                

 

             MCHC (test code = 1007) 31.7 G/DL                              

 

             RDW (test code = 1038) 13.6 %                                 

 

             NEUTROPHILS (test code = 1008) 70.2 %                              

   

 

             LYMPHOCYTES (test code = 1010) 17.6 %                              

   

 

             MONOCYTES (test code = 1011) 7.2 %                                 

 

 

             EOSINOPHILS (test code = 1012) 4.3 %                               

   

 

             BASOPHILS (test code = 1013) 0.4 %                                 

 

 

             IMMATURE GRANULOCYTES (test 0.3 %                                  



             code = 1036)                                        

 

             NUCLEATED RBCS (test code = 0.0 /100WBC'S                          

 



             1065)                                               

 

             PLATELET COUNT (test code = 104 K/UL                               



             1015)                                               

 

             ABSOLUTE NEUTROPHILS (test code 4.74 K/UL                          

    



             = 1066)                                             

 

             ABSOLUTE LYMPHOCYTES (test code 1.19 K/UL                          

    



             = 1067)                                             

 

             ABSOLUTE MONOCYTES (test code = 0.49 K/UL                          

    



             1068)                                               

 

             ABSOLUTE EOSINOPHILS (test code 0.29 K/UL                          

    



             = 1040)                                             

 

             ABSOLUTE BASOPHILS (test code = 0.03 K/UL                          

    



             1069)                                               

 

             ABS IMMATURE GRANULOCYTES (test 0.02 K/UL                          

    



             code = 1020)                                        

 

             ABS NUCLEATED RBCS (test code = 0.00 K/UL                          

    



             92089)                                              



TSH, THIRD [id number] 00:00:00





             Test Item    Value        Reference Range Interpretation Comments

 

             TSH, THIRD GENERATION (test code 2.700 UIU/ML                      

     



             = 2821)                                             



TSH, THIRD [id number] 00:00:00





             Test Item    Value        Reference Range Interpretation Comments

 

             TSH, THIRD GENERATION (test code 2.700 UIU/ML                      

     



             = 2821)                                             



TSH, THIRD [id number] 00:00:00





             Test Item    Value        Reference Range Interpretation Comments

 

             TSH, THIRD GENERATION (test code 2.700 UIU/ML                      

     



             = 2821)                                             



HEMOGLOBIN [id number] 00:00:00





             Test Item    Value        Reference Range Interpretation Comments

 

             HEMOGLOBIN A1c (test code = 52461) 6.8 %                           

       



HEMOGLOBIN [id number] 00:00:00





             Test Item    Value        Reference Range Interpretation Comments

 

             HEMOGLOBIN A1c (test code = 37173) 6.8 %                           

       



HEMOGLOBIN [id number] 00:00:00





             Test Item    Value        Reference Range Interpretation Comments

 

             HEMOGLOBIN A1c (test code = 32256) 6.8 %                           

       



VITAMIN D, 25 [id number] 06:58:12





             Test Item    Value        Reference Range Interpretation Comments

 

             VITAMIN D, 25 OH 18 NG/ML     SEE BELOW    L             NOTE: 25-H

YDROXYVITAMIN D



             (test code = 4958)                                        [facility]

LUDES



                                                                 25-HYDROXYVITAM

IN D2 AND



                                                                 D3. METHODOLOGY

 IS



                                                                 CHEMILUMINESCEN

T



                                                                 IMMUNOASSAY. **

***



                                                                 INTERPRETIVE RA

NGES



                                                                 *****PEDIATRIC 

(<17 YEARS)



                                                                 . . . . . . . .

 . . . NG/ML



                                                                 20-100ADULT: IN

SUFFICIENT .



                                                                 . . . . . . . .

 . . . . .



                                                                 NG/ML <20 SUBOP

TIMAL . . .



                                                                 . . . . . . . .

 . . . .



                                                                 NG/ML 20-29 OPT

IMAL . . . .



                                                                 . . . . . . . .

 . . . . .



                                                                 NG/ML  UN

LESS



                                                                 OTHERWISE INDIC

ATED, ALL



                                                                 TESTING PERFORM

ED



                                                                 ATCLINICAL PATH

OLOGY



                                                                 LABORATORIES, I

[address]



                                                                 [place] [address]



                                                                 LABORATORY DIRE

CTOR: GILBERTO WARD. IA



                                                                 NUMBER [id number]

03 CAP



                                                                 ACCREDITATION N

O. [id number]



TSH, THIRD [id number] 06:45:08





             Test Item    Value        Reference Range Interpretation Comments

 

             TSH, THIRD GENERATION (test code 1.290 UIU/ML 0.400-4.100          

     



             = 282)                                             



VITAMIN [id number] 06:45:08





             Test Item    Value        Reference Range Interpretation Comments

 

             VITAMIN B-12 (test code = 2840) 647 PG/ML    200-950               

    



COMPREHENSIVE METABOLIC [id number] 05:39:52





             Test Item    Value        Reference Range Interpretation Comments

 

             GLUCOSE (test code = 119 MG/DL    70-99        H            



             )                                               

 

             BUN (test code = 19 MG/DL     8-23                      



             )                                               

 

             CREATININE (test 0.98 MG/DL   0.60-1.30                 



             code = 2214)                                        

 

             eGFR ( CKD-EPI) 65 ML/MIN/1.73 >60                       



             (test code = 87209)                                        

 

             CALC BUN/CREAT (test 19 RATIO     6-28                      



             code = 2235)                                        

 

             SODIUM (test code = 137 MEQ/L    133-146                   



             )                                               

 

             POTASSIUM (test code 4.9 MEQ/L    3.5-5.4                   



             = )                                             

 

             CHLORIDE (test code 97 MEQ/L                         



             = 2215)                                             

 

             CARBON DIOXIDE (test 23 MEQ/L     19-31                     



             code = 2206)                                        

 

             CALCIUM (test code = 9.7 MG/DL    8.5-10.5                  



             )                                               

 

             PROTEIN, TOTAL (test 7.9 G/DL     6.1-8.3                   



             code = 222)                                        

 

             ALBUMIN (test code = 4.6 G/DL     3.5-5.2                   



             )                                               

 

             CALC GLOBULIN (test 3.3 G/DL     1.9-3.7                   



             code = 2240)                                        

 

             CALC A/G RATIO (test 1.4 RATIO    1.0-2.6                   



             code = 2234)                                        

 

             BILIRUBIN, TOTAL 0.3 MG/DL    See_Comment                [Automated

 message]



             (test code = 2207)                                        The syste

m which



                                                                 generated this



                                                                 result transmit

maximo



                                                                 reference range

:



                                                                 <=1.2. The refe

rence



                                                                 range was not u

sed



                                                                 to interpret th

is



                                                                 result as



                                                                 normal/abnormal

.

 

             ALKALINE PHOSPHATASE 79 U/L                           



             (test code = 2204)                                        

 

             AST (test code = 48 U/L       9-40         H            



             2218)                                               

 

             ALT (test code = 48 U/L       5-40         H            



             221)                                               



CBC W/AUTO DIFF WITH [id number] 02:09:53





             Test Item    Value        Reference Range Interpretation Comments

 

             WBC (test code = 7.0 K/UL     3.5-11.0                  



             1001)                                               

 

             RBC (test code = 3.83 M/UL    3.80-5.40                 



             1002)                                               

 

             HEMOGLOBIN (test code 10.9 G/DL    11.5-15.5    L            



             = 1003)                                             

 

             HEMATOCRIT (test code 33.2 %       34.0-45.0    L            



             = 1004)                                             

 

             MCV (test code = 86.7 fL      80.0-99.0                 



             1005)                                               

 

             MCH (test code = 28.5 PG      25.0-33.0                 



             1006)                                               

 

             MCHC (test code = 32.8 G/DL    31.0-36.0                 



             1007)                                               

 

             RDW (test code = 15.2 %       11.5-15.0    H            



             1038)                                               

 

             NEUTROPHILS (test 63.1 %                                 



             code = 1008)                                        

 

             LYMPHOCYTES (test 28.0 %                                 



             code = 1010)                                        

 

             MONOCYTES (test code 6.4 %                                  



             = 1011)                                             

 

             EOSINOPHILS (test 1.6 %                                  



             code = 1012)                                        

 

             BASOPHILS (test code 0.6 %                                  



             = 1013)                                             

 

             IMMATURE GRANULOCYTES 0.3 %                                  



             (test code = 1036)                                        

 

             NUCLEATED RBCS (test 0.0 /100 WBC'S See_Comment                [Aut

omated



             code = 1065)                                        message] The sy

stem



                                                                 which generated



                                                                 this result



                                                                 transmitted



                                                                 reference range

:



                                                                 0.0. The refere

nce



                                                                 range was not u

sed



                                                                 to interpret th

is



                                                                 result as



                                                                 normal/abnormal

.

 

             PLATELET COUNT (test 115 K/UL     130-400      L            



             code = 1015)                                        

 

             ABSOLUTE NEUTROPHILS 4.41 K/UL    1.50-7.50                 



             (test code = 1066)                                        

 

             ABSOLUTE LYMPHOCYTES 1.96 K/UL    1.00-4.00                 



             (test code = 1067)                                        

 

             ABSOLUTE MONOCYTES 0.45 K/UL    0.20-1.00                 



             (test code = 1068)                                        

 

             ABSOLUTE EOSINOPHILS 0.11 K/UL    0.00-0.50                 



             (test code = 1040)                                        

 

             ABSOLUTE BASOPHILS 0.04 K/UL    0.00-0.20                 



             (test code = 1069)                                        

 

             ABS IMMATURE 0.02 K/UL    0.00-0.10                 



             GRANULOCYTES (test                                        



             code = 1020)                                        

 

             ABS NUCLEATED RBCS 0.00 K/UL    0.00-0.11                 



             (test code = 96940)                                        



TSH, THIRD [id number] 00:00:00





             Test Item    Value        Reference Range Interpretation Comments

 

             TSH, THIRD GENERATION (test code 1.290 UIU/ML                      

     



             = 2821)                                             



TSH, THIRD [id number] 00:00:00





             Test Item    Value        Reference Range Interpretation Comments

 

             TSH, THIRD GENERATION (test code 1.290 UIU/ML                      

     



             = 2821)                                             



TSH, THIRD [id number] 00:00:00





             Test Item    Value        Reference Range Interpretation Comments

 

             TSH, THIRD GENERATION (test code 1.290 UIU/ML                      

     



             = 2821)                                             



CBC W/AUTO [id number] 00:00:00





             Test Item    Value        Reference Range Interpretation Comments

 

             WBC (test code = 1001) 7.0 K/UL                               

 

             RBC (test code = 1002) 3.83 M/UL                              

 

             HEMOGLOBIN (test code = 1003) 10.9 G/DL                            

  

 

             HEMATOCRIT (test code = 1004) 33.2 %                               

  

 

             MCV (test code = 1005) 86.7 fL                                

 

             MCH (test code = 1006) 28.5 PG                                

 

             MCHC (test code = 1007) 32.8 G/DL                              

 

             RDW (test code = 1038) 15.2 %                                 

 

             NEUTROPHILS (test code = 1008) 63.1 %                              

   

 

             LYMPHOCYTES (test code = 1010) 28.0 %                              

   

 

             MONOCYTES (test code = 1011) 6.4 %                                 

 

 

             EOSINOPHILS (test code = 1012) 1.6 %                               

   

 

             BASOPHILS (test code = 1013) 0.6 %                                 

 

 

             IMMATURE GRANULOCYTES (test 0.3 %                                  



             code = 1036)                                        

 

             NUCLEATED RBCS (test code = 0.0 /100WBC'S                          

 



             1065)                                               

 

             PLATELET COUNT (test code = 115 K/UL                               



             1015)                                               

 

             ABSOLUTE NEUTROPHILS (test code 4.41 K/UL                          

    



             = 1066)                                             

 

             ABSOLUTE LYMPHOCYTES (test code 1.96 K/UL                          

    



             = 1067)                                             

 

             ABSOLUTE MONOCYTES (test code = 0.45 K/UL                          

    



             1068)                                               

 

             ABSOLUTE EOSINOPHILS (test code 0.11 K/UL                          

    



             = 1040)                                             

 

             ABSOLUTE BASOPHILS (test code = 0.04 K/UL                          

    



             1069)                                               

 

             ABS IMMATURE GRANULOCYTES (test 0.02 K/UL                          

    



             code = 1020)                                        

 

             ABS NUCLEATED RBCS (test code = 0.00 K/UL                          

    



             29646)                                              



CBC W/AUTO [id number] 00:00:00





             Test Item    Value        Reference Range Interpretation Comments

 

             WBC (test code = 1001) 7.0 K/UL                               

 

             RBC (test code = 1002) 3.83 M/UL                              

 

             HEMOGLOBIN (test code = 1003) 10.9 G/DL                            

  

 

             HEMATOCRIT (test code = 1004) 33.2 %                               

  

 

             MCV (test code = 1005) 86.7 fL                                

 

             MCH (test code = 1006) 28.5 PG                                

 

             MCHC (test code = 1007) 32.8 G/DL                              

 

             RDW (test code = 1038) 15.2 %                                 

 

             NEUTROPHILS (test code = 1008) 63.1 %                              

   

 

             LYMPHOCYTES (test code = 1010) 28.0 %                              

   

 

             MONOCYTES (test code = 1011) 6.4 %                                 

 

 

             EOSINOPHILS (test code = 1012) 1.6 %                               

   

 

             BASOPHILS (test code = 1013) 0.6 %                                 

 

 

             IMMATURE GRANULOCYTES (test 0.3 %                                  



             code = 1036)                                        

 

             NUCLEATED RBCS (test code = 0.0 /100WBC'S                          

 



             1065)                                               

 

             PLATELET COUNT (test code = 115 K/UL                               



             1015)                                               

 

             ABSOLUTE NEUTROPHILS (test code 4.41 K/UL                          

    



             = 1066)                                             

 

             ABSOLUTE LYMPHOCYTES (test code 1.96 K/UL                          

    



             = 1067)                                             

 

             ABSOLUTE MONOCYTES (test code = 0.45 K/UL                          

    



             1068)                                               

 

             ABSOLUTE EOSINOPHILS (test code 0.11 K/UL                          

    



             = 1040)                                             

 

             ABSOLUTE BASOPHILS (test code = 0.04 K/UL                          

    



             1069)                                               

 

             ABS IMMATURE GRANULOCYTES (test 0.02 K/UL                          

    



             code = 1020)                                        

 

             ABS NUCLEATED RBCS (test code = 0.00 K/UL                          

    



             84504)                                              



CBC W/AUTO [id number] 00:00:00





             Test Item    Value        Reference Range Interpretation Comments

 

             WBC (test code = 1001) 7.0 K/UL                               

 

             RBC (test code = 1002) 3.83 M/UL                              

 

             HEMOGLOBIN (test code = 1003) 10.9 G/DL                            

  

 

             HEMATOCRIT (test code = 1004) 33.2 %                               

  

 

             MCV (test code = 1005) 86.7 fL                                

 

             MCH (test code = 1006) 28.5 PG                                

 

             MCHC (test code = 1007) 32.8 G/DL                              

 

             RDW (test code = 1038) 15.2 %                                 

 

             NEUTROPHILS (test code = 1008) 63.1 %                              

   

 

             LYMPHOCYTES (test code = 1010) 28.0 %                              

   

 

             MONOCYTES (test code = 1011) 6.4 %                                 

 

 

             EOSINOPHILS (test code = 1012) 1.6 %                               

   

 

             BASOPHILS (test code = 1013) 0.6 %                                 

 

 

             IMMATURE GRANULOCYTES (test 0.3 %                                  



             code = 1036)                                        

 

             NUCLEATED RBCS (test code = 0.0 /100WBC'S                          

 



             1065)                                               

 

             PLATELET COUNT (test code = 115 K/UL                               



             1015)                                               

 

             ABSOLUTE NEUTROPHILS (test code 4.41 K/UL                          

    



             = 1066)                                             

 

             ABSOLUTE LYMPHOCYTES (test code 1.96 K/UL                          

    



             = 1067)                                             

 

             ABSOLUTE MONOCYTES (test code = 0.45 K/UL                          

    



             1068)                                               

 

             ABSOLUTE EOSINOPHILS (test code 0.11 K/UL                          

    



             = 1040)                                             

 

             ABSOLUTE BASOPHILS (test code = 0.04 K/UL                          

    



             1069)                                               

 

             ABS IMMATURE GRANULOCYTES (test 0.02 K/UL                          

    



             code = 1020)                                        

 

             ABS NUCLEATED RBCS (test code = 0.00 K/UL                          

    



             42893)                                              



COMPREHENSIVE METABOLIC [id number] 00:00:00





             Test Item    Value        Reference Range Interpretation Comments

 

             GLUCOSE (test code = 2217) 119 MG/DL                              

 

             BUN (test code = 2208) 19 MG/DL                               

 

             CREATININE (test code = 2214) 0.98 MG/DL                           

  

 

             eGFR ( CKD-EPI) (test code 65 ML/MIN/1.73                      

     



             = 53606)                                            

 

             CALC BUN/CREAT (test code = 19 RATIO                               



             2235)                                               

 

             SODIUM (test code = 2231) 137 MEQ/L                              

 

             POTASSIUM (test code = 2228) 4.9 MEQ/L                             

 

 

             CHLORIDE (test code = 2215) 97 MEQ/L                               

 

             CARBON DIOXIDE (test code = 23 MEQ/L                               



             )                                               

 

             CALCIUM (test code = 2209) 9.7 MG/DL                              

 

             PROTEIN, TOTAL (test code = 7.9 G/DL                               



             )                                               

 

             ALBUMIN (test code = 2201) 4.6 G/DL                               

 

             CALC GLOBULIN (test code = 3.3 G/DL                               



             2240)                                               

 

             CALC A/G RATIO (test code = 1.4 RATIO                              



             2234)                                               

 

             BILIRUBIN, TOTAL (test code = 0.3 MG/DL                            

  



             )                                               

 

             ALKALINE PHOSPHATASE (test 79 U/L                                 



             code = 2204)                                        

 

             AST (test code = 2218) 48 U/L                                 

 

             ALT (test code = 2219) 48 U/L                                 



COMPREHENSIVE METABOLIC [id number] 00:00:00





             Test Item    Value        Reference Range Interpretation Comments

 

             GLUCOSE (test code = 2217) 119 MG/DL                              

 

             BUN (test code = 2208) 19 MG/DL                               

 

             CREATININE (test code = 2214) 0.98 MG/DL                           

  

 

             eGFR ( CKD-EPI) (test code 65 ML/MIN/1.73                      

     



             = 84277)                                            

 

             CALC BUN/CREAT (test code = 19 RATIO                               



             2235)                                               

 

             SODIUM (test code = 2231) 137 MEQ/L                              

 

             POTASSIUM (test code = 2228) 4.9 MEQ/L                             

 

 

             CHLORIDE (test code = 2215) 97 MEQ/L                               

 

             CARBON DIOXIDE (test code = 23 MEQ/L                               



             )                                               

 

             CALCIUM (test code = 2209) 9.7 MG/DL                              

 

             PROTEIN, TOTAL (test code = 7.9 G/DL                               



             )                                               

 

             ALBUMIN (test code = 220) 4.6 G/DL                               

 

             CALC GLOBULIN (test code = 3.3 G/DL                               



             )                                               

 

             CALC A/G RATIO (test code = 1.4 RATIO                              



             )                                               

 

             BILIRUBIN, TOTAL (test code = 0.3 MG/DL                            

  



             )                                               

 

             ALKALINE PHOSPHATASE (test 79 U/L                                 



             code = 2204)                                        

 

             AST (test code = 2218) 48 U/L                                 

 

             ALT (test code = 2219) 48 U/L                                 



VITAMIN [id number] 00:00:00





             Test Item    Value        Reference Range Interpretation Comments

 

             VITAMIN B-12 (test code = 2840) 647 PG/ML                          

    



VITAMIN [id number] 00:00:00





             Test Item    Value        Reference Range Interpretation Comments

 

             VITAMIN B-12 (test code = 2840) 647 PG/ML                          

    



VITAMIN [id number] 00:00:00





             Test Item    Value        Reference Range Interpretation Comments

 

             VITAMIN B-12 (test code = 2840) 647 PG/ML                          

    



VITAMIN D, 25 [id number] 00:00:00





             Test Item    Value        Reference Range Interpretation Comments

 

             VITAMIN D, 25 OH (test code = 4958) 18 NG/ML                       

        



VITAMIN D, 25 [id number] 00:00:00





             Test Item    Value        Reference Range Interpretation Comments

 

             VITAMIN D, 25 OH (test code = 4958) 18 NG/ML                       

        



TSH, THIRD [id number] 00:00:00





             Test Item    Value        Reference Range Interpretation Comments

 

             TSH, THIRD GENERATION (test code 1.290 UIU/ML                      

     



             = 2821)                                             



TSH, THIRD [id number] 00:00:00





             Test Item    Value        Reference Range Interpretation Comments

 

             TSH, THIRD GENERATION (test code 1.290 UIU/ML                      

     



             = 2821)                                             



TSH, THIRD [id number] 00:00:00





             Test Item    Value        Reference Range Interpretation Comments

 

             TSH, THIRD GENERATION (test code 1.290 UIU/ML                      

     



             = 2821)                                             



CBC W/AUTO [id number] 00:00:00





             Test Item    Value        Reference Range Interpretation Comments

 

             WBC (test code = 1001) 7.0 K/UL                               

 

             RBC (test code = 1002) 3.83 M/UL                              

 

             HEMOGLOBIN (test code = 1003) 10.9 G/DL                            

  

 

             HEMATOCRIT (test code = 1004) 33.2 %                               

  

 

             MCV (test code = 1005) 86.7 fL                                

 

             MCH (test code = 1006) 28.5 PG                                

 

             MCHC (test code = 1007) 32.8 G/DL                              

 

             RDW (test code = 1038) 15.2 %                                 

 

             NEUTROPHILS (test code = 1008) 63.1 %                              

   

 

             LYMPHOCYTES (test code = 1010) 28.0 %                              

   

 

             MONOCYTES (test code = 1011) 6.4 %                                 

 

 

             EOSINOPHILS (test code = 1012) 1.6 %                               

   

 

             BASOPHILS (test code = 1013) 0.6 %                                 

 

 

             IMMATURE GRANULOCYTES (test 0.3 %                                  



             code = 1036)                                        

 

             NUCLEATED RBCS (test code = 0.0 /100WBC'S                          

 



             1065)                                               

 

             PLATELET COUNT (test code = 115 K/UL                               



             1015)                                               

 

             ABSOLUTE NEUTROPHILS (test code 4.41 K/UL                          

    



             = 1066)                                             

 

             ABSOLUTE LYMPHOCYTES (test code 1.96 K/UL                          

    



             = 1067)                                             

 

             ABSOLUTE MONOCYTES (test code = 0.45 K/UL                          

    



             1068)                                               

 

             ABSOLUTE EOSINOPHILS (test code 0.11 K/UL                          

    



             = 1040)                                             

 

             ABSOLUTE BASOPHILS (test code = 0.04 K/UL                          

    



             1069)                                               

 

             ABS IMMATURE GRANULOCYTES (test 0.02 K/UL                          

    



             code = 1020)                                        

 

             ABS NUCLEATED RBCS (test code = 0.00 K/UL                          

    



             39188)                                              



CBC W/AUTO [id number] 00:00:00





             Test Item    Value        Reference Range Interpretation Comments

 

             WBC (test code = 1001) 7.0 K/UL                               

 

             RBC (test code = 1002) 3.83 M/UL                              

 

             HEMOGLOBIN (test code = 1003) 10.9 G/DL                            

  

 

             HEMATOCRIT (test code = 1004) 33.2 %                               

  

 

             MCV (test code = 1005) 86.7 fL                                

 

             MCH (test code = 1006) 28.5 PG                                

 

             MCHC (test code = 1007) 32.8 G/DL                              

 

             RDW (test code = 1038) 15.2 %                                 

 

             NEUTROPHILS (test code = 1008) 63.1 %                              

   

 

             LYMPHOCYTES (test code = 1010) 28.0 %                              

   

 

             MONOCYTES (test code = 1011) 6.4 %                                 

 

 

             EOSINOPHILS (test code = 1012) 1.6 %                               

   

 

             BASOPHILS (test code = 1013) 0.6 %                                 

 

 

             IMMATURE GRANULOCYTES (test 0.3 %                                  



             code = 1036)                                        

 

             NUCLEATED RBCS (test code = 0.0 /100WBC'S                          

 



             1065)                                               

 

             PLATELET COUNT (test code = 115 K/UL                               



             1015)                                               

 

             ABSOLUTE NEUTROPHILS (test code 4.41 K/UL                          

    



             = 1066)                                             

 

             ABSOLUTE LYMPHOCYTES (test code 1.96 K/UL                          

    



             = 1067)                                             

 

             ABSOLUTE MONOCYTES (test code = 0.45 K/UL                          

    



             1068)                                               

 

             ABSOLUTE EOSINOPHILS (test code 0.11 K/UL                          

    



             = 1040)                                             

 

             ABSOLUTE BASOPHILS (test code = 0.04 K/UL                          

    



             1069)                                               

 

             ABS IMMATURE GRANULOCYTES (test 0.02 K/UL                          

    



             code = 1020)                                        

 

             ABS NUCLEATED RBCS (test code = 0.00 K/UL                          

    



             75990)                                              



CBC W/AUTO [id number] 00:00:00





             Test Item    Value        Reference Range Interpretation Comments

 

             WBC (test code = 1001) 7.0 K/UL                               

 

             RBC (test code = 1002) 3.83 M/UL                              

 

             HEMOGLOBIN (test code = 1003) 10.9 G/DL                            

  

 

             HEMATOCRIT (test code = 1004) 33.2 %                               

  

 

             MCV (test code = 1005) 86.7 fL                                

 

             MCH (test code = 1006) 28.5 PG                                

 

             MCHC (test code = 1007) 32.8 G/DL                              

 

             RDW (test code = 1038) 15.2 %                                 

 

             NEUTROPHILS (test code = 1008) 63.1 %                              

   

 

             LYMPHOCYTES (test code = 1010) 28.0 %                              

   

 

             MONOCYTES (test code = 1011) 6.4 %                                 

 

 

             EOSINOPHILS (test code = 1012) 1.6 %                               

   

 

             BASOPHILS (test code = 1013) 0.6 %                                 

 

 

             IMMATURE GRANULOCYTES (test 0.3 %                                  



             code = 1036)                                        

 

             NUCLEATED RBCS (test code = 0.0 /100WBC'S                          

 



             1065)                                               

 

             PLATELET COUNT (test code = 115 K/UL                               



             1015)                                               

 

             ABSOLUTE NEUTROPHILS (test code 4.41 K/UL                          

    



             = 1066)                                             

 

             ABSOLUTE LYMPHOCYTES (test code 1.96 K/UL                          

    



             = 1067)                                             

 

             ABSOLUTE MONOCYTES (test code = 0.45 K/UL                          

    



             1068)                                               

 

             ABSOLUTE EOSINOPHILS (test code 0.11 K/UL                          

    



             = 1040)                                             

 

             ABSOLUTE BASOPHILS (test code = 0.04 K/UL                          

    



             1069)                                               

 

             ABS IMMATURE GRANULOCYTES (test 0.02 K/UL                          

    



             code = 1020)                                        

 

             ABS NUCLEATED RBCS (test code = 0.00 K/UL                          

    



             99309)                                              



COMPREHENSIVE METABOLIC [id number] 00:00:00





             Test Item    Value        Reference Range Interpretation Comments

 

             GLUCOSE (test code = 2217) 119 MG/DL                              

 

             BUN (test code = 2208) 19 MG/DL                               

 

             CREATININE (test code = 2214) 0.98 MG/DL                           

  

 

             eGFR ( CKD-EPI) (test code 65 ML/MIN/1.73                      

     



             = 46520)                                            

 

             CALC BUN/CREAT (test code = 19 RATIO                               



             2235)                                               

 

             SODIUM (test code = 2231) 137 MEQ/L                              

 

             POTASSIUM (test code = 2228) 4.9 MEQ/L                             

 

 

             CHLORIDE (test code = 2215) 97 MEQ/L                               

 

             CARBON DIOXIDE (test code = 23 MEQ/L                               



             )                                               

 

             CALCIUM (test code = 2209) 9.7 MG/DL                              

 

             PROTEIN, TOTAL (test code = 7.9 G/DL                               



             )                                               

 

             ALBUMIN (test code = 2201) 4.6 G/DL                               

 

             CALC GLOBULIN (test code = 3.3 G/DL                               



             2240)                                               

 

             CALC A/G RATIO (test code = 1.4 RATIO                              



             2234)                                               

 

             BILIRUBIN, TOTAL (test code = 0.3 MG/DL                            

  



             )                                               

 

             ALKALINE PHOSPHATASE (test 79 U/L                                 



             code = 2204)                                        

 

             AST (test code = 2218) 48 U/L                                 

 

             ALT (test code = 2219) 48 U/L                                 



COMPREHENSIVE METABOLIC [id number] 00:00:00





             Test Item    Value        Reference Range Interpretation Comments

 

             GLUCOSE (test code = 2217) 119 MG/DL                              

 

             BUN (test code = 2208) 19 MG/DL                               

 

             CREATININE (test code = 2214) 0.98 MG/DL                           

  

 

             eGFR ( CKD-EPI) (test code 65 ML/MIN/1.73                      

     



             = 53301)                                            

 

             CALC BUN/CREAT (test code = 19 RATIO                               



             2235)                                               

 

             SODIUM (test code = 2231) 137 MEQ/L                              

 

             POTASSIUM (test code = 2228) 4.9 MEQ/L                             

 

 

             CHLORIDE (test code = 2215) 97 MEQ/L                               

 

             CARBON DIOXIDE (test code = 23 MEQ/L                               



             )                                               

 

             CALCIUM (test code = 2209) 9.7 MG/DL                              

 

             PROTEIN, TOTAL (test code = 7.9 G/DL                               



             )                                               

 

             ALBUMIN (test code = 2201) 4.6 G/DL                               

 

             CALC GLOBULIN (test code = 3.3 G/DL                               



             )                                               

 

             CALC A/G RATIO (test code = 1.4 RATIO                              



             )                                               

 

             BILIRUBIN, TOTAL (test code = 0.3 MG/DL                            

  



             )                                               

 

             ALKALINE PHOSPHATASE (test 79 U/L                                 



             code = 2204)                                        

 

             AST (test code = 2218) 48 U/L                                 

 

             ALT (test code = 2219) 48 U/L                                 



VITAMIN [id number] 00:00:00





             Test Item    Value        Reference Range Interpretation Comments

 

             VITAMIN B-12 (test code = 2840) 647 PG/ML                          

    



VITAMIN [id number] 00:00:00





             Test Item    Value        Reference Range Interpretation Comments

 

             VITAMIN B-12 (test code = 2840) 647 PG/ML                          

    



VITAMIN [id number] 00:00:00





             Test Item    Value        Reference Range Interpretation Comments

 

             VITAMIN B-12 (test code = 2840) 647 PG/ML                          

    



VITAMIN D, 25 [id number] 00:00:00





             Test Item    Value        Reference Range Interpretation Comments

 

             VITAMIN D, 25 OH (test code = 4958) 18 NG/ML                       

        



VITAMIN D, 25 [id number] 00:00:00





             Test Item    Value        Reference Range Interpretation Comments

 

             VITAMIN D, 25 OH (test code = 4958) 18 NG/ML                       

        



TSH, THIRD [id number] 00:00:00





             Test Item    Value        Reference Range Interpretation Comments

 

             TSH, THIRD GENERATION (test code 1.290 UIU/ML                      

     



             = 2821)                                             



TSH, THIRD [id number] 00:00:00





             Test Item    Value        Reference Range Interpretation Comments

 

             TSH, THIRD GENERATION (test code 1.290 UIU/ML                      

     



             = 2821)                                             



TSH, THIRD [id number] 00:00:00





             Test Item    Value        Reference Range Interpretation Comments

 

             TSH, THIRD GENERATION (test code 1.290 UIU/ML                      

     



             = 2821)                                             



CBC W/AUTO [id number] 00:00:00





             Test Item    Value        Reference Range Interpretation Comments

 

             WBC (test code = 1001) 7.0 K/UL                               

 

             RBC (test code = 1002) 3.83 M/UL                              

 

             HEMOGLOBIN (test code = 1003) 10.9 G/DL                            

  

 

             HEMATOCRIT (test code = 1004) 33.2 %                               

  

 

             MCV (test code = 1005) 86.7 fL                                

 

             MCH (test code = 1006) 28.5 PG                                

 

             MCHC (test code = 1007) 32.8 G/DL                              

 

             RDW (test code = 1038) 15.2 %                                 

 

             NEUTROPHILS (test code = 1008) 63.1 %                              

   

 

             LYMPHOCYTES (test code = 1010) 28.0 %                              

   

 

             MONOCYTES (test code = 1011) 6.4 %                                 

 

 

             EOSINOPHILS (test code = 1012) 1.6 %                               

   

 

             BASOPHILS (test code = 1013) 0.6 %                                 

 

 

             IMMATURE GRANULOCYTES (test 0.3 %                                  



             code = 1036)                                        

 

             NUCLEATED RBCS (test code = 0.0 /100WBC'S                          

 



             1065)                                               

 

             PLATELET COUNT (test code = 115 K/UL                               



             1015)                                               

 

             ABSOLUTE NEUTROPHILS (test code 4.41 K/UL                          

    



             = 1066)                                             

 

             ABSOLUTE LYMPHOCYTES (test code 1.96 K/UL                          

    



             = 1067)                                             

 

             ABSOLUTE MONOCYTES (test code = 0.45 K/UL                          

    



             1068)                                               

 

             ABSOLUTE EOSINOPHILS (test code 0.11 K/UL                          

    



             = 1040)                                             

 

             ABSOLUTE BASOPHILS (test code = 0.04 K/UL                          

    



             1069)                                               

 

             ABS IMMATURE GRANULOCYTES (test 0.02 K/UL                          

    



             code = 1020)                                        

 

             ABS NUCLEATED RBCS (test code = 0.00 K/UL                          

    



             98283)                                              



CBC W/AUTO [id number] 00:00:00





             Test Item    Value        Reference Range Interpretation Comments

 

             WBC (test code = 1001) 7.0 K/UL                               

 

             RBC (test code = 1002) 3.83 M/UL                              

 

             HEMOGLOBIN (test code = 1003) 10.9 G/DL                            

  

 

             HEMATOCRIT (test code = 1004) 33.2 %                               

  

 

             MCV (test code = 1005) 86.7 fL                                

 

             MCH (test code = 1006) 28.5 PG                                

 

             MCHC (test code = 1007) 32.8 G/DL                              

 

             RDW (test code = 1038) 15.2 %                                 

 

             NEUTROPHILS (test code = 1008) 63.1 %                              

   

 

             LYMPHOCYTES (test code = 1010) 28.0 %                              

   

 

             MONOCYTES (test code = 1011) 6.4 %                                 

 

 

             EOSINOPHILS (test code = 1012) 1.6 %                               

   

 

             BASOPHILS (test code = 1013) 0.6 %                                 

 

 

             IMMATURE GRANULOCYTES (test 0.3 %                                  



             code = 1036)                                        

 

             NUCLEATED RBCS (test code = 0.0 /100WBC'S                          

 



             1065)                                               

 

             PLATELET COUNT (test code = 115 K/UL                               



             1015)                                               

 

             ABSOLUTE NEUTROPHILS (test code 4.41 K/UL                          

    



             = 1066)                                             

 

             ABSOLUTE LYMPHOCYTES (test code 1.96 K/UL                          

    



             = 1067)                                             

 

             ABSOLUTE MONOCYTES (test code = 0.45 K/UL                          

    



             1068)                                               

 

             ABSOLUTE EOSINOPHILS (test code 0.11 K/UL                          

    



             = 1040)                                             

 

             ABSOLUTE BASOPHILS (test code = 0.04 K/UL                          

    



             1069)                                               

 

             ABS IMMATURE GRANULOCYTES (test 0.02 K/UL                          

    



             code = 1020)                                        

 

             ABS NUCLEATED RBCS (test code = 0.00 K/UL                          

    



             85932)                                              



CBC W/AUTO [id number] 00:00:00





             Test Item    Value        Reference Range Interpretation Comments

 

             WBC (test code = 1001) 7.0 K/UL                               

 

             RBC (test code = 1002) 3.83 M/UL                              

 

             HEMOGLOBIN (test code = 1003) 10.9 G/DL                            

  

 

             HEMATOCRIT (test code = 1004) 33.2 %                               

  

 

             MCV (test code = 1005) 86.7 fL                                

 

             MCH (test code = 1006) 28.5 PG                                

 

             MCHC (test code = 1007) 32.8 G/DL                              

 

             RDW (test code = 1038) 15.2 %                                 

 

             NEUTROPHILS (test code = 1008) 63.1 %                              

   

 

             LYMPHOCYTES (test code = 1010) 28.0 %                              

   

 

             MONOCYTES (test code = 1011) 6.4 %                                 

 

 

             EOSINOPHILS (test code = 1012) 1.6 %                               

   

 

             BASOPHILS (test code = 1013) 0.6 %                                 

 

 

             IMMATURE GRANULOCYTES (test 0.3 %                                  



             code = 1036)                                        

 

             NUCLEATED RBCS (test code = 0.0 /100WBC'S                          

 



             1065)                                               

 

             PLATELET COUNT (test code = 115 K/UL                               



             1015)                                               

 

             ABSOLUTE NEUTROPHILS (test code 4.41 K/UL                          

    



             = 1066)                                             

 

             ABSOLUTE LYMPHOCYTES (test code 1.96 K/UL                          

    



             = 1067)                                             

 

             ABSOLUTE MONOCYTES (test code = 0.45 K/UL                          

    



             1068)                                               

 

             ABSOLUTE EOSINOPHILS (test code 0.11 K/UL                          

    



             = 1040)                                             

 

             ABSOLUTE BASOPHILS (test code = 0.04 K/UL                          

    



             1069)                                               

 

             ABS IMMATURE GRANULOCYTES (test 0.02 K/UL                          

    



             code = 1020)                                        

 

             ABS NUCLEATED RBCS (test code = 0.00 K/UL                          

    



             52649)                                              



COMPREHENSIVE METABOLIC [id number] 00:00:00





             Test Item    Value        Reference Range Interpretation Comments

 

             GLUCOSE (test code = 2217) 119 MG/DL                              

 

             BUN (test code = 2208) 19 MG/DL                               

 

             CREATININE (test code = 2214) 0.98 MG/DL                           

  

 

             eGFR ( CKD-EPI) (test code 65 ML/MIN/1.73                      

     



             = 17307)                                            

 

             CALC BUN/CREAT (test code = 19 RATIO                               



             2235)                                               

 

             SODIUM (test code = 2231) 137 MEQ/L                              

 

             POTASSIUM (test code = 2228) 4.9 MEQ/L                             

 

 

             CHLORIDE (test code = 2215) 97 MEQ/L                               

 

             CARBON DIOXIDE (test code = 23 MEQ/L                               



             )                                               

 

             CALCIUM (test code = 2209) 9.7 MG/DL                              

 

             PROTEIN, TOTAL (test code = 7.9 G/DL                               



             )                                               

 

             ALBUMIN (test code = 220) 4.6 G/DL                               

 

             CALC GLOBULIN (test code = 3.3 G/DL                               



             0)                                               

 

             CALC A/G RATIO (test code = 1.4 RATIO                              



             2234)                                               

 

             BILIRUBIN, TOTAL (test code = 0.3 MG/DL                            

  



             )                                               

 

             ALKALINE PHOSPHATASE (test 79 U/L                                 



             code = 2204)                                        

 

             AST (test code = 2218) 48 U/L                                 

 

             ALT (test code = 2219) 48 U/L                                 



COMPREHENSIVE METABOLIC [id number] 00:00:00





             Test Item    Value        Reference Range Interpretation Comments

 

             GLUCOSE (test code = 2217) 119 MG/DL                              

 

             BUN (test code = 2208) 19 MG/DL                               

 

             CREATININE (test code = 2214) 0.98 MG/DL                           

  

 

             eGFR ( CKD-EPI) (test code 65 ML/MIN/1.73                      

     



             = 20498)                                            

 

             CALC BUN/CREAT (test code = 19 RATIO                               



             2235)                                               

 

             SODIUM (test code = 2231) 137 MEQ/L                              

 

             POTASSIUM (test code = 2228) 4.9 MEQ/L                             

 

 

             CHLORIDE (test code = 2215) 97 MEQ/L                               

 

             CARBON DIOXIDE (test code = 23 MEQ/L                               



             )                                               

 

             CALCIUM (test code = 2209) 9.7 MG/DL                              

 

             PROTEIN, TOTAL (test code = 7.9 G/DL                               



             )                                               

 

             ALBUMIN (test code = 2201) 4.6 G/DL                               

 

             CALC GLOBULIN (test code = 3.3 G/DL                               



             2240)                                               

 

             CALC A/G RATIO (test code = 1.4 RATIO                              



             2234)                                               

 

             BILIRUBIN, TOTAL (test code = 0.3 MG/DL                            

  



             )                                               

 

             ALKALINE PHOSPHATASE (test 79 U/L                                 



             code = 2204)                                        

 

             AST (test code = 2218) 48 U/L                                 

 

             ALT (test code = 2219) 48 U/L                                 



VITAMIN [id number] 00:00:00





             Test Item    Value        Reference Range Interpretation Comments

 

             VITAMIN B-12 (test code = 2840) 647 PG/ML                          

    



VITAMIN [id number] 00:00:00





             Test Item    Value        Reference Range Interpretation Comments

 

             VITAMIN B-12 (test code = 2840) 647 PG/ML                          

    



VITAMIN [id number] 00:00:00





             Test Item    Value        Reference Range Interpretation Comments

 

             VITAMIN B-12 (test code = 2840) 647 PG/ML                          

    



VITAMIN D, 25 [id number] 00:00:00





             Test Item    Value        Reference Range Interpretation Comments

 

             VITAMIN D, 25 OH (test code = 4958) 18 NG/ML                       

        



VITAMIN D, 25 [id number] 00:00:00





             Test Item    Value        Reference Range Interpretation Comments

 

             VITAMIN D, 25 OH (test code = 4958) 18 NG/ML                       

        



TSH, THIRD [id number] 00:00:00





             Test Item    Value        Reference Range Interpretation Comments

 

             TSH, THIRD GENERATION (test code 1.290 UIU/ML                      

     



             = 2821)                                             



TSH, THIRD [id number] 00:00:00





             Test Item    Value        Reference Range Interpretation Comments

 

             TSH, THIRD GENERATION (test code 1.290 UIU/ML                      

     



             = 2821)                                             



TSH, THIRD [id number] 00:00:00





             Test Item    Value        Reference Range Interpretation Comments

 

             TSH, THIRD GENERATION (test code 1.290 UIU/ML                      

     



             = 2821)                                             



CBC W/AUTO [id number] 00:00:00





             Test Item    Value        Reference Range Interpretation Comments

 

             WBC (test code = 1001) 7.0 K/UL                               

 

             RBC (test code = 1002) 3.83 M/UL                              

 

             HEMOGLOBIN (test code = 1003) 10.9 G/DL                            

  

 

             HEMATOCRIT (test code = 1004) 33.2 %                               

  

 

             MCV (test code = 1005) 86.7 fL                                

 

             MCH (test code = 1006) 28.5 PG                                

 

             MCHC (test code = 1007) 32.8 G/DL                              

 

             RDW (test code = 1038) 15.2 %                                 

 

             NEUTROPHILS (test code = 1008) 63.1 %                              

   

 

             LYMPHOCYTES (test code = 1010) 28.0 %                              

   

 

             MONOCYTES (test code = 1011) 6.4 %                                 

 

 

             EOSINOPHILS (test code = 1012) 1.6 %                               

   

 

             BASOPHILS (test code = 1013) 0.6 %                                 

 

 

             IMMATURE GRANULOCYTES (test 0.3 %                                  



             code = 1036)                                        

 

             NUCLEATED RBCS (test code = 0.0 /100WBC'S                          

 



             1065)                                               

 

             PLATELET COUNT (test code = 115 K/UL                               



             1015)                                               

 

             ABSOLUTE NEUTROPHILS (test code 4.41 K/UL                          

    



             = 1066)                                             

 

             ABSOLUTE LYMPHOCYTES (test code 1.96 K/UL                          

    



             = 1067)                                             

 

             ABSOLUTE MONOCYTES (test code = 0.45 K/UL                          

    



             1068)                                               

 

             ABSOLUTE EOSINOPHILS (test code 0.11 K/UL                          

    



             = 1040)                                             

 

             ABSOLUTE BASOPHILS (test code = 0.04 K/UL                          

    



             1069)                                               

 

             ABS IMMATURE GRANULOCYTES (test 0.02 K/UL                          

    



             code = 1020)                                        

 

             ABS NUCLEATED RBCS (test code = 0.00 K/UL                          

    



             69213)                                              



CBC W/AUTO [id number] 00:00:00





             Test Item    Value        Reference Range Interpretation Comments

 

             WBC (test code = 1001) 7.0 K/UL                               

 

             RBC (test code = 1002) 3.83 M/UL                              

 

             HEMOGLOBIN (test code = 1003) 10.9 G/DL                            

  

 

             HEMATOCRIT (test code = 1004) 33.2 %                               

  

 

             MCV (test code = 1005) 86.7 fL                                

 

             MCH (test code = 1006) 28.5 PG                                

 

             MCHC (test code = 1007) 32.8 G/DL                              

 

             RDW (test code = 1038) 15.2 %                                 

 

             NEUTROPHILS (test code = 1008) 63.1 %                              

   

 

             LYMPHOCYTES (test code = 1010) 28.0 %                              

   

 

             MONOCYTES (test code = 1011) 6.4 %                                 

 

 

             EOSINOPHILS (test code = 1012) 1.6 %                               

   

 

             BASOPHILS (test code = 1013) 0.6 %                                 

 

 

             IMMATURE GRANULOCYTES (test 0.3 %                                  



             code = 1036)                                        

 

             NUCLEATED RBCS (test code = 0.0 /100WBC'S                          

 



             1065)                                               

 

             PLATELET COUNT (test code = 115 K/UL                               



             1015)                                               

 

             ABSOLUTE NEUTROPHILS (test code 4.41 K/UL                          

    



             = 1066)                                             

 

             ABSOLUTE LYMPHOCYTES (test code 1.96 K/UL                          

    



             = 1067)                                             

 

             ABSOLUTE MONOCYTES (test code = 0.45 K/UL                          

    



             1068)                                               

 

             ABSOLUTE EOSINOPHILS (test code 0.11 K/UL                          

    



             = 1040)                                             

 

             ABSOLUTE BASOPHILS (test code = 0.04 K/UL                          

    



             1069)                                               

 

             ABS IMMATURE GRANULOCYTES (test 0.02 K/UL                          

    



             code = 1020)                                        

 

             ABS NUCLEATED RBCS (test code = 0.00 K/UL                          

    



             27496)                                              



CBC W/AUTO [id number] 00:00:00





             Test Item    Value        Reference Range Interpretation Comments

 

             WBC (test code = 1001) 7.0 K/UL                               

 

             RBC (test code = 1002) 3.83 M/UL                              

 

             HEMOGLOBIN (test code = 1003) 10.9 G/DL                            

  

 

             HEMATOCRIT (test code = 1004) 33.2 %                               

  

 

             MCV (test code = 1005) 86.7 fL                                

 

             MCH (test code = 1006) 28.5 PG                                

 

             MCHC (test code = 1007) 32.8 G/DL                              

 

             RDW (test code = 1038) 15.2 %                                 

 

             NEUTROPHILS (test code = 1008) 63.1 %                              

   

 

             LYMPHOCYTES (test code = 1010) 28.0 %                              

   

 

             MONOCYTES (test code = 1011) 6.4 %                                 

 

 

             EOSINOPHILS (test code = 1012) 1.6 %                               

   

 

             BASOPHILS (test code = 1013) 0.6 %                                 

 

 

             IMMATURE GRANULOCYTES (test 0.3 %                                  



             code = 1036)                                        

 

             NUCLEATED RBCS (test code = 0.0 /100WBC'S                          

 



             1065)                                               

 

             PLATELET COUNT (test code = 115 K/UL                               



             1015)                                               

 

             ABSOLUTE NEUTROPHILS (test code 4.41 K/UL                          

    



             = 1066)                                             

 

             ABSOLUTE LYMPHOCYTES (test code 1.96 K/UL                          

    



             = 1067)                                             

 

             ABSOLUTE MONOCYTES (test code = 0.45 K/UL                          

    



             1068)                                               

 

             ABSOLUTE EOSINOPHILS (test code 0.11 K/UL                          

    



             = 1040)                                             

 

             ABSOLUTE BASOPHILS (test code = 0.04 K/UL                          

    



             1069)                                               

 

             ABS IMMATURE GRANULOCYTES (test 0.02 K/UL                          

    



             code = 1020)                                        

 

             ABS NUCLEATED RBCS (test code = 0.00 K/UL                          

    



             24762)                                              



COMPREHENSIVE METABOLIC [id number] 00:00:00





             Test Item    Value        Reference Range Interpretation Comments

 

             GLUCOSE (test code = 2217) 119 MG/DL                              

 

             BUN (test code = 2208) 19 MG/DL                               

 

             CREATININE (test code = 2214) 0.98 MG/DL                           

  

 

             eGFR ( CKD-EPI) (test code 65 ML/MIN/1.73                      

     



             = 59655)                                            

 

             CALC BUN/CREAT (test code = 19 RATIO                               



             2235)                                               

 

             SODIUM (test code = 2231) 137 MEQ/L                              

 

             POTASSIUM (test code = 2228) 4.9 MEQ/L                             

 

 

             CHLORIDE (test code = 2215) 97 MEQ/L                               

 

             CARBON DIOXIDE (test code = 23 MEQ/L                               



             2206)                                               

 

             CALCIUM (test code = 2209) 9.7 MG/DL                              

 

             PROTEIN, TOTAL (test code = 7.9 G/DL                               



             )                                               

 

             ALBUMIN (test code = 2201) 4.6 G/DL                               

 

             CALC GLOBULIN (test code = 3.3 G/DL                               



             2240)                                               

 

             CALC A/G RATIO (test code = 1.4 RATIO                              



             2234)                                               

 

             BILIRUBIN, TOTAL (test code = 0.3 MG/DL                            

  



             )                                               

 

             ALKALINE PHOSPHATASE (test 79 U/L                                 



             code = 2204)                                        

 

             AST (test code = 2218) 48 U/L                                 

 

             ALT (test code = 2219) 48 U/L                                 



COMPREHENSIVE METABOLIC [id number] 00:00:00





             Test Item    Value        Reference Range Interpretation Comments

 

             GLUCOSE (test code = 2217) 119 MG/DL                              

 

             BUN (test code = 2208) 19 MG/DL                               

 

             CREATININE (test code = 2214) 0.98 MG/DL                           

  

 

             eGFR ( CKD-EPI) (test code 65 ML/MIN/1.73                      

     



             = 70857)                                            

 

             CALC BUN/CREAT (test code = 19 RATIO                               



             2235)                                               

 

             SODIUM (test code = 2231) 137 MEQ/L                              

 

             POTASSIUM (test code = 2228) 4.9 MEQ/L                             

 

 

             CHLORIDE (test code = 2215) 97 MEQ/L                               

 

             CARBON DIOXIDE (test code = 23 MEQ/L                               



             )                                               

 

             CALCIUM (test code = 2209) 9.7 MG/DL                              

 

             PROTEIN, TOTAL (test code = 7.9 G/DL                               



             )                                               

 

             ALBUMIN (test code = 2201) 4.6 G/DL                               

 

             CALC GLOBULIN (test code = 3.3 G/DL                               



             2240)                                               

 

             CALC A/G RATIO (test code = 1.4 RATIO                              



             2234)                                               

 

             BILIRUBIN, TOTAL (test code = 0.3 MG/DL                            

  



             )                                               

 

             ALKALINE PHOSPHATASE (test 79 U/L                                 



             code = 2204)                                        

 

             AST (test code = 2218) 48 U/L                                 

 

             ALT (test code = 2219) 48 U/L                                 



VITAMIN [id number] 00:00:00





             Test Item    Value        Reference Range Interpretation Comments

 

             VITAMIN B-12 (test code = 2840) 647 PG/ML                          

    



VITAMIN [id number] 00:00:00





             Test Item    Value        Reference Range Interpretation Comments

 

             VITAMIN B-12 (test code = 2840) 647 PG/ML                          

    



VITAMIN [id number] 00:00:00





             Test Item    Value        Reference Range Interpretation Comments

 

             VITAMIN B-12 (test code = 2840) 647 PG/ML                          

    



VITAMIN D, 25 [id number] 00:00:00





             Test Item    Value        Reference Range Interpretation Comments

 

             VITAMIN D, 25 OH (test code = 4958) 18 NG/ML                       

        



VITAMIN D, 25 [id number] 00:00:00





             Test Item    Value        Reference Range Interpretation Comments

 

             VITAMIN D, 25 OH (test code = 4958) 18 NG/ML                       

        



CULTURE, [id number] 12:01:26SPECIMEN NUMBER: [id number] CULTURE, URINE 
SPECIMEN NUMBER: [id number] SPECIMEN COMMENT: URINE SOURCE:URINE REPORT STATUS: 
FINAL FINAL REPORT: 2022 10-50,000 CFU/ML MIXED UROGENITAL ROWENA UNLESS OT
HERWISE INDICATED, ALL TESTING PERFORMED ATCLINICAL PATHOLOGY [employer]
[address] [place] [address]  : CLIFTON DARBY M.D. CLIA
NUMBER [id number] [facility] ACCREDITATION NO.[id number], [id number] 00:00:00





             Test Item    Value        Reference Range Interpretation Comments

 

             CULTURE, URINE (test SPECIMEN NUMBER:                           



             code = 34839) [id number]                              



CULTURE, [id number] 00:00:00





             Test Item    Value        Reference Range Interpretation Comments

 

             CULTURE, URINE (test SPECIMEN NUMBER:                           



             code = 65877) [id number]                              



CULTURE, [id number] 00:00:00





             Test Item    Value        Reference Range Interpretation Comments

 

             CULTURE, URINE (test SPECIMEN NUMBER:                           



             code = 40326) [id number]                              



CULTURE, [id number] 00:00:00





             Test Item    Value        Reference Range Interpretation Comments

 

             CULTURE, URINE (test SPECIMEN NUMBER:                           



             code = 71930) [id number]                              



CULTURE, [id number] 00:00:00





             Test Item    Value        Reference Range Interpretation Comments

 

             CULTURE, URINE (test SPECIMEN NUMBER:                           



             code = 59604) [id number]                              



CULTURE, [id number] 00:00:00





             Test Item    Value        Reference Range Interpretation Comments

 

             CULTURE, URINE (test SPECIMEN NUMBER:                           



             code = 75761) [id number]                              



CULTURE, [id number] 00:00:00





             Test Item    Value        Reference Range Interpretation Comments

 

             CULTURE, URINE (test SPECIMEN NUMBER:                           



             code = 90646) [id number]                              



CULTURE, [id number] 00:00:00





             Test Item    Value        Reference Range Interpretation Comments

 

             CULTURE, URINE (test SPECIMEN NUMBER:                           



             code = 94639) [id number]                              



CULTURE, [id number] 00:00:00





             Test Item    Value        Reference Range Interpretation Comments

 

             CULTURE, URINE (test SPECIMEN NUMBER:                           



             code = 94302) [id number]                              



CULTURE, [id number] 00:00:00





             Test Item    Value        Reference Range Interpretation Comments

 

             CULTURE, URINE (test SPECIMEN NUMBER:                           



             code = 82081) [id number]                              



CULTURE, [id number] 00:00:00





             Test Item    Value        Reference Range Interpretation Comments

 

             CULTURE, URINE (test SPECIMEN NUMBER:                           



             code = 96526) [id number]                              



TSH, THIRD [id number] 06:16:09





             Test Item    Value        Reference Range Interpretation Comments

 

             TSH, THIRD   <0.010 UIU/ML 0.400-4.100  L             UNLESS OTHERW

ISE



             GENERATION (test                                        INDICATED, 

ALL



             code = 2821)                                        TESTING PERFORM

ED



                                                                 ATCLINICAL PATH

[facility], [facility]



                                                                 [address] [place] [id number] [place] [address]



                                                                 DIRECTOR: CLIFTON DARBY M.D.

 CLIA



                                                                 NUMBER [id number]

[id number]



                                                                 ACCREDITATION N

O.



                                                                 [id number]



HEMOGLOBIN [id number] 05:23:24





             Test Item    Value        Reference Range Interpretation Comments

 

             HEMOGLOBIN A1c (test 6.7 %        4.2-5.6      H             AMERIC

AN DIABETES



             code = 22237)                                        ASSOCIATION 

IDELINES FOR



                                                                 HGB A1C:



                                                                 PREDIABETES/INC

REASED RISK .



                                                                 . . . . . . 5.7

-6.4%



                                                                 DIAGNOSIS OF DI

ABETES . . .



                                                                 . . . . . . >=6

.5% WITH



                                                                 CONFIRMATION OR

 APPROPRIATE



                                                                 SYMPTOMS NOTE: 

ASSAY MAY BE



                                                                 AFFECTED BY



                                                                 HEMOGLOBINOPATH

IES (SICKLE



                                                                 CELL ANEMIA, S-

C DISEASE,



                                                                 OTHERS) OR ZENA

FICIALLY



                                                                 LOWERED BY DECR

EASED RED



                                                                 CELL SURVIVAL (

HEMOLYTIC



                                                                 ANEMIAS, BLOOD 

LOSS, ETC.).



                                                                 CONSIDER ALTERN

ATE TESTING



                                                                 OR LABORATORY C

ONSULTATION.



COMPREHENSIVE METABOLIC [id number] 04:45:06





             Test Item    Value        Reference Range Interpretation Comments

 

             GLUCOSE (test code = 266 MG/DL    70-99        H            



             )                                               

 

             BUN (test code = 17 MG/DL     8-2208)                                               

 

             CREATININE (test 0.92 MG/DL   0.60-1.30                 



             code = 2214)                                        

 

             eGFR ( CKD-EPI) 70 ML/MIN/1.73 >60                       



             (test code = 17849)                                        

 

             CALC BUN/CREAT (test 18 RATIO     6-28                      



             code = 2235)                                        

 

             SODIUM (test code = 136 MEQ/L    133-146                   



             )                                               

 

             POTASSIUM (test code 5.3 MEQ/L    3.5-5.4                   



             = )                                             

 

             CHLORIDE (test code 98 MEQ/L                         



             = )                                             

 

             CARBON DIOXIDE (test 22 MEQ/L     19-31                     



             code = 220)                                        

 

             CALCIUM (test code = 8.8 MG/DL    8.5-10.5                  



             )                                               

 

             PROTEIN, TOTAL (test 7.4 G/DL     6.1-8.3                   



             code = 222)                                        

 

             ALBUMIN (test code = 4.1 G/DL     3.5-5.2                   



             )                                               

 

             CALC GLOBULIN (test 3.3 G/DL     1.9-3.7                   



             code = 224)                                        

 

             CALC A/G RATIO (test 1.2 RATIO    1.0-2.6                   



             code = 223)                                        

 

             BILIRUBIN, TOTAL 0.3 MG/DL    See_Comment                [Automated

 message]



             (test code = )                                        The syste

m which



                                                                 generated this



                                                                 result transmit

maximo



                                                                 reference range

:



                                                                 <=1.2. The refe

rence



                                                                 range was not u

sed



                                                                 to interpret th

is



                                                                 result as



                                                                 normal/abnormal

.

 

             ALKALINE PHOSPHATASE 88 U/L                           



             (test code = )                                        

 

             AST (test code = 32 U/L       9-40                      



             )                                               

 

             ALT (test code = 29 U/L       5-40                      



             )                                               



HEMOGLOBIN [id number] 00:00:00





             Test Item    Value        Reference Range Interpretation Comments

 

             HEMOGLOBIN A1c (test code = 98787) 6.7 %                           

       



HEMOGLOBIN [id number] 00:00:00





             Test Item    Value        Reference Range Interpretation Comments

 

             HEMOGLOBIN A1c (test code = 04193) 6.7 %                           

       



HEMOGLOBIN [id number] 00:00:00





             Test Item    Value        Reference Range Interpretation Comments

 

             HEMOGLOBIN A1c (test code = 58846) 6.7 %                           

       



COMPREHENSIVE METABOLIC [id number] 00:00:00





             Test Item    Value        Reference Range Interpretation Comments

 

             GLUCOSE (test code = 2217) 266 MG/DL                              

 

             BUN (test code = 2208) 17 MG/DL                               

 

             CREATININE (test code = 2214) 0.92 MG/DL                           

  

 

             eGFR ( CKD-EPI) (test code 70 ML/MIN/1.73                      

     



             = 69392)                                            

 

             CALC BUN/CREAT (test code = 18 RATIO                               



             2235)                                               

 

             SODIUM (test code = 2231) 136 MEQ/L                              

 

             POTASSIUM (test code = 2228) 5.3 MEQ/L                             

 

 

             CHLORIDE (test code = 2215) 98 MEQ/L                               

 

             CARBON DIOXIDE (test code = 22 MEQ/L                               



             2206)                                               

 

             CALCIUM (test code = 2209) 8.8 MG/DL                              

 

             PROTEIN, TOTAL (test code = 7.4 G/DL                               



             222)                                               

 

             ALBUMIN (test code = 2201) 4.1 G/DL                               

 

             CALC GLOBULIN (test code = 3.3 G/DL                               



             2240)                                               

 

             CALC A/G RATIO (test code = 1.2 RATIO                              



             2234)                                               

 

             BILIRUBIN, TOTAL (test code = 0.3 MG/DL                            

  



             )                                               

 

             ALKALINE PHOSPHATASE (test 88 U/L                                 



             code = 2204)                                        

 

             AST (test code = 2218) 32 U/L                                 

 

             ALT (test code = 2219) 29 U/L                                 



COMPREHENSIVE METABOLIC [id number] 00:00:00





             Test Item    Value        Reference Range Interpretation Comments

 

             GLUCOSE (test code = 2217) 266 MG/DL                              

 

             BUN (test code = 2208) 17 MG/DL                               

 

             CREATININE (test code = 2214) 0.92 MG/DL                           

  

 

             eGFR ( CKD-EPI) (test code 70 ML/MIN/1.73                      

     



             = 38132)                                            

 

             CALC BUN/CREAT (test code = 18 RATIO                               



             2235)                                               

 

             SODIUM (test code = 2231) 136 MEQ/L                              

 

             POTASSIUM (test code = 2228) 5.3 MEQ/L                             

 

 

             CHLORIDE (test code = 2215) 98 MEQ/L                               

 

             CARBON DIOXIDE (test code = 22 MEQ/L                               



             220)                                               

 

             CALCIUM (test code = 2209) 8.8 MG/DL                              

 

             PROTEIN, TOTAL (test code = 7.4 G/DL                               



             )                                               

 

             ALBUMIN (test code = 2201) 4.1 G/DL                               

 

             CALC GLOBULIN (test code = 3.3 G/DL                               



             2240)                                               

 

             CALC A/G RATIO (test code = 1.2 RATIO                              



             2234)                                               

 

             BILIRUBIN, TOTAL (test code = 0.3 MG/DL                            

  



             )                                               

 

             ALKALINE PHOSPHATASE (test 88 U/L                                 



             code = 2204)                                        

 

             AST (test code = 2218) 32 U/L                                 

 

             ALT (test code = 2219) 29 U/L                                 



[id number] 00:00:00





             Test Item    Value        Reference Range Interpretation Comments

 

             TSH, THIRD GENERATION (test <0.010 UIU/ML                          

 



             code = 2821)                                        



[id number] 00:00:00





             Test Item    Value        Reference Range Interpretation Comments

 

             TSH, THIRD GENERATION (test <0.010 UIU/ML                          

 



             code = 2821)                                        



[id number] 00:00:00





             Test Item    Value        Reference Range Interpretation Comments

 

             TSH, THIRD GENERATION (test <0.010 UIU/ML                          

 



             code = 2821)                                        



HEMOGLOBIN [id number] 00:00:00





             Test Item    Value        Reference Range Interpretation Comments

 

             HEMOGLOBIN A1c (test code = 43639) 6.7 %                           

       



HEMOGLOBIN [id number] 00:00:00





             Test Item    Value        Reference Range Interpretation Comments

 

             HEMOGLOBIN A1c (test code = 56459) 6.7 %                           

       



HEMOGLOBIN [id number] 00:00:00





             Test Item    Value        Reference Range Interpretation Comments

 

             HEMOGLOBIN A1c (test code = 52936) 6.7 %                           

       



COMPREHENSIVE METABOLIC [id number] 00:00:00





             Test Item    Value        Reference Range Interpretation Comments

 

             GLUCOSE (test code = 2217) 266 MG/DL                              

 

             BUN (test code = 2208) 17 MG/DL                               

 

             CREATININE (test code = 2214) 0.92 MG/DL                           

  

 

             eGFR ( CKD-EPI) (test code 70 ML/MIN/1.73                      

     



             = 47537)                                            

 

             CALC BUN/CREAT (test code = 18 RATIO                               



             2235)                                               

 

             SODIUM (test code = 2231) 136 MEQ/L                              

 

             POTASSIUM (test code = 2228) 5.3 MEQ/L                             

 

 

             CHLORIDE (test code = 2215) 98 MEQ/L                               

 

             CARBON DIOXIDE (test code = 22 MEQ/L                               



             )                                               

 

             CALCIUM (test code = 2209) 8.8 MG/DL                              

 

             PROTEIN, TOTAL (test code = 7.4 G/DL                               



             )                                               

 

             ALBUMIN (test code = 2201) 4.1 G/DL                               

 

             CALC GLOBULIN (test code = 3.3 G/DL                               



             0)                                               

 

             CALC A/G RATIO (test code = 1.2 RATIO                              



             4)                                               

 

             BILIRUBIN, TOTAL (test code = 0.3 MG/DL                            

  



             )                                               

 

             ALKALINE PHOSPHATASE (test 88 U/L                                 



             code = 2204)                                        

 

             AST (test code = 2218) 32 U/L                                 

 

             ALT (test code = 2219) 29 U/L                                 



COMPREHENSIVE METABOLIC [id number] 00:00:00





             Test Item    Value        Reference Range Interpretation Comments

 

             GLUCOSE (test code = 2217) 266 MG/DL                              

 

             BUN (test code = 2208) 17 MG/DL                               

 

             CREATININE (test code = 2214) 0.92 MG/DL                           

  

 

             eGFR ( CKD-EPI) (test code 70 ML/MIN/1.73                      

     



             = 75826)                                            

 

             CALC BUN/CREAT (test code = 18 RATIO                               



             )                                               

 

             SODIUM (test code = 2231) 136 MEQ/L                              

 

             POTASSIUM (test code = 2228) 5.3 MEQ/L                             

 

 

             CHLORIDE (test code = 2215) 98 MEQ/L                               

 

             CARBON DIOXIDE (test code = 22 MEQ/L                               



             )                                               

 

             CALCIUM (test code = 2209) 8.8 MG/DL                              

 

             PROTEIN, TOTAL (test code = 7.4 G/DL                               



             )                                               

 

             ALBUMIN (test code = 220) 4.1 G/DL                               

 

             CALC GLOBULIN (test code = 3.3 G/DL                               



             )                                               

 

             CALC A/G RATIO (test code = 1.2 RATIO                              



             )                                               

 

             BILIRUBIN, TOTAL (test code = 0.3 MG/DL                            

  



             )                                               

 

             ALKALINE PHOSPHATASE (test 88 U/L                                 



             code = 2204)                                        

 

             AST (test code = 2218) 32 U/L                                 

 

             ALT (test code = 2219) 29 U/L                                 



[id number] 00:00:00





             Test Item    Value        Reference Range Interpretation Comments

 

             TSH, THIRD GENERATION (test <0.010 UIU/ML                          

 



             code = 2821)                                        



[id number] 00:00:00





             Test Item    Value        Reference Range Interpretation Comments

 

             TSH, THIRD GENERATION (test <0.010 UIU/ML                          

 



             code = 2821)                                        



[id number] 00:00:00





             Test Item    Value        Reference Range Interpretation Comments

 

             TSH, THIRD GENERATION (test <0.010 UIU/ML                          

 



             code = 2821)                                        



HEMOGLOBIN [id number] 00:00:00





             Test Item    Value        Reference Range Interpretation Comments

 

             HEMOGLOBIN A1c (test code = 27772) 6.7 %                           

       



HEMOGLOBIN [id number] 00:00:00





             Test Item    Value        Reference Range Interpretation Comments

 

             HEMOGLOBIN A1c (test code = 11220) 6.7 %                           

       



HEMOGLOBIN [id number] 00:00:00





             Test Item    Value        Reference Range Interpretation Comments

 

             HEMOGLOBIN A1c (test code = 68579) 6.7 %                           

       



COMPREHENSIVE METABOLIC [id number] 00:00:00





             Test Item    Value        Reference Range Interpretation Comments

 

             GLUCOSE (test code = 2217) 266 MG/DL                              

 

             BUN (test code = 2208) 17 MG/DL                               

 

             CREATININE (test code = 2214) 0.92 MG/DL                           

  

 

             eGFR ( CKD-EPI) (test code 70 ML/MIN/1.73                      

     



             = 16938)                                            

 

             CALC BUN/CREAT (test code = 18 RATIO                               



             2235)                                               

 

             SODIUM (test code = 2231) 136 MEQ/L                              

 

             POTASSIUM (test code = 2228) 5.3 MEQ/L                             

 

 

             CHLORIDE (test code = 2215) 98 MEQ/L                               

 

             CARBON DIOXIDE (test code = 22 MEQ/L                               



             2206)                                               

 

             CALCIUM (test code = 2209) 8.8 MG/DL                              

 

             PROTEIN, TOTAL (test code = 7.4 G/DL                               



             222)                                               

 

             ALBUMIN (test code = 2201) 4.1 G/DL                               

 

             CALC GLOBULIN (test code = 3.3 G/DL                               



             2240)                                               

 

             CALC A/G RATIO (test code = 1.2 RATIO                              



             2234)                                               

 

             BILIRUBIN, TOTAL (test code = 0.3 MG/DL                            

  



             )                                               

 

             ALKALINE PHOSPHATASE (test 88 U/L                                 



             code = 2204)                                        

 

             AST (test code = 2218) 32 U/L                                 

 

             ALT (test code = 2219) 29 U/L                                 



COMPREHENSIVE METABOLIC [id number] 00:00:00





             Test Item    Value        Reference Range Interpretation Comments

 

             GLUCOSE (test code = 2217) 266 MG/DL                              

 

             BUN (test code = 2208) 17 MG/DL                               

 

             CREATININE (test code = 2214) 0.92 MG/DL                           

  

 

             eGFR ( CKD-EPI) (test code 70 ML/MIN/1.73                      

     



             = 40878)                                            

 

             CALC BUN/CREAT (test code = 18 RATIO                               



             2235)                                               

 

             SODIUM (test code = 2231) 136 MEQ/L                              

 

             POTASSIUM (test code = 2228) 5.3 MEQ/L                             

 

 

             CHLORIDE (test code = 2215) 98 MEQ/L                               

 

             CARBON DIOXIDE (test code = 22 MEQ/L                               



             2206)                                               

 

             CALCIUM (test code = 2209) 8.8 MG/DL                              

 

             PROTEIN, TOTAL (test code = 7.4 G/DL                               



             9)                                               

 

             ALBUMIN (test code = 2201) 4.1 G/DL                               

 

             CALC GLOBULIN (test code = 3.3 G/DL                               



             2240)                                               

 

             CALC A/G RATIO (test code = 1.2 RATIO                              



             2234)                                               

 

             BILIRUBIN, TOTAL (test code = 0.3 MG/DL                            

  



             )                                               

 

             ALKALINE PHOSPHATASE (test 88 U/L                                 



             code = 2204)                                        

 

             AST (test code = 2218) 32 U/L                                 

 

             ALT (test code = 2219) 29 U/L                                 



[id number] 00:00:00





             Test Item    Value        Reference Range Interpretation Comments

 

             TSH, THIRD GENERATION (test <0.010 UIU/ML                          

 



             code = 2821)                                        



[id number] 00:00:00





             Test Item    Value        Reference Range Interpretation Comments

 

             TSH, THIRD GENERATION (test <0.010 UIU/ML                          

 



             code = 2821)                                        



[id number] 00:00:00





             Test Item    Value        Reference Range Interpretation Comments

 

             TSH, THIRD GENERATION (test <0.010 UIU/ML                          

 



             code = 2821)                                        



HEMOGLOBIN [id number] 00:00:00





             Test Item    Value        Reference Range Interpretation Comments

 

             HEMOGLOBIN A1c (test code = 96173) 6.7 %                           

       



HEMOGLOBIN [id number] 00:00:00





             Test Item    Value        Reference Range Interpretation Comments

 

             HEMOGLOBIN A1c (test code = 76624) 6.7 %                           

       



HEMOGLOBIN [id number] 00:00:00





             Test Item    Value        Reference Range Interpretation Comments

 

             HEMOGLOBIN A1c (test code = 38757) 6.7 %                           

       



COMPREHENSIVE METABOLIC [id number] 00:00:00





             Test Item    Value        Reference Range Interpretation Comments

 

             GLUCOSE (test code = 2217) 266 MG/DL                              

 

             BUN (test code = 2208) 17 MG/DL                               

 

             CREATININE (test code = 2214) 0.92 MG/DL                           

  

 

             eGFR ( CKD-EPI) (test code 70 ML/MIN/1.73                      

     



             = 03339)                                            

 

             CALC BUN/CREAT (test code = 18 RATIO                               



             2235)                                               

 

             SODIUM (test code = 2231) 136 MEQ/L                              

 

             POTASSIUM (test code = 2228) 5.3 MEQ/L                             

 

 

             CHLORIDE (test code = 2215) 98 MEQ/L                               

 

             CARBON DIOXIDE (test code = 22 MEQ/L                               



             )                                               

 

             CALCIUM (test code = 2209) 8.8 MG/DL                              

 

             PROTEIN, TOTAL (test code = 7.4 G/DL                               



             )                                               

 

             ALBUMIN (test code = 2201) 4.1 G/DL                               

 

             CALC GLOBULIN (test code = 3.3 G/DL                               



             )                                               

 

             CALC A/G RATIO (test code = 1.2 RATIO                              



             )                                               

 

             BILIRUBIN, TOTAL (test code = 0.3 MG/DL                            

  



             )                                               

 

             ALKALINE PHOSPHATASE (test 88 U/L                                 



             code = 2204)                                        

 

             AST (test code = 2218) 32 U/L                                 

 

             ALT (test code = 2219) 29 U/L                                 



COMPREHENSIVE METABOLIC [id number] 00:00:00





             Test Item    Value        Reference Range Interpretation Comments

 

             GLUCOSE (test code = 2217) 266 MG/DL                              

 

             BUN (test code = 2208) 17 MG/DL                               

 

             CREATININE (test code = 2214) 0.92 MG/DL                           

  

 

             eGFR ( CKD-EPI) (test code 70 ML/MIN/1.73                      

     



             = 54801)                                            

 

             CALC BUN/CREAT (test code = 18 RATIO                               



             2235)                                               

 

             SODIUM (test code = 2231) 136 MEQ/L                              

 

             POTASSIUM (test code = 2228) 5.3 MEQ/L                             

 

 

             CHLORIDE (test code = 2215) 98 MEQ/L                               

 

             CARBON DIOXIDE (test code = 22 MEQ/L                               



             )                                               

 

             CALCIUM (test code = 2209) 8.8 MG/DL                              

 

             PROTEIN, TOTAL (test code = 7.4 G/DL                               



             )                                               

 

             ALBUMIN (test code = 220) 4.1 G/DL                               

 

             CALC GLOBULIN (test code = 3.3 G/DL                               



             )                                               

 

             CALC A/G RATIO (test code = 1.2 RATIO                              



             )                                               

 

             BILIRUBIN, TOTAL (test code = 0.3 MG/DL                            

  



             )                                               

 

             ALKALINE PHOSPHATASE (test 88 U/L                                 



             code = 2204)                                        

 

             AST (test code = 2218) 32 U/L                                 

 

             ALT (test code = 2219) 29 U/L                                 



[id number] 00:00:00





             Test Item    Value        Reference Range Interpretation Comments

 

             TSH, THIRD GENERATION (test <0.010 UIU/ML                          

 



             code = 2821)                                        



[id number] 00:00:00





             Test Item    Value        Reference Range Interpretation Comments

 

             TSH, THIRD GENERATION (test <0.010 UIU/ML                          

 



             code = 2821)                                        



[id number] 00:00:00





             Test Item    Value        Reference Range Interpretation Comments

 

             TSH, THIRD GENERATION (test <0.010 UIU/ML                          

 



             code = 2821)                                        



HEMOGLOBIN [id number] 00:00:00





             Test Item    Value        Reference Range Interpretation Comments

 

             HEMOGLOBIN A1c (test code = 35182) 6.7 %                           

       



HEMOGLOBIN [id number] 00:00:00





             Test Item    Value        Reference Range Interpretation Comments

 

             HEMOGLOBIN A1c (test code = 73941) 6.7 %                           

       



COMPREHENSIVE METABOLIC [id number] 00:00:00





             Test Item    Value        Reference Range Interpretation Comments

 

             GLUCOSE (test code = 2217) 266 MG/DL                              

 

             BUN (test code = 2208) 17 MG/DL                               

 

             CREATININE (test code = 2214) 0.92 MG/DL                           

  

 

             eGFR ( CKD-EPI) (test code 70 ML/MIN/1.73                      

     



             = 01171)                                            

 

             CALC BUN/CREAT (test code = 18 RATIO                               



             2235)                                               

 

             SODIUM (test code = 2231) 136 MEQ/L                              

 

             POTASSIUM (test code = 2228) 5.3 MEQ/L                             

 

 

             CHLORIDE (test code = 2215) 98 MEQ/L                               

 

             CARBON DIOXIDE (test code = 22 MEQ/L                               



             )                                               

 

             CALCIUM (test code = 2209) 8.8 MG/DL                              

 

             PROTEIN, TOTAL (test code = 7.4 G/DL                               



             )                                               

 

             ALBUMIN (test code = 220) 4.1 G/DL                               

 

             CALC GLOBULIN (test code = 3.3 G/DL                               



             )                                               

 

             CALC A/G RATIO (test code = 1.2 RATIO                              



             )                                               

 

             BILIRUBIN, TOTAL (test code = 0.3 MG/DL                            

  



             )                                               

 

             ALKALINE PHOSPHATASE (test 88 U/L                                 



             code = 2204)                                        

 

             AST (test code = 2218) 32 U/L                                 

 

             ALT (test code = 2219) 29 U/L                                 



[id number] 00:00:00





             Test Item    Value        Reference Range Interpretation Comments

 

             TSH, THIRD GENERATION (test <0.010 UIU/ML                          

 



             code = 2821)                                        



[id number] 00:00:00





             Test Item    Value        Reference Range Interpretation Comments

 

             TSH, THIRD GENERATION (test <0.010 UIU/ML                          

 



             code = 2821)                                        



HEMOGLOBIN [id number] 00:00:00





             Test Item    Value        Reference Range Interpretation Comments

 

             HEMOGLOBIN A1c (test code = 07140) 6.7 %                           

       



HEMOGLOBIN [id number] 00:00:00





             Test Item    Value        Reference Range Interpretation Comments

 

             HEMOGLOBIN A1c (test code = 94213) 6.7 %                           

       



HEMOGLOBIN [id number] 00:00:00





             Test Item    Value        Reference Range Interpretation Comments

 

             HEMOGLOBIN A1c (test code = 27449) 6.7 %                           

       



COMPREHENSIVE METABOLIC [id number] 00:00:00





             Test Item    Value        Reference Range Interpretation Comments

 

             GLUCOSE (test code = 2217) 266 MG/DL                              

 

             BUN (test code = 2208) 17 MG/DL                               

 

             CREATININE (test code = 2214) 0.92 MG/DL                           

  

 

             eGFR ( CKD-EPI) (test code 70 ML/MIN/1.73                      

     



             = 67737)                                            

 

             CALC BUN/CREAT (test code = 18 RATIO                               



             2235)                                               

 

             SODIUM (test code = 2231) 136 MEQ/L                              

 

             POTASSIUM (test code = 2228) 5.3 MEQ/L                             

 

 

             CHLORIDE (test code = 2215) 98 MEQ/L                               

 

             CARBON DIOXIDE (test code = 22 MEQ/L                               



             2206)                                               

 

             CALCIUM (test code = 2209) 8.8 MG/DL                              

 

             PROTEIN, TOTAL (test code = 7.4 G/DL                               



             )                                               

 

             ALBUMIN (test code = 2201) 4.1 G/DL                               

 

             CALC GLOBULIN (test code = 3.3 G/DL                               



             2240)                                               

 

             CALC A/G RATIO (test code = 1.2 RATIO                              



             2234)                                               

 

             BILIRUBIN, TOTAL (test code = 0.3 MG/DL                            

  



             )                                               

 

             ALKALINE PHOSPHATASE (test 88 U/L                                 



             code = 2204)                                        

 

             AST (test code = 2218) 32 U/L                                 

 

             ALT (test code = 2219) 29 U/L                                 



COMPREHENSIVE METABOLIC [id number] 00:00:00





             Test Item    Value        Reference Range Interpretation Comments

 

             GLUCOSE (test code = 2217) 266 MG/DL                              

 

             BUN (test code = 2208) 17 MG/DL                               

 

             CREATININE (test code = 2214) 0.92 MG/DL                           

  

 

             eGFR ( CKD-EPI) (test code 70 ML/MIN/1.73                      

     



             = 18415)                                            

 

             CALC BUN/CREAT (test code = 18 RATIO                               



             2235)                                               

 

             SODIUM (test code = 2231) 136 MEQ/L                              

 

             POTASSIUM (test code = 2228) 5.3 MEQ/L                             

 

 

             CHLORIDE (test code = 2215) 98 MEQ/L                               

 

             CARBON DIOXIDE (test code = 22 MEQ/L                               



             )                                               

 

             CALCIUM (test code = 2209) 8.8 MG/DL                              

 

             PROTEIN, TOTAL (test code = 7.4 G/DL                               



             )                                               

 

             ALBUMIN (test code = 2201) 4.1 G/DL                               

 

             CALC GLOBULIN (test code = 3.3 G/DL                               



             2240)                                               

 

             CALC A/G RATIO (test code = 1.2 RATIO                              



             2234)                                               

 

             BILIRUBIN, TOTAL (test code = 0.3 MG/DL                            

  



             )                                               

 

             ALKALINE PHOSPHATASE (test 88 U/L                                 



             code = 2204)                                        

 

             AST (test code = 2218) 32 U/L                                 

 

             ALT (test code = 2219) 29 U/L                                 



[id number] 00:00:00





             Test Item    Value        Reference Range Interpretation Comments

 

             TSH, THIRD GENERATION (test <0.010 UIU/ML                          

 



             code = 2821)                                        



[id number] 00:00:00





             Test Item    Value        Reference Range Interpretation Comments

 

             TSH, THIRD GENERATION (test <0.010 UIU/ML                          

 



             code = 2821)                                        



[id number] 00:00:00





             Test Item    Value        Reference Range Interpretation Comments

 

             TSH, THIRD GENERATION (test <0.010 UIU/ML                          

 



             code = 2821)                                        



TSH, THIRD [id number] 06:18:54





             Test Item    Value        Reference Range Interpretation Comments

 

             TSH, THIRD GENERATION (test code 0.190 UIU/ML 0.400-4.100  L       

     



             = 2821)                                             



HEMOGLOBIN [id number] 03:11:56





             Test Item    Value        Reference Range Interpretation Comments

 

             HEMOGLOBIN A1c (test 8.4 %        4.2-5.6      H             AMERIC

AN DIABETES



             code = 88179)                                        ASSOCIATION 

IDELINES FOR



                                                                 HGB A1C:



                                                                 PREDIABETES/INC

REASED RISK .



                                                                 . . . . . . 5.7

-6.4%



                                                                 DIAGNOSIS OF DI

ABETES . . .



                                                                 . . . . . . >=6

.5% WITH



                                                                 CONFIRMATION OR

 APPROPRIATE



                                                                 SYMPTOMS NOTE: 

ASSAY MAY BE



                                                                 AFFECTED BY



                                                                 HEMOGLOBINOPATH

IES (SICKLE



                                                                 CELL ANEMIA, S-

C DISEASE,



                                                                 OTHERS) OR ZENA

FICIALLY



                                                                 LOWERED BY DECR

EASED RED



                                                                 CELL SURVIVAL (

HEMOLYTIC



                                                                 ANEMIAS, BLOOD 

LOSS, ETC.).



                                                                 CONSIDER ALTERN

ATE TESTING



                                                                 OR LABORATORY C

ONSULTATION.



COMPREHENSIVE METABOLIC [id number] 03:11:06





             Test Item    Value        Reference Range Interpretation Comments

 

             GLUCOSE (test code = 141 MG/DL    70-99        H            



             )                                               

 

             BUN (test code = 31 MG/DL     8-23         H            



             )                                               

 

             CREATININE (test 1.07 MG/DL   0.60-1.30                 



             code = 2214)                                        

 

             eGFR ( CKD-EPI) 59 ML/MIN/1.73 >60          L            



             (test code = 49984)                                        

 

             CALC BUN/CREAT (test 29 RATIO     6-28         H            



             code = 2235)                                        

 

             SODIUM (test code = 141 MEQ/L    133-146                   



             )                                               

 

             POTASSIUM (test code 4.7 MEQ/L    3.5-5.4                   



             = )                                             

 

             CHLORIDE (test code 99 MEQ/L                         



             = )                                             

 

             CARBON DIOXIDE (test 26 MEQ/L     19-31                     



             code = 2206)                                        

 

             CALCIUM (test code = 8.8 MG/DL    8.5-10.5                  



             )                                               

 

             PROTEIN, TOTAL (test 8.0 G/DL     6.1-8.3                   



             code = 2229)                                        

 

             ALBUMIN (test code = 4.5 G/DL     3.5-5.2                   



             )                                               

 

             CALC GLOBULIN (test 3.5 G/DL     1.9-3.7                   



             code = 2240)                                        

 

             CALC A/G RATIO (test 1.3 RATIO    1.0-2.6                   



             code = 2234)                                        

 

             BILIRUBIN, TOTAL 0.4 MG/DL    See_Comment                [Automated

 message]



             (test code = 2207)                                        The syste

m which



                                                                 generated this



                                                                 result transmit

maximo



                                                                 reference range

:



                                                                 <=1.2. The refe

rence



                                                                 range was not u

sed



                                                                 to interpret th

is



                                                                 result as



                                                                 normal/abnormal

.

 

             ALKALINE PHOSPHATASE 67 U/L                           



             (test code = 2204)                                        

 

             AST (test code = 49 U/L       9-40         H            



             )                                               

 

             ALT (test code = 41 U/L       5-40         H            



             )                                               



LIPID [id number] 03:11:06





             Test Item    Value        Reference Range Interpretation Comments

 

             CHOLESTEROL (test 113 MG/DL    <200                      



             code = 2210)                                        

 

             TRIGLYCERIDES (test 165 MG/DL    <150         H            



             code = 2232)                                        

 

             HDL CHOLESTEROL (test 39 MG/DL     >39          L            



             code = 2220)                                        

 

             CALC LDL CHOL (test 50 MG/DL     <100                       NOTE: C

ALCULATED LDL



             code = 2237)                                        IS BASED ON



                                                                 CAROLANN-FUNES 

METHOD



                                                                 WHICHINCLUDES



                                                                 ADJUSTABLE



                                                                 TRIGLYCERIDE:VL

DL



                                                                 CHOLESTEROL RAT

IO.THIS



                                                                 FACTOR VARIES B

Y



                                                                 MEASURED TRIGLY

CERIDE



                                                                 AND NON-HDLCHOL

ESTEROL



                                                                 CONCENTRATIONS 

WITH



                                                                 INCREASED CALCU

LATED



                                                                 LDL SEENIN HIGH

ER



                                                                 TRIGLYCERIDE OR

 LOWER



                                                                 NON-HDL SPECIME

NS. FOR



                                                                 MOREINFORMATION

, SEE



                                                                 CLIENT ANNOUNCE

MENT AT



                                                                 [email or web address]

[email or web address]



                                                                 /[email or web address]

 

             RISK RATIO LDL/HDL 1.28 RATIO   <3.22                     



             (test code = 2238)                                        



IRON, [id number] 03:11:06





             Test Item    Value        Reference Range Interpretation Comments

 

             IRON, SERUM (test 59 UG/DL                          UNLESS OT

HERWISE



             code = 2222)                                        INDICATED, ALL 

TESTING



                                                                 PERFORMED ATCLI

NICAL



                                                                 PATHOLOGY LABOR

[facility],



                                                                 INC. [address] [place] [id number] [address]



                                                                 DIRECTOR: CLIFTON DARBY M.D.

 CLIA



                                                                 NUMBER [id number]

[id number]



                                                                 ACCREDITATION N

O. [id number]



HEMOGLOBIN [id number] 00:00:00





             Test Item    Value        Reference Range Interpretation Comments

 

             HEMOGLOBIN A1c (test code = 25962) 8.4 %                           

       



HEMOGLOBIN [id number] 00:00:00





             Test Item    Value        Reference Range Interpretation Comments

 

             HEMOGLOBIN A1c (test code = 27724) 8.4 %                           

       



HEMOGLOBIN [id number] 00:00:00





             Test Item    Value        Reference Range Interpretation Comments

 

             HEMOGLOBIN A1c (test code = 62985) 8.4 %                           

       



COMPREHENSIVE METABOLIC [id number] 00:00:00





             Test Item    Value        Reference Range Interpretation Comments

 

             GLUCOSE (test code = 2217) 141 MG/DL                              

 

             BUN (test code = 2208) 31 MG/DL                               

 

             CREATININE (test code = 2214) 1.07 MG/DL                           

  

 

             eGFR ( CKD-EPI) (test code 59 ML/MIN/1.73                      

     



             = 93755)                                            

 

             CALC BUN/CREAT (test code = 29 RATIO                               



             2235)                                               

 

             SODIUM (test code = 2231) 141 MEQ/L                              

 

             POTASSIUM (test code = 2228) 4.7 MEQ/L                             

 

 

             CHLORIDE (test code = 2215) 99 MEQ/L                               

 

             CARBON DIOXIDE (test code = 26 MEQ/L                               



             )                                               

 

             CALCIUM (test code = 2209) 8.8 MG/DL                              

 

             PROTEIN, TOTAL (test code = 8.0 G/DL                               



             )                                               

 

             ALBUMIN (test code = 2201) 4.5 G/DL                               

 

             CALC GLOBULIN (test code = 3.5 G/DL                               



             )                                               

 

             CALC A/G RATIO (test code = 1.3 RATIO                              



             )                                               

 

             BILIRUBIN, TOTAL (test code = 0.4 MG/DL                            

  



             )                                               

 

             ALKALINE PHOSPHATASE (test 67 U/L                                 



             code = 2204)                                        

 

             AST (test code = 2218) 49 U/L                                 

 

             ALT (test code = 2219) 41 U/L                                 



COMPREHENSIVE METABOLIC [id number] 00:00:00





             Test Item    Value        Reference Range Interpretation Comments

 

             GLUCOSE (test code = 2217) 141 MG/DL                              

 

             BUN (test code = 2208) 31 MG/DL                               

 

             CREATININE (test code = 2214) 1.07 MG/DL                           

  

 

             eGFR ( CKD-EPI) (test code 59 ML/MIN/1.73                      

     



             = 89353)                                            

 

             CALC BUN/CREAT (test code = 29 RATIO                               



             2235)                                               

 

             SODIUM (test code = 2231) 141 MEQ/L                              

 

             POTASSIUM (test code = 2228) 4.7 MEQ/L                             

 

 

             CHLORIDE (test code = 2215) 99 MEQ/L                               

 

             CARBON DIOXIDE (test code = 26 MEQ/L                               



             )                                               

 

             CALCIUM (test code = 2209) 8.8 MG/DL                              

 

             PROTEIN, TOTAL (test code = 8.0 G/DL                               



             )                                               

 

             ALBUMIN (test code = 2201) 4.5 G/DL                               

 

             CALC GLOBULIN (test code = 3.5 G/DL                               



             0)                                               

 

             CALC A/G RATIO (test code = 1.3 RATIO                              



             2234)                                               

 

             BILIRUBIN, TOTAL (test code = 0.4 MG/DL                            

  



             )                                               

 

             ALKALINE PHOSPHATASE (test 67 U/L                                 



             code = 2204)                                        

 

             AST (test code = 2218) 49 U/L                                 

 

             ALT (test code = 2219) 41 U/L                                 



LIPID [id number] 00:00:00





             Test Item    Value        Reference Range Interpretation Comments

 

             CHOLESTEROL (test code = 2210) 113 MG/DL                           

   

 

             TRIGLYCERIDES (test code = 2232) 165 MG/DL                         

     

 

             HDL CHOLESTEROL (test code = 2220) 39 MG/DL                        

       

 

             CALC LDL CHOL (test code = 2237) 50 MG/DL                          

     

 

             RISK RATIO LDL/HDL (test code = 1.28 RATIO                         

    



             2238)                                               



LIPID [id number] 00:00:00





             Test Item    Value        Reference Range Interpretation Comments

 

             CHOLESTEROL (test code = 2210) 113 MG/DL                           

   

 

             TRIGLYCERIDES (test code = 2232) 165 MG/DL                         

     

 

             HDL CHOLESTEROL (test code = 2220) 39 MG/DL                        

       

 

             CALC LDL CHOL (test code = 2237) 50 MG/DL                          

     

 

             RISK RATIO LDL/HDL (test code = 1.28 RATIO                         

    



             2238)                                               



[id number] 00:00:00





             Test Item    Value        Reference Range Interpretation Comments

 

             TSH, THIRD GENERATION (test code 0.190 UIU/ML                      

     



             = 2821)                                             



[id number] 00:00:00





             Test Item    Value        Reference Range Interpretation Comments

 

             TSH, THIRD GENERATION (test code 0.190 UIU/ML                      

     



             = 2821)                                             



[id number] 00:00:00





             Test Item    Value        Reference Range Interpretation Comments

 

             TSH, THIRD GENERATION (test code 0.190 UIU/ML                      

     



             = 2821)                                             



IRON, [id number] 00:00:00





             Test Item    Value        Reference Range Interpretation Comments

 

             IRON, SERUM (test code = 2222) 59 UG/DL                            

   



IRON, [id number] 00:00:00





             Test Item    Value        Reference Range Interpretation Comments

 

             IRON, SERUM (test code = 2222) 59 UG/DL                            

   



HEMOGLOBIN [id number] 00:00:00





             Test Item    Value        Reference Range Interpretation Comments

 

             HEMOGLOBIN A1c (test code = 93532) 8.4 %                           

       



HEMOGLOBIN [id number] 00:00:00





             Test Item    Value        Reference Range Interpretation Comments

 

             HEMOGLOBIN A1c (test code = 27354) 8.4 %                           

       



HEMOGLOBIN [id number] 00:00:00





             Test Item    Value        Reference Range Interpretation Comments

 

             HEMOGLOBIN A1c (test code = 69047) 8.4 %                           

       



HEMOGLOBIN [id number] 00:00:00





             Test Item    Value        Reference Range Interpretation Comments

 

             HEMOGLOBIN A1c (test code = 36345) 8.4 %                           

       



COMPREHENSIVE METABOLIC [id number] 00:00:00





             Test Item    Value        Reference Range Interpretation Comments

 

             GLUCOSE (test code = 2217) 141 MG/DL                              

 

             BUN (test code = 2208) 31 MG/DL                               

 

             CREATININE (test code = 2214) 1.07 MG/DL                           

  

 

             eGFR ( CKD-EPI) (test code 59 ML/MIN/1.73                      

     



             = 76519)                                            

 

             CALC BUN/CREAT (test code = 29 RATIO                               



             2235)                                               

 

             SODIUM (test code = 2231) 141 MEQ/L                              

 

             POTASSIUM (test code = 2228) 4.7 MEQ/L                             

 

 

             CHLORIDE (test code = 2215) 99 MEQ/L                               

 

             CARBON DIOXIDE (test code = 26 MEQ/L                               



             )                                               

 

             CALCIUM (test code = 2209) 8.8 MG/DL                              

 

             PROTEIN, TOTAL (test code = 8.0 G/DL                               



             )                                               

 

             ALBUMIN (test code = 2201) 4.5 G/DL                               

 

             CALC GLOBULIN (test code = 3.5 G/DL                               



             2240)                                               

 

             CALC A/G RATIO (test code = 1.3 RATIO                              



             2234)                                               

 

             BILIRUBIN, TOTAL (test code = 0.4 MG/DL                            

  



             )                                               

 

             ALKALINE PHOSPHATASE (test 67 U/L                                 



             code = 2204)                                        

 

             AST (test code = 2218) 49 U/L                                 

 

             ALT (test code = 2219) 41 U/L                                 



COMPREHENSIVE METABOLIC [id number] 00:00:00





             Test Item    Value        Reference Range Interpretation Comments

 

             GLUCOSE (test code = 2217) 141 MG/DL                              

 

             BUN (test code = 2208) 31 MG/DL                               

 

             CREATININE (test code = 2214) 1.07 MG/DL                           

  

 

             eGFR ( CKD-EPI) (test code 59 ML/MIN/1.73                      

     



             = 94581)                                            

 

             CALC BUN/CREAT (test code = 29 RATIO                               



             2235)                                               

 

             SODIUM (test code = 2231) 141 MEQ/L                              

 

             POTASSIUM (test code = 2228) 4.7 MEQ/L                             

 

 

             CHLORIDE (test code = 2215) 99 MEQ/L                               

 

             CARBON DIOXIDE (test code = 26 MEQ/L                               



             )                                               

 

             CALCIUM (test code = 2209) 8.8 MG/DL                              

 

             PROTEIN, TOTAL (test code = 8.0 G/DL                               



             )                                               

 

             ALBUMIN (test code = 220) 4.5 G/DL                               

 

             CALC GLOBULIN (test code = 3.5 G/DL                               



             )                                               

 

             CALC A/G RATIO (test code = 1.3 RATIO                              



             2234)                                               

 

             BILIRUBIN, TOTAL (test code = 0.4 MG/DL                            

  



             )                                               

 

             ALKALINE PHOSPHATASE (test 67 U/L                                 



             code = 2204)                                        

 

             AST (test code = 2218) 49 U/L                                 

 

             ALT (test code = 2219) 41 U/L                                 



HEMOGLOBIN [id number] 00:00:00





             Test Item    Value        Reference Range Interpretation Comments

 

             HEMOGLOBIN A1c (test code = 53804) 8.4 %                           

       



LIPID [id number] 00:00:00





             Test Item    Value        Reference Range Interpretation Comments

 

             CHOLESTEROL (test code = 2210) 113 MG/DL                           

   

 

             TRIGLYCERIDES (test code = 2232) 165 MG/DL                         

     

 

             HDL CHOLESTEROL (test code = 2220) 39 MG/DL                        

       

 

             CALC LDL CHOL (test code = 2237) 50 MG/DL                          

     

 

             RISK RATIO LDL/HDL (test code = 1.28 RATIO                         

    



             2238)                                               



LIPID [id number] 00:00:00





             Test Item    Value        Reference Range Interpretation Comments

 

             CHOLESTEROL (test code = 2210) 113 MG/DL                           

   

 

             TRIGLYCERIDES (test code = 2232) 165 MG/DL                         

     

 

             HDL CHOLESTEROL (test code = 2220) 39 MG/DL                        

       

 

             CALC LDL CHOL (test code = 2237) 50 MG/DL                          

     

 

             RISK RATIO LDL/HDL (test code = 1.28 RATIO                         

    



             2238)                                               



COMPREHENSIVE METABOLIC [id number] 00:00:00





             Test Item    Value        Reference Range Interpretation Comments

 

             GLUCOSE (test code = 2217) 141 MG/DL                              

 

             BUN (test code = 2208) 31 MG/DL                               

 

             CREATININE (test code = 2214) 1.07 MG/DL                           

  

 

             eGFR ( CKD-EPI) (test code 59 ML/MIN/1.73                      

     



             = 79360)                                            

 

             CALC BUN/CREAT (test code = 29 RATIO                               



             2235)                                               

 

             SODIUM (test code = 2231) 141 MEQ/L                              

 

             POTASSIUM (test code = 2228) 4.7 MEQ/L                             

 

 

             CHLORIDE (test code = 2215) 99 MEQ/L                               

 

             CARBON DIOXIDE (test code = 26 MEQ/L                               



             )                                               

 

             CALCIUM (test code = 2209) 8.8 MG/DL                              

 

             PROTEIN, TOTAL (test code = 8.0 G/DL                               



             )                                               

 

             ALBUMIN (test code = 2201) 4.5 G/DL                               

 

             CALC GLOBULIN (test code = 3.5 G/DL                               



             )                                               

 

             CALC A/G RATIO (test code = 1.3 RATIO                              



             2234)                                               

 

             BILIRUBIN, TOTAL (test code = 0.4 MG/DL                            

  



             )                                               

 

             ALKALINE PHOSPHATASE (test 67 U/L                                 



             code = 2204)                                        

 

             AST (test code = 2218) 49 U/L                                 

 

             ALT (test code = 2219) 41 U/L                                 



[id number] 00:00:00





             Test Item    Value        Reference Range Interpretation Comments

 

             TSH, THIRD GENERATION (test code 0.190 UIU/ML                      

     



             = 2821)                                             



 00:00:00





             Test Item    Value        Reference Range Interpretation Comments

 

             TSH, THIRD GENERATION (test code 0.190 UIU/ML                      

     



             = 2821)                                             



 00:00:00





             Test Item    Value        Reference Range Interpretation Comments

 

             TSH, THIRD GENERATION (test code 0.190 UIU/ML                      

     



             = 2821)                                             



IRON, [id number] 00:00:00





             Test Item    Value        Reference Range Interpretation Comments

 

             IRON, SERUM (test code = 2222) 59 UG/DL                            

   



IRON, [id number] 00:00:00





             Test Item    Value        Reference Range Interpretation Comments

 

             IRON, SERUM (test code = 2222) 59 UG/DL                            

   



LIPID [id number] 00:00:00





             Test Item    Value        Reference Range Interpretation Comments

 

             CHOLESTEROL (test code = 2210) 113 MG/DL                           

   

 

             TRIGLYCERIDES (test code = 2232) 165 MG/DL                         

     

 

             HDL CHOLESTEROL (test code = 2220) 39 MG/DL                        

       

 

             CALC LDL CHOL (test code = 2237) 50 MG/DL                          

     

 

             RISK RATIO LDL/HDL (test code = 1.28 RATIO                         

    



             2238)                                               



[id number] 00:00:00





             Test Item    Value        Reference Range Interpretation Comments

 

             TSH, THIRD GENERATION (test code 0.190 UIU/ML                      

     



             = 2821)                                             



 00:00:00





             Test Item    Value        Reference Range Interpretation Comments

 

             TSH, THIRD GENERATION (test code 0.190 UIU/ML                      

     



             = 2821)                                             



IRON, [id number] 00:00:00





             Test Item    Value        Reference Range Interpretation Comments

 

             IRON, SERUM (test code = 2222) 59 UG/DL                            

   



HEMOGLOBIN [id number] 00:00:00





             Test Item    Value        Reference Range Interpretation Comments

 

             HEMOGLOBIN A1c (test code = 74901) 8.4 %                           

       



HEMOGLOBIN [id number] 00:00:00





             Test Item    Value        Reference Range Interpretation Comments

 

             HEMOGLOBIN A1c (test code = 24470) 8.4 %                           

       



HEMOGLOBIN [id number] 00:00:00





             Test Item    Value        Reference Range Interpretation Comments

 

             HEMOGLOBIN A1c (test code = 49092) 8.4 %                           

       



COMPREHENSIVE METABOLIC [id number] 00:00:00





             Test Item    Value        Reference Range Interpretation Comments

 

             GLUCOSE (test code = 2217) 141 MG/DL                              

 

             BUN (test code = 2208) 31 MG/DL                               

 

             CREATININE (test code = 2214) 1.07 MG/DL                           

  

 

             eGFR ( CKD-EPI) (test code 59 ML/MIN/1.73                      

     



             = 22387)                                            

 

             CALC BUN/CREAT (test code = 29 RATIO                               



             2235)                                               

 

             SODIUM (test code = 2231) 141 MEQ/L                              

 

             POTASSIUM (test code = 2228) 4.7 MEQ/L                             

 

 

             CHLORIDE (test code = 2215) 99 MEQ/L                               

 

             CARBON DIOXIDE (test code = 26 MEQ/L                               



             )                                               

 

             CALCIUM (test code = 2209) 8.8 MG/DL                              

 

             PROTEIN, TOTAL (test code = 8.0 G/DL                               



             )                                               

 

             ALBUMIN (test code = 2201) 4.5 G/DL                               

 

             CALC GLOBULIN (test code = 3.5 G/DL                               



             0)                                               

 

             CALC A/G RATIO (test code = 1.3 RATIO                              



             4)                                               

 

             BILIRUBIN, TOTAL (test code = 0.4 MG/DL                            

  



             )                                               

 

             ALKALINE PHOSPHATASE (test 67 U/L                                 



             code = 2204)                                        

 

             AST (test code = 2218) 49 U/L                                 

 

             ALT (test code = 2219) 41 U/L                                 



COMPREHENSIVE METABOLIC [id number] 00:00:00





             Test Item    Value        Reference Range Interpretation Comments

 

             GLUCOSE (test code = 2217) 141 MG/DL                              

 

             BUN (test code = 2208) 31 MG/DL                               

 

             CREATININE (test code = 2214) 1.07 MG/DL                           

  

 

             eGFR ( CKD-EPI) (test code 59 ML/MIN/1.73                      

     



             = 33158)                                            

 

             CALC BUN/CREAT (test code = 29 RATIO                               



             2235)                                               

 

             SODIUM (test code = 2231) 141 MEQ/L                              

 

             POTASSIUM (test code = 2228) 4.7 MEQ/L                             

 

 

             CHLORIDE (test code = 2215) 99 MEQ/L                               

 

             CARBON DIOXIDE (test code = 26 MEQ/L                               



             )                                               

 

             CALCIUM (test code = 2209) 8.8 MG/DL                              

 

             PROTEIN, TOTAL (test code = 8.0 G/DL                               



             )                                               

 

             ALBUMIN (test code = 2201) 4.5 G/DL                               

 

             CALC GLOBULIN (test code = 3.5 G/DL                               



             )                                               

 

             CALC A/G RATIO (test code = 1.3 RATIO                              



             2234)                                               

 

             BILIRUBIN, TOTAL (test code = 0.4 MG/DL                            

  



             )                                               

 

             ALKALINE PHOSPHATASE (test 67 U/L                                 



             code = 2204)                                        

 

             AST (test code = 2218) 49 U/L                                 

 

             ALT (test code = 2219) 41 U/L                                 



LIPID [id number] 00:00:00





             Test Item    Value        Reference Range Interpretation Comments

 

             CHOLESTEROL (test code = 2210) 113 MG/DL                           

   

 

             TRIGLYCERIDES (test code = 2232) 165 MG/DL                         

     

 

             HDL CHOLESTEROL (test code = 2220) 39 MG/DL                        

       

 

             CALC LDL CHOL (test code = 2237) 50 MG/DL                          

     

 

             RISK RATIO LDL/HDL (test code = 1.28 RATIO                         

    



             2238)                                               



LIPID [id number] 00:00:00





             Test Item    Value        Reference Range Interpretation Comments

 

             CHOLESTEROL (test code = 2210) 113 MG/DL                           

   

 

             TRIGLYCERIDES (test code = 2232) 165 MG/DL                         

     

 

             HDL CHOLESTEROL (test code = 2220) 39 MG/DL                        

       

 

             CALC LDL CHOL (test code = 2237) 50 MG/DL                          

     

 

             RISK RATIO LDL/HDL (test code = 1.28 RATIO                         

    



             2238)                                               



[id number] 00:00:00





             Test Item    Value        Reference Range Interpretation Comments

 

             TSH, THIRD GENERATION (test code 0.190 UIU/ML                      

     



             = 2821)                                             



[id number] 00:00:00





             Test Item    Value        Reference Range Interpretation Comments

 

             TSH, THIRD GENERATION (test code 0.190 UIU/ML                      

     



             = 2821)                                             



[id number] 00:00:00





             Test Item    Value        Reference Range Interpretation Comments

 

             TSH, THIRD GENERATION (test code 0.190 UIU/ML                      

     



             = 2821)                                             



IRON, [id number] 00:00:00





             Test Item    Value        Reference Range Interpretation Comments

 

             IRON, SERUM (test code = 2222) 59 UG/DL                            

   



IRON, [id number] 00:00:00





             Test Item    Value        Reference Range Interpretation Comments

 

             IRON, SERUM (test code = 2222) 59 UG/DL                            

   



HEMOGLOBIN [id number] 00:00:00





             Test Item    Value        Reference Range Interpretation Comments

 

             HEMOGLOBIN A1c (test code = 59828) 8.4 %                           

       



HEMOGLOBIN [id number] 00:00:00





             Test Item    Value        Reference Range Interpretation Comments

 

             HEMOGLOBIN A1c (test code = 53755) 8.4 %                           

       



HEMOGLOBIN [id number] 00:00:00





             Test Item    Value        Reference Range Interpretation Comments

 

             HEMOGLOBIN A1c (test code = 10828) 8.4 %                           

       



COMPREHENSIVE METABOLIC [id number] 00:00:00





             Test Item    Value        Reference Range Interpretation Comments

 

             GLUCOSE (test code = 2217) 141 MG/DL                              

 

             BUN (test code = 2208) 31 MG/DL                               

 

             CREATININE (test code = 2214) 1.07 MG/DL                           

  

 

             eGFR ( CKD-EPI) (test code 59 ML/MIN/1.73                      

     



             = 57556)                                            

 

             CALC BUN/CREAT (test code = 29 RATIO                               



             2235)                                               

 

             SODIUM (test code = 2231) 141 MEQ/L                              

 

             POTASSIUM (test code = 2228) 4.7 MEQ/L                             

 

 

             CHLORIDE (test code = 2215) 99 MEQ/L                               

 

             CARBON DIOXIDE (test code = 26 MEQ/L                               



             )                                               

 

             CALCIUM (test code = 2209) 8.8 MG/DL                              

 

             PROTEIN, TOTAL (test code = 8.0 G/DL                               



             )                                               

 

             ALBUMIN (test code = 2201) 4.5 G/DL                               

 

             CALC GLOBULIN (test code = 3.5 G/DL                               



             0)                                               

 

             CALC A/G RATIO (test code = 1.3 RATIO                              



             4)                                               

 

             BILIRUBIN, TOTAL (test code = 0.4 MG/DL                            

  



             )                                               

 

             ALKALINE PHOSPHATASE (test 67 U/L                                 



             code = 2204)                                        

 

             AST (test code = 2218) 49 U/L                                 

 

             ALT (test code = 2219) 41 U/L                                 



COMPREHENSIVE METABOLIC [id number] 00:00:00





             Test Item    Value        Reference Range Interpretation Comments

 

             GLUCOSE (test code = 2217) 141 MG/DL                              

 

             BUN (test code = 2208) 31 MG/DL                               

 

             CREATININE (test code = 2214) 1.07 MG/DL                           

  

 

             eGFR ( CKD-EPI) (test code 59 ML/MIN/1.73                      

     



             = 75718)                                            

 

             CALC BUN/CREAT (test code = 29 RATIO                               



             2235)                                               

 

             SODIUM (test code = 2231) 141 MEQ/L                              

 

             POTASSIUM (test code = 2228) 4.7 MEQ/L                             

 

 

             CHLORIDE (test code = 2215) 99 MEQ/L                               

 

             CARBON DIOXIDE (test code = 26 MEQ/L                               



             )                                               

 

             CALCIUM (test code = 2209) 8.8 MG/DL                              

 

             PROTEIN, TOTAL (test code = 8.0 G/DL                               



             )                                               

 

             ALBUMIN (test code = 2201) 4.5 G/DL                               

 

             CALC GLOBULIN (test code = 3.5 G/DL                               



             )                                               

 

             CALC A/G RATIO (test code = 1.3 RATIO                              



             )                                               

 

             BILIRUBIN, TOTAL (test code = 0.4 MG/DL                            

  



             )                                               

 

             ALKALINE PHOSPHATASE (test 67 U/L                                 



             code = 2204)                                        

 

             AST (test code = 2218) 49 U/L                                 

 

             ALT (test code = 2219) 41 U/L                                 



LIPID [id number] 00:00:00





             Test Item    Value        Reference Range Interpretation Comments

 

             CHOLESTEROL (test code = 2210) 113 MG/DL                           

   

 

             TRIGLYCERIDES (test code = 2232) 165 MG/DL                         

     

 

             HDL CHOLESTEROL (test code = 2220) 39 MG/DL                        

       

 

             CALC LDL CHOL (test code = 2237) 50 MG/DL                          

     

 

             RISK RATIO LDL/HDL (test code = 1.28 RATIO                         

    



             2238)                                               



LIPID [id number] 00:00:00





             Test Item    Value        Reference Range Interpretation Comments

 

             CHOLESTEROL (test code = 2210) 113 MG/DL                           

   

 

             TRIGLYCERIDES (test code = 2232) 165 MG/DL                         

     

 

             HDL CHOLESTEROL (test code = 2220) 39 MG/DL                        

       

 

             CALC LDL CHOL (test code = 2237) 50 MG/DL                          

     

 

             RISK RATIO LDL/HDL (test code = 1.28 RATIO                         

    



             2238)                                               



[id number] 00:00:00





             Test Item    Value        Reference Range Interpretation Comments

 

             TSH, THIRD GENERATION (test code 0.190 UIU/ML                      

     



             = 2821)                                             



[id number] 00:00:00





             Test Item    Value        Reference Range Interpretation Comments

 

             TSH, THIRD GENERATION (test code 0.190 UIU/ML                      

     



             = 2821)                                             



[id number] 00:00:00





             Test Item    Value        Reference Range Interpretation Comments

 

             TSH, THIRD GENERATION (test code 0.190 UIU/ML                      

     



             = 2821)                                             



IRON, [id number] 00:00:00





             Test Item    Value        Reference Range Interpretation Comments

 

             IRON, SERUM (test code = 2222) 59 UG/DL                            

   



IRON, [id number] 00:00:00





             Test Item    Value        Reference Range Interpretation Comments

 

             IRON, SERUM (test code = 2222) 59 UG/DL                            

   



HEMOGLOBIN [id number] 00:00:00





             Test Item    Value        Reference Range Interpretation Comments

 

             HEMOGLOBIN A1c (test code = 94413) 8.4 %                           

       



HEMOGLOBIN [id number] 00:00:00





             Test Item    Value        Reference Range Interpretation Comments

 

             HEMOGLOBIN A1c (test code = 67013) 8.4 %                           

       



COMPREHENSIVE METABOLIC [id number] 00:00:00





             Test Item    Value        Reference Range Interpretation Comments

 

             GLUCOSE (test code = 2217) 141 MG/DL                              

 

             BUN (test code = 2208) 31 MG/DL                               

 

             CREATININE (test code = 2214) 1.07 MG/DL                           

  

 

             eGFR ( CKD-EPI) (test code 59 ML/MIN/1.73                      

     



             = 42491)                                            

 

             CALC BUN/CREAT (test code = 29 RATIO                               



             5)                                               

 

             SODIUM (test code = 2231) 141 MEQ/L                              

 

             POTASSIUM (test code = 2228) 4.7 MEQ/L                             

 

 

             CHLORIDE (test code = 2215) 99 MEQ/L                               

 

             CARBON DIOXIDE (test code = 26 MEQ/L                               



             )                                               

 

             CALCIUM (test code = 2209) 8.8 MG/DL                              

 

             PROTEIN, TOTAL (test code = 8.0 G/DL                               



             )                                               

 

             ALBUMIN (test code = 2201) 4.5 G/DL                               

 

             CALC GLOBULIN (test code = 3.5 G/DL                               



             )                                               

 

             CALC A/G RATIO (test code = 1.3 RATIO                              



             4)                                               

 

             BILIRUBIN, TOTAL (test code = 0.4 MG/DL                            

  



             )                                               

 

             ALKALINE PHOSPHATASE (test 67 U/L                                 



             code = 2204)                                        

 

             AST (test code = 2218) 49 U/L                                 

 

             ALT (test code = 2219) 41 U/L                                 



LIPID [id number] 00:00:00





             Test Item    Value        Reference Range Interpretation Comments

 

             CHOLESTEROL (test code = 2210) 113 MG/DL                           

   

 

             TRIGLYCERIDES (test code = 2232) 165 MG/DL                         

     

 

             HDL CHOLESTEROL (test code = 2220) 39 MG/DL                        

       

 

             CALC LDL CHOL (test code = 2237) 50 MG/DL                          

     

 

             RISK RATIO LDL/HDL (test code = 1.28 RATIO                         

    



             2238)                                               



[id number] 00:00:00





             Test Item    Value        Reference Range Interpretation Comments

 

             TSH, THIRD GENERATION (test code 0.190 UIU/ML                      

     



             = 2821)                                             



[id number] 00:00:00





             Test Item    Value        Reference Range Interpretation Comments

 

             TSH, THIRD GENERATION (test code 0.190 UIU/ML                      

     



             = 2821)                                             



IRON, [id number] 00:00:00





             Test Item    Value        Reference Range Interpretation Comments

 

             IRON, SERUM (test code = 2222) 59 UG/DL                            

   



HEMOGLOBIN [id number] 00:00:00





             Test Item    Value        Reference Range Interpretation Comments

 

             HEMOGLOBIN A1c (test code = 14403) 8.4 %                           

       



HEMOGLOBIN [id number] 00:00:00





             Test Item    Value        Reference Range Interpretation Comments

 

             HEMOGLOBIN A1c (test code = 01280) 8.4 %                           

       



HEMOGLOBIN [id number] 00:00:00





             Test Item    Value        Reference Range Interpretation Comments

 

             HEMOGLOBIN A1c (test code = 98647) 8.4 %                           

       



COMPREHENSIVE METABOLIC [id number] 00:00:00





             Test Item    Value        Reference Range Interpretation Comments

 

             GLUCOSE (test code = 2217) 141 MG/DL                              

 

             BUN (test code = 2208) 31 MG/DL                               

 

             CREATININE (test code = 2214) 1.07 MG/DL                           

  

 

             eGFR ( CKD-EPI) (test code 59 ML/MIN/1.73                      

     



             = 92092)                                            

 

             CALC BUN/CREAT (test code = 29 RATIO                               



             )                                               

 

             SODIUM (test code = 2231) 141 MEQ/L                              

 

             POTASSIUM (test code = 2228) 4.7 MEQ/L                             

 

 

             CHLORIDE (test code = 2215) 99 MEQ/L                               

 

             CARBON DIOXIDE (test code = 26 MEQ/L                               



             )                                               

 

             CALCIUM (test code = 2209) 8.8 MG/DL                              

 

             PROTEIN, TOTAL (test code = 8.0 G/DL                               



             )                                               

 

             ALBUMIN (test code = 2201) 4.5 G/DL                               

 

             CALC GLOBULIN (test code = 3.5 G/DL                               



             )                                               

 

             CALC A/G RATIO (test code = 1.3 RATIO                              



             )                                               

 

             BILIRUBIN, TOTAL (test code = 0.4 MG/DL                            

  



             )                                               

 

             ALKALINE PHOSPHATASE (test 67 U/L                                 



             code = 2204)                                        

 

             AST (test code = 2218) 49 U/L                                 

 

             ALT (test code = 2219) 41 U/L                                 



COMPREHENSIVE METABOLIC [id number] 00:00:00





             Test Item    Value        Reference Range Interpretation Comments

 

             GLUCOSE (test code = 2217) 141 MG/DL                              

 

             BUN (test code = 2208) 31 MG/DL                               

 

             CREATININE (test code = 2214) 1.07 MG/DL                           

  

 

             eGFR ( CKD-EPI) (test code 59 ML/MIN/1.73                      

     



             = 13089)                                            

 

             CALC BUN/CREAT (test code = 29 RATIO                               



             2235)                                               

 

             SODIUM (test code = 2231) 141 MEQ/L                              

 

             POTASSIUM (test code = 2228) 4.7 MEQ/L                             

 

 

             CHLORIDE (test code = 2215) 99 MEQ/L                               

 

             CARBON DIOXIDE (test code = 26 MEQ/L                               



             )                                               

 

             CALCIUM (test code = 2209) 8.8 MG/DL                              

 

             PROTEIN, TOTAL (test code = 8.0 G/DL                               



             )                                               

 

             ALBUMIN (test code = 2201) 4.5 G/DL                               

 

             CALC GLOBULIN (test code = 3.5 G/DL                               



             )                                               

 

             CALC A/G RATIO (test code = 1.3 RATIO                              



             )                                               

 

             BILIRUBIN, TOTAL (test code = 0.4 MG/DL                            

  



             )                                               

 

             ALKALINE PHOSPHATASE (test 67 U/L                                 



             code = 2204)                                        

 

             AST (test code = 2218) 49 U/L                                 

 

             ALT (test code = 2219) 41 U/L                                 



LIPID [id number] 00:00:00





             Test Item    Value        Reference Range Interpretation Comments

 

             CHOLESTEROL (test code = 2210) 113 MG/DL                           

   

 

             TRIGLYCERIDES (test code = 2232) 165 MG/DL                         

     

 

             HDL CHOLESTEROL (test code = 2220) 39 MG/DL                        

       

 

             CALC LDL CHOL (test code = 2237) 50 MG/DL                          

     

 

             RISK RATIO LDL/HDL (test code = 1.28 RATIO                         

    



             2238)                                               



LIPID [id number] 00:00:00





             Test Item    Value        Reference Range Interpretation Comments

 

             CHOLESTEROL (test code = 2210) 113 MG/DL                           

   

 

             TRIGLYCERIDES (test code = 2232) 165 MG/DL                         

     

 

             HDL CHOLESTEROL (test code = 2220) 39 MG/DL                        

       

 

             CALC LDL CHOL (test code = 2237) 50 MG/DL                          

     

 

             RISK RATIO LDL/HDL (test code = 1.28 RATIO                         

    



             2238)                                               



[id number] 00:00:00





             Test Item    Value        Reference Range Interpretation Comments

 

             TSH, THIRD GENERATION (test code 0.190 UIU/ML                      

     



             = 2821)                                             



[id number] 00:00:00





             Test Item    Value        Reference Range Interpretation Comments

 

             TSH, THIRD GENERATION (test code 0.190 UIU/ML                      

     



             = 2821)                                             



[id number] 00:00:00





             Test Item    Value        Reference Range Interpretation Comments

 

             TSH, THIRD GENERATION (test code 0.190 UIU/ML                      

     



             = 2821)                                             



IRON, [id number] 00:00:00





             Test Item    Value        Reference Range Interpretation Comments

 

             IRON, SERUM (test code = 2222) 59 UG/DL                            

   



IRON, [id number] 00:00:00





             Test Item    Value        Reference Range Interpretation Comments

 

             IRON, SERUM (test code = 2222) 59 UG/DL                            

   



ALBUMIN/CREATININE RATIO, URINE, [id number] 05:57:19





             Test Item    Value        Reference Range Interpretation Comments

 

             CREATININE, URINE, 212.0 MG/DL  NOT ESTAB                 



             CONC. (test code =                                        



             2072)                                               

 

             ALBUMIN, URINE, 16.0 MG/DL                              Note: Test 

name is



             RANDOM (test code                                        changed to

 Urine Albumin



             = 81179)                                            from Microalbum

in in



                                                                 accordance with

 ADA and



                                                                 NKF Guidelines,

 and



                                                                 Albumin/Creatin

ine ratio



                                                                 reference inter

mariah



                                                                 reflects those



                                                                 Guidelines. Violet

lytic



                                                                 methodology is



                                                                 unchanged. No r

eference



                                                                 interval for Ra

ndom



                                                                 Urine Albumin i

s



                                                                 available.

 

             CALC         75 MG/G      <30          H             UNLESS OTHERWI

SE



             ALBUMIN/CREAT, RND                                        INDICATED

, ALL TESTING



             (test code =                                        PERFORMED ATCLI

NICAL



             64472)                                              PATHOLOGY LABOR

[facility]



                                                                 [facility]. [address] [place] [address]

4



                                                                 LABORATORY DIRE

CTOR:



                                                                 CLIFTON THOMSON M.D.



                                                                 CLIA NUMBER [id number]

[id number]



                                                                 CAP ACCREDITATI

ON NO.



                                                                 [id number]



HEMOGLOBIN [id number] 04:12:15





             Test Item    Value        Reference Range Interpretation Comments

 

             HEMOGLOBIN A1c (test 9.1 %        4.2-5.6      H             AMERIC

AN DIABETES



             code = 71741)                                        ASSOCIATION 

IDELINES FOR



                                                                 HGB A1C:



                                                                 PREDIABETES/INC

REASED RISK .



                                                                 . . . . . . 5.7

-6.4%



                                                                 DIAGNOSIS OF DI

ABETES . . .



                                                                 . . . . . . >=6

.5% WITH



                                                                 CONFIRMATION OR

 APPROPRIATE



                                                                 SYMPTOMS NOTE: 

ASSAY MAY BE



                                                                 AFFECTED BY



                                                                 HEMOGLOBINOPATH

IES (SICKLE



                                                                 CELL ANEMIA, S-

C DISEASE,



                                                                 OTHERS) OR ZENA

FICIALLY



                                                                 LOWERED BY DECR

EASED RED



                                                                 CELL SURVIVAL (

HEMOLYTIC



                                                                 ANEMIAS, BLOOD 

LOSS, ETC.).



                                                                 CONSIDER ALTERN

ATE TESTING



                                                                 OR LABORATORY C

ONSULTATION.



HEMOGLOBIN [id number] 00:00:00





             Test Item    Value        Reference Range Interpretation Comments

 

             HEMOGLOBIN A1c (test code = 28172) 9.1 %                           

       



HEMOGLOBIN [id number] 00:00:00





             Test Item    Value        Reference Range Interpretation Comments

 

             HEMOGLOBIN A1c (test code = 42648) 9.1 %                           

       



HEMOGLOBIN [id number] 00:00:00





             Test Item    Value        Reference Range Interpretation Comments

 

             HEMOGLOBIN A1c (test code = 87556) 9.1 %                           

       



MICROALBUMIN/CREATININE, RANDOM AND [id number] 00:00:00





             Test Item    Value        Reference Range Interpretation Comments

 

             CREATININE, URINE, CONC. (test 212.0 MG/DL                         

   



             code = 2072)                                        

 

             ALBUMIN, URINE, RANDOM (test code 16.0 MG/DL                       

      



             = 61523)                                            

 

             CALC ALBUMIN/CREAT, RND (test 75 MG/G                              

  



             code = 03223)                                        



MICROALBUMIN/CREATININE, RANDOM AND [id number] 00:00:00





             Test Item    Value        Reference Range Interpretation Comments

 

             CREATININE, URINE, CONC. (test 212.0 MG/DL                         

   



             code = 2072)                                        

 

             ALBUMIN, URINE, RANDOM (test code 16.0 MG/DL                       

      



             = 92153)                                            

 

             CALC ALBUMIN/CREAT, RND (test 75 MG/G                              

  



             code = 36780)                                        



HEMOGLOBIN [id number] 00:00:00





             Test Item    Value        Reference Range Interpretation Comments

 

             HEMOGLOBIN A1c (test code = 13284) 9.1 %                           

       



HEMOGLOBIN [id number] 00:00:00





             Test Item    Value        Reference Range Interpretation Comments

 

             HEMOGLOBIN A1c (test code = 46244) 9.1 %                           

       



HEMOGLOBIN [id number] 00:00:00





             Test Item    Value        Reference Range Interpretation Comments

 

             HEMOGLOBIN A1c (test code = 60264) 9.1 %                           

       



HEMOGLOBIN [id number] 00:00:00





             Test Item    Value        Reference Range Interpretation Comments

 

             HEMOGLOBIN A1c (test code = 23274) 9.1 %                           

       



HEMOGLOBIN [id number] 00:00:00





             Test Item    Value        Reference Range Interpretation Comments

 

             HEMOGLOBIN A1c (test code = 19485) 9.1 %                           

       



MICROALBUMIN/CREATININE, RANDOM AND [id number] 00:00:00





             Test Item    Value        Reference Range Interpretation Comments

 

             CREATININE, URINE, CONC. (test 212.0 MG/DL                         

   



             code = 2072)                                        

 

             ALBUMIN, URINE, RANDOM (test code 16.0 MG/DL                       

      



             = 95772)                                            

 

             CALC ALBUMIN/CREAT, RND (test 75 MG/G                              

  



             code = 77181)                                        



MICROALBUMIN/CREATININE, RANDOM AND [id number] 00:00:00





             Test Item    Value        Reference Range Interpretation Comments

 

             CREATININE, URINE, CONC. (test 212.0 MG/DL                         

   



             code = 2072)                                        

 

             ALBUMIN, URINE, RANDOM (test code 16.0 MG/DL                       

      



             = 72106)                                            

 

             CALC ALBUMIN/CREAT, RND (test 75 MG/G                              

  



             code = 06389)                                        



MICROALBUMIN/CREATININE, RANDOM AND [id number] 00:00:00





             Test Item    Value        Reference Range Interpretation Comments

 

             CREATININE, URINE, CONC. (test 212.0 MG/DL                         

   



             code = 2072)                                        

 

             ALBUMIN, URINE, RANDOM (test code 16.0 MG/DL                       

      



             = 04610)                                            

 

             CALC ALBUMIN/CREAT, RND (test 75 MG/G                              

  



             code = 00722)                                        



HEMOGLOBIN [id number] 00:00:00





             Test Item    Value        Reference Range Interpretation Comments

 

             HEMOGLOBIN A1c (test code = 88603) 9.1 %                           

       



HEMOGLOBIN [id number] 00:00:00





             Test Item    Value        Reference Range Interpretation Comments

 

             HEMOGLOBIN A1c (test code = 03696) 9.1 %                           

       



HEMOGLOBIN [id number] 00:00:00





             Test Item    Value        Reference Range Interpretation Comments

 

             HEMOGLOBIN A1c (test code = 24722) 9.1 %                           

       



MICROALBUMIN/CREATININE, RANDOM AND [id number] 00:00:00





             Test Item    Value        Reference Range Interpretation Comments

 

             CREATININE, URINE, CONC. (test 212.0 MG/DL                         

   



             code = 2072)                                        

 

             ALBUMIN, URINE, RANDOM (test code 16.0 MG/DL                       

      



             = 15450)                                            

 

             CALC ALBUMIN/CREAT, RND (test 75 MG/G                              

  



             code = 46580)                                        



MICROALBUMIN/CREATININE, RANDOM AND [id number] 00:00:00





             Test Item    Value        Reference Range Interpretation Comments

 

             CREATININE, URINE, CONC. (test 212.0 MG/DL                         

   



             code = 2072)                                        

 

             ALBUMIN, URINE, RANDOM (test code 16.0 MG/DL                       

      



             = 38125)                                            

 

             CALC ALBUMIN/CREAT, RND (test 75 MG/G                              

  



             code = 86746)                                        



HEMOGLOBIN [id number] 00:00:00





             Test Item    Value        Reference Range Interpretation Comments

 

             HEMOGLOBIN A1c (test code = 24341) 9.1 %                           

       



HEMOGLOBIN [id number] 00:00:00





             Test Item    Value        Reference Range Interpretation Comments

 

             HEMOGLOBIN A1c (test code = 02906) 9.1 %                           

       



HEMOGLOBIN [id number] 00:00:00





             Test Item    Value        Reference Range Interpretation Comments

 

             HEMOGLOBIN A1c (test code = 11582) 9.1 %                           

       



MICROALBUMIN/CREATININE, RANDOM AND [id number] 00:00:00





             Test Item    Value        Reference Range Interpretation Comments

 

             CREATININE, URINE, CONC. (test 212.0 MG/DL                         

   



             code = 2072)                                        

 

             ALBUMIN, URINE, RANDOM (test code 16.0 MG/DL                       

      



             = 99590)                                            

 

             CALC ALBUMIN/CREAT, RND (test 75 MG/G                              

  



             code = 24473)                                        



MICROALBUMIN/CREATININE, RANDOM AND [id number] 00:00:00





             Test Item    Value        Reference Range Interpretation Comments

 

             CREATININE, URINE, CONC. (test 212.0 MG/DL                         

   



             code = 2072)                                        

 

             ALBUMIN, URINE, RANDOM (test code 16.0 MG/DL                       

      



             = 58865)                                            

 

             CALC ALBUMIN/CREAT, RND (test 75 MG/G                              

  



             code = 13864)                                        



HEMOGLOBIN [id number] 00:00:00





             Test Item    Value        Reference Range Interpretation Comments

 

             HEMOGLOBIN A1c (test code = 37806) 9.1 %                           

       



HEMOGLOBIN [id number] 00:00:00





             Test Item    Value        Reference Range Interpretation Comments

 

             HEMOGLOBIN A1c (test code = 42992) 9.1 %                           

       



MICROALBUMIN/CREATININE, RANDOM AND [id number] 00:00:00





             Test Item    Value        Reference Range Interpretation Comments

 

             CREATININE, URINE, CONC. (test 212.0 MG/DL                         

   



             code = 2072)                                        

 

             ALBUMIN, URINE, RANDOM (test code 16.0 MG/DL                       

      



             = 40526)                                            

 

             CALC ALBUMIN/CREAT, RND (test 75 MG/G                              

  



             code = 90851)                                        



HEMOGLOBIN [id number] 00:00:00





             Test Item    Value        Reference Range Interpretation Comments

 

             HEMOGLOBIN A1c (test code = 07684) 9.1 %                           

       



HEMOGLOBIN [id number] 00:00:00





             Test Item    Value        Reference Range Interpretation Comments

 

             HEMOGLOBIN A1c (test code = 76877) 9.1 %                           

       



HEMOGLOBIN [id number] 00:00:00





             Test Item    Value        Reference Range Interpretation Comments

 

             HEMOGLOBIN A1c (test code = 38878) 9.1 %                           

       



MICROALBUMIN/CREATININE, RANDOM AND [id number] 00:00:00





             Test Item    Value        Reference Range Interpretation Comments

 

             CREATININE, URINE, CONC. (test 212.0 MG/DL                         

   



             code = 2072)                                        

 

             ALBUMIN, URINE, RANDOM (test code 16.0 MG/DL                       

      



             = 51876)                                            

 

             CALC ALBUMIN/CREAT, RND (test 75 MG/G                              

  



             code = 05880)                                        



MICROALBUMIN/CREATININE, RANDOM AND [id number] 00:00:00





             Test Item    Value        Reference Range Interpretation Comments

 

             CREATININE, URINE, CONC. (test 212.0 MG/DL                         

   



             code = 2072)                                        

 

             ALBUMIN, URINE, RANDOM (test code 16.0 MG/DL                       

      



             = 62233)                                            

 

             CALC ALBUMIN/CREAT, RND (test 75 MG/G                              

  



             code = 32287)                                        



PATHOLOGIST SMEAR [id number] 00:00:00





             Test Item    Value        Reference Range Interpretation Comments

 

             DIAGNOSIS: (test code = 8200) (NOTE)                               

  

 

             COMMENTS: (test code = 8205) (NOTE)                                

 

 

             MICROSCOPIC DESCRIPTION: (test (NOTE)                              

   



             code = 8210)                                        

 

             PATHOLOGIST: (test code = 8250) (NOTE)                             

    

 

             CPT: (test code = 8400) 56009                                  

 

             WBC (test code = 1001) 2.7 K/UL                               

 

             RBC (test code = 1002) 3.09 M/UL                              

 

             HEMOGLOBIN (test code = 1003) 8.2 G/DL                             

  

 

             HEMATOCRIT (test code = 1004) 25.6 %                               

  

 

             MCV (test code = 1005) 82.8 fL                                

 

             MCH (test code = 1006) 26.5 PG                                

 

             MCHC (test code = 1007) 32.0 G/DL                              

 

             RDW (test code = 1038) 14.1 %                                 

 

             NEUTROPHILS (test code = 1008) 52.0 %                              

   

 

             LYMPHOCYTES (test code = 1010) 35.8 %                              

   

 

             MONOCYTES (test code = 1011) 7.2 %                                 

 

 

             EOSINOPHILS (test code = 1012) 4.2 %                               

   

 

             BASOPHILS (test code = 1013) 0.4 %                                 

 

 

             NUCLEATED RBCS (test code = 0.0 /100WBC'S                          

 



             1065)                                               

 

             PLATELET COUNT (test code = 99 K/UL                                



             1015)                                               

 

             ABSOLUTE NEUTROPHILS (test code 1.38 K/UL                          

    



             = 1066)                                             

 

             ABSOLUTE LYMPHOCYTES (test code 0.95 K/UL                          

    



             = 1067)                                             

 

             ABSOLUTE MONOCYTES (test code = 0.19 K/UL                          

    



             1068)                                               

 

             ABSOLUTE EOSINOPHILS (test code 0.11 K/UL                          

    



             = 1040)                                             

 

             ABSOLUTE BASOPHILS (test code = 0.01 K/UL                          

    



             1069)                                               

 

             ABS NUCLEATED RBCS (test code = 0.00 K/UL                          

    



             07071)                                              

 

             COMMENTS (test code = 1016) (NOTE)                                 



PATHOLOGIST SMEAR [id number] 00:00:00





             Test Item    Value        Reference Range Interpretation Comments

 

             DIAGNOSIS: (test code = 8200) (NOTE)                               

  

 

             COMMENTS: (test code = 8205) (NOTE)                                

 

 

             MICROSCOPIC DESCRIPTION: (test (NOTE)                              

   



             code = 8210)                                        

 

             PATHOLOGIST: (test code = 8250) (NOTE)                             

    

 

             CPT: (test code = 8400) 68209                                  

 

             WBC (test code = 1001) 2.7 K/UL                               

 

             RBC (test code = 1002) 3.09 M/UL                              

 

             HEMOGLOBIN (test code = 1003) 8.2 G/DL                             

  

 

             HEMATOCRIT (test code = 1004) 25.6 %                               

  

 

             MCV (test code = 1005) 82.8 fL                                

 

             MCH (test code = 1006) 26.5 PG                                

 

             MCHC (test code = 1007) 32.0 G/DL                              

 

             RDW (test code = 1038) 14.1 %                                 

 

             NEUTROPHILS (test code = 1008) 52.0 %                              

   

 

             LYMPHOCYTES (test code = 1010) 35.8 %                              

   

 

             MONOCYTES (test code = 1011) 7.2 %                                 

 

 

             EOSINOPHILS (test code = 1012) 4.2 %                               

   

 

             BASOPHILS (test code = 1013) 0.4 %                                 

 

 

             NUCLEATED RBCS (test code = 0.0 /100WBC'S                          

 



             1065)                                               

 

             PLATELET COUNT (test code = 99 K/UL                                



             1015)                                               

 

             ABSOLUTE NEUTROPHILS (test code 1.38 K/UL                          

    



             = 1066)                                             

 

             ABSOLUTE LYMPHOCYTES (test code 0.95 K/UL                          

    



             = 1067)                                             

 

             ABSOLUTE MONOCYTES (test code = 0.19 K/UL                          

    



             1068)                                               

 

             ABSOLUTE EOSINOPHILS (test code 0.11 K/UL                          

    



             = 1040)                                             

 

             ABSOLUTE BASOPHILS (test code = 0.01 K/UL                          

    



             1069)                                               

 

             ABS NUCLEATED RBCS (test code = 0.00 K/UL                          

    



             57525)                                              

 

             COMMENTS (test code = 1016) (NOTE)                                 



PATHOLOGIST SMEAR [id number] 00:00:00





             Test Item    Value        Reference Range Interpretation Comments

 

             DIAGNOSIS: (test code = 8200) (NOTE)                               

  

 

             COMMENTS: (test code = 8205) (NOTE)                                

 

 

             MICROSCOPIC DESCRIPTION: (test (NOTE)                              

   



             code = 8210)                                        

 

             PATHOLOGIST: (test code = 8250) (NOTE)                             

    

 

             CPT: (test code = 8400) 18999                                  

 

             WBC (test code = 1001) 2.7 K/UL                               

 

             RBC (test code = 1002) 3.09 M/UL                              

 

             HEMOGLOBIN (test code = 1003) 8.2 G/DL                             

  

 

             HEMATOCRIT (test code = 1004) 25.6 %                               

  

 

             MCV (test code = 1005) 82.8 fL                                

 

             MCH (test code = 1006) 26.5 PG                                

 

             MCHC (test code = 1007) 32.0 G/DL                              

 

             RDW (test code = 1038) 14.1 %                                 

 

             NEUTROPHILS (test code = 1008) 52.0 %                              

   

 

             LYMPHOCYTES (test code = 1010) 35.8 %                              

   

 

             MONOCYTES (test code = 1011) 7.2 %                                 

 

 

             EOSINOPHILS (test code = 1012) 4.2 %                               

   

 

             BASOPHILS (test code = 1013) 0.4 %                                 

 

 

             NUCLEATED RBCS (test code = 0.0 /100WBC'S                          

 



             1065)                                               

 

             PLATELET COUNT (test code = 99 K/UL                                



             1015)                                               

 

             ABSOLUTE NEUTROPHILS (test code 1.38 K/UL                          

    



             = 1066)                                             

 

             ABSOLUTE LYMPHOCYTES (test code 0.95 K/UL                          

    



             = 1067)                                             

 

             ABSOLUTE MONOCYTES (test code = 0.19 K/UL                          

    



             1068)                                               

 

             ABSOLUTE EOSINOPHILS (test code 0.11 K/UL                          

    



             = 1040)                                             

 

             ABSOLUTE BASOPHILS (test code = 0.01 K/UL                          

    



             1069)                                               

 

             ABS NUCLEATED RBCS (test code = 0.00 K/UL                          

    



             84403)                                              

 

             COMMENTS (test code = 1016) (NOTE)                                 



PATHOLOGIST SMEAR [id number] 00:00:00





             Test Item    Value        Reference Range Interpretation Comments

 

             DIAGNOSIS: (test code = 8200) (NOTE)                               

  

 

             COMMENTS: (test code = 8205) (NOTE)                                

 

 

             MICROSCOPIC DESCRIPTION: (test (NOTE)                              

   



             code = 8210)                                        

 

             PATHOLOGIST: (test code = 8250) (NOTE)                             

    

 

             CPT: (test code = 8400) 95707                                  

 

             WBC (test code = 1001) 2.7 K/UL                               

 

             RBC (test code = 1002) 3.09 M/UL                              

 

             HEMOGLOBIN (test code = 1003) 8.2 G/DL                             

  

 

             HEMATOCRIT (test code = 1004) 25.6 %                               

  

 

             MCV (test code = 1005) 82.8 fL                                

 

             MCH (test code = 1006) 26.5 PG                                

 

             MCHC (test code = 1007) 32.0 G/DL                              

 

             RDW (test code = 1038) 14.1 %                                 

 

             NEUTROPHILS (test code = 1008) 52.0 %                              

   

 

             LYMPHOCYTES (test code = 1010) 35.8 %                              

   

 

             MONOCYTES (test code = 1011) 7.2 %                                 

 

 

             EOSINOPHILS (test code = 1012) 4.2 %                               

   

 

             BASOPHILS (test code = 1013) 0.4 %                                 

 

 

             NUCLEATED RBCS (test code = 0.0 /100WBC'S                          

 



             1065)                                               

 

             PLATELET COUNT (test code = 99 K/UL                                



             1015)                                               

 

             ABSOLUTE NEUTROPHILS (test code 1.38 K/UL                          

    



             = 1066)                                             

 

             ABSOLUTE LYMPHOCYTES (test code 0.95 K/UL                          

    



             = 1067)                                             

 

             ABSOLUTE MONOCYTES (test code = 0.19 K/UL                          

    



             1068)                                               

 

             ABSOLUTE EOSINOPHILS (test code 0.11 K/UL                          

    



             = 1040)                                             

 

             ABSOLUTE BASOPHILS (test code = 0.01 K/UL                          

    



             1069)                                               

 

             ABS NUCLEATED RBCS (test code = 0.00 K/UL                          

    



             42110)                                              

 

             COMMENTS (test code = 1016) (NOTE)                                 



PATHOLOGIST SMEAR [id number] 00:00:00





             Test Item    Value        Reference Range Interpretation Comments

 

             DIAGNOSIS: (test code = 8200) (NOTE)                               

  

 

             COMMENTS: (test code = 8205) (NOTE)                                

 

 

             MICROSCOPIC DESCRIPTION: (test (NOTE)                              

   



             code = 8210)                                        

 

             PATHOLOGIST: (test code = 8250) (NOTE)                             

    

 

             CPT: (test code = 8400) 53216                                  

 

             WBC (test code = 1001) 2.7 K/UL                               

 

             RBC (test code = 1002) 3.09 M/UL                              

 

             HEMOGLOBIN (test code = 1003) 8.2 G/DL                             

  

 

             HEMATOCRIT (test code = 1004) 25.6 %                               

  

 

             MCV (test code = 1005) 82.8 fL                                

 

             MCH (test code = 1006) 26.5 PG                                

 

             MCHC (test code = 1007) 32.0 G/DL                              

 

             RDW (test code = 1038) 14.1 %                                 

 

             NEUTROPHILS (test code = 1008) 52.0 %                              

   

 

             LYMPHOCYTES (test code = 1010) 35.8 %                              

   

 

             MONOCYTES (test code = 1011) 7.2 %                                 

 

 

             EOSINOPHILS (test code = 1012) 4.2 %                               

   

 

             BASOPHILS (test code = 1013) 0.4 %                                 

 

 

             NUCLEATED RBCS (test code = 0.0 /100WBC'S                          

 



             1065)                                               

 

             PLATELET COUNT (test code = 99 K/UL                                



             1015)                                               

 

             ABSOLUTE NEUTROPHILS (test code 1.38 K/UL                          

    



             = 1066)                                             

 

             ABSOLUTE LYMPHOCYTES (test code 0.95 K/UL                          

    



             = 1067)                                             

 

             ABSOLUTE MONOCYTES (test code = 0.19 K/UL                          

    



             1068)                                               

 

             ABSOLUTE EOSINOPHILS (test code 0.11 K/UL                          

    



             = 1040)                                             

 

             ABSOLUTE BASOPHILS (test code = 0.01 K/UL                          

    



             1069)                                               

 

             ABS NUCLEATED RBCS (test code = 0.00 K/UL                          

    



             98470)                                              

 

             COMMENTS (test code = 1016) (NOTE)                                 



PATHOLOGIST SMEAR [id number] 00:00:00





             Test Item    Value        Reference Range Interpretation Comments

 

             DIAGNOSIS: (test code = 8200) (NOTE)                               

  

 

             COMMENTS: (test code = 8205) (NOTE)                                

 

 

             MICROSCOPIC DESCRIPTION: (test (NOTE)                              

   



             code = 8210)                                        

 

             PATHOLOGIST: (test code = 8250) (NOTE)                             

    

 

             CPT: (test code = 8400) 98084                                  

 

             WBC (test code = 1001) 2.7 K/UL                               

 

             RBC (test code = 1002) 3.09 M/UL                              

 

             HEMOGLOBIN (test code = 1003) 8.2 G/DL                             

  

 

             HEMATOCRIT (test code = 1004) 25.6 %                               

  

 

             MCV (test code = 1005) 82.8 fL                                

 

             MCH (test code = 1006) 26.5 PG                                

 

             MCHC (test code = 1007) 32.0 G/DL                              

 

             RDW (test code = 1038) 14.1 %                                 

 

             NEUTROPHILS (test code = 1008) 52.0 %                              

   

 

             LYMPHOCYTES (test code = 1010) 35.8 %                              

   

 

             MONOCYTES (test code = 1011) 7.2 %                                 

 

 

             EOSINOPHILS (test code = 1012) 4.2 %                               

   

 

             BASOPHILS (test code = 1013) 0.4 %                                 

 

 

             NUCLEATED RBCS (test code = 0.0 /100WBC'S                          

 



             1065)                                               

 

             PLATELET COUNT (test code = 99 K/UL                                



             1015)                                               

 

             ABSOLUTE NEUTROPHILS (test code 1.38 K/UL                          

    



             = 1066)                                             

 

             ABSOLUTE LYMPHOCYTES (test code 0.95 K/UL                          

    



             = 1067)                                             

 

             ABSOLUTE MONOCYTES (test code = 0.19 K/UL                          

    



             1068)                                               

 

             ABSOLUTE EOSINOPHILS (test code 0.11 K/UL                          

    



             = 1040)                                             

 

             ABSOLUTE BASOPHILS (test code = 0.01 K/UL                          

    



             1069)                                               

 

             ABS NUCLEATED RBCS (test code = 0.00 K/UL                          

    



             72694)                                              

 

             COMMENTS (test code = 1016) (NOTE)                                 



PATHOLOGIST SMEAR [id number] 00:00:00





             Test Item    Value        Reference Range Interpretation Comments

 

             DIAGNOSIS: (test code = 8200) (NOTE)                               

  

 

             COMMENTS: (test code = 8205) (NOTE)                                

 

 

             MICROSCOPIC DESCRIPTION: (test (NOTE)                              

   



             code = 8210)                                        

 

             PATHOLOGIST: (test code = 8250) (NOTE)                             

    

 

             CPT: (test code = 8400) 24824                                  

 

             WBC (test code = 1001) 2.7 K/UL                               

 

             RBC (test code = 1002) 3.09 M/UL                              

 

             HEMOGLOBIN (test code = 1003) 8.2 G/DL                             

  

 

             HEMATOCRIT (test code = 1004) 25.6 %                               

  

 

             MCV (test code = 1005) 82.8 fL                                

 

             MCH (test code = 1006) 26.5 PG                                

 

             MCHC (test code = 1007) 32.0 G/DL                              

 

             RDW (test code = 1038) 14.1 %                                 

 

             NEUTROPHILS (test code = 1008) 52.0 %                              

   

 

             LYMPHOCYTES (test code = 1010) 35.8 %                              

   

 

             MONOCYTES (test code = 1011) 7.2 %                                 

 

 

             EOSINOPHILS (test code = 1012) 4.2 %                               

   

 

             BASOPHILS (test code = 1013) 0.4 %                                 

 

 

             NUCLEATED RBCS (test code = 0.0 /100WBC'S                          

 



             1065)                                               

 

             PLATELET COUNT (test code = 99 K/UL                                



             1015)                                               

 

             ABSOLUTE NEUTROPHILS (test code 1.38 K/UL                          

    



             = 1066)                                             

 

             ABSOLUTE LYMPHOCYTES (test code 0.95 K/UL                          

    



             = 1067)                                             

 

             ABSOLUTE MONOCYTES (test code = 0.19 K/UL                          

    



             1068)                                               

 

             ABSOLUTE EOSINOPHILS (test code 0.11 K/UL                          

    



             = 1040)                                             

 

             ABSOLUTE BASOPHILS (test code = 0.01 K/UL                          

    



             1069)                                               

 

             ABS NUCLEATED RBCS (test code = 0.00 K/UL                          

    



             50324)                                              

 

             COMMENTS (test code = 1016) (NOTE)                                 



PATHOLOGIST SMEAR [id number] 00:00:00





             Test Item    Value        Reference Range Interpretation Comments

 

             DIAGNOSIS: (test code = 8200) (NOTE)                               

  

 

             COMMENTS: (test code = 8205) (NOTE)                                

 

 

             MICROSCOPIC DESCRIPTION: (test (NOTE)                              

   



             code = 8210)                                        

 

             PATHOLOGIST: (test code = 8250) (NOTE)                             

    

 

             CPT: (test code = 8400) 26735                                  

 

             WBC (test code = 1001) 2.7 K/UL                               

 

             RBC (test code = 1002) 3.09 M/UL                              

 

             HEMOGLOBIN (test code = 1003) 8.2 G/DL                             

  

 

             HEMATOCRIT (test code = 1004) 25.6 %                               

  

 

             MCV (test code = 1005) 82.8 fL                                

 

             MCH (test code = 1006) 26.5 PG                                

 

             MCHC (test code = 1007) 32.0 G/DL                              

 

             RDW (test code = 1038) 14.1 %                                 

 

             NEUTROPHILS (test code = 1008) 52.0 %                              

   

 

             LYMPHOCYTES (test code = 1010) 35.8 %                              

   

 

             MONOCYTES (test code = 1011) 7.2 %                                 

 

 

             EOSINOPHILS (test code = 1012) 4.2 %                               

   

 

             BASOPHILS (test code = 1013) 0.4 %                                 

 

 

             NUCLEATED RBCS (test code = 0.0 /100WBC'S                          

 



             1065)                                               

 

             PLATELET COUNT (test code = 99 K/UL                                



             1015)                                               

 

             ABSOLUTE NEUTROPHILS (test code 1.38 K/UL                          

    



             = 1066)                                             

 

             ABSOLUTE LYMPHOCYTES (test code 0.95 K/UL                          

    



             = 1067)                                             

 

             ABSOLUTE MONOCYTES (test code = 0.19 K/UL                          

    



             1068)                                               

 

             ABSOLUTE EOSINOPHILS (test code 0.11 K/UL                          

    



             = 1040)                                             

 

             ABSOLUTE BASOPHILS (test code = 0.01 K/UL                          

    



             1069)                                               

 

             ABS NUCLEATED RBCS (test code = 0.00 K/UL                          

    



             64194)                                              

 

             COMMENTS (test code = 1016) (NOTE)                                 



PATHOLOGIST SMEAR [id number] 00:00:00





             Test Item    Value        Reference Range Interpretation Comments

 

             DIAGNOSIS: (test code = 8200) (NOTE)                               

  

 

             COMMENTS: (test code = 8205) (NOTE)                                

 

 

             MICROSCOPIC DESCRIPTION: (test (NOTE)                              

   



             code = 8210)                                        

 

             PATHOLOGIST: (test code = 8250) (NOTE)                             

    

 

             CPT: (test code = 8400) 00016                                  

 

             WBC (test code = 1001) 2.7 K/UL                               

 

             RBC (test code = 1002) 3.09 M/UL                              

 

             HEMOGLOBIN (test code = 1003) 8.2 G/DL                             

  

 

             HEMATOCRIT (test code = 1004) 25.6 %                               

  

 

             MCV (test code = 1005) 82.8 fL                                

 

             MCH (test code = 1006) 26.5 PG                                

 

             MCHC (test code = 1007) 32.0 G/DL                              

 

             RDW (test code = 1038) 14.1 %                                 

 

             NEUTROPHILS (test code = 1008) 52.0 %                              

   

 

             LYMPHOCYTES (test code = 1010) 35.8 %                              

   

 

             MONOCYTES (test code = 1011) 7.2 %                                 

 

 

             EOSINOPHILS (test code = 1012) 4.2 %                               

   

 

             BASOPHILS (test code = 1013) 0.4 %                                 

 

 

             NUCLEATED RBCS (test code = 0.0 /100WBC'S                          

 



             1065)                                               

 

             PLATELET COUNT (test code = 99 K/UL                                



             1015)                                               

 

             ABSOLUTE NEUTROPHILS (test code 1.38 K/UL                          

    



             = 1066)                                             

 

             ABSOLUTE LYMPHOCYTES (test code 0.95 K/UL                          

    



             = 1067)                                             

 

             ABSOLUTE MONOCYTES (test code = 0.19 K/UL                          

    



             1068)                                               

 

             ABSOLUTE EOSINOPHILS (test code 0.11 K/UL                          

    



             = 1040)                                             

 

             ABSOLUTE BASOPHILS (test code = 0.01 K/UL                          

    



             1069)                                               

 

             ABS NUCLEATED RBCS (test code = 0.00 K/UL                          

    



             57057)                                              

 

             COMMENTS (test code = 1016) (NOTE)                                 



PATHOLOGIST SMEAR [id number] 00:00:00





             Test Item    Value        Reference Range Interpretation Comments

 

             DIAGNOSIS: (test code = 8200) (NOTE)                               

  

 

             COMMENTS: (test code = 8205) (NOTE)                                

 

 

             MICROSCOPIC DESCRIPTION: (test (NOTE)                              

   



             code = 8210)                                        

 

             PATHOLOGIST: (test code = 8250) (NOTE)                             

    

 

             CPT: (test code = 8400) 23831                                  

 

             WBC (test code = 1001) 2.7 K/UL                               

 

             RBC (test code = 1002) 3.09 M/UL                              

 

             HEMOGLOBIN (test code = 1003) 8.2 G/DL                             

  

 

             HEMATOCRIT (test code = 1004) 25.6 %                               

  

 

             MCV (test code = 1005) 82.8 fL                                

 

             MCH (test code = 1006) 26.5 PG                                

 

             MCHC (test code = 1007) 32.0 G/DL                              

 

             RDW (test code = 1038) 14.1 %                                 

 

             NEUTROPHILS (test code = 1008) 52.0 %                              

   

 

             LYMPHOCYTES (test code = 1010) 35.8 %                              

   

 

             MONOCYTES (test code = 1011) 7.2 %                                 

 

 

             EOSINOPHILS (test code = 1012) 4.2 %                               

   

 

             BASOPHILS (test code = 1013) 0.4 %                                 

 

 

             NUCLEATED RBCS (test code = 0.0 /100WBC'S                          

 



             1065)                                               

 

             PLATELET COUNT (test code = 99 K/UL                                



             1015)                                               

 

             ABSOLUTE NEUTROPHILS (test code 1.38 K/UL                          

    



             = 1066)                                             

 

             ABSOLUTE LYMPHOCYTES (test code 0.95 K/UL                          

    



             = 1067)                                             

 

             ABSOLUTE MONOCYTES (test code = 0.19 K/UL                          

    



             1068)                                               

 

             ABSOLUTE EOSINOPHILS (test code 0.11 K/UL                          

    



             = 1040)                                             

 

             ABSOLUTE BASOPHILS (test code = 0.01 K/UL                          

    



             1069)                                               

 

             ABS NUCLEATED RBCS (test code = 0.00 K/UL                          

    



             28981)                                              

 

             COMMENTS (test code = 1016) (NOTE)                                 



PATHOLOGIST SMEAR [id number] 00:00:00





             Test Item    Value        Reference Range Interpretation Comments

 

             DIAGNOSIS: (test code = 8200) (NOTE)                               

  

 

             COMMENTS: (test code = 8205) (NOTE)                                

 

 

             MICROSCOPIC DESCRIPTION: (test (NOTE)                              

   



             code = 8210)                                        

 

             PATHOLOGIST: (test code = 8250) (NOTE)                             

    

 

             CPT: (test code = 8400) 24304                                  

 

             WBC (test code = 1001) 2.7 K/UL                               

 

             RBC (test code = 1002) 3.09 M/UL                              

 

             HEMOGLOBIN (test code = 1003) 8.2 G/DL                             

  

 

             HEMATOCRIT (test code = 1004) 25.6 %                               

  

 

             MCV (test code = 1005) 82.8 fL                                

 

             MCH (test code = 1006) 26.5 PG                                

 

             MCHC (test code = 1007) 32.0 G/DL                              

 

             RDW (test code = 1038) 14.1 %                                 

 

             NEUTROPHILS (test code = 1008) 52.0 %                              

   

 

             LYMPHOCYTES (test code = 1010) 35.8 %                              

   

 

             MONOCYTES (test code = 1011) 7.2 %                                 

 

 

             EOSINOPHILS (test code = 1012) 4.2 %                               

   

 

             BASOPHILS (test code = 1013) 0.4 %                                 

 

 

             NUCLEATED RBCS (test code = 0.0 /100WBC'S                          

 



             1065)                                               

 

             PLATELET COUNT (test code = 99 K/UL                                



             1015)                                               

 

             ABSOLUTE NEUTROPHILS (test code 1.38 K/UL                          

    



             = 1066)                                             

 

             ABSOLUTE LYMPHOCYTES (test code 0.95 K/UL                          

    



             = 1067)                                             

 

             ABSOLUTE MONOCYTES (test code = 0.19 K/UL                          

    



             1068)                                               

 

             ABSOLUTE EOSINOPHILS (test code 0.11 K/UL                          

    



             = 1040)                                             

 

             ABSOLUTE BASOPHILS (test code = 0.01 K/UL                          

    



             1069)                                               

 

             ABS NUCLEATED RBCS (test code = 0.00 K/UL                          

    



             14674)                                              

 

             COMMENTS (test code = 1016) (NOTE)                                 



PATHOLOGIST SMEAR [id number] 00:00:00





             Test Item    Value        Reference Range Interpretation Comments

 

             DIAGNOSIS: (test code = 8200) (NOTE)                               

  

 

             COMMENTS: (test code = 8205) (NOTE)                                

 

 

             MICROSCOPIC DESCRIPTION: (test (NOTE)                              

   



             code = 8210)                                        

 

             PATHOLOGIST: (test code = 8250) (NOTE)                             

    

 

             CPT: (test code = 8400) 05133                                  

 

             WBC (test code = 1001) 2.7 K/UL                               

 

             RBC (test code = 1002) 3.09 M/UL                              

 

             HEMOGLOBIN (test code = 1003) 8.2 G/DL                             

  

 

             HEMATOCRIT (test code = 1004) 25.6 %                               

  

 

             MCV (test code = 1005) 82.8 fL                                

 

             MCH (test code = 1006) 26.5 PG                                

 

             MCHC (test code = 1007) 32.0 G/DL                              

 

             RDW (test code = 1038) 14.1 %                                 

 

             NEUTROPHILS (test code = 1008) 52.0 %                              

   

 

             LYMPHOCYTES (test code = 1010) 35.8 %                              

   

 

             MONOCYTES (test code = 1011) 7.2 %                                 

 

 

             EOSINOPHILS (test code = 1012) 4.2 %                               

   

 

             BASOPHILS (test code = 1013) 0.4 %                                 

 

 

             NUCLEATED RBCS (test code = 0.0 /100WBC'S                          

 



             1065)                                               

 

             PLATELET COUNT (test code = 99 K/UL                                



             1015)                                               

 

             ABSOLUTE NEUTROPHILS (test code 1.38 K/UL                          

    



             = 1066)                                             

 

             ABSOLUTE LYMPHOCYTES (test code 0.95 K/UL                          

    



             = 1067)                                             

 

             ABSOLUTE MONOCYTES (test code = 0.19 K/UL                          

    



             1068)                                               

 

             ABSOLUTE EOSINOPHILS (test code 0.11 K/UL                          

    



             = 1040)                                             

 

             ABSOLUTE BASOPHILS (test code = 0.01 K/UL                          

    



             1069)                                               

 

             ABS NUCLEATED RBCS (test code = 0.00 K/UL                          

    



             39792)                                              

 

             COMMENTS (test code = 1016) (NOTE)                                 



FERRITIN2021-10-20 00:00:00





             Test Item    Value        Reference Range Interpretation Comments

 

             FERRITIN (test code = ) 42 NG/ML                               



FERRITIN2021-10-20 00:00:00





             Test Item    Value        Reference Range Interpretation Comments

 

             FERRITIN (test code = ) 42 NG/ML                               



[id number] 00:00:00





             Test Item    Value        Reference Range Interpretation Comments

 

             TRANSFERRIN (test code = 4936) 281 MG/DL                           

   



[id number] 00:00:00





             Test Item    Value        Reference Range Interpretation Comments

 

             TRANSFERRIN (test code = 4936) 281 MG/DL                           

   



IRON BINDING CAPACITY AND IRON AND % SATURATION2021-10-20 00:00:00





             Test Item    Value        Reference Range Interpretation Comments

 

             IRON, SERUM (test code = 2) 28 UG/DL                            

   

 

             UNSATURATED IBC (test code = ) 315 UG/DL                      

        

 

             CALC TOTAL IBC (test code = ) 343 UG/DL                        

      

 

             CALC % IRON SAT (test code = ) 8 %                             

       



IRON BINDING CAPACITY AND IRON AND % SATURATION2021-10-20 00:00:00





             Test Item    Value        Reference Range Interpretation Comments

 

             IRON, SERUM (test code = 2) 28 UG/DL                            

   

 

             UNSATURATED IBC (test code = 61420) 315 UG/DL                      

        

 

             CALC TOTAL IBC (test code = ) 343 UG/DL                        

      

 

             CALC % IRON SAT (test code = ) 8 %                             

       



PROTIME AND PTT2021-10-20 00:00:00





             Test Item    Value        Reference Range Interpretation Comments

 

             PROTHROMBIN TIME (PT) (test code 14.8 SECONDS                      

     



             = 1402)                                             

 

             INR (test code = 74934) 1.1                                    

 

             PTT (test code = 1403) 35.8 SECONDS                           



PROTIME AND PTT2021-10-20 00:00:00





             Test Item    Value        Reference Range Interpretation Comments

 

             PROTHROMBIN TIME (PT) (test code 14.8 SECONDS                      

     



             = 1402)                                             

 

             INR (test code = 06822) 1.1                                    

 

             PTT (test code = 1403) 35.8 SECONDS                           



RHEUMATOID FACTOR, [id number] 00:00:00





             Test Item    Value        Reference Range Interpretation Comments

 

             RHEUMATOID FACTOR, QUANT (test code <10 IU/ML                      

        



             = 3502)                                             



RHEUMATOID FACTOR, [id number] 00:00:00





             Test Item    Value        Reference Range Interpretation Comments

 

             RHEUMATOID FACTOR, QUANT (test code <10 IU/ML                      

        



             = 3502)                                             



RHEUMATOID FACTOR, [id number] 00:00:00





             Test Item    Value        Reference Range Interpretation Comments

 

             RHEUMATOID FACTOR, QUANT (test code <10 IU/ML                      

        



             = 3502)                                             



CCP IgG2021-10-20 00:00:00





             Test Item    Value        Reference Range Interpretation Comments

 

             CCP IgG (test code = 62080) <0.5 U/ML                              



CCP IgG2021-10-20 00:00:00





             Test Item    Value        Reference Range Interpretation Comments

 

             CCP IgG (test code = 10401) <0.5 U/ML                              



CCP IgG2021-10-20 00:00:00





             Test Item    Value        Reference Range Interpretation Comments

 

             CCP IgG (test code = 28771) <0.5 U/ML                              



SMITH (SM) ANTIBODY2021-10-20 00:00:00





             Test Item    Value        Reference Range Interpretation Comments

 

             MCGUIRE (Sm) ANTIBODY (test code = <0.2 AI                           

     



             99742)                                              



SMITH (SM) ANTIBODY2021-10-20 00:00:00





             Test Item    Value        Reference Range Interpretation Comments

 

             MCGUIRE (Sm) ANTIBODY (test code = <0.2 AI                           

     



             56113)                                              



DNA DS ANTIBODY2021-10-20 00:00:00





             Test Item    Value        Reference Range Interpretation Comments

 

             dsDNA ANTIBODY (test code = 4287) 1.0 IU/ML                        

      



DNA DS ANTIBODY2021-10-20 00:00:00





             Test Item    Value        Reference Range Interpretation Comments

 

             dsDNA ANTIBODY (test code = 4287) 1.0 IU/ML                        

      



FERRITIN2021-10-20 00:00:00





             Test Item    Value        Reference Range Interpretation Comments

 

             FERRITIN (test code = 2075) 42 NG/ML                               



FERRITIN2021-10-20 00:00:00





             Test Item    Value        Reference Range Interpretation Comments

 

             FERRITIN (test code = 2075) 42 NG/ML                               



FERRITIN2021-10-20 00:00:00





             Test Item    Value        Reference Range Interpretation Comments

 

             FERRITIN (test code = 2075) 42 NG/ML                               



[id number] 00:00:00





             Test Item    Value        Reference Range Interpretation Comments

 

             TRANSFERRIN (test code = 4936) 281 MG/DL                           

   



[id number] 00:00:00





             Test Item    Value        Reference Range Interpretation Comments

 

             TRANSFERRIN (test code = 4936) 281 MG/DL                           

   



IRON BINDING CAPACITY AND IRON AND % SATURATION2021-10-20 00:00:00





             Test Item    Value        Reference Range Interpretation Comments

 

             IRON, SERUM (test code = 2222) 28 UG/DL                            

   

 

             UNSATURATED IBC (test code = 09221) 315 UG/DL                      

        

 

             CALC TOTAL IBC (test code = 7) 343 UG/DL                        

      

 

             CALC % IRON SAT (test code = 9) 8 %                             

       



IRON BINDING CAPACITY AND IRON AND % SATURATION2021-10-20 00:00:00





             Test Item    Value        Reference Range Interpretation Comments

 

             IRON, SERUM (test code = 2222) 28 UG/DL                            

   

 

             UNSATURATED IBC (test code = 82869) 315 UG/DL                      

        

 

             CALC TOTAL IBC (test code = 2077) 343 UG/DL                        

      

 

             CALC % IRON SAT (test code = 9) 8 %                             

       



PROTIME AND PTT2021-10-20 00:00:00





             Test Item    Value        Reference Range Interpretation Comments

 

             PROTHROMBIN TIME (PT) (test code 14.8 SECONDS                      

     



             = 1402)                                             

 

             INR (test code = 55387) 1.1                                    

 

             PTT (test code = 1403) 35.8 SECONDS                           



PROTIME AND PTT2021-10-20 00:00:00





             Test Item    Value        Reference Range Interpretation Comments

 

             PROTHROMBIN TIME (PT) (test code 14.8 SECONDS                      

     



             = 1402)                                             

 

             INR (test code = 56632) 1.1                                    

 

             PTT (test code = 1403) 35.8 SECONDS                           



RHEUMATOID FACTOR, [id number] 00:00:00





             Test Item    Value        Reference Range Interpretation Comments

 

             RHEUMATOID FACTOR, QUANT (test code <10 IU/ML                      

        



             = 3502)                                             



RHEUMATOID FACTOR, [id number] 00:00:00





             Test Item    Value        Reference Range Interpretation Comments

 

             RHEUMATOID FACTOR, QUANT (test code <10 IU/ML                      

        



             = 3502)                                             



RHEUMATOID FACTOR, [id number] 00:00:00





             Test Item    Value        Reference Range Interpretation Comments

 

             RHEUMATOID FACTOR, QUANT (test code <10 IU/ML                      

        



             = 3502)                                             



CCP IgG2021-10-20 00:00:00





             Test Item    Value        Reference Range Interpretation Comments

 

             CCP IgG (test code = 53805) <0.5 U/ML                              



[id number] 00:00:00





             Test Item    Value        Reference Range Interpretation Comments

 

             TRANSFERRIN (test code = 4936) 281 MG/DL                           

   



CCP IgG2021-10-20 00:00:00





             Test Item    Value        Reference Range Interpretation Comments

 

             CCP IgG (test code = 34086) <0.5 U/ML                              



CCP IgG2021-10-20 00:00:00





             Test Item    Value        Reference Range Interpretation Comments

 

             CCP IgG (test code = 90554) <0.5 U/ML                              



SMITH (SM) ANTIBODY2021-10-20 00:00:00





             Test Item    Value        Reference Range Interpretation Comments

 

             MCGUIRE (Sm) ANTIBODY (test code = <0.2 AI                           

     



             21341)                                              



SMITH (SM) ANTIBODY2021-10-20 00:00:00





             Test Item    Value        Reference Range Interpretation Comments

 

             MCGUIRE (Sm) ANTIBODY (test code = <0.2 AI                           

     



             20928)                                              



DNA DS ANTIBODY2021-10-20 00:00:00





             Test Item    Value        Reference Range Interpretation Comments

 

             dsDNA ANTIBODY (test code = 4287) 1.0 IU/ML                        

      



DNA DS ANTIBODY2021-10-20 00:00:00





             Test Item    Value        Reference Range Interpretation Comments

 

             dsDNA ANTIBODY (test code = 4287) 1.0 IU/ML                        

      



IRON BINDING CAPACITY AND IRON AND % SATURATION2021-10-20 00:00:00





             Test Item    Value        Reference Range Interpretation Comments

 

             IRON, SERUM (test code = 2222) 28 UG/DL                            

   

 

             UNSATURATED IBC (test code = 79668) 315 UG/DL                      

        

 

             CALC TOTAL IBC (test code = 2077) 343 UG/DL                        

      

 

             CALC % IRON SAT (test code = 2079) 8 %                             

       



PROTIME AND PTT2021-10-20 00:00:00





             Test Item    Value        Reference Range Interpretation Comments

 

             PROTHROMBIN TIME (PT) (test code 14.8 SECONDS                      

     



             = 1402)                                             

 

             INR (test code = 59836) 1.1                                    

 

             PTT (test code = 1403) 35.8 SECONDS                           



RHEUMATOID FACTOR, [id number] 00:00:00





             Test Item    Value        Reference Range Interpretation Comments

 

             RHEUMATOID FACTOR, QUANT (test code <10 IU/ML                      

        



             = 3502)                                             



RHEUMATOID FACTOR, [id number] 00:00:00





             Test Item    Value        Reference Range Interpretation Comments

 

             RHEUMATOID FACTOR, QUANT (test code <10 IU/ML                      

        



             = 3502)                                             



CCP IgG2021-10-20 00:00:00





             Test Item    Value        Reference Range Interpretation Comments

 

             CCP IgG (test code = 23845) <0.5 U/ML                              



CCP IgG2021-10-20 00:00:00





             Test Item    Value        Reference Range Interpretation Comments

 

             CCP IgG (test code = 57006) <0.5 U/ML                              



SMITH (SM) ANTIBODY2021-10-20 00:00:00





             Test Item    Value        Reference Range Interpretation Comments

 

             MCGUIRE (Sm) ANTIBODY (test code = <0.2 AI                           

     



             88259)                                              



DNA DS ANTIBODY2021-10-20 00:00:00





             Test Item    Value        Reference Range Interpretation Comments

 

             dsDNA ANTIBODY (test code = 4287) 1.0 IU/ML                        

      



FERRITIN2021-10-20 00:00:00





             Test Item    Value        Reference Range Interpretation Comments

 

             FERRITIN (test code = ) 42 NG/ML                               



FERRITIN2021-10-20 00:00:00





             Test Item    Value        Reference Range Interpretation Comments

 

             FERRITIN (test code = ) 42 NG/ML                               



[id number] 00:00:00





             Test Item    Value        Reference Range Interpretation Comments

 

             TRANSFERRIN (test code = 4936) 281 MG/DL                           

   



[id number] 00:00:00





             Test Item    Value        Reference Range Interpretation Comments

 

             TRANSFERRIN (test code = 4936) 281 MG/DL                           

   



IRON BINDING CAPACITY AND IRON AND % SATURATION2021-10-20 00:00:00





             Test Item    Value        Reference Range Interpretation Comments

 

             IRON, SERUM (test code = 2) 28 UG/DL                            

   

 

             UNSATURATED IBC (test code = ) 315 UG/DL                      

        

 

             CALC TOTAL IBC (test code = ) 343 UG/DL                        

      

 

             CALC % IRON SAT (test code = ) 8 %                             

       



IRON BINDING CAPACITY AND IRON AND % SATURATION2021-10-20 00:00:00





             Test Item    Value        Reference Range Interpretation Comments

 

             IRON, SERUM (test code = 2222) 28 UG/DL                            

   

 

             UNSATURATED IBC (test code = 32904) 315 UG/DL                      

        

 

             CALC TOTAL IBC (test code = ) 343 UG/DL                        

      

 

             CALC % IRON SAT (test code = ) 8 %                             

       



PROTIME AND PTT2021-10-20 00:00:00





             Test Item    Value        Reference Range Interpretation Comments

 

             PROTHROMBIN TIME (PT) (test code 14.8 SECONDS                      

     



             = 1402)                                             

 

             INR (test code = 10282) 1.1                                    

 

             PTT (test code = 1403) 35.8 SECONDS                           



PROTIME AND PTT2021-10-20 00:00:00





             Test Item    Value        Reference Range Interpretation Comments

 

             PROTHROMBIN TIME (PT) (test code 14.8 SECONDS                      

     



             = 1402)                                             

 

             INR (test code = 46874) 1.1                                    

 

             PTT (test code = 1403) 35.8 SECONDS                           



RHEUMATOID FACTOR, [id number] 00:00:00





             Test Item    Value        Reference Range Interpretation Comments

 

             RHEUMATOID FACTOR, QUANT (test code <10 IU/ML                      

        



             = 3502)                                             



RHEUMATOID FACTOR, [id number] 00:00:00





             Test Item    Value        Reference Range Interpretation Comments

 

             RHEUMATOID FACTOR, QUANT (test code <10 IU/ML                      

        



             = 3502)                                             



RHEUMATOID FACTOR, [id number] 00:00:00





             Test Item    Value        Reference Range Interpretation Comments

 

             RHEUMATOID FACTOR, QUANT (test code <10 IU/ML                      

        



             = 3502)                                             



CCP IgG2021-10-20 00:00:00





             Test Item    Value        Reference Range Interpretation Comments

 

             CCP IgG (test code = 65284) <0.5 U/ML                              



CCP IgG2021-10-20 00:00:00





             Test Item    Value        Reference Range Interpretation Comments

 

             CCP IgG (test code = 85087) <0.5 U/ML                              



CCP IgG2021-10-20 00:00:00





             Test Item    Value        Reference Range Interpretation Comments

 

             CCP IgG (test code = 11099) <0.5 U/ML                              



SMITH (SM) ANTIBODY2021-10-20 00:00:00





             Test Item    Value        Reference Range Interpretation Comments

 

             MCGUIRE (Sm) ANTIBODY (test code = <0.2 AI                           

     



             18971)                                              



SMITH (SM) ANTIBODY2021-10-20 00:00:00





             Test Item    Value        Reference Range Interpretation Comments

 

             MCGUIRE (Sm) ANTIBODY (test code = <0.2 AI                           

     



             12596)                                              



DNA DS ANTIBODY2021-10-20 00:00:00





             Test Item    Value        Reference Range Interpretation Comments

 

             dsDNA ANTIBODY (test code = 4287) 1.0 IU/ML                        

      



DNA DS ANTIBODY2021-10-20 00:00:00





             Test Item    Value        Reference Range Interpretation Comments

 

             dsDNA ANTIBODY (test code = 4287) 1.0 IU/ML                        

      



FERRITIN2021-10-20 00:00:00





             Test Item    Value        Reference Range Interpretation Comments

 

             FERRITIN (test code = 2075) 42 NG/ML                               



FERRITIN2021-10-20 00:00:00





             Test Item    Value        Reference Range Interpretation Comments

 

             FERRITIN (test code = 2075) 42 NG/ML                               



[id number] 00:00:00





             Test Item    Value        Reference Range Interpretation Comments

 

             TRANSFERRIN (test code = 4936) 281 MG/DL                           

   



[id number] 00:00:00





             Test Item    Value        Reference Range Interpretation Comments

 

             TRANSFERRIN (test code = 4936) 281 MG/DL                           

   



IRON BINDING CAPACITY AND IRON AND % SATURATION2021-10-20 00:00:00





             Test Item    Value        Reference Range Interpretation Comments

 

             IRON, SERUM (test code = 2) 28 UG/DL                            

   

 

             UNSATURATED IBC (test code = 69713) 315 UG/DL                      

        

 

             CALC TOTAL IBC (test code = ) 343 UG/DL                        

      

 

             CALC % IRON SAT (test code = 9) 8 %                             

       



IRON BINDING CAPACITY AND IRON AND % SATURATION2021-10-20 00:00:00





             Test Item    Value        Reference Range Interpretation Comments

 

             IRON, SERUM (test code = 2222) 28 UG/DL                            

   

 

             UNSATURATED IBC (test code = 74300) 315 UG/DL                      

        

 

             CALC TOTAL IBC (test code = 7) 343 UG/DL                        

      

 

             CALC % IRON SAT (test code = 9) 8 %                             

       



PROTIME AND PTT2021-10-20 00:00:00





             Test Item    Value        Reference Range Interpretation Comments

 

             PROTHROMBIN TIME (PT) (test code 14.8 SECONDS                      

     



             = 1402)                                             

 

             INR (test code = 05635) 1.1                                    

 

             PTT (test code = 1403) 35.8 SECONDS                           



PROTIME AND PTT2021-10-20 00:00:00





             Test Item    Value        Reference Range Interpretation Comments

 

             PROTHROMBIN TIME (PT) (test code 14.8 SECONDS                      

     



             = 1402)                                             

 

             INR (test code = 09318) 1.1                                    

 

             PTT (test code = 1403) 35.8 SECONDS                           



RHEUMATOID FACTOR, [id number] 00:00:00





             Test Item    Value        Reference Range Interpretation Comments

 

             RHEUMATOID FACTOR, QUANT (test code <10 IU/ML                      

        



             = 3502)                                             



RHEUMATOID FACTOR, [id number] 00:00:00





             Test Item    Value        Reference Range Interpretation Comments

 

             RHEUMATOID FACTOR, QUANT (test code <10 IU/ML                      

        



             = 3502)                                             



RHEUMATOID FACTOR, [id number] 00:00:00





             Test Item    Value        Reference Range Interpretation Comments

 

             RHEUMATOID FACTOR, QUANT (test code <10 IU/ML                      

        



             = 3502)                                             



CCP IgG2021-10-20 00:00:00





             Test Item    Value        Reference Range Interpretation Comments

 

             CCP IgG (test code = 53564) <0.5 U/ML                              



CCP IgG2021-10-20 00:00:00





             Test Item    Value        Reference Range Interpretation Comments

 

             CCP IgG (test code = 89666) <0.5 U/ML                              



CCP IgG2021-10-20 00:00:00





             Test Item    Value        Reference Range Interpretation Comments

 

             CCP IgG (test code = 50011) <0.5 U/ML                              



SMITH (SM) ANTIBODY2021-10-20 00:00:00





             Test Item    Value        Reference Range Interpretation Comments

 

             MCGUIRE (Sm) ANTIBODY (test code = <0.2 AI                           

     



             79899)                                              



SMITH (SM) ANTIBODY2021-10-20 00:00:00





             Test Item    Value        Reference Range Interpretation Comments

 

             MCGUIRE (Sm) ANTIBODY (test code = <0.2 AI                           

     



             04121)                                              



DNA DS ANTIBODY2021-10-20 00:00:00





             Test Item    Value        Reference Range Interpretation Comments

 

             dsDNA ANTIBODY (test code = 4287) 1.0 IU/ML                        

      



DNA DS ANTIBODY2021-10-20 00:00:00





             Test Item    Value        Reference Range Interpretation Comments

 

             dsDNA ANTIBODY (test code = 4287) 1.0 IU/ML                        

      



FERRITIN2021-10-20 00:00:00





             Test Item    Value        Reference Range Interpretation Comments

 

             FERRITIN (test code = 2075) 42 NG/ML                               



[id number] 00:00:00





             Test Item    Value        Reference Range Interpretation Comments

 

             TRANSFERRIN (test code = 4936) 281 MG/DL                           

   



IRON BINDING CAPACITY AND IRON AND % SATURATION2021-10-20 00:00:00





             Test Item    Value        Reference Range Interpretation Comments

 

             IRON, SERUM (test code = 2222) 28 UG/DL                            

   

 

             UNSATURATED IBC (test code = 29447) 315 UG/DL                      

        

 

             CALC TOTAL IBC (test code = 2077) 343 UG/DL                        

      

 

             CALC % IRON SAT (test code = 2079) 8 %                             

       



PROTIME AND PTT2021-10-20 00:00:00





             Test Item    Value        Reference Range Interpretation Comments

 

             PROTHROMBIN TIME (PT) (test code 14.8 SECONDS                      

     



             = 1402)                                             

 

             INR (test code = 06894) 1.1                                    

 

             PTT (test code = 1403) 35.8 SECONDS                           



RHEUMATOID FACTOR, [id number] 00:00:00





             Test Item    Value        Reference Range Interpretation Comments

 

             RHEUMATOID FACTOR, QUANT (test code <10 IU/ML                      

        



             = 3502)                                             



RHEUMATOID FACTOR, [id number] 00:00:00





             Test Item    Value        Reference Range Interpretation Comments

 

             RHEUMATOID FACTOR, QUANT (test code <10 IU/ML                      

        



             = 3502)                                             



CCP IgG2021-10-20 00:00:00





             Test Item    Value        Reference Range Interpretation Comments

 

             CCP IgG (test code = 14291) <0.5 U/ML                              



CCP IgG2021-10-20 00:00:00





             Test Item    Value        Reference Range Interpretation Comments

 

             CCP IgG (test code = 58482) <0.5 U/ML                              



SMITH (SM) ANTIBODY2021-10-20 00:00:00





             Test Item    Value        Reference Range Interpretation Comments

 

             MCGUIRE (Sm) ANTIBODY (test code = <0.2 AI                           

     



             69248)                                              



DNA DS ANTIBODY2021-10-20 00:00:00





             Test Item    Value        Reference Range Interpretation Comments

 

             dsDNA ANTIBODY (test code = 4287) 1.0 IU/ML                        

      



FERRITIN2021-10-20 00:00:00





             Test Item    Value        Reference Range Interpretation Comments

 

             FERRITIN (test code = ) 42 NG/ML                               



FERRITIN2021-10-20 00:00:00





             Test Item    Value        Reference Range Interpretation Comments

 

             FERRITIN (test code = ) 42 NG/ML                               



[id number] 00:00:00





             Test Item    Value        Reference Range Interpretation Comments

 

             TRANSFERRIN (test code = 4936) 281 MG/DL                           

   



[id number] 00:00:00





             Test Item    Value        Reference Range Interpretation Comments

 

             TRANSFERRIN (test code = 4936) 281 MG/DL                           

   



IRON BINDING CAPACITY AND IRON AND % SATURATION2021-10-20 00:00:00





             Test Item    Value        Reference Range Interpretation Comments

 

             IRON, SERUM (test code = 2) 28 UG/DL                            

   

 

             UNSATURATED IBC (test code = ) 315 UG/DL                      

        

 

             CALC TOTAL IBC (test code = ) 343 UG/DL                        

      

 

             CALC % IRON SAT (test code = ) 8 %                             

       



IRON BINDING CAPACITY AND IRON AND % SATURATION2021-10-20 00:00:00





             Test Item    Value        Reference Range Interpretation Comments

 

             IRON, SERUM (test code = 2) 28 UG/DL                            

   

 

             UNSATURATED IBC (test code = 57933) 315 UG/DL                      

        

 

             CALC TOTAL IBC (test code = ) 343 UG/DL                        

      

 

             CALC % IRON SAT (test code = 9) 8 %                             

       



PROTIME AND PTT2021-10-20 00:00:00





             Test Item    Value        Reference Range Interpretation Comments

 

             PROTHROMBIN TIME (PT) (test code 14.8 SECONDS                      

     



             = 1402)                                             

 

             INR (test code = 01714) 1.1                                    

 

             PTT (test code = 1403) 35.8 SECONDS                           



PROTIME AND PTT2021-10-20 00:00:00





             Test Item    Value        Reference Range Interpretation Comments

 

             PROTHROMBIN TIME (PT) (test code 14.8 SECONDS                      

     



             = 1402)                                             

 

             INR (test code = 34711) 1.1                                    

 

             PTT (test code = 1403) 35.8 SECONDS                           



RHEUMATOID FACTOR, [id number] 00:00:00





             Test Item    Value        Reference Range Interpretation Comments

 

             RHEUMATOID FACTOR, QUANT (test code <10 IU/ML                      

        



             = 3502)                                             



RHEUMATOID FACTOR, [id number] 00:00:00





             Test Item    Value        Reference Range Interpretation Comments

 

             RHEUMATOID FACTOR, QUANT (test code <10 IU/ML                      

        



             = 3502)                                             



RHEUMATOID FACTOR, [id number] 00:00:00





             Test Item    Value        Reference Range Interpretation Comments

 

             RHEUMATOID FACTOR, QUANT (test code <10 IU/ML                      

        



             = 3502)                                             



CCP IgG2021-10-20 00:00:00





             Test Item    Value        Reference Range Interpretation Comments

 

             CCP IgG (test code = 56428) <0.5 U/ML                              



CCP IgG2021-10-20 00:00:00





             Test Item    Value        Reference Range Interpretation Comments

 

             CCP IgG (test code = 62616) <0.5 U/ML                              



CCP IgG2021-10-20 00:00:00





             Test Item    Value        Reference Range Interpretation Comments

 

             CCP IgG (test code = 31104) <0.5 U/ML                              



SMITH (SM) ANTIBODY2021-10-20 00:00:00





             Test Item    Value        Reference Range Interpretation Comments

 

             MCGUIRE (Sm) ANTIBODY (test code = <0.2 AI                           

     



             36085)                                              



SMITH (SM) ANTIBODY2021-10-20 00:00:00





             Test Item    Value        Reference Range Interpretation Comments

 

             MCGUIRE (Sm) ANTIBODY (test code = <0.2 AI                           

     



             18828)                                              



DNA DS ANTIBODY2021-10-20 00:00:00





             Test Item    Value        Reference Range Interpretation Comments

 

             dsDNA ANTIBODY (test code = 4287) 1.0 IU/ML                        

      



DNA DS ANTIBODY2021-10-20 00:00:00





             Test Item    Value        Reference Range Interpretation Comments

 

             dsDNA ANTIBODY (test code = 4287) 1.0 IU/ML                        

      



CULTURE, URINE2021-10-16 00:00:00





             Test Item    Value        Reference Range Interpretation Comments

 

             CULTURE, URINE (test SPECIMEN NUMBER:                           



             code = 82014) [id number]                              



CULTURE, URINE2021-10-16 00:00:00





             Test Item    Value        Reference Range Interpretation Comments

 

             CULTURE, URINE (test SPECIMEN NUMBER:                           



             code = 66116) [id number]                              



CULTURE, URINE2021-10-16 00:00:00





             Test Item    Value        Reference Range Interpretation Comments

 

             CULTURE, URINE (test SPECIMEN NUMBER:                           



             code = 05294) [id number]                              



CULTURE, URINE2021-10-16 00:00:00





             Test Item    Value        Reference Range Interpretation Comments

 

             CULTURE, URINE (test SPECIMEN NUMBER:                           



             code = 76992) [id number]                              



CULTURE, URINE2021-10-16 00:00:00





             Test Item    Value        Reference Range Interpretation Comments

 

             CULTURE, URINE (test SPECIMEN NUMBER:                           



             code = 93419) [id number]                              



CULTURE, URINE2021-10-16 00:00:00





             Test Item    Value        Reference Range Interpretation Comments

 

             CULTURE, URINE (test SPECIMEN NUMBER:                           



             code = 78660) [id number]                              



CULTURE, URINE2021-10-16 00:00:00





             Test Item    Value        Reference Range Interpretation Comments

 

             CULTURE, URINE (test SPECIMEN NUMBER:                           



             code = 61521) [id number]                              



CULTURE, URINE2021-10-16 00:00:00





             Test Item    Value        Reference Range Interpretation Comments

 

             CULTURE, URINE (test SPECIMEN NUMBER:                           



             code = 98603) [id number]                              



CULTURE, URINE2021-10-16 00:00:00





             Test Item    Value        Reference Range Interpretation Comments

 

             CULTURE, URINE (test SPECIMEN NUMBER:                           



             code = 43725) [id number]                              



CULTURE, URINE2021-10-16 00:00:00





             Test Item    Value        Reference Range Interpretation Comments

 

             CULTURE, URINE (test SPECIMEN NUMBER:                           



             code = 58093) [id number]                              



CULTURE, URINE2021-10-16 00:00:00





             Test Item    Value        Reference Range Interpretation Comments

 

             CULTURE, URINE (test SPECIMEN NUMBER:                           



             code = 59349) [id number]                              



CULTURE, URINE2021-10-16 00:00:00





             Test Item    Value        Reference Range Interpretation Comments

 

             CULTURE, URINE (test SPECIMEN NUMBER:                           



             code = 63890) [id number]                              



VIOLET TITER AND PATTERN [REFLEX]2021-10-14 00:00:00





             Test Item    Value        Reference Range Interpretation Comments

 

             PATTERN (test code = SPECKLED                               



             18544)                                              

 

             VIOLET TITER (test code = 1:640 TITER                            



             3550)                                               

 

             PATTERN 2 (test code = NOT DETECTED                           



             86349)                                              

 

             VIOLET TITER 2 (test code = NOT DETECTED TITER                        

   



             21195)                                              

 

             PATTERN 3 (test code = NOT DETECTED                           



             58319)                                              

 

             VIOLET TITER 3 (test code = NOT DETECTED TITER                        

   



             32717)                                              

 

             METHOD (test code = 04913) (NOTE)                                 



VIOLET TITER AND PATTERN [REFLEX]2021-10-14 00:00:00





             Test Item    Value        Reference Range Interpretation Comments

 

             PATTERN (test code = SPECKLED                               



             10760)                                              

 

             VIOLET TITER (test code = 1:640 TITER                            



             3550)                                               

 

             PATTERN 2 (test code = NOT DETECTED                           



             66416)                                              

 

             VIOLET TITER 2 (test code = NOT DETECTED TITER                        

   



             26269)                                              

 

             PATTERN 3 (test code = NOT DETECTED                           



             26538)                                              

 

             VIOLET TITER 3 (test code = NOT DETECTED TITER                        

   



             80043)                                              

 

             METHOD (test code = 95771) (NOTE)                                 



VIOLET TITER AND PATTERN [REFLEX]2021-10-14 00:00:00





             Test Item    Value        Reference Range Interpretation Comments

 

             PATTERN (test code = SPECKLED                               



             83648)                                              

 

             VIOLET TITER (test code = 1:640 TITER                            



             3550)                                               

 

             PATTERN 2 (test code = NOT DETECTED                           



             74949)                                              

 

             VIOLET TITER 2 (test code = NOT DETECTED TITER                        

   



             00899)                                              

 

             PATTERN 3 (test code = NOT DETECTED                           



             15058)                                              

 

             VIOLET TITER 3 (test code = NOT DETECTED TITER                        

   



             81883)                                              

 

             METHOD (test code = 15398) (NOTE)                                 



VIOLET TITER AND PATTERN [REFLEX]2021-10-14 00:00:00





             Test Item    Value        Reference Range Interpretation Comments

 

             PATTERN (test code = SPECKLED                               



             22753)                                              

 

             VIOLET TITER (test code = 1:640 TITER                            



             3550)                                               

 

             PATTERN 2 (test code = NOT DETECTED                           



             82177)                                              

 

             VIOLET TITER 2 (test code = NOT DETECTED TITER                        

   



             30685)                                              

 

             PATTERN 3 (test code = NOT DETECTED                           



             14102)                                              

 

             VIOLET TITER 3 (test code = NOT DETECTED TITER                        

   



             93750)                                              

 

             METHOD (test code = 97013) (NOTE)                                 



VIOLET TITER AND PATTERN [REFLEX]2021-10-14 00:00:00





             Test Item    Value        Reference Range Interpretation Comments

 

             PATTERN (test code = SPECKLED                               



             68346)                                              

 

             VIOLET TITER (test code = 1:640 TITER                            



             3550)                                               

 

             PATTERN 2 (test code = NOT DETECTED                           



             38363)                                              

 

             VIOLET TITER 2 (test code = NOT DETECTED TITER                        

   



             58626)                                              

 

             PATTERN 3 (test code = NOT DETECTED                           



             48885)                                              

 

             VIOLET TITER 3 (test code = NOT DETECTED TITER                        

   



             62509)                                              

 

             METHOD (test code = 30961) (NOTE)                                 



VIOLET TITER AND PATTERN [REFLEX]2021-10-14 00:00:00





             Test Item    Value        Reference Range Interpretation Comments

 

             PATTERN (test code = SPECKLED                               



             10242)                                              

 

             VIOLET TITER (test code = 1:640 TITER                            



             3550)                                               

 

             PATTERN 2 (test code = NOT DETECTED                           



             73254)                                              

 

             VIOLET TITER 2 (test code = NOT DETECTED TITER                        

   



             33288)                                              

 

             PATTERN 3 (test code = NOT DETECTED                           



             22877)                                              

 

             VIOLET TITER 3 (test code = NOT DETECTED TITER                        

   



             15976)                                              

 

             METHOD (test code = 89398) (NOTE)                                 



VIOLET TITER AND PATTERN [REFLEX]2021-10-14 00:00:00





             Test Item    Value        Reference Range Interpretation Comments

 

             PATTERN (test code = SPECKLED                               



             65743)                                              

 

             VIOLET TITER (test code = 1:640 TITER                            



             3550)                                               

 

             PATTERN 2 (test code = NOT DETECTED                           



             66421)                                              

 

             VIOLET TITER 2 (test code = NOT DETECTED TITER                        

   



             18719)                                              

 

             PATTERN 3 (test code = NOT DETECTED                           



             09486)                                              

 

             VIOLET TITER 3 (test code = NOT DETECTED TITER                        

   



             52329)                                              

 

             METHOD (test code = 82645) (NOTE)                                 



CBC W/AUTO DIFF2021-10-13 00:00:00





             Test Item    Value        Reference Range Interpretation Comments

 

             WBC (test code = 1001) 3.1 K/UL                               

 

             RBC (test code = 1002) 3.14 M/UL                              

 

             HEMOGLOBIN (test code = 1003) 8.4 G/DL                             

  

 

             HEMATOCRIT (test code = 1004) 25.9 %                               

  

 

             MCV (test code = 1005) 82.5 fL                                

 

             MCH (test code = 1006) 26.8 PG                                

 

             MCHC (test code = 1007) 32.4 G/DL                              

 

             RDW (test code = 1038) 14.5 %                                 

 

             NEUTROPHILS (test code = 1008) 61.2 %                              

   

 

             LYMPHOCYTES (test code = 1010) 25.5 %                              

   

 

             MONOCYTES (test code = 1011) 8.8 %                                 

 

 

             EOSINOPHILS (test code = 1012) 3.9 %                               

   

 

             BASOPHILS (test code = 1013) 0.3 %                                 

 

 

             IMMATURE GRANULOCYTES (test 0.3 %                                  



             code = 1036)                                        

 

             NUCLEATED RBCS (test code = 0.0 /100WBC'S                          

 



             1065)                                               

 

             PLATELET COUNT (test code = 85 K/UL                                



             1015)                                               

 

             ABSOLUTE NEUTROPHILS (test code 1.87 K/UL                          

    



             = 1066)                                             

 

             ABSOLUTE LYMPHOCYTES (test code 0.78 K/UL                          

    



             = 1067)                                             

 

             ABSOLUTE MONOCYTES (test code = 0.27 K/UL                          

    



             1068)                                               

 

             ABSOLUTE EOSINOPHILS (test code 0.12 K/UL                          

    



             = 1040)                                             

 

             ABSOLUTE BASOPHILS (test code = 0.01 K/UL                          

    



             1069)                                               

 

             ABS IMMATURE GRANULOCYTES (test 0.01 K/UL                          

    



             code = 1020)                                        

 

             ABS NUCLEATED RBCS (test code = 0.00 K/UL                          

    



             89977)                                              



CBC W/AUTO DIFF2021-10-13 00:00:00





             Test Item    Value        Reference Range Interpretation Comments

 

             WBC (test code = 1001) 3.1 K/UL                               

 

             RBC (test code = 1002) 3.14 M/UL                              

 

             HEMOGLOBIN (test code = 1003) 8.4 G/DL                             

  

 

             HEMATOCRIT (test code = 1004) 25.9 %                               

  

 

             MCV (test code = 1005) 82.5 fL                                

 

             MCH (test code = 1006) 26.8 PG                                

 

             MCHC (test code = 1007) 32.4 G/DL                              

 

             RDW (test code = 1038) 14.5 %                                 

 

             NEUTROPHILS (test code = 1008) 61.2 %                              

   

 

             LYMPHOCYTES (test code = 1010) 25.5 %                              

   

 

             MONOCYTES (test code = 1011) 8.8 %                                 

 

 

             EOSINOPHILS (test code = 1012) 3.9 %                               

   

 

             BASOPHILS (test code = 1013) 0.3 %                                 

 

 

             IMMATURE GRANULOCYTES (test 0.3 %                                  



             code = 1036)                                        

 

             NUCLEATED RBCS (test code = 0.0 /100WBC'S                          

 



             1065)                                               

 

             PLATELET COUNT (test code = 85 K/UL                                



             1015)                                               

 

             ABSOLUTE NEUTROPHILS (test code 1.87 K/UL                          

    



             = 1066)                                             

 

             ABSOLUTE LYMPHOCYTES (test code 0.78 K/UL                          

    



             = 1067)                                             

 

             ABSOLUTE MONOCYTES (test code = 0.27 K/UL                          

    



             1068)                                               

 

             ABSOLUTE EOSINOPHILS (test code 0.12 K/UL                          

    



             = 1040)                                             

 

             ABSOLUTE BASOPHILS (test code = 0.01 K/UL                          

    



             1069)                                               

 

             ABS IMMATURE GRANULOCYTES (test 0.01 K/UL                          

    



             code = 1020)                                        

 

             ABS NUCLEATED RBCS (test code = 0.00 K/UL                          

    



             82023)                                              



CBC W/AUTO DIFF2021-10- 00:00:00





             Test Item    Value        Reference Range Interpretation Comments

 

             WBC (test code = 1001) 3.1 K/UL                               

 

             RBC (test code = 1002) 3.14 M/UL                              

 

             HEMOGLOBIN (test code = 1003) 8.4 G/DL                             

  

 

             HEMATOCRIT (test code = 1004) 25.9 %                               

  

 

             MCV (test code = 1005) 82.5 fL                                

 

             MCH (test code = 1006) 26.8 PG                                

 

             MCHC (test code = 1007) 32.4 G/DL                              

 

             RDW (test code = 1038) 14.5 %                                 

 

             NEUTROPHILS (test code = 1008) 61.2 %                              

   

 

             LYMPHOCYTES (test code = 1010) 25.5 %                              

   

 

             MONOCYTES (test code = 1011) 8.8 %                                 

 

 

             EOSINOPHILS (test code = 1012) 3.9 %                               

   

 

             BASOPHILS (test code = 1013) 0.3 %                                 

 

 

             IMMATURE GRANULOCYTES (test 0.3 %                                  



             code = 1036)                                        

 

             NUCLEATED RBCS (test code = 0.0 /100WBC'S                          

 



             1065)                                               

 

             PLATELET COUNT (test code = 85 K/UL                                



             1015)                                               

 

             ABSOLUTE NEUTROPHILS (test code 1.87 K/UL                          

    



             = 1066)                                             

 

             ABSOLUTE LYMPHOCYTES (test code 0.78 K/UL                          

    



             = 1067)                                             

 

             ABSOLUTE MONOCYTES (test code = 0.27 K/UL                          

    



             1068)                                               

 

             ABSOLUTE EOSINOPHILS (test code 0.12 K/UL                          

    



             = 1040)                                             

 

             ABSOLUTE BASOPHILS (test code = 0.01 K/UL                          

    



             1069)                                               

 

             ABS IMMATURE GRANULOCYTES (test 0.01 K/UL                          

    



             code = 1020)                                        

 

             ABS NUCLEATED RBCS (test code = 0.00 K/UL                          

    



             67942)                                              



COMPREHENSIVE METABOLIC PANEL2021-10-13 00:00:00





             Test Item    Value        Reference Range Interpretation Comments

 

             GLUCOSE (test code = 2217) 139 MG/DL                              

 

             BUN (test code = 2208) 21 MG/DL                               

 

             CREATININE (test code = 2214) 0.83 MG/DL                           

  

 

             eGFR  AMER. (test code 88 ML/MIN/1.73                       

    



             = 17786)                                            

 

             eGFR NON- AMER. (test 76 ML/MIN/1.73                        

   



             code = 42146)                                        

 

             CALC BUN/CREAT (test code = 25 RATIO                               



             2235)                                               

 

             SODIUM (test code = 2231) 140 MEQ/L                              

 

             POTASSIUM (test code = 2228) 4.8 MEQ/L                             

 

 

             CHLORIDE (test code = 2215) 102 MEQ/L                              

 

             CARBON DIOXIDE (test code = 25 MEQ/L                               



             2206)                                               

 

             CALCIUM (test code = 2209) 9.2 MG/DL                              

 

             PROTEIN, TOTAL (test code = 7.2 G/DL                               



             )                                               

 

             ALBUMIN (test code = 2201) 3.6 G/DL                               

 

             CALC GLOBULIN (test code = 3.6 G/DL                               



             2240)                                               

 

             CALC A/G RATIO (test code = 1.0 RATIO                              



             2234)                                               

 

             BILIRUBIN, TOTAL (test code = 0.6 MG/DL                            

  



             )                                               

 

             ALKALINE PHOSPHATASE (test 101 U/L                                



             code = 2204)                                        

 

             AST (test code = 2218) 30 U/L                                 

 

             ALT (test code = 2219) 23 U/L                                 



COMPREHENSIVE METABOLIC PANEL2021-10-13 00:00:00





             Test Item    Value        Reference Range Interpretation Comments

 

             GLUCOSE (test code = 2217) 139 MG/DL                              

 

             BUN (test code = 2208) 21 MG/DL                               

 

             CREATININE (test code = 2214) 0.83 MG/DL                           

  

 

             eGFR  AMER. (test code 88 ML/MIN/1.73                       

    



             = 02045)                                            

 

             eGFR NON- AMER. (test 76 ML/MIN/1.73                        

   



             code = 63703)                                        

 

             CALC BUN/CREAT (test code = 25 RATIO                               



             2235)                                               

 

             SODIUM (test code = 2231) 140 MEQ/L                              

 

             POTASSIUM (test code = 2228) 4.8 MEQ/L                             

 

 

             CHLORIDE (test code = 2215) 102 MEQ/L                              

 

             CARBON DIOXIDE (test code = 25 MEQ/L                               



             )                                               

 

             CALCIUM (test code = 2209) 9.2 MG/DL                              

 

             PROTEIN, TOTAL (test code = 7.2 G/DL                               



             )                                               

 

             ALBUMIN (test code = 220) 3.6 G/DL                               

 

             CALC GLOBULIN (test code = 3.6 G/DL                               



             )                                               

 

             CALC A/G RATIO (test code = 1.0 RATIO                              



             )                                               

 

             BILIRUBIN, TOTAL (test code = 0.6 MG/DL                            

  



             )                                               

 

             ALKALINE PHOSPHATASE (test 101 U/L                                



             code = 220)                                        

 

             AST (test code = 2218) 30 U/L                                 

 

             ALT (test code = 2219) 23 U/L                                 



PROBNP2021-10-13 00:00:00





             Test Item    Value        Reference Range Interpretation Comments

 

             NT-proBNP (test code = 81894) 247 PG/ML                            

  



PROBNP2021-10-13 00:00:00





             Test Item    Value        Reference Range Interpretation Comments

 

             NT-proBNP (test code = 45552) 247 PG/ML                            

  



PROBNP2021-10-13 00:00:00





             Test Item    Value        Reference Range Interpretation Comments

 

             NT-proBNP (test code = 60544) 247 PG/ML                            

  



VIOLET (ANTI-NUCLEAR AB) WITH REFLEX TITER2021-10-13 00:00:00





             Test Item    Value        Reference Range Interpretation Comments

 

             ANTI-NUCLEAR ANTIBODIES (test code = POSITIVE                      

         



             3506)                                               



VIOLET (ANTI-NUCLEAR AB) WITH REFLEX TITER2021-10-13 00:00:00





             Test Item    Value        Reference Range Interpretation Comments

 

             ANTI-NUCLEAR ANTIBODIES (test code = POSITIVE                      

         



             3506)                                               



C-REACTIVE PROTEIN2021-10-13 00:00:00





             Test Item    Value        Reference Range Interpretation Comments

 

             C-REACTIVE PROTEIN (test code = 1.1 MG/DL                          

    



             3513)                                               



C-REACTIVE PROTEIN2021-10-13 00:00:00





             Test Item    Value        Reference Range Interpretation Comments

 

             C-REACTIVE PROTEIN (test code = 1.1 MG/DL                          

    



             3513)                                               



SEDIMENTATION RATE2021-10-13 00:00:00





             Test Item    Value        Reference Range Interpretation Comments

 

             SEDIMENTATION RATE (test code = 88 MM/HOUR                         

    



             1017)                                               



SEDIMENTATION RATE2021-10-13 00:00:00





             Test Item    Value        Reference Range Interpretation Comments

 

             SEDIMENTATION RATE (test code = 88 MM/HOUR                         

    



             1017)                                               



[id number] 00:00:00





             Test Item    Value        Reference Range Interpretation Comments

 

             D-DIMER (test code = 1405) 0.77 UG/MLFEU                           



[id number] 00:00:00





             Test Item    Value        Reference Range Interpretation Comments

 

             D-DIMER (test code = 1405) 0.77 UG/MLFEU                           



CBC W/AUTO DIFF2021-10-13 00:00:00





             Test Item    Value        Reference Range Interpretation Comments

 

             WBC (test code = 1001) 3.1 K/UL                               

 

             RBC (test code = 1002) 3.14 M/UL                              

 

             HEMOGLOBIN (test code = 1003) 8.4 G/DL                             

  

 

             HEMATOCRIT (test code = 1004) 25.9 %                               

  

 

             MCV (test code = 1005) 82.5 fL                                

 

             MCH (test code = 1006) 26.8 PG                                

 

             MCHC (test code = 1007) 32.4 G/DL                              

 

             RDW (test code = 1038) 14.5 %                                 

 

             NEUTROPHILS (test code = 1008) 61.2 %                              

   

 

             LYMPHOCYTES (test code = 1010) 25.5 %                              

   

 

             MONOCYTES (test code = 1011) 8.8 %                                 

 

 

             EOSINOPHILS (test code = 1012) 3.9 %                               

   

 

             BASOPHILS (test code = 1013) 0.3 %                                 

 

 

             IMMATURE GRANULOCYTES (test 0.3 %                                  



             code = 1036)                                        

 

             NUCLEATED RBCS (test code = 0.0 /100WBC'S                          

 



             1065)                                               

 

             PLATELET COUNT (test code = 85 K/UL                                



             1015)                                               

 

             ABSOLUTE NEUTROPHILS (test code 1.87 K/UL                          

    



             = 1066)                                             

 

             ABSOLUTE LYMPHOCYTES (test code 0.78 K/UL                          

    



             = 1067)                                             

 

             ABSOLUTE MONOCYTES (test code = 0.27 K/UL                          

    



             1068)                                               

 

             ABSOLUTE EOSINOPHILS (test code 0.12 K/UL                          

    



             = 1040)                                             

 

             ABSOLUTE BASOPHILS (test code = 0.01 K/UL                          

    



             1069)                                               

 

             ABS IMMATURE GRANULOCYTES (test 0.01 K/UL                          

    



             code = 1020)                                        

 

             ABS NUCLEATED RBCS (test code = 0.00 K/UL                          

    



             21497)                                              



CBC W/AUTO DIFF2021-10-13 00:00:00





             Test Item    Value        Reference Range Interpretation Comments

 

             WBC (test code = 1001) 3.1 K/UL                               

 

             RBC (test code = 1002) 3.14 M/UL                              

 

             HEMOGLOBIN (test code = 1003) 8.4 G/DL                             

  

 

             HEMATOCRIT (test code = 1004) 25.9 %                               

  

 

             MCV (test code = 1005) 82.5 fL                                

 

             MCH (test code = 1006) 26.8 PG                                

 

             MCHC (test code = 1007) 32.4 G/DL                              

 

             RDW (test code = 1038) 14.5 %                                 

 

             NEUTROPHILS (test code = 1008) 61.2 %                              

   

 

             LYMPHOCYTES (test code = 1010) 25.5 %                              

   

 

             MONOCYTES (test code = 1011) 8.8 %                                 

 

 

             EOSINOPHILS (test code = 1012) 3.9 %                               

   

 

             BASOPHILS (test code = 1013) 0.3 %                                 

 

 

             IMMATURE GRANULOCYTES (test 0.3 %                                  



             code = 1036)                                        

 

             NUCLEATED RBCS (test code = 0.0 /100WBC'S                          

 



             1065)                                               

 

             PLATELET COUNT (test code = 85 K/UL                                



             1015)                                               

 

             ABSOLUTE NEUTROPHILS (test code 1.87 K/UL                          

    



             = 1066)                                             

 

             ABSOLUTE LYMPHOCYTES (test code 0.78 K/UL                          

    



             = 1067)                                             

 

             ABSOLUTE MONOCYTES (test code = 0.27 K/UL                          

    



             1068)                                               

 

             ABSOLUTE EOSINOPHILS (test code 0.12 K/UL                          

    



             = 1040)                                             

 

             ABSOLUTE BASOPHILS (test code = 0.01 K/UL                          

    



             1069)                                               

 

             ABS IMMATURE GRANULOCYTES (test 0.01 K/UL                          

    



             code = 1020)                                        

 

             ABS NUCLEATED RBCS (test code = 0.00 K/UL                          

    



             51588)                                              



CBC W/AUTO DIFF2021-10- 00:00:00





             Test Item    Value        Reference Range Interpretation Comments

 

             WBC (test code = 1001) 3.1 K/UL                               

 

             RBC (test code = 1002) 3.14 M/UL                              

 

             HEMOGLOBIN (test code = 1003) 8.4 G/DL                             

  

 

             HEMATOCRIT (test code = 1004) 25.9 %                               

  

 

             MCV (test code = 1005) 82.5 fL                                

 

             MCH (test code = 1006) 26.8 PG                                

 

             MCHC (test code = 1007) 32.4 G/DL                              

 

             RDW (test code = 1038) 14.5 %                                 

 

             NEUTROPHILS (test code = 1008) 61.2 %                              

   

 

             LYMPHOCYTES (test code = 1010) 25.5 %                              

   

 

             MONOCYTES (test code = 1011) 8.8 %                                 

 

 

             EOSINOPHILS (test code = 1012) 3.9 %                               

   

 

             BASOPHILS (test code = 1013) 0.3 %                                 

 

 

             IMMATURE GRANULOCYTES (test 0.3 %                                  



             code = 1036)                                        

 

             NUCLEATED RBCS (test code = 0.0 /100WBC'S                          

 



             1065)                                               

 

             PLATELET COUNT (test code = 85 K/UL                                



             1015)                                               

 

             ABSOLUTE NEUTROPHILS (test code 1.87 K/UL                          

    



             = 1066)                                             

 

             ABSOLUTE LYMPHOCYTES (test code 0.78 K/UL                          

    



             = 1067)                                             

 

             ABSOLUTE MONOCYTES (test code = 0.27 K/UL                          

    



             1068)                                               

 

             ABSOLUTE EOSINOPHILS (test code 0.12 K/UL                          

    



             = 1040)                                             

 

             ABSOLUTE BASOPHILS (test code = 0.01 K/UL                          

    



             1069)                                               

 

             ABS IMMATURE GRANULOCYTES (test 0.01 K/UL                          

    



             code = 1020)                                        

 

             ABS NUCLEATED RBCS (test code = 0.00 K/UL                          

    



             57598)                                              



CBC W/AUTO [id number] 00:00:00





             Test Item    Value        Reference Range Interpretation Comments

 

             WBC (test code = 1001) 3.1 K/UL                               

 

             RBC (test code = 1002) 3.14 M/UL                              

 

             HEMOGLOBIN (test code = 1003) 8.4 G/DL                             

  

 

             HEMATOCRIT (test code = 1004) 25.9 %                               

  

 

             MCV (test code = 1005) 82.5 fL                                

 

             MCH (test code = 1006) 26.8 PG                                

 

             MCHC (test code = 1007) 32.4 G/DL                              

 

             RDW (test code = 1038) 14.5 %                                 

 

             NEUTROPHILS (test code = 1008) 61.2 %                              

   

 

             LYMPHOCYTES (test code = 1010) 25.5 %                              

   

 

             MONOCYTES (test code = 1011) 8.8 %                                 

 

 

             EOSINOPHILS (test code = 1012) 3.9 %                               

   

 

             BASOPHILS (test code = 1013) 0.3 %                                 

 

 

             IMMATURE GRANULOCYTES (test 0.3 %                                  



             code = 1036)                                        

 

             NUCLEATED RBCS (test code = 0.0 /100WBC'S                          

 



             1065)                                               

 

             PLATELET COUNT (test code = 85 K/UL                                



             1015)                                               

 

             ABSOLUTE NEUTROPHILS (test code 1.87 K/UL                          

    



             = 1066)                                             

 

             ABSOLUTE LYMPHOCYTES (test code 0.78 K/UL                          

    



             = 1067)                                             

 

             ABSOLUTE MONOCYTES (test code = 0.27 K/UL                          

    



             1068)                                               

 

             ABSOLUTE EOSINOPHILS (test code 0.12 K/UL                          

    



             = 1040)                                             

 

             ABSOLUTE BASOPHILS (test code = 0.01 K/UL                          

    



             1069)                                               

 

             ABS IMMATURE GRANULOCYTES (test 0.01 K/UL                          

    



             code = 1020)                                        

 

             ABS NUCLEATED RBCS (test code = 0.00 K/UL                          

    



             21715)                                              



CBC W/AUTO DIFF2021-10-13 00:00:00





             Test Item    Value        Reference Range Interpretation Comments

 

             WBC (test code = 1001) 3.1 K/UL                               

 

             RBC (test code = 1002) 3.14 M/UL                              

 

             HEMOGLOBIN (test code = 1003) 8.4 G/DL                             

  

 

             HEMATOCRIT (test code = 1004) 25.9 %                               

  

 

             MCV (test code = 1005) 82.5 fL                                

 

             MCH (test code = 1006) 26.8 PG                                

 

             MCHC (test code = 1007) 32.4 G/DL                              

 

             RDW (test code = 1038) 14.5 %                                 

 

             NEUTROPHILS (test code = 1008) 61.2 %                              

   

 

             LYMPHOCYTES (test code = 1010) 25.5 %                              

   

 

             MONOCYTES (test code = 1011) 8.8 %                                 

 

 

             EOSINOPHILS (test code = 1012) 3.9 %                               

   

 

             BASOPHILS (test code = 1013) 0.3 %                                 

 

 

             IMMATURE GRANULOCYTES (test 0.3 %                                  



             code = 1036)                                        

 

             NUCLEATED RBCS (test code = 0.0 /100WBC'S                          

 



             1065)                                               

 

             PLATELET COUNT (test code = 85 K/UL                                



             1015)                                               

 

             ABSOLUTE NEUTROPHILS (test code 1.87 K/UL                          

    



             = 1066)                                             

 

             ABSOLUTE LYMPHOCYTES (test code 0.78 K/UL                          

    



             = 1067)                                             

 

             ABSOLUTE MONOCYTES (test code = 0.27 K/UL                          

    



             1068)                                               

 

             ABSOLUTE EOSINOPHILS (test code 0.12 K/UL                          

    



             = 1040)                                             

 

             ABSOLUTE BASOPHILS (test code = 0.01 K/UL                          

    



             1069)                                               

 

             ABS IMMATURE GRANULOCYTES (test 0.01 K/UL                          

    



             code = 1020)                                        

 

             ABS NUCLEATED RBCS (test code = 0.00 K/UL                          

    



             29687)                                              



COMPREHENSIVE METABOLIC PANEL202-10- 00:00:00





             Test Item    Value        Reference Range Interpretation Comments

 

             GLUCOSE (test code = 2217) 139 MG/DL                              

 

             BUN (test code = 2208) 21 MG/DL                               

 

             CREATININE (test code = 2214) 0.83 MG/DL                           

  

 

             eGFR  AMER. (test code 88 ML/MIN/1.73                       

    



             = 06198)                                            

 

             eGFR NON- AMER. (test 76 ML/MIN/1.73                        

   



             code = 61027)                                        

 

             CALC BUN/CREAT (test code = 25 RATIO                               



             2235)                                               

 

             SODIUM (test code = 2231) 140 MEQ/L                              

 

             POTASSIUM (test code = 2228) 4.8 MEQ/L                             

 

 

             CHLORIDE (test code = 2215) 102 MEQ/L                              

 

             CARBON DIOXIDE (test code = 25 MEQ/L                               



             2206)                                               

 

             CALCIUM (test code = 2209) 9.2 MG/DL                              

 

             PROTEIN, TOTAL (test code = 7.2 G/DL                               



             )                                               

 

             ALBUMIN (test code = 2201) 3.6 G/DL                               

 

             CALC GLOBULIN (test code = 3.6 G/DL                               



             2240)                                               

 

             CALC A/G RATIO (test code = 1.0 RATIO                              



             2234)                                               

 

             BILIRUBIN, TOTAL (test code = 0.6 MG/DL                            

  



             2207)                                               

 

             ALKALINE PHOSPHATASE (test 101 U/L                                



             code = 2204)                                        

 

             AST (test code = 2218) 30 U/L                                 

 

             ALT (test code = 2219) 23 U/L                                 



COMPREHENSIVE METABOLIC PANEL2021-10-13 00:00:00





             Test Item    Value        Reference Range Interpretation Comments

 

             GLUCOSE (test code = 2217) 139 MG/DL                              

 

             BUN (test code = 2208) 21 MG/DL                               

 

             CREATININE (test code = 2214) 0.83 MG/DL                           

  

 

             eGFR  AMER. (test code 88 ML/MIN/1.73                       

    



             = 66296)                                            

 

             eGFR NON- AMER. (test 76 ML/MIN/1.73                        

   



             code = 99666)                                        

 

             CALC BUN/CREAT (test code = 25 RATIO                               



             2235)                                               

 

             SODIUM (test code = 2231) 140 MEQ/L                              

 

             POTASSIUM (test code = 2228) 4.8 MEQ/L                             

 

 

             CHLORIDE (test code = 2215) 102 MEQ/L                              

 

             CARBON DIOXIDE (test code = 25 MEQ/L                               



             )                                               

 

             CALCIUM (test code = 2209) 9.2 MG/DL                              

 

             PROTEIN, TOTAL (test code = 7.2 G/DL                               



             )                                               

 

             ALBUMIN (test code = 2201) 3.6 G/DL                               

 

             CALC GLOBULIN (test code = 3.6 G/DL                               



             2240)                                               

 

             CALC A/G RATIO (test code = 1.0 RATIO                              



             2234)                                               

 

             BILIRUBIN, TOTAL (test code = 0.6 MG/DL                            

  



             7)                                               

 

             ALKALINE PHOSPHATASE (test 101 U/L                                



             code = 2204)                                        

 

             AST (test code = 2218) 30 U/L                                 

 

             ALT (test code = 2219) 23 U/L                                 



PROBNP2021-10-13 00:00:00





             Test Item    Value        Reference Range Interpretation Comments

 

             NT-proBNP (test code = 95415) 247 PG/ML                            

  



PROBNP2021-10-13 00:00:00





             Test Item    Value        Reference Range Interpretation Comments

 

             NT-proBNP (test code = 65112) 247 PG/ML                            

  



PROBNP2021-10-13 00:00:00





             Test Item    Value        Reference Range Interpretation Comments

 

             NT-proBNP (test code = 34797) 247 PG/ML                            

  



VIOLET (ANTI-NUCLEAR AB) WITH REFLEX TITER2021-10-13 00:00:00





             Test Item    Value        Reference Range Interpretation Comments

 

             ANTI-NUCLEAR ANTIBODIES (test code = POSITIVE                      

         



             3506)                                               



VIOLET (ANTI-NUCLEAR AB) WITH REFLEX TITER2021-10-13 00:00:00





             Test Item    Value        Reference Range Interpretation Comments

 

             ANTI-NUCLEAR ANTIBODIES (test code = POSITIVE                      

         



             3506)                                               



C-REACTIVE PROTEIN2021-10-13 00:00:00





             Test Item    Value        Reference Range Interpretation Comments

 

             C-REACTIVE PROTEIN (test code = 1.1 MG/DL                          

    



             3513)                                               



C-REACTIVE PROTEIN2021-10-13 00:00:00





             Test Item    Value        Reference Range Interpretation Comments

 

             C-REACTIVE PROTEIN (test code = 1.1 MG/DL                          

    



             3513)                                               



COMPREHENSIVE METABOLIC PANEL2021-10-13 00:00:00





             Test Item    Value        Reference Range Interpretation Comments

 

             GLUCOSE (test code = 2217) 139 MG/DL                              

 

             BUN (test code = 2208) 21 MG/DL                               

 

             CREATININE (test code = 2214) 0.83 MG/DL                           

  

 

             eGFR  AMER. (test code 88 ML/MIN/1.73                       

    



             = 69029)                                            

 

             eGFR NON- AMER. (test 76 ML/MIN/1.73                        

   



             code = 78111)                                        

 

             CALC BUN/CREAT (test code = 25 RATIO                               



             2235)                                               

 

             SODIUM (test code = 2231) 140 MEQ/L                              

 

             POTASSIUM (test code = 2228) 4.8 MEQ/L                             

 

 

             CHLORIDE (test code = 2215) 102 MEQ/L                              

 

             CARBON DIOXIDE (test code = 25 MEQ/L                               



             )                                               

 

             CALCIUM (test code = 2209) 9.2 MG/DL                              

 

             PROTEIN, TOTAL (test code = 7.2 G/DL                               



             )                                               

 

             ALBUMIN (test code = 2201) 3.6 G/DL                               

 

             CALC GLOBULIN (test code = 3.6 G/DL                               



             2240)                                               

 

             CALC A/G RATIO (test code = 1.0 RATIO                              



             )                                               

 

             BILIRUBIN, TOTAL (test code = 0.6 MG/DL                            

  



             )                                               

 

             ALKALINE PHOSPHATASE (test 101 U/L                                



             code = 2204)                                        

 

             AST (test code = 2218) 30 U/L                                 

 

             ALT (test code = 2219) 23 U/L                                 



SEDIMENTATION RATE2021-10-13 00:00:00





             Test Item    Value        Reference Range Interpretation Comments

 

             SEDIMENTATION RATE (test code = 88 MM/HOUR                         

    



             1017)                                               



SEDIMENTATION RATE2021-10-13 00:00:00





             Test Item    Value        Reference Range Interpretation Comments

 

             SEDIMENTATION RATE (test code = 88 MM/HOUR                         

    



             1017)                                               



[id number] 00:00:00





             Test Item    Value        Reference Range Interpretation Comments

 

             D-DIMER (test code = 1405) 0.77 UG/MLFEU                           



[id number] 00:00:00





             Test Item    Value        Reference Range Interpretation Comments

 

             D-DIMER (test code = 1405) 0.77 UG/MLFEU                           



PROBNP2021-10-13 00:00:00





             Test Item    Value        Reference Range Interpretation Comments

 

             NT-proBNP (test code = 55419) 247 PG/ML                            

  



PROBNP2021-10-13 00:00:00





             Test Item    Value        Reference Range Interpretation Comments

 

             NT-proBNP (test code = 03506) 247 PG/ML                            

  



VIOLET (ANTI-NUCLEAR AB) WITH REFLEX TITER2021-10-13 00:00:00





             Test Item    Value        Reference Range Interpretation Comments

 

             ANTI-NUCLEAR ANTIBODIES (test code = POSITIVE                      

         



             3506)                                               



C-REACTIVE PROTEIN2021-10-13 00:00:00





             Test Item    Value        Reference Range Interpretation Comments

 

             C-REACTIVE PROTEIN (test code = 1.1 MG/DL                          

    



             3513)                                               



SEDIMENTATION RATE2021-10-13 00:00:00





             Test Item    Value        Reference Range Interpretation Comments

 

             SEDIMENTATION RATE (test code = 88 MM/HOUR                         

    



             1017)                                               



[id number] 00:00:00





             Test Item    Value        Reference Range Interpretation Comments

 

             D-DIMER (test code = 1405) 0.77 UG/MLFEU                           



CBC W/AUTO DIFF2021-10-13 00:00:00





             Test Item    Value        Reference Range Interpretation Comments

 

             WBC (test code = 1001) 3.1 K/UL                               

 

             RBC (test code = 1002) 3.14 M/UL                              

 

             HEMOGLOBIN (test code = 1003) 8.4 G/DL                             

  

 

             HEMATOCRIT (test code = 1004) 25.9 %                               

  

 

             MCV (test code = 1005) 82.5 fL                                

 

             MCH (test code = 1006) 26.8 PG                                

 

             MCHC (test code = 1007) 32.4 G/DL                              

 

             RDW (test code = 1038) 14.5 %                                 

 

             NEUTROPHILS (test code = 1008) 61.2 %                              

   

 

             LYMPHOCYTES (test code = 1010) 25.5 %                              

   

 

             MONOCYTES (test code = 1011) 8.8 %                                 

 

 

             EOSINOPHILS (test code = 1012) 3.9 %                               

   

 

             BASOPHILS (test code = 1013) 0.3 %                                 

 

 

             IMMATURE GRANULOCYTES (test 0.3 %                                  



             code = 1036)                                        

 

             NUCLEATED RBCS (test code = 0.0 /100WBC'S                          

 



             1065)                                               

 

             PLATELET COUNT (test code = 85 K/UL                                



             1015)                                               

 

             ABSOLUTE NEUTROPHILS (test code 1.87 K/UL                          

    



             = 1066)                                             

 

             ABSOLUTE LYMPHOCYTES (test code 0.78 K/UL                          

    



             = 1067)                                             

 

             ABSOLUTE MONOCYTES (test code = 0.27 K/UL                          

    



             1068)                                               

 

             ABSOLUTE EOSINOPHILS (test code 0.12 K/UL                          

    



             = 1040)                                             

 

             ABSOLUTE BASOPHILS (test code = 0.01 K/UL                          

    



             1069)                                               

 

             ABS IMMATURE GRANULOCYTES (test 0.01 K/UL                          

    



             code = 1020)                                        

 

             ABS NUCLEATED RBCS (test code = 0.00 K/UL                          

    



             02624)                                              



CBC W/AUTO DIFF2021-10-13 00:00:00





             Test Item    Value        Reference Range Interpretation Comments

 

             WBC (test code = 1001) 3.1 K/UL                               

 

             RBC (test code = 1002) 3.14 M/UL                              

 

             HEMOGLOBIN (test code = 1003) 8.4 G/DL                             

  

 

             HEMATOCRIT (test code = 1004) 25.9 %                               

  

 

             MCV (test code = 1005) 82.5 fL                                

 

             MCH (test code = 1006) 26.8 PG                                

 

             MCHC (test code = 1007) 32.4 G/DL                              

 

             RDW (test code = 1038) 14.5 %                                 

 

             NEUTROPHILS (test code = 1008) 61.2 %                              

   

 

             LYMPHOCYTES (test code = 1010) 25.5 %                              

   

 

             MONOCYTES (test code = 1011) 8.8 %                                 

 

 

             EOSINOPHILS (test code = 1012) 3.9 %                               

   

 

             BASOPHILS (test code = 1013) 0.3 %                                 

 

 

             IMMATURE GRANULOCYTES (test 0.3 %                                  



             code = 1036)                                        

 

             NUCLEATED RBCS (test code = 0.0 /100WBC'S                          

 



             1065)                                               

 

             PLATELET COUNT (test code = 85 K/UL                                



             1015)                                               

 

             ABSOLUTE NEUTROPHILS (test code 1.87 K/UL                          

    



             = 1066)                                             

 

             ABSOLUTE LYMPHOCYTES (test code 0.78 K/UL                          

    



             = 1067)                                             

 

             ABSOLUTE MONOCYTES (test code = 0.27 K/UL                          

    



             1068)                                               

 

             ABSOLUTE EOSINOPHILS (test code 0.12 K/UL                          

    



             = 1040)                                             

 

             ABSOLUTE BASOPHILS (test code = 0.01 K/UL                          

    



             1069)                                               

 

             ABS IMMATURE GRANULOCYTES (test 0.01 K/UL                          

    



             code = 1020)                                        

 

             ABS NUCLEATED RBCS (test code = 0.00 K/UL                          

    



             13992)                                              



CBC W/AUTO DIFF2021-10-13 00:00:00





             Test Item    Value        Reference Range Interpretation Comments

 

             WBC (test code = 1001) 3.1 K/UL                               

 

             RBC (test code = 1002) 3.14 M/UL                              

 

             HEMOGLOBIN (test code = 1003) 8.4 G/DL                             

  

 

             HEMATOCRIT (test code = 1004) 25.9 %                               

  

 

             MCV (test code = 1005) 82.5 fL                                

 

             MCH (test code = 1006) 26.8 PG                                

 

             MCHC (test code = 1007) 32.4 G/DL                              

 

             RDW (test code = 1038) 14.5 %                                 

 

             NEUTROPHILS (test code = 1008) 61.2 %                              

   

 

             LYMPHOCYTES (test code = 1010) 25.5 %                              

   

 

             MONOCYTES (test code = 1011) 8.8 %                                 

 

 

             EOSINOPHILS (test code = 1012) 3.9 %                               

   

 

             BASOPHILS (test code = 1013) 0.3 %                                 

 

 

             IMMATURE GRANULOCYTES (test 0.3 %                                  



             code = 1036)                                        

 

             NUCLEATED RBCS (test code = 0.0 /100WBC'S                          

 



             1065)                                               

 

             PLATELET COUNT (test code = 85 K/UL                                



             1015)                                               

 

             ABSOLUTE NEUTROPHILS (test code 1.87 K/UL                          

    



             = 1066)                                             

 

             ABSOLUTE LYMPHOCYTES (test code 0.78 K/UL                          

    



             = 1067)                                             

 

             ABSOLUTE MONOCYTES (test code = 0.27 K/UL                          

    



             1068)                                               

 

             ABSOLUTE EOSINOPHILS (test code 0.12 K/UL                          

    



             = 1040)                                             

 

             ABSOLUTE BASOPHILS (test code = 0.01 K/UL                          

    



             1069)                                               

 

             ABS IMMATURE GRANULOCYTES (test 0.01 K/UL                          

    



             code = 1020)                                        

 

             ABS NUCLEATED RBCS (test code = 0.00 K/UL                          

    



             06536)                                              



COMPREHENSIVE METABOLIC PANEL-10- 00:00:00





             Test Item    Value        Reference Range Interpretation Comments

 

             GLUCOSE (test code = 2217) 139 MG/DL                              

 

             BUN (test code = 2208) 21 MG/DL                               

 

             CREATININE (test code = 2214) 0.83 MG/DL                           

  

 

             eGFR  AMER. (test code 88 ML/MIN/1.73                       

    



             = 50438)                                            

 

             eGFR NON- AMER. (test 76 ML/MIN/1.73                        

   



             code = 65010)                                        

 

             CALC BUN/CREAT (test code = 25 RATIO                               



             2235)                                               

 

             SODIUM (test code = 2231) 140 MEQ/L                              

 

             POTASSIUM (test code = 2228) 4.8 MEQ/L                             

 

 

             CHLORIDE (test code = 2215) 102 MEQ/L                              

 

             CARBON DIOXIDE (test code = 25 MEQ/L                               



             2206)                                               

 

             CALCIUM (test code = 2209) 9.2 MG/DL                              

 

             PROTEIN, TOTAL (test code = 7.2 G/DL                               



             )                                               

 

             ALBUMIN (test code = 2201) 3.6 G/DL                               

 

             CALC GLOBULIN (test code = 3.6 G/DL                               



             2240)                                               

 

             CALC A/G RATIO (test code = 1.0 RATIO                              



             2234)                                               

 

             BILIRUBIN, TOTAL (test code = 0.6 MG/DL                            

  



             2207)                                               

 

             ALKALINE PHOSPHATASE (test 101 U/L                                



             code = 2204)                                        

 

             AST (test code = 2218) 30 U/L                                 

 

             ALT (test code = 2219) 23 U/L                                 



COMPREHENSIVE METABOLIC PANEL2021-10-13 00:00:00





             Test Item    Value        Reference Range Interpretation Comments

 

             GLUCOSE (test code = 2217) 139 MG/DL                              

 

             BUN (test code = 2208) 21 MG/DL                               

 

             CREATININE (test code = 2214) 0.83 MG/DL                           

  

 

             eGFR  AMER. (test code 88 ML/MIN/1.73                       

    



             = 38806)                                            

 

             eGFR NON- AMER. (test 76 ML/MIN/1.73                        

   



             code = 43376)                                        

 

             CALC BUN/CREAT (test code = 25 RATIO                               



             2235)                                               

 

             SODIUM (test code = 2231) 140 MEQ/L                              

 

             POTASSIUM (test code = 2228) 4.8 MEQ/L                             

 

 

             CHLORIDE (test code = 2215) 102 MEQ/L                              

 

             CARBON DIOXIDE (test code = 25 MEQ/L                               



             2206)                                               

 

             CALCIUM (test code = 2209) 9.2 MG/DL                              

 

             PROTEIN, TOTAL (test code = 7.2 G/DL                               



             2229)                                               

 

             ALBUMIN (test code = 2201) 3.6 G/DL                               

 

             CALC GLOBULIN (test code = 3.6 G/DL                               



             2240)                                               

 

             CALC A/G RATIO (test code = 1.0 RATIO                              



             2234)                                               

 

             BILIRUBIN, TOTAL (test code = 0.6 MG/DL                            

  



             2207)                                               

 

             ALKALINE PHOSPHATASE (test 101 U/L                                



             code = 2204)                                        

 

             AST (test code = 2218) 30 U/L                                 

 

             ALT (test code = 2219) 23 U/L                                 



PROBNP2021-10-13 00:00:00





             Test Item    Value        Reference Range Interpretation Comments

 

             NT-proBNP (test code = 69384) 247 PG/ML                            

  



PROBNP2021-10-13 00:00:00





             Test Item    Value        Reference Range Interpretation Comments

 

             NT-proBNP (test code = 96556) 247 PG/ML                            

  



PROBNP2021-10-13 00:00:00





             Test Item    Value        Reference Range Interpretation Comments

 

             NT-proBNP (test code = 12515) 247 PG/ML                            

  



VIOLET (ANTI-NUCLEAR AB) WITH REFLEX TITER2021-10-13 00:00:00





             Test Item    Value        Reference Range Interpretation Comments

 

             ANTI-NUCLEAR ANTIBODIES (test code = POSITIVE                      

         



             3506)                                               



VIOLET (ANTI-NUCLEAR AB) WITH REFLEX TITER2021-10-13 00:00:00





             Test Item    Value        Reference Range Interpretation Comments

 

             ANTI-NUCLEAR ANTIBODIES (test code = POSITIVE                      

         



             3506)                                               



C-REACTIVE PROTEIN2021-10-13 00:00:00





             Test Item    Value        Reference Range Interpretation Comments

 

             C-REACTIVE PROTEIN (test code = 1.1 MG/DL                          

    



             3513)                                               



C-REACTIVE PROTEIN2021-10-13 00:00:00





             Test Item    Value        Reference Range Interpretation Comments

 

             C-REACTIVE PROTEIN (test code = 1.1 MG/DL                          

    



             3513)                                               



SEDIMENTATION RATE2021-10-13 00:00:00





             Test Item    Value        Reference Range Interpretation Comments

 

             SEDIMENTATION RATE (test code = 88 MM/HOUR                         

    



             1017)                                               



SEDIMENTATION RATE2021-10-13 00:00:00





             Test Item    Value        Reference Range Interpretation Comments

 

             SEDIMENTATION RATE (test code = 88 MM/HOUR                         

    



             1017)                                               



[id number] 00:00:00





             Test Item    Value        Reference Range Interpretation Comments

 

             D-DIMER (test code = 1405) 0.77 UG/MLFEU                           



[id number] 00:00:00





             Test Item    Value        Reference Range Interpretation Comments

 

             D-DIMER (test code = 1405) 0.77 UG/MLFEU                           



CBC W/AUTO DIFF2021-10-13 00:00:00





             Test Item    Value        Reference Range Interpretation Comments

 

             WBC (test code = 1001) 3.1 K/UL                               

 

             RBC (test code = 1002) 3.14 M/UL                              

 

             HEMOGLOBIN (test code = 1003) 8.4 G/DL                             

  

 

             HEMATOCRIT (test code = 1004) 25.9 %                               

  

 

             MCV (test code = 1005) 82.5 fL                                

 

             MCH (test code = 1006) 26.8 PG                                

 

             MCHC (test code = 1007) 32.4 G/DL                              

 

             RDW (test code = 1038) 14.5 %                                 

 

             NEUTROPHILS (test code = 1008) 61.2 %                              

   

 

             LYMPHOCYTES (test code = 1010) 25.5 %                              

   

 

             MONOCYTES (test code = 1011) 8.8 %                                 

 

 

             EOSINOPHILS (test code = 1012) 3.9 %                               

   

 

             BASOPHILS (test code = 1013) 0.3 %                                 

 

 

             IMMATURE GRANULOCYTES (test 0.3 %                                  



             code = 1036)                                        

 

             NUCLEATED RBCS (test code = 0.0 /100WBC'S                          

 



             1065)                                               

 

             PLATELET COUNT (test code = 85 K/UL                                



             1015)                                               

 

             ABSOLUTE NEUTROPHILS (test code 1.87 K/UL                          

    



             = 1066)                                             

 

             ABSOLUTE LYMPHOCYTES (test code 0.78 K/UL                          

    



             = 1067)                                             

 

             ABSOLUTE MONOCYTES (test code = 0.27 K/UL                          

    



             1068)                                               

 

             ABSOLUTE EOSINOPHILS (test code 0.12 K/UL                          

    



             = 1040)                                             

 

             ABSOLUTE BASOPHILS (test code = 0.01 K/UL                          

    



             1069)                                               

 

             ABS IMMATURE GRANULOCYTES (test 0.01 K/UL                          

    



             code = 1020)                                        

 

             ABS NUCLEATED RBCS (test code = 0.00 K/UL                          

    



             42974)                                              



CBC W/AUTO DIFF2021-10-13 00:00:00





             Test Item    Value        Reference Range Interpretation Comments

 

             WBC (test code = 1001) 3.1 K/UL                               

 

             RBC (test code = 1002) 3.14 M/UL                              

 

             HEMOGLOBIN (test code = 1003) 8.4 G/DL                             

  

 

             HEMATOCRIT (test code = 1004) 25.9 %                               

  

 

             MCV (test code = 1005) 82.5 fL                                

 

             MCH (test code = 1006) 26.8 PG                                

 

             MCHC (test code = 1007) 32.4 G/DL                              

 

             RDW (test code = 1038) 14.5 %                                 

 

             NEUTROPHILS (test code = 1008) 61.2 %                              

   

 

             LYMPHOCYTES (test code = 1010) 25.5 %                              

   

 

             MONOCYTES (test code = 1011) 8.8 %                                 

 

 

             EOSINOPHILS (test code = 1012) 3.9 %                               

   

 

             BASOPHILS (test code = 1013) 0.3 %                                 

 

 

             IMMATURE GRANULOCYTES (test 0.3 %                                  



             code = 1036)                                        

 

             NUCLEATED RBCS (test code = 0.0 /100WBC'S                          

 



             1065)                                               

 

             PLATELET COUNT (test code = 85 K/UL                                



             1015)                                               

 

             ABSOLUTE NEUTROPHILS (test code 1.87 K/UL                          

    



             = 1066)                                             

 

             ABSOLUTE LYMPHOCYTES (test code 0.78 K/UL                          

    



             = 1067)                                             

 

             ABSOLUTE MONOCYTES (test code = 0.27 K/UL                          

    



             1068)                                               

 

             ABSOLUTE EOSINOPHILS (test code 0.12 K/UL                          

    



             = 1040)                                             

 

             ABSOLUTE BASOPHILS (test code = 0.01 K/UL                          

    



             1069)                                               

 

             ABS IMMATURE GRANULOCYTES (test 0.01 K/UL                          

    



             code = 1020)                                        

 

             ABS NUCLEATED RBCS (test code = 0.00 K/UL                          

    



             91275)                                              



CBC W/AUTO DIFF2021-10-13 00:00:00





             Test Item    Value        Reference Range Interpretation Comments

 

             WBC (test code = 1001) 3.1 K/UL                               

 

             RBC (test code = 1002) 3.14 M/UL                              

 

             HEMOGLOBIN (test code = 1003) 8.4 G/DL                             

  

 

             HEMATOCRIT (test code = 1004) 25.9 %                               

  

 

             MCV (test code = 1005) 82.5 fL                                

 

             MCH (test code = 1006) 26.8 PG                                

 

             MCHC (test code = 1007) 32.4 G/DL                              

 

             RDW (test code = 1038) 14.5 %                                 

 

             NEUTROPHILS (test code = 1008) 61.2 %                              

   

 

             LYMPHOCYTES (test code = 1010) 25.5 %                              

   

 

             MONOCYTES (test code = 1011) 8.8 %                                 

 

 

             EOSINOPHILS (test code = 1012) 3.9 %                               

   

 

             BASOPHILS (test code = 1013) 0.3 %                                 

 

 

             IMMATURE GRANULOCYTES (test 0.3 %                                  



             code = 1036)                                        

 

             NUCLEATED RBCS (test code = 0.0 /100WBC'S                          

 



             1065)                                               

 

             PLATELET COUNT (test code = 85 K/UL                                



             1015)                                               

 

             ABSOLUTE NEUTROPHILS (test code 1.87 K/UL                          

    



             = 1066)                                             

 

             ABSOLUTE LYMPHOCYTES (test code 0.78 K/UL                          

    



             = 1067)                                             

 

             ABSOLUTE MONOCYTES (test code = 0.27 K/UL                          

    



             1068)                                               

 

             ABSOLUTE EOSINOPHILS (test code 0.12 K/UL                          

    



             = 1040)                                             

 

             ABSOLUTE BASOPHILS (test code = 0.01 K/UL                          

    



             1069)                                               

 

             ABS IMMATURE GRANULOCYTES (test 0.01 K/UL                          

    



             code = 1020)                                        

 

             ABS NUCLEATED RBCS (test code = 0.00 K/UL                          

    



             33228)                                              



COMPREHENSIVE METABOLIC PANEL-10- 00:00:00





             Test Item    Value        Reference Range Interpretation Comments

 

             GLUCOSE (test code = 2217) 139 MG/DL                              

 

             BUN (test code = 2208) 21 MG/DL                               

 

             CREATININE (test code = 2214) 0.83 MG/DL                           

  

 

             eGFR  AMER. (test code 88 ML/MIN/1.73                       

    



             = 93832)                                            

 

             eGFR NON- AMER. (test 76 ML/MIN/1.73                        

   



             code = 81179)                                        

 

             CALC BUN/CREAT (test code = 25 RATIO                               



             2235)                                               

 

             SODIUM (test code = 2231) 140 MEQ/L                              

 

             POTASSIUM (test code = 2228) 4.8 MEQ/L                             

 

 

             CHLORIDE (test code = 2215) 102 MEQ/L                              

 

             CARBON DIOXIDE (test code = 25 MEQ/L                               



             2206)                                               

 

             CALCIUM (test code = 2209) 9.2 MG/DL                              

 

             PROTEIN, TOTAL (test code = 7.2 G/DL                               



             )                                               

 

             ALBUMIN (test code = 2201) 3.6 G/DL                               

 

             CALC GLOBULIN (test code = 3.6 G/DL                               



             2240)                                               

 

             CALC A/G RATIO (test code = 1.0 RATIO                              



             2234)                                               

 

             BILIRUBIN, TOTAL (test code = 0.6 MG/DL                            

  



             2207)                                               

 

             ALKALINE PHOSPHATASE (test 101 U/L                                



             code = 2204)                                        

 

             AST (test code = 2218) 30 U/L                                 

 

             ALT (test code = 2219) 23 U/L                                 



COMPREHENSIVE METABOLIC PANEL2021-10-13 00:00:00





             Test Item    Value        Reference Range Interpretation Comments

 

             GLUCOSE (test code = 2217) 139 MG/DL                              

 

             BUN (test code = 2208) 21 MG/DL                               

 

             CREATININE (test code = 2214) 0.83 MG/DL                           

  

 

             eGFR  AMER. (test code 88 ML/MIN/1.73                       

    



             = 60769)                                            

 

             eGFR NON- AMER. (test 76 ML/MIN/1.73                        

   



             code = 27056)                                        

 

             CALC BUN/CREAT (test code = 25 RATIO                               



             2235)                                               

 

             SODIUM (test code = 2231) 140 MEQ/L                              

 

             POTASSIUM (test code = 2228) 4.8 MEQ/L                             

 

 

             CHLORIDE (test code = 2215) 102 MEQ/L                              

 

             CARBON DIOXIDE (test code = 25 MEQ/L                               



             2206)                                               

 

             CALCIUM (test code = 2209) 9.2 MG/DL                              

 

             PROTEIN, TOTAL (test code = 7.2 G/DL                               



             2229)                                               

 

             ALBUMIN (test code = 2201) 3.6 G/DL                               

 

             CALC GLOBULIN (test code = 3.6 G/DL                               



             2240)                                               

 

             CALC A/G RATIO (test code = 1.0 RATIO                              



             2234)                                               

 

             BILIRUBIN, TOTAL (test code = 0.6 MG/DL                            

  



             2207)                                               

 

             ALKALINE PHOSPHATASE (test 101 U/L                                



             code = 2204)                                        

 

             AST (test code = 2218) 30 U/L                                 

 

             ALT (test code = 2219) 23 U/L                                 



PROBNP2021-10-13 00:00:00





             Test Item    Value        Reference Range Interpretation Comments

 

             NT-proBNP (test code = 16506) 247 PG/ML                            

  



PROBNP2021-10-13 00:00:00





             Test Item    Value        Reference Range Interpretation Comments

 

             NT-proBNP (test code = 23089) 247 PG/ML                            

  



PROBNP2021-10-13 00:00:00





             Test Item    Value        Reference Range Interpretation Comments

 

             NT-proBNP (test code = 52112) 247 PG/ML                            

  



VIOLET (ANTI-NUCLEAR AB) WITH REFLEX TITER2021-10-13 00:00:00





             Test Item    Value        Reference Range Interpretation Comments

 

             ANTI-NUCLEAR ANTIBODIES (test code = POSITIVE                      

         



             3506)                                               



VIOLET (ANTI-NUCLEAR AB) WITH REFLEX TITER2021-10-13 00:00:00





             Test Item    Value        Reference Range Interpretation Comments

 

             ANTI-NUCLEAR ANTIBODIES (test code = POSITIVE                      

         



             3506)                                               



C-REACTIVE PROTEIN2021-10-13 00:00:00





             Test Item    Value        Reference Range Interpretation Comments

 

             C-REACTIVE PROTEIN (test code = 1.1 MG/DL                          

    



             3513)                                               



C-REACTIVE PROTEIN2021-10-13 00:00:00





             Test Item    Value        Reference Range Interpretation Comments

 

             C-REACTIVE PROTEIN (test code = 1.1 MG/DL                          

    



             3513)                                               



SEDIMENTATION RATE2021-10-13 00:00:00





             Test Item    Value        Reference Range Interpretation Comments

 

             SEDIMENTATION RATE (test code = 88 MM/HOUR                         

    



             1017)                                               



SEDIMENTATION RATE2021-10-13 00:00:00





             Test Item    Value        Reference Range Interpretation Comments

 

             SEDIMENTATION RATE (test code = 88 MM/HOUR                         

    



             1017)                                               



[id number] 00:00:00





             Test Item    Value        Reference Range Interpretation Comments

 

             D-DIMER (test code = 1405) 0.77 UG/MLFEU                           



[id number] 00:00:00





             Test Item    Value        Reference Range Interpretation Comments

 

             D-DIMER (test code = 1405) 0.77 UG/MLFEU                           



CBC W/AUTO DIFF2021-10-13 00:00:00





             Test Item    Value        Reference Range Interpretation Comments

 

             WBC (test code = 1001) 3.1 K/UL                               

 

             RBC (test code = 1002) 3.14 M/UL                              

 

             HEMOGLOBIN (test code = 1003) 8.4 G/DL                             

  

 

             HEMATOCRIT (test code = 1004) 25.9 %                               

  

 

             MCV (test code = 1005) 82.5 fL                                

 

             MCH (test code = 1006) 26.8 PG                                

 

             MCHC (test code = 1007) 32.4 G/DL                              

 

             RDW (test code = 1038) 14.5 %                                 

 

             NEUTROPHILS (test code = 1008) 61.2 %                              

   

 

             LYMPHOCYTES (test code = 1010) 25.5 %                              

   

 

             MONOCYTES (test code = 1011) 8.8 %                                 

 

 

             EOSINOPHILS (test code = 1012) 3.9 %                               

   

 

             BASOPHILS (test code = 1013) 0.3 %                                 

 

 

             IMMATURE GRANULOCYTES (test 0.3 %                                  



             code = 1036)                                        

 

             NUCLEATED RBCS (test code = 0.0 /100WBC'S                          

 



             1065)                                               

 

             PLATELET COUNT (test code = 85 K/UL                                



             1015)                                               

 

             ABSOLUTE NEUTROPHILS (test code 1.87 K/UL                          

    



             = 1066)                                             

 

             ABSOLUTE LYMPHOCYTES (test code 0.78 K/UL                          

    



             = 1067)                                             

 

             ABSOLUTE MONOCYTES (test code = 0.27 K/UL                          

    



             1068)                                               

 

             ABSOLUTE EOSINOPHILS (test code 0.12 K/UL                          

    



             = 1040)                                             

 

             ABSOLUTE BASOPHILS (test code = 0.01 K/UL                          

    



             1069)                                               

 

             ABS IMMATURE GRANULOCYTES (test 0.01 K/UL                          

    



             code = 1020)                                        

 

             ABS NUCLEATED RBCS (test code = 0.00 K/UL                          

    



             24768)                                              



CBC W/AUTO DIFF2021-10-13 00:00:00





             Test Item    Value        Reference Range Interpretation Comments

 

             WBC (test code = 1001) 3.1 K/UL                               

 

             RBC (test code = 1002) 3.14 M/UL                              

 

             HEMOGLOBIN (test code = 1003) 8.4 G/DL                             

  

 

             HEMATOCRIT (test code = 1004) 25.9 %                               

  

 

             MCV (test code = 1005) 82.5 fL                                

 

             MCH (test code = 1006) 26.8 PG                                

 

             MCHC (test code = 1007) 32.4 G/DL                              

 

             RDW (test code = 1038) 14.5 %                                 

 

             NEUTROPHILS (test code = 1008) 61.2 %                              

   

 

             LYMPHOCYTES (test code = 1010) 25.5 %                              

   

 

             MONOCYTES (test code = 1011) 8.8 %                                 

 

 

             EOSINOPHILS (test code = 1012) 3.9 %                               

   

 

             BASOPHILS (test code = 1013) 0.3 %                                 

 

 

             IMMATURE GRANULOCYTES (test 0.3 %                                  



             code = 1036)                                        

 

             NUCLEATED RBCS (test code = 0.0 /100WBC'S                          

 



             1065)                                               

 

             PLATELET COUNT (test code = 85 K/UL                                



             1015)                                               

 

             ABSOLUTE NEUTROPHILS (test code 1.87 K/UL                          

    



             = 1066)                                             

 

             ABSOLUTE LYMPHOCYTES (test code 0.78 K/UL                          

    



             = 1067)                                             

 

             ABSOLUTE MONOCYTES (test code = 0.27 K/UL                          

    



             1068)                                               

 

             ABSOLUTE EOSINOPHILS (test code 0.12 K/UL                          

    



             = 1040)                                             

 

             ABSOLUTE BASOPHILS (test code = 0.01 K/UL                          

    



             1069)                                               

 

             ABS IMMATURE GRANULOCYTES (test 0.01 K/UL                          

    



             code = 1020)                                        

 

             ABS NUCLEATED RBCS (test code = 0.00 K/UL                          

    



             95932)                                              



COMPREHENSIVE METABOLIC PANEL2021-10-13 00:00:00





             Test Item    Value        Reference Range Interpretation Comments

 

             GLUCOSE (test code = 2217) 139 MG/DL                              

 

             BUN (test code = 2208) 21 MG/DL                               

 

             CREATININE (test code = 2214) 0.83 MG/DL                           

  

 

             eGFR  AMER. (test code 88 ML/MIN/1.73                       

    



             = 57317)                                            

 

             eGFR NON- AMER. (test 76 ML/MIN/1.73                        

   



             code = 93226)                                        

 

             CALC BUN/CREAT (test code = 25 RATIO                               



             2235)                                               

 

             SODIUM (test code = 2231) 140 MEQ/L                              

 

             POTASSIUM (test code = 2228) 4.8 MEQ/L                             

 

 

             CHLORIDE (test code = 2215) 102 MEQ/L                              

 

             CARBON DIOXIDE (test code = 25 MEQ/L                               



             220)                                               

 

             CALCIUM (test code = 2209) 9.2 MG/DL                              

 

             PROTEIN, TOTAL (test code = 7.2 G/DL                               



             )                                               

 

             ALBUMIN (test code = 2201) 3.6 G/DL                               

 

             CALC GLOBULIN (test code = 3.6 G/DL                               



             224)                                               

 

             CALC A/G RATIO (test code = 1.0 RATIO                              



             )                                               

 

             BILIRUBIN, TOTAL (test code = 0.6 MG/DL                            

  



             )                                               

 

             ALKALINE PHOSPHATASE (test 101 U/L                                



             code = 2204)                                        

 

             AST (test code = 2218) 30 U/L                                 

 

             ALT (test code = 2219) 23 U/L                                 



PROBNP2021-10-13 00:00:00





             Test Item    Value        Reference Range Interpretation Comments

 

             NT-proBNP (test code = 09951) 247 PG/ML                            

  



PROBNP2021-10-13 00:00:00





             Test Item    Value        Reference Range Interpretation Comments

 

             NT-proBNP (test code = 09962) 247 PG/ML                            

  



VIOLET (ANTI-NUCLEAR AB) WITH REFLEX TITER2021-10-13 00:00:00





             Test Item    Value        Reference Range Interpretation Comments

 

             ANTI-NUCLEAR ANTIBODIES (test code = POSITIVE                      

         



             3506)                                               



C-REACTIVE PROTEIN2021-10-13 00:00:00





             Test Item    Value        Reference Range Interpretation Comments

 

             C-REACTIVE PROTEIN (test code = 1.1 MG/DL                          

    



             3513)                                               



SEDIMENTATION RATE2021-10-13 00:00:00





             Test Item    Value        Reference Range Interpretation Comments

 

             SEDIMENTATION RATE (test code = 88 MM/HOUR                         

    



             1017)                                               



[id number] 00:00:00





             Test Item    Value        Reference Range Interpretation Comments

 

             D-DIMER (test code = 1405) 0.77 UG/MLFEU                           



CBC W/AUTO DIFF2021-10-13 00:00:00





             Test Item    Value        Reference Range Interpretation Comments

 

             WBC (test code = 1001) 3.1 K/UL                               

 

             RBC (test code = 1002) 3.14 M/UL                              

 

             HEMOGLOBIN (test code = 1003) 8.4 G/DL                             

  

 

             HEMATOCRIT (test code = 1004) 25.9 %                               

  

 

             MCV (test code = 1005) 82.5 fL                                

 

             MCH (test code = 1006) 26.8 PG                                

 

             MCHC (test code = 1007) 32.4 G/DL                              

 

             RDW (test code = 1038) 14.5 %                                 

 

             NEUTROPHILS (test code = 1008) 61.2 %                              

   

 

             LYMPHOCYTES (test code = 1010) 25.5 %                              

   

 

             MONOCYTES (test code = 1011) 8.8 %                                 

 

 

             EOSINOPHILS (test code = 1012) 3.9 %                               

   

 

             BASOPHILS (test code = 1013) 0.3 %                                 

 

 

             IMMATURE GRANULOCYTES (test 0.3 %                                  



             code = 1036)                                        

 

             NUCLEATED RBCS (test code = 0.0 /100WBC'S                          

 



             1065)                                               

 

             PLATELET COUNT (test code = 85 K/UL                                



             1015)                                               

 

             ABSOLUTE NEUTROPHILS (test code 1.87 K/UL                          

    



             = 1066)                                             

 

             ABSOLUTE LYMPHOCYTES (test code 0.78 K/UL                          

    



             = 1067)                                             

 

             ABSOLUTE MONOCYTES (test code = 0.27 K/UL                          

    



             1068)                                               

 

             ABSOLUTE EOSINOPHILS (test code 0.12 K/UL                          

    



             = 1040)                                             

 

             ABSOLUTE BASOPHILS (test code = 0.01 K/UL                          

    



             1069)                                               

 

             ABS IMMATURE GRANULOCYTES (test 0.01 K/UL                          

    



             code = 1020)                                        

 

             ABS NUCLEATED RBCS (test code = 0.00 K/UL                          

    



             03056)                                              



CBC W/AUTO DIFF2021-10-13 00:00:00





             Test Item    Value        Reference Range Interpretation Comments

 

             WBC (test code = 1001) 3.1 K/UL                               

 

             RBC (test code = 1002) 3.14 M/UL                              

 

             HEMOGLOBIN (test code = 1003) 8.4 G/DL                             

  

 

             HEMATOCRIT (test code = 1004) 25.9 %                               

  

 

             MCV (test code = 1005) 82.5 fL                                

 

             MCH (test code = 1006) 26.8 PG                                

 

             MCHC (test code = 1007) 32.4 G/DL                              

 

             RDW (test code = 1038) 14.5 %                                 

 

             NEUTROPHILS (test code = 1008) 61.2 %                              

   

 

             LYMPHOCYTES (test code = 1010) 25.5 %                              

   

 

             MONOCYTES (test code = 1011) 8.8 %                                 

 

 

             EOSINOPHILS (test code = 1012) 3.9 %                               

   

 

             BASOPHILS (test code = 1013) 0.3 %                                 

 

 

             IMMATURE GRANULOCYTES (test 0.3 %                                  



             code = 1036)                                        

 

             NUCLEATED RBCS (test code = 0.0 /100WBC'S                          

 



             1065)                                               

 

             PLATELET COUNT (test code = 85 K/UL                                



             1015)                                               

 

             ABSOLUTE NEUTROPHILS (test code 1.87 K/UL                          

    



             = 1066)                                             

 

             ABSOLUTE LYMPHOCYTES (test code 0.78 K/UL                          

    



             = 1067)                                             

 

             ABSOLUTE MONOCYTES (test code = 0.27 K/UL                          

    



             1068)                                               

 

             ABSOLUTE EOSINOPHILS (test code 0.12 K/UL                          

    



             = 1040)                                             

 

             ABSOLUTE BASOPHILS (test code = 0.01 K/UL                          

    



             1069)                                               

 

             ABS IMMATURE GRANULOCYTES (test 0.01 K/UL                          

    



             code = 1020)                                        

 

             ABS NUCLEATED RBCS (test code = 0.00 K/UL                          

    



             58912)                                              



CBC W/AUTO DIFF2021-10-13 00:00:00





             Test Item    Value        Reference Range Interpretation Comments

 

             WBC (test code = 1001) 3.1 K/UL                               

 

             RBC (test code = 1002) 3.14 M/UL                              

 

             HEMOGLOBIN (test code = 1003) 8.4 G/DL                             

  

 

             HEMATOCRIT (test code = 1004) 25.9 %                               

  

 

             MCV (test code = 1005) 82.5 fL                                

 

             MCH (test code = 1006) 26.8 PG                                

 

             MCHC (test code = 1007) 32.4 G/DL                              

 

             RDW (test code = 1038) 14.5 %                                 

 

             NEUTROPHILS (test code = 1008) 61.2 %                              

   

 

             LYMPHOCYTES (test code = 1010) 25.5 %                              

   

 

             MONOCYTES (test code = 1011) 8.8 %                                 

 

 

             EOSINOPHILS (test code = 1012) 3.9 %                               

   

 

             BASOPHILS (test code = 1013) 0.3 %                                 

 

 

             IMMATURE GRANULOCYTES (test 0.3 %                                  



             code = 1036)                                        

 

             NUCLEATED RBCS (test code = 0.0 /100WBC'S                          

 



             1065)                                               

 

             PLATELET COUNT (test code = 85 K/UL                                



             1015)                                               

 

             ABSOLUTE NEUTROPHILS (test code 1.87 K/UL                          

    



             = 1066)                                             

 

             ABSOLUTE LYMPHOCYTES (test code 0.78 K/UL                          

    



             = 1067)                                             

 

             ABSOLUTE MONOCYTES (test code = 0.27 K/UL                          

    



             1068)                                               

 

             ABSOLUTE EOSINOPHILS (test code 0.12 K/UL                          

    



             = 1040)                                             

 

             ABSOLUTE BASOPHILS (test code = 0.01 K/UL                          

    



             1069)                                               

 

             ABS IMMATURE GRANULOCYTES (test 0.01 K/UL                          

    



             code = 1020)                                        

 

             ABS NUCLEATED RBCS (test code = 0.00 K/UL                          

    



             50229)                                              



COMPREHENSIVE METABOLIC PANEL2021-10-13 00:00:00





             Test Item    Value        Reference Range Interpretation Comments

 

             GLUCOSE (test code = 2217) 139 MG/DL                              

 

             BUN (test code = 2208) 21 MG/DL                               

 

             CREATININE (test code = 2214) 0.83 MG/DL                           

  

 

             eGFR  AMER. (test code 88 ML/MIN/1.73                       

    



             = 00966)                                            

 

             eGFR NON- AMER. (test 76 ML/MIN/1.73                        

   



             code = 18908)                                        

 

             CALC BUN/CREAT (test code = 25 RATIO                               



             2235)                                               

 

             SODIUM (test code = 2231) 140 MEQ/L                              

 

             POTASSIUM (test code = 2228) 4.8 MEQ/L                             

 

 

             CHLORIDE (test code = 2215) 102 MEQ/L                              

 

             CARBON DIOXIDE (test code = 25 MEQ/L                               



             )                                               

 

             CALCIUM (test code = 2209) 9.2 MG/DL                              

 

             PROTEIN, TOTAL (test code = 7.2 G/DL                               



             )                                               

 

             ALBUMIN (test code = 2201) 3.6 G/DL                               

 

             CALC GLOBULIN (test code = 3.6 G/DL                               



             2240)                                               

 

             CALC A/G RATIO (test code = 1.0 RATIO                              



             2234)                                               

 

             BILIRUBIN, TOTAL (test code = 0.6 MG/DL                            

  



             )                                               

 

             ALKALINE PHOSPHATASE (test 101 U/L                                



             code = 2204)                                        

 

             AST (test code = 2218) 30 U/L                                 

 

             ALT (test code = 2219) 23 U/L                                 



COMPREHENSIVE METABOLIC PANEL-10- 00:00:00





             Test Item    Value        Reference Range Interpretation Comments

 

             GLUCOSE (test code = 2217) 139 MG/DL                              

 

             BUN (test code = 2208) 21 MG/DL                               

 

             CREATININE (test code = 2214) 0.83 MG/DL                           

  

 

             eGFR  AMER. (test code 88 ML/MIN/1.73                       

    



             = 01100)                                            

 

             eGFR NON- AMER. (test 76 ML/MIN/1.73                        

   



             code = 53742)                                        

 

             CALC BUN/CREAT (test code = 25 RATIO                               



             2235)                                               

 

             SODIUM (test code = 2231) 140 MEQ/L                              

 

             POTASSIUM (test code = 2228) 4.8 MEQ/L                             

 

 

             CHLORIDE (test code = 2215) 102 MEQ/L                              

 

             CARBON DIOXIDE (test code = 25 MEQ/L                               



             )                                               

 

             CALCIUM (test code = 2209) 9.2 MG/DL                              

 

             PROTEIN, TOTAL (test code = 7.2 G/DL                               



             )                                               

 

             ALBUMIN (test code = 2201) 3.6 G/DL                               

 

             CALC GLOBULIN (test code = 3.6 G/DL                               



             2240)                                               

 

             CALC A/G RATIO (test code = 1.0 RATIO                              



             2234)                                               

 

             BILIRUBIN, TOTAL (test code = 0.6 MG/DL                            

  



             2207)                                               

 

             ALKALINE PHOSPHATASE (test 101 U/L                                



             code = 2204)                                        

 

             AST (test code = 2218) 30 U/L                                 

 

             ALT (test code = 2219) 23 U/L                                 



PROBNP2021-10-13 00:00:00





             Test Item    Value        Reference Range Interpretation Comments

 

             NT-proBNP (test code = 81888) 247 PG/ML                            

  



PROBNP2021-10-13 00:00:00





             Test Item    Value        Reference Range Interpretation Comments

 

             NT-proBNP (test code = 56981) 247 PG/ML                            

  



PROBNP2021-10-13 00:00:00





             Test Item    Value        Reference Range Interpretation Comments

 

             NT-proBNP (test code = 56329) 247 PG/ML                            

  



VIOLET (ANTI-NUCLEAR AB) WITH REFLEX TITER2021-10-13 00:00:00





             Test Item    Value        Reference Range Interpretation Comments

 

             ANTI-NUCLEAR ANTIBODIES (test code = POSITIVE                      

         



             3506)                                               



VIOLET (ANTI-NUCLEAR AB) WITH REFLEX TITER2021-10-13 00:00:00





             Test Item    Value        Reference Range Interpretation Comments

 

             ANTI-NUCLEAR ANTIBODIES (test code = POSITIVE                      

         



             3506)                                               



C-REACTIVE PROTEIN2021-10-13 00:00:00





             Test Item    Value        Reference Range Interpretation Comments

 

             C-REACTIVE PROTEIN (test code = 1.1 MG/DL                          

    



             3513)                                               



C-REACTIVE PROTEIN2021-10-13 00:00:00





             Test Item    Value        Reference Range Interpretation Comments

 

             C-REACTIVE PROTEIN (test code = 1.1 MG/DL                          

    



             3513)                                               



SEDIMENTATION RATE2021-10-13 00:00:00





             Test Item    Value        Reference Range Interpretation Comments

 

             SEDIMENTATION RATE (test code = 88 MM/HOUR                         

    



             1017)                                               



SEDIMENTATION RATE2021-10-13 00:00:00





             Test Item    Value        Reference Range Interpretation Comments

 

             SEDIMENTATION RATE (test code = 88 MM/HOUR                         

    



             1017)                                               



[id number] 00:00:00





             Test Item    Value        Reference Range Interpretation Comments

 

             D-DIMER (test code = 1405) 0.77 UG/MLFEU                           



[id number] 00:00:00





             Test Item    Value        Reference Range Interpretation Comments

 

             D-DIMER (test code = 1405) 0.77 UG/MLFEU                           



HEMOGLOBIN [id number] 00:00:00





             Test Item    Value        Reference Range Interpretation Comments

 

             HEMOGLOBIN A1c (test code = 66879) 7.5 %                           

       



HEMOGLOBIN [id number] 00:00:00





             Test Item    Value        Reference Range Interpretation Comments

 

             HEMOGLOBIN A1c (test code = 46136) 7.5 %                           

       



HEMOGLOBIN A1c2021-10-10 00:00:00





             Test Item    Value        Reference Range Interpretation Comments

 

             HEMOGLOBIN A1c (test code = 82854) 7.5 %                           

       



HEMOGLOBIN A1c2021-10-10 00:00:00





             Test Item    Value        Reference Range Interpretation Comments

 

             HEMOGLOBIN A1c (test code = 76414) 7.5 %                           

       



HEMOGLOBIN A1c2021-10-10 00:00:00





             Test Item    Value        Reference Range Interpretation Comments

 

             HEMOGLOBIN A1c (test code = 29332) 7.5 %                           

       



HEMOGLOBIN A1c2021-10-10 00:00:00





             Test Item    Value        Reference Range Interpretation Comments

 

             HEMOGLOBIN A1c (test code = 05786) 7.5 %                           

       



HEMOGLOBIN A1c2021-10-10 00:00:00





             Test Item    Value        Reference Range Interpretation Comments

 

             HEMOGLOBIN A1c (test code = 54312) 7.5 %                           

       



HEMOGLOBIN A1c2021-10-10 00:00:00





             Test Item    Value        Reference Range Interpretation Comments

 

             HEMOGLOBIN A1c (test code = 19289) 7.5 %                           

       



HEMOGLOBIN [id number] 00:00:00





             Test Item    Value        Reference Range Interpretation Comments

 

             HEMOGLOBIN A1c (test code = 66990) 7.5 %                           

       



HEMOGLOBIN [id number] 00:00:00





             Test Item    Value        Reference Range Interpretation Comments

 

             HEMOGLOBIN A1c (test code = 52397) 7.5 %                           

       



HEMOGLOBIN A1c2021-10-10 00:00:00





             Test Item    Value        Reference Range Interpretation Comments

 

             HEMOGLOBIN A1c (test code = 77314) 7.5 %                           

       



HEMOGLOBIN A1c2021-10-10 00:00:00





             Test Item    Value        Reference Range Interpretation Comments

 

             HEMOGLOBIN A1c (test code = 78614) 7.5 %                           

       



HEMOGLOBIN A1c2021-10-10 00:00:00





             Test Item    Value        Reference Range Interpretation Comments

 

             HEMOGLOBIN A1c (test code = 62303) 7.5 %                           

       



HEMOGLOBIN A1c2021-10-10 00:00:00





             Test Item    Value        Reference Range Interpretation Comments

 

             HEMOGLOBIN A1c (test code = 78423) 7.5 %                           

       



HEMOGLOBIN A1c2021-10-10 00:00:00





             Test Item    Value        Reference Range Interpretation Comments

 

             HEMOGLOBIN A1c (test code = 14244) 7.5 %                           

       



HEMOGLOBIN A1c2021-10-10 00:00:00





             Test Item    Value        Reference Range Interpretation Comments

 

             HEMOGLOBIN A1c (test code = 09794) 7.5 %                           

       



HEMOGLOBIN A1c2021-10-10 00:00:00





             Test Item    Value        Reference Range Interpretation Comments

 

             HEMOGLOBIN A1c (test code = 33967) 7.5 %                           

       



HEMOGLOBIN A1c2021-10-10 00:00:00





             Test Item    Value        Reference Range Interpretation Comments

 

             HEMOGLOBIN A1c (test code = 14411) 7.5 %                           

       



HEMOGLOBIN A1c2021-10-10 00:00:00





             Test Item    Value        Reference Range Interpretation Comments

 

             HEMOGLOBIN A1c (test code = 26552) 7.5 %                           

       



CULTURE, URINE2021-07-15 00:00:00





             Test Item    Value        Reference Range Interpretation Comments

 

             CULTURE, URINE (test SPECIMEN NUMBER:                           



             code = 03165) [id number]                              



CULTURE, URINE2021-07-15 00:00:00





             Test Item    Value        Reference Range Interpretation Comments

 

             CULTURE, URINE (test SPECIMEN NUMBER:                           



             code = 68849) [id number]                              



CULTURE, URINE2021-07-15 00:00:00





             Test Item    Value        Reference Range Interpretation Comments

 

             CULTURE, URINE (test SPECIMEN NUMBER:                           



             code = 19706) [id number]                              



CULTURE, URINE2021-07-15 00:00:00





             Test Item    Value        Reference Range Interpretation Comments

 

             CULTURE, URINE (test SPECIMEN NUMBER:                           



             code = 47309) [id number]                              



CULTURE, URINE2021-07-15 00:00:00





             Test Item    Value        Reference Range Interpretation Comments

 

             CULTURE, URINE (test SPECIMEN NUMBER:                           



             code = 97058) [id number]                              



CULTURE, URINE2021-07-15 00:00:00





             Test Item    Value        Reference Range Interpretation Comments

 

             CULTURE, URINE (test SPECIMEN NUMBER:                           



             code = 22348) [id number]                              



CULTURE, URINE2021-07-15 00:00:00





             Test Item    Value        Reference Range Interpretation Comments

 

             CULTURE, URINE (test SPECIMEN NUMBER:                           



             code = 09222) [id number]                              



CULTURE, URINE2021-07-15 00:00:00





             Test Item    Value        Reference Range Interpretation Comments

 

             CULTURE, URINE (test SPECIMEN NUMBER:                           



             code = 20907) [id number]                              



CULTURE, URINE2021-07-15 00:00:00





             Test Item    Value        Reference Range Interpretation Comments

 

             CULTURE, URINE (test SPECIMEN NUMBER:                           



             code = 38081) [id number]                              



CULTURE, URINE2021-07-15 00:00:00





             Test Item    Value        Reference Range Interpretation Comments

 

             CULTURE, URINE (test SPECIMEN NUMBER:                           



             code = 62322) [id number]                              



CULTURE, URINE2021-07-15 00:00:00





             Test Item    Value        Reference Range Interpretation Comments

 

             CULTURE, URINE (test SPECIMEN NUMBER:                           



             code = 10985) [id number]                              



CULTURE, URINE2021-07-15 00:00:00





             Test Item    Value        Reference Range Interpretation Comments

 

             CULTURE, URINE (test SPECIMEN NUMBER:                           



             code = 26121) [id number]                              



HEMOGLOBIN [id number] 00:00:00





             Test Item    Value        Reference Range Interpretation Comments

 

             HEMOGLOBIN A1c (test code = 59651) 8.6 %                           

       



HEMOGLOBIN [id number] 00:00:00





             Test Item    Value        Reference Range Interpretation Comments

 

             HEMOGLOBIN A1c (test code = 42490) 8.6 %                           

       



HEMOGLOBIN [id number] 00:00:00





             Test Item    Value        Reference Range Interpretation Comments

 

             HEMOGLOBIN A1c (test code = 38831) 8.6 %                           

       



LIPID [id number] 00:00:00





             Test Item    Value        Reference Range Interpretation Comments

 

             CHOLESTEROL (test code = 2210) 104 MG/DL                           

   

 

             TRIGLYCERIDES (test code = 2232) 164 MG/DL                         

     

 

             HDL CHOLESTEROL (test code = 2220) 39 MG/DL                        

       

 

             CALC LDL CHOL (test code = 2237) 41 MG/DL                          

     

 

             RISK RATIO LDL/HDL (test code = 1.05 RATIO                         

    



             2238)                                               



LIPID [id number] 00:00:00





             Test Item    Value        Reference Range Interpretation Comments

 

             CHOLESTEROL (test code = 2210) 104 MG/DL                           

   

 

             TRIGLYCERIDES (test code = 2232) 164 MG/DL                         

     

 

             HDL CHOLESTEROL (test code = 2220) 39 MG/DL                        

       

 

             CALC LDL CHOL (test code = 2237) 41 MG/DL                          

     

 

             RISK RATIO LDL/HDL (test code = 1.05 RATIO                         

    



             2238)                                               



COMPREHENSIVE METABOLIC [id number] 00:00:00





             Test Item    Value        Reference Range Interpretation Comments

 

             GLUCOSE (test code = 2217) 330 MG/DL                              

 

             BUN (test code = 2208) 11 MG/DL                               

 

             CREATININE (test code = 2214) 0.84 MG/DL                           

  

 

             eGFR  AMER. (test code 87 ML/MIN/1.73                       

    



             = 77962)                                            

 

             eGFR NON- AMER. (test 75 ML/MIN/1.73                        

   



             code = 82807)                                        

 

             CALC BUN/CREAT (test code = 13 RATIO                               



             2235)                                               

 

             SODIUM (test code = 2231) 136 MEQ/L                              

 

             POTASSIUM (test code = 2228) 4.5 MEQ/L                             

 

 

             CHLORIDE (test code = 2215) 97 MEQ/L                               

 

             CARBON DIOXIDE (test code = 27 MEQ/L                               



             )                                               

 

             CALCIUM (test code = 2209) 9.1 MG/DL                              

 

             PROTEIN, TOTAL (test code = 7.7 G/DL                               



             )                                               

 

             ALBUMIN (test code = 2201) 3.8 G/DL                               

 

             CALC GLOBULIN (test code = 3.9 G/DL                               



             2240)                                               

 

             CALC A/G RATIO (test code = 1.0 RATIO                              



             2234)                                               

 

             BILIRUBIN, TOTAL (test code = 0.4 MG/DL                            

  



             )                                               

 

             ALKALINE PHOSPHATASE (test 106 U/L                                



             code = 2204)                                        

 

             AST (test code = 2218) 44 U/L                                 

 

             ALT (test code = 2219) 29 U/L                                 



COMPREHENSIVE METABOLIC [id number] 00:00:00





             Test Item    Value        Reference Range Interpretation Comments

 

             GLUCOSE (test code = 2217) 330 MG/DL                              

 

             BUN (test code = 2208) 11 MG/DL                               

 

             CREATININE (test code = 2214) 0.84 MG/DL                           

  

 

             eGFR  AMER. (test code 87 ML/MIN/1.73                       

    



             = 77505)                                            

 

             eGFR NON- AMER. (test 75 ML/MIN/1.73                        

   



             code = 27121)                                        

 

             CALC BUN/CREAT (test code = 13 RATIO                               



             2235)                                               

 

             SODIUM (test code = 2231) 136 MEQ/L                              

 

             POTASSIUM (test code = 2228) 4.5 MEQ/L                             

 

 

             CHLORIDE (test code = 2215) 97 MEQ/L                               

 

             CARBON DIOXIDE (test code = 27 MEQ/L                               



             )                                               

 

             CALCIUM (test code = 2209) 9.1 MG/DL                              

 

             PROTEIN, TOTAL (test code = 7.7 G/DL                               



             )                                               

 

             ALBUMIN (test code = 2201) 3.8 G/DL                               

 

             CALC GLOBULIN (test code = 3.9 G/DL                               



             2240)                                               

 

             CALC A/G RATIO (test code = 1.0 RATIO                              



             2234)                                               

 

             BILIRUBIN, TOTAL (test code = 0.4 MG/DL                            

  



             )                                               

 

             ALKALINE PHOSPHATASE (test 106 U/L                                



             code = 2204)                                        

 

             AST (test code = 2218) 44 U/L                                 

 

             ALT (test code = 2219) 29 U/L                                 



MICROALBUMIN/CREATININE, RANDOM AND [id number] 00:00:00





             Test Item    Value        Reference Range Interpretation Comments

 

             CREATININE, URINE, CONC. (test 131.3 MG/DL                         

   



             code = 2072)                                        

 

             ALBUMIN, URINE, RANDOM (test code 0.4 MG/DL                        

      



             = 28361)                                            

 

             CALC ALBUMIN/CREAT, RND (test 3 MG/G                               

  



             code = 17773)                                        



MICROALBUMIN/CREATININE, RANDOM AND [id number] 00:00:00





             Test Item    Value        Reference Range Interpretation Comments

 

             CREATININE, URINE, CONC. (test 131.3 MG/DL                         

   



             code = 2072)                                        

 

             ALBUMIN, URINE, RANDOM (test code 0.4 MG/DL                        

      



             = 10746)                                            

 

             CALC ALBUMIN/CREAT, RND (test 3 MG/G                               

  



             code = 56016)                                        



HEMOGLOBIN [id number] 00:00:00





             Test Item    Value        Reference Range Interpretation Comments

 

             HEMOGLOBIN A1c (test code = 33533) 8.6 %                           

       



HEMOGLOBIN [id number] 00:00:00





             Test Item    Value        Reference Range Interpretation Comments

 

             HEMOGLOBIN A1c (test code = 46287) 8.6 %                           

       



HEMOGLOBIN [id number] 00:00:00





             Test Item    Value        Reference Range Interpretation Comments

 

             HEMOGLOBIN A1c (test code = 35782) 8.6 %                           

       



HEMOGLOBIN [id number] 00:00:00





             Test Item    Value        Reference Range Interpretation Comments

 

             HEMOGLOBIN A1c (test code = 50477) 8.6 %                           

       



HEMOGLOBIN [id number] 00:00:00





             Test Item    Value        Reference Range Interpretation Comments

 

             HEMOGLOBIN A1c (test code = 80628) 8.6 %                           

       



LIPID [id number] 00:00:00





             Test Item    Value        Reference Range Interpretation Comments

 

             CHOLESTEROL (test code = 2210) 104 MG/DL                           

   

 

             TRIGLYCERIDES (test code = 2232) 164 MG/DL                         

     

 

             HDL CHOLESTEROL (test code = 2220) 39 MG/DL                        

       

 

             CALC LDL CHOL (test code = 2237) 41 MG/DL                          

     

 

             RISK RATIO LDL/HDL (test code = 1.05 RATIO                         

    



             2238)                                               



LIPID [id number] 00:00:00





             Test Item    Value        Reference Range Interpretation Comments

 

             CHOLESTEROL (test code = 2210) 104 MG/DL                           

   

 

             TRIGLYCERIDES (test code = 2232) 164 MG/DL                         

     

 

             HDL CHOLESTEROL (test code = 2220) 39 MG/DL                        

       

 

             CALC LDL CHOL (test code = 2237) 41 MG/DL                          

     

 

             RISK RATIO LDL/HDL (test code = 1.05 RATIO                         

    



             2238)                                               



COMPREHENSIVE METABOLIC [id number] 00:00:00





             Test Item    Value        Reference Range Interpretation Comments

 

             GLUCOSE (test code = 2217) 330 MG/DL                              

 

             BUN (test code = 2208) 11 MG/DL                               

 

             CREATININE (test code = 2214) 0.84 MG/DL                           

  

 

             eGFR  AMER. (test code 87 ML/MIN/1.73                       

    



             = 71146)                                            

 

             eGFR NON- AMER. (test 75 ML/MIN/1.73                        

   



             code = 54996)                                        

 

             CALC BUN/CREAT (test code = 13 RATIO                               



             2235)                                               

 

             SODIUM (test code = 2231) 136 MEQ/L                              

 

             POTASSIUM (test code = 2228) 4.5 MEQ/L                             

 

 

             CHLORIDE (test code = 2215) 97 MEQ/L                               

 

             CARBON DIOXIDE (test code = 27 MEQ/L                               



             2206)                                               

 

             CALCIUM (test code = 2209) 9.1 MG/DL                              

 

             PROTEIN, TOTAL (test code = 7.7 G/DL                               



             222)                                               

 

             ALBUMIN (test code = 2201) 3.8 G/DL                               

 

             CALC GLOBULIN (test code = 3.9 G/DL                               



             2240)                                               

 

             CALC A/G RATIO (test code = 1.0 RATIO                              



             2234)                                               

 

             BILIRUBIN, TOTAL (test code = 0.4 MG/DL                            

  



             )                                               

 

             ALKALINE PHOSPHATASE (test 106 U/L                                



             code = 220)                                        

 

             AST (test code = 2218) 44 U/L                                 

 

             ALT (test code = 2219) 29 U/L                                 



COMPREHENSIVE METABOLIC [id number] 00:00:00





             Test Item    Value        Reference Range Interpretation Comments

 

             GLUCOSE (test code = 2217) 330 MG/DL                              

 

             BUN (test code = 2208) 11 MG/DL                               

 

             CREATININE (test code = 2214) 0.84 MG/DL                           

  

 

             eGFR  AMER. (test code 87 ML/MIN/1.73                       

    



             = 32937)                                            

 

             eGFR NON- AMER. (test 75 ML/MIN/1.73                        

   



             code = 88396)                                        

 

             CALC BUN/CREAT (test code = 13 RATIO                               



             2235)                                               

 

             SODIUM (test code = 2231) 136 MEQ/L                              

 

             POTASSIUM (test code = 2228) 4.5 MEQ/L                             

 

 

             CHLORIDE (test code = 2215) 97 MEQ/L                               

 

             CARBON DIOXIDE (test code = 27 MEQ/L                               



             2206)                                               

 

             CALCIUM (test code = 2209) 9.1 MG/DL                              

 

             PROTEIN, TOTAL (test code = 7.7 G/DL                               



             )                                               

 

             ALBUMIN (test code = 2201) 3.8 G/DL                               

 

             CALC GLOBULIN (test code = 3.9 G/DL                               



             2240)                                               

 

             CALC A/G RATIO (test code = 1.0 RATIO                              



             2234)                                               

 

             BILIRUBIN, TOTAL (test code = 0.4 MG/DL                            

  



             )                                               

 

             ALKALINE PHOSPHATASE (test 106 U/L                                



             code = 2204)                                        

 

             AST (test code = 2218) 44 U/L                                 

 

             ALT (test code = 2219) 29 U/L                                 



MICROALBUMIN/CREATININE, RANDOM AND [id number] 00:00:00





             Test Item    Value        Reference Range Interpretation Comments

 

             CREATININE, URINE, CONC. (test 131.3 MG/DL                         

   



             code = 2072)                                        

 

             ALBUMIN, URINE, RANDOM (test code 0.4 MG/DL                        

      



             = 64292)                                            

 

             CALC ALBUMIN/CREAT, RND (test 3 MG/G                               

  



             code = 82016)                                        



MICROALBUMIN/CREATININE, RANDOM AND [id number] 00:00:00





             Test Item    Value        Reference Range Interpretation Comments

 

             CREATININE, URINE, CONC. (test 131.3 MG/DL                         

   



             code = 2072)                                        

 

             ALBUMIN, URINE, RANDOM (test code 0.4 MG/DL                        

      



             = 61256)                                            

 

             CALC ALBUMIN/CREAT, RND (test 3 MG/G                               

  



             code = 16464)                                        



LIPID [id number] 00:00:00





             Test Item    Value        Reference Range Interpretation Comments

 

             CHOLESTEROL (test code = 2210) 104 MG/DL                           

   

 

             TRIGLYCERIDES (test code = 2232) 164 MG/DL                         

     

 

             HDL CHOLESTEROL (test code = 2220) 39 MG/DL                        

       

 

             CALC LDL CHOL (test code = 2237) 41 MG/DL                          

     

 

             RISK RATIO LDL/HDL (test code = 1.05 RATIO                         

    



             2238)                                               



COMPREHENSIVE METABOLIC [id number] 00:00:00





             Test Item    Value        Reference Range Interpretation Comments

 

             GLUCOSE (test code = 2217) 330 MG/DL                              

 

             BUN (test code = 2208) 11 MG/DL                               

 

             CREATININE (test code = 2214) 0.84 MG/DL                           

  

 

             eGFR  AMER. (test code 87 ML/MIN/1.73                       

    



             = 12249)                                            

 

             eGFR NON- AMER. (test 75 ML/MIN/1.73                        

   



             code = 89228)                                        

 

             CALC BUN/CREAT (test code = 13 RATIO                               



             2235)                                               

 

             SODIUM (test code = 2231) 136 MEQ/L                              

 

             POTASSIUM (test code = 2228) 4.5 MEQ/L                             

 

 

             CHLORIDE (test code = 2215) 97 MEQ/L                               

 

             CARBON DIOXIDE (test code = 27 MEQ/L                               



             )                                               

 

             CALCIUM (test code = 2209) 9.1 MG/DL                              

 

             PROTEIN, TOTAL (test code = 7.7 G/DL                               



             )                                               

 

             ALBUMIN (test code = 2201) 3.8 G/DL                               

 

             CALC GLOBULIN (test code = 3.9 G/DL                               



             )                                               

 

             CALC A/G RATIO (test code = 1.0 RATIO                              



             2234)                                               

 

             BILIRUBIN, TOTAL (test code = 0.4 MG/DL                            

  



             )                                               

 

             ALKALINE PHOSPHATASE (test 106 U/L                                



             code = 2204)                                        

 

             AST (test code = 2218) 44 U/L                                 

 

             ALT (test code = 2219) 29 U/L                                 



MICROALBUMIN/CREATININE, RANDOM AND [id number] 00:00:00





             Test Item    Value        Reference Range Interpretation Comments

 

             CREATININE, URINE, CONC. (test 131.3 MG/DL                         

   



             code = 2072)                                        

 

             ALBUMIN, URINE, RANDOM (test code 0.4 MG/DL                        

      



             = 80743)                                            

 

             CALC ALBUMIN/CREAT, RND (test 3 MG/G                               

  



             code = 78666)                                        



HEMOGLOBIN [id number] 00:00:00





             Test Item    Value        Reference Range Interpretation Comments

 

             HEMOGLOBIN A1c (test code = 54366) 8.6 %                           

       



HEMOGLOBIN [id number] 00:00:00





             Test Item    Value        Reference Range Interpretation Comments

 

             HEMOGLOBIN A1c (test code = 59925) 8.6 %                           

       



HEMOGLOBIN [id number] 00:00:00





             Test Item    Value        Reference Range Interpretation Comments

 

             HEMOGLOBIN A1c (test code = 58214) 8.6 %                           

       



LIPID [id number] 00:00:00





             Test Item    Value        Reference Range Interpretation Comments

 

             CHOLESTEROL (test code = 2210) 104 MG/DL                           

   

 

             TRIGLYCERIDES (test code = 2232) 164 MG/DL                         

     

 

             HDL CHOLESTEROL (test code = 2220) 39 MG/DL                        

       

 

             CALC LDL CHOL (test code = 2237) 41 MG/DL                          

     

 

             RISK RATIO LDL/HDL (test code = 1.05 RATIO                         

    



             2238)                                               



LIPID [id number] 00:00:00





             Test Item    Value        Reference Range Interpretation Comments

 

             CHOLESTEROL (test code = 2210) 104 MG/DL                           

   

 

             TRIGLYCERIDES (test code = 2232) 164 MG/DL                         

     

 

             HDL CHOLESTEROL (test code = 2220) 39 MG/DL                        

       

 

             CALC LDL CHOL (test code = 2237) 41 MG/DL                          

     

 

             RISK RATIO LDL/HDL (test code = 1.05 RATIO                         

    



             2238)                                               



COMPREHENSIVE METABOLIC [id number] 00:00:00





             Test Item    Value        Reference Range Interpretation Comments

 

             GLUCOSE (test code = 2217) 330 MG/DL                              

 

             BUN (test code = 2208) 11 MG/DL                               

 

             CREATININE (test code = 2214) 0.84 MG/DL                           

  

 

             eGFR  AMER. (test code 87 ML/MIN/1.73                       

    



             = 23752)                                            

 

             eGFR NON- AMER. (test 75 ML/MIN/1.73                        

   



             code = 87531)                                        

 

             CALC BUN/CREAT (test code = 13 RATIO                               



             2235)                                               

 

             SODIUM (test code = 2231) 136 MEQ/L                              

 

             POTASSIUM (test code = 2228) 4.5 MEQ/L                             

 

 

             CHLORIDE (test code = 2215) 97 MEQ/L                               

 

             CARBON DIOXIDE (test code = 27 MEQ/L                               



             2206)                                               

 

             CALCIUM (test code = 2209) 9.1 MG/DL                              

 

             PROTEIN, TOTAL (test code = 7.7 G/DL                               



             )                                               

 

             ALBUMIN (test code = 2201) 3.8 G/DL                               

 

             CALC GLOBULIN (test code = 3.9 G/DL                               



             2240)                                               

 

             CALC A/G RATIO (test code = 1.0 RATIO                              



             2234)                                               

 

             BILIRUBIN, TOTAL (test code = 0.4 MG/DL                            

  



             )                                               

 

             ALKALINE PHOSPHATASE (test 106 U/L                                



             code = 2204)                                        

 

             AST (test code = 2218) 44 U/L                                 

 

             ALT (test code = 2219) 29 U/L                                 



COMPREHENSIVE METABOLIC [id number] 00:00:00





             Test Item    Value        Reference Range Interpretation Comments

 

             GLUCOSE (test code = 2217) 330 MG/DL                              

 

             BUN (test code = 2208) 11 MG/DL                               

 

             CREATININE (test code = 2214) 0.84 MG/DL                           

  

 

             eGFR  AMER. (test code 87 ML/MIN/1.73                       

    



             = 09288)                                            

 

             eGFR NON- AMER. (test 75 ML/MIN/1.73                        

   



             code = 73917)                                        

 

             CALC BUN/CREAT (test code = 13 RATIO                               



             2235)                                               

 

             SODIUM (test code = 2231) 136 MEQ/L                              

 

             POTASSIUM (test code = 2228) 4.5 MEQ/L                             

 

 

             CHLORIDE (test code = 2215) 97 MEQ/L                               

 

             CARBON DIOXIDE (test code = 27 MEQ/L                               



             2206)                                               

 

             CALCIUM (test code = 2209) 9.1 MG/DL                              

 

             PROTEIN, TOTAL (test code = 7.7 G/DL                               



             )                                               

 

             ALBUMIN (test code = 2201) 3.8 G/DL                               

 

             CALC GLOBULIN (test code = 3.9 G/DL                               



             2240)                                               

 

             CALC A/G RATIO (test code = 1.0 RATIO                              



             2234)                                               

 

             BILIRUBIN, TOTAL (test code = 0.4 MG/DL                            

  



             )                                               

 

             ALKALINE PHOSPHATASE (test 106 U/L                                



             code = 2204)                                        

 

             AST (test code = 2218) 44 U/L                                 

 

             ALT (test code = 2219) 29 U/L                                 



MICROALBUMIN/CREATININE, RANDOM AND [id number] 00:00:00





             Test Item    Value        Reference Range Interpretation Comments

 

             CREATININE, URINE, CONC. (test 131.3 MG/DL                         

   



             code = 2072)                                        

 

             ALBUMIN, URINE, RANDOM (test code 0.4 MG/DL                        

      



             = 14694)                                            

 

             CALC ALBUMIN/CREAT, RND (test 3 MG/G                               

  



             code = 74915)                                        



MICROALBUMIN/CREATININE, RANDOM AND [id number] 00:00:00





             Test Item    Value        Reference Range Interpretation Comments

 

             CREATININE, URINE, CONC. (test 131.3 MG/DL                         

   



             code = 2072)                                        

 

             ALBUMIN, URINE, RANDOM (test code 0.4 MG/DL                        

      



             = 51828)                                            

 

             CALC ALBUMIN/CREAT, RND (test 3 MG/G                               

  



             code = 36119)                                        



HEMOGLOBIN [id number] 00:00:00





             Test Item    Value        Reference Range Interpretation Comments

 

             HEMOGLOBIN A1c (test code = 96299) 8.6 %                           

       



HEMOGLOBIN [id number] 00:00:00





             Test Item    Value        Reference Range Interpretation Comments

 

             HEMOGLOBIN A1c (test code = 39780) 8.6 %                           

       



HEMOGLOBIN [id number] 00:00:00





             Test Item    Value        Reference Range Interpretation Comments

 

             HEMOGLOBIN A1c (test code = 12599) 8.6 %                           

       



LIPID [id number] 00:00:00





             Test Item    Value        Reference Range Interpretation Comments

 

             CHOLESTEROL (test code = 2210) 104 MG/DL                           

   

 

             TRIGLYCERIDES (test code = 2232) 164 MG/DL                         

     

 

             HDL CHOLESTEROL (test code = 2220) 39 MG/DL                        

       

 

             CALC LDL CHOL (test code = 2237) 41 MG/DL                          

     

 

             RISK RATIO LDL/HDL (test code = 1.05 RATIO                         

    



             2238)                                               



LIPID [id number] 00:00:00





             Test Item    Value        Reference Range Interpretation Comments

 

             CHOLESTEROL (test code = 2210) 104 MG/DL                           

   

 

             TRIGLYCERIDES (test code = 2232) 164 MG/DL                         

     

 

             HDL CHOLESTEROL (test code = 2220) 39 MG/DL                        

       

 

             CALC LDL CHOL (test code = 2237) 41 MG/DL                          

     

 

             RISK RATIO LDL/HDL (test code = 1.05 RATIO                         

    



             2238)                                               



COMPREHENSIVE METABOLIC [id number] 00:00:00





             Test Item    Value        Reference Range Interpretation Comments

 

             GLUCOSE (test code = 2217) 330 MG/DL                              

 

             BUN (test code = 2208) 11 MG/DL                               

 

             CREATININE (test code = 2214) 0.84 MG/DL                           

  

 

             eGFR  AMER. (test code 87 ML/MIN/1.73                       

    



             = 98800)                                            

 

             eGFR NON- AMER. (test 75 ML/MIN/1.73                        

   



             code = 15478)                                        

 

             CALC BUN/CREAT (test code = 13 RATIO                               



             2235)                                               

 

             SODIUM (test code = 2231) 136 MEQ/L                              

 

             POTASSIUM (test code = 2228) 4.5 MEQ/L                             

 

 

             CHLORIDE (test code = 2215) 97 MEQ/L                               

 

             CARBON DIOXIDE (test code = 27 MEQ/L                               



             )                                               

 

             CALCIUM (test code = 2209) 9.1 MG/DL                              

 

             PROTEIN, TOTAL (test code = 7.7 G/DL                               



             )                                               

 

             ALBUMIN (test code = 2201) 3.8 G/DL                               

 

             CALC GLOBULIN (test code = 3.9 G/DL                               



             2240)                                               

 

             CALC A/G RATIO (test code = 1.0 RATIO                              



             2234)                                               

 

             BILIRUBIN, TOTAL (test code = 0.4 MG/DL                            

  



             )                                               

 

             ALKALINE PHOSPHATASE (test 106 U/L                                



             code = 2204)                                        

 

             AST (test code = 2218) 44 U/L                                 

 

             ALT (test code = 2219) 29 U/L                                 



COMPREHENSIVE METABOLIC [id number] 00:00:00





             Test Item    Value        Reference Range Interpretation Comments

 

             GLUCOSE (test code = 2217) 330 MG/DL                              

 

             BUN (test code = 2208) 11 MG/DL                               

 

             CREATININE (test code = 2214) 0.84 MG/DL                           

  

 

             eGFR  AMER. (test code 87 ML/MIN/1.73                       

    



             = 31468)                                            

 

             eGFR NON- AMER. (test 75 ML/MIN/1.73                        

   



             code = 82156)                                        

 

             CALC BUN/CREAT (test code = 13 RATIO                               



             )                                               

 

             SODIUM (test code = 2231) 136 MEQ/L                              

 

             POTASSIUM (test code = 2228) 4.5 MEQ/L                             

 

 

             CHLORIDE (test code = 2215) 97 MEQ/L                               

 

             CARBON DIOXIDE (test code = 27 MEQ/L                               



             220)                                               

 

             CALCIUM (test code = 2209) 9.1 MG/DL                              

 

             PROTEIN, TOTAL (test code = 7.7 G/DL                               



             )                                               

 

             ALBUMIN (test code = 2201) 3.8 G/DL                               

 

             CALC GLOBULIN (test code = 3.9 G/DL                               



             2240)                                               

 

             CALC A/G RATIO (test code = 1.0 RATIO                              



             2234)                                               

 

             BILIRUBIN, TOTAL (test code = 0.4 MG/DL                            

  



             )                                               

 

             ALKALINE PHOSPHATASE (test 106 U/L                                



             code = 2204)                                        

 

             AST (test code = 2218) 44 U/L                                 

 

             ALT (test code = 2219) 29 U/L                                 



MICROALBUMIN/CREATININE, RANDOM AND [id number] 00:00:00





             Test Item    Value        Reference Range Interpretation Comments

 

             CREATININE, URINE, CONC. (test 131.3 MG/DL                         

   



             code = 2072)                                        

 

             ALBUMIN, URINE, RANDOM (test code 0.4 MG/DL                        

      



             = 36671)                                            

 

             CALC ALBUMIN/CREAT, RND (test 3 MG/G                               

  



             code = 11726)                                        



MICROALBUMIN/CREATININE, RANDOM AND [id number] 00:00:00





             Test Item    Value        Reference Range Interpretation Comments

 

             CREATININE, URINE, CONC. (test 131.3 MG/DL                         

   



             code = 2072)                                        

 

             ALBUMIN, URINE, RANDOM (test code 0.4 MG/DL                        

      



             = 20522)                                            

 

             CALC ALBUMIN/CREAT, RND (test 3 MG/G                               

  



             code = 34661)                                        



HEMOGLOBIN [id number] 00:00:00





             Test Item    Value        Reference Range Interpretation Comments

 

             HEMOGLOBIN A1c (test code = 28660) 8.6 %                           

       



HEMOGLOBIN [id number] 00:00:00





             Test Item    Value        Reference Range Interpretation Comments

 

             HEMOGLOBIN A1c (test code = 69281) 8.6 %                           

       



LIPID [id number] 00:00:00





             Test Item    Value        Reference Range Interpretation Comments

 

             CHOLESTEROL (test code = 2210) 104 MG/DL                           

   

 

             TRIGLYCERIDES (test code = 2232) 164 MG/DL                         

     

 

             HDL CHOLESTEROL (test code = 2220) 39 MG/DL                        

       

 

             CALC LDL CHOL (test code = 2237) 41 MG/DL                          

     

 

             RISK RATIO LDL/HDL (test code = 1.05 RATIO                         

    



             2238)                                               



COMPREHENSIVE METABOLIC [id number] 00:00:00





             Test Item    Value        Reference Range Interpretation Comments

 

             GLUCOSE (test code = 2217) 330 MG/DL                              

 

             BUN (test code = 2208) 11 MG/DL                               

 

             CREATININE (test code = 2214) 0.84 MG/DL                           

  

 

             eGFR  AMER. (test code 87 ML/MIN/1.73                       

    



             = 98799)                                            

 

             eGFR NON- AMER. (test 75 ML/MIN/1.73                        

   



             code = 57754)                                        

 

             CALC BUN/CREAT (test code = 13 RATIO                               



             2235)                                               

 

             SODIUM (test code = 2231) 136 MEQ/L                              

 

             POTASSIUM (test code = 2228) 4.5 MEQ/L                             

 

 

             CHLORIDE (test code = 2215) 97 MEQ/L                               

 

             CARBON DIOXIDE (test code = 27 MEQ/L                               



             )                                               

 

             CALCIUM (test code = 2209) 9.1 MG/DL                              

 

             PROTEIN, TOTAL (test code = 7.7 G/DL                               



             )                                               

 

             ALBUMIN (test code = 2201) 3.8 G/DL                               

 

             CALC GLOBULIN (test code = 3.9 G/DL                               



             )                                               

 

             CALC A/G RATIO (test code = 1.0 RATIO                              



             )                                               

 

             BILIRUBIN, TOTAL (test code = 0.4 MG/DL                            

  



             )                                               

 

             ALKALINE PHOSPHATASE (test 106 U/L                                



             code = 2204)                                        

 

             AST (test code = 2218) 44 U/L                                 

 

             ALT (test code = 2219) 29 U/L                                 



MICROALBUMIN/CREATININE, RANDOM AND [id number] 00:00:00





             Test Item    Value        Reference Range Interpretation Comments

 

             CREATININE, URINE, CONC. (test 131.3 MG/DL                         

   



             code = 207)                                        

 

             ALBUMIN, URINE, RANDOM (test code 0.4 MG/DL                        

      



             = 89950)                                            

 

             CALC ALBUMIN/CREAT, RND (test 3 MG/G                               

  



             code = 12576)                                        



HEMOGLOBIN [id number] 00:00:00





             Test Item    Value        Reference Range Interpretation Comments

 

             HEMOGLOBIN A1c (test code = 27912) 8.6 %                           

       



HEMOGLOBIN [id number] 00:00:00





             Test Item    Value        Reference Range Interpretation Comments

 

             HEMOGLOBIN A1c (test code = 79381) 8.6 %                           

       



HEMOGLOBIN [id number] 00:00:00





             Test Item    Value        Reference Range Interpretation Comments

 

             HEMOGLOBIN A1c (test code = 46212) 8.6 %                           

       



LIPID [id number] 00:00:00





             Test Item    Value        Reference Range Interpretation Comments

 

             CHOLESTEROL (test code = 2210) 104 MG/DL                           

   

 

             TRIGLYCERIDES (test code = 2232) 164 MG/DL                         

     

 

             HDL CHOLESTEROL (test code = 2220) 39 MG/DL                        

       

 

             CALC LDL CHOL (test code = 2237) 41 MG/DL                          

     

 

             RISK RATIO LDL/HDL (test code = 1.05 RATIO                         

    



             2238)                                               



LIPID [id number] 00:00:00





             Test Item    Value        Reference Range Interpretation Comments

 

             CHOLESTEROL (test code = 2210) 104 MG/DL                           

   

 

             TRIGLYCERIDES (test code = 2232) 164 MG/DL                         

     

 

             HDL CHOLESTEROL (test code = 2220) 39 MG/DL                        

       

 

             CALC LDL CHOL (test code = 2237) 41 MG/DL                          

     

 

             RISK RATIO LDL/HDL (test code = 1.05 RATIO                         

    



             2238)                                               



COMPREHENSIVE METABOLIC [id number] 00:00:00





             Test Item    Value        Reference Range Interpretation Comments

 

             GLUCOSE (test code = 2217) 330 MG/DL                              

 

             BUN (test code = 2208) 11 MG/DL                               

 

             CREATININE (test code = 2214) 0.84 MG/DL                           

  

 

             eGFR  AMER. (test code 87 ML/MIN/1.73                       

    



             = 68092)                                            

 

             eGFR NON- AMER. (test 75 ML/MIN/1.73                        

   



             code = 96355)                                        

 

             CALC BUN/CREAT (test code = 13 RATIO                               



             2235)                                               

 

             SODIUM (test code = 2231) 136 MEQ/L                              

 

             POTASSIUM (test code = 2228) 4.5 MEQ/L                             

 

 

             CHLORIDE (test code = 2215) 97 MEQ/L                               

 

             CARBON DIOXIDE (test code = 27 MEQ/L                               



             220)                                               

 

             CALCIUM (test code = 2209) 9.1 MG/DL                              

 

             PROTEIN, TOTAL (test code = 7.7 G/DL                               



             )                                               

 

             ALBUMIN (test code = 2201) 3.8 G/DL                               

 

             CALC GLOBULIN (test code = 3.9 G/DL                               



             2240)                                               

 

             CALC A/G RATIO (test code = 1.0 RATIO                              



             4)                                               

 

             BILIRUBIN, TOTAL (test code = 0.4 MG/DL                            

  



             )                                               

 

             ALKALINE PHOSPHATASE (test 106 U/L                                



             code = 2204)                                        

 

             AST (test code = 2218) 44 U/L                                 

 

             ALT (test code = 2219) 29 U/L                                 



COMPREHENSIVE METABOLIC [id number] 00:00:00





             Test Item    Value        Reference Range Interpretation Comments

 

             GLUCOSE (test code = 2217) 330 MG/DL                              

 

             BUN (test code = 2208) 11 MG/DL                               

 

             CREATININE (test code = 2214) 0.84 MG/DL                           

  

 

             eGFR  AMER. (test code 87 ML/MIN/1.73                       

    



             = 53159)                                            

 

             eGFR NON- AMER. (test 75 ML/MIN/1.73                        

   



             code = 56923)                                        

 

             CALC BUN/CREAT (test code = 13 RATIO                               



             2235)                                               

 

             SODIUM (test code = 2231) 136 MEQ/L                              

 

             POTASSIUM (test code = 2228) 4.5 MEQ/L                             

 

 

             CHLORIDE (test code = 2215) 97 MEQ/L                               

 

             CARBON DIOXIDE (test code = 27 MEQ/L                               



             220)                                               

 

             CALCIUM (test code = 2209) 9.1 MG/DL                              

 

             PROTEIN, TOTAL (test code = 7.7 G/DL                               



             )                                               

 

             ALBUMIN (test code = 2201) 3.8 G/DL                               

 

             CALC GLOBULIN (test code = 3.9 G/DL                               



             0)                                               

 

             CALC A/G RATIO (test code = 1.0 RATIO                              



             )                                               

 

             BILIRUBIN, TOTAL (test code = 0.4 MG/DL                            

  



             )                                               

 

             ALKALINE PHOSPHATASE (test 106 U/L                                



             code = )                                        

 

             AST (test code = 2218) 44 U/L                                 

 

             ALT (test code = 2219) 29 U/L                                 



MICROALBUMIN/CREATININE, RANDOM AND [id number] 00:00:00





             Test Item    Value        Reference Range Interpretation Comments

 

             CREATININE, URINE, CONC. (test 131.3 MG/DL                         

   



             code = 2072)                                        

 

             ALBUMIN, URINE, RANDOM (test code 0.4 MG/DL                        

      



             = 78905)                                            

 

             CALC ALBUMIN/CREAT, RND (test 3 MG/G                               

  



             code = 87097)                                        



MICROALBUMIN/CREATININE, RANDOM AND [id number] 00:00:00





             Test Item    Value        Reference Range Interpretation Comments

 

             CREATININE, URINE, CONC. (test 131.3 MG/DL                         

   



             code = 2072)                                        

 

             ALBUMIN, URINE, RANDOM (test code 0.4 MG/DL                        

      



             = 47574)                                            

 

             CALC ALBUMIN/CREAT, RND (test 3 MG/G                               

  



             code = 68525)

## 2023-02-10 NOTE — ER
Nurse's Notes                                                                                     

 [facility]                                                                 

Name: Meredith Stark                                                                                 

Age: 62 yrs                                                                                       

Sex: Female                                                                                       

: 1960                                                                                   

MRN: [id number]                                                                                   

Arrival Date: 02/10/2023                                                                          

Time: 19:17                                                                                       

Account#: [id number]                                                                            

Bed 10                                                                                            

Private MD:                                                                                       

Diagnosis: UTI/ Urinary tract infection, site not specified                                       

                                                                                                  

Presentation:                                                                                     

02/10                                                                                             

19:46 Chief complaint: Patient states: I have been peeing a lot and it burns and hurts when i kr3 

      pee. Coronavirus screen: Vaccine status: Patient reports receiving the 2nd dose of the      

      covid vaccine. Ebola Screen: Patient denies travel to an Ebola-affected area in the 21      

      days before illness onset. Initial Sepsis Screen: Does the patient meet any 2 criteria?     

      No. Patient's initial sepsis screen is negative. Does the patient have a suspected          

      source of infection? No. Patient's initial sepsis screen is negative. Risk Assessment:      

      Do you want to hurt yourself or someone else? Patient reports no desire to harm self or     

      others. Onset of symptoms was 2023.                                             

19:46 Method Of Arrival: Ambulatory                                                           kr3 

19:46 Acuity: SP 4                                                                           kr3 

                                                                                                  

Triage Assessment:                                                                                

19:51 General: Appears in no apparent distress. uncomfortable, Behavior is calm, cooperative, kr3 

      appropriate for age. Pain: Complains of pain in groin. Pain: Complains of pain in back.     

                                                                                                  

Historical:                                                                                       

- Allergies:                                                                                      

19:49 Darvocet-N 100;                                                                         kr3 

19:49 Ketorolac;                                                                              kr3 

19:49 Nubain;                                                                                 kr3 

19:49 PENICILLINS;                                                                            kr3 

- PMHx:                                                                                           

19:49 diabetes mellitus; Hypertensive disorder; cirrhosis of liver; chronic back pain;        kr3 

22:46 Hypercholesterolemia; Thyroid problem;                                                  kl  

- PSHx:                                                                                           

19:49 Appendectomy; cheryl knee reconstructive sx; carpal tunnel repair; Cholecystectomy;        kr3 

      hysterectomy;                                                                               

                                                                                                  

- Immunization history:: Adult Immunizations up to date.                                          

- Social history:: Smoking status: Patient/guardian denies using tobacco, the patient             

  reports quitting approximately 15 years ago.                                                    

                                                                                                  

                                                                                                  

Screenin:46 [facility] ED Fall Risk Assessment (Adult) History of falling in the last 3 months,       kl  

      including since admission No falls in past 3 months (0 pts). Abuse screen: Denies           

      threats or abuse. Nutritional screening: No deficits noted. Tuberculosis screening: No      

      symptoms or risk factors identified.                                                        

                                                                                                  

Assessment:                                                                                       

20:59 Reassessment: Patient appears in no apparent distress at this time. Patient and/or      kl  

      family updated on plan of care and expected duration. Pain level reassessed. Patient is     

      alert, oriented x 3, equal unlabored respirations, skin warm/dry/pink. Patient states       

      feeling better. Patient states symptoms have improved.                                      

                                                                                                  

Vital Signs:                                                                                      

19:46  / 71; Pulse 93; Resp 18; Temp 98.8(O); Pulse Ox 95% ; Weight 91.63 kg; Height 5  kr3 

      ft. 2 in. (157.48 cm); Pain 10/10;                                                          

22:45  / 77; Pulse 85; Resp 17; Pulse Ox 98% on R/A; Pain 0/10;                         kl  

19:46 Body Mass Index 36.95 (91.63 kg, 157.48 cm)                                             kr3 

                                                                                                  

ED Course:                                                                                        

19:17 Patient arrived in ED.                                                                  mr  

19:28 Gerardo Carroll PA is PHCP.                                                                cp  

19:28 Enrrique Bundy MD is Attending Physician.                                                cp  

19:49 Triage completed.                                                                       kr3 

19:51 Arm band placed on right wrist. Patient placed in an exam room, on a stretcher.         kr3 

20:06 Urine Microscopic Only Sent.                                                            kr3 

20:40 Inserted saline lock: 20 gauge in right antecubital area, using aseptic technique.      kl  

      Blood collected.                                                                            

20:54 CBC with Diff Sent.                                                                     kl  

20:54 Lipase Sent.                                                                            kl  

20:59 No provider procedures requiring assistance completed.                                  kl  

21:35 CT Abd/Pelvis - IV Contrast Only In Process Unspecified.                                EDMS

22:46 IV discontinued, intact, bleeding controlled, No redness/swelling at site. Pressure     kl  

      dressing applied.                                                                           

22:47 Patient has correct armband on for positive identification.                               

                                                                                                  

Administered Medications:                                                                         

20:19 CANCELLED (Physician Discretion): Rocephin (cefTRIAXone) 1 grams IV at calculated rate  cp  

      once; Given slow IV push per pharmacy instructions                                          

20:45 Drug: Zofran (Ondansetron) 4 mg Route: IVP; Site: right antecubital;                    kl  

20:50 Drug: fentaNYL (PF) 25 mcg Route: IVP; Site: right antecubital;                         kl  

22:45 Follow up: Response: No adverse reaction; Marked relief of symptoms                       

20:53 Drug: NS 0.9% 500 ml Route: IV; Rate: bolus; Site: right antecubital;                     

22:00 Follow up: IV Status: Completed infusion; IV Intake: 500ml                                

22:01 Drug: Rocephin (cefTRIAXone) 1 grams Route: IV; Rate: calculated rate; Site: right        

      antecubital;                                                                                

:44 Follow up: Response: No adverse reaction                                                  

                                                                                                  

                                                                                                  

Medication:                                                                                       

22:47 VIS not applicable for this client.                                                       

                                                                                                  

Intake:                                                                                           

22:00 IV: 500ml; Total: 500ml.                                                                  

                                                                                                  

Outcome:                                                                                          

22:04 Discharge ordered by MD.                                                                cp  

22:45 Discharged to home ambulatory.                                                            

22:45 Condition: good                                                                             

22:45 Discharge instructions given to patient, Instructed on discharge instructions, follow       

      up and referral plans. medication usage, Demonstrated understanding of instructions,        

      follow-up care, medications, Prescriptions given X 2.                                       

22:47 Patient left the ED.                                                                      

                                                                                                  

Signatures:                                                                                       

Dispatcher [employer]                           EDLori Pulido RN RN kl Rivera, Mary                                 mr                                                   

Gerardo Carroll, PA                         PA   Michelle iWllingham RN                        RN   kr3                                                  

                                                                                                  

**************************************************************************************************